# Patient Record
Sex: FEMALE | Race: WHITE | HISPANIC OR LATINO | Employment: UNEMPLOYED | URBAN - METROPOLITAN AREA
[De-identification: names, ages, dates, MRNs, and addresses within clinical notes are randomized per-mention and may not be internally consistent; named-entity substitution may affect disease eponyms.]

---

## 2017-02-21 RX ORDER — GLIMEPIRIDE 2 MG/1
TABLET ORAL
Refills: 0 | OUTPATIENT
Start: 2017-02-21

## 2017-04-20 ENCOUNTER — APPOINTMENT (OUTPATIENT)
Dept: RADIOLOGY | Facility: MEDICAL CENTER | Age: 37
End: 2017-04-20
Attending: EMERGENCY MEDICINE
Payer: MEDICAID

## 2017-04-20 ENCOUNTER — HOSPITAL ENCOUNTER (OUTPATIENT)
Facility: MEDICAL CENTER | Age: 37
End: 2017-04-21
Attending: EMERGENCY MEDICINE | Admitting: FAMILY MEDICINE
Payer: MEDICAID

## 2017-04-20 DIAGNOSIS — R51.9 NONINTRACTABLE HEADACHE, UNSPECIFIED CHRONICITY PATTERN, UNSPECIFIED HEADACHE TYPE: ICD-10-CM

## 2017-04-20 DIAGNOSIS — R07.9 CHEST PAIN, UNSPECIFIED TYPE: ICD-10-CM

## 2017-04-20 LAB
ALBUMIN SERPL BCP-MCNC: 4.3 G/DL (ref 3.2–4.9)
ALBUMIN/GLOB SERPL: 1 G/DL
ALP SERPL-CCNC: 84 U/L (ref 30–99)
ALT SERPL-CCNC: 49 U/L (ref 2–50)
ANION GAP SERPL CALC-SCNC: 12 MMOL/L (ref 0–11.9)
APTT PPP: 25.8 SEC (ref 24.7–36)
AST SERPL-CCNC: 24 U/L (ref 12–45)
BASOPHILS # BLD AUTO: 0.7 % (ref 0–1.8)
BASOPHILS # BLD: 0.07 K/UL (ref 0–0.12)
BILIRUB SERPL-MCNC: 0.4 MG/DL (ref 0.1–1.5)
BUN SERPL-MCNC: 9 MG/DL (ref 8–22)
CALCIUM SERPL-MCNC: 9.2 MG/DL (ref 8.4–10.2)
CHLORIDE SERPL-SCNC: 103 MMOL/L (ref 96–112)
CO2 SERPL-SCNC: 21 MMOL/L (ref 20–33)
CREAT SERPL-MCNC: 0.61 MG/DL (ref 0.5–1.4)
DEPRECATED D DIMER PPP IA-ACNC: <200 NG/ML(D-DU)
EKG IMPRESSION: NORMAL
EOSINOPHIL # BLD AUTO: 0.41 K/UL (ref 0–0.51)
EOSINOPHIL NFR BLD: 4 % (ref 0–6.9)
ERYTHROCYTE [DISTWIDTH] IN BLOOD BY AUTOMATED COUNT: 37.4 FL (ref 35.9–50)
GFR SERPL CREATININE-BSD FRML MDRD: >60 ML/MIN/1.73 M 2
GLOBULIN SER CALC-MCNC: 4.3 G/DL (ref 1.9–3.5)
GLUCOSE SERPL-MCNC: 243 MG/DL (ref 65–99)
HCG SERPL QL: NEGATIVE
HCT VFR BLD AUTO: 45 % (ref 37–47)
HGB BLD-MCNC: 15.7 G/DL (ref 12–16)
IMM GRANULOCYTES # BLD AUTO: 0.02 K/UL (ref 0–0.11)
IMM GRANULOCYTES NFR BLD AUTO: 0.2 % (ref 0–0.9)
INR PPP: 0.87 (ref 0.87–1.13)
LIPASE SERPL-CCNC: 30 U/L (ref 7–58)
LYMPHOCYTES # BLD AUTO: 3.77 K/UL (ref 1–4.8)
LYMPHOCYTES NFR BLD: 36.5 % (ref 22–41)
MCH RBC QN AUTO: 27.7 PG (ref 27–33)
MCHC RBC AUTO-ENTMCNC: 34.9 G/DL (ref 33.6–35)
MCV RBC AUTO: 79.4 FL (ref 81.4–97.8)
MONOCYTES # BLD AUTO: 0.57 K/UL (ref 0–0.85)
MONOCYTES NFR BLD AUTO: 5.5 % (ref 0–13.4)
NEUTROPHILS # BLD AUTO: 5.48 K/UL (ref 2–7.15)
NEUTROPHILS NFR BLD: 53.1 % (ref 44–72)
NRBC # BLD AUTO: 0 K/UL
NRBC BLD AUTO-RTO: 0 /100 WBC
PLATELET # BLD AUTO: 311 K/UL (ref 164–446)
PMV BLD AUTO: 10.2 FL (ref 9–12.9)
POTASSIUM SERPL-SCNC: 3.9 MMOL/L (ref 3.6–5.5)
PROT SERPL-MCNC: 8.6 G/DL (ref 6–8.2)
PROTHROMBIN TIME: 11.6 SEC (ref 12–14.6)
RBC # BLD AUTO: 5.67 M/UL (ref 4.2–5.4)
SODIUM SERPL-SCNC: 136 MMOL/L (ref 135–145)
TROPONIN I SERPL-MCNC: <0.02 NG/ML (ref 0–0.04)
TROPONIN I SERPL-MCNC: <0.02 NG/ML (ref 0–0.04)
WBC # BLD AUTO: 10.3 K/UL (ref 4.8–10.8)

## 2017-04-20 PROCEDURE — 83036 HEMOGLOBIN GLYCOSYLATED A1C: CPT

## 2017-04-20 PROCEDURE — 85025 COMPLETE CBC W/AUTO DIFF WBC: CPT

## 2017-04-20 PROCEDURE — 71020 DX-CHEST-2 VIEWS: CPT

## 2017-04-20 PROCEDURE — 85730 THROMBOPLASTIN TIME PARTIAL: CPT

## 2017-04-20 PROCEDURE — 84484 ASSAY OF TROPONIN QUANT: CPT

## 2017-04-20 PROCEDURE — 83690 ASSAY OF LIPASE: CPT

## 2017-04-20 PROCEDURE — 99285 EMERGENCY DEPT VISIT HI MDM: CPT

## 2017-04-20 PROCEDURE — 84443 ASSAY THYROID STIM HORMONE: CPT

## 2017-04-20 PROCEDURE — 84703 CHORIONIC GONADOTROPIN ASSAY: CPT

## 2017-04-20 PROCEDURE — 86140 C-REACTIVE PROTEIN: CPT

## 2017-04-20 PROCEDURE — 93005 ELECTROCARDIOGRAM TRACING: CPT

## 2017-04-20 PROCEDURE — 84439 ASSAY OF FREE THYROXINE: CPT

## 2017-04-20 PROCEDURE — 85379 FIBRIN DEGRADATION QUANT: CPT

## 2017-04-20 PROCEDURE — 85610 PROTHROMBIN TIME: CPT

## 2017-04-20 PROCEDURE — 700105 HCHG RX REV CODE 258: Performed by: EMERGENCY MEDICINE

## 2017-04-20 PROCEDURE — 80061 LIPID PANEL: CPT

## 2017-04-20 PROCEDURE — 94760 N-INVAS EAR/PLS OXIMETRY 1: CPT

## 2017-04-20 PROCEDURE — 36415 COLL VENOUS BLD VENIPUNCTURE: CPT

## 2017-04-20 PROCEDURE — 96372 THER/PROPH/DIAG INJ SC/IM: CPT

## 2017-04-20 PROCEDURE — 80053 COMPREHEN METABOLIC PANEL: CPT

## 2017-04-20 RX ORDER — SODIUM CHLORIDE 9 MG/ML
1000 INJECTION, SOLUTION INTRAVENOUS ONCE
Status: COMPLETED | OUTPATIENT
Start: 2017-04-20 | End: 2017-04-20

## 2017-04-20 RX ORDER — LEVOTHYROXINE SODIUM 0.07 MG/1
75 TABLET ORAL
COMMUNITY
End: 2019-07-06

## 2017-04-20 RX ORDER — LISINOPRIL 5 MG/1
2.5 TABLET ORAL DAILY
Status: ON HOLD | COMMUNITY
End: 2017-04-21

## 2017-04-20 RX ADMIN — SODIUM CHLORIDE 1000 ML: 9 INJECTION, SOLUTION INTRAVENOUS at 19:56

## 2017-04-20 RX ADMIN — SODIUM CHLORIDE 1000 ML: 900 INJECTION, SOLUTION INTRAVENOUS at 22:30

## 2017-04-20 ASSESSMENT — PAIN SCALES - GENERAL: PAINLEVEL_OUTOF10: 8

## 2017-04-20 NOTE — IP AVS SNAPSHOT
4/21/2017    Liana Degroot Panchito  [address]  [place] [address]    Dear Liana:    Pending sale to [facility] wants to ensure your discharge home is safe and you or your loved ones have had all of your questions answered regarding your care after you leave the hospital.    Below is a list of resources and contact information should you have any questions regarding your hospital stay, follow-up instructions, or active medical symptoms.    Questions or Concerns Regarding… Contact   Medical Questions Related to Your Discharge  (7 days a week, 8am-5pm) Contact a Nurse Care Coordinator   [phone]   Medical Questions Not Related to Your Discharge  (24 hours a day / 7 days a week)  Contact the Nurse Health Line   [phone]    Medications or Discharge Instructions Refer to your discharge packet   or contact your [facility] Primary Care Provider   [phone]   Follow-up Appointment(s) Schedule your appointment via [facility]   or contact Scheduling [phone]   Billing Review your statement via [facility]  or contact Billing [phone]   Medical Records Review your records via [facility]   or contact Medical Records [phone]     You may receive a telephone call within two days of discharge. This call is to make certain you understand your discharge instructions and have the opportunity to have any questions answered. You can also easily access your medical information, test results and upcoming appointments via the [facility] free online health management tool. You can learn more and sign up at [employer]. For assistance setting up your [facility] account, please call [phone].    Once again, we want to ensure your discharge home is safe and that you have a clear understanding of any next steps in your care. If you have any questions or concerns, please do not hesitate to contact us, we are here for you. Thank you for choosing [facility] for your healthcare needs.    Sincerely,    Your [facility] Healthcare Team

## 2017-04-20 NOTE — IP AVS SNAPSHOT
" <p align=\"LEFT\"><IMG SRC=\"//[email or web address]$/[email or web address]\" alt=\"Image\" WIDTH=\"50%\" HEIGHT=\"200\" BORDER=\"\"></p>                   Name:Liana Flanagan  Medical Record Number:[id number]  CSN: [id number]    YOB: 1980   Age: 37 y.o.  Sex: female  HT:1.6 m (5' 3\") WT: 107.1 kg (236 lb 1.8 oz)          Admit Date: 4/20/2017     Discharge Date:   Today's Date: 4/21/2017  Attending Doctor:  Nithin Costello M.D.                  Allergies:  Reglan and Zofran          Follow-up Information     1. Follow up with Kirsten Maher N.P.. Go on 4/28/2017.    Why:  Please arrive at 12:45pm for your appointment.    Contact information    [address] [place] [id number] [phone]         Medication List      Take these Medications        Instructions    atorvastatin 20 MG Tabs   Commonly known as:  LIPITOR    Take 1 Tab by mouth every bedtime.   Dose:  20 mg       insulin glargine 100 UNIT/ML Sopn injection   Commonly known as:  LANTUS    Inject 34 Units as instructed every evening.   Dose:  34 Units       levothyroxine 75 MCG Tabs   Commonly known as:  SYNTHROID    Take 75 mcg by mouth Every morning on an empty stomach.   Dose:  75 mcg       lisinopril 2.5 MG Tabs   Commonly known as:  PRINIVIL    Take 2.5 mg by mouth every day.   Dose:  2.5 mg       metformin 1000 MG tablet   What changed:    - medication strength  - how much to take  - when to take this   Commonly known as:  GLUCOPHAGE    Take 1 Tab by mouth 2 times a day.   Dose:  1000 mg       omeprazole 20 MG delayed-release capsule   Commonly known as:  PRILOSEC    Take 1 Cap by mouth 2 Times a Day.   Dose:  20 mg       sitagliptin 100 MG Tabs   What changed:    - medication strength  - how much to take   Commonly known as:  JANUVIA    Take 1 Tab by mouth every day.   Dose:  100 mg       sucralfate 1 GM/10ML Susp   Commonly known as:  CARAFATE    Take 10 mL by mouth every 6 hours for 14 days.   Dose:  1 g         "

## 2017-04-20 NOTE — IP AVS SNAPSHOT
[employer] Access Code: Activation code not generated  Current [employer] Status: Active    [employer]  A secure, online tool to manage your health information     [employer]’s [employer]® is a secure, online tool that connects you to your personalized health information from the privacy of your home -- day or night - making it very easy for you to manage your healthcare. Once the activation process is completed, you can even access your medical information using the [employer] zachariah, which is available for free in the [employer] store or Google Play store.     [employer] provides the following levels of access (as shown below):   My Chart Features   [facility] Primary Care Doctor [facility]  Specialists [facility]  Urgent  Care Non-[facility]  Primary Care  Doctor   Email your healthcare team securely and privately 24/7 X X X X   Manage appointments: schedule your next appointment; view details of past/upcoming appointments X      Request prescription refills. X      View recent personal medical records, including lab and immunizations X X X X   View health record, including health history, allergies, medications X X X X   Read reports about your outpatient visits, procedures, consult and ER notes X X X X   See your discharge summary, which is a recap of your hospital and/or ER visit that includes your diagnosis, lab results, and care plan. X X       How to register for [employer]:  1. Go to  [email or web address].  2. Click on the Sign Up Now box, which takes you to the New Member Sign Up page. You will need to provide the following information:  a. Enter your [employer] Access Code exactly as it appears at the top of this page. (You will not need to use this code after you’ve completed the sign-up process. If you do not sign up before the expiration date, you must request a new code.)   b. Enter your date of birth.   c. Enter your home email address.   d. Click Submit, and follow the next screen’s instructions.  3. Create a [employer] ID. This will  be your [employer] login ID and cannot be changed, so think of one that is secure and easy to remember.  4. Create a [employer] password. You can change your password at any time.  5. Enter your Password Reset Question and Answer. This can be used at a later time if you forget your password.   6. Enter your e-mail address. This allows you to receive e-mail notifications when new information is available in [employer].  7. Click Sign Up. You can now view your health information.    For assistance activating your [employer] account, call [phone]

## 2017-04-20 NOTE — ED NOTES
"In triage pt reports, \"I don't feel well\" points to right side of chest telling nurse it has been hurting for a few days, and when I walk it hurts too\". Also reports vague symptoms of dizziness, headache, being tired. EKG called in triage  "

## 2017-04-20 NOTE — IP AVS SNAPSHOT
" Home Care Instructions                                                                                                                  Name:Liana Flanagan  Medical Record Number:[id number]  CSN: [id number]    YOB: 1980   Age: 37 y.o.  Sex: female  HT:1.6 m (5' 3\") WT: 107.1 kg (236 lb 1.8 oz)          Admit Date: 4/20/2017     Discharge Date:   Today's Date: 4/21/2017  Attending Doctor:  Nithin Costello M.D.                  Allergies:  Reglan and Zofran            Discharge Instructions       Discharge Instructions    Discharged to home by car with self. Discharged via wheelchair, hospital escort: Yes.  Special equipment needed: Not Applicable    Be sure to schedule a follow-up appointment with your primary care doctor or any specialists as instructed.     Discharge Plan:   Influenza Vaccine Indication: Not indicated: Previously immunized this influenza season and > 8 years of age    I understand that a diet low in cholesterol, fat, and sodium is recommended for good health. Unless I have been given specific instructions below for another diet, I accept this instruction as my diet prescription.   Other diet: Diabetic diet     Special Instructions:     1) You symptoms are suggestive of Acid reflux and inflammation of the esophagus. Start Omeprazole and Sucralfate as prescribed.   2) Follow up with Primary Care Provider within one week of hospital discharge.   3) Your blood sugars are not well controlled. Increase Metformin to 1000 mg twice daily. Increase Januvia to 100 mg daily. Discuss better control with Primary care.   4) You need to start cholesterol medication. Start Atorvastatin 20 mg. Discuss with PCP. Have PCP check liver function and cholesterol levels in four weeks of hospital discharge.    · Is patient discharged on Warfarin / Coumadin?   No     · Is patient Post Blood Transfusion?  No    Depression / Suicide Risk    As you are discharged from this [facility] facility, it is important to " learn how to keep safe from harming yourself.    Recognize the warning signs:  · Abrupt changes in personality, positive or negative- including increase in energy   · Giving away possessions  · Change in eating patterns- significant weight changes-  positive or negative  · Change in sleeping patterns- unable to sleep or sleeping all the time   · Unwillingness or inability to communicate  · Depression  · Unusual sadness, discouragement and loneliness  · Talk of wanting to die  · Neglect of personal appearance   · Rebelliousness- reckless behavior  · Withdrawal from people/activities they love  · Confusion- inability to concentrate     If you or a loved one observes any of these behaviors or has concerns about self-harm, here's what you can do:  · Talk about it- your feelings and reasons for harming yourself  · Remove any means that you might use to hurt yourself (examples: pills, rope, extension cords, firearm)  · Get professional help from the community (Mental Health, Substance Abuse, psychological counseling)  · Do not be alone:Call your Safe Contact- someone whom you trust who will be there for you.  · Call your local CRISIS HOTLINE 909-5296 or 805-228-2514  · Call your local Children's Mobile Crisis Response Team [place] (387) 798-4771 or www.[place]  · Call the toll free National Suicide Prevention Hotlines   · National Suicide Prevention Lifeline 165-538-LEFL (5527)  · National Hope Line Network 800-SUICIDE (335-7536)      Gastroesophageal Reflux Disease, Adult  Gastroesophageal reflux disease (GERD) happens when acid from your stomach flows up into the esophagus. When acid comes in contact with the esophagus, the acid causes soreness (inflammation) in the esophagus. Over time, GERD may create small holes (ulcers) in the lining of the esophagus.  CAUSES   · Increased body weight. This puts pressure on the stomach, making acid rise from the stomach into the esophagus.  · Smoking. This increases  acid production in the stomach.  · Drinking alcohol. This causes decreased pressure in the lower esophageal sphincter (valve or ring of muscle between the esophagus and stomach), allowing acid from the stomach into the esophagus.  · Late evening meals and a full stomach. This increases pressure and acid production in the stomach.  · A malformed lower esophageal sphincter.  Sometimes, no cause is found.  SYMPTOMS   · Burning pain in the lower part of the mid-chest behind the breastbone and in the mid-stomach area. This may occur twice a week or more often.  · Trouble swallowing.  · Sore throat.  · Dry cough.  · Asthma-like symptoms including chest tightness, shortness of breath, or wheezing.  DIAGNOSIS   Your caregiver may be able to diagnose GERD based on your symptoms. In some cases, X-rays and other tests may be done to check for complications or to check the condition of your stomach and esophagus.  TREATMENT   Your caregiver may recommend over-the-counter or prescription medicines to help decrease acid production. Ask your caregiver before starting or adding any new medicines.   HOME CARE INSTRUCTIONS   · Change the factors that you can control. Ask your caregiver for guidance concerning weight loss, quitting smoking, and alcohol consumption.  · Avoid foods and drinks that make your symptoms worse, such as:  · Caffeine or alcoholic drinks.  · Chocolate.  · Peppermint or mint flavorings.  · Garlic and onions.  · Spicy foods.  · Citrus fruits, such as oranges, levon, or limes.  · Tomato-based foods such as sauce, chili, salsa, and pizza.  · Fried and fatty foods.  · Avoid lying down for the 3 hours prior to your bedtime or prior to taking a nap.  · Eat small, frequent meals instead of large meals.  · Wear loose-fitting clothing. Do not wear anything tight around your waist that causes pressure on your stomach.  · Raise the head of your bed 6 to 8 inches with wood blocks to help you sleep. Extra pillows will not  help.  · Only take over-the-counter or prescription medicines for pain, discomfort, or fever as directed by your caregiver.  · Do not take aspirin, ibuprofen, or other nonsteroidal anti-inflammatory drugs (NSAIDs).  SEEK IMMEDIATE MEDICAL CARE IF:   · You have pain in your arms, neck, jaw, teeth, or back.  · Your pain increases or changes in intensity or duration.  · You develop nausea, vomiting, or sweating (diaphoresis).  · You develop shortness of breath, or you faint.  · Your vomit is green, yellow, black, or looks like coffee grounds or blood.  · Your stool is red, bloody, or black.  These symptoms could be signs of other problems, such as heart disease, gastric bleeding, or esophageal bleeding.  MAKE SURE YOU:   · Understand these instructions.  · Will watch your condition.  · Will get help right away if you are not doing well or get worse.     This information is not intended to replace advice given to you by your health care provider. Make sure you discuss any questions you have with your health care provider.     Document Released: 09/27/2006 Document Revised: 01/08/2016 Document Reviewed: 04/13/2016  [employer] Interactive Patient Education ©2016 [employer]    Esophagitis  Esophagitis is inflammation of the esophagus. It can involve swelling, soreness, and pain in the esophagus. This condition can make it difficult and painful to swallow.  CAUSES   Most causes of esophagitis are not serious. Many different factors can cause esophagitis, including:  Gastroesophageal reflux disease (GERD). This is when acid from your stomach flows up into the esophagus.  Recurrent vomiting.  An allergic-type reaction.  Certain medicines, especially those that come in large pills.  Ingestion of harmful chemicals, such as household cleaning products.  Heavy alcohol use.  An infection of the esophagus.  Radiation treatment for cancer.  Certain diseases such as sarcoidosis, Crohn's disease, and scleroderma. These diseases may  cause recurrent esophagitis.  SYMPTOMS   Trouble swallowing.  Painful swallowing.  Chest pain.  Difficulty breathing.  Nausea.  Vomiting.  Abdominal pain.  DIAGNOSIS   Your caregiver will take your history and do a physical exam. Depending upon what your caregiver finds, certain tests may also be done, including:  Barium X-ray. You will drink a solution that coats the esophagus, and X-rays will be taken.  Endoscopy. A lighted tube is put down the esophagus so your caregiver can examine the area.  Allergy tests. These can sometimes be arranged through follow-up visits.  TREATMENT   Treatment will depend on the cause of your esophagitis. In some cases, steroids or other medicines may be given to help relieve your symptoms or to treat the underlying cause of your condition. Medicines that may be recommended include:  Viscous lidocaine, to soothe the esophagus.  Antacids.  Acid reducers.  Proton pump inhibitors.  Antiviral medicines for certain viral infections of the esophagus.  Antifungal medicines for certain fungal infections of the esophagus.  Antibiotic medicines, depending on the cause of the esophagitis.  HOME CARE INSTRUCTIONS   Avoid foods and drinks that seem to make your symptoms worse.  Eat small, frequent meals instead of large meals.  Avoid eating for the 3 hours prior to your bedtime.  If you have trouble taking pills, use a pill splitter to decrease the size and likelihood of the pill getting stuck or injuring the esophagus on the way down. Drinking water after taking a pill also helps.  Stop smoking if you smoke.  Maintain a healthy weight.  Wear loose-fitting clothing. Do not wear anything tight around your waist that causes pressure on your stomach.  Raise the head of your bed 6 to 8 inches with wood blocks to help you sleep. Extra pillows will not help.  Only take over-the-counter or prescription medicines as directed by your caregiver.  SEEK IMMEDIATE MEDICAL CARE IF:  You have severe chest pain  that radiates into your arm, neck, or jaw.  You feel sweaty, dizzy, or lightheaded.  You have shortness of breath.  You vomit blood.  You have difficulty or pain with swallowing.  You have bloody or black, tarry stools.  You have a fever.  You have a burning sensation in the chest more than 3 times a week for more than 2 weeks.  You cannot swallow, drink, or eat.  You drool because you cannot swallow your saliva.  MAKE SURE YOU:  Understand these instructions.  Will watch your condition.  Will get help right away if you are not doing well or get worse.     This information is not intended to replace advice given to you by your health care provider. Make sure you discuss any questions you have with your health care provider.     Document Released: 01/25/2006 Document Revised: 01/08/2016 Document Reviewed: 04/13/2016  [employer] Interactive Patient Education ©2016 [employer]        Follow-up Information     1. Follow up with Kirsten Maher N.P.. Go on 4/28/2017.    Why:  Please arrive at 12:45pm for your appointment.    Contact information    [address] [place] [id number] [phone]         Discharge Medication Instructions:    Below are the medications your physician expects you to take upon discharge:    Review all your home medications and newly ordered medications with your doctor and/or pharmacist. Follow medication instructions as directed by your doctor and/or pharmacist.    Please keep your medication list with you and share with your physician.               Medication List      START taking these medications        Instructions    Morning Afternoon Evening Bedtime    atorvastatin 20 MG Tabs   Commonly known as:  LIPITOR        Take 1 Tab by mouth every bedtime.   Dose:  20 mg                        omeprazole 20 MG delayed-release capsule   Commonly known as:  PRILOSEC        Take 1 Cap by mouth 2 Times a Day.   Dose:  20 mg                        sucralfate 1 GM/10ML Susp   Commonly known as:   CARAFATE        Take 10 mL by mouth every 6 hours for 14 days.   Dose:  1 g                          CHANGE how you take these medications        Instructions    Morning Afternoon Evening Bedtime    metformin 1000 MG tablet   What changed:    - medication strength  - how much to take  - when to take this   Last time this was given:  500 mg on 4/21/2017  1:08 PM   Commonly known as:  GLUCOPHAGE        Take 1 Tab by mouth 2 times a day.   Dose:  1000 mg                        sitagliptin 100 MG Tabs   What changed:    - medication strength  - how much to take   Last time this was given:  25 mg on 4/21/2017  1:07 PM   Commonly known as:  JANUVIA        Take 1 Tab by mouth every day.   Dose:  100 mg                          CONTINUE taking these medications        Instructions    Morning Afternoon Evening Bedtime    insulin glargine 100 UNIT/ML Sopn injection   Commonly known as:  LANTUS        Inject 34 Units as instructed every evening.   Dose:  34 Units                        levothyroxine 75 MCG Tabs   Last time this was given:  75 mcg on 4/21/2017  1:06 PM   Commonly known as:  SYNTHROID        Take 75 mcg by mouth Every morning on an empty stomach.   Dose:  75 mcg                        lisinopril 2.5 MG Tabs   Last time this was given:  2.5 mg on 4/21/2017  1:09 PM   Commonly known as:  PRINIVIL        Take 2.5 mg by mouth every day.   Dose:  2.5 mg                             Where to Get Your Medications      You can get these medications from any pharmacy     Bring a paper prescription for each of these medications    - atorvastatin 20 MG Tabs  - metformin 1000 MG tablet  - omeprazole 20 MG delayed-release capsule  - sitagliptin 100 MG Tabs  - sucralfate 1 GM/10ML Susp            Instructions           Diet / Nutrition:    Follow any diet instructions given to you by your doctor or the dietician, including how much salt (sodium) you are allowed each day.    If you are overweight, talk to your doctor about a  weight reduction plan.    Activity:    Remain physically active following your doctor's instructions about exercise and activity.    Rest often.     Any time you become even a little tired or short of breath, SIT DOWN and rest.    Worsening Symptoms:    Report any of the following signs and symptoms to the doctor's office immediately:    *Pain of jaw, arm, or neck  *Chest pain not relieved by medication                               *Dizziness or loss of consciousness  *Difficulty breathing even when at rest   *More tired than usual                                       *Bleeding drainage or swelling of surgical site  *Swelling of feet, ankles, legs or stomach                 *Fever (>100ºF)  *Pink or blood tinged sputum  *Weight gain (3lbs/day or 5lbs /week)           *Shock from internal defibrillator (if applicable)  *Palpitations or irregular heartbeats                *Cool and/or numb extremities    Stroke Awareness    Common Risk Factors for Stroke include:    Age  Atrial Fibrillation  Carotid Artery Stenosis  Diabetes Mellitus  Excessive alcohol consumption  High blood pressure  Overweight   Physical inactivity  Smoking    Warning signs and symptoms of a stroke include:    *Sudden numbness or weakness of the face, arm or leg (especially on one side of the body).  *Sudden confusion, trouble speaking or understanding.  *Sudden trouble seeing in one or both eyes.  *Sudden trouble walking, dizziness, loss of balance or coordination.Sudden severe headache with no known cause.    It is very important to get treatment quickly when a stroke occurs. If you experience any of the above warning signs, call 911 immediately.                   Disclaimer         Quit Smoking / Tobacco Use:    I understand the use of any tobacco products increases my chance of suffering from future heart disease or stroke and could cause other illnesses which may shorten my life. Quitting the use of tobacco products is the single most  important thing I can do to improve my health. For further information on smoking / tobacco cessation call a Toll Free Quit Line at 1-442.378.7864 (*[facility] [place]) or 1-610.131.7799 (American Lung Association) or you can access the web based program at www.lungusa.org.    [place] Tobacco Users Help Line:  (775) 983-5637       Toll Free: 1-108.483.9977  Quit Tobacco Program [facility] beds at the [facility] [phone]    Crisis Hotline:    [place] Crisis Hotline:  4-318-IXOEDTA or 1-775.704.9728    [place] Crisis Hotline:    1-118.154.5023 or 837-131-0731    Discharge Survey:   Thank you for choosing [facility] beds at the [facility]. We hope we did everything we could to make your hospital stay a pleasant one. You may be receiving a phone survey and we would appreciate your time and participation in answering the questions. Your input is very valuable to us in our efforts to improve our service to our patients and their families.        My signature on this form indicates that:    1. I have reviewed and understand the above information.  2. My questions regarding this information have been answered to my satisfaction.  3. I have formulated a plan with my discharge nurse to obtain my prescribed medications for home.                  Disclaimer         __________________________________                     __________       ________                       Patient Signature                                                 Date                    Time

## 2017-04-21 ENCOUNTER — APPOINTMENT (OUTPATIENT)
Dept: RADIOLOGY | Facility: MEDICAL CENTER | Age: 37
End: 2017-04-21
Attending: FAMILY MEDICINE
Payer: MEDICAID

## 2017-04-21 ENCOUNTER — PATIENT OUTREACH (OUTPATIENT)
Dept: HEALTH INFORMATION MANAGEMENT | Facility: OTHER | Age: 37
End: 2017-04-21

## 2017-04-21 ENCOUNTER — RESOLUTE PROFESSIONAL BILLING HOSPITAL PROF FEE (OUTPATIENT)
Dept: HOSPITALIST | Facility: MEDICAL CENTER | Age: 37
End: 2017-04-21
Payer: MEDICAID

## 2017-04-21 VITALS
WEIGHT: 236.11 LBS | RESPIRATION RATE: 18 BRPM | HEIGHT: 63 IN | BODY MASS INDEX: 41.84 KG/M2 | HEART RATE: 79 BPM | SYSTOLIC BLOOD PRESSURE: 121 MMHG | OXYGEN SATURATION: 97 % | TEMPERATURE: 97.7 F | DIASTOLIC BLOOD PRESSURE: 83 MMHG

## 2017-04-21 PROBLEM — R07.9 CHEST PAIN: Status: ACTIVE | Noted: 2017-04-21

## 2017-04-21 LAB
CHOLEST SERPL-MCNC: 206 MG/DL (ref 100–199)
CRP SERPL HS-MCNC: 1.01 MG/DL (ref 0–0.75)
EST. AVERAGE GLUCOSE BLD GHB EST-MCNC: 243 MG/DL
GLUCOSE BLD-MCNC: 114 MG/DL (ref 65–99)
GLUCOSE BLD-MCNC: 118 MG/DL (ref 65–99)
GLUCOSE BLD-MCNC: 180 MG/DL (ref 65–99)
HBA1C MFR BLD: 10.1 % (ref 0–5.6)
HDLC SERPL-MCNC: 61 MG/DL
LDLC SERPL CALC-MCNC: 92 MG/DL
T4 FREE SERPL-MCNC: 0.76 NG/DL (ref 0.58–1.64)
TRIGL SERPL-MCNC: 264 MG/DL (ref 0–149)
TROPONIN I SERPL-MCNC: <0.02 NG/ML (ref 0–0.04)
TSH SERPL DL<=0.005 MIU/L-ACNC: 3.13 UIU/ML (ref 0.35–5.5)

## 2017-04-21 PROCEDURE — 700105 HCHG RX REV CODE 258: Performed by: FAMILY MEDICINE

## 2017-04-21 PROCEDURE — A9270 NON-COVERED ITEM OR SERVICE: HCPCS | Performed by: FAMILY MEDICINE

## 2017-04-21 PROCEDURE — 700102 HCHG RX REV CODE 250 W/ 637 OVERRIDE(OP): Performed by: FAMILY MEDICINE

## 2017-04-21 PROCEDURE — A9270 NON-COVERED ITEM OR SERVICE: HCPCS | Performed by: EMERGENCY MEDICINE

## 2017-04-21 PROCEDURE — 99236 HOSP IP/OBS SAME DATE HI 85: CPT | Performed by: FAMILY MEDICINE

## 2017-04-21 PROCEDURE — 82962 GLUCOSE BLOOD TEST: CPT

## 2017-04-21 PROCEDURE — G0378 HOSPITAL OBSERVATION PER HR: HCPCS

## 2017-04-21 PROCEDURE — 84484 ASSAY OF TROPONIN QUANT: CPT

## 2017-04-21 PROCEDURE — 700111 HCHG RX REV CODE 636 W/ 250 OVERRIDE (IP): Performed by: FAMILY MEDICINE

## 2017-04-21 PROCEDURE — A9502 TC99M TETROFOSMIN: HCPCS

## 2017-04-21 PROCEDURE — 700102 HCHG RX REV CODE 250 W/ 637 OVERRIDE(OP)

## 2017-04-21 PROCEDURE — 700102 HCHG RX REV CODE 250 W/ 637 OVERRIDE(OP): Performed by: EMERGENCY MEDICINE

## 2017-04-21 PROCEDURE — 700111 HCHG RX REV CODE 636 W/ 250 OVERRIDE (IP)

## 2017-04-21 RX ORDER — OMEPRAZOLE 20 MG/1
20 CAPSULE, DELAYED RELEASE ORAL 2 TIMES DAILY
Status: DISCONTINUED | OUTPATIENT
Start: 2017-04-21 | End: 2017-04-21 | Stop reason: HOSPADM

## 2017-04-21 RX ORDER — SUCRALFATE ORAL 1 G/10ML
1 SUSPENSION ORAL EVERY 6 HOURS
Status: DISCONTINUED | OUTPATIENT
Start: 2017-04-21 | End: 2017-04-21 | Stop reason: HOSPADM

## 2017-04-21 RX ORDER — LABETALOL HYDROCHLORIDE 5 MG/ML
10 INJECTION, SOLUTION INTRAVENOUS EVERY 4 HOURS PRN
Status: DISCONTINUED | OUTPATIENT
Start: 2017-04-21 | End: 2017-04-21 | Stop reason: HOSPADM

## 2017-04-21 RX ORDER — SODIUM CHLORIDE 9 MG/ML
INJECTION, SOLUTION INTRAVENOUS CONTINUOUS
Status: DISCONTINUED | OUTPATIENT
Start: 2017-04-21 | End: 2017-04-21

## 2017-04-21 RX ORDER — LEVOTHYROXINE SODIUM 0.05 MG/1
75 TABLET ORAL
Status: DISCONTINUED | OUTPATIENT
Start: 2017-04-21 | End: 2017-04-21 | Stop reason: HOSPADM

## 2017-04-21 RX ORDER — PROMETHAZINE HYDROCHLORIDE 25 MG/1
12.5-25 SUPPOSITORY RECTAL EVERY 4 HOURS PRN
Status: DISCONTINUED | OUTPATIENT
Start: 2017-04-21 | End: 2017-04-21 | Stop reason: HOSPADM

## 2017-04-21 RX ORDER — ZOLPIDEM TARTRATE 5 MG/1
5 TABLET ORAL
Status: DISCONTINUED | OUTPATIENT
Start: 2017-04-21 | End: 2017-04-21 | Stop reason: HOSPADM

## 2017-04-21 RX ORDER — DEXTROSE MONOHYDRATE 25 G/50ML
25 INJECTION, SOLUTION INTRAVENOUS
Status: DISCONTINUED | OUTPATIENT
Start: 2017-04-21 | End: 2017-04-21 | Stop reason: HOSPADM

## 2017-04-21 RX ORDER — OMEPRAZOLE 20 MG/1
20 CAPSULE, DELAYED RELEASE ORAL 2 TIMES DAILY
Qty: 60 CAP | Refills: 0 | Status: SHIPPED | OUTPATIENT
Start: 2017-04-21 | End: 2017-07-24

## 2017-04-21 RX ORDER — SUCRALFATE ORAL 1 G/10ML
1 SUSPENSION ORAL EVERY 6 HOURS
Qty: 560 ML | Refills: 0 | Status: SHIPPED | OUTPATIENT
Start: 2017-04-21 | End: 2017-05-05

## 2017-04-21 RX ORDER — LISINOPRIL 5 MG/1
2.5 TABLET ORAL DAILY
Status: DISCONTINUED | OUTPATIENT
Start: 2017-04-21 | End: 2017-04-21 | Stop reason: HOSPADM

## 2017-04-21 RX ORDER — AMINOPHYLLINE 25 MG/ML
INJECTION, SOLUTION INTRAVENOUS
Status: COMPLETED
Start: 2017-04-21 | End: 2017-04-21

## 2017-04-21 RX ORDER — ASPIRIN 81 MG/1
324 TABLET, CHEWABLE ORAL ONCE
Status: COMPLETED | OUTPATIENT
Start: 2017-04-21 | End: 2017-04-21

## 2017-04-21 RX ORDER — REGADENOSON 0.08 MG/ML
INJECTION, SOLUTION INTRAVENOUS
Status: COMPLETED
Start: 2017-04-21 | End: 2017-04-21

## 2017-04-21 RX ORDER — ACETAMINOPHEN 325 MG/1
650 TABLET ORAL EVERY 6 HOURS PRN
Status: DISCONTINUED | OUTPATIENT
Start: 2017-04-21 | End: 2017-04-21 | Stop reason: HOSPADM

## 2017-04-21 RX ORDER — PROMETHAZINE HYDROCHLORIDE 25 MG/1
12.5-25 TABLET ORAL EVERY 4 HOURS PRN
Status: DISCONTINUED | OUTPATIENT
Start: 2017-04-21 | End: 2017-04-21 | Stop reason: HOSPADM

## 2017-04-21 RX ORDER — ATORVASTATIN CALCIUM 10 MG/1
20 TABLET, FILM COATED ORAL
Status: DISCONTINUED | OUTPATIENT
Start: 2017-04-21 | End: 2017-04-21 | Stop reason: HOSPADM

## 2017-04-21 RX ORDER — ATORVASTATIN CALCIUM 20 MG/1
20 TABLET, FILM COATED ORAL
Qty: 30 TAB | Refills: 0 | Status: SHIPPED | OUTPATIENT
Start: 2017-04-21 | End: 2019-07-06

## 2017-04-21 RX ORDER — LISINOPRIL 2.5 MG/1
2.5 TABLET ORAL DAILY
COMMUNITY
End: 2019-07-06

## 2017-04-21 RX ADMIN — REGADENOSON 0.4 MG: 0.08 INJECTION, SOLUTION INTRAVENOUS at 11:22

## 2017-04-21 RX ADMIN — SITAGLIPTIN 25 MG: 50 TABLET, FILM COATED ORAL at 13:07

## 2017-04-21 RX ADMIN — LISINOPRIL 2.5 MG: 5 TABLET ORAL at 13:09

## 2017-04-21 RX ADMIN — ASPIRIN 81 MG CHEWABLE TABLET 324 MG: 81 TABLET CHEWABLE at 01:05

## 2017-04-21 RX ADMIN — METFORMIN HYDROCHLORIDE 500 MG: 500 TABLET, FILM COATED ORAL at 13:08

## 2017-04-21 RX ADMIN — AMINOPHYLLINE 100 MG: 25 INJECTION, SOLUTION INTRAVENOUS at 11:30

## 2017-04-21 RX ADMIN — SODIUM CHLORIDE: 9 INJECTION, SOLUTION INTRAVENOUS at 03:21

## 2017-04-21 RX ADMIN — INSULIN LISPRO 2 UNITS: 100 INJECTION, SOLUTION INTRAVENOUS; SUBCUTANEOUS at 17:10

## 2017-04-21 RX ADMIN — ENOXAPARIN SODIUM 40 MG: 100 INJECTION SUBCUTANEOUS at 13:18

## 2017-04-21 RX ADMIN — LEVOTHYROXINE SODIUM 75 MCG: 50 TABLET ORAL at 13:06

## 2017-04-21 ASSESSMENT — LIFESTYLE VARIABLES
ALCOHOL_USE: NO
EVER_SMOKED: NEVER
EVER_SMOKED: YES

## 2017-04-21 ASSESSMENT — PAIN SCALES - GENERAL
PAINLEVEL_OUTOF10: 4
PAINLEVEL_OUTOF10: 0

## 2017-04-21 NOTE — CARE PLAN
Problem: Communication  Goal: The ability to communicate needs accurately and effectively will improve  Intervention: [facility] patient and significant other/support system to call light to alert staff of needs    04/21/17 1047   OTHER   Oriented to: All of the Following : Location of Bathroom, Visiting Policy, Unit Routine, Call Light and Bedside Controls, Bedside Rail Policy, Smoking Policy, Rights and Responsibilities, Bedside Report, and Patient Education Notebook           Problem: Knowledge Deficit  Goal: Knowledge of disease process/condition, treatment plan, diagnostic tests, and medications will improve  Intervention: Explain information regarding disease process/condition, treatment plan, diagnostic tests, and medications and document in education  Educated pt on stress test and why test is needed. Verbalized understanding.

## 2017-04-21 NOTE — ED NOTES
Assumed patient care. Pt assesement done.  Plan of care reviewed with patient. Pt denies any needs at this time, informed that waiting on hospitalist to come and assess, pt verbalized understanding.

## 2017-04-21 NOTE — ED PROVIDER NOTES
CHIEF COMPLAINT  Chest pain    HPI  Liana Flanagan is a 37 y.o. female who presents with chest pain. She states that the pain started 3 or 4 days ago and has been intermittent. She describes it as pressure-like in the middle of her chest. It does not radiate. She denies alleviating or exacerbating factors. She also admits to some nausea as well as a headache and dizziness intermittently. She states that she also feels short of breath. She does have a significant past medical history for diabetes and states that her blood sugars have been running high. She states that she does also a history of DVT after she was diagnosed with a cellulitis in her lower extremity. She states that she was on Coumadin for several years however stopped taking it several years ago. She denies any syncope, fevers, URI symptoms, focal neuro deficit, urinary symptoms, vomiting, or diarrhea.    REVIEW OF SYSTEMS  See HPI for further details. All other systems are negative.     PAST MEDICAL HISTORY   has a past medical history of Type II or unspecified type diabetes mellitus without mention of complication, not stated as uncontrolled; Sleep apnea; Hypertension; Personal history of venous thrombosis and embolism; Herpes; Obese; and Chickenpox.    SOCIAL HISTORY  Social History     Social History Main Topics   • Smoking status: Former Smoker -- 0.50 packs/day for .3 years     Types: Cigarettes     Quit date: 01/01/2008   • Smokeless tobacco: Never Used   • Alcohol Use: No   • Drug Use: No      Comment: past hx of meth use at age 19 x 3 mos   • Sexual Activity:     Partners: Male       SURGICAL HISTORY  patient denies any surgical history    CURRENT MEDICATIONS  Home Medications     **Home medications have not yet been reviewed for this encounter**          ALLERGIES  Allergies   Allergen Reactions   • Reglan [Metoclopramide] Vomiting   • Zofran Hives       PHYSICAL EXAM  VITAL SIGNS: /86 mmHg  Pulse 69  Temp(Src) 36.9 °C (98.4 °F)  " Resp 16  Ht 1.6 m (5' 3\")  Wt 107.1 kg (236 lb 1.8 oz)  BMI 41.84 kg/m2  SpO2 95%   Constitutional: Alert and in no apparent distress. Morbidly obese female.  HENT: Normocephalic atraumatic. Bilateral external ears normal. Nose normal. Mucous membranes are moist.  Eyes: Pupils are equal and reactive. Conjunctiva normal. Non-icteric sclera.   Neck: Normal range of motion without tenderness. Supple. No meningeal signs.  Cardiovascular: Regular rate and rhythm. No murmurs, gallops or rubs.  Thorax & Lungs: Breath sounds are clear to auscultation bilaterally. No wheezing, rhonchi or rales.  Abdomen: Exam is limited secondary to obesity however it is otherwise soft, nontender and nondistended. No peritoneal signs noted.  Skin: Warm and dry. No rashes are noted.  Back: No bony tenderness, No CVA tenderness.   Extremities: 2+ peripheral pulses. Cap refill is less than 2 seconds. No edema, cyanosis, or clubbing.  Musculoskeletal: Good range of motion in all major joints. No tenderness to palpation or major deformities noted. No calf tenderness or swelling is noted.  Neurologic: Alert and oriented ×3. The patient moves all 4 extremities and follows commands.  Psychiatric: Affect is anxious. Judgment appears to be intact.      DIAGNOSTIC STUDIES / PROCEDURES    EKG  EKG was performed at 15:35. It shows a normal sinus rhythm with a heart rate of 69. Axis is normal. MO interval is 161. QRS is 101. QT and QTC are 428/459. No acute ST elevation or depression is noted. Impression nonspecific EKG.    LABS  Results for orders placed or performed during the hospital encounter of 04/20/17   CBC w/ Differential   Result Value Ref Range    WBC 10.3 4.8 - 10.8 K/uL    RBC 5.67 (H) 4.20 - 5.40 M/uL    Hemoglobin 15.7 12.0 - 16.0 g/dL    Hematocrit 45.0 37.0 - 47.0 %    MCV 79.4 (L) 81.4 - 97.8 fL    MCH 27.7 27.0 - 33.0 pg    MCHC 34.9 33.6 - 35.0 g/dL    RDW 37.4 35.9 - 50.0 fL    Platelet Count 311 164 - 446 K/uL    MPV 10.2 9.0 - " 12.9 fL    Neutrophils-Polys 53.10 44.00 - 72.00 %    Lymphocytes 36.50 22.00 - 41.00 %    Monocytes 5.50 0.00 - 13.40 %    Eosinophils 4.00 0.00 - 6.90 %    Basophils 0.70 0.00 - 1.80 %    Immature Granulocytes 0.20 0.00 - 0.90 %    Nucleated RBC 0.00 /100 WBC    Neutrophils (Absolute) 5.48 2.00 - 7.15 K/uL    Lymphs (Absolute) 3.77 1.00 - 4.80 K/uL    Monos (Absolute) 0.57 0.00 - 0.85 K/uL    Eos (Absolute) 0.41 0.00 - 0.51 K/uL    Baso (Absolute) 0.07 0.00 - 0.12 K/uL    Immature Granulocytes (abs) 0.02 0.00 - 0.11 K/uL    NRBC (Absolute) 0.00 K/uL   Complete Metabolic Panel (CMP)   Result Value Ref Range    Sodium 136 135 - 145 mmol/L    Potassium 3.9 3.6 - 5.5 mmol/L    Chloride 103 96 - 112 mmol/L    Co2 21 20 - 33 mmol/L    Anion Gap 12.0 (H) 0.0 - 11.9    Glucose 243 (H) 65 - 99 mg/dL    Calcium 9.2 8.4 - 10.2 mg/dL    AST(SGOT) 24 12 - 45 U/L    ALT(SGPT) 49 2 - 50 U/L    Alkaline Phosphatase 84 30 - 99 U/L    Bun 9 8 - 22 mg/dL    Creatinine 0.61 0.50 - 1.40 mg/dL    Total Bilirubin 0.4 0.1 - 1.5 mg/dL    Albumin 4.3 3.2 - 4.9 g/dL    Total Protein 8.6 (H) 6.0 - 8.2 g/dL    Globulin 4.3 (H) 1.9 - 3.5 g/dL    A-G Ratio 1.0 g/dL   Lipase   Result Value Ref Range    Lipase 30 7 - 58 U/L   Troponin in two (2) hours   Result Value Ref Range    Troponin I <0.02 0.00 - 0.04 ng/mL   Troponin STAT   Result Value Ref Range    Troponin I <0.02 0.00 - 0.04 ng/mL   PT/INR   Result Value Ref Range    PT 11.6 (L) 12.0 - 14.6 sec    INR 0.87 0.87 - 1.13   APTT   Result Value Ref Range    APTT 25.8 24.7 - 36.0 sec   D-Dimer (only helpful in low pre-test probability wells critieria. Do not order if patient ruled out by PERC criteria. See Weblinks at top of Labs section)   Result Value Ref Range    D-Dimer Screen <200 <250 ng/mL(D-DU)   HCG QUAL SERUM   Result Value Ref Range    Beta-Hcg Qualitative Serum Negative Negative   ESTIMATED GFR   Result Value Ref Range    GFR If African American >60 >60 mL/min/1.73 m 2    GFR If  Non African American >60 >60 mL/min/1.73 m 2   EKG (ER)   Result Value Ref Range    Report       [facility] Emergency Dept.    Test Date:  2017  Pt Name:    PRANAV URIBE                 Department: [facility]  MRN:        [id number]                      Room:  Gender:     F                            Technician: MS  :        [id number]                   Requested By:ER TRIAGE PROTOCOL  Order #:    [id number]                    Reading MD:    Measurements  Intervals                                Axis  Rate:       69                           P:          51  AR:         161                          QRS:        67  QRSD:       101                          T:          45  QT:         428  QTc:        459    Interpretive Statements  Sinus rhythm  Baseline wander in lead(s) V1  Compared to ECG 2016 16:14:17  No significant changes           RADIOLOGY  DX-CHEST-2 VIEWS   Final Result         1. No active cardiopulmonary abnormalities are identified.          COURSE & MEDICAL DECISION MAKING  Pertinent Labs & Imaging studies reviewed. (See chart for details)  This is a 37-year-old female with history of diabetes, obesity, DVT, and hypertension presenting to the ED with chest pain. On initial evaluation, the patient did not appear to be in any acute distress, and her vital signs are stable. Specifically, she was not tachycardic or hypoxic. Initial concern was for ACS versus PE versus DKA.    EKG was performed and did not reveal any evidence of acute ischemia at this time. 1st troponin was negative. D-dimer was also negative. I have low clinical suspicion for PE or DVT. Her glucose was noted to be 243 however her bicarb was normal. Do not believe that she is in DKA at this point. She was treated with IV fluids and her symptoms improved. However, the patient does have significant risk factors for ACS. She has not had a stress test. She was admitted for chest pain rule out and stress testing.      11:20 PM - Discussed the case with Dr Butcher who agreed with the plan and accepted the patient    FINAL IMPRESSION  1. Chest pain  2. Dizziness    Electronically signed by: Yuliana Villagomez, 4/20/2017 7:09 PM

## 2017-04-21 NOTE — DIETARY
NUTRITION SERVICES: BMI - Pt with BMI >40 (41.84). Weight loss counseling not appropriate in acute care setting. RECOMMEND - Referral to outpatient nutrition services for weight management after D/C.

## 2017-04-21 NOTE — PROGRESS NOTES
Received bedside report from Nichelle ARMENTA. Assumed care of pt who is resting in bed. RR even/unlabored. Fall protocol in place. CLIP. Will continue to monitor. Pt aware of pending stress test.

## 2017-04-21 NOTE — H&P
HPI:    Patient is a 37 year old female who comes to the ER because of chest pain for the past 2 days. She states the pain is mostly located substernally with radiation to her back. She says the pain is 6/10, intermittent and dull in nature. She denies having any cardiac problems in the past. She does have a history of diabetes but she denies hypertension or dyslipidemia. She also denies tobacco use. In the ER, first 2 sets of negative troponins are negative. EKG shows a sinus rhythm at 69 bpm. Her sugars are elevated.     ROS: Positive for chest pain. All other ROS are negative.     PAST MEDICAL HISTORY   has a past medical history of Type II Diabetes, Hypothyroidism, Sleep apnea; Personal history of venous thrombosis and embolism; Herpes; Obese; and Chickenpox.    SOCIAL HISTORY  Social History        Social History Main Topics    •  Smoking status:  Former Smoker -- 0.50 packs/day for .3 years        Types:  Cigarettes        Quit date:  01/01/2008    •  Smokeless tobacco:  Never Used    •  Alcohol Use:  No    •  Drug Use:  No          Comment: past hx of meth use at age 19 x 3 mos    •  Sexual Activity:        Partners:  Male        SURGICAL HISTORY  patient denies any surgical history        MEDICATIONS:  Levothyroxine, lisinopril, metformin, Januvia      ALLERGIES  Allergies    Allergen  Reactions    •  Reglan [Metoclopramide]  Vomiting    •  Zofran               Physical Exam:  Gen: NAD, lying comfortably in bed  HEENT: NC/AT, MMM  Chest: symmetrical expansion, no costochondral tenderness on palpation  Lungs: CTA B/L, no w/r/r  CV: +S1, S2, no m/r/g  Abdomen: S/NT/ND. + BS x 4  Ext: no c/c/e, FROM x 4  Skin: dry, warm to touch   Neuro: CN II - XII intact, no focal motor or sensory deficits noted  Psych: A+O x 3, judgment is intact        Labs (CBC): Last 72 Hours  (Last 3 results in the past 72 hours)             04/20/17 1955          WBC  10.3        Neutrophils-Polys  53.10        Basophils  0.70         "RBC  5.67        Hemoglobin  15.7        Hematocrit  45.0        MCV  79.4        MCH  27.7        MCHC  34.9        Platelet Count  311        RDW  37.4        MPV  10.2        Neutrophils (Absolute)  5.48        Lymphs (Absolute)  3.77                   - Comment  - Low  - High            Labs (Metabolic): Last 72 Hours  (Last 3 results in the past 72 hours)             04/20/17 1955          Sodium  136        Potassium  3.9        Chloride  103        Co2  21        Glucose  243        Bun  9        Creatinine  0.61        Calcium  9.2        AST(SGOT)  24        ALT(SGPT)  49        Alkaline Phosphatase  84        Total Bilirubin  0.4        Albumin  4.3        Total Protein  8.6        Globulin  4.3        A-G Ratio  1.0        GFR If African American 60 mL/min/1.73 m 2  Lab: V\" class=\"rz_1o\" style=\"padding-left: 2px;\"> 60 mL/min/1.73 m 2  Lab: V\" class=\"rz_1p\" style=\"padding-left: 3px;\">>60        GFR If Non African American 60 mL/min/1.73 m 2  Lab: V\" class=\"rz_1o\" style=\"padding-left: 2px;\"> 60 mL/min/1.73 m 2  Lab: V\" class=\"rz_1p\" style=\"padding-left: 3px;\">>60        Anion Gap  12.0                   - High              Labs (Additional): Last 72 Hours  (Last 3 results in the past 72 hours)             04/20/17 2142 04/20/17 1955          INR    0.87        Troponin I 0.50 ng/mL  \" class=\"rz_1o\" style=\"padding-left: 2px;\"> 0.50 ng/mL  \" class=\"rz_1p\" style=\"padding-left: 4px; border-right-color: lightgray; border-right-width: 1px; border-right-style: solid;\"><0.02 0.50 ng/mL  \" class=\"rz_1o\" style=\"padding-left: 2px;\"> 0.50 ng/mL  \" class=\"rz_1p\" style=\"padding-left: 4px; border-right-color: lightgray; border-right-width: 1px; border-right-style: solid;\"><0.02         4/20/2017 7:59 PM    HISTORY/REASON FOR EXAM:  Chest Pain      TECHNIQUE/EXAM DESCRIPTION AND NUMBER OF VIEWS:  Two views of the chest.    COMPARISON:  7/13/2016.    FINDINGS:  No pulmonary infiltrates or consolidations are " noted.  No pleural effusions, no pneumothorax are appreciated.  Stable cardiopericardial silhouette.           Impression          1. No active cardiopulmonary abnormalities are identified.         Assessment/Plan:    Chest pain  Hyperglycemia due to uncontrolled DM Type II  Hypothyroidism  Hx of DVT x 3  Sleep apnea  Morbid obesity    Patient with 2 sets of negative troponins. EKG is normal. Monitor third set of troponin. Nuclear stress test ordered. Check lipid panel, HgbA1c, CRP, TSH, Free T4.  Continue IV fluids for hyperglycemia. Continue metformin and Januvia. Start insulin sliding scale  Apparently has had 3 episodes of lower extremity DVTs in the past but is not on any anticoagulation at home. Current D-dimer is negative

## 2017-04-21 NOTE — PROGRESS NOTES
Admit to 313-1 from ED. Ambulate from gurney to bed, gait steady. Alert and oriented. Respirations even and unlabored. IVF hung and infusing without issue. Oriented to room and equipment. Call light in reach.     Tele Report:   Sinus Rhythm  No ectopy  HR 55-70  NJ 0.18  QRS 0.08  QT 0.40

## 2017-04-21 NOTE — PROGRESS NOTES
The Medication Reconciliation process has been completed by interviewing the patient and a call to [facility]     Allergies have been reviewed  Antibiotic use in 30 days - NONE    Home Pharmacy:  [facility]

## 2017-04-21 NOTE — CARE PLAN
Problem: Safety  Goal: Will remain free from injury  Outcome: PROGRESSING AS EXPECTED  Oriented to room and equipment. Call light instructions given. In reach at all times. Slipper socks in place. Floor dry and uncluttered. Bed locked and in lowest position.     Problem: Knowledge Deficit  Goal: Knowledge of disease process/condition, treatment plan, diagnostic tests, and medications will improve  Outcome: PROGRESSING AS EXPECTED  Plan of care discussed with patient. Opportunity given for questions. States understanding.     Problem: Pain Management  Goal: Pain level will decrease to patient’s comfort goal  Outcome: PROGRESSING AS EXPECTED  Uses 0-10 scale appropriately. Pharmacologic and nonpharmacologic interventions in place.

## 2017-04-21 NOTE — PROGRESS NOTES
Assessment completed, pt aware stress test is roughly at 930. IV fluids running, PIV flushing well. Educated on procedure. Denies needs. CLIP.

## 2017-04-22 NOTE — PROGRESS NOTES
Discharge instructions provided, verbalized understanding. PIV removed, tip intact. printed scripts given. Work note provided. Side effects of new medications discussed. Pt will use call light when ready to go.

## 2017-04-22 NOTE — DISCHARGE SUMMARY
C O N F I D E N T I A L I N F O R M A T I O N   -------------------------------------------------------------------------------------------------------------------   DISCHARGE SUMMARY    Patient ID:  Liana Flanagan  [id number]  37 y.o.female  1980    Admit date: 4/20/2017    Discharge date and time: 04/21/2017    Admitting Physician: George Brown D.O.    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  7) Hypothyroidism  8) History of DVT  9) Hx SILVIA    Admission Condition: fair    Discharged Condition: good    Indication for Admission: Chest pain    HPI: As noted by Dr Brown on 04/21/2017.    Hospital Course: This is a 37 female presented to the [facility] ER with chest pain. Detail review with patient reveals that this has been ongoing for some time. Mostly after meals. [place]. There has been ongoing severe reflux like symptoms including reflux of sour contents into her throat causing pain. She was admitted to the hospital. Pain was non pleuritic. Overall low risk for PE. Given prior history of DVT D Dimer was obtained and found to be negative. Presentation was noted to be not consistent with aortic pathologies. She was admitted to the hospital. Given underlying uncontrolled DM, and underlying risk factors it was decided to r/o cardiac etiologies by obtaining a stress test. This was obtained after troponin's remained negative and no events noted on telemetry. MPI obtained did not reveal any evidence of ischemia or infarct. Presentation most consistent with esophageal pain / suspected esophagitis / GERD like symptoms. She has been started on oral PPI / Sucralfate. This has been prescribed on discharge. In addition patient with uncontrolled DM. I have increased  the metformin and Januvia on discharge. Advised close discussion with PCP following discharge home. In addition patient candidate for statin therapy and this has been started. Patient advised very close f/u with PCP following discharge for ongoing close care. She is discharged home in stable condition. Discharge planning has been discussed in detail with the patient.     Things to follow up after discharge:   1) You symptoms are suggestive of Acid reflux and inflammation of the esophagus. Start Omeprazole and Sucralfate as prescribed.   2) Follow up with PCP within one week of hospital discharge.   3) Your blood sugars are not well controlled. Increase Metformin to 1000 mg twice daily. Increase Januvia to 100 mg daily. Discuss better control with PCP.   4) You need to start cholesterol medication. Start Atorvastatin 20 mg. Discuss with PCP. Have PCP check liver function and cholesterol levels in four weeks of hospital discharge.    Consults: none    Significant Studies:   NM-CARDIAC STRESS TEST   Final Result      DX-CHEST-2 VIEWS   Final Result         1. No active cardiopulmonary abnormalities are identified.          Procedures Performed While Hospitalized: None    Operations During Hospitalization: None    Disposition: Home    Patient Instructions: Given  Activity: activity as tolerated  Diet: Low fatty food. Avoid choclates. Caffeine.   Wound Care: none needed    Medications at discharge:   Liana Flanagan   Home Medication Instructions ROSANNE:[id number]    Printed on:04/21/17 5292   Medication Information                      atorvastatin (LIPITOR) 20 MG Tab  Take 1 Tab by mouth every bedtime.             insulin glargine (LANTUS) 100 UNIT/ML Solution Pen-injector injection  Inject 34 Units as instructed every evening.             levothyroxine (SYNTHROID) 75 MCG Tab  Take 75 mcg by mouth Every morning on an empty stomach.             lisinopril (PRINIVIL) 2.5 MG Tab  Take 2.5 mg by mouth every day.              metformin (GLUCOPHAGE) 1000 MG tablet  Take 1 Tab by mouth 2 times a day.             omeprazole (PRILOSEC) 20 MG delayed-release capsule  Take 1 Cap by mouth 2 Times a Day.             sitagliptin (JANUVIA) 100 MG Tab  Take 1 Tab by mouth every day.             sucralfate (CARAFATE) 1 GM/10ML Suspension  Take 10 mL by mouth every 6 hours for 14 days.                 Opioid prescription history checked: N/A. None prescribed.     Follow-up with PCP in 7 days.    Signed:  Nithin Costello  4/21/2017  7:07 PM

## 2017-04-22 NOTE — DISCHARGE INSTRUCTIONS
Discharge Instructions    Discharged to home by car with self. Discharged via wheelchair, hospital escort: Yes.  Special equipment needed: Not Applicable    Be sure to schedule a follow-up appointment with your primary care doctor or any specialists as instructed.     Discharge Plan:   Influenza Vaccine Indication: Not indicated: Previously immunized this influenza season and > 8 years of age    I understand that a diet low in cholesterol, fat, and sodium is recommended for good health. Unless I have been given specific instructions below for another diet, I accept this instruction as my diet prescription.   Other diet: Diabetic diet     Special Instructions:     1) You symptoms are suggestive of Acid reflux and inflammation of the esophagus. Start Omeprazole and Sucralfate as prescribed.   2) Follow up with Primary Care Provider within one week of hospital discharge.   3) Your blood sugars are not well controlled. Increase Metformin to 1000 mg twice daily. Increase Januvia to 100 mg daily. Discuss better control with Primary care.   4) You need to start cholesterol medication. Start Atorvastatin 20 mg. Discuss with PCP. Have PCP check liver function and cholesterol levels in four weeks of hospital discharge.    · Is patient discharged on Warfarin / Coumadin?   No     · Is patient Post Blood Transfusion?  No    Depression / Suicide Risk    As you are discharged from this [facility] facility, it is important to learn how to keep safe from harming yourself.    Recognize the warning signs:  · Abrupt changes in personality, positive or negative- including increase in energy   · Giving away possessions  · Change in eating patterns- significant weight changes-  positive or negative  · Change in sleeping patterns- unable to sleep or sleeping all the time   · Unwillingness or inability to communicate  · Depression  · Unusual sadness, discouragement and loneliness  · Talk of wanting to die  · Neglect of personal  appearance   · Rebelliousness- reckless behavior  · Withdrawal from people/activities they love  · Confusion- inability to concentrate     If you or a loved one observes any of these behaviors or has concerns about self-harm, here's what you can do:  · Talk about it- your feelings and reasons for harming yourself  · Remove any means that you might use to hurt yourself (examples: pills, rope, extension cords, firearm)  · Get professional help from the community (Mental Health, Substance Abuse, psychological counseling)  · Do not be alone:Call your Safe Contact- someone whom you trust who will be there for you.  · Call your local CRISIS HOTLINE 193-3507 or 437-729-5525  · Call your local Children's Mobile Crisis Response Team [place] (979) 550-6441 or www.[employer]  · Call the toll free National Suicide Prevention Hotlines   · National Suicide Prevention Lifeline 439-335-HKUC (4397)  · fotopedia Line Network 800-SUICIDE (609-3944)      Gastroesophageal Reflux Disease, Adult  Gastroesophageal reflux disease (GERD) happens when acid from your stomach flows up into the esophagus. When acid comes in contact with the esophagus, the acid causes soreness (inflammation) in the esophagus. Over time, GERD may create small holes (ulcers) in the lining of the esophagus.  CAUSES   · Increased body weight. This puts pressure on the stomach, making acid rise from the stomach into the esophagus.  · Smoking. This increases acid production in the stomach.  · Drinking alcohol. This causes decreased pressure in the lower esophageal sphincter (valve or ring of muscle between the esophagus and stomach), allowing acid from the stomach into the esophagus.  · Late evening meals and a full stomach. This increases pressure and acid production in the stomach.  · A malformed lower esophageal sphincter.  Sometimes, no cause is found.  SYMPTOMS   · Burning pain in the lower part of the mid-chest behind the breastbone and in the  mid-stomach area. This may occur twice a week or more often.  · Trouble swallowing.  · Sore throat.  · Dry cough.  · Asthma-like symptoms including chest tightness, shortness of breath, or wheezing.  DIAGNOSIS   Your caregiver may be able to diagnose GERD based on your symptoms. In some cases, X-rays and other tests may be done to check for complications or to check the condition of your stomach and esophagus.  TREATMENT   Your caregiver may recommend over-the-counter or prescription medicines to help decrease acid production. Ask your caregiver before starting or adding any new medicines.   HOME CARE INSTRUCTIONS   · Change the factors that you can control. Ask your caregiver for guidance concerning weight loss, quitting smoking, and alcohol consumption.  · Avoid foods and drinks that make your symptoms worse, such as:  · Caffeine or alcoholic drinks.  · Chocolate.  · Peppermint or mint flavorings.  · Garlic and onions.  · Spicy foods.  · Citrus fruits, such as oranges, levon, or limes.  · Tomato-based foods such as sauce, chili, salsa, and pizza.  · Fried and fatty foods.  · Avoid lying down for the 3 hours prior to your bedtime or prior to taking a nap.  · Eat small, frequent meals instead of large meals.  · Wear loose-fitting clothing. Do not wear anything tight around your waist that causes pressure on your stomach.  · Raise the head of your bed 6 to 8 inches with wood blocks to help you sleep. Extra pillows will not help.  · Only take over-the-counter or prescription medicines for pain, discomfort, or fever as directed by your caregiver.  · Do not take aspirin, ibuprofen, or other nonsteroidal anti-inflammatory drugs (NSAIDs).  SEEK IMMEDIATE MEDICAL CARE IF:   · You have pain in your arms, neck, jaw, teeth, or back.  · Your pain increases or changes in intensity or duration.  · You develop nausea, vomiting, or sweating (diaphoresis).  · You develop shortness of breath, or you faint.  · Your vomit is green,  yellow, black, or looks like coffee grounds or blood.  · Your stool is red, bloody, or black.  These symptoms could be signs of other problems, such as heart disease, gastric bleeding, or esophageal bleeding.  MAKE SURE YOU:   · Understand these instructions.  · Will watch your condition.  · Will get help right away if you are not doing well or get worse.     This information is not intended to replace advice given to you by your health care provider. Make sure you discuss any questions you have with your health care provider.     Document Released: 09/27/2006 Document Revised: 01/08/2016 Document Reviewed: 04/13/2016  [employer] Interactive Patient Education ©2016 Elsevier Inc.    Esophagitis  Esophagitis is inflammation of the esophagus. It can involve swelling, soreness, and pain in the esophagus. This condition can make it difficult and painful to swallow.  CAUSES   Most causes of esophagitis are not serious. Many different factors can cause esophagitis, including:  Gastroesophageal reflux disease (GERD). This is when acid from your stomach flows up into the esophagus.  Recurrent vomiting.  An allergic-type reaction.  Certain medicines, especially those that come in large pills.  Ingestion of harmful chemicals, such as household cleaning products.  Heavy alcohol use.  An infection of the esophagus.  Radiation treatment for cancer.  Certain diseases such as sarcoidosis, Crohn's disease, and scleroderma. These diseases may cause recurrent esophagitis.  SYMPTOMS   Trouble swallowing.  Painful swallowing.  Chest pain.  Difficulty breathing.  Nausea.  Vomiting.  Abdominal pain.  DIAGNOSIS   Your caregiver will take your history and do a physical exam. Depending upon what your caregiver finds, certain tests may also be done, including:  Barium X-ray. You will drink a solution that coats the esophagus, and X-rays will be taken.  Endoscopy. A lighted tube is put down the esophagus so your caregiver can examine the  area.  Allergy tests. These can sometimes be arranged through follow-up visits.  TREATMENT   Treatment will depend on the cause of your esophagitis. In some cases, steroids or other medicines may be given to help relieve your symptoms or to treat the underlying cause of your condition. Medicines that may be recommended include:  Viscous lidocaine, to soothe the esophagus.  Antacids.  Acid reducers.  Proton pump inhibitors.  Antiviral medicines for certain viral infections of the esophagus.  Antifungal medicines for certain fungal infections of the esophagus.  Antibiotic medicines, depending on the cause of the esophagitis.  HOME CARE INSTRUCTIONS   Avoid foods and drinks that seem to make your symptoms worse.  Eat small, frequent meals instead of large meals.  Avoid eating for the 3 hours prior to your bedtime.  If you have trouble taking pills, use a pill splitter to decrease the size and likelihood of the pill getting stuck or injuring the esophagus on the way down. Drinking water after taking a pill also helps.  Stop smoking if you smoke.  Maintain a healthy weight.  Wear loose-fitting clothing. Do not wear anything tight around your waist that causes pressure on your stomach.  Raise the head of your bed 6 to 8 inches with wood blocks to help you sleep. Extra pillows will not help.  Only take over-the-counter or prescription medicines as directed by your caregiver.  SEEK IMMEDIATE MEDICAL CARE IF:  You have severe chest pain that radiates into your arm, neck, or jaw.  You feel sweaty, dizzy, or lightheaded.  You have shortness of breath.  You vomit blood.  You have difficulty or pain with swallowing.  You have bloody or black, tarry stools.  You have a fever.  You have a burning sensation in the chest more than 3 times a week for more than 2 weeks.  You cannot swallow, drink, or eat.  You drool because you cannot swallow your saliva.  MAKE SURE YOU:  Understand these instructions.  Will watch your  condition.  Will get help right away if you are not doing well or get worse.     This information is not intended to replace advice given to you by your health care provider. Make sure you discuss any questions you have with your health care provider.     Document Released: 01/25/2006 Document Revised: 01/08/2016 Document Reviewed: 04/13/2016  [employer] / [employer] Interactive Patient Education ©2016 Elsevier Inc.

## 2017-05-30 ENCOUNTER — TELEPHONE (OUTPATIENT)
Dept: SLEEP MEDICINE | Facility: MEDICAL CENTER | Age: 37
End: 2017-05-30

## 2017-05-30 NOTE — TELEPHONE ENCOUNTER
Request for sleep studies for continuation of care.  Faxed last sleep study from [facility] and last sleep study from [facility].

## 2017-07-24 ENCOUNTER — HOSPITAL ENCOUNTER (EMERGENCY)
Facility: MEDICAL CENTER | Age: 37
End: 2017-07-24
Attending: EMERGENCY MEDICINE
Payer: MEDICAID

## 2017-07-24 VITALS
HEIGHT: 63 IN | RESPIRATION RATE: 18 BRPM | WEIGHT: 245.37 LBS | SYSTOLIC BLOOD PRESSURE: 129 MMHG | OXYGEN SATURATION: 97 % | DIASTOLIC BLOOD PRESSURE: 85 MMHG | TEMPERATURE: 97.3 F | BODY MASS INDEX: 43.48 KG/M2 | HEART RATE: 75 BPM

## 2017-07-24 DIAGNOSIS — L08.9 INFECTED FINGER LACERATION, INITIAL ENCOUNTER: ICD-10-CM

## 2017-07-24 DIAGNOSIS — S61.219A INFECTED FINGER LACERATION, INITIAL ENCOUNTER: ICD-10-CM

## 2017-07-24 PROCEDURE — 99282 EMERGENCY DEPT VISIT SF MDM: CPT

## 2017-07-24 RX ORDER — SULFAMETHOXAZOLE AND TRIMETHOPRIM 800; 160 MG/1; MG/1
1 TABLET ORAL 2 TIMES DAILY
Qty: 14 TAB | Refills: 0 | Status: SHIPPED | OUTPATIENT
Start: 2017-07-24 | End: 2017-07-31

## 2017-07-24 RX ORDER — FLUCONAZOLE 200 MG/1
200 TABLET ORAL DAILY
Qty: 1 TAB | Refills: 1 | Status: SHIPPED | OUTPATIENT
Start: 2017-07-24 | End: 2017-07-25

## 2017-07-24 RX ORDER — CEPHALEXIN 500 MG/1
500 CAPSULE ORAL 4 TIMES DAILY
Qty: 28 CAP | Refills: 0 | Status: SHIPPED | OUTPATIENT
Start: 2017-07-24 | End: 2017-07-31

## 2017-07-24 RX ORDER — RANITIDINE 150 MG/1
150 TABLET ORAL 2 TIMES DAILY
Status: SHIPPED | COMMUNITY
End: 2018-08-09

## 2017-07-24 NOTE — DISCHARGE INSTRUCTIONS
Fingertip Infection  When an infection is around the nail, it is called a paronychia. When it appears over the tip of the finger, it is called a felon. These infections are due to minor injuries or cracks in the skin. If they are not treated properly, they can lead to bone infection and permanent damage to the fingernail.  Incision and drainage is necessary if a pus pocket (an abscess) has formed. Antibiotics and pain medicine may also be needed. Keep your hand elevated for the next 2-3 days to reduce swelling and pain. If a pack was placed in the abscess, it should be removed in 1-2 days by your caregiver. Soak the finger in warm water for 20 minutes 4 times daily to help promote drainage.  Keep the hands as dry as possible. Wear protective gloves with cotton liners. See your caregiver for follow-up care as recommended.   HOME CARE INSTRUCTIONS   · Keep wound clean, dry and dressed as suggested by your caregiver.  · Soak in warm salt water for fifteen minutes, four times per day for bacterial infections.  · Your caregiver will prescribe an antibiotic if a bacterial infection is suspected. Take antibiotics as directed and finish the prescription, even if the problem appears to be improving before the medicine is gone.  · Only take over-the-counter or prescription medicines for pain, discomfort, or fever as directed by your caregiver.  SEEK IMMEDIATE MEDICAL CARE IF:  · There is redness, swelling, or increasing pain in the wound.  · Pus or any other unusual drainage is coming from the wound.  · An unexplained oral temperature above 102° F (38.9° C) develops.  · You notice a foul smell coming from the wound or dressing.  MAKE SURE YOU:   · Understand these instructions.  · Monitor your condition.  · Contact your caregiver if you are getting worse or not improving.     This information is not intended to replace advice given to you by your health care provider. Make sure you discuss any questions you have with your  health care provider.     Document Released: 01/25/2006 Document Revised: 03/11/2013 Document Reviewed: 01/21/2010  Elsevier Interactive Patient Education ©2016 Elsevier Inc.

## 2017-07-24 NOTE — ED AVS SNAPSHOT
[employer] Access Code: Activation code not generated  Current [employer] Status: Active    Intransahart  A secure, online tool to manage your health information     [employer]’s [employer]® is a secure, online tool that connects you to your personalized health information from the privacy of your home -- day or night - making it very easy for you to manage your healthcare. Once the activation process is completed, you can even access your medical information using the [employer] Systems zachariah, which is available for free in the Apple Zachariah store or Google Play store.     [employer] provides the following levels of access (as shown below):   My Chart Features   [facility] Primary Care Doctor [facility]  Specialists [facility]  Urgent  Care Non-[facility]  Primary Care  Doctor   Email your healthcare team securely and privately 24/7 X X X X   Manage appointments: schedule your next appointment; view details of past/upcoming appointments X      Request prescription refills. X      View recent personal medical records, including lab and immunizations X X X X   View health record, including health history, allergies, medications X X X X   Read reports about your outpatient visits, procedures, consult and ER notes X X X X   See your discharge summary, which is a recap of your hospital and/or ER visit that includes your diagnosis, lab results, and care plan. X X       How to register for [employer]:  1. Go to  [email or web address].  2. Click on the Sign Up Now box, which takes you to the New Member Sign Up page. You will need to provide the following information:  a. Enter your [employer] Access Code exactly as it appears at the top of this page. (You will not need to use this code after you’ve completed the sign-up process. If you do not sign up before the expiration date, you must request a new code.)   b. Enter your date of birth.   c. Enter your home email address.   d. Click Submit, and follow the next screen’s instructions.  3. Create a [employer] ID. This will  be your [employer] login ID and cannot be changed, so think of one that is secure and easy to remember.  4. Create a [employer] password. You can change your password at any time.  5. Enter your Password Reset Question and Answer. This can be used at a later time if you forget your password.   6. Enter your e-mail address. This allows you to receive e-mail notifications when new information is available in [employer].  7. Click Sign Up. You can now view your health information.    For assistance activating your [employer] account, call [phone]

## 2017-07-24 NOTE — ED AVS SNAPSHOT
Home Care Instructions                                                                                                                Liana Flanagan   MRN: [id number]    Department:  [facility], Emergency Dept   Date of Visit:  7/24/2017            [facility], Emergency Dept    [address]    [place] [address]    Phone:  [phone]      You were seen by     Alex Greer M.D.      Your Diagnosis Was     Infected finger laceration, initial encounter     S61.219A, L08.9 Left 2nd distal      Follow-up Information     1. Follow up with Kirsten Maher N.P..    Specialty:  Family Medicine    Why:  see your doctor for recheck in 4 days if no improvement.  Sooner if worse or return    Contact information    [address] [place] [address] [phone]        Medication Information     Review all of your home medications and newly ordered medications with your primary doctor and/or pharmacist as soon as possible. Follow medication instructions as directed by your doctor and/or pharmacist.     Please keep your complete medication list with you and share with your physician. Update the information when medications are discontinued, doses are changed, or new medications (including over-the-counter products) are added; and carry medication information at all times in the event of emergency situations.               Medication List      START taking these medications        Instructions    Morning Afternoon Evening Bedtime    cephALEXin 500 MG Caps   Commonly known as:  KEFLEX        Take 1 Cap by mouth 4 times a day for 7 days.   Dose:  500 mg                        fluconazole 200 MG Tabs   Commonly known as:  DIFLUCAN        Take 1 Tab by mouth every day for 1 dose.   Dose:  200 mg                        sulfamethoxazole-trimethoprim 800-160 MG tablet   Commonly known as:  BACTRIM DS        Take 1 Tab by mouth 2 times a day for 7 days.   Dose:  1 Tab                          ASK your doctor about these medications        Instructions    Morning Afternoon Evening Bedtime    atorvastatin 20 MG Tabs   Commonly known as:  LIPITOR        Take 1 Tab by mouth every bedtime.   Dose:  20 mg                        insulin glargine 100 UNIT/ML Sopn injection   Commonly known as:  LANTUS        Inject 34 Units as instructed every evening.   Dose:  34 Units                        insulin lispro 100 UNIT/ML Soln   Commonly known as:  HUMALOG        Inject 15 Units as instructed 3 times a day before meals.   Dose:  15 Units                        levothyroxine 75 MCG Tabs   Commonly known as:  SYNTHROID        Take 75 mcg by mouth Every morning on an empty stomach.   Dose:  75 mcg                        lisinopril 2.5 MG Tabs   Commonly known as:  PRINIVIL        Take 2.5 mg by mouth every day.   Dose:  2.5 mg                        metformin 1000 MG tablet   Commonly known as:  GLUCOPHAGE        Take 1 Tab by mouth 2 times a day.   Dose:  1000 mg                        ranitidine 150 MG Tabs   Commonly known as:  ZANTAC        Take 150 mg by mouth 2 times a day.   Dose:  150 mg                        sitagliptin 100 MG Tabs   Commonly known as:  JANUVIA        Take 1 Tab by mouth every day.   Dose:  100 mg                             Where to Get Your Medications      You can get these medications from any pharmacy     Bring a paper prescription for each of these medications    - cephALEXin 500 MG Caps  - fluconazole 200 MG Tabs  - sulfamethoxazole-trimethoprim 800-160 MG tablet              Discharge Instructions       Fingertip Infection  When an infection is around the nail, it is called a paronychia. When it appears over the tip of the finger, it is called a felon. These infections are due to minor injuries or cracks in the skin. If they are not treated properly, they can lead to bone infection and permanent damage to the fingernail.  Incision and drainage is necessary if a pus  pocket (an abscess) has formed. Antibiotics and pain medicine may also be needed. Keep your hand elevated for the next 2-3 days to reduce swelling and pain. If a pack was placed in the abscess, it should be removed in 1-2 days by your caregiver. Soak the finger in warm water for 20 minutes 4 times daily to help promote drainage.  Keep the hands as dry as possible. Wear protective gloves with cotton liners. See your caregiver for follow-up care as recommended.   HOME CARE INSTRUCTIONS   · Keep wound clean, dry and dressed as suggested by your caregiver.  · Soak in warm salt water for fifteen minutes, four times per day for bacterial infections.  · Your caregiver will prescribe an antibiotic if a bacterial infection is suspected. Take antibiotics as directed and finish the prescription, even if the problem appears to be improving before the medicine is gone.  · Only take over-the-counter or prescription medicines for pain, discomfort, or fever as directed by your caregiver.  SEEK IMMEDIATE MEDICAL CARE IF:  · There is redness, swelling, or increasing pain in the wound.  · Pus or any other unusual drainage is coming from the wound.  · An unexplained oral temperature above 102° F (38.9° C) develops.  · You notice a foul smell coming from the wound or dressing.  MAKE SURE YOU:   · Understand these instructions.  · Monitor your condition.  · Contact your caregiver if you are getting worse or not improving.     This information is not intended to replace advice given to you by your health care provider. Make sure you discuss any questions you have with your health care provider.     Document Released: 01/25/2006 Document Revised: 03/11/2013 Document Reviewed: 01/21/2010  Elsevier Interactive Patient Education ©2016 [employer]            Patient Information     Patient Information    Following emergency treatment: all patient requiring follow-up care must return either to a private physician or a clinic if your condition  worsens before you are able to obtain further medical attention, please return to the emergency room.     Billing Information    At [facility], we work to make the billing process streamlined for our patients.  Our Representatives are here to answer any questions you may have regarding your hospital bill.  If you have insurance coverage and have supplied your insurance information to us, we will submit a claim to your insurer on your behalf.  Should you have any questions regarding your bill, we can be reached online or by phone as follows:  Online: You are able pay your bills online or live chat with our representatives about any billing questions you may have. We are here to help Monday - Friday from 8:00am to 7:30pm and 9:00am - 12:00pm on Saturdays.  Please visit [email or web address]  for more information.   Phone:  [phone] or [phone]    Please note that your emergency physician, surgeon, pathologist, radiologist, anesthesiologist, and other specialists are not employed by [facility] and will therefore bill separately for their services.  Please contact them directly for any questions concerning their bills at the numbers below:     Emergency Physician Services:  [phone]  [facility]:  [phone]  [facility]:  [phone]  [facility]:  [phone]    1. Your final bill may vary from the amount quoted upon discharge if all procedures are not complete at that time, or if your doctor has additional procedures of which we are not aware. You will receive an additional bill if you return to the Emergency Department at [facility] for suture removal regardless of the facility of which the sutures were placed.     2. Please arrange for settlement of this account at the emergency registration.    3. All self-pay accounts are due in full at the time of treatment.  If you are unable to meet this obligation then payment  is expected within 4-5 days.     4. If you have had radiology studies (CT, X-ray, Ultrasound, MRI), you have received a preliminary result during your emergency department visit. Please contact the radiology department [phone] to receive a copy of your final result. Please discuss the Final result with your primary physician or with the follow up physician provided.     Crisis Hotline:  [place] Crisis Hotline:  6-135-PRCZWVX or 1-563.441.4929  [place] Crisis Hotline:    1-560.365.4254 or 197-741-9586         ED Discharge Follow Up Questions    1. In order to provide you with very good care, we would like to follow up with a phone call in the next few days.  May we have your permission to contact you?     YES /  NO    2. What is the best phone number to call you? (       )_____-__________    3. What is the best time to call you?      Morning  /  Afternoon  /  Evening                   Patient Signature:  ____________________________________________________________    Date:  ____________________________________________________________

## 2017-07-24 NOTE — ED PROVIDER NOTES
ED Provider Note    CHIEF COMPLAINT   Chief Complaint   Patient presents with   • Laceration     left 1st finger       HPI   Liana Flanagan is a 37 y.o. female who presents with a small cut/skin avulsion to the dorsum of her left 2nd finger adjacent to the nail bed.  This occurred at Saturday night.  She had fever and chills last night subjectively.  Today noticed redness and swelling to the left 2nd fingertip.  Medical history significant for diabetes.  No arthralgia.  Currently no fever    REVIEW OF SYSTEMS   Musculoskeletal: Finger pain, no arthralgia  Neurologic: No numbness  Skin: Redness and swelling left 2nd fingertip  Constitutional: Chills last night      PAST MEDICAL HISTORY   Past Medical History   Diagnosis Date   • Type II or unspecified type diabetes mellitus without mention of complication, not stated as uncontrolled    • Sleep apnea    • Hypertension    • Personal history of venous thrombosis and embolism      DVT in BLE years ago   • Herpes    • Obese    • Chickenpox        FAMILY HISTORY  Family History   Problem Relation Age of Onset   • Hypertension Mother    • Sleep Apnea Mother    • Hyperlipidemia Father    • Cancer Maternal Uncle    • Diabetes Neg Hx    • Heart Disease Neg Hx    • Stroke Neg Hx    • Sleep Apnea Brother        SOCIAL HISTORY  Social History     Social History   • Marital Status: Single     Spouse Name: N/A   • Number of Children: N/A   • Years of Education: N/A     Social History Main Topics   • Smoking status: Former Smoker -- 0.50 packs/day for .3 years     Types: Cigarettes     Quit date: 01/01/2008   • Smokeless tobacco: Never Used   • Alcohol Use: No   • Drug Use: No      Comment: past hx of meth use at age 19 x 3 mos   • Sexual Activity:     Partners: Male     Other Topics Concern   • None     Social History Narrative       SURGICAL HISTORY  History reviewed. No pertinent past surgical history.    CURRENT MEDICATIONS   Home Medications     Reviewed by Castillo MOTTA  "Amari Panchal (Pharmacy Tech) on 07/24/17 at 0911  Med List Status: Complete    Medication Last Dose Status    atorvastatin (LIPITOR) 20 MG Tab 7/23/2017 Active    insulin glargine (LANTUS) 100 UNIT/ML Solution Pen-injector injection 7/23/2017 Active    insulin lispro (HUMALOG) 100 UNIT/ML Solution 7/23/2017 Active    levothyroxine (SYNTHROID) 75 MCG Tab 7/23/2017 Active    lisinopril (PRINIVIL) 2.5 MG Tab 7/23/2017 Active    metformin (GLUCOPHAGE) 1000 MG tablet 7/23/2017 Active    ranitidine (ZANTAC) 150 MG Tab 7/23/2017 Active    sitagliptin (JANUVIA) 100 MG Tab 7/23/2017 Active                ALLERGIES   Allergies   Allergen Reactions   • Reglan [Metoclopramide] Vomiting   • Zofran Hives       PHYSICAL EXAM  VITAL SIGNS: /85 mmHg  Pulse 75  Temp(Src) 36.3 °C (97.3 °F)  Resp 18  Ht 1.6 m (5' 2.99\")  Wt 111.3 kg (245 lb 6 oz)  BMI 43.48 kg/m2  SpO2 97%  LMP   Skin: Erythematous change to the distal phalanx of the left 2nd finger with 2 mm skin avulsion central.  Erythema does progress to the mid 2nd phalanx.  No proximal lymphangitis.  No fluctuance or pustule  Vascular: Intact distal capillary refill.   Musculoskeletal: Flexion and extension of the left 2nd finger is normal.  No crepitance or deformity  Neurologic: Sensation is normal.  Strength normal.    RADIOLOGY/PROCEDURES  None    COURSE & MEDICAL DECISION MAKING  Pertinent Labs & Imaging studies reviewed. (See chart for details)  Patient with an infected skin wound left 2nd finger, evidence of early cellulitis.  No evidence of flexor tenosynovitis.  No evidence of septic arthritis.  Patient is placed on Keflex and Bactrim.  At her request, secondary to previous episodes of yeast infections following antibiotics, she has requested and will receive Diflucan 200 mg single dose to be used if needed.    FINAL IMPRESSION     1. Infected finger laceration, initial encounter              Electronically signed by: Alex Greer, 7/24/2017 9:15 " AM

## 2017-07-24 NOTE — ED NOTES
C/O small cutto left index fingernail. Cut it Saturday night. C/O increase bleeding this morning with finger tip swelling. States transient fever last night.

## 2017-07-24 NOTE — ED AVS SNAPSHOT
7/24/2017    Liana Degroot Panchito  [address]  [place] [address]    Dear Liana:    [facility] wants to ensure your discharge home is safe and you or your loved ones have had all of your questions answered regarding your care after you leave the hospital.    Below is a list of resources and contact information should you have any questions regarding your hospital stay, follow-up instructions, or active medical symptoms.    Questions or Concerns Regarding… Contact   Medical Questions Related to Your Discharge  (7 days a week, 8am-5pm) Contact a Nurse Care Coordinator   [phone]   Medical Questions Not Related to Your Discharge  (24 hours a day / 7 days a week)  Contact the Nurse Health Line   [phone]    Medications or Discharge Instructions Refer to your discharge packet   or contact your [facility] Primary Care Provider   [phone]   Follow-up Appointment(s) Schedule your appointment via [employer]   or contact Scheduling [phone]   Billing Review your statement via [employer]  or contact Billing [phone]   Medical Records Review your records via [employer]   or contact Medical Records [phone]     You may receive a telephone call within two days of discharge. This call is to make certain you understand your discharge instructions and have the opportunity to have any questions answered. You can also easily access your medical information, test results and upcoming appointments via the [employer] free online health management tool. You can learn more and sign up at [employer]. For assistance setting up your [employer] account, please call [phone].    Once again, we want to ensure your discharge home is safe and that you have a clear understanding of any next steps in your care. If you have any questions or concerns, please do not hesitate to contact us, we are here for you. Thank you for choosing [facility] for your healthcare needs.    Sincerely,    Your [facility] Healthcare Team

## 2017-07-24 NOTE — ED NOTES
Outpatient pharmacy called, states patient is allergic to sulfa. This is added to our allergies.  Rx for Bactrim changed to doxycycline 100 mg po BID x 7 days, #14, no refills.    Marti Bianchi, PharmD, CGP, BCPS

## 2017-07-24 NOTE — ED NOTES
DC instructions, prescription x3, and work note given to pt. Verbalized understanding. Pt steady on feet with 0 s/s distress noted. Pt dcd home.

## 2017-07-25 ENCOUNTER — PATIENT OUTREACH (OUTPATIENT)
Dept: HEALTH INFORMATION MANAGEMENT | Facility: OTHER | Age: 37
End: 2017-07-25

## 2017-07-25 NOTE — PROGRESS NOTES
07/25/2017 1631 - Discharge Outreach - received call from patient. States her insurance requires prior auth for Bactrim. States PCP states they can't help her. Transferred to  at .   07/25/2017 1643 - Called patient back and left phone number for [facility] on VM to help with prior auth if she needs further assistance.

## 2017-07-26 NOTE — ED NOTES
Pharmacy called back to state that doxycycline not covered by insurance. Informed to run for the doxycycline monohydrate capsules to obtain coverage since patient has [employer].     Marti Bianchi, PharmD, CGP, BCPS

## 2017-07-26 NOTE — ED NOTES
[facility] pharmacy contacted ER r/t RX for doxycycline not covered by pt's ins. Dr Orozco contacted Marti Bianchi to change RX.

## 2017-08-16 ENCOUNTER — NON-PROVIDER VISIT (OUTPATIENT)
Dept: URGENT CARE | Facility: CLINIC | Age: 37
End: 2017-08-16

## 2017-08-16 DIAGNOSIS — Z02.1 PRE-EMPLOYMENT DRUG SCREENING: ICD-10-CM

## 2017-08-16 LAB
AMP AMPHETAMINE: NORMAL
COC COCAINE: NORMAL
INT CON NEG: NORMAL
INT CON POS: NORMAL
MET METHAMPHETAMINES: NORMAL
OPI OPIATES: NORMAL
PCP PHENCYCLIDINE: NORMAL
POC DRUG COMMENT 753798-OCCUPATIONAL HEALTH: NORMAL
THC: NORMAL

## 2017-08-16 PROCEDURE — 80305 DRUG TEST PRSMV DIR OPT OBS: CPT | Performed by: PHYSICIAN ASSISTANT

## 2017-09-25 ENCOUNTER — HOSPITAL ENCOUNTER (EMERGENCY)
Facility: MEDICAL CENTER | Age: 37
End: 2017-09-25
Attending: EMERGENCY MEDICINE
Payer: MEDICAID

## 2017-09-25 VITALS
OXYGEN SATURATION: 97 % | SYSTOLIC BLOOD PRESSURE: 138 MMHG | RESPIRATION RATE: 16 BRPM | HEART RATE: 79 BPM | DIASTOLIC BLOOD PRESSURE: 85 MMHG | BODY MASS INDEX: 46.01 KG/M2 | TEMPERATURE: 98.1 F | WEIGHT: 250 LBS | HEIGHT: 62 IN

## 2017-09-25 DIAGNOSIS — R73.9 HYPERGLYCEMIA: ICD-10-CM

## 2017-09-25 LAB
ALBUMIN SERPL BCP-MCNC: 3.5 G/DL (ref 3.2–4.9)
ALBUMIN/GLOB SERPL: 0.9 G/DL
ALP SERPL-CCNC: 68 U/L (ref 30–99)
ALT SERPL-CCNC: 40 U/L (ref 2–50)
ANION GAP SERPL CALC-SCNC: 6 MMOL/L (ref 0–11.9)
APPEARANCE UR: ABNORMAL
AST SERPL-CCNC: 23 U/L (ref 12–45)
B-OH-BUTYR SERPL-MCNC: 0.08 MMOL/L (ref 0.02–0.27)
BASE EXCESS BLDV CALC-SCNC: -2 MMOL/L
BASOPHILS # BLD AUTO: 0.7 % (ref 0–1.8)
BASOPHILS # BLD: 0.07 K/UL (ref 0–0.12)
BILIRUB SERPL-MCNC: 0.5 MG/DL (ref 0.1–1.5)
BODY TEMPERATURE: NORMAL CENTIGRADE
BUN SERPL-MCNC: <5 MG/DL (ref 8–22)
CALCIUM SERPL-MCNC: 8.6 MG/DL (ref 8.4–10.2)
CHLORIDE SERPL-SCNC: 103 MMOL/L (ref 96–112)
CO2 SERPL-SCNC: 24 MMOL/L (ref 20–33)
COLOR UR: YELLOW
CREAT SERPL-MCNC: 0.46 MG/DL (ref 0.5–1.4)
EKG IMPRESSION: NORMAL
EOSINOPHIL # BLD AUTO: 0.36 K/UL (ref 0–0.51)
EOSINOPHIL NFR BLD: 3.8 % (ref 0–6.9)
ERYTHROCYTE [DISTWIDTH] IN BLOOD BY AUTOMATED COUNT: 39.3 FL (ref 35.9–50)
GFR SERPL CREATININE-BSD FRML MDRD: >60 ML/MIN/1.73 M 2
GLOBULIN SER CALC-MCNC: 4.1 G/DL (ref 1.9–3.5)
GLUCOSE BLD-MCNC: 128 MG/DL (ref 65–99)
GLUCOSE BLD-MCNC: 159 MG/DL (ref 65–99)
GLUCOSE BLD-MCNC: 253 MG/DL (ref 65–99)
GLUCOSE SERPL-MCNC: 246 MG/DL (ref 65–99)
GLUCOSE UR STRIP.AUTO-MCNC: 500 MG/DL
HCG UR QL: NEGATIVE
HCO3 BLDV-SCNC: 24 MMOL/L (ref 24–28)
HCT VFR BLD AUTO: 41.9 % (ref 37–47)
HGB BLD-MCNC: 14.2 G/DL (ref 12–16)
IMM GRANULOCYTES # BLD AUTO: 0.05 K/UL (ref 0–0.11)
IMM GRANULOCYTES NFR BLD AUTO: 0.5 % (ref 0–0.9)
KETONES UR STRIP.AUTO-MCNC: ABNORMAL MG/DL
LEUKOCYTE ESTERASE UR QL STRIP.AUTO: NEGATIVE
LIPASE SERPL-CCNC: 23 U/L (ref 7–58)
LYMPHOCYTES # BLD AUTO: 3.56 K/UL (ref 1–4.8)
LYMPHOCYTES NFR BLD: 37.4 % (ref 22–41)
MCH RBC QN AUTO: 28.2 PG (ref 27–33)
MCHC RBC AUTO-ENTMCNC: 33.9 G/DL (ref 33.6–35)
MCV RBC AUTO: 83.1 FL (ref 81.4–97.8)
MONOCYTES # BLD AUTO: 0.55 K/UL (ref 0–0.85)
MONOCYTES NFR BLD AUTO: 5.8 % (ref 0–13.4)
NEUTROPHILS # BLD AUTO: 4.92 K/UL (ref 2–7.15)
NEUTROPHILS NFR BLD: 51.8 % (ref 44–72)
NITRITE UR QL STRIP.AUTO: NEGATIVE
NRBC # BLD AUTO: 0 K/UL
NRBC BLD AUTO-RTO: 0 /100 WBC
PCO2 BLDV: 44.7 MMHG (ref 41–51)
PH BLDV: 7.35 [PH] (ref 7.31–7.45)
PH UR STRIP.AUTO: 5 [PH]
PLATELET # BLD AUTO: 280 K/UL (ref 164–446)
PMV BLD AUTO: 10.4 FL (ref 9–12.9)
PO2 BLDV: 39.1 MMHG (ref 25–40)
POTASSIUM SERPL-SCNC: 3.9 MMOL/L (ref 3.6–5.5)
PROT SERPL-MCNC: 7.6 G/DL (ref 6–8.2)
PROT UR QL STRIP: NEGATIVE MG/DL
RBC # BLD AUTO: 5.04 M/UL (ref 4.2–5.4)
RBC UR QL AUTO: NEGATIVE
SAO2 % BLDV: 71.8 %
SODIUM SERPL-SCNC: 133 MMOL/L (ref 135–145)
SP GR UR STRIP.AUTO: 1.01
WBC # BLD AUTO: 9.5 K/UL (ref 4.8–10.8)

## 2017-09-25 PROCEDURE — 80053 COMPREHEN METABOLIC PANEL: CPT

## 2017-09-25 PROCEDURE — 93005 ELECTROCARDIOGRAM TRACING: CPT

## 2017-09-25 PROCEDURE — 82010 KETONE BODYS QUAN: CPT

## 2017-09-25 PROCEDURE — 83690 ASSAY OF LIPASE: CPT

## 2017-09-25 PROCEDURE — 700105 HCHG RX REV CODE 258: Performed by: EMERGENCY MEDICINE

## 2017-09-25 PROCEDURE — 99284 EMERGENCY DEPT VISIT MOD MDM: CPT

## 2017-09-25 PROCEDURE — 96361 HYDRATE IV INFUSION ADD-ON: CPT

## 2017-09-25 PROCEDURE — 96360 HYDRATION IV INFUSION INIT: CPT

## 2017-09-25 PROCEDURE — 36415 COLL VENOUS BLD VENIPUNCTURE: CPT

## 2017-09-25 PROCEDURE — 82962 GLUCOSE BLOOD TEST: CPT | Mod: 91

## 2017-09-25 PROCEDURE — 81025 URINE PREGNANCY TEST: CPT

## 2017-09-25 PROCEDURE — 93005 ELECTROCARDIOGRAM TRACING: CPT | Performed by: EMERGENCY MEDICINE

## 2017-09-25 PROCEDURE — 81002 URINALYSIS NONAUTO W/O SCOPE: CPT

## 2017-09-25 PROCEDURE — 82803 BLOOD GASES ANY COMBINATION: CPT

## 2017-09-25 PROCEDURE — 85025 COMPLETE CBC W/AUTO DIFF WBC: CPT

## 2017-09-25 RX ORDER — SODIUM CHLORIDE 9 MG/ML
2000 INJECTION, SOLUTION INTRAVENOUS ONCE
Status: COMPLETED | OUTPATIENT
Start: 2017-09-25 | End: 2017-09-25

## 2017-09-25 RX ORDER — GABAPENTIN 300 MG/1
300 CAPSULE ORAL 2 TIMES DAILY
Status: SHIPPED | COMMUNITY
End: 2019-07-06

## 2017-09-25 RX ORDER — INSULIN GLARGINE 100 [IU]/ML
59 INJECTION, SOLUTION SUBCUTANEOUS EVERY EVENING
Status: SHIPPED | COMMUNITY
End: 2018-08-27

## 2017-09-25 RX ADMIN — SODIUM CHLORIDE 2000 ML: 9 INJECTION, SOLUTION INTRAVENOUS at 17:16

## 2017-09-25 NOTE — ED NOTES
Patient arrives very tearful and in pain abdominally with nausea and vomiting, numbness to her hands bilaterally for four days, and fsbs 273.

## 2017-09-25 NOTE — ED NOTES
Pt continues waiting to see ERP. IV site obtained plus blood draw. Pt c/o nausea. Pt given emesis bag. Pt with call light in reach and kaye in low position for pt safety.

## 2017-09-25 NOTE — ED NOTES
Med rec complete per medication bottles at bedside  Allergies reviewed  No ORAL antibiotics in last 30 days

## 2017-09-26 ASSESSMENT — ENCOUNTER SYMPTOMS
FEVER: 0
CHILLS: 0
VOMITING: 1
NAUSEA: 1
HEADACHES: 0
MYALGIAS: 1
SHORTNESS OF BREATH: 0
ABDOMINAL PAIN: 1

## 2017-09-26 NOTE — ED NOTES
IV removed without problem. FSBS completed. Pt given d/c instructions. Questions answered & support given. Note for work provided. Pt states she has appt with PCP on Friday.

## 2017-09-26 NOTE — DISCHARGE INSTRUCTIONS
Please follow up with her primary care physician regarding her diabetes within the next week. Return if you have any worsening symptoms.  Blood Glucose Monitoring, Adult  Monitoring your blood glucose (also know as blood sugar) helps you to manage your diabetes. It also helps you and your health care provider monitor your diabetes and determine how well your treatment plan is working.  WHY SHOULD YOU MONITOR YOUR BLOOD GLUCOSE?  · It can help you understand how food, exercise, and medicine affect your blood glucose.  · It allows you to know what your blood glucose is at any given moment. You can quickly tell if you are having low blood glucose (hypoglycemia) or high blood glucose (hyperglycemia).  · It can help you and your health care provider know how to adjust your medicines.  · It can help you understand how to manage an illness or adjust medicine for exercise.  WHEN SHOULD YOU TEST?  Your health care provider will help you decide how often you should check your blood glucose. This may depend on the type of diabetes you have, your diabetes control, or the types of medicines you are taking. Be sure to write down all of your blood glucose readings so that this information can be reviewed with your health care provider. See below for examples of testing times that your health care provider may suggest.  Type 1 Diabetes  · Test at least 2 times per day if your diabetes is well controlled, if you are using an insulin pump, or if you perform multiple daily injections.  · If your diabetes is not well controlled or if you are sick, you may need to test more often.  · It is a good idea to also test:  ¨ Before every insulin injection.  ¨ Before and after exercise.  ¨ Between meals and 2 hours after a meal.  ¨ Occasionally between 2:00 a.m. and 3:00 a.m.  Type 2 Diabetes  · If you are taking insulin, test at least 2 times per day. However, it is best to test before every insulin injection.  · If you take medicines by mouth  "(orally), test 2 times a day.  · If you are on a controlled diet, test once a day.  · If your diabetes is not well controlled or if you are sick, you may need to monitor more often.  HOW TO MONITOR YOUR BLOOD GLUCOSE  Supplies Needed  · Blood glucose meter.  · Test strips for your meter. Each meter has its own strips. You must use the strips that go with your own meter.  · A pricking needle (lancet).  · A device that holds the lancet (lancing device).  · A journal or log book to write down your results.  Procedure  · Wash your hands with soap and water. Alcohol is not preferred.  · Prick the side of your finger (not the tip) with the lancet.  · Gently milk the finger until a small drop of blood appears.  · Follow the instructions that come with your meter for inserting the test strip, applying blood to the strip, and using your blood glucose meter.  Other Areas to Get Blood for Testing  Some meters allow you to use other areas of your body (other than your finger) to test your blood. These areas are called alternative sites. The most common alternative sites are:  · The forearm.  · The thigh.  · The back area of the lower leg.  · The palm of the hand.  The blood flow in these areas is slower. Therefore, the blood glucose values you get may be delayed, and the numbers are different from what you would get from your fingers. Do not use alternative sites if you think you are having hypoglycemia. Your reading will not be accurate. Always use a finger if you are having hypoglycemia. Also, if you cannot feel your lows (hypoglycemia unawareness), always use your fingers for your blood glucose checks.  ADDITIONAL TIPS FOR GLUCOSE MONITORING  · Do not reuse lancets.  · Always carry your supplies with you.  · All blood glucose meters have a 24-hour \"hotline\" number to call if you have questions or need help.  · Adjust (calibrate) your blood glucose meter with a control solution after finishing a few boxes of strips.  BLOOD " GLUCOSE RECORD KEEPING  It is a good idea to keep a daily record or log of your blood glucose readings. Most glucose meters, if not all, keep your glucose records stored in the meter. Some meters come with the ability to download your records to your home computer. Keeping a record of your blood glucose readings is especially helpful if you are wanting to look for patterns. Make notes to go along with the blood glucose readings because you might forget what happened at that exact time. Keeping good records helps you and your health care provider to work together to achieve good diabetes management.      This information is not intended to replace advice given to you by your health care provider. Make sure you discuss any questions you have with your health care provider.     Document Released: 12/20/2004 Document Revised: 01/08/2016 Document Reviewed: 05/12/2014  Elsevier Interactive Patient Education ©2016 [employer]

## 2017-09-26 NOTE — ED PROVIDER NOTES
ED Provider Note    Primary care provider: Kirsten Maher N.P.  Means of arrival: Private vehicle  History obtained from: Patient  History limited by: None    CHIEF COMPLAINT  Chief Complaint   Patient presents with   • Hyperglycemia     253   • Abdominal Pain   • N/V       HPI  Liana Flanagan is a 37 y.o. female who presents to the Emergency Department For generalized not feeling well. Patient reports that over the last couple of days she notes her blood sugars have been running high, despite taking her normal diabetes medications. She reports that they have been running from 200-300. She reports associated tingling in her bilateral hands, mild generalized abdominal pain, nausea, and nonbloody emesis. Patient states she is still eating normally despite feeling this way. She denies feeling thirsty and polyuria. She has follow-up with her diabetes doctor later this week.    REVIEW OF SYSTEMS  Review of Systems   Constitutional: Positive for malaise/fatigue. Negative for chills and fever.   Respiratory: Negative for shortness of breath.    Cardiovascular: Negative for chest pain.   Gastrointestinal: Positive for abdominal pain, nausea and vomiting.   Musculoskeletal: Positive for myalgias.   Neurological: Negative for headaches.   All other systems reviewed and are negative.        PAST MEDICAL HISTORY   has a past medical history of Chickenpox; Herpes; Hypertension; Obese; Personal history of venous thrombosis and embolism; Sleep apnea; and Type II or unspecified type diabetes mellitus without mention of complication, not stated as uncontrolled.    SURGICAL HISTORY  patient denies any surgical history    SOCIAL HISTORY  Social History   Substance Use Topics   • Smoking status: Former Smoker     Packs/day: 0.50     Years: 0.30     Types: Cigarettes     Quit date: 1/1/2008   • Smokeless tobacco: Never Used   • Alcohol use No      History   Drug Use No     Comment: past hx of meth use at age 19 x 3 mos  "      FAMILY HISTORY  Family History   Problem Relation Age of Onset   • Hypertension Mother    • Sleep Apnea Mother    • Hyperlipidemia Father    • Cancer Maternal Uncle    • Sleep Apnea Brother    • Diabetes Neg Hx    • Heart Disease Neg Hx    • Stroke Neg Hx        CURRENT MEDICATIONS  Home Medications     Reviewed by Amari Mac (Pharmacy Tech) on 09/25/17 at 1637  Med List Status: Complete   Medication Last Dose Status   atorvastatin (LIPITOR) 20 MG Tab 9/24/2017 Active   gabapentin (NEURONTIN) 300 MG Cap 9/24/2017 Active   insulin glargine (BASAGLAR KWIKPEN) 100 UNIT/ML Solution Pen-injector injection 9/24/2017 Active   insulin glulisine (APIDRA) 100 UNIT/ML Solution Pen-injector injection 9/25/2017 Active   levothyroxine (SYNTHROID) 75 MCG Tab 9/24/2017 Active   lisinopril (PRINIVIL) 2.5 MG Tab 9/24/2017 Active   metformin (GLUCOPHAGE) 1000 MG tablet 9/24/2017 Active   ranitidine (ZANTAC) 150 MG Tab 9/24/2017 Active   sitagliptin (JANUVIA) 100 MG Tab 9/24/2017 Active                ALLERGIES  Allergies   Allergen Reactions   • Reglan [Metoclopramide] Vomiting   • Sulfa Drugs    • Zofran Hives       PHYSICAL EXAM  VITAL SIGNS: /85   Pulse 79   Temp 36.7 °C (98.1 °F)   Resp 16   Ht 1.575 m (5' 2\")   Wt 113.4 kg (250 lb)   SpO2 97%   BMI 45.73 kg/m²   Vitals reviewed by myself.  Physical Exam   Constitutional: She is oriented to person, place, and time and well-developed, well-nourished, and in no distress. No distress.   HENT:   Head: Normocephalic.   Eyes: EOM are normal.   Neck: Normal range of motion.   Cardiovascular: Normal rate and regular rhythm.    Pulmonary/Chest: Effort normal and breath sounds normal. No respiratory distress.   Abdominal: Soft. There is no tenderness.   Musculoskeletal: Normal range of motion.   Strength is 5 out of 5 in all extremities   Neurological: She is alert and oriented to person, place, and time.   Sensation intact in all extremities   Skin: Skin is " warm and dry.         DIAGNOSTIC STUDIES /  LABS  Labs Reviewed   COMP METABOLIC PANEL - Abnormal; Notable for the following:        Result Value    Sodium 133 (*)     Glucose 246 (*)     Bun <5 (*)     Creatinine 0.46 (*)     Globulin 4.1 (*)     All other components within normal limits   ACCU-CHEK GLUCOSE - Abnormal; Notable for the following:     Glucose - Accu-Ck 253 (*)     All other components within normal limits   POC UA - Abnormal; Notable for the following:     POC Appearance Slightly Cloudy (*)     POC Glucose 500 (*)     POC Ketones Trace (*)     All other components within normal limits   ACCU-CHEK GLUCOSE - Abnormal; Notable for the following:     Glucose - Accu-Ck 159 (*)     All other components within normal limits   ACCU-CHEK GLUCOSE - Abnormal; Notable for the following:     Glucose - Accu-Ck 128 (*)     All other components within normal limits   CBC WITH DIFFERENTIAL   VENOUS BLOOD GAS   BETA-HYDROXYBUTYRIC ACID   LIPASE   ESTIMATED GFR   POC URINE PREGNANCY      All labs reviewed by me.      COURSE & MEDICAL DECISION MAKING  Nursing notes, VS, PMSFHx reviewed in chart.    Patient is a 37-year-old female who comes in for generalized malaise. Differential diagnosis includes hyperglycemia, DKA, UTI, poorly controlled diabetes. Diagnostic workup includes labs ABG, and beta hydroxy butyrate.      Patient's initial vitals are within normal limits and she is well-appearing on exam. Initial blood sugar is 253. She is treated with IV fluids for her hyperglycemia after which blood sugar improved to 128. Patient reports she feels improved after treatment with IV fluids. Labs returned and are unremarkable except for hyperglycemia. PH is within normal limits and anion gap is also normal. Beta hydroxybutyrate is also within normal limits. Therefore patient is not in DKA. Urinalysis returns demonstrates no signs of infection. Patient is reassured, advised to keep a blood sugar log, advised to follow-up with  her PCP as scheduled this week, and given strict return precautions. Patient is then discharged home in stable condition with vitals in normal limits.      FINAL IMPRESSION  1. Hyperglycemia

## 2017-09-26 NOTE — ED NOTES
IVF completed. Plan of care updated. Report from KATHI Mallory. Care assumed at this time. Pt complains of IV site pain.

## 2017-10-28 ENCOUNTER — RESOLUTE PROFESSIONAL BILLING HOSPITAL PROF FEE (OUTPATIENT)
Dept: HOSPITALIST | Facility: MEDICAL CENTER | Age: 37
End: 2017-10-28

## 2017-10-28 ENCOUNTER — HOSPITAL ENCOUNTER (INPATIENT)
Facility: MEDICAL CENTER | Age: 37
LOS: 2 days | DRG: 638 | End: 2017-10-30
Attending: EMERGENCY MEDICINE | Admitting: INTERNAL MEDICINE
Payer: MEDICAID

## 2017-10-28 DIAGNOSIS — F10.921 ALCOHOL INTOXICATION WITH DELIRIUM (HCC): ICD-10-CM

## 2017-10-28 DIAGNOSIS — E11.10 DIABETIC KETOACIDOSIS WITHOUT COMA ASSOCIATED WITH TYPE 2 DIABETES MELLITUS (HCC): ICD-10-CM

## 2017-10-28 PROBLEM — I10 ESSENTIAL HYPERTENSION: Status: ACTIVE | Noted: 2017-10-28

## 2017-10-28 PROBLEM — E87.29 HIGH ANION GAP METABOLIC ACIDOSIS: Status: ACTIVE | Noted: 2017-10-28

## 2017-10-28 LAB
ALBUMIN SERPL BCP-MCNC: 4.1 G/DL (ref 3.2–4.9)
ALBUMIN/GLOB SERPL: 1.1 G/DL
ALP SERPL-CCNC: 80 U/L (ref 30–99)
ALT SERPL-CCNC: 46 U/L (ref 2–50)
ANION GAP SERPL CALC-SCNC: 15 MMOL/L (ref 0–11.9)
ANION GAP SERPL CALC-SCNC: 9 MMOL/L (ref 0–11.9)
APPEARANCE UR: CLEAR
AST SERPL-CCNC: 22 U/L (ref 12–45)
B-OH-BUTYR SERPL-MCNC: 0.18 MMOL/L (ref 0.02–0.27)
BASE EXCESS BLDV CALC-SCNC: -8 MMOL/L
BASOPHILS # BLD AUTO: 0.6 % (ref 0–1.8)
BASOPHILS # BLD: 0.06 K/UL (ref 0–0.12)
BILIRUB SERPL-MCNC: 0.3 MG/DL (ref 0.1–1.5)
BILIRUB UR QL STRIP.AUTO: NEGATIVE
BODY TEMPERATURE: ABNORMAL CENTIGRADE
BUN SERPL-MCNC: 6 MG/DL (ref 8–22)
BUN SERPL-MCNC: 8 MG/DL (ref 8–22)
CALCIUM SERPL-MCNC: 7.8 MG/DL (ref 8.5–10.5)
CALCIUM SERPL-MCNC: 9 MG/DL (ref 8.5–10.5)
CHLORIDE SERPL-SCNC: 104 MMOL/L (ref 96–112)
CHLORIDE SERPL-SCNC: 111 MMOL/L (ref 96–112)
CO2 SERPL-SCNC: 18 MMOL/L (ref 20–33)
CO2 SERPL-SCNC: 19 MMOL/L (ref 20–33)
COLOR UR: YELLOW
CREAT SERPL-MCNC: 0.48 MG/DL (ref 0.5–1.4)
CREAT SERPL-MCNC: 0.65 MG/DL (ref 0.5–1.4)
CULTURE IF INDICATED INDCX: NO UA CULTURE
EOSINOPHIL # BLD AUTO: 0.07 K/UL (ref 0–0.51)
EOSINOPHIL NFR BLD: 0.7 % (ref 0–6.9)
ERYTHROCYTE [DISTWIDTH] IN BLOOD BY AUTOMATED COUNT: 41.3 FL (ref 35.9–50)
ETHANOL BLD-MCNC: 0.12 G/DL
GFR SERPL CREATININE-BSD FRML MDRD: >60 ML/MIN/1.73 M 2
GFR SERPL CREATININE-BSD FRML MDRD: >60 ML/MIN/1.73 M 2
GLOBULIN SER CALC-MCNC: 3.9 G/DL (ref 1.9–3.5)
GLUCOSE BLD-MCNC: 219 MG/DL (ref 65–99)
GLUCOSE BLD-MCNC: 227 MG/DL (ref 65–99)
GLUCOSE BLD-MCNC: 308 MG/DL (ref 65–99)
GLUCOSE BLD-MCNC: 330 MG/DL (ref 65–99)
GLUCOSE BLD-MCNC: 487 MG/DL (ref 65–99)
GLUCOSE SERPL-MCNC: 353 MG/DL (ref 65–99)
GLUCOSE SERPL-MCNC: 544 MG/DL (ref 65–99)
GLUCOSE UR STRIP.AUTO-MCNC: >=1000 MG/DL
HCO3 BLDV-SCNC: 19 MMOL/L (ref 24–28)
HCT VFR BLD AUTO: 40.7 % (ref 37–47)
HGB BLD-MCNC: 13.5 G/DL (ref 12–16)
IMM GRANULOCYTES # BLD AUTO: 0.08 K/UL (ref 0–0.11)
IMM GRANULOCYTES NFR BLD AUTO: 0.8 % (ref 0–0.9)
INHALED O2 FLOW RATE: 3 L/MIN (ref 2–10)
KETONES UR STRIP.AUTO-MCNC: 15 MG/DL
LEUKOCYTE ESTERASE UR QL STRIP.AUTO: NEGATIVE
LYMPHOCYTES # BLD AUTO: 1.79 K/UL (ref 1–4.8)
LYMPHOCYTES NFR BLD: 17.6 % (ref 22–41)
MCH RBC QN AUTO: 27.8 PG (ref 27–33)
MCHC RBC AUTO-ENTMCNC: 33.2 G/DL (ref 33.6–35)
MCV RBC AUTO: 83.7 FL (ref 81.4–97.8)
MICRO URNS: ABNORMAL
MONOCYTES # BLD AUTO: 0.25 K/UL (ref 0–0.85)
MONOCYTES NFR BLD AUTO: 2.5 % (ref 0–13.4)
NEUTROPHILS # BLD AUTO: 7.94 K/UL (ref 2–7.15)
NEUTROPHILS NFR BLD: 77.8 % (ref 44–72)
NITRITE UR QL STRIP.AUTO: NEGATIVE
NRBC # BLD AUTO: 0 K/UL
NRBC BLD AUTO-RTO: 0 /100 WBC
PCO2 BLDV: 42.5 MMHG (ref 41–51)
PH BLDV: 7.26 [PH] (ref 7.31–7.45)
PH UR STRIP.AUTO: 5 [PH]
PLATELET # BLD AUTO: 271 K/UL (ref 164–446)
PMV BLD AUTO: 10.5 FL (ref 9–12.9)
PO2 BLDV: 40.1 MMHG (ref 25–40)
POTASSIUM SERPL-SCNC: 3.1 MMOL/L (ref 3.6–5.5)
POTASSIUM SERPL-SCNC: 3.8 MMOL/L (ref 3.6–5.5)
PROT SERPL-MCNC: 8 G/DL (ref 6–8.2)
PROT UR QL STRIP: NEGATIVE MG/DL
RBC # BLD AUTO: 4.86 M/UL (ref 4.2–5.4)
RBC UR QL AUTO: NEGATIVE
SAO2 % BLDV: 65.3 %
SODIUM SERPL-SCNC: 137 MMOL/L (ref 135–145)
SODIUM SERPL-SCNC: 139 MMOL/L (ref 135–145)
SP GR UR STRIP.AUTO: 1.04
UROBILINOGEN UR STRIP.AUTO-MCNC: 0.2 MG/DL
WBC # BLD AUTO: 10.2 K/UL (ref 4.8–10.8)

## 2017-10-28 PROCEDURE — 80053 COMPREHEN METABOLIC PANEL: CPT

## 2017-10-28 PROCEDURE — 94760 N-INVAS EAR/PLS OXIMETRY 1: CPT

## 2017-10-28 PROCEDURE — A9270 NON-COVERED ITEM OR SERVICE: HCPCS | Performed by: INTERNAL MEDICINE

## 2017-10-28 PROCEDURE — 82010 KETONE BODYS QUAN: CPT

## 2017-10-28 PROCEDURE — 99285 EMERGENCY DEPT VISIT HI MDM: CPT

## 2017-10-28 PROCEDURE — 82962 GLUCOSE BLOOD TEST: CPT | Mod: 91

## 2017-10-28 PROCEDURE — 96360 HYDRATION IV INFUSION INIT: CPT

## 2017-10-28 PROCEDURE — 700105 HCHG RX REV CODE 258: Performed by: EMERGENCY MEDICINE

## 2017-10-28 PROCEDURE — 700102 HCHG RX REV CODE 250 W/ 637 OVERRIDE(OP): Performed by: FAMILY MEDICINE

## 2017-10-28 PROCEDURE — A9270 NON-COVERED ITEM OR SERVICE: HCPCS | Performed by: FAMILY MEDICINE

## 2017-10-28 PROCEDURE — 80307 DRUG TEST PRSMV CHEM ANLYZR: CPT

## 2017-10-28 PROCEDURE — 81003 URINALYSIS AUTO W/O SCOPE: CPT

## 2017-10-28 PROCEDURE — 99223 1ST HOSP IP/OBS HIGH 75: CPT | Performed by: INTERNAL MEDICINE

## 2017-10-28 PROCEDURE — 96361 HYDRATE IV INFUSION ADD-ON: CPT

## 2017-10-28 PROCEDURE — 700105 HCHG RX REV CODE 258: Performed by: INTERNAL MEDICINE

## 2017-10-28 PROCEDURE — 96372 THER/PROPH/DIAG INJ SC/IM: CPT

## 2017-10-28 PROCEDURE — 85025 COMPLETE CBC W/AUTO DIFF WBC: CPT

## 2017-10-28 PROCEDURE — 82803 BLOOD GASES ANY COMBINATION: CPT

## 2017-10-28 PROCEDURE — 700102 HCHG RX REV CODE 250 W/ 637 OVERRIDE(OP): Performed by: INTERNAL MEDICINE

## 2017-10-28 PROCEDURE — 770020 HCHG ROOM/CARE - TELE (206)

## 2017-10-28 PROCEDURE — 80048 BASIC METABOLIC PNL TOTAL CA: CPT

## 2017-10-28 RX ORDER — INSULIN GLARGINE 100 [IU]/ML
58 INJECTION, SOLUTION SUBCUTANEOUS EVERY EVENING
Status: DISCONTINUED | OUTPATIENT
Start: 2017-10-28 | End: 2017-10-30 | Stop reason: HOSPADM

## 2017-10-28 RX ORDER — DEXTROSE MONOHYDRATE 25 G/50ML
25 INJECTION, SOLUTION INTRAVENOUS
Status: DISCONTINUED | OUTPATIENT
Start: 2017-10-28 | End: 2017-10-30 | Stop reason: HOSPADM

## 2017-10-28 RX ORDER — PROMETHAZINE HYDROCHLORIDE 25 MG/1
12.5-25 SUPPOSITORY RECTAL EVERY 4 HOURS PRN
Status: DISCONTINUED | OUTPATIENT
Start: 2017-10-28 | End: 2017-10-30 | Stop reason: HOSPADM

## 2017-10-28 RX ORDER — SODIUM CHLORIDE 9 MG/ML
2000 INJECTION, SOLUTION INTRAVENOUS ONCE
Status: COMPLETED | OUTPATIENT
Start: 2017-10-28 | End: 2017-10-28

## 2017-10-28 RX ORDER — BISACODYL 10 MG
10 SUPPOSITORY, RECTAL RECTAL
Status: DISCONTINUED | OUTPATIENT
Start: 2017-10-28 | End: 2017-10-30 | Stop reason: HOSPADM

## 2017-10-28 RX ORDER — POTASSIUM CHLORIDE 20 MEQ/1
40 TABLET, EXTENDED RELEASE ORAL ONCE
Status: COMPLETED | OUTPATIENT
Start: 2017-10-28 | End: 2017-10-28

## 2017-10-28 RX ORDER — FAMOTIDINE 20 MG/1
20 TABLET, FILM COATED ORAL 2 TIMES DAILY
Status: DISCONTINUED | OUTPATIENT
Start: 2017-10-28 | End: 2017-10-30 | Stop reason: HOSPADM

## 2017-10-28 RX ORDER — LEVOTHYROXINE SODIUM 0.07 MG/1
75 TABLET ORAL
Status: DISCONTINUED | OUTPATIENT
Start: 2017-10-28 | End: 2017-10-30 | Stop reason: HOSPADM

## 2017-10-28 RX ORDER — ONDANSETRON 4 MG/1
4 TABLET, ORALLY DISINTEGRATING ORAL EVERY 4 HOURS PRN
Status: DISCONTINUED | OUTPATIENT
Start: 2017-10-28 | End: 2017-10-28

## 2017-10-28 RX ORDER — SODIUM CHLORIDE 9 MG/ML
1000 INJECTION, SOLUTION INTRAVENOUS ONCE
Status: DISCONTINUED | OUTPATIENT
Start: 2017-10-28 | End: 2017-10-28

## 2017-10-28 RX ORDER — PROMETHAZINE HYDROCHLORIDE 25 MG/1
12.5-25 TABLET ORAL EVERY 4 HOURS PRN
Status: DISCONTINUED | OUTPATIENT
Start: 2017-10-28 | End: 2017-10-30 | Stop reason: HOSPADM

## 2017-10-28 RX ORDER — LISINOPRIL 5 MG/1
2.5 TABLET ORAL DAILY
Status: DISCONTINUED | OUTPATIENT
Start: 2017-10-28 | End: 2017-10-30 | Stop reason: HOSPADM

## 2017-10-28 RX ORDER — RANITIDINE 150 MG/1
150 TABLET ORAL 2 TIMES DAILY
Status: DISCONTINUED | OUTPATIENT
Start: 2017-10-28 | End: 2017-10-28

## 2017-10-28 RX ORDER — ONDANSETRON 2 MG/ML
4 INJECTION INTRAMUSCULAR; INTRAVENOUS EVERY 4 HOURS PRN
Status: DISCONTINUED | OUTPATIENT
Start: 2017-10-28 | End: 2017-10-28

## 2017-10-28 RX ORDER — AMOXICILLIN 250 MG
2 CAPSULE ORAL 2 TIMES DAILY
Status: DISCONTINUED | OUTPATIENT
Start: 2017-10-28 | End: 2017-10-30 | Stop reason: HOSPADM

## 2017-10-28 RX ORDER — SODIUM CHLORIDE 9 MG/ML
INJECTION, SOLUTION INTRAVENOUS CONTINUOUS
Status: DISCONTINUED | OUTPATIENT
Start: 2017-10-28 | End: 2017-10-29

## 2017-10-28 RX ORDER — GABAPENTIN 300 MG/1
300 CAPSULE ORAL 2 TIMES DAILY
Status: DISCONTINUED | OUTPATIENT
Start: 2017-10-28 | End: 2017-10-30 | Stop reason: HOSPADM

## 2017-10-28 RX ORDER — ATORVASTATIN CALCIUM 20 MG/1
20 TABLET, FILM COATED ORAL
Status: DISCONTINUED | OUTPATIENT
Start: 2017-10-28 | End: 2017-10-30 | Stop reason: HOSPADM

## 2017-10-28 RX ORDER — POLYETHYLENE GLYCOL 3350 17 G/17G
1 POWDER, FOR SOLUTION ORAL
Status: DISCONTINUED | OUTPATIENT
Start: 2017-10-28 | End: 2017-10-30 | Stop reason: HOSPADM

## 2017-10-28 RX ORDER — HEPARIN SODIUM 5000 [USP'U]/ML
5000 INJECTION, SOLUTION INTRAVENOUS; SUBCUTANEOUS EVERY 8 HOURS
Status: DISCONTINUED | OUTPATIENT
Start: 2017-10-28 | End: 2017-10-30 | Stop reason: HOSPADM

## 2017-10-28 RX ADMIN — FAMOTIDINE 20 MG: 20 TABLET, FILM COATED ORAL at 10:02

## 2017-10-28 RX ADMIN — LEVOTHYROXINE SODIUM 75 MCG: 75 TABLET ORAL at 09:59

## 2017-10-28 RX ADMIN — GABAPENTIN 300 MG: 300 CAPSULE ORAL at 21:19

## 2017-10-28 RX ADMIN — POTASSIUM CHLORIDE 40 MEQ: 1500 TABLET, EXTENDED RELEASE ORAL at 21:18

## 2017-10-28 RX ADMIN — INSULIN HUMAN 15 UNITS: 100 INJECTION, SUSPENSION SUBCUTANEOUS at 11:52

## 2017-10-28 RX ADMIN — GABAPENTIN 300 MG: 300 CAPSULE ORAL at 11:41

## 2017-10-28 RX ADMIN — ATORVASTATIN CALCIUM 20 MG: 20 TABLET, FILM COATED ORAL at 21:19

## 2017-10-28 RX ADMIN — INSULIN LISPRO 3 UNITS: 100 INJECTION, SOLUTION INTRAVENOUS; SUBCUTANEOUS at 21:17

## 2017-10-28 RX ADMIN — INSULIN LISPRO 3 UNITS: 100 INJECTION, SOLUTION INTRAVENOUS; SUBCUTANEOUS at 16:48

## 2017-10-28 RX ADMIN — SODIUM CHLORIDE: 9 INJECTION, SOLUTION INTRAVENOUS at 11:41

## 2017-10-28 RX ADMIN — FAMOTIDINE 20 MG: 20 TABLET, FILM COATED ORAL at 21:19

## 2017-10-28 RX ADMIN — SODIUM CHLORIDE 2000 ML: 9 INJECTION, SOLUTION INTRAVENOUS at 06:48

## 2017-10-28 RX ADMIN — POTASSIUM CHLORIDE 40 MEQ: 1500 TABLET, EXTENDED RELEASE ORAL at 11:52

## 2017-10-28 RX ADMIN — LISINOPRIL 2.5 MG: 5 TABLET ORAL at 10:00

## 2017-10-28 RX ADMIN — INSULIN LISPRO 6 UNITS: 100 INJECTION, SOLUTION INTRAVENOUS; SUBCUTANEOUS at 09:54

## 2017-10-28 RX ADMIN — INSULIN GLARGINE 58 UNITS: 100 INJECTION, SOLUTION SUBCUTANEOUS at 21:16

## 2017-10-28 ASSESSMENT — LIFESTYLE VARIABLES
ON A TYPICAL DAY WHEN YOU DRINK ALCOHOL HOW MANY DRINKS DO YOU HAVE: 0
EVER FELT BAD OR GUILTY ABOUT YOUR DRINKING: NO
HEADACHE, FULLNESS IN HEAD: NOT PRESENT
NAUSEA AND VOMITING: NO NAUSEA AND NO VOMITING
HAVE PEOPLE ANNOYED YOU BY CRITICIZING YOUR DRINKING: NO
TOTAL SCORE: 0
HAVE YOU EVER FELT YOU SHOULD CUT DOWN ON YOUR DRINKING: NO
DO YOU DRINK ALCOHOL: YES
TOTAL SCORE: 0
CONSUMPTION TOTAL: NEGATIVE
HAVE YOU EVER FELT YOU SHOULD CUT DOWN ON YOUR DRINKING: NO
VISUAL DISTURBANCES: NOT PRESENT
EVER FELT BAD OR GUILTY ABOUT YOUR DRINKING: NO
DO YOU DRINK ALCOHOL: YES
TOTAL SCORE: 0
ORIENTATION AND CLOUDING OF SENSORIUM: ORIENTED AND CAN DO SERIAL ADDITIONS
EVER HAD A DRINK FIRST THING IN THE MORNING TO STEADY YOUR NERVES TO GET RID OF A HANGOVER: NO
VISUAL DISTURBANCES: NOT PRESENT
ORIENTATION AND CLOUDING OF SENSORIUM: ORIENTED AND CAN DO SERIAL ADDITIONS
TOTAL SCORE: 0
EVER_SMOKED: YES
AGITATION: NORMAL ACTIVITY
TREMOR: NO TREMOR
TOTAL SCORE: 0
ON A TYPICAL DAY WHEN YOU DRINK ALCOHOL HOW MANY DRINKS DO YOU HAVE: 0
ANXIETY: NO ANXIETY (AT EASE)
TOTAL SCORE: 0
NAUSEA AND VOMITING: MILD NAUSEA WITH NO VOMITING
AUDITORY DISTURBANCES: NOT PRESENT
AUDITORY DISTURBANCES: NOT PRESENT
HEADACHE, FULLNESS IN HEAD: NOT PRESENT
HAVE PEOPLE ANNOYED YOU BY CRITICIZING YOUR DRINKING: NO
AGITATION: NORMAL ACTIVITY
AVERAGE NUMBER OF DAYS PER WEEK YOU HAVE A DRINK CONTAINING ALCOHOL: 0
AVERAGE NUMBER OF DAYS PER WEEK YOU HAVE A DRINK CONTAINING ALCOHOL: 0
EVER HAD A DRINK FIRST THING IN THE MORNING TO STEADY YOUR NERVES TO GET RID OF A HANGOVER: NO
TOTAL SCORE: 1
TOTAL SCORE: 0
EVER_SMOKED: YES
PAROXYSMAL SWEATS: NO SWEAT VISIBLE
TREMOR: NO TREMOR
CONSUMPTION TOTAL: NEGATIVE
ANXIETY: NO ANXIETY (AT EASE)
PAROXYSMAL SWEATS: NO SWEAT VISIBLE
HOW MANY TIMES IN THE PAST YEAR HAVE YOU HAD 5 OR MORE DRINKS IN A DAY: 0
HOW MANY TIMES IN THE PAST YEAR HAVE YOU HAD 5 OR MORE DRINKS IN A DAY: 0
DO YOU DRINK ALCOHOL: YES

## 2017-10-28 ASSESSMENT — PAIN SCALES - GENERAL
PAINLEVEL_OUTOF10: 0
PAINLEVEL_OUTOF10: 8
PAINLEVEL_OUTOF10: 0

## 2017-10-28 ASSESSMENT — ENCOUNTER SYMPTOMS
WEIGHT LOSS: 0
SEIZURES: 0
STRIDOR: 0
BACK PAIN: 0
MYALGIAS: 0
COUGH: 0
DIARRHEA: 0
NECK PAIN: 0
HEARTBURN: 0
SPUTUM PRODUCTION: 0
INSOMNIA: 0
FOCAL WEAKNESS: 0
DEPRESSION: 0
FEVER: 0
NAUSEA: 1
DIZZINESS: 0
EYE REDNESS: 0
ABDOMINAL PAIN: 1
NERVOUS/ANXIOUS: 0
PALPITATIONS: 0
HEADACHES: 0
EYE DISCHARGE: 0
EYE PAIN: 0
ORTHOPNEA: 0
BLURRED VISION: 0
SHORTNESS OF BREATH: 0
VOMITING: 1
CHILLS: 0

## 2017-10-28 ASSESSMENT — PATIENT HEALTH QUESTIONNAIRE - PHQ9
SUM OF ALL RESPONSES TO PHQ QUESTIONS 1-9: 0
2. FEELING DOWN, DEPRESSED, IRRITABLE, OR HOPELESS: NOT AT ALL
SUM OF ALL RESPONSES TO PHQ9 QUESTIONS 1 AND 2: 0
1. LITTLE INTEREST OR PLEASURE IN DOING THINGS: NOT AT ALL

## 2017-10-28 ASSESSMENT — COPD QUESTIONNAIRES
DO YOU EVER COUGH UP ANY MUCUS OR PHLEGM?: NO/ONLY WITH OCCASIONAL COLDS OR INFECTIONS
HAVE YOU SMOKED AT LEAST 100 CIGARETTES IN YOUR ENTIRE LIFE: YES
DURING THE PAST 4 WEEKS HOW MUCH DID YOU FEEL SHORT OF BREATH: SOME OF THE TIME
COPD SCREENING SCORE: 3

## 2017-10-28 ASSESSMENT — COGNITIVE AND FUNCTIONAL STATUS - GENERAL
MOBILITY SCORE: 24
SUGGESTED CMS G CODE MODIFIER MOBILITY: CH
DAILY ACTIVITIY SCORE: 24
SUGGESTED CMS G CODE MODIFIER DAILY ACTIVITY: CH

## 2017-10-28 NOTE — ASSESSMENT & PLAN NOTE
Early DKA  Given insulin in ER along with IVF bolus 3L  improving  Follow cmp closely  On insulin management  Check A1c: 10.1  Diabetic education

## 2017-10-28 NOTE — ED NOTES
Report received.  Noted critical glucose.  JAIDA gale notified and verbalized understanding.  Rounded on patient.  IVF infusing.  Vitals stable.  Will monitor.

## 2017-10-28 NOTE — ED PROVIDER NOTES
ED Provider Note    Scribed for Lenny Boggs M.D. by Liz Gomez. 10/28/2017  6:40 AM    Primary care provider: Kirsetn Olvera N.P.  Means of arrival: EMS  History obtained from: Patient  History limited by: Altered mental status    CHIEF COMPLAINT  Chief Complaint   Patient presents with   • N/V   • High Blood Sugar   • Alcohol Intoxication       HPI  Liana Flanagan is a 37 y.o. female who presents to the Emergency Department complaining of nausea and vomiting this morning secondary to alcohol intoxication last night. Patient confirms drinking a lot last night which she does not normally do. Denies known fall, head trauma, hematemesis.     History is limited secondary to patients altered mental status.    REVIEW OF SYSTEMS  Pertinent positives include alcohol intoxication, nausea, emesis. Pertinent negatives include no fall, head trauma, hematemesis. All other systems reviewed and negative.    ROS limited secondary to patient's altered mental status.     PAST MEDICAL HISTORY   has a past medical history of Back pain; Chickenpox; Disorder of thyroid; Gynecological disorder; Herpes; Hypertension; Indigestion; Obese; Personal history of venous thrombosis and embolism; Psychiatric problem; Sleep apnea; and Type II or unspecified type diabetes mellitus without mention of complication, not stated as uncontrolled.    SURGICAL HISTORY  patient denies any surgical history    SOCIAL HISTORY  Social History   Substance Use Topics   • Smoking status: Former Smoker     Packs/day: 0.50     Years: 0.30     Types: Cigarettes     Quit date: 1/1/2008   • Smokeless tobacco: Never Used   • Alcohol use No      History   Drug Use No       FAMILY HISTORY  Family History   Problem Relation Age of Onset   • Hypertension Mother    • Sleep Apnea Mother    • Hyperlipidemia Father    • Cancer Maternal Uncle    • Sleep Apnea Brother    • Diabetes Neg Hx    • Heart Disease Neg Hx    • Stroke Neg Hx        CURRENT MEDICATIONS  No  "current facility-administered medications on file prior to encounter.      Current Outpatient Prescriptions on File Prior to Encounter   Medication Sig Dispense Refill   • insulin glulisine (APIDRA) 100 UNIT/ML Solution Pen-injector injection Inject 15 Units as instructed 3 times a day before meals.     • insulin glargine (BASAGLAR KWIKPEN) 100 UNIT/ML Solution Pen-injector injection Inject 58 Units as instructed every evening.     • gabapentin (NEURONTIN) 300 MG Cap Take 300 mg by mouth 3 times a day.     • ranitidine (ZANTAC) 150 MG Tab Take 150 mg by mouth 2 times a day.     • lisinopril (PRINIVIL) 2.5 MG Tab Take 2.5 mg by mouth every day.     • atorvastatin (LIPITOR) 20 MG Tab Take 1 Tab by mouth every bedtime. 30 Tab 0   • sitagliptin (JANUVIA) 100 MG Tab Take 1 Tab by mouth every day. 30 Tab 0   • metformin (GLUCOPHAGE) 1000 MG tablet Take 1 Tab by mouth 2 times a day. 60 Tab 0   • levothyroxine (SYNTHROID) 75 MCG Tab Take 75 mcg by mouth Every morning on an empty stomach.         ALLERGIES  Allergies   Allergen Reactions   • Reglan [Metoclopramide] Vomiting   • Sulfa Drugs Unspecified     Rxn - unknown timeframe and reaction     • Zofran Hives       PHYSICAL EXAM  VITAL SIGNS: /100   Pulse 83   Temp 36 °C (96.8 °F)   Resp 18   Ht 1.6 m (5' 3\")   Wt 113.4 kg (250 lb)   BMI 44.29 kg/m²     Constitutional: Disheveled. Intoxicated. Sleeping. No acute distress,   HENT: Dry mucous membranes. Normocephalic, Atraumatic, Bilateral external ears normal, No oral exudates.   Eyes: PERRLA, EOMI, Conjunctiva normal, No discharge.   Neck: No tenderness, Supple, No stridor.   Lymphatic: No lymphadenopathy noted.   Cardiovascular: Normal heart rate, Normal rhythm.   Thorax & Lungs: Clear to auscultation bilaterally, No respiratory distress, No wheezing, No crackles.   Abdomen: Obese. Soft, No tenderness, No masses, No pulsatile masses.   Skin: Warm, Dry, No erythema, No rash.   Extremities:, No edema No cyanosis. "   Musculoskeletal: No tenderness to palpation or major deformities noted.  Intact distal pulses  Neurologic: Somnolent but will awaken. Slurred speech. Moves all extremities spontaneously.  Psychiatric: Affect normal, Judgment normal, Mood normal.       LABS  Labs Reviewed   CBC WITH DIFFERENTIAL - Abnormal; Notable for the following:        Result Value    MCHC 33.2 (*)     Neutrophils-Polys 77.80 (*)     Lymphocytes 17.60 (*)     Neutrophils (Absolute) 7.94 (*)     All other components within normal limits   COMP METABOLIC PANEL - Abnormal; Notable for the following:     Co2 18 (*)     Anion Gap 15.0 (*)     Glucose 544 (*)     Globulin 3.9 (*)     All other components within normal limits   URINALYSIS,CULTURE IF INDICATED - Abnormal; Notable for the following:     Glucose >=1000 (*)     Ketones 15 (*)     All other components within normal limits   VENOUS BLOOD GAS - Abnormal; Notable for the following:     Venous Bg Ph 7.26 (*)     Venous Bg Po2 40.1 (*)     Venous Bg Hco3 19 (*)     All other components within normal limits   DIAGNOSTIC ALCOHOL - Abnormal; Notable for the following:     Diagnostic Alcohol 0.12 (*)     All other components within normal limits   BASIC METABOLIC PANEL - Abnormal; Notable for the following:     Potassium 3.1 (*)     Co2 19 (*)     Glucose 353 (*)     Bun 6 (*)     Creatinine 0.48 (*)     Calcium 7.8 (*)     All other components within normal limits   ACCU-CHEK GLUCOSE - Abnormal; Notable for the following:     Glucose - Accu-Ck 487 (*)     All other components within normal limits   ACCU-CHEK GLUCOSE - Abnormal; Notable for the following:     Glucose - Accu-Ck 330 (*)     All other components within normal limits   ACCU-CHEK GLUCOSE - Abnormal; Notable for the following:     Glucose - Accu-Ck 308 (*)     All other components within normal limits   BETA-HYDROXYBUTYRIC ACID   ESTIMATED GFR   ESTIMATED GFR   [facility] POC GLUCOSE   [facility] POC GLUCOSE     All labs reviewed by  me.      COURSE & MEDICAL DECISION MAKING  Pertinent Labs & Imaging studies reviewed. (See chart for details)    6:38 AM - I reviewed the patient's medical records which showed that she was last seen September 25th for hyperglycemia.     6:40 AM - Patient seen and examined at bedside. Patient will be treated with IV fluids for dehydration. Ordered CBC with differential, CMP, urinalysis, venous blood gas, beta-hydroxybutyric acid, diagnostic alcohol, acuu-chek glucose to evaluate her symptoms. The differential diagnoses include but are not limited to: TKA, alcohol intoxication.    7:32 AM - Consulted with Dr. Torres (Hospitalist) who will admit.    CRITICAL CARE  The very real possibility of a deterioration of this patient's condition required the highest level of my preparedness for sudden, emergent intervention.  I provided critical care services, which included medication orders, frequent reevaluations of the patient's condition and response to treatment, ordering and reviewing test results, and discussing the case with various consultants.  The critical care time associated with the care of the patient was 35 minutes. Review chart for interventions. This time is exclusive of any other billable procedures.       Decision Making:  Patient with alcohol intoxication, nausea vomiting, the patient was found to be hyperglycemic and in DKA, give the patient multiple liters of normal saline, started an insulin drip, discussed the case with the hospitalist for admission to hospital.    DISPOSITION:  Patient will be admitted to Dr. Torres (Hospitalist) in guarded condition.      FINAL IMPRESSION  Performed 35 minutes of critical care time  1. Diabetic ketoacidosis without coma associated with type 2 diabetes mellitus (CMS-HCC)    2. Alcohol intoxication with delirium (CMS-HCC)          ILiz (Scribe), am scribing for, and in the presence of, Lenny Boggs M.D..    Electronically signed by: Liz Gomez  (Scribe), 10/28/2017    ILenny M.D. personally performed the services described in this documentation, as scribed by Liz Gomez in my presence, and it is both accurate and complete.    The note accurately reflects work and decisions made by me.  Lenny Boggs  10/28/2017  1:03 PM

## 2017-10-28 NOTE — ED NOTES
Patient awake, alert and oriented.  Able to ambulate to the bathroom.  Vitals stable.  Snack provided.  Will monitor.

## 2017-10-28 NOTE — ED NOTES
"Pt BIB [facility] for nausea/vomiting/high blood sugar. For EMS FSBS read \"high\". Patient ETOH admits to drinking half a pint of vodka. Pt is alert and oriented but very drowsy and falling asleep during assessment.  NAD observed; patient's VSS at this time. Pt given 300 cc's NS PTA.     /100   Pulse 83   Temp 36 °C (96.8 °F)   Resp 18   Ht 1.6 m (5' 3\")   Wt 113.4 kg (250 lb)   BMI 44.29 kg/m²     "

## 2017-10-28 NOTE — DISCHARGE PLANNING
Alert team note:  Provided resources for support-AA meeting schedule, counseling and sobriety services. Available to assist with accessing detox treatment such as [facility] if requested.

## 2017-10-28 NOTE — ED NOTES
The Medication Reconciliation process has been completed by interviewing the patient who reports that she has not had her insulin in at least 2 days because she was afraid to mix the insulin with alcohol.     Allergies have been reviewed  Antibiotic use in 30 days - none    Home Pharmacy:  [facility]

## 2017-10-28 NOTE — PROGRESS NOTES
2 RN skin check completed with KATHI Michel.    Patient has abrasion on right posterior calf, which is healing and also has a healing bruise/abrasion on left anterior foot. Otherwise skin is dry and intact without any signs of breakdown.

## 2017-10-28 NOTE — DISCHARGE PLANNING
Alert team note:  Patient is in police custody for DUI with an accident.  Provided resources for support-AA meeting schedule, counseling and sobriety services.

## 2017-10-28 NOTE — PROGRESS NOTES
Patient arrived via gurney and she is AOx4. No complaints of any pain or discomfort. Patient is able to ambulate on her own, gait is steady.    Patient was oriented to her room, white board, call light, bed fall monitor, and medical supplies/equipment. POC was discussed with patient and she verbalized understanding.    Will continue to monitor.

## 2017-10-28 NOTE — FACE TO FACE
Hospital Medicine History and Physical      Date of Service  10/28/2017    Chief Complaint  Chief Complaint   Patient presents with   • N/V   • High Blood Sugar   • Alcohol Intoxication       History of Presenting Illness  Panchito is a 37 y.o. female PMH of DM, HTN, obesity, who presents with Nausea, vomiting and abdominal pain. She was found to have early DKA in the er. She is a poor historian. She is moaning in her room and some time answer questions appropriately in some time not answering the questions. She complained about abdominal pain in the left side. She was started on IV fluid bolus and 15 units of insulin. Her blood sugar was in the 600s. She will be admitted to the floor for further management.    Primary Care Physician  Kirsten Olvera N.P.      Code Status  Full code    Review of Systems  Review of Systems   Constitutional: Negative for chills, fever and weight loss.   HENT: Negative for congestion and nosebleeds.    Eyes: Negative for blurred vision, pain, discharge and redness.   Respiratory: Negative for cough, sputum production, shortness of breath and stridor.    Cardiovascular: Negative for chest pain, palpitations and orthopnea.   Gastrointestinal: Positive for abdominal pain, nausea and vomiting. Negative for diarrhea and heartburn.   Genitourinary: Negative for dysuria, frequency and urgency.   Musculoskeletal: Negative for back pain, myalgias and neck pain.   Skin: Negative for itching and rash.   Neurological: Negative for dizziness, focal weakness, seizures and headaches.   Psychiatric/Behavioral: Negative for depression. The patient is not nervous/anxious and does not have insomnia.      Please see HPI, all other systems were reviewed and are negative (AMA/CMS criteria)     Past Medical History  Past Medical History:   Diagnosis Date   • Chickenpox    • Herpes    • Hypertension    • Obese    • Personal history of venous thrombosis and embolism     DVT in BLE years ago   • Sleep apnea    •  Type II or unspecified type diabetes mellitus without mention of complication, not stated as uncontrolled        Surgical History  No past surgical history on file.    Medications  No current facility-administered medications on file prior to encounter.      Current Outpatient Prescriptions on File Prior to Encounter   Medication Sig Dispense Refill   • insulin glulisine (APIDRA) 100 UNIT/ML Solution Pen-injector injection Inject 15 Units as instructed 3 times a day before meals.     • insulin glargine (BASAGLAR KWIKPEN) 100 UNIT/ML Solution Pen-injector injection Inject 58 Units as instructed every evening.     • gabapentin (NEURONTIN) 300 MG Cap Take 300 mg by mouth 2 Times a Day.     • ranitidine (ZANTAC) 150 MG Tab Take 150 mg by mouth 2 times a day.     • lisinopril (PRINIVIL) 2.5 MG Tab Take 2.5 mg by mouth every day.     • atorvastatin (LIPITOR) 20 MG Tab Take 1 Tab by mouth every bedtime. 30 Tab 0   • sitagliptin (JANUVIA) 100 MG Tab Take 1 Tab by mouth every day. 30 Tab 0   • metformin (GLUCOPHAGE) 1000 MG tablet Take 1 Tab by mouth 2 times a day. 60 Tab 0   • levothyroxine (SYNTHROID) 75 MCG Tab Take 75 mcg by mouth Every morning on an empty stomach.       Family History  Family History   Problem Relation Age of Onset   • Hypertension Mother    • Sleep Apnea Mother    • Hyperlipidemia Father    • Cancer Maternal Uncle    • Sleep Apnea Brother    • Diabetes Neg Hx    • Heart Disease Neg Hx    • Stroke Neg Hx          Social History  Social History   Substance Use Topics   • Smoking status: Former Smoker     Packs/day: 0.50     Years: 0.30     Types: Cigarettes     Quit date: 2008   • Smokeless tobacco: Never Used   • Alcohol use No       Allergies  Allergies   Allergen Reactions   • Reglan [Metoclopramide] Vomiting   • Sulfa Drugs    • Zofran Hives        Physical Exam  Laboratory   Hemodynamics  Temp (24hrs), Av °C (96.8 °F), Min:36 °C (96.8 °F), Max:36 °C (96.8 °F)   Temperature: 36 °C (96.8  °F)  Pulse  Av  Min: 62  Max: 83 Heart Rate (Monitored): 86  Blood Pressure: 152/100, NIBP: 125/73      Respiratory      Respiration: 19, Pulse Oximetry: 100 %             Physical Exam   Constitutional: She is oriented to person, place, and time. No distress.   HENT:   Head: Normocephalic and atraumatic.   Mouth/Throat: Oropharynx is clear and moist.   Eyes: Conjunctivae and EOM are normal. Pupils are equal, round, and reactive to light.   Neck: Normal range of motion. Neck supple. No tracheal deviation present. No thyromegaly present.   Cardiovascular: Normal rate and regular rhythm.    No murmur heard.  Pulmonary/Chest: Effort normal and breath sounds normal. No respiratory distress. She has no wheezes.   Abdominal: Soft. Bowel sounds are normal. She exhibits no distension. There is tenderness. There is no rebound.   Musculoskeletal: She exhibits no edema or tenderness.   Neurological: She is alert and oriented to person, place, and time. No cranial nerve deficit.   Skin: Skin is warm and dry. She is not diaphoretic. No erythema.   Psychiatric: She has a normal mood and affect. Her behavior is normal. Thought content normal.       Recent Labs      10/28/17   0640   WBC  10.2   RBC  4.86   HEMOGLOBIN  13.5   HEMATOCRIT  40.7   MCV  83.7   MCH  27.8   MCHC  33.2*   RDW  41.3   PLATELETCT  271   MPV  10.5     Recent Labs      10/28/17   0640   SODIUM  137   POTASSIUM  3.8   CHLORIDE  104   CO2  18*   GLUCOSE  544*   BUN  8   CREATININE  0.65   CALCIUM  9.0     Recent Labs      10/28/17   0640   ALTSGPT  46   ASTSGOT  22   ALKPHOSPHAT  80   TBILIRUBIN  0.3   GLUCOSE  544*                 Lab Results   Component Value Date    TROPONINI <0.02 2017       Imaging  No orders to display        Assessment/Plan     I anticipate this patient will require at least two midnights for appropriate medical management, necessitating inpatient admission.    DKA (diabetic ketoacidoses) (CMS-[facility])- (present on admission)    Assessment & Plan    Early DKA  Given insulin in ER along with IVF bolus 3L  Follow cmp closely  On insulin management  Check A1c        Essential hypertension   Assessment & Plan    stable        High anion gap metabolic acidosis- (present on admission)   Assessment & Plan    From DKA  Plan as above.        Morbid obesity (CMS-HCC)- (present on admission)   Assessment & Plan    Diet and exercise education            Prophylaxis:  sc heparin

## 2017-10-29 PROBLEM — E87.6 HYPOKALEMIA: Status: ACTIVE | Noted: 2017-10-29

## 2017-10-29 LAB
ALBUMIN SERPL BCP-MCNC: 3.1 G/DL (ref 3.2–4.9)
ALBUMIN/GLOB SERPL: 1 G/DL
ALP SERPL-CCNC: 59 U/L (ref 30–99)
ALT SERPL-CCNC: 27 U/L (ref 2–50)
ANION GAP SERPL CALC-SCNC: 8 MMOL/L (ref 0–11.9)
AST SERPL-CCNC: 14 U/L (ref 12–45)
BILIRUB SERPL-MCNC: 0.5 MG/DL (ref 0.1–1.5)
BUN SERPL-MCNC: 7 MG/DL (ref 8–22)
CALCIUM SERPL-MCNC: 8.1 MG/DL (ref 8.5–10.5)
CHLORIDE SERPL-SCNC: 108 MMOL/L (ref 96–112)
CO2 SERPL-SCNC: 20 MMOL/L (ref 20–33)
CREAT SERPL-MCNC: 0.33 MG/DL (ref 0.5–1.4)
ERYTHROCYTE [DISTWIDTH] IN BLOOD BY AUTOMATED COUNT: 40.8 FL (ref 35.9–50)
GFR SERPL CREATININE-BSD FRML MDRD: >60 ML/MIN/1.73 M 2
GLOBULIN SER CALC-MCNC: 3.2 G/DL (ref 1.9–3.5)
GLUCOSE BLD-MCNC: 155 MG/DL (ref 65–99)
GLUCOSE BLD-MCNC: 155 MG/DL (ref 65–99)
GLUCOSE BLD-MCNC: 241 MG/DL (ref 65–99)
GLUCOSE BLD-MCNC: 250 MG/DL (ref 65–99)
GLUCOSE BLD-MCNC: 288 MG/DL (ref 65–99)
GLUCOSE SERPL-MCNC: 196 MG/DL (ref 65–99)
HCG UR QL: NEGATIVE
HCT VFR BLD AUTO: 36.9 % (ref 37–47)
HGB BLD-MCNC: 12.4 G/DL (ref 12–16)
MCH RBC QN AUTO: 27.9 PG (ref 27–33)
MCHC RBC AUTO-ENTMCNC: 33.6 G/DL (ref 33.6–35)
MCV RBC AUTO: 83.1 FL (ref 81.4–97.8)
PLATELET # BLD AUTO: 257 K/UL (ref 164–446)
PMV BLD AUTO: 10.3 FL (ref 9–12.9)
POTASSIUM SERPL-SCNC: 3.6 MMOL/L (ref 3.6–5.5)
PROT SERPL-MCNC: 6.3 G/DL (ref 6–8.2)
RBC # BLD AUTO: 4.44 M/UL (ref 4.2–5.4)
SODIUM SERPL-SCNC: 136 MMOL/L (ref 135–145)
SP GR UR REFRACTOMETRY: 1.02
WBC # BLD AUTO: 9.7 K/UL (ref 4.8–10.8)

## 2017-10-29 PROCEDURE — 81025 URINE PREGNANCY TEST: CPT

## 2017-10-29 PROCEDURE — 700102 HCHG RX REV CODE 250 W/ 637 OVERRIDE(OP): Performed by: INTERNAL MEDICINE

## 2017-10-29 PROCEDURE — A9270 NON-COVERED ITEM OR SERVICE: HCPCS | Performed by: INTERNAL MEDICINE

## 2017-10-29 PROCEDURE — 700111 HCHG RX REV CODE 636 W/ 250 OVERRIDE (IP): Performed by: INTERNAL MEDICINE

## 2017-10-29 PROCEDURE — 82962 GLUCOSE BLOOD TEST: CPT | Mod: 91

## 2017-10-29 PROCEDURE — A9270 NON-COVERED ITEM OR SERVICE: HCPCS | Performed by: FAMILY MEDICINE

## 2017-10-29 PROCEDURE — 85027 COMPLETE CBC AUTOMATED: CPT

## 2017-10-29 PROCEDURE — 700105 HCHG RX REV CODE 258: Performed by: INTERNAL MEDICINE

## 2017-10-29 PROCEDURE — 770020 HCHG ROOM/CARE - TELE (206)

## 2017-10-29 PROCEDURE — 700102 HCHG RX REV CODE 250 W/ 637 OVERRIDE(OP): Performed by: FAMILY MEDICINE

## 2017-10-29 PROCEDURE — 80053 COMPREHEN METABOLIC PANEL: CPT

## 2017-10-29 PROCEDURE — 99233 SBSQ HOSP IP/OBS HIGH 50: CPT | Performed by: INTERNAL MEDICINE

## 2017-10-29 PROCEDURE — 36415 COLL VENOUS BLD VENIPUNCTURE: CPT

## 2017-10-29 RX ORDER — ACETAMINOPHEN 325 MG/1
650 TABLET ORAL EVERY 6 HOURS PRN
Status: DISCONTINUED | OUTPATIENT
Start: 2017-10-29 | End: 2017-10-30 | Stop reason: HOSPADM

## 2017-10-29 RX ADMIN — FAMOTIDINE 20 MG: 20 TABLET, FILM COATED ORAL at 21:33

## 2017-10-29 RX ADMIN — GABAPENTIN 300 MG: 300 CAPSULE ORAL at 21:33

## 2017-10-29 RX ADMIN — ACETAMINOPHEN 650 MG: 325 TABLET, FILM COATED ORAL at 01:33

## 2017-10-29 RX ADMIN — INSULIN GLARGINE 58 UNITS: 100 INJECTION, SOLUTION SUBCUTANEOUS at 21:38

## 2017-10-29 RX ADMIN — LISINOPRIL 2.5 MG: 5 TABLET ORAL at 09:35

## 2017-10-29 RX ADMIN — INSULIN LISPRO 5 UNITS: 100 INJECTION, SOLUTION INTRAVENOUS; SUBCUTANEOUS at 21:38

## 2017-10-29 RX ADMIN — SODIUM CHLORIDE: 9 INJECTION, SOLUTION INTRAVENOUS at 12:00

## 2017-10-29 RX ADMIN — STANDARDIZED SENNA CONCENTRATE AND DOCUSATE SODIUM 2 TABLET: 8.6; 5 TABLET, FILM COATED ORAL at 21:33

## 2017-10-29 RX ADMIN — LEVOTHYROXINE SODIUM 75 MCG: 75 TABLET ORAL at 06:06

## 2017-10-29 RX ADMIN — FAMOTIDINE 20 MG: 20 TABLET, FILM COATED ORAL at 09:35

## 2017-10-29 RX ADMIN — SODIUM CHLORIDE: 9 INJECTION, SOLUTION INTRAVENOUS at 01:35

## 2017-10-29 RX ADMIN — INSULIN LISPRO 3 UNITS: 100 INJECTION, SOLUTION INTRAVENOUS; SUBCUTANEOUS at 12:07

## 2017-10-29 RX ADMIN — HEPARIN SODIUM 5000 UNITS: 5000 INJECTION, SOLUTION INTRAVENOUS; SUBCUTANEOUS at 15:20

## 2017-10-29 RX ADMIN — GABAPENTIN 300 MG: 300 CAPSULE ORAL at 09:35

## 2017-10-29 RX ADMIN — HEPARIN SODIUM 5000 UNITS: 5000 INJECTION, SOLUTION INTRAVENOUS; SUBCUTANEOUS at 21:33

## 2017-10-29 RX ADMIN — INSULIN LISPRO 3 UNITS: 100 INJECTION, SOLUTION INTRAVENOUS; SUBCUTANEOUS at 17:35

## 2017-10-29 RX ADMIN — ATORVASTATIN CALCIUM 20 MG: 20 TABLET, FILM COATED ORAL at 21:33

## 2017-10-29 RX ADMIN — INSULIN LISPRO 2 UNITS: 100 INJECTION, SOLUTION INTRAVENOUS; SUBCUTANEOUS at 06:04

## 2017-10-29 RX ADMIN — HEPARIN SODIUM 5000 UNITS: 5000 INJECTION, SOLUTION INTRAVENOUS; SUBCUTANEOUS at 06:06

## 2017-10-29 ASSESSMENT — PAIN SCALES - GENERAL
PAINLEVEL_OUTOF10: 0
PAINLEVEL_OUTOF10: 5
PAINLEVEL_OUTOF10: 0

## 2017-10-29 ASSESSMENT — LIFESTYLE VARIABLES
HEADACHE, FULLNESS IN HEAD: NOT PRESENT
ORIENTATION AND CLOUDING OF SENSORIUM: ORIENTED AND CAN DO SERIAL ADDITIONS
NAUSEA AND VOMITING: MILD NAUSEA WITH NO VOMITING
ANXIETY: MILDLY ANXIOUS
PAROXYSMAL SWEATS: NO SWEAT VISIBLE
AGITATION: NORMAL ACTIVITY
TOTAL SCORE: 2
TREMOR: NO TREMOR
AUDITORY DISTURBANCES: NOT PRESENT
VISUAL DISTURBANCES: NOT PRESENT

## 2017-10-29 ASSESSMENT — ENCOUNTER SYMPTOMS
DIARRHEA: 0
BACK PAIN: 0
WEIGHT LOSS: 0
SHORTNESS OF BREATH: 0
FOCAL WEAKNESS: 0
COUGH: 0
VOMITING: 0
HEARTBURN: 0
NAUSEA: 0
ABDOMINAL PAIN: 0
STRIDOR: 0
BLURRED VISION: 0
MYALGIAS: 0
HEADACHES: 0
INSOMNIA: 0
DIZZINESS: 0
EYE DISCHARGE: 0
ORTHOPNEA: 0
FEVER: 0
SPUTUM PRODUCTION: 0
NECK PAIN: 0
NERVOUS/ANXIOUS: 0
SEIZURES: 0
EYE REDNESS: 0
EYE PAIN: 0
CHILLS: 0
DEPRESSION: 0
PALPITATIONS: 0

## 2017-10-29 NOTE — PROGRESS NOTES
Kavya Christopher Fall Risk Assessment:     Last Known Fall: No falls  Mobility: No limitations  Medications: No meds  Mental Status/LOC/Awareness: Awake, alert, and oriented to date, place, and person  Toileting Needs: No needs  Volume/Electrolyte Status: Use of IV fluids/tube feeds  Communication/Sensory: No deficits  Behavior: Appropriate behavior  Kavya Christopher Fall Risk Total: 3  Fall Risk Level: NO RISK    Universal Fall Precautions:  call light/belongings in reach, bed in low position and locked, wheelchairs and assistive devices out of sight, siderails up x 2, use non-slip footwear, adequate lighting, clutter free and spill free environment, educate on level of risk, educate to call for assistance    Fall Risk Level Interventions:          Patient Specific Interventions:     Medication: not applicable  Mental Status/LOC/Awareness: reinforce falls education and reinforce the use of call light  Toileting: monitor intake and output/use of appropriate interventions and instruct patient/family on the need to call for assistance when toileting  Volume/Electrolyte Status: ensure patient remains hydrated, monitor blood sugars and maintain appropriate blood sugar levels if diabetic, monitor abnormal lab values and ensure IV fluids are appropriate  Communication/Sensory: update plan of care on whiteboard  Behavioral: not applicable  Mobility: not applicable

## 2017-10-29 NOTE — PROGRESS NOTES
Pt states she is having visual changes and headache, stroke scale negative, pupils equal/round/reactive to light, vitals signs stable, pt stated that she has had issues with vision in past related to diabetes and uncontrolled sugar levels, accu check 155 at this time; pt also stated that several years ago she did have clot in her leg; RN discussed findings with Dr. Pulido via phone, due to stroke assessment negative and pt history, no further work ups at this time and continue monitoring pt symptoms

## 2017-10-29 NOTE — PROGRESS NOTES
[facility] Hospitalist Progress Note    Date of Service: 10/29/2017    Chief Complaint  37 y.o. Female with PMH of DMII, DVT, HTN admitted 10/28/2017 with high blood sugar    Interval Problem Update  Feeling much better today. Denies n/v/d.    Consultants/Specialty  none    Disposition  Remain on the floor        Review of Systems   Constitutional: Negative for chills, fever and weight loss.   HENT: Negative for congestion and nosebleeds.    Eyes: Negative for blurred vision, pain, discharge and redness.   Respiratory: Negative for cough, sputum production, shortness of breath and stridor.    Cardiovascular: Negative for chest pain, palpitations and orthopnea.   Gastrointestinal: Negative for abdominal pain, diarrhea, heartburn, nausea and vomiting.   Genitourinary: Negative for dysuria, frequency and urgency.   Musculoskeletal: Negative for back pain, myalgias and neck pain.   Skin: Negative for itching and rash.   Neurological: Negative for dizziness, focal weakness, seizures and headaches.   Psychiatric/Behavioral: Negative for depression. The patient is not nervous/anxious and does not have insomnia.       Physical Exam  Laboratory/Imaging   Hemodynamics  Temp (24hrs), Av.7 °C (98 °F), Min:36.2 °C (97.2 °F), Max:37.2 °C (98.9 °F)   Temperature: 36.2 °C (97.2 °F)  Pulse  Av.1  Min: 61  Max: 91 Heart Rate (Monitored): 68  Blood Pressure: 106/68, NIBP: 140/59      Respiratory      Respiration: 20, Pulse Oximetry: 96 %     Work Of Breathing / Effort: Mild  RUL Breath Sounds: Clear, RML Breath Sounds: Clear, RLL Breath Sounds: Clear, KATINA Breath Sounds: Clear, LLL Breath Sounds: Clear    Fluids    Intake/Output Summary (Last 24 hours) at 10/29/17 0837  Last data filed at 10/29/17 0400   Gross per 24 hour   Intake             1000 ml   Output              400 ml   Net              600 ml       Nutrition  Orders Placed This Encounter   Procedures   • Diet Order     Standing Status:   Standing     Number of  Occurrences:   1     Order Specific Question:   Diet:     Answer:   Diabetic [3]     Physical Exam   Constitutional: She is oriented to person, place, and time. No distress.   HENT:   Head: Normocephalic and atraumatic.   Mouth/Throat: Oropharynx is clear and moist.   Eyes: Conjunctivae and EOM are normal. Pupils are equal, round, and reactive to light.   Neck: Normal range of motion. Neck supple. No tracheal deviation present. No thyromegaly present.   Cardiovascular: Normal rate and regular rhythm.    No murmur heard.  Pulmonary/Chest: Effort normal and breath sounds normal. No respiratory distress. She has no wheezes.   Abdominal: Soft. Bowel sounds are normal. She exhibits no distension. There is no tenderness.   Musculoskeletal: She exhibits no edema or tenderness.   Neurological: She is alert and oriented to person, place, and time. No cranial nerve deficit.   Skin: Skin is warm and dry. She is not diaphoretic. No erythema.   Psychiatric: She has a normal mood and affect. Her behavior is normal. Thought content normal.       Recent Labs      10/28/17   0640  10/29/17   0309   WBC  10.2  9.7   RBC  4.86  4.44   HEMOGLOBIN  13.5  12.4   HEMATOCRIT  40.7  36.9*   MCV  83.7  83.1   MCH  27.8  27.9   MCHC  33.2*  33.6   RDW  41.3  40.8   PLATELETCT  271  257   MPV  10.5  10.3     Recent Labs      10/28/17   0640  10/28/17   1013  10/29/17   0309   SODIUM  137  139  136   POTASSIUM  3.8  3.1*  3.6   CHLORIDE  104  111  108   CO2  18*  19*  20   GLUCOSE  544*  353*  196*   BUN  8  6*  7*   CREATININE  0.65  0.48*  0.33*   CALCIUM  9.0  7.8*  8.1*                      Assessment/Plan     DKA (diabetic ketoacidoses) (CMS-[facility])- (present on admission)   Assessment & Plan    Early DKA  Given insulin in ER along with IVF bolus 3L  improving  Follow cmp closely  On insulin management  Check A1c: 10.1  Diabetic education        Hypokalemia- (present on admission)   Assessment & Plan    Improving  Replete as needed  Follow  cmp in am        Essential hypertension- (present on admission)   Assessment & Plan    stable        High anion gap metabolic acidosis- (present on admission)   Assessment & Plan    From DKA  Plan as above.  Improving  cmp in am        Morbid obesity (CMS-[facility])- (present on admission)   Assessment & Plan    Diet and exercise education            Reviewed items::  Labs reviewed, Medications reviewed and Radiology images reviewed  DVT prophylaxis pharmacological::  Heparin

## 2017-10-29 NOTE — DIETARY
"Nutrition Services: High BMI    Pt is a 37 y.o. Female with Dx: DKA    Ht: 63\", Wt: 112.1 kg, BMI= 43.78  Pt with BMI >40; formal weight loss counseling not appropriate in acute care setting.    Recommend outpatient weight management after D/c.   RD available as indicated.  "

## 2017-10-29 NOTE — PROGRESS NOTES
Reassessed pt, pt stated she is no longer having issues with vision, stroke scale still negative, only complaint pt has at this time is headache

## 2017-10-29 NOTE — H&P
Hospital Medicine History and Physical        Date of Service  10/28/2017     Chief Complaint      Chief Complaint   Patient presents with   • N/V   • High Blood Sugar   • Alcohol Intoxication         History of Presenting Illness  Panchito is a 37 y.o. female PMH of DM, HTN, obesity, who presents with Nausea, vomiting and abdominal pain. She was found to have early DKA in the er. She is a poor historian. She is moaning in her room and some time answer questions appropriately in some time not answering the questions. She complained about abdominal pain in the left side. She was started on IV fluid bolus and 15 units of insulin. Her blood sugar was in the 600s. She will be admitted to the floor for further management.   Primary Care Physician  Kirsten Olvera N.P.        Code Status  Full code     Review of Systems  Review of Systems   Constitutional: Negative for chills, fever and weight loss.   HENT: Negative for congestion and nosebleeds.    Eyes: Negative for blurred vision, pain, discharge and redness.   Respiratory: Negative for cough, sputum production, shortness of breath and stridor.    Cardiovascular: Negative for chest pain, palpitations and orthopnea.   Gastrointestinal: Positive for abdominal pain, nausea and vomiting. Negative for diarrhea and heartburn.   Genitourinary: Negative for dysuria, frequency and urgency.   Musculoskeletal: Negative for back pain, myalgias and neck pain.   Skin: Negative for itching and rash.   Neurological: Negative for dizziness, focal weakness, seizures and headaches.   Psychiatric/Behavioral: Negative for depression. The patient is not nervous/anxious and does not have insomnia.       Please see HPI, all other systems were reviewed and are negative (AMA/CMS criteria)   Past Medical History       Past Medical History:   Diagnosis Date   • Chickenpox     • Herpes     • Hypertension     • Obese     • Personal history of venous thrombosis and embolism       DVT in BLE years ago    • Sleep apnea     • Type II or unspecified type diabetes mellitus without mention of complication, not stated as uncontrolled           Surgical History  No past surgical history on file.     Medications  No current facility-administered medications on file prior to encounter.                 Current Outpatient Prescriptions on File Prior to Encounter   Medication Sig Dispense Refill   • insulin glulisine (APIDRA) 100 UNIT/ML Solution Pen-injector injection Inject 15 Units as instructed 3 times a day before meals.       • insulin glargine (BASAGLAR KWIKPEN) 100 UNIT/ML Solution Pen-injector injection Inject 58 Units as instructed every evening.       • gabapentin (NEURONTIN) 300 MG Cap Take 300 mg by mouth 2 Times a Day.       • ranitidine (ZANTAC) 150 MG Tab Take 150 mg by mouth 2 times a day.       • lisinopril (PRINIVIL) 2.5 MG Tab Take 2.5 mg by mouth every day.       • atorvastatin (LIPITOR) 20 MG Tab Take 1 Tab by mouth every bedtime. 30 Tab 0   • sitagliptin (JANUVIA) 100 MG Tab Take 1 Tab by mouth every day. 30 Tab 0   • metformin (GLUCOPHAGE) 1000 MG tablet Take 1 Tab by mouth 2 times a day. 60 Tab 0   • levothyroxine (SYNTHROID) 75 MCG Tab Take 75 mcg by mouth Every morning on an empty stomach.           Family History  Family History         Family History   Problem Relation Age of Onset   • Hypertension Mother     • Sleep Apnea Mother     • Hyperlipidemia Father     • Cancer Maternal Uncle     • Sleep Apnea Brother     • Diabetes Neg Hx     • Heart Disease Neg Hx     • Stroke Neg Hx                 Social History         Social History   Substance Use Topics   • Smoking status: Former Smoker       Packs/day: 0.50       Years: 0.30       Types: Cigarettes       Quit date: 1/1/2008   • Smokeless tobacco: Never Used   • Alcohol use No         Allergies       Allergies   Allergen Reactions   • Reglan [Metoclopramide] Vomiting   • Sulfa Drugs     • Zofran Hives       Physical Exam   Laboratory    Hemodynamics  Temp (24hrs), Av °C (96.8 °F), Min:36 °C (96.8 °F), Max:36 °C (96.8 °F)   Temperature: 36 °C (96.8 °F)  Pulse  Av  Min: 62  Max: 83 Heart Rate (Monitored): 86  Blood Pressure: 152/100, NIBP: 125/73       Respiratory      Respiration: 19, Pulse Oximetry: 100 %              Physical Exam   Constitutional: She is oriented to person, place, and time. No distress.   HENT:   Head: Normocephalic and atraumatic.   Mouth/Throat: Oropharynx is clear and moist.   Eyes: Conjunctivae and EOM are normal. Pupils are equal, round, and reactive to light.   Neck: Normal range of motion. Neck supple. No tracheal deviation present. No thyromegaly present.   Cardiovascular: Normal rate and regular rhythm.    No murmur heard.  Pulmonary/Chest: Effort normal and breath sounds normal. No respiratory distress. She has no wheezes.   Abdominal: Soft. Bowel sounds are normal. She exhibits no distension. There is tenderness. There is no rebound.   Musculoskeletal: She exhibits no edema or tenderness.   Neurological: She is alert and oriented to person, place, and time. No cranial nerve deficit.   Skin: Skin is warm and dry. She is not diaphoretic. No erythema.   Psychiatric: She has a normal mood and affect. Her behavior is normal. Thought content normal.           Recent Labs      10/28/17   0640   WBC  10.2   RBC  4.86   HEMOGLOBIN  13.5   HEMATOCRIT  40.7   MCV  83.7   MCH  27.8   MCHC  33.2*   RDW  41.3   PLATELETCT  271   MPV  10.5          Recent Labs      10/28/17   0640   SODIUM  137   POTASSIUM  3.8   CHLORIDE  104   CO2  18*   GLUCOSE  544*   BUN  8   CREATININE  0.65   CALCIUM  9.0          Recent Labs      10/28/17   0640   ALTSGPT  46   ASTSGOT  22   ALKPHOSPHAT  80   TBILIRUBIN  0.3   GLUCOSE  544*                        Lab Results   Component Value Date     TROPONINI <0.02 2017         Imaging  No orders to display       Assessment/Plan       I anticipate this patient will require at least two  midnights for appropriate medical management, necessitating inpatient admission.         DKA (diabetic ketoacidoses) (CMS-[facility])- (present on admission)   Assessment & Plan     Early DKA  Given insulin in ER along with IVF bolus 3L  Follow cmp closely  On insulin management  Check A1c       Essential hypertension   Assessment & Plan     stable       High anion gap metabolic acidosis- (present on admission)   Assessment & Plan     From DKA  Plan as above.       Morbid obesity (CMS-[facility])- (present on admission)   Assessment & Plan     Diet and exercise education             Prophylaxis:  sc heparin

## 2017-10-30 ENCOUNTER — PATIENT OUTREACH (OUTPATIENT)
Dept: HEALTH INFORMATION MANAGEMENT | Facility: OTHER | Age: 37
End: 2017-10-30

## 2017-10-30 VITALS
OXYGEN SATURATION: 98 % | RESPIRATION RATE: 15 BRPM | HEART RATE: 67 BPM | HEIGHT: 63 IN | SYSTOLIC BLOOD PRESSURE: 144 MMHG | BODY MASS INDEX: 44.3 KG/M2 | WEIGHT: 250 LBS | DIASTOLIC BLOOD PRESSURE: 83 MMHG | TEMPERATURE: 97.1 F

## 2017-10-30 LAB
ANION GAP SERPL CALC-SCNC: 8 MMOL/L (ref 0–11.9)
BUN SERPL-MCNC: 7 MG/DL (ref 8–22)
CALCIUM SERPL-MCNC: 8.9 MG/DL (ref 8.5–10.5)
CHLORIDE SERPL-SCNC: 104 MMOL/L (ref 96–112)
CO2 SERPL-SCNC: 20 MMOL/L (ref 20–33)
CREAT SERPL-MCNC: 0.37 MG/DL (ref 0.5–1.4)
GFR SERPL CREATININE-BSD FRML MDRD: >60 ML/MIN/1.73 M 2
GLUCOSE BLD-MCNC: 175 MG/DL (ref 65–99)
GLUCOSE BLD-MCNC: 264 MG/DL (ref 65–99)
GLUCOSE SERPL-MCNC: 179 MG/DL (ref 65–99)
POTASSIUM SERPL-SCNC: 3.4 MMOL/L (ref 3.6–5.5)
SODIUM SERPL-SCNC: 132 MMOL/L (ref 135–145)

## 2017-10-30 PROCEDURE — 82962 GLUCOSE BLOOD TEST: CPT | Mod: 91

## 2017-10-30 PROCEDURE — 99239 HOSP IP/OBS DSCHRG MGMT >30: CPT | Performed by: INTERNAL MEDICINE

## 2017-10-30 PROCEDURE — A9270 NON-COVERED ITEM OR SERVICE: HCPCS | Performed by: INTERNAL MEDICINE

## 2017-10-30 PROCEDURE — 36415 COLL VENOUS BLD VENIPUNCTURE: CPT

## 2017-10-30 PROCEDURE — 700111 HCHG RX REV CODE 636 W/ 250 OVERRIDE (IP): Performed by: INTERNAL MEDICINE

## 2017-10-30 PROCEDURE — 80048 BASIC METABOLIC PNL TOTAL CA: CPT

## 2017-10-30 PROCEDURE — 700102 HCHG RX REV CODE 250 W/ 637 OVERRIDE(OP): Performed by: INTERNAL MEDICINE

## 2017-10-30 RX ORDER — POTASSIUM CHLORIDE 20 MEQ/1
40 TABLET, EXTENDED RELEASE ORAL ONCE
Status: COMPLETED | OUTPATIENT
Start: 2017-10-30 | End: 2017-10-30

## 2017-10-30 RX ADMIN — GABAPENTIN 300 MG: 300 CAPSULE ORAL at 09:04

## 2017-10-30 RX ADMIN — INSULIN LISPRO 2 UNITS: 100 INJECTION, SOLUTION INTRAVENOUS; SUBCUTANEOUS at 05:57

## 2017-10-30 RX ADMIN — INSULIN LISPRO 5 UNITS: 100 INJECTION, SOLUTION INTRAVENOUS; SUBCUTANEOUS at 12:25

## 2017-10-30 RX ADMIN — LEVOTHYROXINE SODIUM 75 MCG: 75 TABLET ORAL at 05:52

## 2017-10-30 RX ADMIN — POTASSIUM CHLORIDE 40 MEQ: 1500 TABLET, EXTENDED RELEASE ORAL at 09:04

## 2017-10-30 RX ADMIN — FAMOTIDINE 20 MG: 20 TABLET, FILM COATED ORAL at 09:04

## 2017-10-30 RX ADMIN — HEPARIN SODIUM 5000 UNITS: 5000 INJECTION, SOLUTION INTRAVENOUS; SUBCUTANEOUS at 05:52

## 2017-10-30 RX ADMIN — LISINOPRIL 2.5 MG: 5 TABLET ORAL at 09:04

## 2017-10-30 ASSESSMENT — PAIN SCALES - GENERAL: PAINLEVEL_OUTOF10: 0

## 2017-10-30 ASSESSMENT — LIFESTYLE VARIABLES: EVER_SMOKED: YES

## 2017-10-30 NOTE — PROGRESS NOTES
All discharge instructions reviewed with patient and diabetes education given. Pt verbalizes understanding. Transport called to take pt to [facility] by wheelchair. Pt has her own private vehicle and will drive herself home.

## 2017-10-30 NOTE — DISCHARGE SUMMARY
CHIEF COMPLAINT ON ADMISSION  Chief Complaint   Patient presents with   • N/V   • High Blood Sugar   • Alcohol Intoxication       CODE STATUS  Full Code    HPI & HOSPITAL COURSE  This is a 37 y.o. female here with with PMH of poorly controlled IDDM came to ER with nausea, vomiting, high blood surgar. She was found to have early DKA in ER. She was given insulin and aggressive IVF. She responded amazingly well to medical management. Her acidosis resolved as well as her nausea, vomiting. A1c: 10. Diabetic education was given. Work note was written. Instructed to come back to ER if her symptom get worse.    Therefore, she is discharged in fair and stable condition with close outpatient follow-up.    SPECIFIC OUTPATIENT FOLLOW-UP      DISCHARGE PROBLEM LIST  Active Problems:    DKA (diabetic ketoacidoses) (CMS-[facility]) POA: Yes    Morbid obesity (CMS-[facility]) POA: Yes    High anion gap metabolic acidosis POA: Yes    Essential hypertension POA: Yes    Hypokalemia POA: Yes  Resolved Problems:    * No resolved hospital problems. *      FOLLOW UP  No future appointments.  Octavio Young M.D.  [address]  [address]  [facility] [place] [address]  [phone]    Go on 11/8/2017  Please arrive at 7:45 am for a 8:00 am appointment. Thank you.    Kirsten Olvera N.P.  [address] [facility] [address] [facility] [address]  [phone]    In 1 week        MEDICATIONS ON DISCHARGE   PanchitoLiana saenz   Home Medication Instructions ROSANNE:[id number]    Printed on:10/30/17 0930   Medication Information                      atorvastatin (LIPITOR) 20 MG Tab  Take 1 Tab by mouth every bedtime.             gabapentin (NEURONTIN) 300 MG Cap  Take 300 mg by mouth 3 times a day.             insulin glargine (BASAGLAR KWIKPEN) 100 UNIT/ML Solution Pen-injector injection  Inject 58 Units as instructed every evening.             insulin glulisine (APIDRA) 100 UNIT/ML Solution Pen-injector injection  Inject 15 Units as instructed 3 times a day before meals.             levothyroxine  (SYNTHROID) 75 MCG Tab  Take 75 mcg by mouth Every morning on an empty stomach.             lisinopril (PRINIVIL) 2.5 MG Tab  Take 2.5 mg by mouth every day.             metformin (GLUCOPHAGE) 1000 MG tablet  Take 1 Tab by mouth 2 times a day.             ranitidine (ZANTAC) 150 MG Tab  Take 150 mg by mouth 2 times a day.             sitagliptin (JANUVIA) 100 MG Tab  Take 1 Tab by mouth every day.                 DIET  Orders Placed This Encounter   Procedures   • Diet Order     Standing Status:   Standing     Number of Occurrences:   1     Order Specific Question:   Diet:     Answer:   Diabetic [3]       ACTIVITY  As tolerated.  Weight bearing as tolerated      CONSULTATIONS      PROCEDURES      LABORATORY  Lab Results   Component Value Date/Time    SODIUM 132 (L) 10/30/2017 04:05 AM    POTASSIUM 3.4 (L) 10/30/2017 04:05 AM    CHLORIDE 104 10/30/2017 04:05 AM    CO2 20 10/30/2017 04:05 AM    GLUCOSE 179 (H) 10/30/2017 04:05 AM    BUN 7 (L) 10/30/2017 04:05 AM    CREATININE 0.37 (L) 10/30/2017 04:05 AM        Lab Results   Component Value Date/Time    WBC 9.7 10/29/2017 03:09 AM    HEMOGLOBIN 12.4 10/29/2017 03:09 AM    HEMATOCRIT 36.9 (L) 10/29/2017 03:09 AM    PLATELETCT 257 10/29/2017 03:09 AM        Total time of the discharge process exceeds 40 minutes

## 2017-10-30 NOTE — CARE PLAN
Problem: Communication  Goal: The ability to communicate needs accurately and effectively will improve    Intervention: Educate patient and significant other/support system about the plan of care, procedures, treatments, medications and allow for questions  Bedside report completed. Patient educated on plan of care, call light, and communication board. Patient verbalizes understanding.         Problem: Safety  Goal: Will remain free from falls    Intervention: Assess risk factors for falls  Bed in low position.  Treaded socks on patient.  Call light within reach.  Bedrails closest to bathroom down.  Patient educated to call for assistance.

## 2017-10-30 NOTE — CONSULTS
Diabetes Education:  I met with Liana for diabetes education.  She states that she was doing better but has been under a lot of stress.  She has not been eating breakfast at home and not taking her insulin at breakfast time.  She has not been bringing her own food from home and eating at fast food for dinner due to working long hours.  She states she plans to start eating breakfast at home and taking her insulin.   She will stock up on food at home.  She was encouraged to get more prepared on her days off for the work week.  She plans to get her vegetables put in bags that she can grab for her lunch. She will make sure she is eating protein at each meal. She has been only testing blood sugars in the morning.  She will test before meals and bedtime.  I reviewed blood sugar pattern recognition and how to adjust her insulin.  She denies any problems with hypoglycemia.  I reviewed all basic education with her including diet, exercise, medications, blood sugar testing and logging, pattern recognition and insulin adjustment, hypoglycemia, daily foot care, and need for follow up care.  She is ready for discharge.

## 2017-10-30 NOTE — PROGRESS NOTES
Bedside report received, assumed pt care @4995. Pt A&Ox4. She denies any pain. Pt c/o nausea. POC discussed, she verbalized understanding. Pt steady on her feet. Call light within reach

## 2017-10-30 NOTE — DISCHARGE INSTRUCTIONS
Discharge Instructions    Discharged to home by car with self. Discharged via walking, hospital escort: Refused.  Special equipment needed: Not Applicable    Be sure to schedule a follow-up appointment with your primary care doctor or any specialists as instructed.     Discharge Plan:   Diet Plan: Discussed  Activity Level: Discussed  Smoking Cessation Offered: Patient Counseled  Confirmed Follow up Appointment: Patient to Call and Schedule Appointment  Confirmed Symptoms Management: Discussed  Medication Reconciliation Updated: Yes  Influenza Vaccine Indication:  (Already received 10/19/17)    I understand that a diet low in cholesterol, fat, and sodium is recommended for good health. Unless I have been given specific instructions below for another diet, I accept this instruction as my diet prescription.   Other diet: Diabetic    Special Instructions: None    · Is patient discharged on Warfarin / Coumadin?   No     · Is patient Post Blood Transfusion?  No    Depression / Suicide Risk    As you are discharged from this [facility] facility, it is important to learn how to keep safe from harming yourself.    Recognize the warning signs:  · Abrupt changes in personality, positive or negative- including increase in energy   · Giving away possessions  · Change in eating patterns- significant weight changes-  positive or negative  · Change in sleeping patterns- unable to sleep or sleeping all the time   · Unwillingness or inability to communicate  · Depression  · Unusual sadness, discouragement and loneliness  · Talk of wanting to die  · Neglect of personal appearance   · Rebelliousness- reckless behavior  · Withdrawal from people/activities they love  · Confusion- inability to concentrate     If you or a loved one observes any of these behaviors or has concerns about self-harm, here's what you can do:  · Talk about it- your feelings and reasons for harming yourself  · Remove any means that you might use to hurt  yourself (examples: pills, rope, extension cords, firearm)  · Get professional help from the community (Mental Health, Substance Abuse, psychological counseling)  · Do not be alone:Call your Safe Contact- someone whom you trust who will be there for you.  · Call your local CRISIS HOTLINE 536-7771 or 357-444-0953  · Call your local Children's Mobile Crisis Response Team [place] (214) 377-7393 or www.[employer]  · Call the toll free National Suicide Prevention Hotlines   · National Suicide Prevention Lifeline 505-544-OGIF (9690)  · National Hope Line Network 800-SUICIDE (533-7119)  Diets for Diabetes, Food Labeling  Look at food labels to help you decide how much of a product you can eat. You will want to check the amount of total carbohydrate in a serving to see how the food fits into your meal plan. In the list of ingredients, the ingredient present in the largest amount by weight must be listed first, followed by the other ingredients in descending order.  STANDARD OF IDENTITY  Most products have a list of ingredients. However, foods that the Food and Drug Administration (FDA) has given a standard of identity do not need a list of ingredients. A standard of identity means that a food must contain certain ingredients if it is called a particular name. Examples are mayonnaise, peanut butter, ketchup, jelly, and cheese.  LABELING TERMS  There are many terms found on food labels. Some of these terms have specific definitions. Some terms are regulated by the FDA, and the FDA has clearly specified how they can be used. Others are not regulated or well-defined and can be misleading and confusing.  SPECIFICALLY DEFINED TERMS  Nutritive Sweetener.  · A sweetener that contains calories,such as table sugar or honey.  Nonnutritive Sweetener.  · A sweetener with few or no calories,such as saccharin, aspartame, sucralose, and cyclamate.  LABELING TERMS REGULATED BY THE FDA  Free.  · The product contains only a tiny  "or small amount of fat, cholesterol, sodium, sugar, or calories. For example, a \"fat-free\" product will contain less than 0.5 g of fat per serving.  Low.  · A food described as \"low\" in fat, saturated fat, cholesterol, sodium, or calories could be eaten fairly often without exceeding dietary guidelines. For example, \"low in fat\" means no more than 3 g of fat per serving.  Lean.  · \"Lean\" and \"extra lean\" are U.S. Department of Agriculture (USDA) terms for use on meat and poultry products. \"Lean\" means the product contains less than 10 g of fat, 4 g of saturated fat, and 95 mg of cholesterol per serving. \"Lean\" is not as low in fat as a product labeled \"low.\"  Extra Lean.  · \"Extra lean\" means the product contains less than 5 g of fat, 2 g of saturated fat, and 95 mg of cholesterol per serving. While \"extra lean\" has less fat than \"lean,\" it is still higher in fat than a product labeled \"low.\"  Reduced, Less, Fewer.  · A diet product that contains 25% less of a nutrient or calories than the regular version. For example, hot dogs might be labeled \"25% less fat than our regular hot dogs.\"  Light/Lite.  · A diet product that contains  fewer calories or ½ the fat of the original. For example, \"light in sodium\" means a product with ½ the usual sodium.  More.  · One serving contains at least 10% more of the daily value of a vitamin, mineral, or fiber than usual.  Good Source Of.  · One serving contains 10% to 19% of the daily value for a particular vitamin, mineral, or fiber.  Excellent Source Of.  · One serving contains 20% or more of the daily value for a particular nutrient. Other terms used might be \"high in\" or \"rich in.\"  Enriched or Fortified.  · The product contains added vitamins, minerals, or protein. Nutrition labeling must be used on enriched or fortified foods.  Imitation.  · The product has been altered so that it is lower in protein, vitamins, or minerals than the usual food,such as imitation peanut " "butter.  Total Fat.  · The number listed is the total of all fat found in a serving of the product. Under total fat, food labels must list saturated fat and trans fat, which are associated with raising bad cholesterol and an increased risk of heart blood vessel disease.  Saturated Fat.  · Mainly fats from animal-based sources. Some examples are red meat, cheese, cream, whole milk, and coconut oil.  Trans Fat.  · Found in some fried snack foods, packaged foods, and fried restaurant foods. It is recommended you eat as close to 0 g of trans fat as possible, since it raises bad cholesterol and lowers good cholesterol.  Polyunsaturated and Monounsaturated Fats.  · More healthful fats. These fats are from plant sources.  Total Carbohydrate.  · The number of carbohydrate grams in a serving of the product. Under total carbohydrate are listed the other carbohydrate sources, such as dietary fiber and sugars.  Dietary Fiber.  · A carbohydrate from plant sources.  Sugars.  · Sugars listed on the label contain all naturally occurring sugars as well as added sugars.  LABELING TERMS NOT REGULATED BY THE FDA  Sugarless.  · Table sugar (sucrose) has not been added. However, the  may use another form of sugar in place of sucrose to kennedy the product. For example, sugar alcohols are used to kennedy foods. Sugar alcohols are a form of sugar but are not table sugar. If a product contains sugar alcohols in place of sucrose, it can still be labeled \"sugarless.\"  Low Salt, Salt-Free, Unsalted, No Salt, No Salt Added, Without Added Salt.  · Food that is usually processed with salt has been made without salt. However, the food may contain sodium-containing additives, such as preservatives, leavening agents, or flavorings.  Natural.  · This term has no legal meaning.  Organic.  · Foods that are certified as organic have been inspected and approved by the USDA to ensure they are produced without pesticides, fertilizers containing " synthetic ingredients, bioengineering, or ionizing radiation.  Document Released: 12/20/2004 Document Revised: 03/11/2013 Document Reviewed: 07/08/2010  [employer]® Patient Information ©2014 [employer].

## 2017-10-30 NOTE — PROGRESS NOTES
Received report from day shift RN. Twelve hour chart check completed. Patient assessed. Patient A and 0 X 4.   Patient states that pain is a 0 out of 10.  Patient educated on plan of care for the evening including medications per MAR, monitoring vital signs, safety, and rest. Patient educated to call for assistance. Patient verbalizes understanding.

## 2017-11-03 NOTE — ADDENDUM NOTE
Encounter addended by: Laura Velarde R.N. on: 11/3/2017  9:00 AM<BR>    Actions taken: Flowsheet accepted

## 2017-11-19 ENCOUNTER — APPOINTMENT (OUTPATIENT)
Dept: RADIOLOGY | Facility: MEDICAL CENTER | Age: 37
End: 2017-11-19
Attending: EMERGENCY MEDICINE
Payer: MEDICAID

## 2017-11-19 ENCOUNTER — HOSPITAL ENCOUNTER (EMERGENCY)
Facility: MEDICAL CENTER | Age: 37
End: 2017-11-19
Attending: EMERGENCY MEDICINE
Payer: MEDICAID

## 2017-11-19 VITALS
WEIGHT: 243.17 LBS | HEART RATE: 77 BPM | BODY MASS INDEX: 43.09 KG/M2 | RESPIRATION RATE: 18 BRPM | TEMPERATURE: 97.8 F | SYSTOLIC BLOOD PRESSURE: 148 MMHG | HEIGHT: 63 IN | OXYGEN SATURATION: 96 % | DIASTOLIC BLOOD PRESSURE: 96 MMHG

## 2017-11-19 DIAGNOSIS — B34.9 VIRAL SYNDROME: ICD-10-CM

## 2017-11-19 DIAGNOSIS — R11.2 NON-INTRACTABLE VOMITING WITH NAUSEA, UNSPECIFIED VOMITING TYPE: ICD-10-CM

## 2017-11-19 DIAGNOSIS — Z86.39 HISTORY OF DIABETES MELLITUS: ICD-10-CM

## 2017-11-19 LAB
ANION GAP SERPL CALC-SCNC: 7 MMOL/L (ref 0–11.9)
APPEARANCE UR: ABNORMAL
APPEARANCE UR: ABNORMAL
BACTERIA #/AREA URNS HPF: ABNORMAL /HPF
BASOPHILS # BLD AUTO: 0.8 % (ref 0–1.8)
BASOPHILS # BLD: 0.05 K/UL (ref 0–0.12)
BILIRUB UR QL STRIP.AUTO: NEGATIVE
BUN SERPL-MCNC: 7 MG/DL (ref 8–22)
CALCIUM SERPL-MCNC: 8.5 MG/DL (ref 8.4–10.2)
CHLORIDE SERPL-SCNC: 105 MMOL/L (ref 96–112)
CO2 SERPL-SCNC: 23 MMOL/L (ref 20–33)
COLOR UR: ABNORMAL
COLOR UR: YELLOW
CREAT SERPL-MCNC: 0.52 MG/DL (ref 0.5–1.4)
CULTURE IF INDICATED INDCX: YES UA CULTURE
DEPRECATED D DIMER PPP IA-ACNC: <200 NG/ML(D-DU)
EOSINOPHIL # BLD AUTO: 0.11 K/UL (ref 0–0.51)
EOSINOPHIL NFR BLD: 1.7 % (ref 0–6.9)
EPI CELLS #/AREA URNS HPF: ABNORMAL /HPF
ERYTHROCYTE [DISTWIDTH] IN BLOOD BY AUTOMATED COUNT: 40.2 FL (ref 35.9–50)
FLUAV+FLUBV AG SPEC QL IA: NORMAL
GFR SERPL CREATININE-BSD FRML MDRD: >60 ML/MIN/1.73 M 2
GLUCOSE BLD-MCNC: 218 MG/DL (ref 65–99)
GLUCOSE SERPL-MCNC: 221 MG/DL (ref 65–99)
GLUCOSE UR STRIP.AUTO-MCNC: 250 MG/DL
GLUCOSE UR STRIP.AUTO-MCNC: 500 MG/DL
HCG SERPL QL: NEGATIVE
HCT VFR BLD AUTO: 40.1 % (ref 37–47)
HGB BLD-MCNC: 13.8 G/DL (ref 12–16)
IMM GRANULOCYTES # BLD AUTO: 0.02 K/UL (ref 0–0.11)
IMM GRANULOCYTES NFR BLD AUTO: 0.3 % (ref 0–0.9)
KETONES UR STRIP.AUTO-MCNC: NEGATIVE MG/DL
KETONES UR STRIP.AUTO-MCNC: NEGATIVE MG/DL
LEUKOCYTE ESTERASE UR QL STRIP.AUTO: NEGATIVE
LEUKOCYTE ESTERASE UR QL STRIP.AUTO: NEGATIVE
LYMPHOCYTES # BLD AUTO: 1.95 K/UL (ref 1–4.8)
LYMPHOCYTES NFR BLD: 30.6 % (ref 22–41)
MCH RBC QN AUTO: 28.9 PG (ref 27–33)
MCHC RBC AUTO-ENTMCNC: 34.4 G/DL (ref 33.6–35)
MCV RBC AUTO: 83.9 FL (ref 81.4–97.8)
MICRO URNS: ABNORMAL
MONOCYTES # BLD AUTO: 0.71 K/UL (ref 0–0.85)
MONOCYTES NFR BLD AUTO: 11.1 % (ref 0–13.4)
MUCOUS THREADS #/AREA URNS HPF: ABNORMAL /HPF
NEUTROPHILS # BLD AUTO: 3.54 K/UL (ref 2–7.15)
NEUTROPHILS NFR BLD: 55.5 % (ref 44–72)
NITRITE UR QL STRIP.AUTO: NEGATIVE
NITRITE UR QL STRIP.AUTO: NEGATIVE
NRBC # BLD AUTO: 0 K/UL
NRBC BLD AUTO-RTO: 0 /100 WBC
PH UR STRIP.AUTO: 6 [PH]
PH UR STRIP.AUTO: 6 [PH]
PLATELET # BLD AUTO: 235 K/UL (ref 164–446)
PMV BLD AUTO: 10.1 FL (ref 9–12.9)
POTASSIUM SERPL-SCNC: 3.6 MMOL/L (ref 3.6–5.5)
PROT UR QL STRIP: 30 MG/DL
PROT UR QL STRIP: NEGATIVE MG/DL
RBC # BLD AUTO: 4.78 M/UL (ref 4.2–5.4)
RBC # URNS HPF: ABNORMAL /HPF
RBC UR QL AUTO: ABNORMAL
RBC UR QL AUTO: ABNORMAL
SIGNIFICANT IND 70042: NORMAL
SITE SITE: NORMAL
SODIUM SERPL-SCNC: 135 MMOL/L (ref 135–145)
SOURCE SOURCE: NORMAL
SP GR UR REFRACTOMETRY: 1.03
SP GR UR STRIP.AUTO: >=1.03
UNIDENT CRYS URNS QL MICRO: ABNORMAL /HPF
WBC # BLD AUTO: 6.4 K/UL (ref 4.8–10.8)
WBC #/AREA URNS HPF: ABNORMAL /HPF

## 2017-11-19 PROCEDURE — 87086 URINE CULTURE/COLONY COUNT: CPT

## 2017-11-19 PROCEDURE — 82962 GLUCOSE BLOOD TEST: CPT

## 2017-11-19 PROCEDURE — 85025 COMPLETE CBC W/AUTO DIFF WBC: CPT

## 2017-11-19 PROCEDURE — 700102 HCHG RX REV CODE 250 W/ 637 OVERRIDE(OP): Performed by: EMERGENCY MEDICINE

## 2017-11-19 PROCEDURE — A9270 NON-COVERED ITEM OR SERVICE: HCPCS | Performed by: EMERGENCY MEDICINE

## 2017-11-19 PROCEDURE — 99284 EMERGENCY DEPT VISIT MOD MDM: CPT

## 2017-11-19 PROCEDURE — 87400 INFLUENZA A/B EACH AG IA: CPT

## 2017-11-19 PROCEDURE — 85379 FIBRIN DEGRADATION QUANT: CPT

## 2017-11-19 PROCEDURE — 71010 DX-CHEST-LIMITED (1 VIEW): CPT

## 2017-11-19 PROCEDURE — 36415 COLL VENOUS BLD VENIPUNCTURE: CPT

## 2017-11-19 PROCEDURE — 81001 URINALYSIS AUTO W/SCOPE: CPT

## 2017-11-19 PROCEDURE — 80048 BASIC METABOLIC PNL TOTAL CA: CPT

## 2017-11-19 PROCEDURE — 84703 CHORIONIC GONADOTROPIN ASSAY: CPT

## 2017-11-19 PROCEDURE — 81002 URINALYSIS NONAUTO W/O SCOPE: CPT

## 2017-11-19 RX ORDER — PROCHLORPERAZINE MALEATE 10 MG
10 TABLET ORAL EVERY 6 HOURS PRN
Qty: 15 TAB | Refills: 3 | Status: SHIPPED | OUTPATIENT
Start: 2017-11-19 | End: 2018-08-09

## 2017-11-19 RX ORDER — DIPHENHYDRAMINE HCL 25 MG
12.5 TABLET ORAL ONCE
Status: COMPLETED | OUTPATIENT
Start: 2017-11-19 | End: 2017-11-19

## 2017-11-19 RX ORDER — LORAZEPAM 1 MG/1
1 TABLET ORAL ONCE
Status: COMPLETED | OUTPATIENT
Start: 2017-11-19 | End: 2017-11-19

## 2017-11-19 RX ADMIN — DIPHENHYDRAMINE HCL 12.5 MG: 25 TABLET ORAL at 11:51

## 2017-11-19 RX ADMIN — LORAZEPAM 1 MG: 1 TABLET ORAL at 11:50

## 2017-11-19 ASSESSMENT — PAIN SCALES - GENERAL: PAINLEVEL_OUTOF10: 7

## 2017-11-19 NOTE — ED PROVIDER NOTES
ED Provider Note    CHIEF COMPLAINT  Chief Complaint   Patient presents with   • Vomiting     vomiting and diarrhea started this am  cough for a few days       HPI  Liana Flanagan is a 37 y.o. female who presentsWith 1 episode of vomiting onset this morning at around 8. She had diarrhea twice since yesterday evening. She wonders if she has a URI since she has some sore throat and intermittent rhinorrhea and cough. She feels malaise but no body aches. She has diabetes. Denies pregnancy. She did receive an influenza vaccine. She had abdominal pain before vomiting but now that's resolved. She has chronic rash from eczema.    REVIEW OF SYSTEMS  Pertinent positives include: Nausea, diarrhea, intermittent abdominal pain.  Pertinent negatives include: Fever, body aches, headaches, pregnancy.  10+ systems reviewed and negative.      PAST MEDICAL HISTORY  Past Medical History:   Diagnosis Date   • Back pain    • Chickenpox    • Disorder of thyroid    • Gynecological disorder    • Herpes    • Hypertension    • Indigestion    • Obese    • Personal history of venous thrombosis and embolism     DVT in BLE years ago   • Psychiatric problem    • Sleep apnea    • Type II or unspecified type diabetes mellitus without mention of complication, not stated as uncontrolled        FAMILY HISTORY  Family History   Problem Relation Age of Onset   • Hypertension Mother    • Sleep Apnea Mother    • Hyperlipidemia Father    • Cancer Maternal Uncle    • Sleep Apnea Brother    • Diabetes Neg Hx    • Heart Disease Neg Hx    • Stroke Neg Hx        SOCIAL HISTORY  Social History   Substance Use Topics   • Smoking status: Current Some Day Smoker     Packs/day: 0.50     Years: 0.30     Types: Cigarettes     Last attempt to quit: 1/1/2008   • Smokeless tobacco: Never Used   • Alcohol use 0.0 oz/week      Comment: occ     History   Drug Use No       CURRENT MEDICATIONS  No current facility-administered medications for this encounter.      Current  "Outpatient Prescriptions   Medication Sig Dispense Refill   • insulin glulisine (APIDRA) 100 UNIT/ML Solution Pen-injector injection Inject 15 Units as instructed 3 times a day before meals.     • insulin glargine (BASAGLAR KWIKPEN) 100 UNIT/ML Solution Pen-injector injection Inject 58 Units as instructed every evening.     • gabapentin (NEURONTIN) 300 MG Cap Take 300 mg by mouth 3 times a day.     • ranitidine (ZANTAC) 150 MG Tab Take 150 mg by mouth 2 times a day.     • lisinopril (PRINIVIL) 2.5 MG Tab Take 2.5 mg by mouth every day.     • atorvastatin (LIPITOR) 20 MG Tab Take 1 Tab by mouth every bedtime. 30 Tab 0   • sitagliptin (JANUVIA) 100 MG Tab Take 1 Tab by mouth every day. 30 Tab 0   • metformin (GLUCOPHAGE) 1000 MG tablet Take 1 Tab by mouth 2 times a day. 60 Tab 0   • levothyroxine (SYNTHROID) 75 MCG Tab Take 75 mcg by mouth Every morning on an empty stomach.         ALLERGIES  Allergies   Allergen Reactions   • Reglan [Metoclopramide] Vomiting   • Sulfa Drugs Unspecified     Rxn - unknown timeframe and reaction     • Zofran Hives       PHYSICAL EXAM  VITAL SIGNS: /96   Pulse 89   Temp 36.6 °C (97.8 °F)   Resp 18   Ht 1.6 m (5' 3\")   Wt 110.3 kg (243 lb 2.7 oz)   LMP 11/12/2017 (Approximate)   SpO2 100%   BMI 43.08 kg/m²   Reviewed andBorderline blood pressure elevation, afebrile  Constitutional: Well developed, Well nourished, well appearing, moderately overweight.  HENT: Normocephalic, atraumatic, bilateral external ears normal, oropharynx moist, No exudates or erythema.   Eyes: PERRLA, conjunctiva pink, no scleral icterus.   Cardiovascular: Regular S1-S2 without murmur, rub, gallop .  Respiratory: No rales, rhonchi, wheeze.  Gastrointestinal: Soft, nontender, nondistended, no organomegaly.  Skin: No erythema, no rash.   Genitourinary:  No costovertebral angle tenderness.   Neurologic: Alert & oriented x 3, cranial nerves 2-12 intact by passive exam.  No focal deficit " noted.  Psychiatric: Affect normal, Judgment normal, Mood normal.     DIFFERENTIAL DIAGNOSIS:  Viral syndrome, influenza, UTI, pneumonia, strep throat.    RADIOLOGY/PROCEDURES  DX-CHEST-LIMITED (1 VIEW)   Final Result      No evidence of acute cardiopulmonary process.          LABORATORY:  Results for orders placed or performed during the hospital encounter of 11/19/17   CBC WITH DIFFERENTIAL   Result Value Ref Range    WBC 6.4 4.8 - 10.8 K/uL    RBC 4.78 4.20 - 5.40 M/uL    Hemoglobin 13.8 12.0 - 16.0 g/dL    Hematocrit 40.1 37.0 - 47.0 %    MCV 83.9 81.4 - 97.8 fL    MCH 28.9 27.0 - 33.0 pg    MCHC 34.4 33.6 - 35.0 g/dL    RDW 40.2 35.9 - 50.0 fL    Platelet Count 235 164 - 446 K/uL    MPV 10.1 9.0 - 12.9 fL    Neutrophils-Polys 55.50 44.00 - 72.00 %    Lymphocytes 30.60 22.00 - 41.00 %    Monocytes 11.10 0.00 - 13.40 %    Eosinophils 1.70 0.00 - 6.90 %    Basophils 0.80 0.00 - 1.80 %    Immature Granulocytes 0.30 0.00 - 0.90 %    Nucleated RBC 0.00 /100 WBC    Neutrophils (Absolute) 3.54 2.00 - 7.15 K/uL    Lymphs (Absolute) 1.95 1.00 - 4.80 K/uL    Monos (Absolute) 0.71 0.00 - 0.85 K/uL    Eos (Absolute) 0.11 0.00 - 0.51 K/uL    Baso (Absolute) 0.05 0.00 - 0.12 K/uL    Immature Granulocytes (abs) 0.02 0.00 - 0.11 K/uL    NRBC (Absolute) 0.00 K/uL   BASIC METABOLIC PANEL   Result Value Ref Range    Sodium 135 135 - 145 mmol/L    Potassium 3.6 3.6 - 5.5 mmol/L    Chloride 105 96 - 112 mmol/L    Co2 23 20 - 33 mmol/L    Glucose 221 (H) 65 - 99 mg/dL    Bun 7 (L) 8 - 22 mg/dL    Creatinine 0.52 0.50 - 1.40 mg/dL    Calcium 8.5 8.4 - 10.2 mg/dL    Anion Gap 7.0 0.0 - 11.9   INFLUENZA RAPID   Result Value Ref Range    Significant Indicator NEG     Source RESP     Site RESPIRATORY     Rapid Influenza A-B       Negative for Influenza A and Influenza B antigens.  Infection due to influenza A or B cannot be ruled out  since the antigen present in the specimen may be below the  detection limit of the test. Culture  confirmation of  negative samples is recommended.     BETA-HCG QUALITATIVE SERUM   Result Value Ref Range    Beta-Hcg Qualitative Serum Negative Negative   URINALYSIS,CULTURE IF INDICATED   Result Value Ref Range    Micro Urine Req Microscopic     Color Yellow     Character Hazy (A)     Ph 6.0 5.0 - 8.0    Glucose 250 (A) Negative mg/dL    Ketones Negative Negative mg/dL    Protein Negative Negative mg/dL    Bilirubin Negative Negative    Nitrite Negative Negative    Leukocyte Esterase Negative Negative    Occult Blood Trace (A) Negative    Culture Indicated Yes UA Culture   URINE MICROSCOPIC (W/UA)   Result Value Ref Range    WBC 0-2 /hpf    RBC 0-2 /hpf    Bacteria Moderate (A) None /hpf    Epithelial Cells Few Few /hpf    Mucous Threads Few /hpf    Urine Crystals Few Amorphous /hpf   ACCU-CHEK GLUCOSE   Result Value Ref Range    Glucose - Accu-Ck 218 (H) 65 - 99 mg/dL   POC UA   Result Value Ref Range    POC Color Lora     POC Appearance Slightly Cloudy (A)     POC Glucose 500 (A) Negative mg/dL    POC Ketones Negative Negative mg/dL    POC Specific Gravity >=1.030 (A) 1.005 - 1.030    POC Blood Small (A) Negative    POC Urine PH 6.0 5.0 - 8.0    POC Protein 30 (A) Negative mg/dL    POC Nitrites Negative Negative    POC Leukocyte Esterase Negative Negative       INTERVENTIONS:  Medications   diphenhydrAMINE (BENADRYL) tablet/capsule 12.5 mg (12.5 mg Oral Given 11/19/17 1151)   lorazepam (ATIVAN) tablet 1 mg (1 mg Oral Given 11/19/17 1150)     Response:Somnolence, resolution of nausea.    COURSE & MEDICAL DECISION MAKING  Well-appearing patient presents with vomiting diarrhea transient abdominal pain cough and diabetes. She likely has a viral syndrome. There is no evidence of influenza, UTI, acute abdominal process or pneumonia..    PLAN:  New Prescriptions    PROCHLORPERAZINE (COMPAZINE) 10 MG TAB    Take 1 Tab by mouth every 6 hours as needed.     Ibuprofen and Tylenol  Viral syndrome handout given  Return for  ill appearance, uncontrolled vomiting, GI bleed, dizziness    LOGAN SandovalPSummerRSummerN.  [address] [facility] [address] [phone]    Schedule an appointment as soon as possible for a visit in 2 days  As needed      CONDITION: Stable, improved.    FINAL IMPRESSION  1. Non-intractable vomiting with nausea, unspecified vomiting type    2. Viral syndrome    3. History of diabetes mellitus          Electronically signed by: Norm Callaway, 11/19/2017 9:33 AM

## 2017-11-19 NOTE — ED NOTES
Assumed care of pt from dionicio castro. erp aware of pt c/o nausea and unable to establish iv. Orders recvd

## 2017-11-19 NOTE — ED NOTES
Pt amb in hallway prior to d/c for safety. To f/u with pcp, aware of not being able to drive due to meds recvd in er, return for worsening s/s

## 2017-11-19 NOTE — ED NOTES
Pt rounding-states improvement in previous s/s. Urine spec obtained and in process. Denies further needs

## 2017-11-19 NOTE — ED NOTES
"Med rec updated and complete  Allergies reviewed  Pt states \"No antibiotics in the last 30 days\".  Called [facility] @ [phone] to verify insulin.    "

## 2017-11-19 NOTE — DISCHARGE INSTRUCTIONS
" Viral Illness    Take ibuprofen 600-800 mg every 6 hours or naproxen 500 mg every 12 hours for pain and fever.  Return for ill appearance, uncontrolled vomiting, abdominal pain, GI bleed, dizziness or shortness of breath.  See a doctor if not better or worsening after 5-6 days of fever or other significant symptoms. Take Compazine for nausea and vomiting and Imodium for diarrhea.    You had a borderline or high normal blood pressure reading today.  This does not necessarily mean you have hypertension.  Please followup with your/a primary physician for comprehensive blood pressure evaluation and yearly fasting cholesterol assessment.  BP Readings from Last 3 Encounters:   11/19/17 148/96   10/30/17 144/83   09/25/17 138/85         Your exam indicates you have a viral illness.  Typical symptoms of virus infections include: fever, muscle aches, headache, fatigue, stomach upsets, sore throat, and dry cough. Antibiotic drugs are not effective in viral illnesses; they are only given when there is a secondary bacterial infection.    General treatment includes bed rest, increasing oral fluid intake of clear, non-caffeinated drinks like ginger ale, fruit juices, water, or sports (electrolyte) drinks, and medicine to relieve specific symptoms such as cough, pain, or diarrhea.  Acetaminophen (Tylenol) or ibuprofen may be used to help control fever and pain.      Please call your doctor if you are not better after 2-3 days of symptom treatment.  Call or return here right away if your illness gets more severe, or you develop any other new symptoms, such as a fever above 103 F, vomiting for more than a day, severe headache or other pain, stiff neck, trouble breathing, visual problems, \"blackouts\" or fainting.    Document Released: 12/18/2006    [employer]® Patient Information ©2008 [employer].    "

## 2017-11-21 LAB
BACTERIA UR CULT: NORMAL
SIGNIFICANT IND 70042: NORMAL
SITE SITE: NORMAL
SOURCE SOURCE: NORMAL

## 2017-12-04 ENCOUNTER — NON-PROVIDER VISIT (OUTPATIENT)
Dept: URGENT CARE | Facility: CLINIC | Age: 37
End: 2017-12-04

## 2017-12-04 DIAGNOSIS — Z02.1 PRE-EMPLOYMENT DRUG SCREENING: ICD-10-CM

## 2017-12-04 DIAGNOSIS — Z02.89 ENCOUNTER FOR OCCUPATIONAL HEALTH EXAMINATION: ICD-10-CM

## 2017-12-04 LAB
AMP AMPHETAMINE: NORMAL
BAR BARBITURATES: NORMAL
BZO BENZODIAZEPINES: NORMAL
COC COCAINE: NORMAL
INT CON NEG: NORMAL
INT CON POS: NORMAL
MDMA ECSTASY: NORMAL
MET METHAMPHETAMINES: NORMAL
MTD METHADONE: NORMAL
OPI OPIATES: NORMAL
OXY OXYCODONE: NORMAL
PCP PHENCYCLIDINE: NORMAL
POC URINE DRUG SCREEN OCDRS: NEGATIVE
THC: NORMAL

## 2017-12-04 PROCEDURE — 80305 DRUG TEST PRSMV DIR OPT OBS: CPT | Performed by: PHYSICIAN ASSISTANT

## 2018-01-23 ENCOUNTER — HOSPITAL ENCOUNTER (EMERGENCY)
Facility: MEDICAL CENTER | Age: 38
End: 2018-01-23
Attending: EMERGENCY MEDICINE
Payer: MEDICAID

## 2018-01-23 VITALS
RESPIRATION RATE: 20 BRPM | HEART RATE: 80 BPM | BODY MASS INDEX: 45.39 KG/M2 | HEIGHT: 63 IN | OXYGEN SATURATION: 98 % | TEMPERATURE: 98.2 F | SYSTOLIC BLOOD PRESSURE: 130 MMHG | WEIGHT: 256.17 LBS | DIASTOLIC BLOOD PRESSURE: 72 MMHG

## 2018-01-23 DIAGNOSIS — L30.9 DERMATITIS: ICD-10-CM

## 2018-01-23 PROCEDURE — 99283 EMERGENCY DEPT VISIT LOW MDM: CPT

## 2018-01-23 PROCEDURE — 700102 HCHG RX REV CODE 250 W/ 637 OVERRIDE(OP): Performed by: EMERGENCY MEDICINE

## 2018-01-23 PROCEDURE — A9270 NON-COVERED ITEM OR SERVICE: HCPCS | Performed by: EMERGENCY MEDICINE

## 2018-01-23 RX ORDER — BENZOCAINE/MENTHOL 6 MG-10 MG
LOZENGE MUCOUS MEMBRANE
Qty: 1 TUBE | Refills: 0 | Status: SHIPPED | OUTPATIENT
Start: 2018-01-23 | End: 2018-10-29

## 2018-01-23 RX ORDER — DIPHENHYDRAMINE HCL 25 MG
25 TABLET ORAL ONCE
Status: COMPLETED | OUTPATIENT
Start: 2018-01-23 | End: 2018-01-23

## 2018-01-23 RX ADMIN — DIPHENHYDRAMINE HCL 25 MG: 25 TABLET ORAL at 03:37

## 2018-01-23 ASSESSMENT — PAIN SCALES - GENERAL: PAINLEVEL_OUTOF10: 0

## 2018-01-23 NOTE — ED NOTES
Discharge instructions provided. Rx x1 given and educated on how and when to take medication, Pt verbalized the understanding of discharge instructions to follow up with PCP and to return to ER if condition worsens.  Pt ambulated out of ER without difficulty.

## 2018-01-23 NOTE — DISCHARGE INSTRUCTIONS
You were seen in the Emergency Department for rash.    Please use 1,000mg of tylenol or 600mg of ibuprofen every 6 hours as needed for pain.    Take 25-50mg (1-2 pills) benadryl scheduled every 6 hours for the next 2-3 days.  Use ointment as directed.    Please follow up with your primary care physician as scheduled for recheck.    Return to the Emergency Department with worsening rash, trouble breathing, oral swelling or lesion, or other concerns.      Rash  A rash is a change in the color or texture of the skin. There are many different types of rashes. You may have other problems that accompany your rash.  CAUSES   · Infections.  · Allergic reactions. This can include allergies to pets or foods.  · Certain medicines.  · Exposure to certain chemicals, soaps, or cosmetics.  · Heat.  · Exposure to poisonous plants.  · Tumors, both cancerous and noncancerous.  SYMPTOMS   · Redness.  · Scaly skin.  · Itchy skin.  · Dry or cracked skin.  · Bumps.  · Blisters.  · Pain.  DIAGNOSIS   Your caregiver may do a physical exam to determine what type of rash you have. A skin sample (biopsy) may be taken and examined under a microscope.  TREATMENT   Treatment depends on the type of rash you have. Your caregiver may prescribe certain medicines. For serious conditions, you may need to see a skin doctor (dermatologist).  HOME CARE INSTRUCTIONS   · Avoid the substance that caused your rash.  · Do not scratch your rash. This can cause infection.  · You may take cool baths to help stop itching.  · Only take over-the-counter or prescription medicines as directed by your caregiver.  · Keep all follow-up appointments as directed by your caregiver.  SEEK IMMEDIATE MEDICAL CARE IF:  · You have increasing pain, swelling, or redness.  · You have a fever.  · You have new or severe symptoms.  · You have body aches, diarrhea, or vomiting.  · Your rash is not better after 3 days.  MAKE SURE YOU:  · Understand these instructions.  · Will watch  your condition.  · Will get help right away if you are not doing well or get worse.     This information is not intended to replace advice given to you by your health care provider. Make sure you discuss any questions you have with your health care provider.     Document Released: 12/08/2003 Document Revised: 01/08/2016 Document Reviewed: 10/01/2012  [employer] Interactive Patient Education ©2016 [employer]    Allergies  An allergy is an abnormal reaction to a substance by the body's defense system (immune system). Allergies can develop at any age.  WHAT CAUSES ALLERGIES?  An allergic reaction happens when the immune system mistakenly reacts to a normally harmless substance, called an allergen, as if it were harmful. The immune system releases antibodies to fight the substance. Antibodies eventually release a chemical called histamine into the bloodstream. The release of histamine is meant to protect the body from infection, but it also causes discomfort.  An allergic reaction can be triggered by:  · Eating an allergen.  · Inhaling an allergen.  · Touching an allergen.  WHAT TYPES OF ALLERGIES ARE THERE?  There are many types of allergies. Common types include:  · Seasonal allergies. People with this type of allergy are usually allergic to substances that are only present during certain seasons, such as molds and pollens.  · Food allergies.  · Drug allergies.  · Insect allergies.  · Animal dander allergies.  WHAT ARE SYMPTOMS OF ALLERGIES?  Possible allergy symptoms include:  · Swelling of the lips, face, tongue, mouth, or throat.  · Sneezing, coughing, or wheezing.  · Nasal congestion.  · Tingling in the mouth.  · Rash.  · Itching.  · Itchy, red, swollen areas of skin (hives).  · Watery eyes.  · Vomiting.  · Diarrhea.  · Dizziness.  · Lightheadedness.  · Fainting.  · Trouble breathing or swallowing.  · Chest tightness.  · Rapid heartbeat.  HOW ARE ALLERGIES DIAGNOSED?  Allergies are diagnosed with a medical and  family history and one or more of the following:  · Skin tests.  · Blood tests.  · A food diary. A food diary is a record of all the foods and drinks you have in a day and of all the symptoms you experience.  · The results of an elimination diet. An elimination diet involves eliminating foods from your diet and then adding them back in one by one to find out if a certain food causes an allergic reaction.  HOW ARE ALLERGIES TREATED?  There is no cure for allergies, but allergic reactions can be treated with medicine. Severe reactions usually need to be treated at a hospital.  HOW CAN REACTIONS BE PREVENTED?  The best way to prevent an allergic reaction is by avoiding the substance you are allergic to. Allergy shots and medicines can also help prevent reactions in some cases. People with severe allergic reactions may be able to prevent a life-threatening reaction called anaphylaxis with a medicine given right after exposure to the allergen.     This information is not intended to replace advice given to you by your health care provider. Make sure you discuss any questions you have with your health care provider.     Document Released: 03/12/2004 Document Revised: 01/08/2016 Document Reviewed: 09/29/2015  [employer] Interactive Patient Education ©2016 [employer]

## 2018-01-23 NOTE — ED PROVIDER NOTES
ED Provider Note    CHIEF COMPLAINT  Chief Complaint   Patient presents with   • Rash     Painful and itchy rash that started Saturday 1/20/18 to bilateral arms, chest and neck       HPI  Liana Flanagan is a 37 y.o. Female with history of insulin dependent diabetes HTN, and bipolar disorder who presents with rash.  Patient endorses pruritic, erythematous rash to her bilateral arms, chest and face which started 2-3 days prior.  No history of the same.  Denies known allergies.  No new foods, detergents, soaps.  She states she was placed on oxycarbazepine approximately one month prior for her bipolar depression.  Denies oral lesion, rash to the palms or soles.  No oral swelling, trouble breathing, or vomiting or diarrhea.       REVIEW OF SYSTEMS  See HPI for further details. All other systems are negative.     PAST MEDICAL HISTORY   has a past medical history of Back pain; Chickenpox; Disorder of thyroid; Gynecological disorder; Herpes; Hypertension; Indigestion; Obese; Personal history of venous thrombosis and embolism; Psychiatric problem; Sleep apnea; and Type II or unspecified type diabetes mellitus without mention of complication, not stated as uncontrolled.    SOCIAL HISTORY  Social History     Social History Main Topics   • Smoking status: Current Some Day Smoker     Packs/day: 0.50     Years: 0.30     Types: Cigarettes     Last attempt to quit: 1/1/2008   • Smokeless tobacco: Never Used   • Alcohol use 0.0 oz/week      Comment: occ   • Drug use: No   • Sexual activity: Yes     Partners: Male       SURGICAL HISTORY  patient denies any surgical history    CURRENT MEDICATIONS  Home Medications     Reviewed by Pauline Lama R.N. (Registered Nurse) on 01/23/18 at 0254  Med List Status: <None>   Medication Last Dose Status   atorvastatin (LIPITOR) 20 MG Tab 11/18/2017 Active   gabapentin (NEURONTIN) 300 MG Cap 11/18/2017 Active   insulin glargine (BASAGLAR KWIKPEN) 100 UNIT/ML Solution Pen-injector  "injection 11/18/2017 Active   insulin glulisine (APIDRA) 100 UNIT/ML Solution Pen-injector injection 11/19/2017 Active   levothyroxine (SYNTHROID) 75 MCG Tab 11/18/2017 Active   lisinopril (PRINIVIL) 2.5 MG Tab 11/18/2017 Active   metformin (GLUCOPHAGE) 1000 MG tablet 11/18/2017 Active   prochlorperazine (COMPAZINE) 10 MG Tab  Active   ranitidine (ZANTAC) 150 MG Tab 11/18/2017 Active   sitagliptin (JANUVIA) 100 MG Tab 11/18/2017 Active                ALLERGIES  Allergies   Allergen Reactions   • Reglan [Metoclopramide] Vomiting   • Sulfa Drugs Hives     Rxn - unknown timeframe and reaction     • Zofran Hives       PHYSICAL EXAM  VITAL SIGNS: /72   Pulse 80   Temp 36.8 °C (98.2 °F)   Resp 20   Ht 1.6 m (5' 3\")   Wt 116.2 kg (256 lb 2.8 oz)   LMP 01/15/2018   SpO2 98%   BMI 45.38 kg/m²    Constitutional: Obese, well appearing female.  Alert in no apparent distress.  HENT: Normocephalic, Atraumatic. Bilateral external ears normal. Nose normal. Moist mucous membranes. No oral lesions.  No tongue or oropharyngeal swelling.    Neck: Supple, full range of motion.  Eyes: Pupils are equal and reactive. Conjunctiva normal.   Heart: Regular rate and rhythm. No murmurs.    Lungs: No respiratory distress.  Normal work of breathing.  Clear to auscultation bilaterally.  Skin: Warm, Dry. Pruritic maculopapular rash to bilateral arms and chest. No involvement of palms or soles of feet.      DIAGNOSTIC STUDIES        ED COURSE  Vitals:    01/23/18 0233 01/23/18 0344   BP: 136/78 130/72   Pulse: 75 80   Resp: 16 20   Temp: 36.1 °C (97 °F) 36.8 °C (98.2 °F)   SpO2:  98%   Weight: 116.2 kg (256 lb 2.8 oz)    Height: 1.6 m (5' 3\")          Medications administered:  Medications   diphenhydrAMINE (BENADRYL) tablet/capsule 25 mg (25 mg Oral Given 1/23/18 3160)         MEDICAL DECISION MAKING  Patient with history of insulin dependent diabetes and bipolar disorder who presents with pruritic maculopapular rash to chest and " arms.  Normal vials and well appearing on arrival.  No signs concerning for anaphylaxis at this time.  No infectious symptoms concerning for cellulitis or abscess.  No oral lesions concerning for SJS/TEN, however patient was recently placed on oxycarbamazepine.  Suspect more likely allergic or contact dermatitis.  Plan to discharge home with benadryl and hydrocortisone cream.  Patient has established follow up in 2 days with PCP.  Patient understands plan of care and strict return precautions for changing or worsening symptoms.      IMPRESSION  (L30.9) Dermatitis    Disposition: Discharge home stable condition  Results, diagnoses, and treatment options were discussed with the patient and/or family. Patient verbalized understanding of plan of care and strict return precautions prior to discharge.    Patient referred to primary care provider for monitoring and treatment of blood pressure.      Discharge Medication List as of 1/23/2018  3:39 AM      START taking these medications    Details   hydrocortisone 1 % Cream Applied to affected area twice daily for 1-2 weeks until resolved., Disp-1 Tube, R-0, Print Rx Paper               Electronically signed by: Shalini Dhillon, 1/23/2018 3:21 AM

## 2018-01-23 NOTE — ED NOTES
"Chief Complaint   Patient presents with   • Rash     Painful and itchy rash that started Saturday 1/20/18 to bilateral arms, chest and neck     /78   Pulse 75   Temp 36.1 °C (97 °F)   Resp 16   Ht 1.6 m (5' 3\")   Wt 116.2 kg (256 lb 2.8 oz)   BMI 45.38 kg/m²     Pt denies taking any new medications or anything different in her normal routine.  "

## 2018-01-26 ENCOUNTER — NON-PROVIDER VISIT (OUTPATIENT)
Dept: URGENT CARE | Facility: CLINIC | Age: 38
End: 2018-01-26

## 2018-01-26 DIAGNOSIS — Z02.1 PRE-EMPLOYMENT DRUG SCREENING: ICD-10-CM

## 2018-01-26 LAB
AMP AMPHETAMINE: NORMAL
BAR BARBITURATES: NORMAL
BZO BENZODIAZEPINES: NORMAL
COC COCAINE: NORMAL
INT CON NEG: NORMAL
INT CON POS: NORMAL
MDMA ECSTASY: NORMAL
MET METHAMPHETAMINES: NORMAL
MTD METHADONE: NORMAL
OPI OPIATES: NORMAL
OXY OXYCODONE: NORMAL
PCP PHENCYCLIDINE: NORMAL
POC URINE DRUG SCREEN OCDRS: NORMAL
THC: NORMAL

## 2018-01-26 PROCEDURE — 80305 DRUG TEST PRSMV DIR OPT OBS: CPT | Performed by: PHYSICIAN ASSISTANT

## 2018-02-07 ENCOUNTER — NON-PROVIDER VISIT (OUTPATIENT)
Dept: OCCUPATIONAL MEDICINE | Facility: CLINIC | Age: 38
End: 2018-02-07

## 2018-02-07 DIAGNOSIS — Z02.1 PRE-EMPLOYMENT DRUG SCREENING: ICD-10-CM

## 2018-02-07 LAB
AMP AMPHETAMINE: NORMAL
COC COCAINE: NORMAL
INT CON NEG: NORMAL
INT CON POS: NORMAL
MET METHAMPHETAMINES: NORMAL
OPI OPIATES: NORMAL
PCP PHENCYCLIDINE: NORMAL
POC DRUG COMMENT 753798-OCCUPATIONAL HEALTH: NEGATIVE
THC: NORMAL

## 2018-02-07 PROCEDURE — 80305 DRUG TEST PRSMV DIR OPT OBS: CPT | Performed by: PREVENTIVE MEDICINE

## 2018-04-16 ENCOUNTER — PATIENT MESSAGE (OUTPATIENT)
Dept: HEALTH INFORMATION MANAGEMENT | Facility: OTHER | Age: 38
End: 2018-04-16

## 2018-04-17 ENCOUNTER — APPOINTMENT (OUTPATIENT)
Dept: RADIOLOGY | Facility: MEDICAL CENTER | Age: 38
End: 2018-04-17
Attending: EMERGENCY MEDICINE

## 2018-04-17 ENCOUNTER — HOSPITAL ENCOUNTER (EMERGENCY)
Facility: MEDICAL CENTER | Age: 38
End: 2018-04-18
Attending: EMERGENCY MEDICINE

## 2018-04-17 DIAGNOSIS — R73.9 HYPERGLYCEMIA: ICD-10-CM

## 2018-04-17 DIAGNOSIS — N12 PYELONEPHRITIS: ICD-10-CM

## 2018-04-17 LAB
ALBUMIN SERPL BCP-MCNC: 3.5 G/DL (ref 3.2–4.9)
ALBUMIN/GLOB SERPL: 0.9 G/DL
ALP SERPL-CCNC: 72 U/L (ref 30–99)
ALT SERPL-CCNC: 28 U/L (ref 2–50)
ANION GAP SERPL CALC-SCNC: 7 MMOL/L (ref 0–11.9)
APPEARANCE UR: ABNORMAL
AST SERPL-CCNC: 19 U/L (ref 12–45)
BACTERIA #/AREA URNS HPF: ABNORMAL /HPF
BASOPHILS # BLD AUTO: 0.5 % (ref 0–1.8)
BASOPHILS # BLD: 0.06 K/UL (ref 0–0.12)
BILIRUB SERPL-MCNC: 0.5 MG/DL (ref 0.1–1.5)
BILIRUB UR QL STRIP.AUTO: NEGATIVE
BUN SERPL-MCNC: 12 MG/DL (ref 8–22)
CALCIUM SERPL-MCNC: 8.8 MG/DL (ref 8.4–10.2)
CASTS URNS QL MICRO: ABNORMAL /LPF
CHLORIDE SERPL-SCNC: 104 MMOL/L (ref 96–112)
CO2 SERPL-SCNC: 21 MMOL/L (ref 20–33)
COLOR UR: ABNORMAL
CREAT SERPL-MCNC: 0.58 MG/DL (ref 0.5–1.4)
CULTURE IF INDICATED INDCX: YES UA CULTURE
EOSINOPHIL # BLD AUTO: 0.19 K/UL (ref 0–0.51)
EOSINOPHIL NFR BLD: 1.7 % (ref 0–6.9)
EPI CELLS #/AREA URNS HPF: ABNORMAL /HPF
ERYTHROCYTE [DISTWIDTH] IN BLOOD BY AUTOMATED COUNT: 37.2 FL (ref 35.9–50)
GLOBULIN SER CALC-MCNC: 3.7 G/DL (ref 1.9–3.5)
GLUCOSE SERPL-MCNC: 325 MG/DL (ref 65–99)
GLUCOSE UR STRIP.AUTO-MCNC: NEGATIVE MG/DL
HCG UR QL: NEGATIVE
HCT VFR BLD AUTO: 35.4 % (ref 37–47)
HGB BLD-MCNC: 12.1 G/DL (ref 12–16)
IMM GRANULOCYTES # BLD AUTO: 0.03 K/UL (ref 0–0.11)
IMM GRANULOCYTES NFR BLD AUTO: 0.3 % (ref 0–0.9)
KETONES UR STRIP.AUTO-MCNC: 15 MG/DL
LEUKOCYTE ESTERASE UR QL STRIP.AUTO: ABNORMAL
LYMPHOCYTES # BLD AUTO: 3.61 K/UL (ref 1–4.8)
LYMPHOCYTES NFR BLD: 32.6 % (ref 22–41)
MCH RBC QN AUTO: 26.7 PG (ref 27–33)
MCHC RBC AUTO-ENTMCNC: 34.2 G/DL (ref 33.6–35)
MCV RBC AUTO: 78 FL (ref 81.4–97.8)
MICRO URNS: ABNORMAL
MONOCYTES # BLD AUTO: 0.76 K/UL (ref 0–0.85)
MONOCYTES NFR BLD AUTO: 6.9 % (ref 0–13.4)
MUCOUS THREADS #/AREA URNS HPF: ABNORMAL /HPF
NEUTROPHILS # BLD AUTO: 6.42 K/UL (ref 2–7.15)
NEUTROPHILS NFR BLD: 58 % (ref 44–72)
NITRITE UR QL STRIP.AUTO: POSITIVE
NRBC # BLD AUTO: 0 K/UL
NRBC BLD-RTO: 0 /100 WBC
PH UR STRIP.AUTO: 7 [PH]
PLATELET # BLD AUTO: 242 K/UL (ref 164–446)
PMV BLD AUTO: 10.2 FL (ref 9–12.9)
POTASSIUM SERPL-SCNC: 3.6 MMOL/L (ref 3.6–5.5)
PROT SERPL-MCNC: 7.2 G/DL (ref 6–8.2)
PROT UR QL STRIP: >=300 MG/DL
RBC # BLD AUTO: 4.54 M/UL (ref 4.2–5.4)
RBC # URNS HPF: >150 /HPF
RBC UR QL AUTO: ABNORMAL
SODIUM SERPL-SCNC: 132 MMOL/L (ref 135–145)
SP GR UR REFRACTOMETRY: 1.03
SP GR UR STRIP.AUTO: 1.02
UNIDENT CRYS URNS QL MICRO: ABNORMAL /HPF
WBC # BLD AUTO: 11.1 K/UL (ref 4.8–10.8)
WBC #/AREA URNS HPF: ABNORMAL /HPF

## 2018-04-17 PROCEDURE — 81001 URINALYSIS AUTO W/SCOPE: CPT

## 2018-04-17 PROCEDURE — 99284 EMERGENCY DEPT VISIT MOD MDM: CPT

## 2018-04-17 PROCEDURE — 700111 HCHG RX REV CODE 636 W/ 250 OVERRIDE (IP): Performed by: EMERGENCY MEDICINE

## 2018-04-17 PROCEDURE — 74176 CT ABD & PELVIS W/O CONTRAST: CPT

## 2018-04-17 PROCEDURE — 85025 COMPLETE CBC W/AUTO DIFF WBC: CPT

## 2018-04-17 PROCEDURE — 36415 COLL VENOUS BLD VENIPUNCTURE: CPT

## 2018-04-17 PROCEDURE — 96374 THER/PROPH/DIAG INJ IV PUSH: CPT

## 2018-04-17 PROCEDURE — 87086 URINE CULTURE/COLONY COUNT: CPT

## 2018-04-17 PROCEDURE — 87077 CULTURE AEROBIC IDENTIFY: CPT

## 2018-04-17 PROCEDURE — 96375 TX/PRO/DX INJ NEW DRUG ADDON: CPT

## 2018-04-17 PROCEDURE — 80053 COMPREHEN METABOLIC PANEL: CPT

## 2018-04-17 PROCEDURE — 87186 SC STD MICRODIL/AGAR DIL: CPT

## 2018-04-17 PROCEDURE — 81025 URINE PREGNANCY TEST: CPT

## 2018-04-17 RX ORDER — ONDANSETRON 2 MG/ML
INJECTION INTRAMUSCULAR; INTRAVENOUS
Status: COMPLETED
Start: 2018-04-17 | End: 2018-04-17

## 2018-04-17 RX ORDER — KETOROLAC TROMETHAMINE 30 MG/ML
15 INJECTION, SOLUTION INTRAMUSCULAR; INTRAVENOUS ONCE
Status: COMPLETED | OUTPATIENT
Start: 2018-04-17 | End: 2018-04-17

## 2018-04-17 RX ORDER — MORPHINE SULFATE 4 MG/ML
4 INJECTION, SOLUTION INTRAMUSCULAR; INTRAVENOUS
Status: DISCONTINUED | OUTPATIENT
Start: 2018-04-17 | End: 2018-04-18 | Stop reason: HOSPADM

## 2018-04-17 RX ADMIN — KETOROLAC TROMETHAMINE 15 MG: 30 INJECTION, SOLUTION INTRAMUSCULAR at 22:46

## 2018-04-17 RX ADMIN — MORPHINE SULFATE 4 MG: 4 INJECTION INTRAVENOUS at 22:46

## 2018-04-17 ASSESSMENT — PAIN SCALES - GENERAL: PAINLEVEL_OUTOF10: 8

## 2018-04-17 NOTE — LETTER
4/20/2018               Liana Degroot Panchito  [address]  [place] [address]        Dear Liana (MR#[id number])    This letter is sent in regards to your, recent visit to the [facility] Emergency Department on 4/17/2018.  During the visit, tests were performed to assist the physician in a medical diagnosis.  A review of those tests requires that we notify you of the following:    Your urine culture was POSITIVE for a bacteria called Escherichia coli. The antibiotic prescribed for you (ciprofloxacin) should be active to treat this bacteria. IT IS IMPORTANT THAT YOU CONTINUE TAKING YOUR ANTIBIOTIC UNTIL IT IS FINISHED.      Please feel free to contact me at the number below if you have any questions or concerns. Thank you for your cooperation in the matter.    Sincerely,  ED Culture Follow-Up Staff  Obi Anderson, PharmD, [facility]    [facility], Emergency Department  [address] [place] [id number]  [phone]

## 2018-04-18 VITALS
HEART RATE: 74 BPM | DIASTOLIC BLOOD PRESSURE: 69 MMHG | TEMPERATURE: 98.7 F | BODY MASS INDEX: 46.56 KG/M2 | SYSTOLIC BLOOD PRESSURE: 109 MMHG | HEIGHT: 63 IN | WEIGHT: 262.79 LBS | OXYGEN SATURATION: 95 % | RESPIRATION RATE: 16 BRPM

## 2018-04-18 PROCEDURE — 96375 TX/PRO/DX INJ NEW DRUG ADDON: CPT

## 2018-04-18 PROCEDURE — 700111 HCHG RX REV CODE 636 W/ 250 OVERRIDE (IP): Performed by: EMERGENCY MEDICINE

## 2018-04-18 RX ORDER — CIPROFLOXACIN 500 MG/1
500 TABLET, FILM COATED ORAL 2 TIMES DAILY
Qty: 14 TAB | Refills: 0 | Status: SHIPPED | OUTPATIENT
Start: 2018-04-18 | End: 2018-04-25

## 2018-04-18 RX ORDER — CEFTRIAXONE 1 G/1
1 INJECTION, POWDER, FOR SOLUTION INTRAMUSCULAR; INTRAVENOUS ONCE
Status: COMPLETED | OUTPATIENT
Start: 2018-04-18 | End: 2018-04-18

## 2018-04-18 RX ADMIN — CEFTRIAXONE SODIUM 1 G: 1 INJECTION, POWDER, FOR SOLUTION INTRAMUSCULAR; INTRAVENOUS at 00:39

## 2018-04-18 NOTE — ED NOTES
Discharge information provided. Pt verbalized understanding of discharge instructions to follow up with PCP and to return to ER if condition worsens. Pt expressed the awareness of not driving or operating heavy machinery, has ride home with . Pt ambulated out of ER in a steady gait, no additional questions or concerns. Paper scripts given, educated on new medication

## 2018-04-18 NOTE — DISCHARGE INSTRUCTIONS
Hyperglycemia  Hyperglycemia occurs when the level of sugar (glucose) in the blood is too high. Glucose is a type of sugar that provides the body's main source of energy. Certain hormones (insulin and glucagon) control the level of glucose in the blood. Insulin lowers blood glucose, and glucagon increases blood glucose. Hyperglycemia can result from having too little insulin in the bloodstream, or from the body not responding normally to insulin.  Hyperglycemia occurs most often in people who have diabetes (diabetes mellitus), but it can happen in people who do not have diabetes. It can develop quickly, and it can be life-threatening if it causes you to become severely dehydrated (diabetic ketoacidosis or hyperglycemic hyperosmolar state). Severe hyperglycemia is a medical emergency.  What are the causes?  If you have diabetes, hyperglycemia may be caused by:  · Diabetes medicine.  · Medicines that increase blood glucose or affect your diabetes control.  · Not eating enough, or not eating often enough.  · Changes in physical activity level.  · Being sick or having an infection.  If you have prediabetes or undiagnosed diabetes:  · Hyperglycemia may be caused by those conditions.  If you do not have diabetes, hyperglycemia may be caused by:  · Certain medicines, including steroid medicines, beta-blockers, epinephrine, and thiazide diuretics.  · Stress.  · Serious illness.  · Surgery.  · Diseases of the pancreas.  · Infection.  What increases the risk?  Hyperglycemia is more likely to develop in people who have risk factors for diabetes, such as:  · Having a family member with diabetes.  · Having a gene for type 1 diabetes that is passed from parent to child (inherited).  · Living in an area with cold weather conditions.  · Exposure to certain viruses.  · Certain conditions in which the body's disease-fighting (immune) system attacks itself (autoimmune disorders).  · Being overweight or obese.  · Having an inactive  (sedentary) lifestyle.  · Having been diagnosed with insulin resistance.  · Having a history of prediabetes, gestational diabetes, or polycystic ovarian syndrome (PCOS).  · Being of American-, -American, /, or / descent.  What are the signs or symptoms?  Hyperglycemia may not cause any symptoms. If you do have symptoms, they may include early warning signs, such as:  · Increased thirst.  · Hunger.  · Feeling very tired.  · Needing to urinate more often than usual.  · Blurry vision.  Other symptoms may develop if hyperglycemia gets worse, such as:  · Dry mouth.  · Loss of appetite.  · Fruity-smelling breath.  · Weakness.  · Unexpected or rapid weight gain or weight loss.  · Tingling or numbness in the hands or feet.  · Headache.  · Skin that does not quickly return to normal after being lightly pinched and released (poor skin turgor).  · Abdominal pain.  · Cuts or bruises that are slow to heal.  How is this diagnosed?  Hyperglycemia is diagnosed with a blood test to measure your blood glucose level. This blood test is usually done while you are having symptoms. Your health care provider may also do a physical exam and review your medical history.  You may have more tests to determine the cause of your hyperglycemia, such as:  · A fasting blood glucose (FBG) test. You will not be allowed to eat (you will fast) for at least 8 hours before a blood sample is taken.  · An A1c (hemoglobin A1c) blood test. This provides information about blood glucose control over the previous 2-3 months.  · An oral glucose tolerance test (OGTT). This measures your blood glucose at two times:  ¨ After fasting. This is your baseline blood glucose level.  ¨ Two hours after drinking a beverage that contains glucose.  How is this treated?  Treatment depends on the cause of your hyperglycemia. Treatment may include:  · Taking medicine to regulate your blood glucose levels. If you take insulin or  other diabetes medicines, your medicine or dosage may be adjusted.  · Lifestyle changes, such as exercising more, eating healthier foods, or losing weight.  · Treating an illness or infection, if this caused your hyperglycemia.  · Checking your blood glucose more often.  · Stopping or reducing steroid medicines, if these caused your hyperglycemia.  If your hyperglycemia becomes severe and it results in hyperglycemic hyperosmolar state, you must be hospitalized and given IV fluids.  Follow these instructions at home:  General instructions  · Take over-the-counter and prescription medicines only as told by your health care provider.  · Do not use any products that contain nicotine or tobacco, such as cigarettes and e-cigarettes. If you need help quitting, ask your health care provider.  · Limit alcohol intake to no more than 1 drink per day for nonpregnant women and 2 drinks per day for men. One drink equals 12 oz of beer, 5 oz of wine, or 1½ oz of hard liquor.  · Learn to manage stress. If you need help with this, ask your health care provider.  · Keep all follow-up visits as told by your health care provider. This is important.  Eating and drinking  · Maintain a healthy weight.  · Exercise regularly, as directed by your health care provider.  · Stay hydrated, especially when you exercise, get sick, or spend time in hot temperatures.  · Eat healthy foods, such as:  ¨ Lean proteins.  ¨ Complex carbohydrates.  ¨ Fresh fruits and vegetables.  ¨ Low-fat dairy products.  ¨ Healthy fats.  · Drink enough fluid to keep your urine clear or pale yellow.  If you have diabetes:   · Make sure you know the symptoms of hyperglycemia.  · Follow your diabetes management plan, as told by your health care provider. Make sure you:  ¨ Take your insulin and medicines as directed.  ¨ Follow your exercise plan.  ¨ Follow your meal plan. Eat on time, and do not skip meals.  ¨ Check your blood glucose as often as directed. Make sure to check  your blood glucose before and after exercise. If you exercise longer or in a different way than usual, check your blood glucose more often.  ¨ Follow your sick day plan whenever you cannot eat or drink normally. Make this plan in advance with your health care provider.  · Share your diabetes management plan with people in your workplace, school, and household.  · Check your urine for ketones when you are ill and as told by your health care provider.  · Carry a medical alert card or wear medical alert jewelry.  Contact a health care provider if:  · Your blood glucose is at or above 240 mg/dL (13.3 mmol/L) for 2 days in a row.  · You have problems keeping your blood glucose in your target range.  · You have frequent episodes of hyperglycemia.  Get help right away if:  · You have difficulty breathing.  · You have a change in how you think, feel, or act (mental status).  · You have nausea or vomiting that does not go away.  These symptoms may represent a serious problem that is an emergency. Do not wait to see if the symptoms will go away. Get medical help right away. Call your local emergency services (911 in the [place] Do not drive yourself to the hospital.   Summary  · Hyperglycemia occurs when the level of sugar (glucose) in the blood is too high.  · Hyperglycemia is diagnosed with a blood test to measure your blood glucose level. This blood test is usually done while you are having symptoms. Your health care provider may also do a physical exam and review your medical history.  · If you have diabetes, follow your diabetes management plan as told by your health care provider.  · Contact your health care provider if you have problems keeping your blood glucose in your target range.  This information is not intended to replace advice given to you by your health care provider. Make sure you discuss any questions you have with your health care provider.  Document Released: 06/13/2002 Document Revised: 09/04/2017  Document Reviewed: 09/04/2017  [employer] Patient Education © 2017 [employer]      Pyelonephritis, Adult  Introduction  Pyelonephritis is a kidney infection. The kidneys are the organs that filter a person's blood and move waste out of the bloodstream and into the urine. Urine passes from the kidneys, through the ureters, and into the bladder. There are two main types of pyelonephritis:  · Infections that come on quickly without any warning (acute pyelonephritis).  · Infections that last for a long period of time (chronic pyelonephritis).  In most cases, the infection clears up with treatment and does not cause further problems. More severe infections or chronic infections can sometimes spread to the bloodstream or lead to other problems with the kidneys.  What are the causes?  This condition is usually caused by:  · Bacteria traveling from the bladder to the kidney through infected urine. The urine in the bladder can become infected with bacteria from:  ¨ Bladder infection (cystitis).  ¨ Inflammation of the prostate gland (prostatitis).  ¨ Sexual intercourse, in females.  · Bacteria traveling from the bloodstream to the kidney.  What increases the risk?  This condition is more likely to develop in:  · Pregnant women.  · Older people.  · People who have diabetes.  · People who have kidney stones or bladder stones.  · People who have other abnormalities of the kidney or ureter.  · People who have a catheter placed in the bladder.  · People who have cancer.  · People who are sexually active.  · Women who use spermicides.  · People who have had a prior urinary tract infection.  What are the signs or symptoms?  Symptoms of this condition include:  · Frequent urination.  · Strong or persistent urge to urinate.  · Burning or stinging when urinating.  · Abdominal pain.  · Back pain.  · Pain in the side or flank area.  · Fever.  · Chills.  · Blood in the urine, or dark urine.  · Nausea.  · Vomiting.  How is  this diagnosed?  This condition may be diagnosed based on:  · Medical history and physical exam.  · Urine tests.  · Blood tests.  You may also have imaging tests of the kidneys, such as an ultrasound or CT scan.  How is this treated?  Treatment for this condition may depend on the severity of the infection.  · If the infection is mild and is found early, you may be treated with antibiotic medicines taken by mouth. You will need to drink fluids to remain hydrated.  · If the infection is more severe, you may need to stay in the hospital and receive antibiotics given directly into a vein through an IV tube. You may also need to receive fluids through an IV tube if you are not able to remain hydrated. After your hospital stay, you may need to take oral antibiotics for a period of time.  Other treatments may be required, depending on the cause of the infection.  Follow these instructions at home:  Medicines  · Take over-the-counter and prescription medicines only as told by your health care provider.  · If you were prescribed an antibiotic medicine, take it as told by your health care provider. Do not stop taking the antibiotic even if you start to feel better.  General instructions  · Drink enough fluid to keep your urine clear or pale yellow.  · Avoid caffeine, tea, and carbonated beverages. They tend to irritate the bladder.  · Urinate often. Avoid holding in urine for long periods of time.  · Urinate before and after sex.  · After a bowel movement, women should cleanse from front to back. Use each tissue only once.  · Keep all follow-up visits as told by your health care provider. This is important.  Contact a health care provider if:  · Your symptoms do not get better after 2 days of treatment.  · Your symptoms get worse.  · You have a fever.  Get help right away if:  · You are unable to take your antibiotics or fluids.  · You have shaking chills.  · You vomit.  · You have severe flank or back pain.  · You have  extreme weakness or fainting.  This information is not intended to replace advice given to you by your health care provider. Make sure you discuss any questions you have with your health care provider.  Document Released: 12/18/2006 Document Revised: 05/25/2017 Document Reviewed: 04/11/2016  © 2017 [employer]

## 2018-04-18 NOTE — ED NOTES
"Chief Complaint   Patient presents with   • Blood in Urine     Started today, right sided flank pain and dysuria. Unsure if having fevers.    • Nausea     /94   Pulse 92   Temp 37.1 °C (98.7 °F)   Resp 18   Ht 1.6 m (5' 3\")   Wt 119.2 kg (262 lb 12.6 oz)   SpO2 97%   BMI 46.55 kg/m²     "

## 2018-04-18 NOTE — ED NOTES
Pt reports dysuria and hematuria since approx 2 pm this afternoon. Reports nausea but denies vomiting. Denies fever. abd mildly tender to palpation. Denies bowel disturbances. Skin pwd. Aa0x3. resp nonlabored.

## 2018-04-18 NOTE — ED PROVIDER NOTES
CHIEF COMPLAINT  Chief Complaint   Patient presents with   • Blood in Urine     Started today, right sided flank pain and dysuria. Unsure if having fevers.    • Nausea       HPI  Liana Flanagan is a 38 y.o. female who presents for evaluation of right flank pain which started around 2:00 this afternoon. Patient states she has frequent low back pain however this is different and more intense. Associated with nausea and some chills. She noted hematuria and presented to the emergency department. She has no history of kidney stones.    REVIEW OF SYSTEMS  Constitutional: No fevers, weakness, or weight loss   Skin: No rashes, abrasions, lacerations, or pruritus  HEENT: No sore throat, runny nose, sores, trouble swallowing, trouble speaking.  Neck: No neck pain, stiffness, or masses.  Chest: No pain or rashes  Pulm: No shortness of breath, cough, wheezing, stridor, or pain with inspiration/expiration  Gastrointestinal: Mild nausea. No vomiting, diarrhea, constipation, bloating, melena, hematochezia or pain.  Genitourinary: Increased flank pain with urination, hematuria noted. No frequency or hesitation.   Musculoskeletal: No recent trauma, pain, swelling, weakness  Neurologic: No sensory or motor changes. No confusion or disorientation.  Heme: No bleeding or bruising problems.   Immuno: No hx of recurrent infections      PAST MEDICAL HISTORY   has a past medical history of Back pain; Chickenpox; Disorder of thyroid; Gynecological disorder; Herpes; Hypertension; Indigestion; Obese; Personal history of venous thrombosis and embolism; Psychiatric problem; Sleep apnea; and Type II or unspecified type diabetes mellitus without mention of complication, not stated as uncontrolled.    SOCIAL HISTORY  Social History     Social History Main Topics   • Smoking status: Current Some Day Smoker     Packs/day: 0.50     Years: 0.30     Types: Cigarettes     Last attempt to quit: 1/1/2008   • Smokeless tobacco: Never Used   • Alcohol  "use 0.0 oz/week      Comment: occ   • Drug use: No   • Sexual activity: Yes     Partners: Male       SURGICAL HISTORY  patient denies any surgical history    CURRENT MEDICATIONS  Home Medications    **Home medications have not yet been reviewed for this encounter**         ALLERGIES  Allergies   Allergen Reactions   • Reglan [Metoclopramide] Vomiting   • Sulfa Drugs Hives     Rxn - unknown timeframe and reaction     • Zofran Hives       PHYSICAL EXAM  VITAL SIGNS: /69   Pulse 80   Temp 37.1 °C (98.7 °F)   Resp 18   Ht 1.6 m (5' 3\")   Wt 119.2 kg (262 lb 12.6 oz)   LMP 02/17/2018 (Approximate) Comment: irregular  SpO2 96%   BMI 46.55 kg/m²    Gen: Alert in no apparent distress.  HEENT: No signs of trauma, Bilateral external ears normal, Nose normal. Conjunctiva normal, Non-icteric.   Neck:  No tenderness, Supple, No masses  Lymphatic: No cervical lymphadenopathy noted.   Cardiovascular: Regular rate and rhythm, no murmurs.   Thorax & Lungs: Normal breath sounds, No respiratory distress, No wheezing bilateral chest rise  Abdomen: Bowel sounds normal, Soft, No tenderness, No masses, No pulsatile masses. No Guarding or rebound  Skin: Warm, Dry, No erythema, No rash.   Back: No bony tenderness, mild right CVA tenderness.  Extremities: Intact distal pulses, No edema  Neurologic: Alert , no facial droop, grossly normal coordination and strength  Psychiatric: Affect normal, Judgment normal, Mood normal.       DIAGNOSTIC STUDIES / PROCEDURES    EKG      LABS  Results for orders placed or performed during the hospital encounter of 04/17/18   URINALYSIS,CULTURE IF INDICATED   Result Value Ref Range    Color Red     Character Cloudy (A)     Specific Gravity 1.020 <1.035    Ph 7.0 5.0 - 8.0    Glucose Negative Negative mg/dL    Ketones 15 (A) Negative mg/dL    Protein >=300 (A) Negative mg/dL    Bilirubin Negative Negative    Nitrite Positive (A) Negative    Leukocyte Esterase Moderate (A) Negative    Occult Blood " Large (A) Negative    Micro Urine Req Microscopic     Culture Indicated Yes UA Culture   CBC WITH DIFFERENTIAL   Result Value Ref Range    WBC 11.1 (H) 4.8 - 10.8 K/uL    RBC 4.54 4.20 - 5.40 M/uL    Hemoglobin 12.1 12.0 - 16.0 g/dL    Hematocrit 35.4 (L) 37.0 - 47.0 %    MCV 78.0 (L) 81.4 - 97.8 fL    MCH 26.7 (L) 27.0 - 33.0 pg    MCHC 34.2 33.6 - 35.0 g/dL    RDW 37.2 35.9 - 50.0 fL    Platelet Count 242 164 - 446 K/uL    MPV 10.2 9.0 - 12.9 fL    Neutrophils-Polys 58.00 44.00 - 72.00 %    Lymphocytes 32.60 22.00 - 41.00 %    Monocytes 6.90 0.00 - 13.40 %    Eosinophils 1.70 0.00 - 6.90 %    Basophils 0.50 0.00 - 1.80 %    Immature Granulocytes 0.30 0.00 - 0.90 %    Nucleated RBC 0.00 /100 WBC    Neutrophils (Absolute) 6.42 2.00 - 7.15 K/uL    Lymphs (Absolute) 3.61 1.00 - 4.80 K/uL    Monos (Absolute) 0.76 0.00 - 0.85 K/uL    Eos (Absolute) 0.19 0.00 - 0.51 K/uL    Baso (Absolute) 0.06 0.00 - 0.12 K/uL    Immature Granulocytes (abs) 0.03 0.00 - 0.11 K/uL    NRBC (Absolute) 0.00 K/uL   COMP METABOLIC PANEL   Result Value Ref Range    Sodium 132 (L) 135 - 145 mmol/L    Potassium 3.6 3.6 - 5.5 mmol/L    Chloride 104 96 - 112 mmol/L    Co2 21 20 - 33 mmol/L    Anion Gap 7.0 0.0 - 11.9    Glucose 325 (H) 65 - 99 mg/dL    Bun 12 8 - 22 mg/dL    Creatinine 0.58 0.50 - 1.40 mg/dL    Calcium 8.8 8.4 - 10.2 mg/dL    AST(SGOT) 19 12 - 45 U/L    ALT(SGPT) 28 2 - 50 U/L    Alkaline Phosphatase 72 30 - 99 U/L    Total Bilirubin 0.5 0.1 - 1.5 mg/dL    Albumin 3.5 3.2 - 4.9 g/dL    Total Protein 7.2 6.0 - 8.2 g/dL    Globulin 3.7 (H) 1.9 - 3.5 g/dL    A-G Ratio 0.9 g/dL   URINE MICROSCOPIC (W/UA)   Result Value Ref Range    WBC 10-20 (A) /hpf    RBC >150 (A) /hpf    Bacteria Moderate (A) None /hpf    Epithelial Cells Moderate (A) Few /hpf    Mucous Threads Few /hpf    Urine Crystals Mod Amorphous /hpf    Urine Casts 0-2 Hyaline /lpf   BETA-HCG QUALITATIVE URINE   Result Value Ref Range    Beta-Hcg Urine Negative Negative    REFRACTOMETER SG   Result Value Ref Range    Specific Gravity 1.028    ESTIMATED GFR   Result Value Ref Range    GFR If African American >60 >60 mL/min/1.73 m 2    GFR If Non African American >60 >60 mL/min/1.73 m 2       RADIOLOGY  CT-RENAL COLIC EVALUATION(A/P W/O)   Final Result         1.  Negative noncontrast CT scan of the abdomen and pelvis.      2.  No hydronephrosis or nephrolithiasis. No perinephric inflammatory change.      3.  Normal appearance of the appendix in the right lower quadrant.        Reevaluation   Time:12:30 AM  Vital signs:   Assessment: Evaluated to bedside. Informed patient of results and plan for treatment of pyelonephritis.     COURSE & MEDICAL DECISION MAKING  Pertinent Labs & Imaging studies reviewed. (See chart for details)  Patient arrives for evaluation of right flank pain and hematuria which is likely related to early pyelonephritis. Patient does not appear septic or toxic and has no renal dysfunction. She was hemodynamically stable and had no deterioration in her condition while in the emergency department. She is noted to be diabetic and is hyperglycemic. She notes her blood sugars have been difficult to control recently however she has no signs of diabetic ketoacidosis or hyperosmolar state. She is able to tolerate oral fluids and feels safe with the plan for discharge. She'll be encouraged follow-up with her primary care physician within a week for further evaluation if symptoms continue or return to the emergency department if they worsen or change.     FINAL IMPRESSION  1. Pyelonephritis  2. Hyperglycemia  3.         Electronically signed by: Ulisses Hawkins, 4/17/2018 10:22 PM

## 2018-04-20 LAB
BACTERIA UR CULT: ABNORMAL
BACTERIA UR CULT: ABNORMAL
SIGNIFICANT IND 70042: ABNORMAL
SITE SITE: ABNORMAL
SOURCE SOURCE: ABNORMAL

## 2018-04-20 NOTE — ED NOTES
ED Positive Culture Follow-up/Notification Note:    Date: 04/20/2018     Patient seen in the ED on 4/17/2018 for right flank pain, hematuria, nausea and chills.   1. Hyperglycemia    2. Pyelonephritis       Discharge Medication List as of 4/18/2018 12:34 AM      START taking these medications    Details   ciprofloxacin (CIPRO) 500 MG Tab Take 1 Tab by mouth 2 times a day for 7 days., Disp-14 Tab, R-0, Normal             Allergies: Reglan [metoclopramide]; Sulfa drugs; and Zofran     Vitals:    04/17/18 2204 04/17/18 2240 04/17/18 2310 04/18/18 0040   BP: 109/69      Pulse: 84 75 80 74   Resp: 18   16   Temp:       SpO2: 96% 95% 96% 95%   Weight:       Height:           Final cultures:   Results     Procedure Component Value Units Date/Time    URINE CULTURE(NEW) [[id number]]  (Abnormal)  (Susceptibility) Collected:  04/17/18 2122    Order Status:  Completed Specimen:  Urine Updated:  04/20/18 0829     Significant Indicator POS (POS)     Source UR     Site --     Urine Culture Mixed skin giovani ,000 cfu/mL (A)      Escherichia coli  ,000 cfu/mL   (A)    Culture & Susceptibility     ESCHERICHIA COLI     Antibiotic Sensitivity Microscan Unit Status    Ampicillin Resistant >16 mcg/mL Final    Cefepime Sensitive <=8 mcg/mL Final    Cefotaxime Sensitive <=2 mcg/mL Final    Cefotetan Sensitive <=16 mcg/mL Final    Ceftazidime Sensitive <=1 mcg/mL Final    Ceftriaxone Sensitive <=8 mcg/mL Final    Cefuroxime Sensitive <=4 mcg/mL Final    Cephalothin Sensitive <=8 mcg/mL Final    Ciprofloxacin Sensitive <=1 mcg/mL Final    Gentamicin Sensitive <=4 mcg/mL Final    Levofloxacin Sensitive <=2 mcg/mL Final    Nitrofurantoin Sensitive <=32 mcg/mL Final    Pip/Tazobactam Sensitive <=16 mcg/mL Final    Piperacillin Intermediate 64 mcg/mL Final    Tigecycline Sensitive <=2 mcg/mL Final    Tobramycin Sensitive <=4 mcg/mL Final    Trimeth/Sulfa Resistant >2/38 mcg/mL Final                       URINALYSIS,CULTURE IF INDICATED  [[id number]]  (Abnormal) Collected:  04/17/18 2122    Order Status:  Completed Specimen:  Urine Updated:  04/17/18 2206     Color Red     Character Cloudy (A)     Specific Gravity 1.020     Ph 7.0     Glucose Negative mg/dL      Ketones 15 (A) mg/dL      Protein >=300 (A) mg/dL      Bilirubin Negative     Nitrite Positive (A)     Leukocyte Esterase Moderate (A)     Occult Blood Large (A)     Micro Urine Req Microscopic     Culture Indicated Yes UA Culture           Plan:   Appropriate antibiotic therapy prescribed. No changes required based upon culture result.  Sent letter to patient to notify of positive culture result and encourage compliance with prescribed antibiotics.     Obi Anderson, PharmD, BCPS

## 2018-08-09 ENCOUNTER — OFFICE VISIT (OUTPATIENT)
Dept: MEDICAL GROUP | Facility: PHYSICIAN GROUP | Age: 38
End: 2018-08-09
Payer: COMMERCIAL

## 2018-08-09 VITALS
HEART RATE: 83 BPM | OXYGEN SATURATION: 97 % | HEIGHT: 63 IN | WEIGHT: 258 LBS | RESPIRATION RATE: 16 BRPM | DIASTOLIC BLOOD PRESSURE: 84 MMHG | TEMPERATURE: 97.5 F | BODY MASS INDEX: 45.71 KG/M2 | SYSTOLIC BLOOD PRESSURE: 114 MMHG

## 2018-08-09 DIAGNOSIS — E03.4 HYPOTHYROIDISM DUE TO ACQUIRED ATROPHY OF THYROID: ICD-10-CM

## 2018-08-09 DIAGNOSIS — L97.501 ULCER OF FOOT, LIMITED TO BREAKDOWN OF SKIN, UNSPECIFIED LATERALITY (HCC): ICD-10-CM

## 2018-08-09 DIAGNOSIS — Z86.718 PERSONAL HISTORY OF VENOUS THROMBOSIS AND EMBOLISM: ICD-10-CM

## 2018-08-09 DIAGNOSIS — G47.33 OBSTRUCTIVE SLEEP APNEA: Chronic | ICD-10-CM

## 2018-08-09 DIAGNOSIS — R35.0 URINARY FREQUENCY: ICD-10-CM

## 2018-08-09 PROBLEM — F31.9 BIPOLAR DISORDER (HCC): Status: ACTIVE | Noted: 2018-08-09

## 2018-08-09 LAB
APPEARANCE UR: NORMAL
BILIRUB UR STRIP-MCNC: NORMAL MG/DL
COLOR UR AUTO: NORMAL
GLUCOSE UR STRIP.AUTO-MCNC: 500 MG/DL
KETONES UR STRIP.AUTO-MCNC: NORMAL MG/DL
LEUKOCYTE ESTERASE UR QL STRIP.AUTO: NORMAL
NITRITE UR QL STRIP.AUTO: NORMAL
PH UR STRIP.AUTO: 5.5 [PH] (ref 5–8)
PROT UR QL STRIP: NORMAL MG/DL
RBC UR QL AUTO: NORMAL
SP GR UR STRIP.AUTO: 1.01
UROBILINOGEN UR STRIP-MCNC: 0.2 MG/DL

## 2018-08-09 PROCEDURE — 81002 URINALYSIS NONAUTO W/O SCOPE: CPT | Performed by: INTERNAL MEDICINE

## 2018-08-09 PROCEDURE — 99214 OFFICE O/P EST MOD 30 MIN: CPT | Performed by: INTERNAL MEDICINE

## 2018-08-09 PROCEDURE — 92250 FUNDUS PHOTOGRAPHY W/I&R: CPT | Mod: TC | Performed by: INTERNAL MEDICINE

## 2018-08-09 RX ORDER — LORATADINE 10 MG/1
10 TABLET ORAL DAILY
COMMUNITY
End: 2019-07-06

## 2018-08-09 RX ORDER — RISPERIDONE 2 MG/1
2 TABLET ORAL
COMMUNITY
End: 2018-08-09

## 2018-08-09 RX ORDER — CEPHALEXIN 500 MG/1
500 CAPSULE ORAL 3 TIMES DAILY
Qty: 21 CAP | Refills: 0 | Status: SHIPPED | OUTPATIENT
Start: 2018-08-09 | End: 2018-08-16

## 2018-08-09 RX ORDER — RISPERIDONE 1 MG/1
1 TABLET ORAL
COMMUNITY
Start: 2018-08-07 | End: 2019-05-03

## 2018-08-09 RX ORDER — RANITIDINE 150 MG/1
150 CAPSULE ORAL 2 TIMES DAILY
COMMUNITY
Start: 2018-08-07 | End: 2019-07-06

## 2018-08-09 NOTE — PROGRESS NOTES
PRIMARY CARE CLINIC NEW PATIENT H&P  Chief Complaint   Patient presents with   • Urinary Frequency     History of Present Illness     Uncontrolled type 2 diabetes mellitus without complication, without long-term current use of insulin (CMS-[facility])  Has been having a lot of variability with her blood sugars. Taking glargine 35u qHS, SSI with meals starting at 15u, metformin 1 g bid, januvia 100 mg daily. Hasn't seen a doctor in a while. This morning her fasting blood sugar was 298 and up to 400 on other days. Some days her fasting blood sugars are 160. Her last eye exam was over a year ago. Has diabetic neuropathy and takes gabapentin 300 mg tid. Taking atorvastatin and a small dose of lisinopril for heart and kidney protection.     Obstructive sleep apnea  Not on CPAP since she's been out of insurance.     Hypothyroidism due to acquired atrophy of thyroid  Denies history of surgery and radiation. Taking levothyroxine 75 mcg daily.     Personal history of venous thrombosis and embolism  Says she's had more than one clot in her legs, first time thought to be secondary to OCPs. She thinks the others were due to immobility. She has been off of blood thinners for ten years with no recurrences.     Bipolar disorder ([facility])  She was prescribed risperdal 1 mg daily originally prescribed by a doctor at [facility] but isn't sure if that provider was a psychiatrist. Has been taking risperdal for a couple months.     Urinary frequency  Has been having issues with urinary urgency/frequency on and off since she was diagnosed with diabetes in 2013/2014. Is prone to yeast infections and UTIs.     Current Outpatient Prescriptions   Medication Sig Dispense Refill   • ranitidine (ZANTAC) 150 MG capsule      • risperiDONE (RISPERDAL) 1 MG Tab      • Probiotic Product (PROBIOTIC ADVANCED PO) Take  by mouth.     • CRANBERRY EXTRACT PO Take  by mouth.     • loratadine (CLARITIN) 10 MG Tab Take 10 mg by mouth every day.     •  cephALEXin (KEFLEX) 500 MG Cap Take 1 Cap by mouth 3 times a day for 7 days. 21 Cap 0   • Insulin Pen Needle 31G X 5 MM Misc To be used with insulin pen 180 Each 3   • hydrocortisone 1 % Cream Applied to affected area twice daily for 1-2 weeks until resolved. 1 Tube 0   • insulin glulisine (APIDRA) 100 UNIT/ML Solution Pen-injector injection Inject 15 Units as instructed 3 times a day before meals.     • insulin glargine (BASAGLAR KWIKPEN) 100 UNIT/ML Solution Pen-injector injection Inject 59 Units as instructed every evening.     • gabapentin (NEURONTIN) 300 MG Cap Take 300 mg by mouth 3 times a day.     • lisinopril (PRINIVIL) 2.5 MG Tab Take 2.5 mg by mouth every day.     • atorvastatin (LIPITOR) 20 MG Tab Take 1 Tab by mouth every bedtime. 30 Tab 0   • sitagliptin (JANUVIA) 100 MG Tab Take 1 Tab by mouth every day. 30 Tab 0   • metformin (GLUCOPHAGE) 1000 MG tablet Take 1 Tab by mouth 2 times a day. 60 Tab 0   • levothyroxine (SYNTHROID) 75 MCG Tab Take 75 mcg by mouth Every morning on an empty stomach.       No current facility-administered medications for this visit.        Past Medical History:   Diagnosis Date   • Bipolar disorder (HCC) 8/9/2018   • Chickenpox    • Herpes    • Hypertension    • Hypothyroidism due to acquired atrophy of thyroid 1/22/2016   • Personal history of venous thrombosis and embolism     DVT in BLE years ago   • Sleep apnea    • Uncontrolled type 2 diabetes mellitus without complication, without long-term current use of insulin (HCC) 3/12/2015     History reviewed. No pertinent surgical history.  Social History   Substance Use Topics   • Smoking status: Former Smoker     Packs/day: 0.50     Years: 0.30     Types: Cigarettes     Quit date: 2018   • Smokeless tobacco: Never Used      Comment: for 3 months at a time on and off    • Alcohol use 0.0 oz/week      Comment: occ     Social History     Social History Narrative    Works at [employer]      Family History   Problem Relation  "Age of Onset   • Hypertension Mother    • Sleep Apnea Mother    • Hyperlipidemia Father    • Cancer Maternal Uncle    • Sleep Apnea Brother    • Diabetes Neg Hx    • Heart Disease Neg Hx    • Stroke Neg Hx      Family Status   Relation Status   • Mo Alive   • Fa Alive   • MUnc Alive   • Bro Alive   • Bro Alive   • Neg Hx (Not Specified)     Allergies: Reglan [metoclopramide]; Sulfa drugs; and Zofran    ROS  Constitutional: Negative for fatigue/generalized weakness.   HEENT: Negative for  vision changes, hearing changes    Respiratory: Negative for shortness of breath  Cardiovascular: Negative for chest pain, palpitations  Gastrointestinal: Negative for blood in stool, constipation, diarrhea  Genitourinary: Positive for urinary frequency   Musculoskeletal: Negative for myalgias, back pain, and joint pain.   Skin: positive for skin changes in toes  Neurological: Negative for numbness, tingling  Psychiatric/Behavioral: Negative for depression, anxiety       Objective   Blood pressure 114/84, pulse 83, temperature 36.4 °C (97.5 °F), resp. rate 16, height 1.6 m (5' 3\"), weight 117 kg (258 lb), SpO2 97 %, not currently breastfeeding. Body mass index is 45.7 kg/m².    General: Alert, oriented. In no acute distress   HEET: EOMI, PERRL, conjunctiva non-injected, sclera non-icteric.  Nares patent with no significant congestion or drainage.  Katherine pinnae, external auditory canals, TM pearly gray with normal light reflex bilaterally.Oral mucous membranes pink and moist with no lesions.  Neck: supple with no cervical, subclavicular lymphadenopathy, JVD, palpable thyroid nodules   Lungs: clear to auscultation bilaterally with good excursion.  CV: regular rate and rhythm.  Abdomen soft, non-distended, non-tender with normal bowel sounds. No hepatosplenomegaly, no masses palpated  Skin: no lesions. Warm, dry   Extremities: monofilament exam intact but green skin discoloration with mild breakdown between bilateral toes "   Psychiatric: appropriate mood and affect       Assessment and Plan   The following treatment plan was discussed     1. Urinary frequency  No signs of infection on POCT UA but glucosuria likely accounting for her symptoms. Needs stricter glycemic control as below.   - POCT Urinalysis    2. Uncontrolled type 2 diabetes mellitus without complication, without long-term current use of insulin ([facility])  Will check baseline labs although suspect her diabetes is wildly uncontrolled. She will need assistance from endocrinology as well as HIP in achieving weight loss and a diabetic regimen for better control. Fear that the changes of her toes are early diabetic ulcers (treatment as below). POCT retinal exam done today.   - POCT Retinal Eye Exam  - HEMOGLOBIN A1C; Future  - MICROALBUMIN CREAT RATIO URINE; Future  - LIPID PROFILE; Future  - REFERRAL TO [facility] HEALTH IMPROVEMENT PROGRAMS (HIP) Services Requested: Physician Medical Weight Management Program; Reason for Referral? BMI>30; Reason for Visit: Overweight/Obesity  - REFERRAL TO ENDOCRINOLOGY  - Diabetic Monofilament LE Exam  - Insulin Pen Needle 31G X 5 MM Misc; To be used with insulin pen  Dispense: 180 Each; Refill: 3    3. Obstructive sleep apnea  Weight loss as above, presently not on CPAP. Will address this at next visit on 9/20/2018.     4. Hypothyroidism due to acquired atrophy of thyroid  Will check TSH, follow up 9/20/2018.     5. Personal history of venous thrombosis and embolism  Presently not on blood thinners, prior events seem provoked.     6. Ulcer of foot, limited to breakdown of skin, unspecified laterality (HCC)  Will treat with keflex, follow up in 9/2018. Needs stricter glycemic control as above.   - cephALEXin (KEFLEX) 500 MG Cap; Take 1 Cap by mouth 3 times a day for 7 days.  Dispense: 21 Cap; Refill: 0      Return in about 6 weeks (around 9/20/2018).    Health Maintenance      Health Maintenance Due   Topic Date Due   • IMM PNEUMOCOCCAL 19-64  (ADULT) MEDIUM RISK SERIES (1 of 1 - PPSV23) 05/07/2015   • IMM HEP B VACCINE (2 of 3 - Risk 3-dose series) 03/21/2016   • RETINAL SCREENING  08/25/2016   • URINE ACR / MICROALBUMIN  01/29/2017   • PAP SMEAR  06/25/2017   • A1C SCREENING  10/20/2017   • FASTING LIPID PROFILE  04/20/2018       Gary Caal MD  Internal Medicine  [facility]

## 2018-08-09 NOTE — LETTER
August 9, 2018      Liana Mikayla Obrien      To Whom It May Concern,       I have evaluated Liana in my office today 8/9/2018. Due to her medical condition she needs to have access to insulin at all times and accessibility to food at least every 2 hours (glucose tablets, granola bar, cheese). Her medical condition also necessitates frequent bathroom visits that are beyond her control and ought to be accommodated. She is under active care presently to help manage her medical condition better.       Please call with questions,       Gary Caal MD

## 2018-08-09 NOTE — ASSESSMENT & PLAN NOTE
Says she's had more than one clot in her legs, first time thought to be secondary to OCPs. She thinks the others were due to immobility. She has been off of blood thinners for ten years with no recurrences.

## 2018-08-09 NOTE — ASSESSMENT & PLAN NOTE
Has been having issues with urinary urgency/frequency on and off since she was diagnosed with diabetes in 2013/2014. Is prone to yeast infections and UTIs.

## 2018-08-09 NOTE — ASSESSMENT & PLAN NOTE
Has been having a lot of variability with her blood sugars. Taking glargine 35u qHS, SSI with meals starting at 15u, metformin 1 g bid, januvia 100 mg daily. Hasn't seen a doctor in a while. This morning her fasting blood sugar was 298 and up to 400 on other days. Some days her fasting blood sugars are 160. Her last eye exam was over a year ago. Has diabetic neuropathy and takes gabapentin 300 mg tid. Taking atorvastatin and a small dose of lisinopril for heart and kidney protection.

## 2018-08-09 NOTE — ASSESSMENT & PLAN NOTE
She was prescribed risperdal 1 mg daily originally prescribed by a doctor at [facility] but isn't sure if that provider was a psychiatrist. Has been taking risperdal for a couple months.

## 2018-08-11 ENCOUNTER — HOSPITAL ENCOUNTER (OUTPATIENT)
Dept: LAB | Facility: MEDICAL CENTER | Age: 38
End: 2018-08-11
Attending: INTERNAL MEDICINE
Payer: COMMERCIAL

## 2018-08-11 DIAGNOSIS — E03.4 HYPOTHYROIDISM DUE TO ACQUIRED ATROPHY OF THYROID: ICD-10-CM

## 2018-08-11 LAB
CHOLEST SERPL-MCNC: 132 MG/DL (ref 100–199)
CREAT UR-MCNC: 137.5 MG/DL
EST. AVERAGE GLUCOSE BLD GHB EST-MCNC: 266 MG/DL
HBA1C MFR BLD: 10.9 % (ref 0–5.6)
HDLC SERPL-MCNC: 57 MG/DL
LDLC SERPL CALC-MCNC: 59 MG/DL
MICROALBUMIN UR-MCNC: 4.1 MG/DL
MICROALBUMIN/CREAT UR: 30 MG/G (ref 0–30)
TRIGL SERPL-MCNC: 82 MG/DL (ref 0–149)
TSH SERPL DL<=0.005 MIU/L-ACNC: 2.22 UIU/ML (ref 0.38–5.33)

## 2018-08-11 PROCEDURE — 83036 HEMOGLOBIN GLYCOSYLATED A1C: CPT

## 2018-08-11 PROCEDURE — 82043 UR ALBUMIN QUANTITATIVE: CPT

## 2018-08-11 PROCEDURE — 36415 COLL VENOUS BLD VENIPUNCTURE: CPT

## 2018-08-11 PROCEDURE — 80061 LIPID PANEL: CPT

## 2018-08-11 PROCEDURE — 82570 ASSAY OF URINE CREATININE: CPT

## 2018-08-11 PROCEDURE — 84443 ASSAY THYROID STIM HORMONE: CPT

## 2018-08-17 LAB — RETINAL SCREEN: NEGATIVE

## 2018-08-21 ENCOUNTER — TELEPHONE (OUTPATIENT)
Dept: MEDICAL GROUP | Facility: PHYSICIAN GROUP | Age: 38
End: 2018-08-21

## 2018-08-21 ENCOUNTER — OFFICE VISIT (OUTPATIENT)
Dept: HEALTH INFORMATION MANAGEMENT | Facility: MEDICAL CENTER | Age: 38
End: 2018-08-21
Payer: COMMERCIAL

## 2018-08-21 VITALS
HEIGHT: 62 IN | BODY MASS INDEX: 46.8 KG/M2 | SYSTOLIC BLOOD PRESSURE: 124 MMHG | HEART RATE: 104 BPM | OXYGEN SATURATION: 96 % | WEIGHT: 254.3 LBS | DIASTOLIC BLOOD PRESSURE: 74 MMHG

## 2018-08-21 DIAGNOSIS — T74.31XA ABUSIVE EMOTIONAL RELATIONSHIP WITH HUSBAND, INITIAL ENCOUNTER: ICD-10-CM

## 2018-08-21 DIAGNOSIS — D50.9 MICROCYTIC ANEMIA: ICD-10-CM

## 2018-08-21 DIAGNOSIS — G47.33 OBSTRUCTIVE SLEEP APNEA: Chronic | ICD-10-CM

## 2018-08-21 DIAGNOSIS — E66.01 MORBID OBESITY (HCC): ICD-10-CM

## 2018-08-21 DIAGNOSIS — F31.61 BIPOLAR DISORDER, CURRENT EPISODE MIXED, MILD (HCC): ICD-10-CM

## 2018-08-21 DIAGNOSIS — E55.9 VITAMIN D DEFICIENCY: ICD-10-CM

## 2018-08-21 DIAGNOSIS — R63.2 BINGE EATING: ICD-10-CM

## 2018-08-21 DIAGNOSIS — I10 ESSENTIAL HYPERTENSION: ICD-10-CM
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
## 2018-08-21 PROCEDURE — 97802 MEDICAL NUTRITION INDIV IN: CPT | Performed by: DIETITIAN, REGISTERED

## 2018-08-21 PROCEDURE — 93000 ELECTROCARDIOGRAM COMPLETE: CPT | Performed by: INTERNAL MEDICINE

## 2018-08-21 PROCEDURE — 99205 OFFICE O/P NEW HI 60 MIN: CPT | Mod: 25 | Performed by: INTERNAL MEDICINE

## 2018-08-21 NOTE — PROGRESS NOTES
"8/21/2018   Referring Provider: Gary Caal M.D.  Liana Mikayla Obrien 38 y.o.        Time in/out: 3:51-4:24    Anthropometrics/Objective  Vitals:    08/21/18 1507   BP: 124/74   Weight: 115.3 kg (254 lb 4.8 oz)   Height: 1.581 m (5' 2.25\")     BMI: Body mass index is 46.14 kg/m².  Stated Goal Weight: 150 lb  See comprehensive patient history form for further information     Subjective:  Pt is here today for the initial screening visit for the medical weight management program.      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    See Medical Questionnaire for more detailed diet history     Nutrition Diagnosis (PES Statement)  · Obesity related to excessive energy intake and inadequate energy expenditure as evidenced by BMI >30     Client history:  Condition(s) associated with a diagnosis or treatment (specify) uncontrolled T2DM, HTN, SILVIA w/ CPAP, bipolar    Biochemical data, medical test and procedures  Lab Results   Component Value Date/Time    HBA1C 10.9 (H) 08/11/2018 08:22 AM     Lab Results   Component Value Date/Time    POCGLUCOSE 218 (H) 11/19/2017 09:43 AM     Lab Results   Component Value Date/Time    CHOLSTRLTOT 132 08/11/2018 08:22 AM    LDL 59 08/11/2018 08:22 AM    HDL 57 08/11/2018 08:22 AM    TRIGLYCERIDE 82 08/11/2018 08:22 AM         Nutrition Intervention  Nutrition Prescription  Recommended Daily Kcals: 1500 Kcal " based on REE of 1783   Recommended Daily Protein: 100g or more per Dr. Carrasco  Recommended CHO Intake: 30g per meal and 0-15g per snack w/ insulin adjustments to prevent hypoglycemia    Meal Plan Recommendation   High protein, calorie-controlled, consistent CHO diet with 1 meal replacements per day     Comprehensive Nutrition Education Instruction or training leading to in-depth nutrition related knowledge about:   Benefits to following meal plan, Combine carb, protein and fat at each meal, Meal timing and spacing, Portion control, Sweets and alcohol in moderation, and treatment of hypoglycemia.     Handouts provided regarding topics discussed - MyPlate, ADA Hypoglycemia, Meal Journals, snack list (w/ fruit)    Monitoring & Evaluation Plan    Behavioral-Environmental:  Behavior: Keep a food and BG journal and bring to next appointment   Physical activity: not discussed at this time    Food / Nutrient Intake:  Food intake: Follow meal plan as discussed. Avoid concentrated sweets and processed carbs. Use the plate method for portion control and macronutrient balance. Limit carbs/starch to up to 1/2 cup (or fist-size if no measuring cups) per meal. Snacks should be 1oz protein + ns veggies.     Fluid intake: Consume at least 64 oz water per day. Avoid all sugar-sweetened beverages - diet is better, water preferred. Limit coffee to 1 cup a day (ideally no cream or sugar). Avoid alcohol.      Physical Signs / Symptoms:  BG: fasting <130, PP < 180 HbA1c profiles < 7.0% and Weight loss of 1-2 lbs per week towards goal weight      Assessment Notes:   Even small changes will make a big impact on Liana's health. Today I focused on helping her understand CHO and protein and NSV foods and their effect or non-effect on blood sugar. I also explained recommended grams of CHO to put her food and beverage choices in perspective. We practiced reading the label of her Radio NEXT bar.  She will be using Robard shake for  breakfast, then an AM and PM snack of protein, NSV / 1 fruit serving (max CHO 15g) and then a bedtime snack only if needed due to BG purposes. She also purchased some Robard snack bars as an alternative to the Raptr. I explained that while her BG is coming down, she may feel like she is hypoglycemic even when she is in the normal range and to avoid treating these like true low blood sugars and that her body will adapt to lower sugars. I encouraged her to get some measuring cups and spoons for home but also taught her Handy Portions. If she is caught off-guard for a meal, rather than going to fast food or skipping, recommended a second Robard shake. Explained strict avoidance of sugar-sweetened beverages; she had heard diet was worse for you and had been avoiding it. Explained it is a better choice for her by far than the regular ones, but water would be best. She will also change out her brown sugar with her AM coffee to Splenda.    Follow up 2 weeks with JACKI and 1 month with MD

## 2018-08-21 NOTE — TELEPHONE ENCOUNTER
----- Message from Dang Reina sent at 8/20/2018  3:50 PM PDT -----  Regarding: Out of insulin  Michele Gomez,  Pt is out of Insulin and haven't been able to get it. Insurance only covers the Humalog not the other. Pt would like the pens not the bottle. [phone].    Thanks,  Dang :)
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
## 2018-08-21 NOTE — TELEPHONE ENCOUNTER
Spoke with pharmacy and due to patients health insurance they are requesting a change from insulin glulisine (APIDRA) to Humalog quik pen injector. This is covered per health insurance. Same sig;Inject 15 Units as instructed 3 times a day before meals.  Please Advise

## 2018-08-27 ENCOUNTER — HOSPITAL ENCOUNTER (OUTPATIENT)
Dept: LAB | Facility: MEDICAL CENTER | Age: 38
End: 2018-08-27
Attending: INTERNAL MEDICINE
Payer: COMMERCIAL

## 2018-08-27 ENCOUNTER — HOSPITAL ENCOUNTER (OUTPATIENT)
Dept: LAB | Facility: MEDICAL CENTER | Age: 38
End: 2018-08-27
Attending: PHYSICIAN ASSISTANT
Payer: COMMERCIAL

## 2018-08-27 ENCOUNTER — OFFICE VISIT (OUTPATIENT)
Dept: ENDOCRINOLOGY | Facility: MEDICAL CENTER | Age: 38
End: 2018-08-27
Payer: COMMERCIAL

## 2018-08-27 VITALS
SYSTOLIC BLOOD PRESSURE: 112 MMHG | OXYGEN SATURATION: 99 % | WEIGHT: 253.6 LBS | HEART RATE: 94 BPM | BODY MASS INDEX: 46.67 KG/M2 | HEIGHT: 62 IN | DIASTOLIC BLOOD PRESSURE: 54 MMHG

## 2018-08-27 DIAGNOSIS — I10 ESSENTIAL HYPERTENSION: ICD-10-CM

## 2018-08-27 DIAGNOSIS — D50.9 MICROCYTIC ANEMIA: ICD-10-CM

## 2018-08-27 LAB
25(OH)D3 SERPL-MCNC: 19 NG/ML (ref 30–100)
IRON SATN MFR SERPL: 9 % (ref 15–55)
IRON SERPL-MCNC: 36 UG/DL (ref 40–170)
TIBC SERPL-MCNC: 388 UG/DL (ref 250–450)

## 2018-08-27 PROCEDURE — 84681 ASSAY OF C-PEPTIDE: CPT

## 2018-08-27 PROCEDURE — 83540 ASSAY OF IRON: CPT

## 2018-08-27 PROCEDURE — 84443 ASSAY THYROID STIM HORMONE: CPT

## 2018-08-27 PROCEDURE — 86341 ISLET CELL ANTIBODY: CPT | Mod: 91

## 2018-08-27 PROCEDURE — 86337 INSULIN ANTIBODIES: CPT

## 2018-08-27 PROCEDURE — 84480 ASSAY TRIIODOTHYRONINE (T3): CPT

## 2018-08-27 PROCEDURE — 82306 VITAMIN D 25 HYDROXY: CPT

## 2018-08-27 PROCEDURE — 99204 OFFICE O/P NEW MOD 45 MIN: CPT | Performed by: PHYSICIAN ASSISTANT

## 2018-08-27 PROCEDURE — 36415 COLL VENOUS BLD VENIPUNCTURE: CPT

## 2018-08-27 PROCEDURE — 83550 IRON BINDING TEST: CPT

## 2018-08-27 RX ORDER — PIOGLITAZONEHYDROCHLORIDE 30 MG/1
30 TABLET ORAL DAILY
Qty: 30 TAB | Refills: 11 | Status: SHIPPED | OUTPATIENT
Start: 2018-08-27 | End: 2018-08-27 | Stop reason: SDUPTHER

## 2018-08-27 RX ORDER — KETOCONAZOLE 20 MG/G
1 CREAM TOPICAL DAILY
Qty: 2 TUBE | Refills: 2 | Status: SHIPPED | OUTPATIENT
Start: 2018-08-27 | End: 2018-10-29

## 2018-08-27 RX ORDER — PIOGLITAZONEHYDROCHLORIDE 30 MG/1
30 TABLET ORAL DAILY
Qty: 30 TAB | Refills: 11 | Status: SHIPPED | OUTPATIENT
Start: 2018-08-27 | End: 2018-10-18 | Stop reason: SDUPTHER

## 2018-08-27 NOTE — PROGRESS NOTES
New Patient Consult Note  Referred by: Gary Caal M.D.    Reason for consult: Diabetes Management Type 2    HPI:  Liana Obrien is a 38 y.o. old patient who is seeing us today for diabetes care.  This is a pleasant patient with diabetes and I appreciate the opportunity to participate in the care of this patient.  This is a new patient with me today.    Labs on 8/11/18 HbA1c is 10.9  Labs of 6/2015 HbA1c was 10.1      BG Diary:8/27/2018  In the AM: did not bring    Has been Diabetic since 2014  Has a Glucagon pen at home: no    1. Uncontrolled type 2 diabetes mellitus without complication, without long-term current use of insulin ([facility])  This is a new patient with me today  She is on:  1.  Januvia 100mg  2.  Metformin 1000mg BID  3.  Apidra (not on for 2 weeks)  BG of 130 take 15 units and then increase from there  4.  Basaglar 40 at night    STOP  1.  Januvia 100mg  2.  Metformin 1000mg BID  4.  Basaglar 40 at night  3.  Apidra    Start:  1.  Tresiba 50 at night before bed  2.  Ozempic 0.25 once a week (Monday)  3.  Xigduo 5/1000 one in the Am one in the PM  4.  Actos 30mg one a day      2. Essential hypertension  This is stable and no changes needed      ROS:   Constitutional: No change in weight , No fatigue, No night sweats.  HEENT: No Headache.  Eyes:  No blurred vision, No visual changes.  Cardiac: No chest pain, No palpitations.  Resp: No shortness of breath, No cough,   Gastro: No nausea or vomiting, No diarrhea.  Neuro: Denies numbness or tinging in bilateral feet or hands, and no loss of sensation.  Endo: No heat or cold intolerance.  : No polyuria, No polydipsia, No chronic UTI's.  Lower extremities: No lower leg edema bilateral.  All other systems were reviewed and were negative.    Past Medical History:  Patient Active Problem List    Diagnosis Date Noted   • Bipolar disorder (HCC) 08/09/2018   • Urinary frequency 08/09/2018   • Personal history of venous thrombosis and embolism    • Essential  hypertension 10/28/2017   • Elevated LFTs 01/22/2016   • Hypothyroidism due to acquired atrophy of thyroid 01/22/2016   • Obstructive sleep apnea 07/31/2015   • Morbid obesity (CMS-HCC) 03/15/2015   • Seasonal allergies 03/12/2015   • Vitamin D deficiency 03/12/2015   • Uncontrolled type 2 diabetes mellitus without complication, without long-term current use of insulin ([facility]) 03/12/2015   • Metrorrhagia 03/12/2015       Past Surgical History:  No past surgical history on file.    Allergies:  Reglan [metoclopramide]; Sulfa drugs; and Zofran    Social History:  Social History     Social History   • Marital status: Single     Spouse name: N/A   • Number of children: N/A   • Years of education: N/A     Occupational History   • Not on file.     Social History Main Topics   • Smoking status: Former Smoker     Packs/day: 0.50     Years: 0.30     Types: Cigarettes     Quit date: 2018   • Smokeless tobacco: Never Used      Comment: for 3 months at a time on and off    • Alcohol use 0.0 oz/week      Comment: occ   • Drug use: No   • Sexual activity: Yes     Partners: Male     Other Topics Concern   • Not on file     Social History Narrative    Works at [employer]        Family History:  Family History   Problem Relation Age of Onset   • Hypertension Mother    • Sleep Apnea Mother    • Hyperlipidemia Father    • Cancer Maternal Uncle    • Sleep Apnea Brother    • Diabetes Neg Hx    • Heart Disease Neg Hx    • Stroke Neg Hx        Medications:    Current Outpatient Prescriptions:   •  pioglitazone (ACTOS) 30 MG Tab, Take 1 Tab by mouth every day., Disp: 30 Tab, Rfl: 11  •  ketoconazole (NIZORAL) 2 % Cream, Apply 1 Application to affected area(s) every day., Disp: 2 Tube, Rfl: 2  •  ranitidine (ZANTAC) 150 MG capsule, , Disp: , Rfl:   •  risperiDONE (RISPERDAL) 1 MG Tab, , Disp: , Rfl:   •  Probiotic Product (PROBIOTIC ADVANCED PO), Take  by mouth., Disp: , Rfl:   •  CRANBERRY EXTRACT PO, Take  by mouth., Disp: , Rfl:   •   "loratadine (CLARITIN) 10 MG Tab, Take 10 mg by mouth every day., Disp: , Rfl:   •  Insulin Pen Needle 31G X 5 MM Misc, To be used with insulin pen, Disp: 180 Each, Rfl: 3  •  hydrocortisone 1 % Cream, Applied to affected area twice daily for 1-2 weeks until resolved., Disp: 1 Tube, Rfl: 0  •  gabapentin (NEURONTIN) 300 MG Cap, Take 300 mg by mouth 3 times a day., Disp: , Rfl:   •  lisinopril (PRINIVIL) 2.5 MG Tab, Take 2.5 mg by mouth every day., Disp: , Rfl:   •  atorvastatin (LIPITOR) 20 MG Tab, Take 1 Tab by mouth every bedtime., Disp: 30 Tab, Rfl: 0  •  levothyroxine (SYNTHROID) 75 MCG Tab, Take 75 mcg by mouth Every morning on an empty stomach., Disp: , Rfl:       Physical Examination:   Vital signs: /54   Pulse 94   Ht 1.581 m (5' 2.24\")   Wt 115 kg (253 lb 9.6 oz)   SpO2 99%   BMI 46.02 kg/m²   General: No distress, cooperative, well dressed and well nourished.   Eyes: No scleral icterus or discharge, No hyposphagma  ENMT: Normal on external inspection of nose, lips, No nasal drainage   Neck: No abnormal masses on inspection  Resp: Normal effort, Bilateral clear to auscultation, No wheezing, No rales  CVS: Regular rate and rhythm, S1 S2 normal, No murmur. No gallop  Extremities: No edema bilateral extremities  Neuro: Alert and oriented  Skin: No rash, No Ulcers  Psych: Normal mood and affect  Foot exam: normal sensation to monofilament testing, normal pulses, no ulcers.  Normal Vibration quantitative sensation test.    Assessment and Plan:    1. Uncontrolled type 2 diabetes mellitus without complication, without long-term current use of insulin (HCC)    STOP  1.  Januvia 100mg  2.  Metformin 1000mg BID  4.  Basaglar 40 at night  3.  Apidra    Start:  1.  Tresiba 50 at night before bed  2.  Ozempic 0.25 once a week (Monday)  3.  Xigduo 5/1000 one in the Am one in the PM  4.  Actos 30mg one a day    2. Essential hypertension  This is stable and no changes needed    Return in about 2 weeks (around " 9/10/2018).    Blood glucose log: Check BG in the morning when wake up, before lunch or dinner and before bed.  So three times a day.  Always bring BG diary to the next office visit.     This patient during there office visit was started on new medication  Tresiba Ozempic xigduo.  Side effects of new medications were discussed with the patient today in the office. The patient was supplied paperwork on this new medication.    Thank you kindly for allowing me to participate in the diabetes care plan for this patient.    Froylan Andrew PA-C, BC-ADM  Board Certified - Advanced Diabetes Management  08/27/18    CC:   Gary Caal M.D.

## 2018-08-28 LAB
T3 SERPL-MCNC: 119.7 NG/DL (ref 60–181)
TSH SERPL DL<=0.005 MIU/L-ACNC: 2.14 UIU/ML (ref 0.38–5.33)

## 2018-08-29 LAB
C PEPTIDE SERPL-MCNC: 3.5 NG/ML (ref 0.8–3.5)
GAD65 AB SER IA-ACNC: <5 IU/ML (ref 0–5)
PANC ISLET CELL AB TITR SER: NORMAL {TITER}

## 2018-08-31 LAB — INSULIN HUMAN AB SER-ACNC: <0.4 U/ML (ref 0–0.4)

## 2018-09-04 ENCOUNTER — NON-PROVIDER VISIT (OUTPATIENT)
Dept: HEALTH INFORMATION MANAGEMENT | Facility: MEDICAL CENTER | Age: 38
End: 2018-09-04
Payer: COMMERCIAL

## 2018-09-04 VITALS — WEIGHT: 248.8 LBS | HEIGHT: 63 IN | BODY MASS INDEX: 44.08 KG/M2

## 2018-09-04 DIAGNOSIS — E66.01 MORBID OBESITY (HCC): ICD-10-CM

## 2018-09-04 PROCEDURE — 97803 MED NUTRITION INDIV SUBSEQ: CPT | Performed by: DIETITIAN, REGISTERED

## 2018-09-04 NOTE — PROGRESS NOTES
"Nutrition Reassess    9/4/2018    Referring Provider:  Gary Caal    Time: in/out 4:00-4:33pm       Subjective:  -Using Antonio ESTRADA for breakfast most days   -Has cut back on her portions  -Was started on GLP-1 and SGLT2 Inhibitor in addition to long acting insulin, tresiba by her Endocrinologist.   -Reports her blood sugars are stablizing. Checks FSBS 1-2 times a day.   -Reports FSBS in AM/ fasting of 137-200 and HS is ~250s   -GI sx/ diarrhea resolved after 2 days of starting these new medications. Reports frequent urination. Is drinking a lot of water as advised.   -She is eating fast food still on days she works late - [employer] or [employer]       Anthropometrics/Objective  Vitals:    09/04/18 1656   Weight: 112.9 kg (248 lb 12.8 oz)   Height: 1.6 m (5' 3\")     Body mass index is 44.07 kg/m².      Previous weight/date: 254.5lb   Weight Change : -5.7lb         Medication changes: Tresiba 50 units HS, Ozempic 0.25 once a week, Xigduo 5/1000 BID, Actos 30mg once a day    Recommended Diet:   High protein, calorie-controlled, consistent CHO diet with 1 meal replacements per day     ReAssesment/Notes:  Reviewed food journal with pt today. She had recorded a few days which still indicate she is eating fast food burgers with fries and sweetened beverages. However she has done well with reducing her portions. The GLP1 has helped with her appetite . She still reports cravings for sugars/sweets.   Discussed tips for making healthier choices while dinning out and some simple meal ideas for quick dinners.   Reviewed label reading today as well.       Goals:   1. Avoid sweetened beverages,   2. Avoid deep fried foods, and watch carb porions when dinning out at fast food and if at a sit down restaurant to take 1/2 entree home.   3. Limit carbs to 15g per snack and 30g per meal   4. Continue 1 protein meal replacement for breakfast daily (give list of over the counter alternatives as pt cannot afford the Antonio carranzakes long " term).   5. Continue to replace sweets for healthier snacks between meals.     Follow-up: 4 weeks with MD and JACKI (pt just saw RD today)

## 2018-09-06 DIAGNOSIS — E61.1 IRON DEFICIENCY: ICD-10-CM

## 2018-09-06 RX ORDER — FERROUS SULFATE 325(65) MG
325 TABLET ORAL DAILY
Qty: 30 TAB | COMMUNITY
Start: 2018-09-06 | End: 2018-10-29 | Stop reason: CLARIF

## 2018-09-10 ENCOUNTER — OFFICE VISIT (OUTPATIENT)
Dept: ENDOCRINOLOGY | Facility: MEDICAL CENTER | Age: 38
End: 2018-09-10
Payer: COMMERCIAL

## 2018-09-10 VITALS
OXYGEN SATURATION: 97 % | WEIGHT: 244.2 LBS | HEART RATE: 94 BPM | BODY MASS INDEX: 43.27 KG/M2 | DIASTOLIC BLOOD PRESSURE: 86 MMHG | SYSTOLIC BLOOD PRESSURE: 124 MMHG | HEIGHT: 63 IN

## 2018-09-10 DIAGNOSIS — E66.01 MORBID OBESITY (HCC): ICD-10-CM

## 2018-09-10 PROCEDURE — 99214 OFFICE O/P EST MOD 30 MIN: CPT | Performed by: PHYSICIAN ASSISTANT

## 2018-09-10 RX ORDER — INSULIN LISPRO 100 [IU]/ML
INJECTION, SOLUTION INTRAVENOUS; SUBCUTANEOUS
COMMUNITY
Start: 2018-08-22 | End: 2018-09-10

## 2018-09-10 RX ORDER — CEPHALEXIN 500 MG/1
500 CAPSULE ORAL 3 TIMES DAILY
Refills: 0 | COMMUNITY
Start: 2018-08-09 | End: 2018-10-29 | Stop reason: CLARIF

## 2018-09-10 RX ORDER — INSULIN GLARGINE 100 [IU]/ML
INJECTION, SOLUTION SUBCUTANEOUS
COMMUNITY
Start: 2018-09-04 | End: 2018-09-10

## 2018-09-10 RX ORDER — DAPAGLIFLOZIN AND METFORMIN HYDROCHLORIDE 5; 1000 MG/1; MG/1
TABLET, FILM COATED, EXTENDED RELEASE ORAL
COMMUNITY
End: 2018-10-18

## 2018-09-10 RX ORDER — SITAGLIPTIN 100 MG/1
100 TABLET, FILM COATED ORAL
Refills: 3 | COMMUNITY
Start: 2018-08-07 | End: 2018-09-10

## 2018-09-10 NOTE — LETTER
September 10, 2018        Liana Obrien  [address] [place] [address]        To Whom it may concern:    Please excuse for medical reason until 9/13/18 and Return on 9/14/18.    If you have any questions or concerns, please don't hesitate to call.        Sincerely,        Froylan Andrew P.A.-C.    Electronically Signed

## 2018-09-10 NOTE — PATIENT INSTRUCTIONS
Now on:  1.  Tresiba 50 at night before bed  2.  Ozempic 0.25 once a week (Monday) (INCREASE to 0.5)  3.  Xigduo 5/1000 one in the Am one in the PM  4.  Actos 30mg one a day
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
## 2018-09-13 ENCOUNTER — OFFICE VISIT (OUTPATIENT)
Dept: MEDICAL GROUP | Facility: PHYSICIAN GROUP | Age: 38
End: 2018-09-13
Payer: COMMERCIAL

## 2018-09-13 VITALS
BODY MASS INDEX: 43.77 KG/M2 | DIASTOLIC BLOOD PRESSURE: 68 MMHG | OXYGEN SATURATION: 97 % | SYSTOLIC BLOOD PRESSURE: 120 MMHG | HEIGHT: 63 IN | TEMPERATURE: 97.7 F | RESPIRATION RATE: 16 BRPM | WEIGHT: 247 LBS | HEART RATE: 84 BPM

## 2018-09-13 DIAGNOSIS — T74.91XA DOMESTIC VIOLENCE OF ADULT, INITIAL ENCOUNTER: ICD-10-CM

## 2018-09-13 PROCEDURE — 99213 OFFICE O/P EST LOW 20 MIN: CPT | Performed by: INTERNAL MEDICINE
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
## 2018-09-24 ENCOUNTER — APPOINTMENT (OUTPATIENT)
Dept: ENDOCRINOLOGY | Facility: MEDICAL CENTER | Age: 38
End: 2018-09-24
Payer: COMMERCIAL

## 2018-09-27 ENCOUNTER — OFFICE VISIT (OUTPATIENT)
Dept: MEDICAL GROUP | Facility: PHYSICIAN GROUP | Age: 38
End: 2018-09-27
Payer: COMMERCIAL

## 2018-09-27 VITALS
HEIGHT: 63 IN | OXYGEN SATURATION: 98 % | TEMPERATURE: 97.3 F | SYSTOLIC BLOOD PRESSURE: 130 MMHG | WEIGHT: 242 LBS | RESPIRATION RATE: 16 BRPM | HEART RATE: 79 BPM | BODY MASS INDEX: 42.88 KG/M2 | DIASTOLIC BLOOD PRESSURE: 86 MMHG

## 2018-09-27 DIAGNOSIS — Z23 NEED FOR VACCINATION: ICD-10-CM

## 2018-09-27 DIAGNOSIS — R07.89 OTHER CHEST PAIN: ICD-10-CM

## 2018-09-27 DIAGNOSIS — N92.6 MISSED PERIOD: ICD-10-CM

## 2018-09-27 LAB
INT CON NEG: NEGATIVE
INT CON POS: POSITIVE
POC URINE PREGNANCY TEST: NEGATIVE

## 2018-09-27 PROCEDURE — 99214 OFFICE O/P EST MOD 30 MIN: CPT | Performed by: INTERNAL MEDICINE

## 2018-09-27 PROCEDURE — 81025 URINE PREGNANCY TEST: CPT | Performed by: INTERNAL MEDICINE

## 2018-09-27 PROCEDURE — 93000 ELECTROCARDIOGRAM COMPLETE: CPT | Performed by: INTERNAL MEDICINE

## 2018-09-27 NOTE — LETTER
September 27, 2018        Re: Liana Mikayla Obrien      To Whom It May Concern,     Liana is released back to work September 28, 2018.           Please call with questions,         Gary Caal MD

## 2018-09-27 NOTE — ASSESSMENT & PLAN NOTE
Her fasting blood sugars are now around 150. Following with endocrinology with next follow up 10/18/2018. On tresiba 50u qHS, Xigduo, Ozempic, and actos.
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
## 2018-10-18 ENCOUNTER — OFFICE VISIT (OUTPATIENT)
Dept: ENDOCRINOLOGY | Facility: MEDICAL CENTER | Age: 38
End: 2018-10-18
Payer: COMMERCIAL

## 2018-10-18 VITALS
OXYGEN SATURATION: 99 % | HEART RATE: 102 BPM | HEIGHT: 63 IN | SYSTOLIC BLOOD PRESSURE: 106 MMHG | WEIGHT: 239.2 LBS | DIASTOLIC BLOOD PRESSURE: 68 MMHG | BODY MASS INDEX: 42.38 KG/M2

## 2018-10-18 DIAGNOSIS — I10 ESSENTIAL HYPERTENSION: ICD-10-CM

## 2018-10-18 PROCEDURE — 99214 OFFICE O/P EST MOD 30 MIN: CPT | Performed by: PHYSICIAN ASSISTANT

## 2018-10-18 RX ORDER — PIOGLITAZONEHYDROCHLORIDE 30 MG/1
30 TABLET ORAL DAILY
Qty: 90 TAB | Refills: 4 | Status: SHIPPED | OUTPATIENT
Start: 2018-10-18 | End: 2019-05-03

## 2018-10-18 RX ORDER — DAPAGLIFLOZIN AND METFORMIN HYDROCHLORIDE 5; 1000 MG/1; MG/1
1 TABLET, FILM COATED, EXTENDED RELEASE ORAL 2 TIMES DAILY
Qty: 180 TAB | Refills: 4 | COMMUNITY
Start: 2018-10-18 | End: 2019-05-03
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
## 2018-10-18 NOTE — PATIENT INSTRUCTIONS
Start:  1.  Tresiba 50 at night before bed (DECREASE to 40)  2.  Ozempic 0.5 once a week (Monday) (INCREASE to 1.0)  3.  Xigduo 5/1000 one in the Am one in the PM  4.  Actos 30mg one a day

## 2018-10-29 ENCOUNTER — APPOINTMENT (OUTPATIENT)
Dept: RADIOLOGY | Facility: MEDICAL CENTER | Age: 38
End: 2018-10-29
Attending: EMERGENCY MEDICINE
Payer: COMMERCIAL

## 2018-10-29 ENCOUNTER — HOSPITAL ENCOUNTER (EMERGENCY)
Facility: MEDICAL CENTER | Age: 38
End: 2018-10-29
Attending: EMERGENCY MEDICINE
Payer: COMMERCIAL

## 2018-10-29 VITALS
OXYGEN SATURATION: 96 % | BODY MASS INDEX: 44.42 KG/M2 | HEART RATE: 73 BPM | DIASTOLIC BLOOD PRESSURE: 96 MMHG | HEIGHT: 62 IN | WEIGHT: 241.4 LBS | RESPIRATION RATE: 14 BRPM | SYSTOLIC BLOOD PRESSURE: 154 MMHG | TEMPERATURE: 98.2 F

## 2018-10-29 DIAGNOSIS — R55 NEAR SYNCOPE: ICD-10-CM

## 2018-10-29 LAB
ALBUMIN SERPL BCP-MCNC: 4 G/DL (ref 3.2–4.9)
ALBUMIN/GLOB SERPL: 1.1 G/DL
ALP SERPL-CCNC: 68 U/L (ref 30–99)
ALT SERPL-CCNC: 20 U/L (ref 2–50)
ANION GAP SERPL CALC-SCNC: 4 MMOL/L (ref 0–11.9)
AST SERPL-CCNC: 15 U/L (ref 12–45)
BASOPHILS # BLD AUTO: 0.7 % (ref 0–1.8)
BASOPHILS # BLD: 0.07 K/UL (ref 0–0.12)
BILIRUB SERPL-MCNC: 0.3 MG/DL (ref 0.1–1.5)
BUN SERPL-MCNC: 9 MG/DL (ref 8–22)
CALCIUM SERPL-MCNC: 8.7 MG/DL (ref 8.4–10.2)
CHLORIDE SERPL-SCNC: 105 MMOL/L (ref 96–112)
CO2 SERPL-SCNC: 26 MMOL/L (ref 20–33)
CREAT SERPL-MCNC: 0.43 MG/DL (ref 0.5–1.4)
D DIMER PPP IA.FEU-MCNC: <0.4 UG/ML (FEU) (ref 0–0.5)
EKG IMPRESSION: NORMAL
EOSINOPHIL # BLD AUTO: 0.25 K/UL (ref 0–0.51)
EOSINOPHIL NFR BLD: 2.5 % (ref 0–6.9)
ERYTHROCYTE [DISTWIDTH] IN BLOOD BY AUTOMATED COUNT: 40.6 FL (ref 35.9–50)
GLOBULIN SER CALC-MCNC: 3.6 G/DL (ref 1.9–3.5)
GLUCOSE SERPL-MCNC: 109 MG/DL (ref 65–99)
HCG SERPL QL: NEGATIVE
HCT VFR BLD AUTO: 41.5 % (ref 37–47)
HGB BLD-MCNC: 14 G/DL (ref 12–16)
IMM GRANULOCYTES # BLD AUTO: 0.03 K/UL (ref 0–0.11)
IMM GRANULOCYTES NFR BLD AUTO: 0.3 % (ref 0–0.9)
LIPASE SERPL-CCNC: 25 U/L (ref 7–58)
LYMPHOCYTES # BLD AUTO: 2.84 K/UL (ref 1–4.8)
LYMPHOCYTES NFR BLD: 28.1 % (ref 22–41)
MCH RBC QN AUTO: 27.3 PG (ref 27–33)
MCHC RBC AUTO-ENTMCNC: 33.7 G/DL (ref 33.6–35)
MCV RBC AUTO: 81.1 FL (ref 81.4–97.8)
MONOCYTES # BLD AUTO: 0.76 K/UL (ref 0–0.85)
MONOCYTES NFR BLD AUTO: 7.5 % (ref 0–13.4)
NEUTROPHILS # BLD AUTO: 6.17 K/UL (ref 2–7.15)
NEUTROPHILS NFR BLD: 60.9 % (ref 44–72)
NRBC # BLD AUTO: 0 K/UL
NRBC BLD-RTO: 0 /100 WBC
PLATELET # BLD AUTO: 286 K/UL (ref 164–446)
PMV BLD AUTO: 9.9 FL (ref 9–12.9)
POTASSIUM SERPL-SCNC: 3.2 MMOL/L (ref 3.6–5.5)
PROT SERPL-MCNC: 7.6 G/DL (ref 6–8.2)
RBC # BLD AUTO: 5.12 M/UL (ref 4.2–5.4)
SODIUM SERPL-SCNC: 135 MMOL/L (ref 135–145)
TROPONIN I SERPL-MCNC: <0.02 NG/ML (ref 0–0.04)
TROPONIN I SERPL-MCNC: <0.02 NG/ML (ref 0–0.04)
TSH SERPL DL<=0.005 MIU/L-ACNC: 2.19 UIU/ML (ref 0.38–5.33)
WBC # BLD AUTO: 10.1 K/UL (ref 4.8–10.8)

## 2018-10-29 PROCEDURE — 99285 EMERGENCY DEPT VISIT HI MDM: CPT

## 2018-10-29 PROCEDURE — 80053 COMPREHEN METABOLIC PANEL: CPT

## 2018-10-29 PROCEDURE — 85025 COMPLETE CBC W/AUTO DIFF WBC: CPT

## 2018-10-29 PROCEDURE — 85379 FIBRIN DEGRADATION QUANT: CPT

## 2018-10-29 PROCEDURE — 71045 X-RAY EXAM CHEST 1 VIEW: CPT

## 2018-10-29 PROCEDURE — 84703 CHORIONIC GONADOTROPIN ASSAY: CPT

## 2018-10-29 PROCEDURE — 93005 ELECTROCARDIOGRAM TRACING: CPT | Performed by: EMERGENCY MEDICINE

## 2018-10-29 PROCEDURE — 83690 ASSAY OF LIPASE: CPT

## 2018-10-29 PROCEDURE — 84484 ASSAY OF TROPONIN QUANT: CPT

## 2018-10-29 PROCEDURE — 84443 ASSAY THYROID STIM HORMONE: CPT

## 2018-10-29 PROCEDURE — 36415 COLL VENOUS BLD VENIPUNCTURE: CPT

## 2018-10-29 ASSESSMENT — PAIN SCALES - GENERAL
PAINLEVEL_OUTOF10: 8
PAINLEVEL_OUTOF10: 8

## 2018-10-29 NOTE — ED NOTES
EDT answered call light.  Pt c/o returning sub-sternal/L-sided CP.  Repeat EKG in process, will inform ERP.
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
## 2018-10-29 NOTE — ED NOTES
Tech answered call light, pt c/o numbness to R fingers.  Pt re-positioned, fingers massaged.  Sx resolved.  Aware labs pending, call light in reach.

## 2018-10-30 ENCOUNTER — OFFICE VISIT (OUTPATIENT)
Dept: MEDICAL GROUP | Facility: PHYSICIAN GROUP | Age: 38
End: 2018-10-30
Payer: COMMERCIAL

## 2018-10-30 VITALS
DIASTOLIC BLOOD PRESSURE: 80 MMHG | HEIGHT: 63 IN | RESPIRATION RATE: 16 BRPM | BODY MASS INDEX: 42.35 KG/M2 | HEART RATE: 84 BPM | OXYGEN SATURATION: 99 % | TEMPERATURE: 97.7 F | WEIGHT: 239 LBS | SYSTOLIC BLOOD PRESSURE: 124 MMHG

## 2018-10-30 DIAGNOSIS — R25.1 SHAKINESS: ICD-10-CM

## 2018-10-30 DIAGNOSIS — Z23 NEED FOR VACCINATION: ICD-10-CM

## 2018-10-30 PROCEDURE — 90746 HEPB VACCINE 3 DOSE ADULT IM: CPT | Performed by: INTERNAL MEDICINE

## 2018-10-30 PROCEDURE — 90686 IIV4 VACC NO PRSV 0.5 ML IM: CPT | Performed by: INTERNAL MEDICINE

## 2018-10-30 PROCEDURE — 99213 OFFICE O/P EST LOW 20 MIN: CPT | Mod: 25 | Performed by: INTERNAL MEDICINE

## 2018-10-30 PROCEDURE — 90472 IMMUNIZATION ADMIN EACH ADD: CPT | Performed by: INTERNAL MEDICINE

## 2018-10-30 PROCEDURE — 90471 IMMUNIZATION ADMIN: CPT | Performed by: INTERNAL MEDICINE

## 2018-10-30 RX ORDER — FLURBIPROFEN SODIUM 0.3 MG/ML
SOLUTION/ DROPS OPHTHALMIC
Refills: 3 | COMMUNITY
Start: 2018-08-09 | End: 2018-10-31

## 2018-10-30 NOTE — ED NOTES
Discharge and f/u instructions discussed and understanding verbalized.  Ambulatory out of ED.  Work note given.
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
## 2018-10-30 NOTE — ASSESSMENT & PLAN NOTE
Went to the ER yesterday for palpitations, vision changes, and feeling clammy. Her blood work (including D dimer, troponin, TSH, CMP/CBC) was all unremarkable. EKG with NSR. Prior to this episode yesterday Liana had vomited. Hasn't had recurrence of her symptoms since. By the she had seen the ER doctor she was already feeling better.

## 2018-10-30 NOTE — ASSESSMENT & PLAN NOTE
Following with endocrinology. Says her ozempic was increased to 1 and her tresiba decreased from 50 to 40. Since going up on the ozempic she has been having headaches. Since increasing the ozempic her fasting sugars are around 100 and but her sugars will go to 60s sometimes later in the day. Her appetite has also decreased since her diabetes regimen was adjusted.

## 2018-10-31 ENCOUNTER — HOSPITAL ENCOUNTER (OUTPATIENT)
Facility: MEDICAL CENTER | Age: 38
End: 2018-11-01
Attending: EMERGENCY MEDICINE | Admitting: HOSPITALIST
Payer: COMMERCIAL

## 2018-10-31 DIAGNOSIS — R07.2 PRECORDIAL PAIN: ICD-10-CM

## 2018-10-31 PROBLEM — R07.9 CHEST PAIN: Status: ACTIVE | Noted: 2018-10-31

## 2018-10-31 LAB
ALBUMIN SERPL BCP-MCNC: 3.3 G/DL (ref 3.2–4.9)
ALBUMIN/GLOB SERPL: 0.9 G/DL
ALP SERPL-CCNC: 62 U/L (ref 30–99)
ALT SERPL-CCNC: 23 U/L (ref 2–50)
ANION GAP SERPL CALC-SCNC: 5 MMOL/L (ref 0–11.9)
APTT PPP: 27.7 SEC (ref 24.7–36)
AST SERPL-CCNC: 19 U/L (ref 12–45)
BASOPHILS # BLD AUTO: 0.5 % (ref 0–1.8)
BASOPHILS # BLD: 0.05 K/UL (ref 0–0.12)
BILIRUB SERPL-MCNC: 0.5 MG/DL (ref 0.1–1.5)
BNP SERPL-MCNC: 22 PG/ML (ref 0–100)
BUN SERPL-MCNC: 10 MG/DL (ref 8–22)
CALCIUM SERPL-MCNC: 9 MG/DL (ref 8.4–10.2)
CHLORIDE SERPL-SCNC: 105 MMOL/L (ref 96–112)
CO2 SERPL-SCNC: 24 MMOL/L (ref 20–33)
CREAT SERPL-MCNC: 0.57 MG/DL (ref 0.5–1.4)
EKG IMPRESSION: NORMAL
EOSINOPHIL # BLD AUTO: 0.2 K/UL (ref 0–0.51)
EOSINOPHIL NFR BLD: 2 % (ref 0–6.9)
ERYTHROCYTE [DISTWIDTH] IN BLOOD BY AUTOMATED COUNT: 40.9 FL (ref 35.9–50)
GLOBULIN SER CALC-MCNC: 3.6 G/DL (ref 1.9–3.5)
GLUCOSE BLD-MCNC: 119 MG/DL (ref 65–99)
GLUCOSE BLD-MCNC: 147 MG/DL (ref 65–99)
GLUCOSE SERPL-MCNC: 147 MG/DL (ref 65–99)
HCT VFR BLD AUTO: 41 % (ref 37–47)
HGB BLD-MCNC: 13.5 G/DL (ref 12–16)
IMM GRANULOCYTES # BLD AUTO: 0.03 K/UL (ref 0–0.11)
IMM GRANULOCYTES NFR BLD AUTO: 0.3 % (ref 0–0.9)
INR PPP: 0.99 (ref 0.87–1.13)
LIPASE SERPL-CCNC: 27 U/L (ref 7–58)
LYMPHOCYTES # BLD AUTO: 2.54 K/UL (ref 1–4.8)
LYMPHOCYTES NFR BLD: 25.3 % (ref 22–41)
MCH RBC QN AUTO: 27.1 PG (ref 27–33)
MCHC RBC AUTO-ENTMCNC: 32.9 G/DL (ref 33.6–35)
MCV RBC AUTO: 82.2 FL (ref 81.4–97.8)
MONOCYTES # BLD AUTO: 0.51 K/UL (ref 0–0.85)
MONOCYTES NFR BLD AUTO: 5.1 % (ref 0–13.4)
NEUTROPHILS # BLD AUTO: 6.71 K/UL (ref 2–7.15)
NEUTROPHILS NFR BLD: 66.8 % (ref 44–72)
NRBC # BLD AUTO: 0 K/UL
NRBC BLD-RTO: 0 /100 WBC
PLATELET # BLD AUTO: 264 K/UL (ref 164–446)
PMV BLD AUTO: 9.9 FL (ref 9–12.9)
POTASSIUM SERPL-SCNC: 3.6 MMOL/L (ref 3.6–5.5)
PROT SERPL-MCNC: 6.9 G/DL (ref 6–8.2)
PROTHROMBIN TIME: 13 SEC (ref 12–14.6)
RBC # BLD AUTO: 4.99 M/UL (ref 4.2–5.4)
SODIUM SERPL-SCNC: 134 MMOL/L (ref 135–145)
TROPONIN I SERPL-MCNC: <0.02 NG/ML (ref 0–0.04)
WBC # BLD AUTO: 10 K/UL (ref 4.8–10.8)

## 2018-10-31 PROCEDURE — 700102 HCHG RX REV CODE 250 W/ 637 OVERRIDE(OP)

## 2018-10-31 PROCEDURE — G0378 HOSPITAL OBSERVATION PER HR: HCPCS

## 2018-10-31 PROCEDURE — 82962 GLUCOSE BLOOD TEST: CPT | Mod: 91

## 2018-10-31 PROCEDURE — 83690 ASSAY OF LIPASE: CPT

## 2018-10-31 PROCEDURE — 36415 COLL VENOUS BLD VENIPUNCTURE: CPT

## 2018-10-31 PROCEDURE — 80053 COMPREHEN METABOLIC PANEL: CPT

## 2018-10-31 PROCEDURE — 84484 ASSAY OF TROPONIN QUANT: CPT

## 2018-10-31 PROCEDURE — 700102 HCHG RX REV CODE 250 W/ 637 OVERRIDE(OP): Performed by: HOSPITALIST

## 2018-10-31 PROCEDURE — 85025 COMPLETE CBC W/AUTO DIFF WBC: CPT

## 2018-10-31 PROCEDURE — 99219 PR INITIAL OBSERVATION CARE,LEVL II: CPT | Performed by: HOSPITALIST

## 2018-10-31 PROCEDURE — 83880 ASSAY OF NATRIURETIC PEPTIDE: CPT

## 2018-10-31 PROCEDURE — 96372 THER/PROPH/DIAG INJ SC/IM: CPT

## 2018-10-31 PROCEDURE — 85730 THROMBOPLASTIN TIME PARTIAL: CPT

## 2018-10-31 PROCEDURE — 85610 PROTHROMBIN TIME: CPT

## 2018-10-31 PROCEDURE — 99285 EMERGENCY DEPT VISIT HI MDM: CPT

## 2018-10-31 PROCEDURE — A9270 NON-COVERED ITEM OR SERVICE: HCPCS

## 2018-10-31 PROCEDURE — 93005 ELECTROCARDIOGRAM TRACING: CPT

## 2018-10-31 PROCEDURE — A9270 NON-COVERED ITEM OR SERVICE: HCPCS | Performed by: HOSPITALIST

## 2018-10-31 RX ORDER — DAPAGLIFLOZIN AND METFORMIN HYDROCHLORIDE 5; 1000 MG/1; MG/1
1 TABLET, FILM COATED, EXTENDED RELEASE ORAL 2 TIMES DAILY
Status: DISCONTINUED | OUTPATIENT
Start: 2018-10-31 | End: 2018-10-31

## 2018-10-31 RX ORDER — ASPIRIN 325 MG
325 TABLET ORAL DAILY
Status: DISCONTINUED | OUTPATIENT
Start: 2018-10-31 | End: 2018-11-01 | Stop reason: HOSPADM

## 2018-10-31 RX ORDER — BISACODYL 10 MG
10 SUPPOSITORY, RECTAL RECTAL
Status: DISCONTINUED | OUTPATIENT
Start: 2018-10-31 | End: 2018-11-01 | Stop reason: HOSPADM

## 2018-10-31 RX ORDER — ASPIRIN 600 MG/1
300 SUPPOSITORY RECTAL DAILY
Status: DISCONTINUED | OUTPATIENT
Start: 2018-10-31 | End: 2018-11-01 | Stop reason: HOSPADM

## 2018-10-31 RX ORDER — PROMETHAZINE HYDROCHLORIDE 25 MG/1
12.5-25 SUPPOSITORY RECTAL EVERY 4 HOURS PRN
Status: DISCONTINUED | OUTPATIENT
Start: 2018-10-31 | End: 2018-11-01 | Stop reason: HOSPADM

## 2018-10-31 RX ORDER — DEXTROSE MONOHYDRATE 25 G/50ML
25 INJECTION, SOLUTION INTRAVENOUS
Status: DISCONTINUED | OUTPATIENT
Start: 2018-10-31 | End: 2018-11-01 | Stop reason: HOSPADM

## 2018-10-31 RX ORDER — ATORVASTATIN CALCIUM 10 MG/1
TABLET, FILM COATED ORAL
Status: ACTIVE
Start: 2018-10-31 | End: 2018-11-01

## 2018-10-31 RX ORDER — ACETAMINOPHEN 325 MG/1
650 TABLET ORAL EVERY 6 HOURS PRN
Status: DISCONTINUED | OUTPATIENT
Start: 2018-10-31 | End: 2018-11-01 | Stop reason: HOSPADM

## 2018-10-31 RX ORDER — ASPIRIN 81 MG/1
324 TABLET, CHEWABLE ORAL ONCE
Status: ACTIVE | OUTPATIENT
Start: 2018-10-31 | End: 2018-11-01

## 2018-10-31 RX ORDER — LEVOTHYROXINE SODIUM 0.05 MG/1
75 TABLET ORAL
Status: DISCONTINUED | OUTPATIENT
Start: 2018-10-31 | End: 2018-11-01 | Stop reason: HOSPADM

## 2018-10-31 RX ORDER — RISPERIDONE 1 MG/1
1 TABLET ORAL
Status: DISCONTINUED | OUTPATIENT
Start: 2018-10-31 | End: 2018-11-01 | Stop reason: HOSPADM

## 2018-10-31 RX ORDER — FAMOTIDINE 20 MG/1
20 TABLET, FILM COATED ORAL 2 TIMES DAILY
Status: DISCONTINUED | OUTPATIENT
Start: 2018-10-31 | End: 2018-11-01 | Stop reason: HOSPADM

## 2018-10-31 RX ORDER — PROMETHAZINE HYDROCHLORIDE 25 MG/1
12.5-25 TABLET ORAL EVERY 4 HOURS PRN
Status: DISCONTINUED | OUTPATIENT
Start: 2018-10-31 | End: 2018-11-01 | Stop reason: HOSPADM

## 2018-10-31 RX ORDER — POLYETHYLENE GLYCOL 3350 17 G/17G
1 POWDER, FOR SOLUTION ORAL
Status: DISCONTINUED | OUTPATIENT
Start: 2018-10-31 | End: 2018-11-01 | Stop reason: HOSPADM

## 2018-10-31 RX ORDER — GABAPENTIN 300 MG/1
300 CAPSULE ORAL 3 TIMES DAILY
Status: DISCONTINUED | OUTPATIENT
Start: 2018-10-31 | End: 2018-11-01 | Stop reason: HOSPADM

## 2018-10-31 RX ORDER — LISINOPRIL 5 MG/1
2.5 TABLET ORAL DAILY
Status: DISCONTINUED | OUTPATIENT
Start: 2018-10-31 | End: 2018-11-01 | Stop reason: HOSPADM

## 2018-10-31 RX ORDER — LABETALOL HYDROCHLORIDE 5 MG/ML
10 INJECTION, SOLUTION INTRAVENOUS EVERY 4 HOURS PRN
Status: DISCONTINUED | OUTPATIENT
Start: 2018-10-31 | End: 2018-11-01 | Stop reason: HOSPADM

## 2018-10-31 RX ORDER — INSULIN GLARGINE 100 [IU]/ML
35 INJECTION, SOLUTION SUBCUTANEOUS
Status: DISCONTINUED | OUTPATIENT
Start: 2018-10-31 | End: 2018-11-01 | Stop reason: HOSPADM

## 2018-10-31 RX ORDER — GABAPENTIN 300 MG/1
300 CAPSULE ORAL 3 TIMES DAILY
Status: DISCONTINUED | OUTPATIENT
Start: 2018-10-31 | End: 2018-10-31

## 2018-10-31 RX ORDER — LORATADINE 10 MG/1
10 TABLET ORAL DAILY
Status: DISCONTINUED | OUTPATIENT
Start: 2018-10-31 | End: 2018-11-01 | Stop reason: HOSPADM

## 2018-10-31 RX ORDER — AMOXICILLIN 250 MG
2 CAPSULE ORAL 2 TIMES DAILY
Status: DISCONTINUED | OUTPATIENT
Start: 2018-10-31 | End: 2018-11-01 | Stop reason: HOSPADM

## 2018-10-31 RX ORDER — ATORVASTATIN CALCIUM 10 MG/1
20 TABLET, FILM COATED ORAL
Status: DISCONTINUED | OUTPATIENT
Start: 2018-10-31 | End: 2018-11-01 | Stop reason: HOSPADM

## 2018-10-31 RX ORDER — PIOGLITAZONEHYDROCHLORIDE 15 MG/1
30 TABLET ORAL DAILY
Status: DISCONTINUED | OUTPATIENT
Start: 2018-10-31 | End: 2018-11-01 | Stop reason: HOSPADM

## 2018-10-31 RX ORDER — ASPIRIN 81 MG/1
324 TABLET, CHEWABLE ORAL DAILY
Status: DISCONTINUED | OUTPATIENT
Start: 2018-10-31 | End: 2018-11-01 | Stop reason: HOSPADM

## 2018-10-31 RX ORDER — ATORVASTATIN CALCIUM 10 MG/1
TABLET, FILM COATED ORAL
Status: COMPLETED
Start: 2018-10-31 | End: 2018-10-31

## 2018-10-31 RX ADMIN — GABAPENTIN 300 MG: 300 CAPSULE ORAL at 18:22

## 2018-10-31 RX ADMIN — FAMOTIDINE 20 MG: 20 TABLET ORAL at 18:22

## 2018-10-31 RX ADMIN — INSULIN GLARGINE 35 UNITS: 100 INJECTION, SOLUTION SUBCUTANEOUS at 20:41

## 2018-10-31 RX ADMIN — RISPERIDONE 1 MG: 1 TABLET ORAL at 20:33

## 2018-10-31 RX ADMIN — ACETAMINOPHEN 650 MG: 325 TABLET, FILM COATED ORAL at 12:34

## 2018-10-31 RX ADMIN — GABAPENTIN 300 MG: 300 CAPSULE ORAL at 14:09

## 2018-10-31 RX ADMIN — ATORVASTATIN CALCIUM 20 MG: 10 TABLET, FILM COATED ORAL at 20:43

## 2018-10-31 RX ADMIN — ASPIRIN 325 MG ORAL TABLET 325 MG: 325 PILL ORAL at 12:33

## 2018-10-31 ASSESSMENT — PATIENT HEALTH QUESTIONNAIRE - PHQ9
2. FEELING DOWN, DEPRESSED, IRRITABLE, OR HOPELESS: NEARLY EVERY DAY
SUM OF ALL RESPONSES TO PHQ9 QUESTIONS 1 AND 2: 4
3. TROUBLE FALLING OR STAYING ASLEEP OR SLEEPING TOO MUCH: SEVERAL DAYS
5. POOR APPETITE OR OVEREATING: NOT AT ALL
7. TROUBLE CONCENTRATING ON THINGS, SUCH AS READING THE NEWSPAPER OR WATCHING TELEVISION: SEVERAL DAYS
4. FEELING TIRED OR HAVING LITTLE ENERGY: SEVERAL DAYS
9. THOUGHTS THAT YOU WOULD BE BETTER OFF DEAD, OR OF HURTING YOURSELF: NOT AT ALL
SUM OF ALL RESPONSES TO PHQ QUESTIONS 1-9: 8
1. LITTLE INTEREST OR PLEASURE IN DOING THINGS: SEVERAL DAYS
6. FEELING BAD ABOUT YOURSELF - OR THAT YOU ARE A FAILURE OR HAVE LET YOURSELF OR YOUR FAMILY DOWN: SEVERAL DAYS
8. MOVING OR SPEAKING SO SLOWLY THAT OTHER PEOPLE COULD HAVE NOTICED. OR THE OPPOSITE, BEING SO FIGETY OR RESTLESS THAT YOU HAVE BEEN MOVING AROUND A LOT MORE THAN USUAL: NOT AT ALL

## 2018-10-31 ASSESSMENT — ENCOUNTER SYMPTOMS
ABDOMINAL PAIN: 0
NECK PAIN: 0
EYE PAIN: 0
FEVER: 0
DIZZINESS: 0
VOMITING: 0
PALPITATIONS: 1
SORE THROAT: 0
CHILLS: 0
NAUSEA: 0
SHORTNESS OF BREATH: 0
COUGH: 0
TINGLING: 0
HEADACHES: 0
DEPRESSION: 0
NERVOUS/ANXIOUS: 1
INSOMNIA: 0
BLURRED VISION: 0
BACK PAIN: 0

## 2018-10-31 ASSESSMENT — COGNITIVE AND FUNCTIONAL STATUS - GENERAL
SUGGESTED CMS G CODE MODIFIER MOBILITY: CH
MOBILITY SCORE: 24
DAILY ACTIVITIY SCORE: 24
SUGGESTED CMS G CODE MODIFIER DAILY ACTIVITY: CH

## 2018-10-31 ASSESSMENT — PAIN SCALES - GENERAL
PAINLEVEL_OUTOF10: 0
PAINLEVEL_OUTOF10: 9

## 2018-10-31 ASSESSMENT — LIFESTYLE VARIABLES: ALCOHOL_USE: NO

## 2018-10-31 ASSESSMENT — PAIN DESCRIPTION - DESCRIPTORS: DESCRIPTORS: SHARP

## 2018-10-31 NOTE — H&P
Hospital Medicine History & Physical Note    Date of Service  10/31/2018    Primary Care Physician  Gary Caal M.D.    Consultants  none    Code Status  full    Chief Complaint  Chest pain    History of Presenting Illness  38 y.o. female who presented 10/31/2018 with chest pain. It is in different places across her chest, sometimes sharp, other times dull. It lasts 5min max and happens several times per day for the last week. It is worse with anxiety but sometime happens randomly. She vomited once this week and does have occasional palpitations and diaphoresis. She recently had her diabetic meds increase a week ago and has had sugars in the 60s several times. She has a restraining order against her  for domestic violence but still works with him at work. She does not feel she is in danger at home. She is not depressed or suicidal.     Review of Systems  Review of Systems   Constitutional: Negative for chills and fever.   HENT: Negative for sore throat.    Eyes: Negative for blurred vision and pain.   Respiratory: Negative for cough and shortness of breath.    Cardiovascular: Positive for chest pain and palpitations.   Gastrointestinal: Negative for abdominal pain, nausea and vomiting.   Genitourinary: Negative for dysuria and urgency.   Musculoskeletal: Negative for back pain and neck pain.   Skin: Negative for itching and rash.   Neurological: Negative for dizziness, tingling and headaches.   Psychiatric/Behavioral: Negative for depression. The patient is nervous/anxious. The patient does not have insomnia.    All other systems reviewed and are negative.      Past Medical History   has a past medical history of Bipolar disorder (HCC) (8/9/2018); Chickenpox; Herpes; Hypertension; Hypothyroidism due to acquired atrophy of thyroid (1/22/2016); Personal history of venous thrombosis and embolism; Sleep apnea; and Uncontrolled type 2 diabetes mellitus without complication, without long-term current use of  insulin (HCC) (3/12/2015).    Surgical History   has no past surgical history on file.     Family History  family history includes Cancer in her maternal uncle; Hyperlipidemia in her father; Hypertension in her mother; Sleep Apnea in her brother and mother.     Social History   reports that she quit smoking about 9 months ago. Her smoking use included Cigarettes. She has a 0.15 pack-year smoking history. She has never used smokeless tobacco. She reports that she drinks alcohol. She reports that she does not use drugs.    Allergies  Allergies   Allergen Reactions   • Metoclopramide Vomiting   • Ondansetron Hives   • Sulfa Drugs Hives              Medications  Prior to Admission Medications   Prescriptions Last Dose Informant Patient Reported? Taking?   CRANBERRY EXTRACT PO 10/30/2018 at 0830 Patient Yes No   Sig: Take 1 Cap by mouth every day.   Dapagliflozin-Metformin HCl ER 5-1000 MG TABLET SR 24 HR 10/31/2018 at 0530 Patient No No   Sig: Take 1 tablet by mouth 2 Times a Day.   Insulin Degludec (TRESIBA FLEXTOUCH) 200 UNIT/ML Solution Pen-injector 10/30/2018 at 2030 Patient Yes No   Sig: Inject 35 Units as instructed every bedtime.   Probiotic Product (PROBIOTIC ADVANCED PO) 10/30/2018 at 2030 Patient Yes No   Sig: Take 2 Tabs by mouth every day.   Semaglutide (OZEMPIC) 1 MG/DOSE Solution Pen-injector 10/30/2018 at 0800 Patient No No   Sig: Inject 1 mg as instructed every 7 days.   atorvastatin (LIPITOR) 20 MG Tab 10/30/2018 at 2030 Patient No No   Sig: Take 1 Tab by mouth every bedtime.   gabapentin (NEURONTIN) 300 MG Cap 10/31/2018 at 0530 Patient Yes No   Sig: Take 300 mg by mouth 3 times a day.   levothyroxine (SYNTHROID) 75 MCG Tab 10/31/2018 at 0530 Patient Yes No   Sig: Take 75 mcg by mouth Every morning on an empty stomach.   lisinopril (PRINIVIL) 2.5 MG Tab 10/31/2018 at 0530 Patient Yes No   Sig: Take 2.5 mg by mouth every day.   loratadine (CLARITIN) 10 MG Tab 10/31/2018 at 0530 Patient Yes No   Sig:  Take 10 mg by mouth every day.   pioglitazone (ACTOS) 30 MG Tab 10/31/2018 at 0530 Patient No No   Sig: Take 1 Tab by mouth every day.   ranitidine (ZANTAC) 150 MG capsule 10/31/2018 at 0530 Patient Yes No   Sig: Take 150 mg by mouth 2 Times a Day.   risperiDONE (RISPERDAL) 1 MG Tab 10/30/2018 at 2030 Patient Yes No   Sig: Take 1 mg by mouth every bedtime.      Facility-Administered Medications: None       Physical Exam  Temp:  [36.3 °C (97.4 °F)] 36.3 °C (97.4 °F)  Pulse:  [89-95] 90  Resp:  [18-21] 21  BP: (140)/(81) 140/81    Physical Exam   Constitutional: She is oriented to person, place, and time. She appears well-developed and well-nourished. No distress.   Patient seen and examined  Discussed plan with RN   HENT:   Right Ear: External ear normal.   Left Ear: External ear normal.   Nose: Nose normal.   Eyes: Conjunctivae are normal. Right eye exhibits no discharge. Left eye exhibits no discharge.   Neck: No JVD present.   Cardiovascular: Regular rhythm and normal heart sounds.    No murmur heard.  Cap refill 2sec  Pulses 2+ throughout     Pulmonary/Chest: Effort normal and breath sounds normal. No stridor. No respiratory distress. She has no wheezes. She has no rales.   Abdominal: Soft. Bowel sounds are normal. She exhibits no distension. There is no tenderness.   obese   Musculoskeletal: She exhibits no edema or tenderness.   Neurological: She is alert and oriented to person, place, and time.   Skin: Skin is warm and dry. She is not diaphoretic. No erythema.   Normal skin  Color.    Psychiatric: She has a normal mood and affect. Her behavior is normal.   Nursing note and vitals reviewed.      Laboratory:  Recent Labs      10/29/18   1506  10/31/18   1020   WBC  10.1  10.0   RBC  5.12  4.99   HEMOGLOBIN  14.0  13.5   HEMATOCRIT  41.5  41.0   MCV  81.1*  82.2   MCH  27.3  27.1   MCHC  33.7  32.9*   RDW  40.6  40.9   PLATELETCT  286  264   MPV  9.9  9.9     Recent Labs      10/29/18   1506  10/31/18   1020    SODIUM  135  134*   POTASSIUM  3.2*  3.6   CHLORIDE  105  105   CO2  26  24   GLUCOSE  109*  147*   BUN  9  10   CREATININE  0.43*  0.57   CALCIUM  8.7  9.0     Recent Labs      10/29/18   1506  10/31/18   1020   ALTSGPT  20  23   ASTSGOT  15  19   ALKPHOSPHAT  68  62   TBILIRUBIN  0.3  0.5   LIPASE  25  27   GLUCOSE  109*  147*     Recent Labs      10/31/18   1020   APTT  27.7   INR  0.99     Recent Labs      10/31/18   1020   BNPBTYPENAT  22         Recent Labs      10/29/18   1506  10/29/18   1803  10/31/18   1020   TROPONINI  <0.02  <0.02  <0.02       Urinalysis:    No results found     Imaging:  NM-CARDIAC STRESS TEST    (Results Pending)         Assessment/Plan:  I anticipate this patient is appropriate for observation status at this time.    Chest pain   Assessment & Plan    Likely axiety related to her social situation. Ss consult placed. Will ANASTASIYA with serial trops and she has asked fo ra stress test. Trend on tele  Also likely from hypoglycemia  Ssi and hold dm med that has been increased. OZEMPIC        Obstructive sleep apnea   Assessment & Plan    On cpap. Needs weight loss.         Morbid obesity (CMS-HCC)- (present on admission)   Assessment & Plan    Needs weight loss.         Uncontrolled type 2 diabetes mellitus without complication, without long-term current use of insulin ([facility])   Assessment & Plan    With hypoglycemia. Will need to decrease her meds again on discharge. Suspect this is part of her sx .             VTE prophylaxis: none

## 2018-10-31 NOTE — CARE PLAN
Problem: Safety  Goal: Will remain free from injury  Outcome: PROGRESSING AS EXPECTED  Pt will remain free from falls or injury.

## 2018-10-31 NOTE — ED NOTES
Rounded on pt. No needs at this time. Updated pt on POC and plan to be admitted. Pt verbalizes understanding. Pt states the dizzyness and nausea have improved and that the chest pain comes and goes. Denies chest pain at this time. Awaiting bed placement.

## 2018-10-31 NOTE — PROGRESS NOTES
"1215: Pt admitted from the ED for atypical chest pain.  VSS. Pt currently denies any chest pain, n/v/sob.  She states that she is having problems at home and at work.  She currentyl has a restraining order agianst her  that she was \"forced\" to obtain by her legal councel provided by the court. Her  is also \"demanding she get a divorce\"--this is against her Buddhist beliefs and is very upsetting to her.  Her Yaz[employer]ism is making comments about her and her  to her--\"they are taking his side\". Pt is very tearful and very overwhelmed by the situation.    1620: MD at bedside.    "

## 2018-10-31 NOTE — ED PROVIDER NOTES
ED Provider Note    CHIEF COMPLAINT  Chief Complaint   Patient presents with   • Chest Pain   • Nausea       HPI  Liana Mikayla Obrien is a 38 y.o. female who presents complaining of chest pain, not feeling well.  For the last several days the patient describes having episodes of chest pain.  With this she gets very dizzy, sweaty, lightheaded.  It makes her feel like she is about to pass out.  It feels like her vision gets blurry.  She says every single time is different.  Sometimes it is a sharp nagging pain other times it is a more pressure pain.  This is what brought her in today.  She describes a pressure-like sensation in the center of her chest.  There is no pleuritic symptoms.  No recent trips or travel.  No leg pain or swelling.  She does not think she has had a fever.  There is no weakness or numbness.  No change in bowel or bladder.  There is no other complaint.  The patient was seen 2 days ago, at that time she had negative troponin x2, negative d-dimer, negative beta.  TSH, CBC were unremarkable, CMP was normal with exception of mildly low potassium and a mildly elevated blood sugar.    PAST MEDICAL HISTORY  Past Medical History:   Diagnosis Date   • Bipolar disorder ([facility]) 8/9/2018   • Chickenpox    • Herpes    • Hypertension    • Hypothyroidism due to acquired atrophy of thyroid 1/22/2016   • Personal history of venous thrombosis and embolism     DVT in BLE years ago   • Sleep apnea    • Uncontrolled type 2 diabetes mellitus without complication, without long-term current use of insulin ([facility]) 3/12/2015       FAMILY HISTORY  Family History   Problem Relation Age of Onset   • Hypertension Mother    • Sleep Apnea Mother    • Hyperlipidemia Father    • Cancer Maternal Uncle    • Sleep Apnea Brother    • Diabetes Neg Hx    • Heart Disease Neg Hx    • Stroke Neg Hx        SOCIAL HISTORY  Social History   Substance Use Topics   • Smoking status: Former Smoker     Packs/day: 0.50     Years: 0.30     Types:  "Cigarettes     Quit date: 2018   • Smokeless tobacco: Never Used      Comment: for 3 months at a time on and off    • Alcohol use 0.0 oz/week      Comment: occ         SURGICAL HISTORY  History reviewed. No pertinent surgical history.    CURRENT MEDICATIONS  Home Medications     Reviewed by Amari Contreras (Pharmacy Tech) on 10/31/18 at 1005  Med List Status: Complete   Medication Last Dose Status   atorvastatin (LIPITOR) 20 MG Tab 10/30/2018 Active   CRANBERRY EXTRACT PO 10/30/2018 Active   Dapagliflozin-Metformin HCl ER 5-1000 MG TABLET SR 24 HR 10/31/2018 Active   gabapentin (NEURONTIN) 300 MG Cap 10/31/2018 Active   Insulin Degludec (TRESIBA FLEXTOUCH) 200 UNIT/ML Solution Pen-injector 10/30/2018 Active   levothyroxine (SYNTHROID) 75 MCG Tab 10/31/2018 Active   lisinopril (PRINIVIL) 2.5 MG Tab 10/31/2018 Active   loratadine (CLARITIN) 10 MG Tab 10/31/2018 Active   pioglitazone (ACTOS) 30 MG Tab 10/31/2018 Active   Probiotic Product (PROBIOTIC ADVANCED PO) 10/30/2018 Active   ranitidine (ZANTAC) 150 MG capsule 10/31/2018 Active   risperiDONE (RISPERDAL) 1 MG Tab 10/30/2018 Active   Semaglutide (OZEMPIC) 1 MG/DOSE Solution Pen-injector 10/30/2018 Active                I have reviewed the nurses notes and/or the list brought with the patient.    ALLERGIES  Allergies   Allergen Reactions   • Metoclopramide Vomiting   • Ondansetron Hives   • Sulfa Drugs Hives              REVIEW OF SYSTEMS  See HPI for further details. Review of systems as above, otherwise all other systems are negative.     PHYSICAL EXAM  VITAL SIGNS: /81   Pulse 90   Temp 36.3 °C (97.4 °F)   Resp (!) 21   Ht 1.575 m (5' 2\")   Wt 110.3 kg (243 lb 2.7 oz)   SpO2 95%   BMI 44.48 kg/m²     Constitutional: Well appearing patient in no acute distress.  Not toxic, nor ill in appearance.  HENT: Mucus membranes moist.  Oropharynx is clear.  Eyes: Pupils equally round.  No scleral icterus.   Neck: Full nontender range of motion.  Lymphatic: " No cervical lymphadenopathy noted.   Cardiovascular: Regular heart rate and rhythm.  No murmurs, rubs, nor gallop appreciated.   Thorax & Lungs: Chest is nontender.  Lungs are clear to auscultation with good air movement bilaterally.  No wheeze, rhonchi, nor rales.   Abdomen: Soft, with no tenderness, rebound nor guarding.  No mass, pulsatile mass, nor hepatosplenomegaly appreciated.  Skin: No purpura nor petechia noted.  Extremities/Musculoskeletal: No sign of trauma.  Calves are nontender with no cords nor edema.  No Amanda's sign.  Pulses are intact all around.   Neurologic: Alert & oriented.  Strength and sensation is intact all around.  Gait is normal.  Psychiatric: Normal affect appropriate for the clinical situation.    EKG  I interpreted this EKG myself.  This is a 12-lead study.  The rhythm is sinus with a rate of 86.  There are no ST segment nor T wave abnormalities.  Interpretation: No ST segment elevation myocardial infarction.    LABS  Labs Reviewed   CBC WITH DIFFERENTIAL - Abnormal; Notable for the following:        Result Value    MCHC 32.9 (*)     All other components within normal limits    Narrative:     Indicate which anticoagulants the patient is on:->UNKNOWN   COMP METABOLIC PANEL - Abnormal; Notable for the following:     Sodium 134 (*)     Glucose 147 (*)     Globulin 3.6 (*)     All other components within normal limits    Narrative:     Indicate which anticoagulants the patient is on:->UNKNOWN   BTYPE NATRIURETIC PEPTIDE    Narrative:     Indicate which anticoagulants the patient is on:->UNKNOWN   PROTHROMBIN TIME    Narrative:     Indicate which anticoagulants the patient is on:->UNKNOWN   APTT    Narrative:     Indicate which anticoagulants the patient is on:->UNKNOWN   LIPASE    Narrative:     Indicate which anticoagulants the patient is on:->UNKNOWN   TROPONIN    Narrative:     Indicate which anticoagulants the patient is on:->UNKNOWN   ESTIMATED GFR    Narrative:     Indicate which  anticoagulants the patient is on:->UNKNOWN   TROPONIN   TROPONIN     Pregnancy 2 days ago was negative.    RADIOLOGY/PROCEDURES  Chest x-ray the other day was normal    MEDICAL RECORD  I have reviewed patient's medical record and pertinent results are listed above.    COURSE & MEDICAL DECISION MAKING  I have reviewed any medical record information, laboratory studies and radiographic results as noted above.  Patient presents with chest pain which has been recurrent.  I have a low suspicion for cardiac etiology,  however, her symptoms are accelerating.  At this point I like to rule her out for cardiac etiology.  Again a negative troponin is reassuring.  EKG is nondiagnostic.  She is a history of a DVT/PE, however I do not think this is pulmonary embolism; she had a negative d-dimer 2 days ago, we did not follow further down this pathway.  Clinic there is no evidence of infectious etiology.  No evidence of pneumonia.  Discussed the patient's case with Dr. Shoemaker, hospitalist.  He will be seeing the patient for admission.  She was given an aspirin here.    FINAL IMPRESSION  1. Precordial pain    2.  Mild hyperglycemia in the setting of diabetes       This dictation was created using voice recognition software.    Electronically signed by: Domenico Orozco, 10/31/2018 11:17 AM

## 2018-11-01 ENCOUNTER — APPOINTMENT (OUTPATIENT)
Dept: RADIOLOGY | Facility: MEDICAL CENTER | Age: 38
End: 2018-11-01
Attending: HOSPITALIST
Payer: COMMERCIAL

## 2018-11-01 ENCOUNTER — PATIENT OUTREACH (OUTPATIENT)
Dept: HEALTH INFORMATION MANAGEMENT | Facility: OTHER | Age: 38
End: 2018-11-01

## 2018-11-01 VITALS
RESPIRATION RATE: 16 BRPM | HEART RATE: 87 BPM | BODY MASS INDEX: 44.38 KG/M2 | WEIGHT: 241.18 LBS | HEIGHT: 62 IN | TEMPERATURE: 97.6 F | OXYGEN SATURATION: 93 % | DIASTOLIC BLOOD PRESSURE: 75 MMHG | SYSTOLIC BLOOD PRESSURE: 119 MMHG

## 2018-11-01 LAB
ALBUMIN SERPL BCP-MCNC: 3.2 G/DL (ref 3.2–4.9)
ALBUMIN/GLOB SERPL: 0.9 G/DL
ALP SERPL-CCNC: 58 U/L (ref 30–99)
ALT SERPL-CCNC: 21 U/L (ref 2–50)
ANION GAP SERPL CALC-SCNC: 5 MMOL/L (ref 0–11.9)
AST SERPL-CCNC: 15 U/L (ref 12–45)
BASOPHILS # BLD AUTO: 0.7 % (ref 0–1.8)
BASOPHILS # BLD: 0.05 K/UL (ref 0–0.12)
BILIRUB SERPL-MCNC: 0.6 MG/DL (ref 0.1–1.5)
BUN SERPL-MCNC: 11 MG/DL (ref 8–22)
CALCIUM SERPL-MCNC: 8.5 MG/DL (ref 8.4–10.2)
CHLORIDE SERPL-SCNC: 102 MMOL/L (ref 96–112)
CO2 SERPL-SCNC: 26 MMOL/L (ref 20–33)
CREAT SERPL-MCNC: 0.51 MG/DL (ref 0.5–1.4)
EOSINOPHIL # BLD AUTO: 0.28 K/UL (ref 0–0.51)
EOSINOPHIL NFR BLD: 3.7 % (ref 0–6.9)
ERYTHROCYTE [DISTWIDTH] IN BLOOD BY AUTOMATED COUNT: 41.5 FL (ref 35.9–50)
GLOBULIN SER CALC-MCNC: 3.7 G/DL (ref 1.9–3.5)
GLUCOSE BLD-MCNC: 102 MG/DL (ref 65–99)
GLUCOSE BLD-MCNC: 103 MG/DL (ref 65–99)
GLUCOSE SERPL-MCNC: 123 MG/DL (ref 65–99)
HCT VFR BLD AUTO: 39.4 % (ref 37–47)
HGB BLD-MCNC: 13 G/DL (ref 12–16)
IMM GRANULOCYTES # BLD AUTO: 0.03 K/UL (ref 0–0.11)
IMM GRANULOCYTES NFR BLD AUTO: 0.4 % (ref 0–0.9)
LYMPHOCYTES # BLD AUTO: 2.72 K/UL (ref 1–4.8)
LYMPHOCYTES NFR BLD: 36.4 % (ref 22–41)
MCH RBC QN AUTO: 27.1 PG (ref 27–33)
MCHC RBC AUTO-ENTMCNC: 33 G/DL (ref 33.6–35)
MCV RBC AUTO: 82.3 FL (ref 81.4–97.8)
MONOCYTES # BLD AUTO: 0.51 K/UL (ref 0–0.85)
MONOCYTES NFR BLD AUTO: 6.8 % (ref 0–13.4)
NEUTROPHILS # BLD AUTO: 3.88 K/UL (ref 2–7.15)
NEUTROPHILS NFR BLD: 52 % (ref 44–72)
NRBC # BLD AUTO: 0 K/UL
NRBC BLD-RTO: 0 /100 WBC
PLATELET # BLD AUTO: 253 K/UL (ref 164–446)
PMV BLD AUTO: 9.9 FL (ref 9–12.9)
POTASSIUM SERPL-SCNC: 3.6 MMOL/L (ref 3.6–5.5)
PROT SERPL-MCNC: 6.9 G/DL (ref 6–8.2)
RBC # BLD AUTO: 4.79 M/UL (ref 4.2–5.4)
SODIUM SERPL-SCNC: 133 MMOL/L (ref 135–145)
WBC # BLD AUTO: 7.5 K/UL (ref 4.8–10.8)

## 2018-11-01 PROCEDURE — 80053 COMPREHEN METABOLIC PANEL: CPT

## 2018-11-01 PROCEDURE — 82962 GLUCOSE BLOOD TEST: CPT

## 2018-11-01 PROCEDURE — G0378 HOSPITAL OBSERVATION PER HR: HCPCS

## 2018-11-01 PROCEDURE — 99217 PR OBSERVATION CARE DISCHARGE: CPT | Performed by: HOSPITALIST

## 2018-11-01 PROCEDURE — 85025 COMPLETE CBC W/AUTO DIFF WBC: CPT

## 2018-11-01 PROCEDURE — 700102 HCHG RX REV CODE 250 W/ 637 OVERRIDE(OP): Performed by: HOSPITALIST

## 2018-11-01 PROCEDURE — 93018 CV STRESS TEST I&R ONLY: CPT | Performed by: INTERNAL MEDICINE

## 2018-11-01 PROCEDURE — 78452 HT MUSCLE IMAGE SPECT MULT: CPT | Mod: 26 | Performed by: INTERNAL MEDICINE

## 2018-11-01 PROCEDURE — A9502 TC99M TETROFOSMIN: HCPCS

## 2018-11-01 PROCEDURE — A9270 NON-COVERED ITEM OR SERVICE: HCPCS | Performed by: HOSPITALIST

## 2018-11-01 PROCEDURE — 700111 HCHG RX REV CODE 636 W/ 250 OVERRIDE (IP)

## 2018-11-01 RX ORDER — REGADENOSON 0.08 MG/ML
INJECTION, SOLUTION INTRAVENOUS
Status: COMPLETED
Start: 2018-11-01 | End: 2018-11-01

## 2018-11-01 RX ORDER — AMINOPHYLLINE 25 MG/ML
INJECTION, SOLUTION INTRAVENOUS
Status: COMPLETED
Start: 2018-11-01 | End: 2018-11-01

## 2018-11-01 RX ADMIN — LORATADINE 10 MG: 10 TABLET ORAL at 06:36

## 2018-11-01 RX ADMIN — ASPIRIN 325 MG ORAL TABLET 325 MG: 325 PILL ORAL at 06:37

## 2018-11-01 RX ADMIN — AMINOPHYLLINE 100 MG: 25 INJECTION, SOLUTION INTRAVENOUS at 10:50

## 2018-11-01 RX ADMIN — LISINOPRIL 2.5 MG: 5 TABLET ORAL at 06:36

## 2018-11-01 RX ADMIN — GABAPENTIN 300 MG: 300 CAPSULE ORAL at 06:36

## 2018-11-01 RX ADMIN — FAMOTIDINE 20 MG: 20 TABLET ORAL at 06:37

## 2018-11-01 RX ADMIN — GABAPENTIN 300 MG: 300 CAPSULE ORAL at 12:54

## 2018-11-01 RX ADMIN — REGADENOSON 0.4 MG: 0.08 INJECTION, SOLUTION INTRAVENOUS at 10:45

## 2018-11-01 RX ADMIN — PIOGLITAZONE 30 MG: 15 TABLET ORAL at 06:34

## 2018-11-01 RX ADMIN — LEVOTHYROXINE SODIUM 75 MCG: 50 TABLET ORAL at 06:33

## 2018-11-01 ASSESSMENT — PAIN SCALES - GENERAL
PAINLEVEL_OUTOF10: 0

## 2018-11-01 NOTE — PROGRESS NOTES
Nursing care plan includes knowledge deficit, potential for discomfort, potential for compromised cardiac output. POC includes teaching, comfort measures and reassurance, and access to code cart, cardiology stand by and availability of rapid response team. Pt verbalizes good understanding of benefits and risks of Con protocol and pharmacological cardiac stress test. Informed consent obtained.Pt unable to complete Con TM test past stage 2 and HR reached only 120s, max HR being 155. Lexiscan given, pt developed the following symptoms SOB dizzy nausea and vomiting. Aminophylline 100 mg IV given. VS stable, major symptoms resolved. To waiting room, caffeinated fluids and/or snacks given, awaiting second scan. Nursing goals met.Will return to room after second scan.

## 2018-11-01 NOTE — ASSESSMENT & PLAN NOTE
Likely axiety related to her social situation. Ss consult placed. Will ANASTASIYA with serial trops and she has asked fo ra stress test. Trend on tele  Also likely from hypoglycemia  Ssi and hold dm med that has been increased. OZEMPIC

## 2018-11-01 NOTE — DIETARY
"Nutrition services: Day 0 of admit.  Liana Obrien is a 38 y.o. female with admitting DX of chest pain.     Pt with BMI >40 (=44.11). Pt with PMH of  Uncontrolled DM II (2015), Hx of DVT, Hypothyroidism, HTN, Herpes, and Bipolar disorder. Pt presented with chest pain and of note had her DM meds increased last week and has had blood sugars in the 60s several times. Pt with negative nurse nutrition screening. PO intake on Consistent Carbohydrate no caffeine diet of % x 1 and adequate. Weight loss counseling not appropriate in acute care setting. RD available for consult as needed.     Assessment:  Height: 157.5 cm (5' 2\")  Weight: 109.4 kg (241 lb 2.9 oz)  Body mass index is 44.11 kg/m².  Diet/Intake: Consistent Carbohydrate No Caffeine    Labs (A1C 8/11/2018 10.9%), MAR (Lantus 35 units and Humulin SSI QID, Actos) and Chart reviewed.     Recommendations/Plan: Referral to outpatient nutrition services for weight management after D/C.   1. Diet as ordered.   2. Encourage intake of 50% or greater.   3. Document intake of all meals  as % taken in ADL's to provide interdisciplinary communication across all shifts.   4. Monitor weight.  5. Nutrition rep will continue to see patient for ongoing meal and snack preferences.             "

## 2018-11-01 NOTE — PROGRESS NOTES
Care assumed from night shift RN. POC and orders reviewed. Pt resting in bed at this time. Call light in reach. Safety precautions maintained. Will continue to monitor.

## 2018-11-01 NOTE — ASSESSMENT & PLAN NOTE
With hypoglycemia. Will need to decrease her meds again on discharge. Suspect this is part of her sx .

## 2018-11-01 NOTE — DISCHARGE INSTRUCTIONS
"Discharge Instructions    Discharged to home by car with self. Discharged via walking, hospital escort: Refused.  Special equipment needed: Not Applicable    Be sure to schedule a follow-up appointment with your primary care doctor or any specialists as instructed.     Discharge Plan:   Diet Plan: Discussed  Activity Level: Discussed  Confirmed Follow up Appointment: Patient to Call and Schedule Appointment  Confirmed Symptoms Management: Discussed  Medication Reconciliation Updated: Yes  Influenza Vaccine Indication: Not indicated: Previously immunized this influenza season and > 8 years of age    I understand that a diet low in cholesterol, fat, and sodium is recommended for good health. Unless I have been given specific instructions below for another diet, I accept this instruction as my diet prescription.   Other diet: diabetic    Special Instructions: None    Stress  Stress-related medical problems are becoming increasingly common.  The body has a built-in physical response to stressful situations. Faced with pressure, challenge or danger, we need to react quickly. Our bodies release hormones such as cortisol and adrenaline to help do this. These hormones are part of the \"fight or flight\" response and affect the metabolic rate, heart rate and blood pressure, resulting in a heightened, stressed state that prepares the body for optimum performance in dealing with a stressful situation.  It is likely that early man required these mechanisms to stay alive, but usually modern stresses do not call for this, and the same hormones released in today's world can damage health and reduce coping ability.  CAUSES  · Pressure to perform at work, at school or in sports.  · Threats of physical violence.  · Money worries.  · Arguments.  · Family conflicts.  · Divorce or separation from significant other.  · Bereavement.  · New job or unemployment.  · Changes in location.  · Alcohol or drug abuse.  SOMETIMES, THERE IS NO " PARTICULAR REASON FOR DEVELOPING STRESS.  Almost all people are at risk of being stressed at some time in their lives. It is important to know that some stress is temporary and some is long term.  · Temporary stress will go away when a situation is resolved. Most people can cope with short periods of stress, and it can often be relieved by relaxing, taking a walk, chatting through issues with friends, or having a good night's sleep.  · Chronic (long-term, continuous) stress is much harder to deal with. It can be psychologically and emotionally damaging. It can be harmful both for an individual and for friends and family.  SYMPTOMS  Everyone reacts to stress differently. There are some common effects that help us recognize it. In times of extreme stress, people may:  · Shake uncontrollably.  · Breathe faster and deeper than normal (hyperventilate).  · Vomit.  · For people with asthma, stress can trigger an attack.  · For some people, stress may trigger migraine headaches, ulcers, and body pain.  PHYSICAL EFFECTS OF STRESS MAY INCLUDE:  · Loss of energy.  · Skin problems.  · Aches and pains resulting from tense muscles, including neck ache, backache and tension headaches.  · Increased pain from arthritis and other conditions.  · Irregular heart beat (palpitations).  · Periods of irritability or anger.  · Apathy or depression.  · Anxiety (feeling uptight or worrying).  · Unusual behavior.  · Loss of appetite.  · Comfort eating.  · Lack of concentration.  · Loss of, or decreased, sex-drive.  · Increased smoking, drinking, or recreational drug use.  · For women, missed periods.  · Ulcers, joint pain, and muscle pain.  Post-traumatic stress is the stress caused by any serious accident, strong emotional damage, or extremely difficult or violent experience such as rape or war.  Post-traumatic stress victims can experience mixtures of emotions such as fear, shame, depression, guilt or anger. It may include recurrent memories  or images that may be haunting. These feelings can last for weeks, months or even years after the traumatic event that triggered them. Specialized treatment, possibly with medicines and psychological therapies, is available.  If stress is causing physical symptoms, severe distress or making it difficult for you to function as normal, it is worth seeing your caregiver. It is important to remember that although stress is a usual part of life, extreme or prolonged stress can lead to other illnesses that will need treatment. It is better to visit a doctor sooner rather than later. Stress has been linked to the development of high blood pressure and heart disease, as well as insomnia and depression.  There is no diagnostic test for stress since everyone reacts to it differently. But a caregiver will be able to spot the physical symptoms, such as:  · Headaches.  · Shingles.  · Ulcers.  Emotional distress such as intense worry, low mood or irritability should be detected when the doctor asks pertinent questions to identify any underlying problems that might be the cause. In case there are physical reasons for the symptoms, the doctor may also want to do some tests to exclude certain conditions.  If you feel that you are suffering from stress, try to identify the aspects of your life that are causing it. Sometimes you may not be able to change or avoid them, but even a small change can have a positive ripple effect. A simple lifestyle change can make all the difference.  STRATEGIES THAT CAN HELP DEAL WITH STRESS:  · Delegating or sharing responsibilities.  · Avoiding confrontations.  · Learning to be more assertive.  · Regular exercise.  · Avoid using alcohol or street drugs to cope.  · Eating a healthy, balanced diet, rich in fruit and vegetables and proteins.  · Finding humor or absurdity in stressful situations.  · Never taking on more than you know you can handle comfortably.  · Organizing your time better to get as  much done as possible.  · Talking to friends or family and sharing your thoughts and fears.  · Listening to music or relaxation tapes.  · Tensing and then relaxing your muscles, starting at the toes and working up to the head and neck.  If you think that you would benefit from help, either in identifying the things that are causing your stress or in learning techniques to help you relax, see a caregiver who is capable of helping you with this. Rather than relying on medications, it is usually better to try and identify the things in your life that are causing stress and try to deal with them.  There are many techniques of managing stress including counseling, psychotherapy, aromatherapy, yoga, and exercise. Your caregiver can help you determine what is best for you.  Document Released: 03/09/2004 Document Revised: 03/11/2013 Document Reviewed: 02/03/2009  [employer]® Patient Information ©2014 [employer].    · Is patient discharged on Warfarin / Coumadin?   No     Depression / Suicide Risk    As you are discharged from this [facility] facility, it is important to learn how to keep safe from harming yourself.    Recognize the warning signs:  · Abrupt changes in personality, positive or negative- including increase in energy   · Giving away possessions  · Change in eating patterns- significant weight changes-  positive or negative  · Change in sleeping patterns- unable to sleep or sleeping all the time   · Unwillingness or inability to communicate  · Depression  · Unusual sadness, discouragement and loneliness  · Talk of wanting to die  · Neglect of personal appearance   · Rebelliousness- reckless behavior  · Withdrawal from people/activities they love  · Confusion- inability to concentrate     If you or a loved one observes any of these behaviors or has concerns about self-harm, here's what you can do:  · Talk about it- your feelings and reasons for harming yourself  · Remove any means that you might use to hurt  yourself (examples: pills, rope, extension cords, firearm)  · Get professional help from the community (Mental Health, Substance Abuse, psychological counseling)  · Do not be alone:Call your Safe Contact- someone whom you trust who will be there for you.  · Call your local CRISIS HOTLINE 690-8375 or 418-699-0821  · Call your local Children's Mobile Crisis Response Team [place] (395) 919-3016 or www.Dia[employer]on  · Call the toll free National Suicide Prevention Hotlines   · National Suicide Prevention Lifeline 665-547-MPRQ (9604)  · National Hope Line Network 800-SUICIDE (172-4346)

## 2018-11-02 ENCOUNTER — PATIENT OUTREACH (OUTPATIENT)
Dept: HEALTH INFORMATION MANAGEMENT | Facility: OTHER | Age: 38
End: 2018-11-02
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
## 2018-12-05 ENCOUNTER — OFFICE VISIT (OUTPATIENT)
Dept: HEALTH INFORMATION MANAGEMENT | Facility: MEDICAL CENTER | Age: 38
End: 2018-12-05
Payer: COMMERCIAL

## 2018-12-05 VITALS
DIASTOLIC BLOOD PRESSURE: 68 MMHG | BODY MASS INDEX: 44.22 KG/M2 | OXYGEN SATURATION: 100 % | HEART RATE: 95 BPM | HEIGHT: 62 IN | WEIGHT: 240.3 LBS | SYSTOLIC BLOOD PRESSURE: 118 MMHG

## 2018-12-05 DIAGNOSIS — I10 ESSENTIAL HYPERTENSION: ICD-10-CM

## 2018-12-05 DIAGNOSIS — E55.9 VITAMIN D DEFICIENCY: ICD-10-CM

## 2018-12-05 DIAGNOSIS — E66.01 MORBID OBESITY (HCC): ICD-10-CM

## 2018-12-05 DIAGNOSIS — G47.33 OBSTRUCTIVE SLEEP APNEA: Chronic | ICD-10-CM

## 2018-12-05 PROCEDURE — 97803 MED NUTRITION INDIV SUBSEQ: CPT | Performed by: DIETITIAN, REGISTERED

## 2018-12-05 PROCEDURE — 99214 OFFICE O/P EST MOD 30 MIN: CPT | Performed by: INTERNAL MEDICINE

## 2018-12-05 NOTE — Clinical Note
She is having significant hypoglycemia.  Until she is back in your office, I am reducing some of her meds - FYI!
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
## 2018-12-20 ENCOUNTER — OFFICE VISIT (OUTPATIENT)
Dept: HEALTH INFORMATION MANAGEMENT | Facility: MEDICAL CENTER | Age: 38
End: 2018-12-20
Payer: COMMERCIAL

## 2018-12-20 VITALS
DIASTOLIC BLOOD PRESSURE: 72 MMHG | HEART RATE: 74 BPM | HEIGHT: 62 IN | BODY MASS INDEX: 44.39 KG/M2 | WEIGHT: 241.2 LBS | OXYGEN SATURATION: 99 % | SYSTOLIC BLOOD PRESSURE: 115 MMHG

## 2018-12-20 VITALS — WEIGHT: 241.2 LBS | HEIGHT: 62 IN | BODY MASS INDEX: 44.39 KG/M2

## 2018-12-20 DIAGNOSIS — E55.9 VITAMIN D DEFICIENCY: ICD-10-CM

## 2018-12-20 DIAGNOSIS — I10 ESSENTIAL HYPERTENSION: ICD-10-CM

## 2018-12-20 PROCEDURE — 97803 MED NUTRITION INDIV SUBSEQ: CPT | Performed by: DIETITIAN, REGISTERED

## 2018-12-20 PROCEDURE — 99214 OFFICE O/P EST MOD 30 MIN: CPT | Performed by: INTERNAL MEDICINE

## 2018-12-20 NOTE — PROGRESS NOTES
Bariatric Medicine Follow Up  Chief Complaint   Patient presents with   • Hyperglycemia   • Weight Gain       History of Present Illness:   Liana Obrien is a 38 y.o. female who presents for weight management follow-up and to help address co-morbidities related to overweight, including T2DM, HTN, VDD.    During the patient's last visit, the following were discussed and recommended:  Weight Goal: 3-5% wt loss each month (pt goal weight is 150 lb)  Diet: High Protein/Low Carb Meals and 2 snacks between meals daily  64+ oz water per day  Avoid sweet drinks and sodas  Track daily intake with my fitness pal (begin) or keep a written food journal for the next 2 weeks  Physical Activity: 12,000 steps at work, can consider increasing to 15,000 or resume Tray next month when work schedule improves  Risk level for moderate/vigorous exercise program: Moderate given propensity for hypoglycemia.  New Rx: Discontinue Ozempic/Trulicity.  Will decrease Tresiba dosing next visit if fasting glucose is still in the 60s.  Instructed patient also to reduce insulin to keep fasting glucose above 80.  Behavior change: Mindful eating, tracking  Follow-up: 2 weeks    Returns to make sure her diabetes medications are adjusted properly, as she was hypoglycemic.  Has stopped Semaglutide, fasting glucose now 110-120s.  Rarely, fasting glucose in the 150s, improved from over 200 regularly.  Now on Actos, Dapagli/metformin, and insulinTresiba 35 units qhs.    Having MR shake in am, snacking less.  When off work, very hungry, eats regular meal.  She feels she can control her portion sizes better.  Trying to track her intake.    Blood pressure controlled on lisinopril.  Not on vitamin D repletion.    Exercise:   12,000 steps at work     Review of Systems   H/a, lightheaded, leg cramp occasionally.  Denies diaphoresis, hypoglycemia, nausea or vomiting.  Polyuria.  All other ROS were reviewed and are otherwise unchanged from my previous visit  "with patient.    Physical Exam:    /72 (BP Location: Left arm, Patient Position: Sitting)   Pulse 74   Ht 1.581 m (5' 2.25\")   Wt 109.4 kg (241 lb 3.2 oz)   SpO2 99%   BMI 43.76 kg/m²   Waist: 51 in    Body fat % 55  REE 1734 kcal/day    Weight change since last visit: +1 lb (-13 lb total)  Waist Circum change since last visit: -1 in (-4 in total)    Constitutional: Oriented to person, place, and time and well-developed, well-nourished, and in no distress.    Head: Normocephalic.   Musculoskeletal: Normal range of motion. No edema.   Neurological: Alert and oriented to person, place, and time. No muscle weakness.  Gait normal.   Skin: Warm and dry. Not diaphoretic.   Psychiatric: Mood, memory, affect and judgment normal.     Laboratory:   Recent labs reviewed.      Dietitian Assessment: I have reviewed the Dietitian's assessment related to this encounter.       ASSESSMENT/PLAN:  Body mass index is 43.76 kg/m².    Obesity Stage ([place]):  2; Class 3    1. Uncontrolled type 2 diabetes mellitus without complication, without long-term current use of insulin (HCC)     2. Vitamin D deficiency     3. Essential hypertension       The patient's fasting glucose under much better control off Semaglutide.  Continue current glucose lowering agents as above, reduce insulin to maintain euglycemia.  Continue tracking intake, steps to maintain activity level.  Continue to monitor vitamin D, blood pressure, which will continue to improve with weight loss.    The patient and I have discussed at length and agree to the following recommendations, which are all addressing the above diagnoses:    Weight Goal: 3-5% wt loss each month (pt goal weight is 150 lb)  Diet: Premier protein shake for breakfast daily  Keep tracking intake  Boullion broth nightly for leg cramps and headache as needed  Physical Activity: Tracks steps, maintain 12,000/day  Risk level for moderate/vigorous exercise program: Low  New Rx: None.  Continue " reducing insulin/Tresiba to maintain euglycemia; continue dapagliflozin/metformin, Actos.   Behavior change: Continue tracking, portion control  Follow-up: 1 month    Patient's body mass index is 43.76 kg/m². Exercise and nutrition counseling were performed at this visit.

## 2018-12-20 NOTE — PROGRESS NOTES
"Nutrition Reassess: Medical Weight Management   Today's date: 12/20/2018  Referring Provider: No ref. provider found      Time: in/out 810 - 830  Visit#: 4    Subjective:  -States she has stopped drinking egg nog and has decreased the amount of Gatorade she drinks to 1-2 bottles/day  -No regular exercise  -Work schedule has changed and she occasionally works until 1700, which leads her to excessive hunger and eating fast food on the way home  -Dr. Carrasco stopped her Ozempic and continued her insulin at last visit    Anthropometrics/Objective  Today's weight:    Vitals:    12/20/18 0833   Weight: 109.4 kg (241 lb 3.2 oz)   Height: 1.581 m (5' 2.25\")     BMI:  Body mass index is 43.76 kg/m².  Starting weight/date 254.3#     Total weight change : - 13.1#            Meal Plan:   High protein with 1 meal replacements per day     ReAssesment/Notes:    Liana and I have set some small goals to help her with improving her habits today.  She is going to start bringing a snack with her to work to consume on the way home to help her with her hunger, understanding that the snack is not meant to make her full, only to help her with her hunger before dinner, which will help with keeping her dinner smaller.  She is also going to drink no more than 1 bottle of Gatorade (lower sugar version) daily and drink one additional glass/bottle of water in its place.    Finally, she wants to start some consistent exercise by walking on one day each week.  She does not think that she can realistically do more than this at this time so this is a great goal for her to strive for.  She will walk for 30 minutes/day on those days that she can walk to start (patient set own goal).  We can adjust her exercise goal as necessary at next appointment.    She is still experiencing low BG's and regained weight so I am unsure why Ozempic was discontinued instead of the insulin, as insulin promotes weight gain and hypoglycemia and Ozempic helps with " weight loss without the risk of hypoglycemia.  She is seeing her endocrinologist in a few weeks and he can straighten out her medications if they need to be changed.  I did encourage Liana to call her endocrinologist if she continues to experience low BG's as something needs to change.    Follow-up: 1 month

## 2018-12-26 ENCOUNTER — HOSPITAL ENCOUNTER (EMERGENCY)
Facility: MEDICAL CENTER | Age: 38
End: 2018-12-26
Attending: EMERGENCY MEDICINE
Payer: COMMERCIAL

## 2018-12-26 VITALS
BODY MASS INDEX: 44.71 KG/M2 | HEART RATE: 95 BPM | HEIGHT: 62 IN | WEIGHT: 242.95 LBS | DIASTOLIC BLOOD PRESSURE: 78 MMHG | TEMPERATURE: 99.9 F | OXYGEN SATURATION: 100 % | SYSTOLIC BLOOD PRESSURE: 119 MMHG | RESPIRATION RATE: 18 BRPM

## 2018-12-26 DIAGNOSIS — J02.0 STREP PHARYNGITIS: ICD-10-CM

## 2018-12-26 LAB
ALBUMIN SERPL BCP-MCNC: 3.4 G/DL (ref 3.2–4.9)
ALBUMIN/GLOB SERPL: 0.8 G/DL
ALP SERPL-CCNC: 75 U/L (ref 30–99)
ALT SERPL-CCNC: 16 U/L (ref 2–50)
ANION GAP SERPL CALC-SCNC: 8 MMOL/L (ref 0–11.9)
APPEARANCE UR: ABNORMAL
AST SERPL-CCNC: 21 U/L (ref 12–45)
BACTERIA #/AREA URNS HPF: ABNORMAL /HPF
BASOPHILS # BLD AUTO: 0.4 % (ref 0–1.8)
BASOPHILS # BLD: 0.06 K/UL (ref 0–0.12)
BILIRUB SERPL-MCNC: 0.4 MG/DL (ref 0.1–1.5)
BILIRUB UR QL STRIP.AUTO: NEGATIVE
BUN SERPL-MCNC: 12 MG/DL (ref 8–22)
CALCIUM SERPL-MCNC: 8.6 MG/DL (ref 8.4–10.2)
CHLORIDE SERPL-SCNC: 103 MMOL/L (ref 96–112)
CO2 SERPL-SCNC: 21 MMOL/L (ref 20–33)
COLOR UR: YELLOW
CREAT SERPL-MCNC: 0.54 MG/DL (ref 0.5–1.4)
EOSINOPHIL # BLD AUTO: 0.21 K/UL (ref 0–0.51)
EOSINOPHIL NFR BLD: 1.4 % (ref 0–6.9)
EPI CELLS #/AREA URNS HPF: ABNORMAL /HPF
ERYTHROCYTE [DISTWIDTH] IN BLOOD BY AUTOMATED COUNT: 40.4 FL (ref 35.9–50)
FLUAV RNA SPEC QL NAA+PROBE: NEGATIVE
FLUBV RNA SPEC QL NAA+PROBE: NEGATIVE
GLOBULIN SER CALC-MCNC: 4.3 G/DL (ref 1.9–3.5)
GLUCOSE SERPL-MCNC: 284 MG/DL (ref 65–99)
GLUCOSE UR STRIP.AUTO-MCNC: >=1000 MG/DL
HCG UR QL: NEGATIVE
HCT VFR BLD AUTO: 39.6 % (ref 37–47)
HGB BLD-MCNC: 13.2 G/DL (ref 12–16)
IMM GRANULOCYTES # BLD AUTO: 0.05 K/UL (ref 0–0.11)
IMM GRANULOCYTES NFR BLD AUTO: 0.3 % (ref 0–0.9)
KETONES UR STRIP.AUTO-MCNC: ABNORMAL MG/DL
LEUKOCYTE ESTERASE UR QL STRIP.AUTO: NEGATIVE
LYMPHOCYTES # BLD AUTO: 2.61 K/UL (ref 1–4.8)
LYMPHOCYTES NFR BLD: 17 % (ref 22–41)
MCH RBC QN AUTO: 27.2 PG (ref 27–33)
MCHC RBC AUTO-ENTMCNC: 33.3 G/DL (ref 33.6–35)
MCV RBC AUTO: 81.6 FL (ref 81.4–97.8)
MICRO URNS: ABNORMAL
MONOCYTES # BLD AUTO: 0.73 K/UL (ref 0–0.85)
MONOCYTES NFR BLD AUTO: 4.8 % (ref 0–13.4)
MUCOUS THREADS #/AREA URNS HPF: ABNORMAL /HPF
NEUTROPHILS # BLD AUTO: 11.66 K/UL (ref 2–7.15)
NEUTROPHILS NFR BLD: 76.1 % (ref 44–72)
NITRITE UR QL STRIP.AUTO: NEGATIVE
NRBC # BLD AUTO: 0 K/UL
NRBC BLD-RTO: 0 /100 WBC
PH UR STRIP.AUTO: 5.5 [PH]
PLATELET # BLD AUTO: 275 K/UL (ref 164–446)
PMV BLD AUTO: 10 FL (ref 9–12.9)
POTASSIUM SERPL-SCNC: 3.9 MMOL/L (ref 3.6–5.5)
PROT SERPL-MCNC: 7.7 G/DL (ref 6–8.2)
PROT UR QL STRIP: NEGATIVE MG/DL
RBC # BLD AUTO: 4.85 M/UL (ref 4.2–5.4)
RBC # URNS HPF: ABNORMAL /HPF
RBC UR QL AUTO: NEGATIVE
S PYO AG THROAT QL: ABNORMAL
SIGNIFICANT IND 70042: ABNORMAL
SITE SITE: ABNORMAL
SODIUM SERPL-SCNC: 132 MMOL/L (ref 135–145)
SOURCE SOURCE: ABNORMAL
SP GR UR REFRACTOMETRY: 1.03
SP GR UR STRIP.AUTO: 1.02
WBC # BLD AUTO: 15.3 K/UL (ref 4.8–10.8)
WBC #/AREA URNS HPF: ABNORMAL /HPF

## 2018-12-26 PROCEDURE — 36415 COLL VENOUS BLD VENIPUNCTURE: CPT

## 2018-12-26 PROCEDURE — 87502 INFLUENZA DNA AMP PROBE: CPT

## 2018-12-26 PROCEDURE — 96372 THER/PROPH/DIAG INJ SC/IM: CPT

## 2018-12-26 PROCEDURE — 85025 COMPLETE CBC W/AUTO DIFF WBC: CPT

## 2018-12-26 PROCEDURE — 700111 HCHG RX REV CODE 636 W/ 250 OVERRIDE (IP): Performed by: EMERGENCY MEDICINE

## 2018-12-26 PROCEDURE — 87880 STREP A ASSAY W/OPTIC: CPT

## 2018-12-26 PROCEDURE — 80053 COMPREHEN METABOLIC PANEL: CPT

## 2018-12-26 PROCEDURE — A9270 NON-COVERED ITEM OR SERVICE: HCPCS | Performed by: EMERGENCY MEDICINE

## 2018-12-26 PROCEDURE — 99284 EMERGENCY DEPT VISIT MOD MDM: CPT

## 2018-12-26 PROCEDURE — 700102 HCHG RX REV CODE 250 W/ 637 OVERRIDE(OP): Performed by: EMERGENCY MEDICINE

## 2018-12-26 PROCEDURE — 81001 URINALYSIS AUTO W/SCOPE: CPT

## 2018-12-26 PROCEDURE — 81025 URINE PREGNANCY TEST: CPT

## 2018-12-26 RX ORDER — ACETAMINOPHEN 325 MG/1
650 TABLET ORAL ONCE
Status: COMPLETED | OUTPATIENT
Start: 2018-12-26 | End: 2018-12-26

## 2018-12-26 RX ADMIN — ACETAMINOPHEN 650 MG: 325 TABLET, FILM COATED ORAL at 18:45

## 2018-12-26 RX ADMIN — PENICILLIN G BENZATHINE 1.2 MILLION UNITS: 1200000 INJECTION, SUSPENSION INTRAMUSCULAR at 20:40

## 2018-12-26 ASSESSMENT — PAIN SCALES - GENERAL
PAINLEVEL_OUTOF10: 4
PAINLEVEL_OUTOF10: 5

## 2018-12-27 NOTE — ED PROVIDER NOTES
"ED Provider Note    CHIEF COMPLAINT  Chief Complaint   Patient presents with   • Bug Bite       HPI  Liana Obrien is a 38 y.o. female who presents for evaluation of \"bug bites\" to face, arms and chest which have been present since Monday and apparently multiplying.  Is associated with fevers, chills, and a sore throat.  She also feels as if her left ear hurts and think she may have an ear infection.  She notes she has had the influenza vaccine this year.    REVIEW OF SYSTEMS  Constitutional: No fevers, weakness, weight loss   Skin: Facial rash. No abrasions, lacerations, or pruritus  HEENT: Left ear pain.  No ringing in ears, or decreased hearing.  Positive for sore throat.  No runny nose, sores, trouble swallowing, trouble speaking.  Neck: No neck pain, stiffness, or masses.  Chest: No pain or rashes  Pulm: No shortness of breath, cough, wheezing, stridor, or pain with inspiration/expiration  Gastrointestinal: No nausea, vomiting, diarrhea, constipation, bloating, melena, hematochezia or pain.  Genitourinary: No dysuria or hematuria  Musculoskeletal: No recent trauma, pain, swelling, weakness  Neurologic: No sensory or motor changes. No confusion or disorientation.  Heme: No bleeding or bruising problems.   Immuno: No hx of recurrent infections      PAST MEDICAL HISTORY   has a past medical history of Bipolar disorder ([facility]) (8/9/2018); Chickenpox; Herpes; Hypertension; Hypothyroidism due to acquired atrophy of thyroid (1/22/2016); Personal history of venous thrombosis and embolism; Sleep apnea; and Uncontrolled type 2 diabetes mellitus without complication, without long-term current use of insulin ([facility]) (3/12/2015).    SOCIAL HISTORY  Social History     Social History Main Topics   • Smoking status: Former Smoker     Packs/day: 0.50     Years: 0.30     Types: Cigarettes     Quit date: 2018   • Smokeless tobacco: Never Used      Comment: for 3 months at a time on and off    • Alcohol use 0.0 oz/week      " "Comment: occ   • Drug use: No   • Sexual activity: Yes     Partners: Male       SURGICAL HISTORY  patient denies any surgical history    CURRENT MEDICATIONS  Home Medications     Reviewed by Sav Frances R.N. (Registered Nurse) on 12/26/18 at 1739  Med List Status: Partial   Medication Last Dose Status   atorvastatin (LIPITOR) 20 MG Tab  Active   CRANBERRY EXTRACT PO  Active   Dapagliflozin-Metformin HCl ER 5-1000 MG TABLET SR 24 HR  Active   gabapentin (NEURONTIN) 300 MG Cap  Active   Insulin Degludec (TRESIBA FLEXTOUCH) 200 UNIT/ML Solution Pen-injector  Active   levothyroxine (SYNTHROID) 75 MCG Tab  Active   lisinopril (PRINIVIL) 2.5 MG Tab  Active   loratadine (CLARITIN) 10 MG Tab  Active   pioglitazone (ACTOS) 30 MG Tab  Active   Probiotic Product (PROBIOTIC ADVANCED PO)  Active   ranitidine (ZANTAC) 150 MG capsule  Active   risperiDONE (RISPERDAL) 1 MG Tab  Active                ALLERGIES  Allergies   Allergen Reactions   • Metoclopramide Vomiting   • Ondansetron Hives   • Sulfa Drugs Hives              PHYSICAL EXAM  VITAL SIGNS: /93   Pulse (!) 102   Temp 37.4 °C (99.3 °F) (Temporal)   Resp 20   Ht 1.575 m (5' 2\")   Wt 110.2 kg (242 lb 15.2 oz)   SpO2 99%   BMI 44.44 kg/m²    Gen: Alert in no apparent distress.  HEENT: No signs of trauma, Bilateral external ears normal, Nose normal. Conjunctiva normal, Non-icteric.  Posterior pharynx erythema with tonsillar swelling.  No tonsillar exudate noted, no unilateral swelling or uvula deviation.  Neck:  No tenderness, Supple, No masses  Lymphatic: Subcentimeter, shotty, anterior cervical adenopathy noted   cardiovascular: Regular rate and rhythm, no murmurs.  Patient noted to have a heart rate of 102 in triage however her heart rate was normal on my exam  Thorax & Lungs: Normal breath sounds, No respiratory distress, No wheezing bilateral chest rise  Abdomen: Bowel sounds normal, Soft, No tenderness, No masses, No pulsatile masses. No Guarding or " rebound  Skin: Warm, Dry, No erythema, 5-7 cm diameter areas of circular excoriations to the patient's face.  There are approximately 8-10 of these.  There is no crusting or weeping and there is no surrounding or patient has scattered papules and occasional pustules were arms and right side of her upper chest.  He again there is no surrounding erythema or induration and no active drainage.  The lesions themselves are mildly tender.   Back: No bony tenderness, No CVA tenderness.   Extremities: Intact distal pulses, No edema  Neurologic: Alert , no facial droop, grossly normal coordination and strength  Psychiatric: Affect normal, Judgment normal, Mood normal.       LABS  Results for orders placed or performed during the hospital encounter of 12/26/18   Influenza A/B By PCR   Result Value Ref Range    Influenza virus A RNA Negative Negative    Influenza virus B, PCR Negative Negative   RAPID STREP, CULT IF INDICATED (CULTURE IF NEGATIVE)   Result Value Ref Range    Significant Indicator POS (POS)     Source THRT     Site THROAT     Rapid Strep Screen Positive for Group A streptococcus. (A)    CBC WITH DIFFERENTIAL   Result Value Ref Range    WBC 15.3 (H) 4.8 - 10.8 K/uL    RBC 4.85 4.20 - 5.40 M/uL    Hemoglobin 13.2 12.0 - 16.0 g/dL    Hematocrit 39.6 37.0 - 47.0 %    MCV 81.6 81.4 - 97.8 fL    MCH 27.2 27.0 - 33.0 pg    MCHC 33.3 (L) 33.6 - 35.0 g/dL    RDW 40.4 35.9 - 50.0 fL    Platelet Count 275 164 - 446 K/uL    MPV 10.0 9.0 - 12.9 fL    Neutrophils-Polys 76.10 (H) 44.00 - 72.00 %    Lymphocytes 17.00 (L) 22.00 - 41.00 %    Monocytes 4.80 0.00 - 13.40 %    Eosinophils 1.40 0.00 - 6.90 %    Basophils 0.40 0.00 - 1.80 %    Immature Granulocytes 0.30 0.00 - 0.90 %    Nucleated RBC 0.00 /100 WBC    Neutrophils (Absolute) 11.66 (H) 2.00 - 7.15 K/uL    Lymphs (Absolute) 2.61 1.00 - 4.80 K/uL    Monos (Absolute) 0.73 0.00 - 0.85 K/uL    Eos (Absolute) 0.21 0.00 - 0.51 K/uL    Baso (Absolute) 0.06 0.00 - 0.12 K/uL     Immature Granulocytes (abs) 0.05 0.00 - 0.11 K/uL    NRBC (Absolute) 0.00 K/uL   COMP METABOLIC PANEL   Result Value Ref Range    Sodium 132 (L) 135 - 145 mmol/L    Potassium 3.9 3.6 - 5.5 mmol/L    Chloride 103 96 - 112 mmol/L    Co2 21 20 - 33 mmol/L    Anion Gap 8.0 0.0 - 11.9    Glucose 284 (H) 65 - 99 mg/dL    Bun 12 8 - 22 mg/dL    Creatinine 0.54 0.50 - 1.40 mg/dL    Calcium 8.6 8.4 - 10.2 mg/dL    AST(SGOT) 21 12 - 45 U/L    ALT(SGPT) 16 2 - 50 U/L    Alkaline Phosphatase 75 30 - 99 U/L    Total Bilirubin 0.4 0.1 - 1.5 mg/dL    Albumin 3.4 3.2 - 4.9 g/dL    Total Protein 7.7 6.0 - 8.2 g/dL    Globulin 4.3 (H) 1.9 - 3.5 g/dL    A-G Ratio 0.8 g/dL   URINALYSIS CULTURE, IF INDICATED   Result Value Ref Range    Color Yellow     Character Turbid (A)     Specific Gravity 1.025 <1.035    Ph 5.5 5.0 - 8.0    Glucose >=1000 (A) Negative mg/dL    Ketones Trace (A) Negative mg/dL    Protein Negative Negative mg/dL    Bilirubin Negative Negative    Nitrite Negative Negative    Leukocyte Esterase Negative Negative    Occult Blood Negative Negative    Micro Urine Req Microscopic    HCG QUALITATIVE UR (Lab)   Result Value Ref Range    Beta-Hcg Urine Negative Negative   REFRACTOMETER SG   Result Value Ref Range    Specific Gravity 1.029    ESTIMATED GFR   Result Value Ref Range    GFR If African American >60 >60 mL/min/1.73 m 2    GFR If Non African American >60 >60 mL/min/1.73 m 2   URINE MICROSCOPIC (W/UA)   Result Value Ref Range    WBC 0-2 /hpf    RBC Rare /hpf    Bacteria Many (A) None /hpf    Epithelial Cells Many (A) Few /hpf    Mucous Threads Moderate /hpf       RADIOLOGY  No orders to display     Reevaluation   Time:8:14 PM  Vital signs:   Assessment: Patient informed that she has strep pharyngitis.  She elected to be treated with a single shot of Bicillin.      COURSE & MEDICAL DECISION MAKING  Pertinent Labs & Imaging studies reviewed. (See chart for details)  Patient has findings suggestive of strep pharyngitis  which I feel can be easily treated with a single shot of Bicillin.  Patient was given the option to take penicillin however she felt it was reasonable to get it all done at once.  I did not feel further labs or imaging would benefit the patient or  and she did not appear septic or toxic.  I did not feel inpatient evaluation or treatment was necessary and I do not suspect any significant superimposed illness although she did have what appeared to be scattered excoriations and occasionally papules to her face and arms.  These did not appear to have any cellulitic component but some did have a small pustule suggestive of folliculitis.  I did consider chickenpox however felt this was unlikely given the atypical appearance.  Patient will follow up with her primary care physician or return if her symptoms worsen or change.      FINAL IMPRESSION  1. Strep pharyngitis        Electronically signed by: Ulisses Hawkins, 12/26/2018 6:16 PM

## 2018-12-27 NOTE — ED TRIAGE NOTES
Pt bib family with multiple complaints. Pt reports multiple insect bites to her face. Pt also reporting left ear pain.

## 2018-12-27 NOTE — ED NOTES
Pt given written and oral discharge instructions. Pt verbalized understanding of all instructions given. All questions answered. VSS. IV removed. Pt given f/u instructions and educated on s/s of when to return to the ER. Pt ambulating with  upon time of discharge in stable condition.

## 2018-12-27 NOTE — PROGRESS NOTES
darin from Lab called with critical result of  at (+) Group A strep. Critical lab result read back to Darin.   Dr. Hawkins notified of critical lab result at 1930.  Critical lab result read back by Dr. Hawkins.

## 2018-12-28 ENCOUNTER — HOSPITAL ENCOUNTER (EMERGENCY)
Facility: MEDICAL CENTER | Age: 38
End: 2018-12-28
Attending: EMERGENCY MEDICINE
Payer: COMMERCIAL

## 2018-12-28 VITALS
HEIGHT: 62 IN | BODY MASS INDEX: 44.63 KG/M2 | RESPIRATION RATE: 18 BRPM | TEMPERATURE: 98.2 F | DIASTOLIC BLOOD PRESSURE: 74 MMHG | HEART RATE: 89 BPM | WEIGHT: 242.51 LBS | SYSTOLIC BLOOD PRESSURE: 127 MMHG | OXYGEN SATURATION: 97 %

## 2018-12-28 DIAGNOSIS — R21 RASH: Primary | ICD-10-CM

## 2018-12-28 PROCEDURE — 700111 HCHG RX REV CODE 636 W/ 250 OVERRIDE (IP): Performed by: EMERGENCY MEDICINE

## 2018-12-28 PROCEDURE — A9270 NON-COVERED ITEM OR SERVICE: HCPCS | Performed by: EMERGENCY MEDICINE

## 2018-12-28 PROCEDURE — 700102 HCHG RX REV CODE 250 W/ 637 OVERRIDE(OP): Performed by: EMERGENCY MEDICINE

## 2018-12-28 PROCEDURE — 99284 EMERGENCY DEPT VISIT MOD MDM: CPT

## 2018-12-28 PROCEDURE — 96374 THER/PROPH/DIAG INJ IV PUSH: CPT

## 2018-12-28 RX ORDER — FAMOTIDINE 20 MG/1
20 TABLET, FILM COATED ORAL ONCE
Status: COMPLETED | OUTPATIENT
Start: 2018-12-28 | End: 2018-12-28

## 2018-12-28 RX ORDER — DIPHENHYDRAMINE HCL 25 MG
25 TABLET ORAL ONCE
Status: COMPLETED | OUTPATIENT
Start: 2018-12-28 | End: 2018-12-28

## 2018-12-28 RX ORDER — FAMOTIDINE 20 MG/1
20 TABLET, FILM COATED ORAL 2 TIMES DAILY
Qty: 10 TAB | Refills: 0 | Status: SHIPPED | OUTPATIENT
Start: 2018-12-28 | End: 2019-01-02

## 2018-12-28 RX ORDER — METHYLPREDNISOLONE 4 MG/1
TABLET ORAL
Qty: 1 KIT | Refills: 0 | Status: SHIPPED | OUTPATIENT
Start: 2018-12-28 | End: 2019-02-14

## 2018-12-28 RX ORDER — METHYLPREDNISOLONE SODIUM SUCCINATE 125 MG/2ML
125 INJECTION, POWDER, LYOPHILIZED, FOR SOLUTION INTRAMUSCULAR; INTRAVENOUS ONCE
Status: COMPLETED | OUTPATIENT
Start: 2018-12-28 | End: 2018-12-28

## 2018-12-28 RX ORDER — DIPHENHYDRAMINE HCL 25 MG
25 CAPSULE ORAL EVERY 6 HOURS PRN
Qty: 20 CAP | Refills: 0 | Status: SHIPPED | OUTPATIENT
Start: 2018-12-28 | End: 2019-01-02

## 2018-12-28 RX ADMIN — DIPHENHYDRAMINE HCL 25 MG: 25 TABLET ORAL at 06:33

## 2018-12-28 RX ADMIN — METHYLPREDNISOLONE SODIUM SUCCINATE 125 MG: 125 INJECTION, POWDER, FOR SOLUTION INTRAMUSCULAR; INTRAVENOUS at 06:33

## 2018-12-28 RX ADMIN — FAMOTIDINE 20 MG: 20 TABLET, FILM COATED ORAL at 06:33

## 2018-12-28 NOTE — ED NOTES
Pt medicated as ordered, discharged in good condition with prescriptions and follow up instructions, pt verbalizes understanding of all, ambulates out with steady gait accompanied by .

## 2018-12-28 NOTE — DISCHARGE INSTRUCTIONS
Follow-up with primary care on Monday for reevaluation, medication management close blood pressure monitoring.    Medrol Dosepak as directed.  Benadryl every 6 hours as needed for rash or itching.  Pepcid twice daily for 5 days.    Return to the emergency department for persistent or worsening rash/pruritus/redness/weeping, fever, facial or oral swelling, difficulty breathing or swallowing, wheezing or stridor, fever or other new concerns.

## 2018-12-28 NOTE — ED PROVIDER NOTES
ED Provider Note    CHIEF COMPLAINT  Chief Complaint   Patient presents with   • Rash     arms , neck and trunk. Had PCN injection 2 days ago. Has taken PCN orally in the past.       HPI  Liana Obrien is a 38 y.o. female who ambulates to the emergency department through triage with her significant other for rash.  Patient states she awoke this morning with a pruritic rash.  She has not taken any medication for this.  Patient was treated 2 days ago for strep throat, given a shot of penicillin here in the emergency department she believes she is having a reaction to this.  Patient has taken oral penicillin as previously without similar symptoms.  No other new exposures or history for similar rash.  Denies fever.  Mild sore throat persistent although improved from initial.  No difficulty swallowing or breathing.  No wheezing, stridor or change in voice.  No neck pain or stiffness.    REVIEW OF SYSTEMS  See HPI for further details.     PAST MEDICAL HISTORY   has a past medical history of Bipolar disorder ([facility]) (8/9/2018); Chickenpox; Herpes; Hypertension; Hypothyroidism due to acquired atrophy of thyroid (1/22/2016); Personal history of venous thrombosis and embolism; Sleep apnea; and Uncontrolled type 2 diabetes mellitus without complication, without long-term current use of insulin ([facility]) (3/12/2015).    SOCIAL HISTORY  Social History     Social History Main Topics   • Smoking status: Former Smoker     Packs/day: 0.50     Years: 0.30     Types: Cigarettes     Quit date: 2018   • Smokeless tobacco: Never Used      Comment: for 3 months at a time on and off    • Alcohol use 0.0 oz/week      Comment: occ   • Drug use: No   • Sexual activity: Yes     Partners: Male       SURGICAL HISTORY  patient denies any surgical history    CURRENT MEDICATIONS  Home Medications    **Home medications have not yet been reviewed for this encounter**         ALLERGIES  Allergies   Allergen Reactions   • Metoclopramide Vomiting   •  "Ondansetron Hives   • Sulfa Drugs Hives              PHYSICAL EXAM  VITAL SIGNS: /98   Pulse 89   Temp 35.9 °C (96.7 °F) (Temporal)   Resp 18   Ht 1.575 m (5' 2\")   Wt 110 kg (242 lb 8.1 oz)   BMI 44.35 kg/m²   Pulse ox interpretation: I interpret this pulse ox as normal.  Constitutional: Alert in no apparent distress.  HENT: Normocephalic, atraumatic. Bilateral external ears normal, Nose normal. Moist mucous membranes.  Oropharynx slightly erythematous, mildly enlarged tonsils although symmetric bilaterally.  No exudate.  Uvula midline.  Tolerating secretions.  No stridor or dysphonia.  Eyes: Pupils are equal and reactive, Conjunctiva normal.   Neck: Normal range of motion, Supple.  No meningeal irritation.  Lymphatic: Subtle submandibular and anterior cervical lymphadenopathy.  Cardiovascular: Regular rate and rhythm, no murmurs. Distal pulses intact.   Thorax & Lungs: Normal breath sounds.  No wheezing/rales/ronchi. No increased work of breathing, clipped speech or retractions.   Skin: Warm, Dry.  Fine rough maculopapular rash greatest on the upper extremities, right greater than left, also on the abdomen.  No weeping, vesicles or cellulitis.  Musculoskeletal: Good range of motion in all major joints.   Neurologic: Alert and oriented x4.  Ambulates independently.  Psychiatric: Affect normal, Judgment normal, Mood normal.       COURSE & MEDICAL DECISION MAKING  Nursing notes and vital signs were reviewed. (See chart for details)  The patients records were reviewed, history was obtained from the patient;     Medical record review: Patient seen at this facility 12/26/18 for sore throat fever and chills.  Strep positive.  Influenza negative.  Leukocytosis, WBC 15.3, leftward shift without bandemia.  No electrolyte derangement.  Treated with IM Bicillin before discharge.    ED evaluation for rash is quite nonspecific but suggestive of drug reaction given presentation and recent treatment for strep " pharyngitis.  No sloughing of skin, no bullae or vesicles, no superimposed cellulitis or abscess.  Scarlet fever rash is considered as well, although patient has been treated appropriately.  Vital signs are stable without fever or tachycardia, patient was never hypotensive.  No clinical evidence for anaphylaxis or sepsis.  Patient was given Solu-Medrol, Pepcid and Benadryl in the emergency department, to continue such regimen for 5 days at home.    Patient is stable for discharge at this time, anticipatory guidance provided, Medrol Dosepak as directed, Pepcid twice daily, Benadryl every 6 hours as needed, close follow-up is encouraged, and strict ED return instructions have been detailed. Patient and her  are agreeable to the disposition and plan.    Patient's blood pressure was elevated in the emergency department, and has been referred to primary care for close monitoring.      FINAL IMPRESSION  (R21) Rash  (primary encounter diagnosis)      Electronically signed by: Sonya Ku, 12/28/2018 6:21 AM      This dictation was created using voice recognition software. The accuracy of the dictation is limited to the abilities of the software. I expect there may be some errors of grammar and possibly content. The nursing notes were reviewed and certain aspects of this information were incorporated into this note.

## 2018-12-28 NOTE — ED TRIAGE NOTES
"Chief Complaint   Patient presents with   • Rash     arms , neck and trunk. Had PCN injection 2 days ago. Has taken PCN orally in the past.     /98   Pulse 89   Temp 35.9 °C (96.7 °F) (Temporal)   Resp 18   Ht 1.575 m (5' 2\")   Wt 110 kg (242 lb 8.1 oz)   BMI 44.35 kg/m²     "

## 2019-01-02 ENCOUNTER — OFFICE VISIT (OUTPATIENT)
Dept: MEDICAL GROUP | Facility: PHYSICIAN GROUP | Age: 39
End: 2019-01-02
Payer: COMMERCIAL

## 2019-01-02 VITALS
RESPIRATION RATE: 18 BRPM | TEMPERATURE: 97.2 F | DIASTOLIC BLOOD PRESSURE: 82 MMHG | BODY MASS INDEX: 43.43 KG/M2 | HEIGHT: 62 IN | SYSTOLIC BLOOD PRESSURE: 126 MMHG | WEIGHT: 236 LBS

## 2019-01-02 DIAGNOSIS — Z23 NEED FOR VACCINATION: ICD-10-CM

## 2019-01-02 DIAGNOSIS — J02.0 STREP PHARYNGITIS: ICD-10-CM

## 2019-01-02 PROCEDURE — 90732 PPSV23 VACC 2 YRS+ SUBQ/IM: CPT | Performed by: INTERNAL MEDICINE

## 2019-01-02 PROCEDURE — 90746 HEPB VACCINE 3 DOSE ADULT IM: CPT | Performed by: INTERNAL MEDICINE

## 2019-01-02 PROCEDURE — 90471 IMMUNIZATION ADMIN: CPT | Performed by: INTERNAL MEDICINE

## 2019-01-02 PROCEDURE — 99213 OFFICE O/P EST LOW 20 MIN: CPT | Mod: 25 | Performed by: INTERNAL MEDICINE

## 2019-01-02 PROCEDURE — 90472 IMMUNIZATION ADMIN EACH ADD: CPT | Performed by: INTERNAL MEDICINE

## 2019-01-02 NOTE — PROGRESS NOTES
PRIMARY CARE CLINIC FOLLOW UP VISIT  Chief Complaint   Patient presents with   • Bug Bite     seen in ER    • Pharyngitis     seen in ER      History of Present Illness     Strep pharyngitis  Was seen in the ED 12/26/2018 and treated with IV penicillin for strep pharyngitis. 2 days later she returned to the ER with a rash and was given solumedrol and medrol dose pack. Her rash has since resolved although the steroids have (not unexpectedly) caused hyperglycemia. She has 2 doses left of the medrol dose pack. Throat pain has improved greatly since she was treated with IV penicillin.     Current Outpatient Prescriptions   Medication Sig Dispense Refill   • MethylPREDNISolone (MEDROL DOSEPAK) 4 MG Tablet Therapy Pack Use as directed 1 Kit 0   • diphenhydrAMINE (BENADRYL) 25 MG capsule Take 1 Cap by mouth every 6 hours as needed for up to 5 days. 20 Cap 0   • famotidine (PEPCID) 20 MG Tab Take 1 Tab by mouth 2 times a day for 5 days. 10 Tab 0   • Insulin Degludec (TRESIBA FLEXTOUCH) 200 UNIT/ML Solution Pen-injector Inject 35 Units as instructed every bedtime.     • pioglitazone (ACTOS) 30 MG Tab Take 1 Tab by mouth every day. 90 Tab 4   • Dapagliflozin-Metformin HCl ER 5-1000 MG TABLET SR 24 HR Take 1 tablet by mouth 2 Times a Day. (Patient not taking: Reported on 12/5/2018) 180 Tab 4   • ranitidine (ZANTAC) 150 MG capsule Take 150 mg by mouth 2 Times a Day.     • risperiDONE (RISPERDAL) 1 MG Tab Take 1 mg by mouth every bedtime.     • Probiotic Product (PROBIOTIC ADVANCED PO) Take 2 Tabs by mouth every day.     • CRANBERRY EXTRACT PO Take 1 Cap by mouth every day.     • loratadine (CLARITIN) 10 MG Tab Take 10 mg by mouth every day.     • gabapentin (NEURONTIN) 300 MG Cap Take 300 mg by mouth 3 times a day.     • lisinopril (PRINIVIL) 2.5 MG Tab Take 2.5 mg by mouth every day.     • atorvastatin (LIPITOR) 20 MG Tab Take 1 Tab by mouth every bedtime. 30 Tab 0   • levothyroxine (SYNTHROID) 75 MCG Tab Take 75 mcg by mouth  "Every morning on an empty stomach.       No current facility-administered medications for this visit.      Past Medical History:   Diagnosis Date   • Bipolar disorder (HCC) 8/9/2018   • Chickenpox    • Herpes    • Hypertension    • Hypothyroidism due to acquired atrophy of thyroid 1/22/2016   • Personal history of venous thrombosis and embolism     DVT in BLE years ago   • Sleep apnea    • Uncontrolled type 2 diabetes mellitus without complication, without long-term current use of insulin (HCC) 3/12/2015     No past surgical history on file.  Social History   Substance Use Topics   • Smoking status: Former Smoker     Packs/day: 0.50     Years: 0.30     Types: Cigarettes     Quit date: 2018   • Smokeless tobacco: Never Used      Comment: for 3 months at a time on and off    • Alcohol use 0.0 oz/week      Comment: occ     Social History     Social History Narrative    Works at [employer]      Family History   Problem Relation Age of Onset   • Hypertension Mother    • Sleep Apnea Mother    • Hyperlipidemia Father    • Cancer Maternal Uncle    • Sleep Apnea Brother    • Diabetes Neg Hx    • Heart Disease Neg Hx    • Stroke Neg Hx      Family Status   Relation Status   • Mo Alive   • Fa Alive   • MUnc Alive   • Bro Alive   • Bro Alive   • Neg Hx (Not Specified)     Allergies: Bicillin c-r [penicillin g proc & benzathine]; Metoclopramide; Ondansetron; and Sulfa drugs    ROS  As per HPI above. All other systems reviewed and negative.        Objective   Blood pressure 126/82, temperature 36.2 °C (97.2 °F), resp. rate 18, height 1.581 m (5' 2.25\"), weight 107 kg (236 lb), not currently breastfeeding. Body mass index is 42.82 kg/m².    General: alert and oriented, pleasant, cooperative  HEENT: Normocephalic, atraumatic.   Cardiovascular: regular rate and rhythm, normal S1/S2  Pulmonary: lungs clear to auscultation bilaterally  Psychiatric: appropriate mood and affect. Good insight and appropriate judgment "       Assessment and Plan   The following treatment plan was discussed     1. Need for vaccination  - Hepatitis B Vaccine Adult IM  - PneumoVax PPV23 =>1yo    2. Strep pharyngitis  Treated with IV penicillin 12/26/2018 in the ED, symptoms have self resolved. Her rash to IV penicillin has also resolved since receiving steroids. Advised to stop further steroids as her symptoms have resolved.     Healthcare maintenance     Health Maintenance Due   Topic Date Due   • IMM PNEUMOCOCCAL 19-64 (ADULT) MEDIUM RISK SERIES (1 of 1 - PPSV23) 05/07/2015   • PAP SMEAR  06/25/2017   • IMM HEP B VACCINE (3 of 3 - Risk 3-dose series) 12/25/2018     Return if symptoms worsen or fail to improve.    Gary Caal MD  Internal Medicine  [facility]

## 2019-01-02 NOTE — ASSESSMENT & PLAN NOTE
Was seen in the ED 12/26/2018 and treated with IV penicillin for strep pharyngitis. 2 days later she returned to the ER with a rash and was given solumedrol and medrol dose pack. Her rash has since resolved although the steroids have (not unexpectedly) caused hyperglycemia. She has 2 doses left of the medrol dose pack. Throat pain has improved greatly since she was treated with IV penicillin.

## 2019-01-15 ENCOUNTER — APPOINTMENT (OUTPATIENT)
Dept: ENDOCRINOLOGY | Facility: MEDICAL CENTER | Age: 39
End: 2019-01-15

## 2019-02-04 ENCOUNTER — HOSPITAL ENCOUNTER (EMERGENCY)
Facility: MEDICAL CENTER | Age: 39
End: 2019-02-04
Attending: EMERGENCY MEDICINE

## 2019-02-04 VITALS
RESPIRATION RATE: 18 BRPM | OXYGEN SATURATION: 97 % | HEIGHT: 63 IN | TEMPERATURE: 97 F | HEART RATE: 82 BPM | SYSTOLIC BLOOD PRESSURE: 163 MMHG | DIASTOLIC BLOOD PRESSURE: 70 MMHG | WEIGHT: 246.25 LBS | BODY MASS INDEX: 43.63 KG/M2

## 2019-02-04 DIAGNOSIS — S46.911A MUSCLE STRAIN OF RIGHT SCAPULAR REGION, INITIAL ENCOUNTER: ICD-10-CM

## 2019-02-04 DIAGNOSIS — S46.811A STRAIN OF RIGHT TRAPEZIUS MUSCLE, INITIAL ENCOUNTER: ICD-10-CM

## 2019-02-04 DIAGNOSIS — S29.012A STRAIN OF RHOMBOID MUSCLE, INITIAL ENCOUNTER: ICD-10-CM

## 2019-02-04 DIAGNOSIS — M25.511 ACUTE PAIN OF RIGHT SHOULDER: ICD-10-CM

## 2019-02-04 LAB
EKG IMPRESSION: NORMAL
HCG UR QL: NEGATIVE

## 2019-02-04 PROCEDURE — A9270 NON-COVERED ITEM OR SERVICE: HCPCS | Performed by: EMERGENCY MEDICINE

## 2019-02-04 PROCEDURE — 99284 EMERGENCY DEPT VISIT MOD MDM: CPT

## 2019-02-04 PROCEDURE — 700111 HCHG RX REV CODE 636 W/ 250 OVERRIDE (IP): Performed by: EMERGENCY MEDICINE

## 2019-02-04 PROCEDURE — 700102 HCHG RX REV CODE 250 W/ 637 OVERRIDE(OP): Performed by: EMERGENCY MEDICINE

## 2019-02-04 PROCEDURE — 93005 ELECTROCARDIOGRAM TRACING: CPT | Performed by: EMERGENCY MEDICINE

## 2019-02-04 PROCEDURE — 81025 URINE PREGNANCY TEST: CPT

## 2019-02-04 PROCEDURE — 96372 THER/PROPH/DIAG INJ SC/IM: CPT

## 2019-02-04 RX ORDER — KETOROLAC TROMETHAMINE 30 MG/ML
60 INJECTION, SOLUTION INTRAMUSCULAR; INTRAVENOUS ONCE
Status: COMPLETED | OUTPATIENT
Start: 2019-02-04 | End: 2019-02-04

## 2019-02-04 RX ORDER — METHOCARBAMOL 750 MG/1
750 TABLET, FILM COATED ORAL 4 TIMES DAILY
Qty: 40 TAB | Refills: 0 | Status: SHIPPED | OUTPATIENT
Start: 2019-02-04 | End: 2019-02-14

## 2019-02-04 RX ORDER — DIAZEPAM 5 MG/1
5 TABLET ORAL ONCE
Status: COMPLETED | OUTPATIENT
Start: 2019-02-04 | End: 2019-02-04

## 2019-02-04 RX ORDER — KETOROLAC TROMETHAMINE 10 MG/1
10 TABLET, FILM COATED ORAL 3 TIMES DAILY PRN
Qty: 15 TAB | Refills: 0 | Status: SHIPPED | OUTPATIENT
Start: 2019-02-04 | End: 2019-02-14

## 2019-02-04 RX ADMIN — DIAZEPAM 5 MG: 5 TABLET ORAL at 03:21

## 2019-02-04 RX ADMIN — KETOROLAC TROMETHAMINE 60 MG: 30 INJECTION, SOLUTION INTRAMUSCULAR at 03:22

## 2019-02-04 NOTE — DISCHARGE INSTRUCTIONS
Alternate between moist heat and ice  Hot tub soaks in Epsom salts  Try icy hot or BenGay to the affected area  Rest and take the medications as directed  Follow-up with your primary care provider this week for recheck.

## 2019-02-04 NOTE — ED PROVIDER NOTES
CHIEF COMPLAINT  Chief Complaint   Patient presents with   • Arm Pain     Right, started three or four days ago, denies any recent injuries   • Neck Pain     Right sided       HPI  Liana Obrien is a 38 y.o. female who presents tonight with her  with a chief complaint of right lateral neck and posterior shoulder pain that started about 3 or 4 days ago.  She denies any injury to her neck or back.  She denies any distal paresthesias or rashes.  She has no complaints of chest pain or shortness of breath.  Her last menstrual cycle was over a month ago and she states she is due to start but she is not sure if she might be pregnant.  She denies any recent productive cough or fever.  REVIEW OF SYSTEMS  See HPI for further details. All other system reviews are negative.    PAST MEDICAL HISTORY  Past Medical History:   Diagnosis Date   • Bipolar disorder ([facility]) 8/9/2018   • Chickenpox    • Herpes    • Hypertension    • Hypothyroidism due to acquired atrophy of thyroid 1/22/2016   • Personal history of venous thrombosis and embolism     DVT in BLE years ago   • Sleep apnea    • Uncontrolled type 2 diabetes mellitus without complication, without long-term current use of insulin ([facility]) 3/12/2015       FAMILY HISTORY  Family History   Problem Relation Age of Onset   • Hypertension Mother    • Sleep Apnea Mother    • Hyperlipidemia Father    • Cancer Maternal Uncle    • Sleep Apnea Brother    • Diabetes Neg Hx    • Heart Disease Neg Hx    • Stroke Neg Hx        SOCIAL HISTORY  Social History     Social History   • Marital status: Single     Spouse name: N/A   • Number of children: N/A   • Years of education: N/A     Social History Main Topics   • Smoking status: Former Smoker     Packs/day: 0.50     Years: 0.30     Types: Cigarettes     Quit date: 2018   • Smokeless tobacco: Never Used      Comment: for 3 months at a time on and off    • Alcohol use 0.0 oz/week   • Drug use: No   • Sexual activity: Yes     Partners:  "Male     Other Topics Concern   • Not on file     Social History Narrative    Works at [employer]        SURGICAL HISTORY  History reviewed. No pertinent surgical history.    CURRENT MEDICATIONS  See nurse's note    ALLERGIES  Allergies   Allergen Reactions   • Bicillin C-R [Penicillin G Proc & Benzathine] Rash     Received inj of Bicillin- reaction was rash. Oral penicillin shows no reaction.   • Metoclopramide Vomiting   • Ondansetron Hives   • Sulfa Drugs Hives              PHYSICAL EXAM  VITAL SIGNS: /108   Pulse 76   Temp 36.6 °C (97.9 °F) (Temporal)   Resp 20   Ht 1.6 m (5' 3\")   Wt 111.7 kg (246 lb 4.1 oz)   LMP 01/04/2019   SpO2 97%   BMI 43.62 kg/m²     Constitutional: Patient is well developed, well nourished in moderate distress from her right posterior shoulder pain  HENT: Normocephalic, atraumatic, Oropharynx moist , nose normal with no drainage.   Eyes: PERRL, EOMI, Conjunctiva without erythema.   Neck: Supple with Normal range of motion in flexion, extension and lateral rotation. No tenderness along the bony prominences or paraspinal muscles.   Lymphatic: No lymphadenopathy noted.   Cardiovascular: Normal heart rate and rhythm. No murmur.  Thorax & Lungs: Clear and equal breath sounds with good excursion. No respiratory distress, no rhonchi, wheezing or rales.  Abdomen: Bowel sounds normal in all four quadrants. Soft morbidly obese, nontender with no rebound, guarding or flank tenderness.  Skin: Warm, Dry, No erythema, No rashes.   Back: No cervical or lumbosacral tenderness.  Positive tenderness on palpation of the right upper thoracic paraspinal muscles extending over to the right scapular region.  Extremities: Peripheral pulses 4/4 No edema, no bony deformities.  Musculoskeletal: Limited range of motion right shoulder secondary to pain in the right trapezius/rhomboid and posterior scapular region.  There are no contusions, abrasions, rashes noted.  There is no swelling or bony " deformities.  She has good radial and ulnar pulses in the right upper extremity.  Equal  strength bilaterally.  Neurologic: Alert & oriented x 3, Normal motor function, Normal sensory function, No lateralizing or focal deficits noted. DTR's 4/4 bilaterally.  Psychiatric: Affect odd, Judgment normal, Mood anxious.     EKG :    Twelve-lead EKG was performed and read by myself to show a normal sinus rhythm at a rate of 73 bpm.  There is no acute ST elevation or depression.  There is no ventricular ectopy.  There is no A-V dissociation or QT prolongation.  Compared to an old EKG from 10/31/2018 there are no acute changes.    COURSE & MEDICAL DECISION MAKING  Pertinent Labs & Imaging studies reviewed. (See chart for details)  Patient received Toradol 60 mg intramuscular and 5 mg of Valium p.o.  She will be sent home with prescriptions for Toradol and Robaxin.  She is to apply ice and alternate with moist heat.  Rest, hot tub soaks in Epsom salts follow-up with her primary care provider this week for recheck.  She is discharged in stable and improved condition.    FINAL IMPRESSION  1.  Right posterior shoulder pain  2.  Right trapezius/rhomboid muscle strain  3.         Electronically signed by: Claudette Mckeon, 2/4/2019 2:56 [facility] Provider Note

## 2019-02-04 NOTE — ED TRIAGE NOTES
"Chief Complaint   Patient presents with   • Arm Pain     Right, started three or four days ago, denies any recent injuries   • Neck Pain     Right sided     /108   Pulse 76   Temp 36.6 °C (97.9 °F) (Temporal)   Resp 20   Ht 1.6 m (5' 3\")   Wt 111.7 kg (246 lb 4.1 oz)   LMP 01/04/2019   SpO2 97%   BMI 43.62 kg/m²     Pt denies c/o CP or abd pain  "

## 2019-02-04 NOTE — ED NOTES
Reviewed discharge instructions and printed prescriptions for Toradol and Robaxin w/ pt, verbalized understanding to information provided including follow up care and return precautions, denied questions/concerns.  Pt ambulated from ED w/ family member.

## 2019-02-11 ENCOUNTER — APPOINTMENT (OUTPATIENT)
Dept: MEDICAL GROUP | Facility: PHYSICIAN GROUP | Age: 39
End: 2019-02-11

## 2019-02-11 ENCOUNTER — OFFICE VISIT (OUTPATIENT)
Dept: ENDOCRINOLOGY | Facility: MEDICAL CENTER | Age: 39
End: 2019-02-11

## 2019-02-11 VITALS
SYSTOLIC BLOOD PRESSURE: 116 MMHG | WEIGHT: 243 LBS | BODY MASS INDEX: 43.05 KG/M2 | OXYGEN SATURATION: 98 % | DIASTOLIC BLOOD PRESSURE: 70 MMHG | HEIGHT: 63 IN | HEART RATE: 117 BPM

## 2019-02-11 DIAGNOSIS — I10 ESSENTIAL HYPERTENSION: ICD-10-CM

## 2019-02-11 LAB
HBA1C MFR BLD: 8.8 % (ref ?–5.8)
INT CON NEG: NEGATIVE
INT CON POS: POSITIVE

## 2019-02-11 PROCEDURE — 83036 HEMOGLOBIN GLYCOSYLATED A1C: CPT | Performed by: PHYSICIAN ASSISTANT

## 2019-02-11 PROCEDURE — 99214 OFFICE O/P EST MOD 30 MIN: CPT | Performed by: PHYSICIAN ASSISTANT

## 2019-02-11 NOTE — PATIENT INSTRUCTIONS
Start:  1.  Tresiba 35 at night before bed (Decrease to 20) Drop if going low  2.  Ozempic 1.0 once a week (Monday) (not on was taken off by weight management)  Ozempic does not cause a low BG reading INSULIN causes low BG.  RESTART Ozempic 1.0   3.  Xigduo 5/1000 one in the Am one in the PM  4.  Actos 30mg one a day
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
## 2019-02-12 ENCOUNTER — OFFICE VISIT (OUTPATIENT)
Dept: HEALTH INFORMATION MANAGEMENT | Facility: MEDICAL CENTER | Age: 39
End: 2019-02-12

## 2019-02-12 VITALS
WEIGHT: 241.8 LBS | HEIGHT: 62 IN | HEART RATE: 87 BPM | OXYGEN SATURATION: 96 % | DIASTOLIC BLOOD PRESSURE: 78 MMHG | BODY MASS INDEX: 44.5 KG/M2 | SYSTOLIC BLOOD PRESSURE: 122 MMHG

## 2019-02-12 DIAGNOSIS — G47.33 OBSTRUCTIVE SLEEP APNEA: Chronic | ICD-10-CM

## 2019-02-12 DIAGNOSIS — I10 ESSENTIAL HYPERTENSION: ICD-10-CM

## 2019-02-12 DIAGNOSIS — E66.01 MORBID OBESITY (HCC): ICD-10-CM

## 2019-02-12 DIAGNOSIS — E55.9 VITAMIN D DEFICIENCY: ICD-10-CM

## 2019-02-12 PROCEDURE — 97803 MED NUTRITION INDIV SUBSEQ: CPT | Performed by: DIETITIAN, REGISTERED

## 2019-02-12 PROCEDURE — 99214 OFFICE O/P EST MOD 30 MIN: CPT | Performed by: INTERNAL MEDICINE
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
## 2019-02-12 NOTE — PROGRESS NOTES
"Nutrition Reassess: Medical Weight Management   Today's date: 2/12/2019  Referring Provider: Afua Vega MD  Time: in/out 8:16-8:31  Visit#: 5    Subjective:   - Liana has been out of work since around the start of the new year. She states she spends her day \"trying to keep busy\"; cleaning, cooking, arts and crafts   - She hurt her shoulder and her back ~2 weeks ago, went to ER and was given a muscle relaxer and a pain medication, she is unsure of the names (Toradol and Robaxin per chart review).   - She was started on Ozempic again and is taking 20u Tresiba BID in AM and HS   - FBG elevated in 250, 270 range   - She states she was unsuccessful in trying to walk for 30 min per week prior to hurting her back    - She is not keeping a food journal and has not for some time, but feels it would be good for her to do again. Prefers a written journal    Diet history:   Breakfast - She told Dr. Hobbs she was doing [employer], but tells me she is having coffee and either sweet bread of some kind or 2 packets flavored oatmeal  Snack - \"tries not to\" but if she does will have \"whatever she can find in the fridge\" which ends up being leftovers or making a quesidilla or hamburger  Lunch - yesterday had [employer] chicken sandwich meal, but gave the fries to her   Snack - usually, Ritz crackers  Dinner - last night; tuna on Ritz crackers  Drinks: water, sweetened raspberry iced tea, coffee    Anthropometrics/Objective  Today's weight:    Vitals:    02/12/19 0802   BP: 122/78   Weight: 109.7 kg (241 lb 12.8 oz)   Height: 1.581 m (5' 2.25\")     BMI:  Body mass index is 43.87 kg/m².  Starting weight/date 254.3 lb     Total weight change : - 13.0 lb            Meal Plan:   Low CHO / high protein / calorie controlled diet per Dr. Hobbs with 0 meal replacements per day     ReAssesment/Notes:  I refreshed Liana on My[employer], and explained it like a check list - no more than 1 check for the CHO group and in the right " portion (1 srvg per label or ~1/2 cup) and checks in protein and NSV groups, OK for more than 1 check in these groups. She will also keep a written journal, focusing her attention on no more than 1 CHO food per meal and their portions.     Follow-up: 2 months w/ MD and 1 mo w/ JACKI

## 2019-02-14 ENCOUNTER — OFFICE VISIT (OUTPATIENT)
Dept: MEDICAL GROUP | Facility: PHYSICIAN GROUP | Age: 39
End: 2019-02-14

## 2019-02-14 VITALS
DIASTOLIC BLOOD PRESSURE: 70 MMHG | TEMPERATURE: 97.3 F | HEIGHT: 63 IN | RESPIRATION RATE: 18 BRPM | HEART RATE: 65 BPM | OXYGEN SATURATION: 95 % | BODY MASS INDEX: 42.7 KG/M2 | SYSTOLIC BLOOD PRESSURE: 116 MMHG | WEIGHT: 241 LBS

## 2019-02-14 DIAGNOSIS — S46.811D STRAIN OF RIGHT TRAPEZIUS MUSCLE, SUBSEQUENT ENCOUNTER: ICD-10-CM

## 2019-02-14 PROCEDURE — 99214 OFFICE O/P EST MOD 30 MIN: CPT | Performed by: FAMILY MEDICINE

## 2019-02-14 RX ORDER — DICLOFENAC SODIUM 75 MG/1
75 TABLET, DELAYED RELEASE ORAL 2 TIMES DAILY PRN
Qty: 45 TAB | Refills: 0 | Status: SHIPPED | OUTPATIENT
Start: 2019-02-14 | End: 2019-05-03

## 2019-02-14 NOTE — PROGRESS NOTES
cc: arm/back pain      Subjective:     Liana Obrien is a 38 y.o. female presenting for the following:     Trapezius muscle pain: patient was seen in ER 10 days ago for muscle pain. Since then, the pain has been slowly improving but she still is getting tightness by the end of the day. No arm tingling, numbness, weakness.     No new pain. No rashes or skin changes.     No relief from robaxin. Mild relief with oral toradol but often forgets to take the noon dose and then having flairs in pain at the end of the day.     Review of systems:  All others reviewed and are negative.       Current Outpatient Prescriptions:   •  diclofenac EC (VOLTAREN) 75 MG Tablet Delayed Response, Take 1 Tab by mouth 2 times a day as needed (pain)., Disp: 45 Tab, Rfl: 0  •  Semaglutide (OZEMPIC) 1 MG/DOSE Solution Pen-injector, Inject 1 mg as instructed every 7 days., Disp: 6 PEN, Rfl: 4  •  Insulin Degludec (TRESIBA FLEXTOUCH) 200 UNIT/ML Solution Pen-injector, Inject 35 Units as instructed every bedtime., Disp: , Rfl:   •  pioglitazone (ACTOS) 30 MG Tab, Take 1 Tab by mouth every day., Disp: 90 Tab, Rfl: 4  •  Dapagliflozin-Metformin HCl ER 5-1000 MG TABLET SR 24 HR, Take 1 tablet by mouth 2 Times a Day., Disp: 180 Tab, Rfl: 4  •  ranitidine (ZANTAC) 150 MG capsule, Take 150 mg by mouth 2 Times a Day., Disp: , Rfl:   •  risperiDONE (RISPERDAL) 1 MG Tab, Take 1 mg by mouth every bedtime., Disp: , Rfl:   •  Probiotic Product (PROBIOTIC ADVANCED PO), Take 2 Tabs by mouth every day., Disp: , Rfl:   •  CRANBERRY EXTRACT PO, Take 1 Cap by mouth every day., Disp: , Rfl:   •  loratadine (CLARITIN) 10 MG Tab, Take 10 mg by mouth every day., Disp: , Rfl:   •  gabapentin (NEURONTIN) 300 MG Cap, Take 300 mg by mouth 3 times a day., Disp: , Rfl:   •  lisinopril (PRINIVIL) 2.5 MG Tab, Take 2.5 mg by mouth every day., Disp: , Rfl:   •  atorvastatin (LIPITOR) 20 MG Tab, Take 1 Tab by mouth every bedtime., Disp: 30 Tab, Rfl: 0  •  levothyroxine  "(SYNTHROID) 75 MCG Tab, Take 75 mcg by mouth Every morning on an empty stomach., Disp: , Rfl:     Allergies, past medical history, past surgical history, family history, social history reviewed and updated    Objective:     Vitals: /70 (BP Location: Left arm, Patient Position: Sitting, BP Cuff Size: Large adult)   Pulse 65   Temp 36.3 °C (97.3 °F) (Temporal)   Resp 18   Ht 1.6 m (5' 3\")   Wt 109.3 kg (241 lb)   SpO2 95%   BMI 42.69 kg/m²   General: Alert, pleasant, NAD  Musculoskeletal: Right shoulder: shoulder joint with FROM iwthout pain. Patient does have muscle tightness and tenderness along upper, medial insertion of the trapezius. But neck with full ROM and supple. Arm and hand with normal sensation and strength.     Assessment/Plan:     Liana was seen today for follow-up.    Diagnoses and all orders for this visit:    Strain of right trapezius muscle, subsequent encounter  Pain has been slowly getting better. Explained stretches and ROM exercises for the patient to do 2 times daily.   No weakness to suggest full thickness tear and normal neuro exam in arm.   Warned of increased risk of GERD/ulcer with long term NSAID use. Will switch to voltaren EC for ease of twice daily dosing.   Other orders  -     diclofenac EC (VOLTAREN) 75 MG Tablet Delayed Response; Take 1 Tab by mouth 2 times a day as needed (pain).    Return if symptoms worsen or fail to improve.  "

## 2019-03-08 ENCOUNTER — PATIENT MESSAGE (OUTPATIENT)
Dept: HEALTH INFORMATION MANAGEMENT | Facility: OTHER | Age: 39
End: 2019-03-08

## 2019-03-12 ENCOUNTER — HOSPITAL ENCOUNTER (EMERGENCY)
Facility: MEDICAL CENTER | Age: 39
End: 2019-03-13
Attending: EMERGENCY MEDICINE

## 2019-03-12 DIAGNOSIS — B37.9 YEAST INFECTION: ICD-10-CM

## 2019-03-12 DIAGNOSIS — E86.0 DEHYDRATION: ICD-10-CM

## 2019-03-12 DIAGNOSIS — R73.9 HYPERGLYCEMIA: ICD-10-CM

## 2019-03-12 LAB
ANION GAP SERPL CALC-SCNC: 8 MMOL/L (ref 0–11.9)
APPEARANCE UR: ABNORMAL
BACTERIA #/AREA URNS HPF: NEGATIVE /HPF
BASOPHILS # BLD AUTO: 0.7 % (ref 0–1.8)
BASOPHILS # BLD: 0.07 K/UL (ref 0–0.12)
BILIRUB UR QL STRIP.AUTO: NEGATIVE
BUN SERPL-MCNC: 7 MG/DL (ref 8–22)
CALCIUM SERPL-MCNC: 8.7 MG/DL (ref 8.4–10.2)
CHLORIDE SERPL-SCNC: 105 MMOL/L (ref 96–112)
CO2 SERPL-SCNC: 21 MMOL/L (ref 20–33)
COLOR UR: ABNORMAL
CREAT SERPL-MCNC: 0.56 MG/DL (ref 0.5–1.4)
EKG IMPRESSION: NORMAL
EOSINOPHIL # BLD AUTO: 0.31 K/UL (ref 0–0.51)
EOSINOPHIL NFR BLD: 3.1 % (ref 0–6.9)
EPI CELLS #/AREA URNS HPF: ABNORMAL /HPF
EPITHELIAL CELLS RENAL  1715R: ABNORMAL /HPF
ERYTHROCYTE [DISTWIDTH] IN BLOOD BY AUTOMATED COUNT: 40.5 FL (ref 35.9–50)
GLUCOSE BLD-MCNC: 428 MG/DL (ref 65–99)
GLUCOSE SERPL-MCNC: 358 MG/DL (ref 65–99)
GLUCOSE UR STRIP.AUTO-MCNC: >=1000 MG/DL
HCT VFR BLD AUTO: 39.3 % (ref 37–47)
HGB BLD-MCNC: 13.2 G/DL (ref 12–16)
IMM GRANULOCYTES # BLD AUTO: 0.02 K/UL (ref 0–0.11)
IMM GRANULOCYTES NFR BLD AUTO: 0.2 % (ref 0–0.9)
KETONES UR STRIP.AUTO-MCNC: 15 MG/DL
LEUKOCYTE ESTERASE UR QL STRIP.AUTO: NEGATIVE
LYMPHOCYTES # BLD AUTO: 3.59 K/UL (ref 1–4.8)
LYMPHOCYTES NFR BLD: 36.4 % (ref 22–41)
MCH RBC QN AUTO: 27.2 PG (ref 27–33)
MCHC RBC AUTO-ENTMCNC: 33.6 G/DL (ref 33.6–35)
MCV RBC AUTO: 81 FL (ref 81.4–97.8)
MICRO URNS: ABNORMAL
MONOCYTES # BLD AUTO: 0.63 K/UL (ref 0–0.85)
MONOCYTES NFR BLD AUTO: 6.4 % (ref 0–13.4)
NEUTROPHILS # BLD AUTO: 5.23 K/UL (ref 2–7.15)
NEUTROPHILS NFR BLD: 53.2 % (ref 44–72)
NITRITE UR QL STRIP.AUTO: NEGATIVE
NRBC # BLD AUTO: 0 K/UL
NRBC BLD-RTO: 0 /100 WBC
PH UR STRIP.AUTO: 5 [PH]
PLATELET # BLD AUTO: 243 K/UL (ref 164–446)
PMV BLD AUTO: 10.6 FL (ref 9–12.9)
POTASSIUM SERPL-SCNC: 3.2 MMOL/L (ref 3.6–5.5)
PROT UR QL STRIP: 100 MG/DL
RBC # BLD AUTO: 4.85 M/UL (ref 4.2–5.4)
RBC # URNS HPF: >150 /HPF
RBC UR QL AUTO: ABNORMAL
SODIUM SERPL-SCNC: 134 MMOL/L (ref 135–145)
SP GR UR STRIP.AUTO: <=1.005
WBC # BLD AUTO: 9.9 K/UL (ref 4.8–10.8)
WBC #/AREA URNS HPF: ABNORMAL /HPF

## 2019-03-12 PROCEDURE — 85025 COMPLETE CBC W/AUTO DIFF WBC: CPT

## 2019-03-12 PROCEDURE — 36415 COLL VENOUS BLD VENIPUNCTURE: CPT

## 2019-03-12 PROCEDURE — 99284 EMERGENCY DEPT VISIT MOD MDM: CPT

## 2019-03-12 PROCEDURE — 81001 URINALYSIS AUTO W/SCOPE: CPT

## 2019-03-12 PROCEDURE — 80048 BASIC METABOLIC PNL TOTAL CA: CPT

## 2019-03-12 PROCEDURE — 82962 GLUCOSE BLOOD TEST: CPT

## 2019-03-12 PROCEDURE — 93005 ELECTROCARDIOGRAM TRACING: CPT | Performed by: EMERGENCY MEDICINE

## 2019-03-12 PROCEDURE — 700105 HCHG RX REV CODE 258: Performed by: EMERGENCY MEDICINE

## 2019-03-12 RX ORDER — SODIUM CHLORIDE 9 MG/ML
1000 INJECTION, SOLUTION INTRAVENOUS ONCE
Status: COMPLETED | OUTPATIENT
Start: 2019-03-12 | End: 2019-03-13

## 2019-03-12 RX ORDER — CLOTRIMAZOLE 1 %
1 CREAM (GRAM) TOPICAL EVERY EVENING
Qty: 1 TUBE | Refills: 0 | Status: SHIPPED | OUTPATIENT
Start: 2019-03-12 | End: 2019-03-22

## 2019-03-12 RX ADMIN — SODIUM CHLORIDE 1000 ML: 9 INJECTION, SOLUTION INTRAVENOUS at 23:10

## 2019-03-13 VITALS
WEIGHT: 241.18 LBS | HEART RATE: 74 BPM | TEMPERATURE: 98.7 F | SYSTOLIC BLOOD PRESSURE: 133 MMHG | BODY MASS INDEX: 44.38 KG/M2 | OXYGEN SATURATION: 98 % | HEIGHT: 62 IN | DIASTOLIC BLOOD PRESSURE: 84 MMHG | RESPIRATION RATE: 16 BRPM

## 2019-03-13 NOTE — ED TRIAGE NOTES
Pt comes in w/   C/o high blood sugars at home and urinary complaints  Has not contacted her MD as of yet however per pt her MD has been changing her med doses and it has not been working  FSBS 428 in triage

## 2019-03-13 NOTE — ED PROVIDER NOTES
ED Provider Note    ER Provider Note         CHIEF COMPLAINT  Chief Complaint   Patient presents with   • Hyperglycemia   • Dizziness   • Urinary Frequency       HPI  Liana Obrien is a 39 y.o. female who presents to the Emergency Department saying that her sugars have been out of control and she has been feeling slightly lightheaded as well as having some urinary frequency.  She also mentions some pain around her vaginal area.  She currently is on her period.  The patient denies any dysuria.  She describes herself as being slightly lightheaded.  She has been eating and drinking normally.  She denies any neck stiffness or pain.  She denies any headache.  She denies any fevers.    REVIEW OF SYSTEMS  See HPI for further details. All other systems are negative.     PAST MEDICAL HISTORY   has a past medical history of Bipolar disorder ([facility]) (8/9/2018); Chickenpox; Herpes; Hypertension; Hypothyroidism due to acquired atrophy of thyroid (1/22/2016); Personal history of venous thrombosis and embolism; Sleep apnea; and Uncontrolled type 2 diabetes mellitus without complication, without long-term current use of insulin ([facility]) (3/12/2015).    SURGICAL HISTORY  patient denies any surgical history    SOCIAL HISTORY  Social History   Substance Use Topics   • Smoking status: Former Smoker     Packs/day: 0.50     Years: 0.30     Types: Cigarettes     Quit date: 2018   • Smokeless tobacco: Never Used      Comment: for 3 months at a time on and off    • Alcohol use No      History   Drug Use No       FAMILY HISTORY  Family History   Problem Relation Age of Onset   • Hypertension Mother    • Sleep Apnea Mother    • Hyperlipidemia Father    • Cancer Maternal Uncle    • Sleep Apnea Brother    • Diabetes Neg Hx    • Heart Disease Neg Hx    • Stroke Neg Hx        CURRENT MEDICATIONS  Home Medications    **Home medications have not yet been reviewed for this encounter**         ALLERGIES  Allergies   Allergen Reactions   • Bicillin  "C-R [Penicillin G Proc & Benzathine] Rash     Received inj of Bicillin- reaction was rash. Oral penicillin shows no reaction.   • Metoclopramide Vomiting   • Ondansetron Hives   • Sulfa Drugs Hives              PHYSICAL EXAM  VITAL SIGNS: /84   Pulse 82   Temp 37.1 °C (98.7 °F) (Temporal)   Resp 16   Ht 1.575 m (5' 2\")   Wt 109.4 kg (241 lb 2.9 oz)   LMP 03/10/2019 (Approximate)   SpO2 95%   BMI 44.11 kg/m²      Constitutional: Alert in no apparent distress.  HENT: No signs of trauma, Bilateral external ears normal, Nose normal.   Eyes: Pupils are equal and reactive, Conjunctiva normal, Non-icteric.   Neck: Normal range of motion, No tenderness, Supple, No stridor.   Lymphatic: No lymphadenopathy noted.   Cardiovascular: Regular rate and rhythm, no murmurs.   Thorax & Lungs: Normal breath sounds, No respiratory distress, No wheezing, No chest tenderness.   Abdomen: Bowel sounds normal, Soft, No tenderness, No masses, No pulsatile masses. No peritoneal signs.  Skin: Warm, Dry, No erythema, No rash.   Back: No bony tenderness, No CVA tenderness.   Extremities: Intact distal pulses, No edema, No tenderness, No cyanosis.  Musculoskeletal: Good range of motion in all major joints. No tenderness to palpation or major deformities noted.   Neurologic: Alert , Normal motor function, Normal sensory function, No focal deficits noted.   Psychiatric: Affect normal, Judgment normal, Mood normal.   : The patient has some redness noted on her labia minora with some satellite lesions present.      DIAGNOSTIC STUDIES / PROCEDURES    EKG Interpretation:  Interpreted by me  No ST-T wave changes and no ectopy with no Brugada syndrome or any hypertrophic cardiomyopathy and no preexcitation and normal intervals         LABS  Labs Reviewed   URINALYSIS,CULTURE IF INDICATED - Abnormal; Notable for the following:        Result Value    Character Cloudy (*)     Glucose >=1000 (*)     Ketones 15 (*)     Protein 100 (*)     " Occult Blood Large (*)     All other components within normal limits   URINE MICROSCOPIC (W/UA) - Abnormal; Notable for the following:     RBC >150 (*)     All other components within normal limits   CBC WITH DIFFERENTIAL - Abnormal; Notable for the following:     MCV 81.0 (*)     All other components within normal limits   BASIC METABOLIC PANEL - Abnormal; Notable for the following:     Sodium 134 (*)     Potassium 3.2 (*)     Glucose 358 (*)     Bun 7 (*)     All other components within normal limits   ACCU-CHEK GLUCOSE - Abnormal; Notable for the following:     Glucose - Accu-Ck 428 (*)     All other components within normal limits   ESTIMATED GFR       All labs reviewed by me.        COURSE & MEDICAL DECISION MAKING  Pertinent Labs & Imaging studies reviewed. (See chart for details)    This is a 39 y.o. female that presents with lightheadedness as well as hyperglycemia.  IV fluids were given to the patient given I was unsure what sort of disease process she may have as well as I am concerned about her hyperglycemia.  I did not want to do p.o. fluids initially given my uncertainty about her disease process.  The patient's EKG shows no arrhythmia genic cause of her  Lightheadedness.    10:06 PM - Patient seen and examined at bedside.    The patient is hyperglycemic at 358.  Her sodium is mildly low at 134.  I believe she is dehydrated therefore fluids were given.  In addition the patient has no infection in her urine but does have red blood cells.  She is on her menses at this time.  She is in much improved after fluids.  I also treat her what appears to be a yeast infection.  However follow-up with a primary care physician.            FINAL IMPRESSION  1. Dehydration    2. Hyperglycemia    3. Yeast infection              Electronically signed by: Emre Rollins, 3/12/2019  \

## 2019-03-16 ENCOUNTER — HOSPITAL ENCOUNTER (EMERGENCY)
Facility: MEDICAL CENTER | Age: 39
End: 2019-03-17
Attending: EMERGENCY MEDICINE

## 2019-03-16 ENCOUNTER — APPOINTMENT (OUTPATIENT)
Dept: RADIOLOGY | Facility: MEDICAL CENTER | Age: 39
End: 2019-03-16
Attending: EMERGENCY MEDICINE

## 2019-03-16 DIAGNOSIS — Z79.4 UNCONTROLLED TYPE 2 DIABETES MELLITUS WITH HYPERGLYCEMIA, WITH LONG-TERM CURRENT USE OF INSULIN (HCC): ICD-10-CM

## 2019-03-16 DIAGNOSIS — B34.9 ACUTE VIRAL SYNDROME: ICD-10-CM

## 2019-03-16 DIAGNOSIS — E86.0 DEHYDRATION: ICD-10-CM

## 2019-03-16 DIAGNOSIS — E11.65 UNCONTROLLED TYPE 2 DIABETES MELLITUS WITH HYPERGLYCEMIA, WITH LONG-TERM CURRENT USE OF INSULIN (HCC): ICD-10-CM

## 2019-03-16 LAB
ALBUMIN SERPL BCP-MCNC: 3.7 G/DL (ref 3.2–4.9)
ALBUMIN/GLOB SERPL: 1 G/DL
ALP SERPL-CCNC: 70 U/L (ref 30–99)
ALT SERPL-CCNC: 40 U/L (ref 2–50)
ANION GAP SERPL CALC-SCNC: 8 MMOL/L (ref 0–11.9)
APPEARANCE UR: CLEAR
AST SERPL-CCNC: 20 U/L (ref 12–45)
B-OH-BUTYR SERPL-MCNC: 0.17 MMOL/L (ref 0.02–0.27)
BASOPHILS # BLD AUTO: 0.7 % (ref 0–1.8)
BASOPHILS # BLD: 0.06 K/UL (ref 0–0.12)
BILIRUB SERPL-MCNC: 0.5 MG/DL (ref 0.1–1.5)
BILIRUB UR QL STRIP.AUTO: NEGATIVE
BUN SERPL-MCNC: 17 MG/DL (ref 8–22)
CALCIUM SERPL-MCNC: 9.1 MG/DL (ref 8.4–10.2)
CHLORIDE SERPL-SCNC: 99 MMOL/L (ref 96–112)
CO2 SERPL-SCNC: 23 MMOL/L (ref 20–33)
COLOR UR: YELLOW
CREAT SERPL-MCNC: 0.48 MG/DL (ref 0.5–1.4)
EOSINOPHIL # BLD AUTO: 0.43 K/UL (ref 0–0.51)
EOSINOPHIL NFR BLD: 4.8 % (ref 0–6.9)
EPI CELLS #/AREA URNS HPF: NORMAL /HPF
ERYTHROCYTE [DISTWIDTH] IN BLOOD BY AUTOMATED COUNT: 39.2 FL (ref 35.9–50)
GLOBULIN SER CALC-MCNC: 3.8 G/DL (ref 1.9–3.5)
GLUCOSE SERPL-MCNC: 461 MG/DL (ref 65–99)
GLUCOSE UR STRIP.AUTO-MCNC: >=1000 MG/DL
HCG UR QL: NEGATIVE
HCT VFR BLD AUTO: 38.2 % (ref 37–47)
HGB BLD-MCNC: 12.8 G/DL (ref 12–16)
IMM GRANULOCYTES # BLD AUTO: 0.02 K/UL (ref 0–0.11)
IMM GRANULOCYTES NFR BLD AUTO: 0.2 % (ref 0–0.9)
KETONES UR STRIP.AUTO-MCNC: 15 MG/DL
LEUKOCYTE ESTERASE UR QL STRIP.AUTO: NEGATIVE
LYMPHOCYTES # BLD AUTO: 3.82 K/UL (ref 1–4.8)
LYMPHOCYTES NFR BLD: 42.3 % (ref 22–41)
MCH RBC QN AUTO: 27.1 PG (ref 27–33)
MCHC RBC AUTO-ENTMCNC: 33.5 G/DL (ref 33.6–35)
MCV RBC AUTO: 80.8 FL (ref 81.4–97.8)
MICRO URNS: ABNORMAL
MONOCYTES # BLD AUTO: 0.64 K/UL (ref 0–0.85)
MONOCYTES NFR BLD AUTO: 7.1 % (ref 0–13.4)
NEUTROPHILS # BLD AUTO: 4.07 K/UL (ref 2–7.15)
NEUTROPHILS NFR BLD: 44.9 % (ref 44–72)
NITRITE UR QL STRIP.AUTO: NEGATIVE
NRBC # BLD AUTO: 0 K/UL
NRBC BLD-RTO: 0 /100 WBC
PH UR STRIP.AUTO: 5.5 [PH]
PLATELET # BLD AUTO: 251 K/UL (ref 164–446)
PMV BLD AUTO: 10.5 FL (ref 9–12.9)
POTASSIUM SERPL-SCNC: 3.6 MMOL/L (ref 3.6–5.5)
PROT SERPL-MCNC: 7.5 G/DL (ref 6–8.2)
PROT UR QL STRIP: NEGATIVE MG/DL
RBC # BLD AUTO: 4.73 M/UL (ref 4.2–5.4)
RBC # URNS HPF: NORMAL /HPF
RBC UR QL AUTO: ABNORMAL
SODIUM SERPL-SCNC: 130 MMOL/L (ref 135–145)
SP GR UR REFRACTOMETRY: 1.03
SP GR UR REFRACTOMETRY: 1.03
WBC # BLD AUTO: 9 K/UL (ref 4.8–10.8)
WBC #/AREA URNS HPF: NORMAL /HPF

## 2019-03-16 PROCEDURE — 700102 HCHG RX REV CODE 250 W/ 637 OVERRIDE(OP): Performed by: EMERGENCY MEDICINE

## 2019-03-16 PROCEDURE — 96374 THER/PROPH/DIAG INJ IV PUSH: CPT

## 2019-03-16 PROCEDURE — 80053 COMPREHEN METABOLIC PANEL: CPT

## 2019-03-16 PROCEDURE — 82010 KETONE BODYS QUAN: CPT

## 2019-03-16 PROCEDURE — 36415 COLL VENOUS BLD VENIPUNCTURE: CPT

## 2019-03-16 PROCEDURE — 700105 HCHG RX REV CODE 258: Performed by: EMERGENCY MEDICINE

## 2019-03-16 PROCEDURE — 93971 EXTREMITY STUDY: CPT | Mod: RT

## 2019-03-16 PROCEDURE — 85025 COMPLETE CBC W/AUTO DIFF WBC: CPT

## 2019-03-16 PROCEDURE — 81025 URINE PREGNANCY TEST: CPT

## 2019-03-16 PROCEDURE — 82962 GLUCOSE BLOOD TEST: CPT

## 2019-03-16 PROCEDURE — 81001 URINALYSIS AUTO W/SCOPE: CPT

## 2019-03-16 PROCEDURE — 99285 EMERGENCY DEPT VISIT HI MDM: CPT

## 2019-03-16 RX ORDER — SODIUM CHLORIDE 9 MG/ML
1000 INJECTION, SOLUTION INTRAVENOUS ONCE
Status: COMPLETED | OUTPATIENT
Start: 2019-03-16 | End: 2019-03-17

## 2019-03-16 RX ADMIN — INSULIN HUMAN 5 UNITS: 100 INJECTION, SOLUTION PARENTERAL at 23:41

## 2019-03-16 RX ADMIN — SODIUM CHLORIDE 1000 ML: 9 INJECTION, SOLUTION INTRAVENOUS at 23:05

## 2019-03-17 VITALS
HEIGHT: 62 IN | TEMPERATURE: 96.7 F | WEIGHT: 242.95 LBS | SYSTOLIC BLOOD PRESSURE: 152 MMHG | OXYGEN SATURATION: 98 % | RESPIRATION RATE: 18 BRPM | DIASTOLIC BLOOD PRESSURE: 101 MMHG | BODY MASS INDEX: 44.71 KG/M2 | HEART RATE: 82 BPM

## 2019-03-17 LAB
GLUCOSE BLD-MCNC: 283 MG/DL (ref 65–99)
GLUCOSE BLD-MCNC: 340 MG/DL (ref 65–99)
GLUCOSE BLD-MCNC: 394 MG/DL (ref 65–99)

## 2019-03-17 PROCEDURE — 82962 GLUCOSE BLOOD TEST: CPT

## 2019-03-17 NOTE — ED TRIAGE NOTES
Pt bib family with c/o urinary incontinence and urgency for the past 2-3 weeks. Pt reports that she was seen on 3/12 for the same problems but has not experienced any resolution of s/s

## 2019-03-17 NOTE — ED NOTES
Discharge instructions provided.  Pt verbalized the understanding of discharge instructions to follow up with PCP and to return to ER if condition worsens.

## 2019-03-17 NOTE — ED PROVIDER NOTES
"ED Provider Note    ED Provider Note    Scribed for Angeles Venegas MD by Angeles Venegas. 3/16/2019, 10:19 PM.    Primary care provider: Gary Caal M.D.  Means of arrival: Private  History obtained from: Patient and SO  History limited by: None    CHIEF COMPLAINT  Chief Complaint   Patient presents with   • Incontinence   • Urinary Frequency       HPI  Liana Obrien is a 39 y.o. female who presents to the Emergency Department for evaluation of urinary frequency and urgency.  She relates this has been getting generally worse for the past week.  Patient notes mild dysuria but primarily urinary urgency, hesitancy, to the extent she is occasionally experiencing \"accidents\".  She does relate mild nausea but no persistent vomiting, no cough or sputum production, no fever endorses chills and generalized fatigue.  She relates a lightheaded sensation but denies any syncope.  She notes she had similar symptoms when she was seen in the second and found to have impressive hyperglycemia.  Blood sugar at that point was over 400.  She relates last 2 days for maximum blood sugars been essentially to 75.  She is a type II diabetic but does require insulin, she notes no known missed doses or recent medication changes, no recent travel or particular ill contacts though her  has had \"a cold\".  Patient herself relates no cough, no nasal congestion, no sore throat, no other change in bowel or bladder habitus, generalized mild body aches but no focal chest or abdominal pain.  She is not anticoagulated but does note a history of DVT and relates discomfort to the right calf for the last week as well without trauma.  No left-sided symptoms and no edema.    REVIEW OF SYSTEMS  Pertinent positives include urinary urgency and hesitancy, fatigue, dizziness. Pertinent negatives include no chest pain, no abdominal pain, no actual fever, no focal weakness or numbness to either side of the body or face, no syncope, no " hematuria.  All other systems reviewed and negative.    PAST MEDICAL HISTORY   has a past medical history of Bipolar disorder ([facility]) (8/9/2018); Chickenpox; Herpes; Hypertension; Hypothyroidism due to acquired atrophy of thyroid (1/22/2016); Personal history of venous thrombosis and embolism; Sleep apnea; and Uncontrolled type 2 diabetes mellitus without complication, without long-term current use of insulin ([facility]) (3/12/2015).    SURGICAL HISTORY  patient denies any surgical history    SOCIAL HISTORY  Social History   Substance Use Topics   • Smoking status: Former Smoker     Packs/day: 0.50     Years: 0.30     Types: Cigarettes     Quit date: 2018   • Smokeless tobacco: Never Used      Comment: for 3 months at a time on and off    • Alcohol use No      History   Drug Use No       FAMILY HISTORY  Family History   Problem Relation Age of Onset   • Hypertension Mother    • Sleep Apnea Mother    • Hyperlipidemia Father    • Cancer Maternal Uncle    • Sleep Apnea Brother    • Diabetes Neg Hx    • Heart Disease Neg Hx    • Stroke Neg Hx        CURRENT MEDICATIONS  Home Medications     Reviewed by Sav Frances R.N. (Registered Nurse) on 03/16/19 at 2133  Med List Status: Partial   Medication Last Dose Status   atorvastatin (LIPITOR) 20 MG Tab  Active   clotrimazole (LOTRIMIN) 1 % Cream  Active   CRANBERRY EXTRACT PO  Active   Dapagliflozin-Metformin HCl ER 5-1000 MG TABLET SR 24 HR  Active   diclofenac EC (VOLTAREN) 75 MG Tablet Delayed Response  Active   gabapentin (NEURONTIN) 300 MG Cap  Active   Insulin Degludec (TRESIBA FLEXTOUCH) 200 UNIT/ML Solution Pen-injector  Active   levothyroxine (SYNTHROID) 75 MCG Tab  Active   lisinopril (PRINIVIL) 2.5 MG Tab  Active   loratadine (CLARITIN) 10 MG Tab  Active   pioglitazone (ACTOS) 30 MG Tab  Active   Probiotic Product (PROBIOTIC ADVANCED PO)  Active   ranitidine (ZANTAC) 150 MG capsule  Active   risperiDONE (RISPERDAL) 1 MG Tab  Active   Semaglutide (OZEMPIC) 1 MG/DOSE  "Solution Pen-injector  Active                ALLERGIES  Allergies   Allergen Reactions   • Bicillin C-R [Penicillin G Proc & Benzathine] Rash     Received inj of Bicillin- reaction was rash. Oral penicillin shows no reaction.   • Metoclopramide Vomiting   • Ondansetron Hives   • Sulfa Drugs Hives              PHYSICAL EXAM  VITAL SIGNS: /101   Pulse 79   Temp 35.9 °C (96.7 °F) (Temporal)   Resp 18   Ht 1.575 m (5' 2\")   Wt 110.2 kg (242 lb 15.2 oz)   LMP 03/10/2019 (Approximate)   SpO2 93%   BMI 44.44 kg/m²     General: Alert, no acute distress  Skin: Warm, dry, normal for ethnicity  Head: Normocephalic, atraumatic  Neck: Trachea midline, no tenderness  Eye: PERRL, normal conjunctiva  ENMT: Oral mucosa pink and dry, no pharyngeal erythema or exudate  Cardiovascular: Regular rate and rhythm, No murmur, Normal peripheral perfusion  Respiratory: Lungs CTA, respirations are non-labored, breath sounds are equal  Gastrointestinal: Soft, nontender, non distended  Musculoskeletal: No swelling, no deformity.  No peripheral edema, there is tenderness throughout the right calf, no erythema, no warmth, no proximal streaking.  2+ DP and PT pulses symmetrical bilaterally.  Neurological: Alert and oriented to person, place, time, and situation  Lymphatics: No lymphadenopathy  Psychiatric: Cooperative, appropriate mood & affect      DIAGNOSTIC STUDIES/PROCEDURES    LABS  Results for orders placed or performed during the hospital encounter of 03/16/19   URINALYSIS CULTURE, IF INDICATED   Result Value Ref Range    Color Yellow     Character Clear     Ph 5.5 5.0 - 8.0    Glucose >=1000 (A) Negative mg/dL    Ketones 15 (A) Negative mg/dL    Protein Negative Negative mg/dL    Bilirubin Negative Negative    Nitrite Negative Negative    Leukocyte Esterase Negative Negative    Occult Blood Trace (A) Negative    Micro Urine Req Microscopic    HCG QUALITATIVE UR   Result Value Ref Range    Beta-Hcg Urine Negative Negative "   REFRACTOMETER SG   Result Value Ref Range    Specific Gravity 1.032    URINE MICROSCOPIC (W/UA)   Result Value Ref Range    WBC Rare /hpf    RBC 0-2 /hpf    Epithelial Cells Rare Few /hpf   CBC w/ Differential   Result Value Ref Range    WBC 9.0 4.8 - 10.8 K/uL    RBC 4.73 4.20 - 5.40 M/uL    Hemoglobin 12.8 12.0 - 16.0 g/dL    Hematocrit 38.2 37.0 - 47.0 %    MCV 80.8 (L) 81.4 - 97.8 fL    MCH 27.1 27.0 - 33.0 pg    MCHC 33.5 (L) 33.6 - 35.0 g/dL    RDW 39.2 35.9 - 50.0 fL    Platelet Count 251 164 - 446 K/uL    MPV 10.5 9.0 - 12.9 fL    Neutrophils-Polys 44.90 44.00 - 72.00 %    Lymphocytes 42.30 (H) 22.00 - 41.00 %    Monocytes 7.10 0.00 - 13.40 %    Eosinophils 4.80 0.00 - 6.90 %    Basophils 0.70 0.00 - 1.80 %    Immature Granulocytes 0.20 0.00 - 0.90 %    Nucleated RBC 0.00 /100 WBC    Neutrophils (Absolute) 4.07 2.00 - 7.15 K/uL    Lymphs (Absolute) 3.82 1.00 - 4.80 K/uL    Monos (Absolute) 0.64 0.00 - 0.85 K/uL    Eos (Absolute) 0.43 0.00 - 0.51 K/uL    Baso (Absolute) 0.06 0.00 - 0.12 K/uL    Immature Granulocytes (abs) 0.02 0.00 - 0.11 K/uL    NRBC (Absolute) 0.00 K/uL   Complete Metabolic Panel (CMP)   Result Value Ref Range    Sodium 130 (L) 135 - 145 mmol/L    Potassium 3.6 3.6 - 5.5 mmol/L    Chloride 99 96 - 112 mmol/L    Co2 23 20 - 33 mmol/L    Anion Gap 8.0 0.0 - 11.9    Glucose 461 (H) 65 - 99 mg/dL    Bun 17 8 - 22 mg/dL    Creatinine 0.48 (L) 0.50 - 1.40 mg/dL    Calcium 9.1 8.4 - 10.2 mg/dL    AST(SGOT) 20 12 - 45 U/L    ALT(SGPT) 40 2 - 50 U/L    Alkaline Phosphatase 70 30 - 99 U/L    Total Bilirubin 0.5 0.1 - 1.5 mg/dL    Albumin 3.7 3.2 - 4.9 g/dL    Total Protein 7.5 6.0 - 8.2 g/dL    Globulin 3.8 (H) 1.9 - 3.5 g/dL    A-G Ratio 1.0 g/dL   BETA-HYDROXYBUTYRIC ACID   Result Value Ref Range    beta-Hydroxybutyric Acid 0.17 0.02 - 0.27 mmol/L   REFRACTOMETER SG   Result Value Ref Range    Specific Gravity 1.032    ESTIMATED GFR   Result Value Ref Range    GFR If  >60 >60  "mL/min/1.73 m 2    GFR If Non African American >60 >60 mL/min/1.73 m 2   ACCU-CHEK GLUCOSE   Result Value Ref Range    Glucose - Accu-Ck 394 (H) 65 - 99 mg/dL   ACCU-CHEK GLUCOSE   Result Value Ref Range    Glucose - Accu-Ck 340 (H) 65 - 99 mg/dL     All labs reviewed by me; improving hyperglycemia noted, slight lymphocyte predominance of CBC, otherwise unremarkable.        RADIOLOGY  US-EXTREMITY VENOUS LOWER UNILAT RIGHT   Final Result      No evidence of right lower extremity deep venous thrombosis.        The radiologist's interpretation of all radiological studies have been reviewed by me.    COURSE & MEDICAL DECISION MAKING  Pertinent Labs & Imaging studies reviewed. (See chart for details)    10:19 PM - Patient seen and examined at bedside. Patient will be treated with IV fluid resuscitation. Ordered metabolic workup including beta hydroxybutyrate and urinalysis to evaluate her symptoms. The differential diagnoses include but are not limited to: Diabetic hyperglycemia, hyperosmolar state, UTI, viral illness    2327: Patient reassessed, updated with labs thus far, there is no evidence of UTI.  Patient does have impressive hyperglycemia with no evidence of DKA or renal insufficiency or marketed I abnormality beyond pseudohyponatremia.  I have ordered cautious dose of IV insulin to go with the IV fluids.  Patient relieved to hear otherwise unremarkable studies.  Stressed importance of follow-up with her outpatient team that manages her diabetes given she has had 2 visits to the emergency department with impressive and symptomatic hyperglycemia.    Patient Vitals for the past 24 hrs:   BP Temp Temp src Pulse Resp SpO2 Height Weight   03/16/19 2300 - - - 89 - 100 % - -   03/16/19 2135 152/101 35.9 °C (96.7 °F) Temporal 93 18 98 % - -   03/16/19 2131 - - - - - - 1.575 m (5' 2\") 110.2 kg (242 lb 15.2 oz)       HYDRATION: Based on the patient's presentation of Dehydration and Hyperglycemia the patient was given IV " fluids. IV Hydration was used because oral hydration was not adequate alone. Upon recheck following hydration, the patient was Feeling better, heart rate improving.  Currently 89.    Decision Making:  This is a 39 y.o. year old female who presents with fatigue and malaise as well as urinary urgency and hesitancy as well as polydipsia and polyuria.  She is a type II diabetic, insulin-dependent.  Patient has dry oral mucous membranes and given concerns of polyuria and polydipsia certainly hypoglycemia is high on the differential.  Indeed urinalysis is impressively positive for glucose.  No evidence of DKA or hyperosmolar coma.  Patient has no evidence of UTI, no leukocytosis, no fever, no evidence of serious bacterial illness.  I suspect perchance her hyperglycemia is exacerbated by a viral illness given she does have a slight lymphocyte predominance on her differential.  Patient otherwise clearly volume depleted, given her hyperglycemia IV fluid resuscitation is indicated.     The patient will return for new or worsening symptoms and is stable at the time of discharge.    Patient has had high blood pressure while in the emergency department, felt likely secondary to medical condition. Counseled patient to monitor blood pressure at home and follow up with primary care physician.     DISPOSITION:  Patient will be discharged home in stable condition.    FOLLOW UP:  Gary Caal M.D.  [address] [place] [id number] [phone]    Call in 2 days        OUTPATIENT MEDICATIONS:  New Prescriptions    No medications on file       FINAL IMPRESSION  1. Uncontrolled type 2 diabetes mellitus with hyperglycemia, with long-term current use of insulin ([facility])    2. Dehydration    3. Acute viral syndrome          Angeles STONE), am scribing for, and in the presence of, Angeles Venegas MD.    Electronically signed by: Angeles Venegas ([place]), 3/16/2019    Angeles STONE MD  personally performed the services described in this documentation, as scribed by Angeles Venegas in my presence, and it is both accurate and complete    The note accurately reflects work and decisions made by me.  Angeles Venegas  3/16/2019  11:28 PM

## 2019-04-07 ENCOUNTER — HOSPITAL ENCOUNTER (EMERGENCY)
Facility: MEDICAL CENTER | Age: 39
End: 2019-04-07
Attending: EMERGENCY MEDICINE

## 2019-04-07 VITALS
HEART RATE: 81 BPM | HEIGHT: 63 IN | TEMPERATURE: 98.4 F | DIASTOLIC BLOOD PRESSURE: 96 MMHG | BODY MASS INDEX: 42.5 KG/M2 | SYSTOLIC BLOOD PRESSURE: 145 MMHG | OXYGEN SATURATION: 96 % | WEIGHT: 239.86 LBS | RESPIRATION RATE: 20 BRPM

## 2019-04-07 DIAGNOSIS — R73.9 ELEVATED BLOOD SUGAR: ICD-10-CM

## 2019-04-07 DIAGNOSIS — N89.8 VAGINAL DISCHARGE: ICD-10-CM

## 2019-04-07 DIAGNOSIS — L03.90 CELLULITIS, UNSPECIFIED CELLULITIS SITE: ICD-10-CM

## 2019-04-07 DIAGNOSIS — N76.0 BACTERIAL VAGINOSIS: ICD-10-CM

## 2019-04-07 DIAGNOSIS — B96.89 BACTERIAL VAGINOSIS: ICD-10-CM

## 2019-04-07 LAB
APPEARANCE UR: ABNORMAL
BACTERIA #/AREA URNS HPF: ABNORMAL /HPF
BACTERIA GENITAL QL WET PREP: NORMAL
BILIRUB UR QL STRIP.AUTO: NEGATIVE
COLOR UR: YELLOW
EPI CELLS #/AREA URNS HPF: ABNORMAL /HPF
GLUCOSE BLD-MCNC: 278 MG/DL (ref 65–99)
GLUCOSE UR STRIP.AUTO-MCNC: >=1000 MG/DL
HCG UR QL: NEGATIVE
KETONES UR STRIP.AUTO-MCNC: ABNORMAL MG/DL
LEUKOCYTE ESTERASE UR QL STRIP.AUTO: ABNORMAL
MICRO URNS: ABNORMAL
MUCOUS THREADS #/AREA URNS HPF: ABNORMAL /HPF
NITRITE UR QL STRIP.AUTO: NEGATIVE
PH UR STRIP.AUTO: 5 [PH]
PROT UR QL STRIP: NEGATIVE MG/DL
RBC # URNS HPF: ABNORMAL /HPF
RBC UR QL AUTO: ABNORMAL
SIGNIFICANT IND 70042: NORMAL
SITE SITE: NORMAL
SOURCE SOURCE: NORMAL
SP GR UR REFRACTOMETRY: >1.035
WBC #/AREA URNS HPF: ABNORMAL /HPF

## 2019-04-07 PROCEDURE — A9270 NON-COVERED ITEM OR SERVICE: HCPCS | Performed by: EMERGENCY MEDICINE

## 2019-04-07 PROCEDURE — 87086 URINE CULTURE/COLONY COUNT: CPT

## 2019-04-07 PROCEDURE — 87491 CHLMYD TRACH DNA AMP PROBE: CPT

## 2019-04-07 PROCEDURE — 82962 GLUCOSE BLOOD TEST: CPT

## 2019-04-07 PROCEDURE — 87077 CULTURE AEROBIC IDENTIFY: CPT

## 2019-04-07 PROCEDURE — 81001 URINALYSIS AUTO W/SCOPE: CPT

## 2019-04-07 PROCEDURE — 99284 EMERGENCY DEPT VISIT MOD MDM: CPT

## 2019-04-07 PROCEDURE — 81025 URINE PREGNANCY TEST: CPT

## 2019-04-07 PROCEDURE — 87591 N.GONORRHOEAE DNA AMP PROB: CPT

## 2019-04-07 PROCEDURE — 700102 HCHG RX REV CODE 250 W/ 637 OVERRIDE(OP): Performed by: EMERGENCY MEDICINE

## 2019-04-07 RX ORDER — CEPHALEXIN 500 MG/1
500 CAPSULE ORAL 4 TIMES DAILY
Qty: 20 CAP | Refills: 0 | Status: SHIPPED | OUTPATIENT
Start: 2019-04-07 | End: 2019-04-12

## 2019-04-07 RX ORDER — METRONIDAZOLE 500 MG/1
500 TABLET ORAL ONCE
Status: COMPLETED | OUTPATIENT
Start: 2019-04-07 | End: 2019-04-07

## 2019-04-07 RX ORDER — METRONIDAZOLE 500 MG/1
500 TABLET ORAL 2 TIMES DAILY
Qty: 14 TAB | Refills: 0 | Status: SHIPPED | OUTPATIENT
Start: 2019-04-07 | End: 2019-04-14

## 2019-04-07 RX ORDER — CEPHALEXIN 250 MG/1
500 CAPSULE ORAL ONCE
Status: COMPLETED | OUTPATIENT
Start: 2019-04-07 | End: 2019-04-07

## 2019-04-07 RX ORDER — FLUCONAZOLE 200 MG/1
200 TABLET ORAL ONCE
Status: COMPLETED | OUTPATIENT
Start: 2019-04-07 | End: 2019-04-07

## 2019-04-07 RX ADMIN — CEPHALEXIN 500 MG: 250 CAPSULE ORAL at 01:59

## 2019-04-07 RX ADMIN — FLUCONAZOLE 200 MG: 200 TABLET ORAL at 01:58

## 2019-04-07 RX ADMIN — METRONIDAZOLE 500 MG: 500 TABLET ORAL at 01:59

## 2019-04-07 NOTE — DISCHARGE INSTRUCTIONS
You were seen in the emergency department for vaginal discharge.  Your urinalysis shows a possible urinary infection.  Your swab shows some evidence of yeast infection but also bacterial vaginosis.  You are being started on therapy to treat these.  The yeast infection is treated with a single dose in the emergency department.  You are being sent home with an antibiotic for the urine which will also treat any possible bacterial skin infections.  For the bacterial vaginosis, you are being started on a medication.  This medication causes a severe reaction with alcohol.  It is very important that you do not drink alcohol while you are taking this medication.    Your blood sugars are elevated.  Please follow-up with your regular doctor about improving her blood sugar control.  Controlling your blood sugars makes further infections less likely.    Please return to the emergency department or seek medical attention if you develop:  Abdominal pain, lightheadedness, dehydration, any other new or concerning findings

## 2019-04-07 NOTE — ED PROVIDER NOTES
"ED Provider Note        History obtained from: Patient, medical record    CHIEF COMPLAINT  Chief Complaint   Patient presents with   • Vaginal Discharge     x 1 week .  the pt is diabetic. \" i have had a yeast infection for one week and can not get rid of it, there is a chance that i could be pregnant \"         HPI  Liana Obrien is a 39 y.o. female who presents with urinary frequency, vaginal itching, vaginal discharge, vaginal pain.  Patient reports that she has been having these symptoms for approximately the past 3 weeks.  She was seen here previously near the start of her symptoms, and was prescribed a cream for this.  She reports that her blood sugars have been running high, however she was able to get better control and now runs roughly in the 170s.  She also reports urinary frequency, occasional incontinence.  She does report a remote history of sexually transmitted infections.    She reports that the discharge from her vagina is cottage cheese in nature.    REVIEW OF SYSTEMS    CONSTITUTIONAL:  No fever.  CARDIOVASCULAR:  No chest discomfort.  RESPIRATORY:  No pleuritic chest pain.  GASTROINTESTINAL:  No abdominal pain.    See HPI for further details.       PAST MEDICAL HISTORY  Past Medical History:   Diagnosis Date   • Bipolar disorder ([facility]) 8/9/2018   • Chickenpox    • Herpes    • Hypertension    • Hypothyroidism due to acquired atrophy of thyroid 1/22/2016   • Personal history of venous thrombosis and embolism     DVT in BLE years ago   • Sleep apnea    • Uncontrolled type 2 diabetes mellitus without complication, without long-term current use of insulin ([facility]) 3/12/2015       FAMILY HISTORY  Family History   Problem Relation Age of Onset   • Hypertension Mother    • Sleep Apnea Mother    • Hyperlipidemia Father    • Cancer Maternal Uncle    • Sleep Apnea Brother    • Diabetes Neg Hx    • Heart Disease Neg Hx    • Stroke Neg Hx        SOCIAL HISTORY   reports that she quit smoking about 15 months " "ago. Her smoking use included Cigarettes. She has a 0.15 pack-year smoking history. She has never used smokeless tobacco. She reports that she does not drink alcohol or use drugs.    SURGICAL HISTORY  History reviewed. No pertinent surgical history.    CURRENT MEDICATIONS  Home Medications     Reviewed by Lauren Franks R.N. (Registered Nurse) on 04/07/19 at 0129  Med List Status: Partial   Medication Last Dose Status   atorvastatin (LIPITOR) 20 MG Tab  Active   CRANBERRY EXTRACT PO  Active   Dapagliflozin-Metformin HCl ER 5-1000 MG TABLET SR 24 HR  Active   diclofenac EC (VOLTAREN) 75 MG Tablet Delayed Response  Active   gabapentin (NEURONTIN) 300 MG Cap  Active   Insulin Degludec (TRESIBA FLEXTOUCH) 200 UNIT/ML Solution Pen-injector  Active   levothyroxine (SYNTHROID) 75 MCG Tab  Active   lisinopril (PRINIVIL) 2.5 MG Tab  Active   loratadine (CLARITIN) 10 MG Tab  Active   pioglitazone (ACTOS) 30 MG Tab  Active   Probiotic Product (PROBIOTIC ADVANCED PO)  Active   ranitidine (ZANTAC) 150 MG capsule  Active   risperiDONE (RISPERDAL) 1 MG Tab  Active   Semaglutide (OZEMPIC) 1 MG/DOSE Solution Pen-injector  Active                ALLERGIES  Allergies   Allergen Reactions   • Bicillin C-R [Penicillin G Proc & Benzathine] Rash     Received inj of Bicillin- reaction was rash. Oral penicillin shows no reaction.   • Metoclopramide Vomiting   • Ondansetron Hives   • Sulfa Drugs Hives              PHYSICAL EXAM  VITAL SIGNS: /90   Pulse 81   Temp 36.9 °C (98.4 °F) (Temporal)   Resp 20   Ht 1.6 m (5' 3\")   Wt 108.8 kg (239 lb 13.8 oz)   LMP 03/10/2019 (Approximate)   SpO2 99%   BMI 42.49 kg/m²    Gen: alert, no acute distress  HENT: ATNC  Eyes: normal conjuctiva  Resp: No resipiratory distress.   CV: No JVD   Abd: Non-distended, nontender  : No CVA tenderness.  External genital exam demonstrates erythema, minimal discharge.  There is tenderness over the labia bilaterally.  Extremities: No " deformity          LABS  Labs Reviewed   REFRACTOMETER SG - Abnormal; Notable for the following:        Result Value    Specific Gravity >1.035 (*)     All other components within normal limits   URINALYSIS,CULTURE IF INDICATED - Abnormal; Notable for the following:     Character Hazy (*)     Glucose >=1000 (*)     Ketones Trace (*)     Leukocyte Esterase Small (*)     Occult Blood Trace (*)     All other components within normal limits   URINE MICROSCOPIC (W/UA) - Abnormal; Notable for the following:     WBC 10-20 (*)     Bacteria Few (*)     All other components within normal limits   ACCU-CHEK GLUCOSE - Abnormal; Notable for the following:     Glucose - Accu-Ck 278 (*)     All other components within normal limits   HCG QUALITATIVE UR   WET PREP   CHLAMYDIA/GC PCR URINE OR SWAB   URINE CULTURE(NEW)        COURSE & MEDICAL DECISION MAKING  Pertinent Labs & Imaging studies reviewed. (See chart for details)    Patient with persistent vaginal itching, discharge that sounds consistent with yeast infection.  She also does have some tenderness and erythema concerning for possible superimposed bacterial infection.  Patient has no pelvic or abdominal pain to suggest PID.  She does have some urinary symptoms.  Will check urinalysis, fingerstick glucose, swab for wet prep and gonorrhea/chlamydia.      1:46 AM  Patient's urinalysis demonstrates elevated white blood cell count, some bacteria.  This concerning for possible infection given her urinary frequency.  Will treat with Keflex as this will treat her urinary tract infection as well as any possible mild cellulitis.  Patient does have clue cells, will treat for bacterial vaginosis as well.  Patient was warned about interaction between alcohol and metronidazole.  She was provided with return precautions and the opportunity ask questions prior to discharge.  She was advised to follow-up with her endocrinologist for improved blood sugar control.  No evidence of DKA at this  time.  No evidence of necrotizing fasciitis.      FINAL IMPRESSION  1. Vaginal discharge    2. Bacterial vaginosis    3. Cellulitis, unspecified cellulitis site    4. Elevated blood sugar

## 2019-04-07 NOTE — ED NOTES
D/c inst reviewed w/ the pt to include f/u w/ pcp for management of blood glucose.  RX x 2.  No etoh w/ rx. The pt verb understanding and denies questions and amb out of the ed w/o diff.

## 2019-04-08 LAB
C TRACH DNA SPEC QL NAA+PROBE: NEGATIVE
N GONORRHOEA DNA SPEC QL NAA+PROBE: NEGATIVE
SPECIMEN SOURCE: NORMAL

## 2019-04-10 NOTE — ED NOTES
"ED Positive Culture Follow-up/Notification Note:    Date: 4/10/19     Patient seen in the ED on 4/7/2019 for urinary frequency, vaginal itching, discharge and pain. Symptoms reported as 3 weeks and patient has history of DM.    1. Vaginal discharge    2. Bacterial vaginosis    3. Cellulitis, unspecified cellulitis site    4. Elevated blood sugar       Patient received cephalexin 500mg orally once, fluconazole 200mg orally once, and metronidazole 500mg orally once before discharge.  Discharge Medication List as of 4/7/2019  2:10 AM      START taking these medications    Details   cephALEXin (KEFLEX) 500 MG Cap Take 1 Cap by mouth 4 times a day for 5 days., Disp-20 Cap, R-0, Normal      metroNIDAZOLE (FLAGYL) 500 MG Tab Take 1 Tab by mouth 2 Times a Day for 7 days., Disp-14 Tab, R-0, Normal             Allergies: Bicillin c-r [penicillin g proc & benzathine]; Metoclopramide; Ondansetron; and Sulfa drugs     Vitals:    04/07/19 0118 04/07/19 0201 04/07/19 0214   BP: 153/90  145/96   Pulse: 81  81   Resp: 19 20    Temp: 36.3 °C (97.4 °F) 36.9 °C (98.4 °F)    TempSrc: Temporal Temporal    SpO2: 99%  96%   Weight: 108.8 kg (239 lb 13.8 oz)     Height: 1.6 m (5' 3\")         Final cultures:   Results     Procedure Component Value Units Date/Time    URINE CULTURE(NEW) [[id number]]  (Abnormal) Collected:  04/07/19 0126    Order Status:  Completed Specimen:  Urine Updated:  04/09/19 0903     Significant Indicator POS (POS)     Source UR     Site -     Urine Culture Mixed skin giovani ,000 cfu/mL (A)      Streptococcus agalactiae (Group B)  10-50,000 cfu/mL   (A)    Narrative:       TEST Urine Culture WAS CANCELLED, 04/07/19 04:19 HIS# [id number]    Chlamydia/GC PCR Urine Or Swab [[id number]] Collected:  04/07/19 0137    Order Status:  Completed Specimen:  Genital from Genital Updated:  04/08/19 1801     C. trachomatis by PCR Negative     N. gonorrhoeae by PCR Negative     Source Endo/Cervical    WET PREP [[id number]] " Collected:  04/07/19 0137    Order Status:  Completed Specimen:  Genital from Vaginal Updated:  04/07/19 0149     Significant Indicator NEG     Source GEN     Site VAGINAL     Wet Prep For Parasites Few yeast.  No motile Trichomonas seen.  Few clue cells seen.  Many WBCs seen.      URINALYSIS,CULTURE IF INDICATED [[id number]]  (Abnormal) Collected:  04/07/19 0126    Order Status:  Completed Updated:  04/07/19 0142     Color Yellow     Character Hazy (A)     Ph 5.0     Glucose >=1000 (A) mg/dL      Ketones Trace (A) mg/dL      Protein Negative mg/dL      Bilirubin Negative     Nitrite Negative     Leukocyte Esterase Small (A)     Occult Blood Trace (A)     Micro Urine Req Microscopic    URINALYSIS,CULTURE IF INDICATED [[id number]] Collected:  04/07/19 0000    Order Status:  Canceled Specimen:  Urine     URINE CULTURE(NEW) [[id number]]     Order Status:  Canceled           Plan:   Antibiotic therapy prescribed likely appropriate. Patient does have a history of DM and infection with this pathogen can occur. However it is also a known colonizer of the genitourinary area and may not representative of true pathogen. Patient also treated for vaginosis symptoms which may be the predominant problem. No change required at this time.      Patricia May, PharmD

## 2019-05-03 ENCOUNTER — APPOINTMENT (OUTPATIENT)
Dept: RADIOLOGY | Facility: MEDICAL CENTER | Age: 39
End: 2019-05-03
Attending: EMERGENCY MEDICINE

## 2019-05-03 ENCOUNTER — HOSPITAL ENCOUNTER (EMERGENCY)
Facility: MEDICAL CENTER | Age: 39
End: 2019-05-03
Attending: EMERGENCY MEDICINE

## 2019-05-03 VITALS
WEIGHT: 231.48 LBS | HEIGHT: 63 IN | HEART RATE: 96 BPM | SYSTOLIC BLOOD PRESSURE: 157 MMHG | OXYGEN SATURATION: 100 % | BODY MASS INDEX: 41.02 KG/M2 | RESPIRATION RATE: 16 BRPM | DIASTOLIC BLOOD PRESSURE: 90 MMHG | TEMPERATURE: 97.8 F

## 2019-05-03 DIAGNOSIS — D25.1 INTRAMURAL LEIOMYOMA OF UTERUS: ICD-10-CM

## 2019-05-03 DIAGNOSIS — N39.0 URINARY TRACT INFECTION ASSOCIATED WITH CATHETERIZATION OF URINARY TRACT, UNSPECIFIED INDWELLING URINARY CATHETER TYPE, INITIAL ENCOUNTER (HCC): ICD-10-CM

## 2019-05-03 DIAGNOSIS — T83.511A URINARY TRACT INFECTION ASSOCIATED WITH CATHETERIZATION OF URINARY TRACT, UNSPECIFIED INDWELLING URINARY CATHETER TYPE, INITIAL ENCOUNTER (HCC): ICD-10-CM

## 2019-05-03 DIAGNOSIS — N93.8 DUB (DYSFUNCTIONAL UTERINE BLEEDING): ICD-10-CM

## 2019-05-03 LAB
APPEARANCE UR: CLEAR
BACTERIA #/AREA URNS HPF: ABNORMAL /HPF
BASOPHILS # BLD AUTO: 1.1 % (ref 0–1.8)
BASOPHILS # BLD: 0.09 K/UL (ref 0–0.12)
BILIRUB UR QL STRIP.AUTO: NEGATIVE
COLOR UR: YELLOW
EOSINOPHIL # BLD AUTO: 0.27 K/UL (ref 0–0.51)
EOSINOPHIL NFR BLD: 3.3 % (ref 0–6.9)
EPI CELLS #/AREA URNS HPF: ABNORMAL /HPF
ERYTHROCYTE [DISTWIDTH] IN BLOOD BY AUTOMATED COUNT: 36.6 FL (ref 35.9–50)
GLUCOSE UR STRIP.AUTO-MCNC: >=1000 MG/DL
HCG SERPL QL: NEGATIVE
HCT VFR BLD AUTO: 40.9 % (ref 37–47)
HGB BLD-MCNC: 13.8 G/DL (ref 12–16)
IMM GRANULOCYTES # BLD AUTO: 0.02 K/UL (ref 0–0.11)
IMM GRANULOCYTES NFR BLD AUTO: 0.2 % (ref 0–0.9)
KETONES UR STRIP.AUTO-MCNC: 15 MG/DL
LEUKOCYTE ESTERASE UR QL STRIP.AUTO: ABNORMAL
LYMPHOCYTES # BLD AUTO: 2.66 K/UL (ref 1–4.8)
LYMPHOCYTES NFR BLD: 33 % (ref 22–41)
MCH RBC QN AUTO: 27.1 PG (ref 27–33)
MCHC RBC AUTO-ENTMCNC: 33.7 G/DL (ref 33.6–35)
MCV RBC AUTO: 80.4 FL (ref 81.4–97.8)
MICRO URNS: ABNORMAL
MONOCYTES # BLD AUTO: 0.51 K/UL (ref 0–0.85)
MONOCYTES NFR BLD AUTO: 6.3 % (ref 0–13.4)
NEUTROPHILS # BLD AUTO: 4.51 K/UL (ref 2–7.15)
NEUTROPHILS NFR BLD: 56.1 % (ref 44–72)
NITRITE UR QL STRIP.AUTO: NEGATIVE
NRBC # BLD AUTO: 0 K/UL
NRBC BLD-RTO: 0 /100 WBC
PH UR STRIP.AUTO: 5 [PH]
PLATELET # BLD AUTO: 285 K/UL (ref 164–446)
PMV BLD AUTO: 10.6 FL (ref 9–12.9)
PROT UR QL STRIP: NEGATIVE MG/DL
RBC # BLD AUTO: 5.09 M/UL (ref 4.2–5.4)
RBC # URNS HPF: ABNORMAL /HPF
RBC UR QL AUTO: ABNORMAL
SP GR UR STRIP.AUTO: <=1.005
WBC # BLD AUTO: 8.1 K/UL (ref 4.8–10.8)
WBC #/AREA URNS HPF: ABNORMAL /HPF

## 2019-05-03 PROCEDURE — 84703 CHORIONIC GONADOTROPIN ASSAY: CPT

## 2019-05-03 PROCEDURE — 700105 HCHG RX REV CODE 258: Performed by: EMERGENCY MEDICINE

## 2019-05-03 PROCEDURE — 76856 US EXAM PELVIC COMPLETE: CPT

## 2019-05-03 PROCEDURE — 81001 URINALYSIS AUTO W/SCOPE: CPT

## 2019-05-03 PROCEDURE — 87086 URINE CULTURE/COLONY COUNT: CPT

## 2019-05-03 PROCEDURE — 36415 COLL VENOUS BLD VENIPUNCTURE: CPT

## 2019-05-03 PROCEDURE — 99284 EMERGENCY DEPT VISIT MOD MDM: CPT

## 2019-05-03 PROCEDURE — 85025 COMPLETE CBC W/AUTO DIFF WBC: CPT

## 2019-05-03 PROCEDURE — 87077 CULTURE AEROBIC IDENTIFY: CPT

## 2019-05-03 RX ORDER — CEPHALEXIN 500 MG/1
500 CAPSULE ORAL 4 TIMES DAILY
Status: SHIPPED | COMMUNITY
Start: 2019-04-07 | End: 2019-05-08

## 2019-05-03 RX ORDER — IBUPROFEN 200 MG
400 TABLET ORAL EVERY 6 HOURS PRN
Status: ON HOLD | COMMUNITY
End: 2019-05-09

## 2019-05-03 RX ORDER — METRONIDAZOLE 500 MG/1
500 TABLET ORAL 2 TIMES DAILY
Status: SHIPPED | COMMUNITY
Start: 2019-04-07 | End: 2019-05-08

## 2019-05-03 RX ORDER — NITROFURANTOIN 25; 75 MG/1; MG/1
100 CAPSULE ORAL 2 TIMES DAILY
Qty: 5 CAP | Refills: 0 | Status: ON HOLD | OUTPATIENT
Start: 2019-05-03 | End: 2019-05-09

## 2019-05-03 RX ORDER — DAPAGLIFLOZIN AND METFORMIN HYDROCHLORIDE 5; 1000 MG/1; MG/1
1 TABLET, FILM COATED, EXTENDED RELEASE ORAL EVERY MORNING
Status: ON HOLD | COMMUNITY
End: 2019-05-09

## 2019-05-03 RX ORDER — SODIUM CHLORIDE 9 MG/ML
1000 INJECTION, SOLUTION INTRAVENOUS ONCE
Status: COMPLETED | OUTPATIENT
Start: 2019-05-03 | End: 2019-05-03

## 2019-05-03 RX ADMIN — SODIUM CHLORIDE 1000 ML: 9 INJECTION, SOLUTION INTRAVENOUS at 09:44

## 2019-05-03 NOTE — ED NOTES
Med Rec complete per pt at bedside  Ok per Pt to discuss medications with visitor/s present  Allergies have been verified and updated  Pt Home Pharmacy:[facility]    Pt reports that she finished a 7 day course of FLAGYL and a 5 day course of KEFLEX that was prescribed to her on 04-

## 2019-05-03 NOTE — ED PROVIDER NOTES
ED Provider Note    CHIEF COMPLAINT  Chief Complaint   Patient presents with   • Vaginal Bleeding   • Pelvic Pain       HPI  Liana Obrien is a 39 y.o. female who presents to the Emergency Department with 3-month history of abnormal bleeding.  She states she was regular for her entire menstrual history until 3 months ago and now she has started having pelvic pain and abnormal bleeding.  She was seen about a month ago and states that she was started on antibiotics Flagyl and Keflex for bacterial vaginosis.  She took these antibiotics.  She was placed on Keflex for group B strep infection.  The patient states she still having some urinary frequency and does wear diapers because of incontinence.  She denies feeling lightheaded chest pain weakness fever or shortness of breath.        REVIEW OF SYSTEMS  Positive for vaginal bleeding urinary frequency urinary incontinence, Negative for back pain shortness of breath abdominal pain.  As above all other systems are negative.    PAST MEDICAL HISTORY   has a past medical history of Bipolar disorder ([facility]) (8/9/2018); Chickenpox; Herpes; Hypertension; Hypothyroidism due to acquired atrophy of thyroid (1/22/2016); Personal history of venous thrombosis and embolism; Sleep apnea; and Uncontrolled type 2 diabetes mellitus without complication, without long-term current use of insulin ([facility]) (3/12/2015).    FAMILY HISTORY  Family History   Problem Relation Age of Onset   • Hypertension Mother    • Sleep Apnea Mother    • Hyperlipidemia Father    • Cancer Maternal Uncle    • Sleep Apnea Brother    • Diabetes Neg Hx    • Heart Disease Neg Hx    • Stroke Neg Hx         SOCIAL HISTORY  Social History     Social History Main Topics   • Smoking status: Former Smoker     Packs/day: 0.50     Years: 0.30     Types: Cigarettes     Quit date: 2018   • Smokeless tobacco: Never Used      Comment: for 3 months at a time on and off    • Alcohol use No   • Drug use: No   • Sexual activity:  "Yes     Partners: Male       SURGICAL HISTORY  patient denies any surgical history    CURRENT MEDICATIONS  Reviewed.  See Encounter Summary.  Include recent treatment with Keflex and Flagyl    ALLERGIES  Allergies   Allergen Reactions   • Bicillin C-R [Penicillin G Proc & Benzathine] Rash     Received inj of Bicillin- reaction was rash. Oral penicillin shows no reaction.   • Ondansetron Hives   • Sulfa Drugs Hives          • Metoclopramide Rash and Vomiting     Rash         PHYSICAL EXAM  VITAL SIGNS: /90   Pulse 96   Temp 36.6 °C (97.8 °F) (Temporal)   Resp 16   Ht 1.6 m (5' 3\")   Wt 105 kg (231 lb 7.7 oz)   SpO2 100%   BMI 41.01 kg/m²   Constitutional: Alert, able to answer questions  HENT: Nose is normal in appearance, external ears are normal,  moist mucous membranes  Eyes: Anicteric,  pupils are equal round and reactive, there is no conjunctival drainage or pallor   Neck: The trachea is midline, there is no obvious mass or meningeal signs  Cardiovascular: Good perfusion, regular rate and rhythm without murmurs gallops or rubs  Thorax & Lungs: Respiratory rate and effort are normal. There is normal chest excursion with respiration.  No wheezes rhonchi or rales noted.  Abdomen: Abdomen is obese normal in appearance, no gross peritoneal signs  normal bowel sounds, no pain with cough  :   No CVA tenderness to palpation  Musculoskeletal: No deformities noted in all 4 extremities.   Skin: Visualized skin is warm without rash.  Neurologic:  Cranial nerves II through XII are intact there is no focal abnormality noted.  Psychiatric: Normal mood and mentation    RADIOLOGY/PROCEDURES  Imaging Studies:    US-PELVIC COMPLETE (TRANSABDOMINAL/TRANSVAGINAL) (COMBO)   Final Result      1.  Multiple uterine fibroids.   2.  Thickening of endometrium, likely due to phase of cycle.  Follow-up recommended.   3.  LEFT ovarian follicles.   4.  No evidence for ovarian torsion.            Pertinent Labs   Results for " orders placed or performed during the hospital encounter of 05/03/19   CBC WITH DIFFERENTIAL   Result Value Ref Range    WBC 8.1 4.8 - 10.8 K/uL    RBC 5.09 4.20 - 5.40 M/uL    Hemoglobin 13.8 12.0 - 16.0 g/dL    Hematocrit 40.9 37.0 - 47.0 %    MCV 80.4 (L) 81.4 - 97.8 fL    MCH 27.1 27.0 - 33.0 pg    MCHC 33.7 33.6 - 35.0 g/dL    RDW 36.6 35.9 - 50.0 fL    Platelet Count 285 164 - 446 K/uL    MPV 10.6 9.0 - 12.9 fL    Neutrophils-Polys 56.10 44.00 - 72.00 %    Lymphocytes 33.00 22.00 - 41.00 %    Monocytes 6.30 0.00 - 13.40 %    Eosinophils 3.30 0.00 - 6.90 %    Basophils 1.10 0.00 - 1.80 %    Immature Granulocytes 0.20 0.00 - 0.90 %    Nucleated RBC 0.00 /100 WBC    Neutrophils (Absolute) 4.51 2.00 - 7.15 K/uL    Lymphs (Absolute) 2.66 1.00 - 4.80 K/uL    Monos (Absolute) 0.51 0.00 - 0.85 K/uL    Eos (Absolute) 0.27 0.00 - 0.51 K/uL    Baso (Absolute) 0.09 0.00 - 0.12 K/uL    Immature Granulocytes (abs) 0.02 0.00 - 0.11 K/uL    NRBC (Absolute) 0.00 K/uL   BETA-HCG QUALITATIVE SERUM   Result Value Ref Range    Beta-Hcg Qualitative Serum Negative Negative   URINALYSIS,CULTURE IF INDICATED   Result Value Ref Range    Color Yellow     Character Clear     Specific Gravity <=1.005 <1.035    Ph 5.0 5.0 - 8.0    Glucose >=1000 (A) Negative mg/dL    Ketones 15 (A) Negative mg/dL    Protein Negative Negative mg/dL    Bilirubin Negative Negative    Nitrite Negative Negative    Leukocyte Esterase Trace (A) Negative    Occult Blood Trace (A) Negative    Micro Urine Req Microscopic    URINE MICROSCOPIC (W/UA)   Result Value Ref Range    WBC 10-20 (A) /hpf    RBC 0-2 /hpf    Bacteria Moderate (A) None /hpf    Epithelial Cells Moderate (A) Few /hpf           COURSE & MEDICAL DECISION MAKING  Nursing notes and vital signs were reviewed. (See chart for details)  The patients  records were reviewed, history was obtained from the patient and ;     The patient presents with vaginal bleeding, and the differential diagnosis  includes but is not limited to dysfunctional uterine bleeding uterine fibroid, unlikely secondary to pregnancy related complaint,.       Initial orders in the Emergency Department included urinalysis ultrasound pelvis and initial treatment     ED testing reveals urine with great 10-20 white blood cells and positive leukocyte esterase she is symptomatic so I will place her on Macrobid.  I believe her dysfunctional uterine bleeding may be secondary to her uterine fibroids.  She has not anemic her vital signs are stable she is not orthostatic.  She does have some endometrial thickening and I have referred her to gynecology for an endometrial biopsy and further work-up I will not place her on hormone therapy pending outpatient work-up which will be needed for proper diagnosis        FINAL IMPRESSION  1.  Dysfunctional uterine bleeding  2.  Urinary tract infection       DISPOSITION  Home in stable condition      FOLLOW UP:  Meche Prescott M.D.  [address] [place] [id number]  [phone]            OUTPATIENT MEDICATIONS:  Discharge Medication List as of 5/3/2019 12:23 PM      START taking these medications    Details   nitrofurantoin monohyd macro (MACROBID) 100 MG Cap Take 1 Cap by mouth 2 times a day., Disp-5 Cap, R-0, Print Rx Paper           The patient verbally agreed to the discharge precautions and follow-up plan which is documented in EPIC.    Electronically signed by: Xena Escoto, 5/3/2019 9:38 AM

## 2019-05-03 NOTE — ED NOTES
"Pt presents accompanied by family.  She C/O recurrent vagina bleeding persisting for at least 3 months with pelvic pain.  She denies a pregnancy status, or any traumatic causative events.  Her medical history is significant for the following conditions:   Bipolar disorder (HCC) 8/9/2018   • Chickenpox     • Herpes     • Hypertension     • Hypothyroidism due to acquired atrophy of thyroid 1/22/2016   • Personal history of venous thrombosis and embolism       DVT in BLE years ago   • Sleep apnea     • Uncontrolled type 2 diabetes mellitus without complication, without long-term current use of insulin ([facility])      Chief Complaint   Patient presents with   • Vaginal Bleeding   • Pelvic Pain   /90   Pulse 78   Temp 36.6 °C (97.8 °F) (Temporal)   Resp 16   Ht 1.6 m (5' 3\")   Wt 105 kg (231 lb 7.7 oz)   SpO2 99%   BMI 41.01 kg/m²       "

## 2019-05-05 NOTE — ED NOTES
ED Positive Culture Follow-up/Notification Note:    Date: 5/5/19     Patient seen in the ED on 5/3/2019 for pelvic pain, abnormal bleeding, and urinary frequency with the use of diapers for incontinence.   1. DUB (dysfunctional uterine bleeding)    2. Intramural leiomyoma of uterus    3. Urinary tract infection associated with catheterization of urinary tract, unspecified indwelling urinary catheter type, initial encounter (HCC)       Discharge Medication List as of 5/3/2019 12:23 PM      START taking these medications    Details   nitrofurantoin monohyd macro (MACROBID) 100 MG Cap Take 1 Cap by mouth 2 times a day., Disp-5 Cap, R-0, Print Rx Paper             Allergies: Bicillin c-r [penicillin g proc & benzathine]; Ondansetron; Sulfa drugs; and Metoclopramide     Vitals:    05/03/19 1030 05/03/19 1034 05/03/19 1054 05/03/19 1209   BP:       Pulse: 82 78 82 96   Resp:       Temp:       TempSrc:       SpO2: 96% 98% 98% 100%   Weight:       Height:           Final cultures:   Results     Procedure Component Value Units Date/Time    URINE CULTURE(NEW) [[id number]]  (Abnormal) Collected:  05/03/19 1000    Order Status:  Completed Specimen:  Urine Updated:  05/05/19 0913     Significant Indicator POS (POS)     Source UR     Site -     Culture Result Mixed skin giovani >100,000 cfu/mL (A)      Streptococcus agalactiae (Group B)  10-50,000 cfu/mL   (A)    Narrative:       [facility]    URINALYSIS,CULTURE IF INDICATED [[id number]]  (Abnormal) Collected:  05/03/19 1000    Order Status:  Completed Specimen:  Urine Updated:  05/03/19 1009     Color Yellow     Character Clear     Specific Gravity <=1.005     Ph 5.0     Glucose >=1000 (A) mg/dL      Ketones 15 (A) mg/dL      Protein Negative mg/dL      Bilirubin Negative     Nitrite Negative     Leukocyte Esterase Trace (A)     Occult Blood Trace (A)     Micro Urine Req Microscopic    URINE CULTURE(NEW) [[id number]]     Order Status:  Canceled           Plan:   Appropriate  antibiotic therapy prescribed to treat empiric UTI based on most predominant pathogens. No changes required based upon culture result. Group B Strep in the urine is a common urogenital colonizer and not likely a true urinary pathogen.     Patricia May, KadieD

## 2019-05-08 ENCOUNTER — HOSPITAL ENCOUNTER (OUTPATIENT)
Facility: MEDICAL CENTER | Age: 39
End: 2019-05-09
Attending: EMERGENCY MEDICINE | Admitting: HOSPITALIST

## 2019-05-08 DIAGNOSIS — E13.65 UNCONTROLLED OTHER SPECIFIED DIABETES MELLITUS WITH HYPERGLYCEMIA (HCC): ICD-10-CM

## 2019-05-08 DIAGNOSIS — B35.6 TINEA CRURIS: ICD-10-CM

## 2019-05-08 DIAGNOSIS — N76.0 VAGINITIS AND VULVOVAGINITIS: ICD-10-CM

## 2019-05-08 LAB
ALBUMIN SERPL BCP-MCNC: 3.3 G/DL (ref 3.2–4.9)
ALBUMIN/GLOB SERPL: 0.8 G/DL
ALP SERPL-CCNC: 85 U/L (ref 30–99)
ALT SERPL-CCNC: 35 U/L (ref 2–50)
ANION GAP SERPL CALC-SCNC: 11 MMOL/L (ref 0–11.9)
APPEARANCE UR: ABNORMAL
AST SERPL-CCNC: 27 U/L (ref 12–45)
B-HCG SERPL-ACNC: <0.6 MIU/ML (ref 0–10)
BACTERIA #/AREA URNS HPF: ABNORMAL /HPF
BACTERIA GENITAL QL WET PREP: NORMAL
BASOPHILS # BLD AUTO: 0.5 % (ref 0–1.8)
BASOPHILS # BLD: 0.07 K/UL (ref 0–0.12)
BILIRUB SERPL-MCNC: 0.4 MG/DL (ref 0.1–1.5)
BILIRUB UR QL STRIP.AUTO: NEGATIVE
BUN SERPL-MCNC: 10 MG/DL (ref 8–22)
CALCIUM SERPL-MCNC: 8.5 MG/DL (ref 8.4–10.2)
CHLORIDE SERPL-SCNC: 98 MMOL/L (ref 96–112)
CO2 SERPL-SCNC: 19 MMOL/L (ref 20–33)
COLOR UR: YELLOW
CREAT SERPL-MCNC: 0.72 MG/DL (ref 0.5–1.4)
EOSINOPHIL # BLD AUTO: 0.27 K/UL (ref 0–0.51)
EOSINOPHIL NFR BLD: 2.1 % (ref 0–6.9)
EPI CELLS #/AREA URNS HPF: ABNORMAL /HPF
ERYTHROCYTE [DISTWIDTH] IN BLOOD BY AUTOMATED COUNT: 36.7 FL (ref 35.9–50)
GLOBULIN SER CALC-MCNC: 3.9 G/DL (ref 1.9–3.5)
GLUCOSE SERPL-MCNC: 519 MG/DL (ref 65–99)
GLUCOSE UR STRIP.AUTO-MCNC: >=1000 MG/DL
HCT VFR BLD AUTO: 40.1 % (ref 37–47)
HGB BLD-MCNC: 13.6 G/DL (ref 12–16)
IMM GRANULOCYTES # BLD AUTO: 0.06 K/UL (ref 0–0.11)
IMM GRANULOCYTES NFR BLD AUTO: 0.5 % (ref 0–0.9)
KETONES UR STRIP.AUTO-MCNC: 15 MG/DL
LEUKOCYTE ESTERASE UR QL STRIP.AUTO: NEGATIVE
LYMPHOCYTES # BLD AUTO: 3.56 K/UL (ref 1–4.8)
LYMPHOCYTES NFR BLD: 27.7 % (ref 22–41)
MCH RBC QN AUTO: 27.3 PG (ref 27–33)
MCHC RBC AUTO-ENTMCNC: 33.9 G/DL (ref 33.6–35)
MCV RBC AUTO: 80.4 FL (ref 81.4–97.8)
MICRO URNS: ABNORMAL
MONOCYTES # BLD AUTO: 0.92 K/UL (ref 0–0.85)
MONOCYTES NFR BLD AUTO: 7.2 % (ref 0–13.4)
MUCOUS THREADS #/AREA URNS HPF: ABNORMAL /HPF
NEUTROPHILS # BLD AUTO: 7.97 K/UL (ref 2–7.15)
NEUTROPHILS NFR BLD: 62 % (ref 44–72)
NITRITE UR QL STRIP.AUTO: NEGATIVE
NRBC # BLD AUTO: 0 K/UL
NRBC BLD-RTO: 0 /100 WBC
PH UR STRIP.AUTO: 5.5 [PH]
PLATELET # BLD AUTO: 268 K/UL (ref 164–446)
PMV BLD AUTO: 10.7 FL (ref 9–12.9)
POTASSIUM SERPL-SCNC: 3.6 MMOL/L (ref 3.6–5.5)
PROT SERPL-MCNC: 7.2 G/DL (ref 6–8.2)
PROT UR QL STRIP: NEGATIVE MG/DL
RBC # BLD AUTO: 4.99 M/UL (ref 4.2–5.4)
RBC # URNS HPF: ABNORMAL /HPF
RBC UR QL AUTO: ABNORMAL
SIGNIFICANT IND 70042: NORMAL
SITE SITE: NORMAL
SODIUM SERPL-SCNC: 128 MMOL/L (ref 135–145)
SOURCE SOURCE: NORMAL
SP GR UR STRIP.AUTO: 1.01
WBC # BLD AUTO: 12.9 K/UL (ref 4.8–10.8)
WBC #/AREA URNS HPF: ABNORMAL /HPF

## 2019-05-08 PROCEDURE — 87086 URINE CULTURE/COLONY COUNT: CPT

## 2019-05-08 PROCEDURE — 85025 COMPLETE CBC W/AUTO DIFF WBC: CPT

## 2019-05-08 PROCEDURE — 87491 CHLMYD TRACH DNA AMP PROBE: CPT

## 2019-05-08 PROCEDURE — 84702 CHORIONIC GONADOTROPIN TEST: CPT

## 2019-05-08 PROCEDURE — 80053 COMPREHEN METABOLIC PANEL: CPT

## 2019-05-08 PROCEDURE — 87077 CULTURE AEROBIC IDENTIFY: CPT

## 2019-05-08 PROCEDURE — 36415 COLL VENOUS BLD VENIPUNCTURE: CPT

## 2019-05-08 PROCEDURE — 87186 SC STD MICRODIL/AGAR DIL: CPT

## 2019-05-08 PROCEDURE — 81001 URINALYSIS AUTO W/SCOPE: CPT

## 2019-05-08 PROCEDURE — 87591 N.GONORRHOEAE DNA AMP PROB: CPT

## 2019-05-08 PROCEDURE — G0378 HOSPITAL OBSERVATION PER HR: HCPCS

## 2019-05-08 PROCEDURE — 99219 PR INITIAL OBSERVATION CARE,LEVL II: CPT | Performed by: HOSPITALIST

## 2019-05-08 PROCEDURE — 99285 EMERGENCY DEPT VISIT HI MDM: CPT

## 2019-05-08 RX ORDER — GABAPENTIN 300 MG/1
300 CAPSULE ORAL 2 TIMES DAILY
Status: DISCONTINUED | OUTPATIENT
Start: 2019-05-09 | End: 2019-05-09 | Stop reason: HOSPADM

## 2019-05-08 RX ORDER — POLYETHYLENE GLYCOL 3350 17 G/17G
1 POWDER, FOR SOLUTION ORAL
Status: DISCONTINUED | OUTPATIENT
Start: 2019-05-08 | End: 2019-05-09 | Stop reason: HOSPADM

## 2019-05-08 RX ORDER — ATORVASTATIN CALCIUM 10 MG/1
20 TABLET, FILM COATED ORAL
Status: DISCONTINUED | OUTPATIENT
Start: 2019-05-09 | End: 2019-05-09 | Stop reason: HOSPADM

## 2019-05-08 RX ORDER — LISINOPRIL 5 MG/1
2.5 TABLET ORAL DAILY
Status: DISCONTINUED | OUTPATIENT
Start: 2019-05-09 | End: 2019-05-09 | Stop reason: HOSPADM

## 2019-05-08 RX ORDER — LEVOTHYROXINE SODIUM 0.05 MG/1
75 TABLET ORAL
Status: DISCONTINUED | OUTPATIENT
Start: 2019-05-09 | End: 2019-05-09 | Stop reason: HOSPADM

## 2019-05-08 RX ORDER — ACETAMINOPHEN 325 MG/1
650 TABLET ORAL EVERY 6 HOURS PRN
Status: DISCONTINUED | OUTPATIENT
Start: 2019-05-08 | End: 2019-05-09 | Stop reason: HOSPADM

## 2019-05-08 RX ORDER — AMOXICILLIN 250 MG
2 CAPSULE ORAL 2 TIMES DAILY
Status: DISCONTINUED | OUTPATIENT
Start: 2019-05-09 | End: 2019-05-09 | Stop reason: HOSPADM

## 2019-05-08 RX ORDER — BISACODYL 10 MG
10 SUPPOSITORY, RECTAL RECTAL
Status: DISCONTINUED | OUTPATIENT
Start: 2019-05-08 | End: 2019-05-09 | Stop reason: HOSPADM

## 2019-05-09 ENCOUNTER — PATIENT OUTREACH (OUTPATIENT)
Dept: HEALTH INFORMATION MANAGEMENT | Facility: OTHER | Age: 39
End: 2019-05-09

## 2019-05-09 VITALS
WEIGHT: 233.69 LBS | BODY MASS INDEX: 41.41 KG/M2 | OXYGEN SATURATION: 90 % | HEIGHT: 63 IN | TEMPERATURE: 98.2 F | RESPIRATION RATE: 18 BRPM | DIASTOLIC BLOOD PRESSURE: 81 MMHG | SYSTOLIC BLOOD PRESSURE: 144 MMHG | HEART RATE: 89 BPM

## 2019-05-09 PROBLEM — B37.31 CANDIDA VAGINITIS: Status: ACTIVE | Noted: 2019-05-09

## 2019-05-09 PROBLEM — Z78.9 FAILURE OF OUTPATIENT TREATMENT: Status: ACTIVE | Noted: 2019-05-09

## 2019-05-09 PROBLEM — D72.829 LEUKOCYTOSIS: Status: ACTIVE | Noted: 2019-05-09

## 2019-05-09 LAB
ANION GAP SERPL CALC-SCNC: 5 MMOL/L (ref 0–11.9)
BASOPHILS # BLD AUTO: 0.8 % (ref 0–1.8)
BASOPHILS # BLD: 0.09 K/UL (ref 0–0.12)
BUN SERPL-MCNC: 8 MG/DL (ref 8–22)
C TRACH DNA SPEC QL NAA+PROBE: NEGATIVE
CALCIUM SERPL-MCNC: 8.1 MG/DL (ref 8.4–10.2)
CHLORIDE SERPL-SCNC: 105 MMOL/L (ref 96–112)
CO2 SERPL-SCNC: 21 MMOL/L (ref 20–33)
CREAT SERPL-MCNC: 0.41 MG/DL (ref 0.5–1.4)
EOSINOPHIL # BLD AUTO: 0.3 K/UL (ref 0–0.51)
EOSINOPHIL NFR BLD: 2.8 % (ref 0–6.9)
ERYTHROCYTE [DISTWIDTH] IN BLOOD BY AUTOMATED COUNT: 38.3 FL (ref 35.9–50)
GLUCOSE BLD-MCNC: 204 MG/DL (ref 65–99)
GLUCOSE BLD-MCNC: 266 MG/DL (ref 65–99)
GLUCOSE BLD-MCNC: 305 MG/DL (ref 65–99)
GLUCOSE BLD-MCNC: 414 MG/DL (ref 65–99)
GLUCOSE SERPL-MCNC: 262 MG/DL (ref 65–99)
HCT VFR BLD AUTO: 40.6 % (ref 37–47)
HGB BLD-MCNC: 13.4 G/DL (ref 12–16)
IMM GRANULOCYTES # BLD AUTO: 0.06 K/UL (ref 0–0.11)
IMM GRANULOCYTES NFR BLD AUTO: 0.6 % (ref 0–0.9)
LYMPHOCYTES # BLD AUTO: 3.39 K/UL (ref 1–4.8)
LYMPHOCYTES NFR BLD: 31.4 % (ref 22–41)
MCH RBC QN AUTO: 27.1 PG (ref 27–33)
MCHC RBC AUTO-ENTMCNC: 33 G/DL (ref 33.6–35)
MCV RBC AUTO: 82 FL (ref 81.4–97.8)
MONOCYTES # BLD AUTO: 0.78 K/UL (ref 0–0.85)
MONOCYTES NFR BLD AUTO: 7.2 % (ref 0–13.4)
N GONORRHOEA DNA SPEC QL NAA+PROBE: NEGATIVE
NEUTROPHILS # BLD AUTO: 6.17 K/UL (ref 2–7.15)
NEUTROPHILS NFR BLD: 57.2 % (ref 44–72)
NRBC # BLD AUTO: 0 K/UL
NRBC BLD-RTO: 0 /100 WBC
PLATELET # BLD AUTO: 251 K/UL (ref 164–446)
PMV BLD AUTO: 10.5 FL (ref 9–12.9)
POTASSIUM SERPL-SCNC: 3.1 MMOL/L (ref 3.6–5.5)
RBC # BLD AUTO: 4.95 M/UL (ref 4.2–5.4)
SODIUM SERPL-SCNC: 131 MMOL/L (ref 135–145)
SPECIMEN SOURCE: NORMAL
WBC # BLD AUTO: 10.8 K/UL (ref 4.8–10.8)

## 2019-05-09 PROCEDURE — 700105 HCHG RX REV CODE 258

## 2019-05-09 PROCEDURE — 82962 GLUCOSE BLOOD TEST: CPT | Mod: 91

## 2019-05-09 PROCEDURE — 700102 HCHG RX REV CODE 250 W/ 637 OVERRIDE(OP)

## 2019-05-09 PROCEDURE — 36415 COLL VENOUS BLD VENIPUNCTURE: CPT

## 2019-05-09 PROCEDURE — 99406 BEHAV CHNG SMOKING 3-10 MIN: CPT

## 2019-05-09 PROCEDURE — 96372 THER/PROPH/DIAG INJ SC/IM: CPT

## 2019-05-09 PROCEDURE — 80048 BASIC METABOLIC PNL TOTAL CA: CPT

## 2019-05-09 PROCEDURE — A9270 NON-COVERED ITEM OR SERVICE: HCPCS | Performed by: INTERNAL MEDICINE

## 2019-05-09 PROCEDURE — 700102 HCHG RX REV CODE 250 W/ 637 OVERRIDE(OP): Performed by: HOSPITALIST

## 2019-05-09 PROCEDURE — 99217 PR OBSERVATION CARE DISCHARGE: CPT | Performed by: INTERNAL MEDICINE

## 2019-05-09 PROCEDURE — G0378 HOSPITAL OBSERVATION PER HR: HCPCS

## 2019-05-09 PROCEDURE — A9270 NON-COVERED ITEM OR SERVICE: HCPCS | Performed by: HOSPITALIST

## 2019-05-09 PROCEDURE — 700102 HCHG RX REV CODE 250 W/ 637 OVERRIDE(OP): Performed by: INTERNAL MEDICINE

## 2019-05-09 PROCEDURE — 85025 COMPLETE CBC W/AUTO DIFF WBC: CPT

## 2019-05-09 PROCEDURE — 83036 HEMOGLOBIN GLYCOSYLATED A1C: CPT

## 2019-05-09 RX ORDER — SODIUM CHLORIDE 9 MG/ML
INJECTION, SOLUTION INTRAVENOUS
Status: COMPLETED
Start: 2019-05-09 | End: 2019-05-09

## 2019-05-09 RX ORDER — FLUCONAZOLE 100 MG/1
150 TABLET ORAL
Status: DISCONTINUED | OUTPATIENT
Start: 2019-05-09 | End: 2019-05-09 | Stop reason: HOSPADM

## 2019-05-09 RX ORDER — DEXTROSE MONOHYDRATE 25 G/50ML
25 INJECTION, SOLUTION INTRAVENOUS
Status: DISCONTINUED | OUTPATIENT
Start: 2019-05-09 | End: 2019-05-09

## 2019-05-09 RX ORDER — PROMETHAZINE HYDROCHLORIDE 25 MG/1
25 TABLET ORAL EVERY 6 HOURS PRN
Status: DISCONTINUED | OUTPATIENT
Start: 2019-05-09 | End: 2019-05-09 | Stop reason: HOSPADM

## 2019-05-09 RX ORDER — DEXTROSE MONOHYDRATE 25 G/50ML
25 INJECTION, SOLUTION INTRAVENOUS
Status: DISCONTINUED | OUTPATIENT
Start: 2019-05-09 | End: 2019-05-09 | Stop reason: HOSPADM

## 2019-05-09 RX ORDER — FLUCONAZOLE 150 MG/1
150 TABLET ORAL
Qty: 3 TAB | Refills: 0 | Status: SHIPPED | OUTPATIENT
Start: 2019-05-12 | End: 2019-07-03

## 2019-05-09 RX ORDER — SODIUM CHLORIDE 9 MG/ML
1000 INJECTION, SOLUTION INTRAVENOUS CONTINUOUS
Status: DISCONTINUED | OUTPATIENT
Start: 2019-05-09 | End: 2019-05-09

## 2019-05-09 RX ORDER — POTASSIUM CHLORIDE 20 MEQ/1
40 TABLET, EXTENDED RELEASE ORAL ONCE
Status: COMPLETED | OUTPATIENT
Start: 2019-05-09 | End: 2019-05-09

## 2019-05-09 RX ORDER — TRAMADOL HYDROCHLORIDE 50 MG/1
50 TABLET ORAL EVERY 6 HOURS PRN
Status: DISCONTINUED | OUTPATIENT
Start: 2019-05-09 | End: 2019-05-09 | Stop reason: HOSPADM

## 2019-05-09 RX ADMIN — LISINOPRIL 2.5 MG: 5 TABLET ORAL at 06:03

## 2019-05-09 RX ADMIN — INSULIN HUMAN 8 UNITS: 100 INJECTION, SOLUTION PARENTERAL at 02:58

## 2019-05-09 RX ADMIN — ACETAMINOPHEN 650 MG: 325 TABLET, FILM COATED ORAL at 01:24

## 2019-05-09 RX ADMIN — TRAMADOL HYDROCHLORIDE 50 MG: 50 TABLET, FILM COATED ORAL at 06:02

## 2019-05-09 RX ADMIN — TRAMADOL HYDROCHLORIDE 50 MG: 50 TABLET, FILM COATED ORAL at 12:21

## 2019-05-09 RX ADMIN — INSULIN HUMAN 10 UNITS: 100 INJECTION, SOLUTION PARENTERAL at 00:39

## 2019-05-09 RX ADMIN — GABAPENTIN 300 MG: 300 CAPSULE ORAL at 06:03

## 2019-05-09 RX ADMIN — ATORVASTATIN CALCIUM 20 MG: 10 TABLET, FILM COATED ORAL at 01:07

## 2019-05-09 RX ADMIN — FLUCONAZOLE 150 MG: 100 TABLET ORAL at 01:05

## 2019-05-09 RX ADMIN — INSULIN HUMAN 6 UNITS: 100 INJECTION, SOLUTION PARENTERAL at 10:58

## 2019-05-09 RX ADMIN — Medication 2 TABLET: at 06:03

## 2019-05-09 RX ADMIN — SODIUM CHLORIDE 1000 ML: 9 INJECTION, SOLUTION INTRAVENOUS at 00:38

## 2019-05-09 RX ADMIN — POTASSIUM CHLORIDE 40 MEQ: 1500 TABLET, EXTENDED RELEASE ORAL at 12:21

## 2019-05-09 RX ADMIN — INSULIN HUMAN 4 UNITS: 100 INJECTION, SOLUTION PARENTERAL at 06:46

## 2019-05-09 RX ADMIN — LEVOTHYROXINE SODIUM 75 MCG: 50 TABLET ORAL at 06:03

## 2019-05-09 ASSESSMENT — COGNITIVE AND FUNCTIONAL STATUS - GENERAL
MOBILITY SCORE: 24
SUGGESTED CMS G CODE MODIFIER MOBILITY: CH
SUGGESTED CMS G CODE MODIFIER DAILY ACTIVITY: CH
DAILY ACTIVITIY SCORE: 24

## 2019-05-09 ASSESSMENT — ENCOUNTER SYMPTOMS
COUGH: 0
HEADACHES: 0
DOUBLE VISION: 0
VOMITING: 0
WEAKNESS: 0
SORE THROAT: 0
MYALGIAS: 0
INSOMNIA: 0
BRUISES/BLEEDS EASILY: 0
NAUSEA: 0
BLURRED VISION: 0
DEPRESSION: 0
DIZZINESS: 0
NECK PAIN: 0
SHORTNESS OF BREATH: 0
PALPITATIONS: 0
FEVER: 0

## 2019-05-09 ASSESSMENT — LIFESTYLE VARIABLES
ALCOHOL_USE: NO
EVER_SMOKED: NEVER

## 2019-05-09 ASSESSMENT — PATIENT HEALTH QUESTIONNAIRE - PHQ9
2. FEELING DOWN, DEPRESSED, IRRITABLE, OR HOPELESS: NOT AT ALL
SUM OF ALL RESPONSES TO PHQ9 QUESTIONS 1 AND 2: 0
1. LITTLE INTEREST OR PLEASURE IN DOING THINGS: NOT AT ALL

## 2019-05-09 NOTE — ASSESSMENT & PLAN NOTE
-Patient had multiple work-ups for this problem over the past 2 months including pelvic ultrasound, GC chlamydia etc.  No evidence of PID  -Patient's clinical features are consistent with Candida vaginitis.  -Patient was treated with metronidazole, I suspect for bacterial vaginosis, and she also just finished treatment with Macrobid 4 days ago, both without significant improvement on her symptoms.  -I will start her on fluconazole 150 mg every 72 hours, total of 3 doses since she has significant symptomatology and clinical findings.

## 2019-05-09 NOTE — ED NOTES
Report called and given to Chris Rn   Made aware of pt's FSBS 414 and covered SS insulin 10 units regular given SQ  Pt and Rn made aware that FS needs repeat in 2 hours   IV NS infusing well at present of transfer to 221-2

## 2019-05-09 NOTE — ASSESSMENT & PLAN NOTE
-Patient received metronidazole and Macrobid.  I will start her on fluconazole, suspecting that when she has is actually Candida vaginitis.

## 2019-05-09 NOTE — PROGRESS NOTES
Notified Dr. Hilton, that pt's pain not controlled by tylenol. Pt needs something stronger.       Dr. Hilton will order 50mg tramadol, PO.

## 2019-05-09 NOTE — ED TRIAGE NOTES
Pt comes c/o increased vaginal pain and swelling  Was seen here on Friday and Dx w/UTI   Now pt believes she is getting worse  Pt also states she thinks she has a yeast infection

## 2019-05-09 NOTE — RESPIRATORY CARE
COPD EDUCATION by COPD CLINICAL EDUCATOR  5/9/2019 at 10:30 AM by Marii Obrien     Patient interviewed by COPD education team. Patient refused COPD/ Smoking cessation program at this time. Hx SILVIA.

## 2019-05-09 NOTE — ED NOTES
Darin from Lab called with critical result of glucose 519 at 2309. Critical lab result read back to Darin.   Dr. Cheng notified of critical lab result at 2309.  Critical lab result read back by Dr. Cheng.

## 2019-05-09 NOTE — ASSESSMENT & PLAN NOTE
-Glucose above 500.  -She has ongoing Candida vaginitis over the past 2 months, also lost medical insurance, reason why I suspect she is not quite taking her medication as prescribed.  Also, she has a very unusual type 2 diabetes medication regimen including semaglutide, Tresiba and Xigduo (Dapagliflozin/metformin).  -Hold all home diabetic medication and start regular insulin sliding scale.  -May need to readjust her diabetes medication regimen, considering that she no longer has medical insurance and current medication regimen is very expensive.  -Also, add hemoglobin A1c.

## 2019-05-09 NOTE — H&P
"Hospital Medicine History & Physical Note    Date of Service  5/8/2019    Primary Care Physician  No primary care provider on file.    Consultants  None    Code Status  Full Code    Chief Complaint  Vaginal Itching    History of Presenting Illness  39 y.o. female, morbid obese patient, with uncontrolled type 2 diabetes, hyperlipidemia, who presented to the ER for the fifth time in 2 months complaining of the same problem, come today, on 5/8/2019 for evaluation of ongoing vaginal itching that is getting worse.    VAGINAL ITCHING:  -Onset: 2 months ago.  -Patient has been prescribed metronidazole, and also has been on Macrobid, most recently finished treatment of Macrobid 4 days ago.  No resolution with medications.  -Patient also has associated \"dry/white \"skin, and sometimes foul-smelling.  -She has been tested for GC/chlamydia here in the emergency department on 4/7/2019 and has been negative.  -Pelvic ultrasound was done last time she was seen in the ED, 6 days ago on 5/3/2019.  She has multiple uterine fibroids, thickened endometrium and left ovarian follicles but no evidence of ovarian torsion or any other abnormality.    -Patient has glucose of 519 today.  She states is being \"very hard to control her glucose \".  She also admits missing doses of medications, however states that last night did take her long-acting insulin.  She is currently taking the following medications for diabetes: Semaglutide, Tresiba and Xigduo.    Review of Systems  Review of Systems   Constitutional: Negative for fever.   HENT: Negative for congestion and sore throat.    Eyes: Negative for blurred vision and double vision.   Respiratory: Negative for cough and shortness of breath.    Cardiovascular: Negative for chest pain and palpitations.   Gastrointestinal: Negative for nausea and vomiting.   Genitourinary: Negative for dysuria and urgency.        Vaginal itching   Musculoskeletal: Negative for myalgias and neck pain.   Skin: " Negative for itching and rash.   Neurological: Negative for dizziness, weakness and headaches.   Endo/Heme/Allergies: Does not bruise/bleed easily.   Psychiatric/Behavioral: Negative for depression. The patient does not have insomnia.        Past Medical History   has a past medical history of Bipolar disorder ([facility]) (8/9/2018); Chickenpox; Herpes; Hypertension; Hypothyroidism due to acquired atrophy of thyroid (1/22/2016); Personal history of venous thrombosis and embolism; Sleep apnea; and Uncontrolled type 2 diabetes mellitus without complication, without long-term current use of insulin ([facility]) (3/12/2015).    Surgical History   has no past surgical history on file.     Family History  family history includes Cancer in her maternal uncle; Hyperlipidemia in her father; Hypertension in her mother; Sleep Apnea in her brother and mother.     Social History   reports that she quit smoking about 16 months ago. Her smoking use included Cigarettes. She has a 0.15 pack-year smoking history. She has never used smokeless tobacco. She reports that she does not drink alcohol or use drugs.    Allergies  Allergies   Allergen Reactions   • Bicillin C-R [Penicillin G Proc & Benzathine] Rash     Received inj of Bicillin- reaction was rash. Oral penicillin shows no reaction.   • Ondansetron Hives   • Sulfa Drugs Hives          • Metoclopramide Rash and Vomiting     Rash         Medications  Prior to Admission Medications   Prescriptions Last Dose Informant Patient Reported? Taking?   Dapagliflozin-Metformin HCl ER 5-1000 MG TABLET SR 24 HR 5/8/2019 at Unknown time Patient Yes No   Sig: Take 1 Tab by mouth every morning.   Insulin Degludec (TRESIBA FLEXTOUCH) 200 UNIT/ML Solution Pen-injector 5/8/2019 at Unknown time Patient Yes No   Sig: Inject 34 Units as instructed every bedtime.   Probiotic Product (PROBIOTIC ADVANCED PO) 5/8/2019 at Unknown time Patient Yes No   Sig: Take 2 Caps by mouth every day.   Semaglutide (OZEMPIC) 1  MG/DOSE Solution Pen-injector 5/6/2019 Patient No No   Sig: Inject 1 mg as instructed every 7 days.   atorvastatin (LIPITOR) 20 MG Tab 5/7/2019 at Unknown time Patient No No   Sig: Take 1 Tab by mouth every bedtime.   gabapentin (NEURONTIN) 300 MG Cap 5/8/2019 at Unknown time Patient Yes No   Sig: Take 300 mg by mouth 2 Times a Day.   ibuprofen (MOTRIN) 200 MG Tab  Patient Yes No   Sig: Take 400 mg by mouth every 6 hours as needed for Mild Pain or Headache.   levothyroxine (SYNTHROID) 75 MCG Tab 5/8/2019 at Unknown time Patient Yes No   Sig: Take 75 mcg by mouth Every morning on an empty stomach.   lisinopril (PRINIVIL) 2.5 MG Tab 5/8/2019 at Unknown time Patient Yes No   Sig: Take 2.5 mg by mouth every day.   loratadine (CLARITIN) 10 MG Tab  Patient Yes No   Sig: Take 10 mg by mouth every day.   nitrofurantoin monohyd macro (MACROBID) 100 MG Cap 5/5/2019  No No   Sig: Take 1 Cap by mouth 2 times a day.   ranitidine (ZANTAC) 150 MG capsule 5/8/2019 at Unknown time Patient Yes No   Sig: Take 150 mg by mouth 2 Times a Day.      Facility-Administered Medications: None       Physical Exam  Temp:  [36.7 °C (98 °F)] 36.7 °C (98 °F)  Pulse:  [] 94  Resp:  [20] 20  BP: (140)/(95) 140/95  SpO2:  [93 %-96 %] 93 %    Physical Exam   Constitutional: She is oriented to person, place, and time. She appears well-developed and well-nourished.   HENT:   Head: Normocephalic and atraumatic.   Eyes: Pupils are equal, round, and reactive to light. EOM are normal.   Neck: Normal range of motion. Neck supple.   Cardiovascular: Normal rate and regular rhythm.    Pulmonary/Chest: Effort normal and breath sounds normal.   Abdominal: Soft. Bowel sounds are normal.   Genitourinary:   Genitourinary Comments: External pelvic exam: Vulvar inflammation/erythema with excoriation marks.  Mild vulvar edema with white, thick, clumpy plaque-like lesion seen on the vulvar area.  Minimal thin discharge.   Musculoskeletal: Normal range of motion.  She exhibits no edema.   Neurological: She is alert and oriented to person, place, and time.   Skin: Skin is warm and dry.   Psychiatric: She has a normal mood and affect. Her behavior is normal.       Laboratory:  Recent Labs      05/08/19   2225   WBC  12.9*   RBC  4.99   HEMOGLOBIN  13.6   HEMATOCRIT  40.1   MCV  80.4*   MCH  27.3   MCHC  33.9   RDW  36.7   PLATELETCT  268   MPV  10.7     Recent Labs      05/08/19   2225   SODIUM  128*   POTASSIUM  3.6   CHLORIDE  98   CO2  19*   GLUCOSE  519*   BUN  10   CREATININE  0.72   CALCIUM  8.5     Recent Labs      05/08/19   2225   ALTSGPT  35   ASTSGOT  27   ALKPHOSPHAT  85   TBILIRUBIN  0.4   GLUCOSE  519*                 No results for input(s): TROPONINI in the last 72 hours.    Urinalysis:    Recent Labs      05/08/19 2115   SPECGRAVITY  1.010   GLUCOSEUR  >=1000*   KETONES  15*   NITRITE  Negative   LEUKESTERAS  Negative   WBCURINE  10-20*   RBCURINE  0-2   BACTERIA  Many*   EPITHELCELL  Many*        Imaging:  No orders to display         Assessment/Plan:  I anticipate this patient is appropriate for observation status at this time.    * Uncontrolled type 2 diabetes mellitus without complication, without long-term current use of insulin (HCC)- (present on admission)   Assessment & Plan    -Glucose above 500.  -She has ongoing Candida vaginitis over the past 2 months, also lost medical insurance, reason why I suspect she is not quite taking her medication as prescribed.  Also, she has a very unusual type 2 diabetes medication regimen including semaglutide, Tresiba and Xigduo (Dapagliflozin/metformin).  -Hold all home diabetic medication and start regular insulin sliding scale.  -May need to readjust her diabetes medication regimen, considering that she no longer has medical insurance and current medication regimen is very expensive.  -Also, add hemoglobin A1c.     Leukocytosis   Assessment & Plan    -12.4 on admission, likely reactive.  Will monitor.      Failure  of outpatient treatment   Assessment & Plan    -Patient received metronidazole and Macrobid.  I will start her on fluconazole, suspecting that when she has is actually Candida vaginitis.     Candida vaginitis   Assessment & Plan    -Patient had multiple work-ups for this problem over the past 2 months including pelvic ultrasound, GC chlamydia etc.  No evidence of PID  -Patient's clinical features are consistent with Candida vaginitis.  -Patient was treated with metronidazole, I suspect for bacterial vaginosis, and she also just finished treatment with Macrobid 4 days ago, both without significant improvement on her symptoms.  -I will start her on fluconazole 150 mg every 72 hours, total of 3 doses since she has significant symptomatology and clinical findings.     Hypothyroidism due to acquired atrophy of thyroid- (present on admission)   Assessment & Plan    -Continue levothyroxine.         VTE prophylaxis: SCD's

## 2019-05-09 NOTE — PROGRESS NOTES
Pt is AAO x4.  Pt reports a 4/10 groin pain level.  Understands when next pain med is due.  Pt fatigued this AM.  VS WNL.  L PIV patent, saline locked.  Pt upself.  POC discussed.  All needs met at this time.  Bed in low position.  Call light within reach.  Rounding in place.

## 2019-05-09 NOTE — ED NOTES
Sl placed  Hospitalist at  for evaluation and admission to hospital  Both pt and  aware of POC and in agreement

## 2019-05-09 NOTE — DIETARY
NUTRITION SERVICES: BMI - Pt with BMI >40 (=Body mass index is 41.4 kg/m².). Weight loss counseling not appropriate in acute care setting. RECOMMEND - Referral to outpatient nutrition services for weight management after D/C.

## 2019-05-09 NOTE — DISCHARGE INSTRUCTIONS
"   Grains and Starchy Vegetables  There is no end in sight to the debate as to whether grains help you lose weight, or if they promote weight gain. Even more importantly, do they help or hinder blood glucosethe main sugar found in the blood and the body's main source of energy. Also called blood sugar.X management?  One thing is for sure. If you are going to eat grain foods, pick the ones that are the most nutritious. Choose whole grains. Whole grains are rich in vitamins, minerals, phytochemicals and fiberThe part of food that is hard to digest. Foods high in fiber take longer to digest and therefore affect your blood glucose more slowly (i.e. whole wheat bread, prunes and other vegetables)X.  Reading labels is essential for this food group to make sure you are making the best choices.  Every time you choose to eat a starchy food, make it count! Leave the processed white flour-based products, especially the ones with added sugar1. A class of carbohydrates with a sweet taste, including glucose, fructose and sucrose. 2. A term used to refer to blood glucose.X, on the shelves or use them only for special occasion treats.    What is a Whole Grain?  A whole grain is the entire grain--which includes the bran, germ and endosperm (starchy part).  The most popular grain in the  is wheat so that will be our example. To make 100% whole wheat flour, the entire wheat grain is ground up. \"Refined\" flours like white and enriched wheat flour include only part of the grain - the starchy part, and are not whole grain. They are missing many of the nutrients found in whole wheat flour.  Examples of whole grain wheat products include 100% whole wheat bread, pasta, tortillas, and crackers. But don’t stop there! There are many whole grains to choose from.  Finding whole grain foods can be a challenge. Some foods only contain a small amount of whole grain but will say it contains whole grain on the front of the package. For all cereals " "and grains, read the ingredient list and look for the following sources of whole grains as the first ingredient:  Bulgur (cracked wheat)  Whole wheat flour  Whole oats/oatmeal  Whole grain corn/corn meal  Popcorn  Brown rice  Whole rye  Whole grain barley  Whole farro  Wild rice  Buckwheat  Buckwheat flour  Triticale  Millet  Quinoa  Sorghum  Do you have celiac disease? Check out our gluten-free meal planning section for some essential tips.  Most rolls, breads, cereals, and crackers labeled as \"made with\" or \"containing\" whole grain do not have whole grain as the first ingredient. Read labels carefully to find the most nutritious grain products.  For cereals, pick those with at least 3 grams of fiber per serving and less than 6 grams of sugar.    Starchy Vegetables  Starchy vegetables are great sources of vitamins, minerals and fiber. The best choices do not have added fats, sugar or sodium. While these foods can be part of healthy diet, they do raise blood glucoseThe food you eat gets digested and broken down into a sugar your body's cells can use. This is glucose, one of the simplest forms of sugar.X.  Try a variety such as:  Parsnip  Plantain  Potato  Pumpkin  [place] squash  [place] squash  Green Peas  Corn  Beans, Legumes, Peas and Lentils  Try to include beans into several meals per week. They are a great source of protein1. One of the three main nutrients in food. Foods that provide protein include meat, poultry, fish, cheese, milk, dairy products, eggs, and dried beans. 2. Proteins are also used in the body for cell structure, hormones such as insulin, and other functions.X an<[facility] [place]=\"//[email or web address]-[employer]/action/0?[email or web address]\" height=\"0\" width=\"0\" style=\"display:none; visibility: hidden;\" />         Protein Choices  Plant-Based Proteins  Plant-based protein foods provide quality protein, healthy fats, and fiberThe part of food that is hard to digest. Foods high in fiber take longer to digest and therefore " "affect your blood glucose more slowly (i.e. whole wheat bread, prunes and other vegetables)X. They vary in how much fat and carbohydrate they contain, so make sure to read labels.  Beans such as black, kidney, and forde  Bean products like baked beans and refried beans  Hummus and falafel  Lentils such as brown, green, or yellow  Peas such as black-eyed or split peas  Edamame  Soy nuts  Nuts and spreads like almond butter, cashew butter, or peanut butter  [place], tofu  Products like meatless \"chicken\" nuggets, \"beef\" crumbles, \"burgers\", \"parikh\", \"sausage\", and \"hot dogs\"  Fish and Seafood  Try to include fish at least 2 times per week.  Fish high in omega-3 fatty acids like [place] tuna, herring, mackerel, rainbow trout, sardines, and salmon  Other fish including catfish, cod, flounder, graciela, halibut, orange roughy, and tilapia  Shellfish including clams, crab, imitation shellfish, lobster, scallops, shrimp, oysters.  Poultry  Choose poultry without the skin for less saturated fat and cholesterola type of fat produced by the liver and found in the blood; it is also found in some foods. Cholesterol is used by the body to make hormones and build cell walls.X.  Chicken, turkey, alonso hen  Cheese and Eggs  Reduced-fat cheese or regular cheese in small amounts   Cottage cheese  Whole eggs  Game  [place], ostrich, rabbit, venison  Dove, duck, goose, or pheasant (no skin)  Beef, Pork, Veal, Lamb  It’s best to limit your intake of red meat which is often higher in saturated fat and processed meats like ham, parikh and hot dogs which are often higher in saturated fat and sodium. If you decide to have these, choose the leanest options, which are:  Select or Choice grades of beef trimmed of fat including: lily, rib, rump roast, round, sirloin, cubed, flank, porterhouse, T-bone steak, tenderloin  Lamb: chop, leg, or roast  Veal: loin chop or roast  Pork: [place] parikh, center loin chop, ham, tenderloin    The healthiest " choices of dairy products are:  Fat-free (skim) or low-fatone of the three main nutrients in food. Foods that provide fat are butter, margarine, salad dressing, oil, nuts, meat, poultry, fish and some dairy products. 2. Excess calories are stored as body fat, providing the body with a reserve supply of energy and other functions.X (1% milk)  Yogurt (regular or Greek yogurt)  Some reduced fat cheeses  If you are lactose intolerant or don't like milk, you may want to try fortified soy milk, rice milk, or almond milk as a source of calcium and vitamin D.       What Can I Drink?  Food is often a focus when it comes to diabetes. But don't forget that the beverages you drink can also have an effect on your weight and blood glucosethe main sugar found in the blood and the body's main source of energy. Also called blood sugar.X!  It’s important to stay hydrated and water is simply your best choice when it comes to hydration.  What to Avoid  Avoid sugary drinks like regular soda, fruit punch, fruit drinks, energy drinks, sports drinks, sweet tea, and other sugary drinks. These will raise blood glucoseThe food you eat gets digested and broken down into a sugar your body's cells can use. This is glucose, one of the simplest forms of sugar.X and can provide several hundred calories in just one serving! See for yourself:  One 12-ounce can of regular soda has about 150 calories and 40 grams of carbohydrate. This is the same amount of carbohydrateAnother word for sugars. The main source of energy for the body. Carbs get digested quickly and easily into glucose. Carbs are the foods that affect blood glucose the most. Examples of carbs are fruits, starchy vegetables, breads, pastas, rice, sugar, syrup and honey.X in 10 teaspoons of sugar1. A class of carbohydrates with a sweet taste, including glucose, fructose and sucrose. 2. A term used to refer to blood glucose.X!  One cup of fruit punch and other sugary fruit drinks have about  100 calories (or more) and 30 grams of carbohydrate.  Tired of Water?  There are other options!  Mix it up by choosing unsweetened teas. Hot or cold - black, green, and herbal teas provide lots of variety. You could also try sparkling water or making your own infused water at home. To make infused water, simply put water in the fridge with cucumbers, strawberries, fresh mint for a refreshing low-calorie drink. Get creative and invent your own natural fruit or herbal infusion or buy one of the many 0 caloriea unit representing the energy provided by food. Carbohydrate, protein, fat and alcohol provide calories in the diet. Carbohydrate and protein have 4 calories per gram, fat has 9 calories per gram, and alcohol has 7 calories per gram.X drinks on the market.  Most diet drinks (like diet soda or diet tea) have zero grams of carbohydrate per serving, so they will not raise blood glucose on their own. These diet drinks are sweetened with non-nutritive sweeteners instead of added sugars. Removing the added sugars and replacing them with low-calorie sweeteners removes most of the calories and carbohydrates. It is important to consider that these products may help reduce calorie and carbohydrate intake only if they are used in place of other higher calorie or carbohydrate drinks.    Other low-calorie drinks and drink mixes are available in several flavors. They may be a good alternative to regular lemonade, iced tea, fruit punch, etc. These drinks also use low-calorie sweeteners in place of sugar. They are very low in calories (about 5-10 calories per 8-ounce portion) and have less than 5 grams of carbohydrate per serving.  Coffee and Tea  Plain coffee and tea contain very little calories and carbohydrates and can be part of a healthy diet. Things added to coffee such as cream, sugar, sweetener, and non-dairy creamer can all add calories and carbohydrates, so using a small amount or no additives at all will have the  least impact on blood sugar. Coffee drinks with flavors and syrups can contain too much calories and carbohydrates.  There have been several large research studies indicating that drinking coffee may play a role in preventing type 2 diabetesa condition characterized by high blood glucose levels caused by either a lack of insulin or the body's inability to use insulin efficiently. Type 2 diabetes develops most often in middle-aged and older adults but can appear in young people.X. However, other research has also shown that coffee intake can increase blood sugar in the short term. People with Type 1 diabetesa condition characterized by high blood glucose levels caused by a total lack of insulin. Occurs when the body's immune system attacks the insulin-producing beta cells in the pancreas and destroys them. The pancreas then produces little or no insulin. Type 1 diabetes develops most often in young people but can appear in adults.X may see a rise in blood glucose after drinking coffee alone. More research is needed on the effects of coffee in individuals with diabetes, but it can be enjoyed in moderation.   Milk and Juice  If you drink milk choose low-fat 1% or fat-free milk, and be sure to count it in your meal plan. One cup of fatone of the three main nutrients in food. Foods that provide fat are butter, margarine, salad dressing, oil, nuts, meat, poultry, fish and some dairy products. 2. Excess calories are stored as body fat, providing the body with a reserve supply of energy and other functions.X-free milk provides about 12 grams of carbohydrate, 80 calories, calcium, and vitamin D. If you are lactose intolerant or don’t like milk, other options are fortified soy milk, rice milk, or almond milk.  Juice provides a lot of carbohydrates in a small portion, so be sure to count it in your meal plan. Usually about 4 ounces or less of juice contains at least 15 grams of carbohydrate and 50 or more calories. If you  choose to drink juice, be sure the label says it is 100% juice with no sugar added.    Unhealthy Fats  Saturated Fat  Why should you eat less saturated fat? Because saturated fat raises blood cholesterol levels. High blood cholesterola type of fat produced by the liver and found in the blood; it is also found in some foods. Cholesterol is used by the body to make hormones and build cell walls.X is a risk factoranything that raises the chances of a person developing a disease.X for heart disease. People with diabetes are at high risk for heart disease and limiting your saturated fat can help lower your risk of having a heart attack or strokecondition caused by damage to blood vessels in the brain; may cause loss of ability to speak or to move parts of the body.X.  Foods containing saturated fat include:  Lard  Fatback and salt pork  High-fat meats like regular ground beef, bologna, hot dogs, sausage, parikh and spareribs  High-fat dairy products such as full-fat cheese, cream, ice cream, whole milk, 2% milk and sour cream.  Butter  Cream sauces  Gravy made with meat drippings  Chocolate  Palm oil and palm kernel oil  Coconut and coconut oil  Poultry (chicken and turkey) skin  The goal for people with and without diabetes is to eat less than 10% of calories from saturated fat. For most people, eating this is about 20 grams of saturated fat per day. That is not much when you consider just one ounce of cheese can have 8 grams of saturated fat.  Many adults, especially women or sedentary men, may need less. To find out a specific goal for you, talk with your dietitiana health care professional who advises people about meal planning, weight control and diabetes management. A registered dietitian (RD) has more trainingX or health care provider.  Saturated fat grams are listed on the Nutrition Facts food label under total fat. As a general rule, compare foods with less saturated fat. Foods with 1 grama unit of weight in the  metric system. An ounce equals 28 grams. In some meal plans for people with diabetes, the suggested amounts of food are given in grams.X or less saturated fat per serving are considered low in saturated fat.  Trans Fat  Like saturated fat, trans fat increases blood cholesterol levels. It is actually worse for you than saturated fat and for a heart-healthy diet, you want to eat as little trans fat as possible by avoiding all foods that contain it.  Trans fats are produced when liquid oil is made into a solid fat. This process is called hydrogenation. Trans fats act like saturated fats and can raise your cholesterol level.  Trans fats are listed on the label, making it easier to identify these foods. However, if there is not at least 0.5 grams or more of trans fat in a food, the label can claim 0 grams. If you want to avoid as much trans fat as possible, you must read the ingredient list on food labels. Look for words like hydrogenated oil or partially hydrogenated oil. Select foods that either do not contain hydrogenated oil or where a liquid oil is listed first in the ingredient list.  Sources of trans fat include:  Processed foods like snacks (crackers and chips) and baked goods (muffins, cookies and cakes) with hydrogenated oil or partially hydrogenated oil  Stick margarines  Shortening  Some fast food items such as french fries  Cholesterol  Your body makes some of the cholesterol in your blood. The rest comes from foods you eat. Foods from animals are sources of dietary cholesterol.  Cholesterol from the food you eat may increase your blood cholesterol, so it's a good idea to eat less than 300 mg per day. Cholesterol is required on the label if the food contains it.  Sources of cholesterol include:  High-fat dairy products (whole or 2% milk, cream, ice cream, full-fat cheese)  Egg yolks  Liver and other organ meats  High-fat meat and poultry skin  Healthy Fats  Monounsaturated Fat  Monounsaturated fats are  "called \"good or healthy\" fats because they can lower your bad (LDL) cholesterol. Sources of monounsaturated fat include:  Avocado  Canola oil  Nuts like almonds, cashews, pecans, and peanuts  Olive oil and olives  Peanut butter and peanut oil  Sesame seeds  The Association recommends eating more monounsaturated fats than saturated or trans fats in your diet.  To include more monounsaturated fats, try to substitute olive or canola oil instead of butter, margarine or shortening when cooking. Sprinkling a few nuts or sunflower seeds on a salad, yogurt, or cereal is an easy way to eat more monounsaturated fats.  But be careful! Nuts and oils are high in calories, like all fats. If you are trying to lose or maintain your weight, you want to eat small portions of these foods. For example, 6 almonds or 4 pecan halves have the same number of calories as 1 teaspoon of oil or butter.  Work with your dietitian to include healthy fats into your meal plan without increasing your total calories.  Monounsaturated fats are not required on the label, but many foods that are a good source do list them.  Polyunsaturated Fat  Polyunsaturated fats are also \"healthy\" fats. The Association recommends that you include these in your diet as well as monounsaturated fats.  Like the other healthy fats, you want to replace the sources of saturated fat in your diet with polyunsaturated fats.  Sources of polyunsaturated fats are:  Corn oil  Cottonseed oil  Safflower oil  Soybean oil  Sunflower oil  Walnuts  Pumpkin or sunflower seeds  Soft (tub) margarine  Mayonnaise  Salad dressings  Omega-3 Fatty Acids  Omega-3 fatty acids help prevent clogging of the arteries. Some types of fish are high in omega-3 fatty acids. The Association recommends eating non-fried fish 2 or 3 times a week.  Sources include:  [place] tuna  [place]  Mackerel  [place] trout  Sardines  [place]  Some plant foods are also sources of omega-3 fatty acids. Sources include:  Tofu " and other soybean products  Walnuts  Flaxseed and flaxseed oil  Canola oil

## 2019-05-09 NOTE — PROGRESS NOTES
Pt discharged to home via wheelchair escroted with transport.  All discharge instructions given, questions and concerns addressed.   All prescriptions sent to pharmacy.  PIV removed without complications.  Follow up appt discussed.   DM education given and education print outs given.

## 2019-05-09 NOTE — CARE PLAN
Problem: Knowledge Deficit  Goal: Knowledge of disease process/condition, treatment plan, diagnostic tests, and medications will improve  POC discussed. Pt understands the plan is to monitor BS and continue to treat yeast infection. All questions and concerns addressed.    Problem: Pain Management  Goal: Pain level will decrease to patient's comfort goal  Will control pain with TRAMADOL.

## 2019-05-09 NOTE — ED PROVIDER NOTES
ED Provider Note    ED Provider Note    Primary care provider: No primary care provider on file.    CHIEF COMPLAINT  Chief Complaint   Patient presents with   • Vaginal Pain     pt was seen here on Friday for same complaint  worsening symptoms now  increased swelling and pt believes she has a yeast infection as well        HPI  Liana Obrien is a 39 y.o. female who presents with a complaint of vaginal pain and discharge.  She states she has been here multiple times in the keep given her antibiotics but they do not seem to be helping.  She seems to be getting worse with increased pain and swelling in her pelvic region.  She denies other abdominal pain or fevers.  She admits that she was been unable to follow-up as an outpatient because she cannot afford the payment and she does not have insurance.  She states that she has not been checking her blood sugars and is not completely compliant with her medications.    REVIEW OF SYSTEMS  See HPI.  All other systems negative.       ALLERGIES  Allergies   Allergen Reactions   • Bicillin C-R [Penicillin G Proc & Benzathine] Rash     Received inj of Bicillin- reaction was rash. Oral penicillin shows no reaction.   • Ondansetron Hives   • Sulfa Drugs Hives          • Metoclopramide Rash and Vomiting     Rash         CURRENT MEDICATIONS  Home Medications     Reviewed by Radha Gomez R.N. (Registered Nurse) on 05/08/19 at 2141  Med List Status: Partial   Medication Last Dose Status   atorvastatin (LIPITOR) 20 MG Tab 5/7/2019 Active   Dapagliflozin-Metformin HCl ER 5-1000 MG TABLET SR 24 HR 5/8/2019 Active   gabapentin (NEURONTIN) 300 MG Cap 5/8/2019 Active   ibuprofen (MOTRIN) 200 MG Tab  Active   Insulin Degludec (TRESIBA FLEXTOUCH) 200 UNIT/ML Solution Pen-injector 5/8/2019 Active   levothyroxine (SYNTHROID) 75 MCG Tab 5/8/2019 Active   lisinopril (PRINIVIL) 2.5 MG Tab 5/8/2019 Active   loratadine (CLARITIN) 10 MG Tab  Active   nitrofurantoin monohyd macro  "(MACROBID) 100 MG Cap 5/5/2019 Active   Probiotic Product (PROBIOTIC ADVANCED PO) 5/8/2019 Active   ranitidine (ZANTAC) 150 MG capsule 5/8/2019 Active   Semaglutide (OZEMPIC) 1 MG/DOSE Solution Pen-injector 5/6/2019 Active                PAST MEDICAL HISTORY   has a past medical history of Bipolar disorder (HCC) (8/9/2018); Chickenpox; Herpes; Hypertension; Hypothyroidism due to acquired atrophy of thyroid (1/22/2016); Personal history of venous thrombosis and embolism; Sleep apnea; and Uncontrolled type 2 diabetes mellitus without complication, without long-term current use of insulin (HCC) (3/12/2015).    SURGICAL HISTORY  patient denies any surgical history    SOCIAL HISTORY  Social History   Substance Use Topics   • Smoking status: Former Smoker     Packs/day: 0.50     Years: 0.30     Types: Cigarettes     Quit date: 2018   • Smokeless tobacco: Never Used      Comment: for 3 months at a time on and off    • Alcohol use No      History   Drug Use No       FAMILY HISTORY  Family History   Problem Relation Age of Onset   • Hypertension Mother    • Sleep Apnea Mother    • Hyperlipidemia Father    • Cancer Maternal Uncle    • Sleep Apnea Brother    • Diabetes Neg Hx    • Heart Disease Neg Hx    • Stroke Neg Hx          PHYSICAL EXAM  VITAL SIGNS: /95   Pulse (!) 105   Temp 36.7 °C (98 °F) (Temporal)   Resp 20   Ht 1.6 m (5' 3\")   Wt 106 kg (233 lb 11 oz)   SpO2 96%   BMI 41.40 kg/m²   Constitutional: Well-nourished, Well developed. In mild distress.   Head: Normocephalic, Atraumatic  Eyes: PERRL, sclera anicteric, EOMI, no lid swelling  ENT: No nasal discharge or epistaxis. No facial deformity. Mucous membranes are moist.   Lungs: No respiratory distress.  Heart regular rate and rhythm without S3-S4 murmur  Abdomen is morbidly obese soft nontender nondistended without peritoneal signs  Pelvic exam demonstrates significant perineal excoriation and evidence of severe tinea infection.  She has significant " tenderness to the labia and vagina on speculum exam.  There is a white purulent discharge.  Samples were collected for testing.  I was unable to do any further exam because of her pain.  Psychiatric: Cooperative. Appropriate mood and affect.       DIAGNOSTIC STUDIES / PROCEDURES    Results for orders placed or performed during the hospital encounter of 05/08/19   CBC WITH DIFFERENTIAL   Result Value Ref Range    WBC 12.9 (H) 4.8 - 10.8 K/uL    RBC 4.99 4.20 - 5.40 M/uL    Hemoglobin 13.6 12.0 - 16.0 g/dL    Hematocrit 40.1 37.0 - 47.0 %    MCV 80.4 (L) 81.4 - 97.8 fL    MCH 27.3 27.0 - 33.0 pg    MCHC 33.9 33.6 - 35.0 g/dL    RDW 36.7 35.9 - 50.0 fL    Platelet Count 268 164 - 446 K/uL    MPV 10.7 9.0 - 12.9 fL    Neutrophils-Polys 62.00 44.00 - 72.00 %    Lymphocytes 27.70 22.00 - 41.00 %    Monocytes 7.20 0.00 - 13.40 %    Eosinophils 2.10 0.00 - 6.90 %    Basophils 0.50 0.00 - 1.80 %    Immature Granulocytes 0.50 0.00 - 0.90 %    Nucleated RBC 0.00 /100 WBC    Neutrophils (Absolute) 7.97 (H) 2.00 - 7.15 K/uL    Lymphs (Absolute) 3.56 1.00 - 4.80 K/uL    Monos (Absolute) 0.92 (H) 0.00 - 0.85 K/uL    Eos (Absolute) 0.27 0.00 - 0.51 K/uL    Baso (Absolute) 0.07 0.00 - 0.12 K/uL    Immature Granulocytes (abs) 0.06 0.00 - 0.11 K/uL    NRBC (Absolute) 0.00 K/uL   URINALYSIS CULTURE, IF INDICATED   Result Value Ref Range    Color Yellow     Character Hazy (A)     Specific Gravity 1.010 <1.035    Ph 5.5 5.0 - 8.0    Glucose >=1000 (A) Negative mg/dL    Ketones 15 (A) Negative mg/dL    Protein Negative Negative mg/dL    Bilirubin Negative Negative    Nitrite Negative Negative    Leukocyte Esterase Negative Negative    Occult Blood Trace (A) Negative    Micro Urine Req Microscopic    HCG QUANTITATIVE SERUM   Result Value Ref Range    Bhcg <0.6 0.0 - 10.0 mIU/mL   WET PREP   Result Value Ref Range    Significant Indicator NEG     Source GEN     Site VAGINAL     Wet Prep For Parasites       No yeast.  No motile Trichomonas  seen.  No clue cells seen.  Few WBCs seen.     CHLAMYDIA & GC BY PCR   Result Value Ref Range    Source Genital    COMP METABOLIC PANEL   Result Value Ref Range    Sodium 128 (L) 135 - 145 mmol/L    Potassium 3.6 3.6 - 5.5 mmol/L    Chloride 98 96 - 112 mmol/L    Co2 19 (L) 20 - 33 mmol/L    Anion Gap 11.0 0.0 - 11.9    Glucose 519 (HH) 65 - 99 mg/dL    Bun 10 8 - 22 mg/dL    Creatinine 0.72 0.50 - 1.40 mg/dL    Calcium 8.5 8.4 - 10.2 mg/dL    AST(SGOT) 27 12 - 45 U/L    ALT(SGPT) 35 2 - 50 U/L    Alkaline Phosphatase 85 30 - 99 U/L    Total Bilirubin 0.4 0.1 - 1.5 mg/dL    Albumin 3.3 3.2 - 4.9 g/dL    Total Protein 7.2 6.0 - 8.2 g/dL    Globulin 3.9 (H) 1.9 - 3.5 g/dL    A-G Ratio 0.8 g/dL   URINE MICROSCOPIC (W/UA)   Result Value Ref Range    WBC 10-20 (A) /hpf    RBC 0-2 /hpf    Bacteria Many (A) None /hpf    Epithelial Cells Many (A) Few /hpf    Mucous Threads Rare /hpf   ESTIMATED GFR   Result Value Ref Range    GFR If African American >60 >60 mL/min/1.73 m 2    GFR If Non African American >60 >60 mL/min/1.73 m 2         COURSE & MEDICAL DECISION MAKING:  Nursing notes, VS, PMSFHx reviewed in chart.   Prior ED visits were reviewed.  In each visit she had elevated blood sugars and was treated for some sort of  infection.  She had a pelvic ultrasound last week which showed a uterine fibroid.  There is no tubo-ovarian abscess.    Patient was evaluated here in the emergency department but has clearly failed outpatient therapy, is noncompliant, and cannot afford the follow-up visits.  Her blood sugars the highest since coming in over the last couple of months.  She has a worsening infection that has not gone to heal with an uncontrolled blood sugar.  The infection is also keeping the blood sugars elevated.  At this time she does not appear septic with stable vital signs and clinically on exam.  She does not have a necrotizing fasciitis or Qing's gangrene.  She does need to be admitted for glucose control and  better treatment of her infection or she will only get worse and be at risk for these severe infections at a later time.  I spoke to the hospitalist who agreed to admit the patient to her service.  She came to the emergency department to evaluate the patient and write the orders.    FINAL IMPRESSION:  Uncontrolled diabetes  Elevated blood sugar  [employer] vaginitis  Tinea cruris infection    DISPOSITION:  Patient will be admitted to the hospitalist service for further care and evaluation.    Please note that this dictation was created using voice recognition software. I have worked with consultants from the vendor as well as technical experts from [facility] to optimize the interface. I have made every reasonable attempt to correct obvious errors, but I expect that there are errors of grammar and possibly content that I did not discover before finalizing the note.

## 2019-05-09 NOTE — DISCHARGE PLANNING
MAGDA noted patient has been screened by hospitals and is a medical financial hardship.  Approved Services completed for patient needed discharge medications. SW provided [facility] and [facility] information.

## 2019-05-10 LAB
EST. AVERAGE GLUCOSE BLD GHB EST-MCNC: 309 MG/DL
HBA1C MFR BLD: 12.4 % (ref 0–5.6)

## 2019-05-10 NOTE — DISCHARGE SUMMARY
Discharge Summary    CHIEF COMPLAINT ON ADMISSION  Chief Complaint   Patient presents with   • Vaginal Pain     pt was seen here on Friday for same complaint  worsening symptoms now  increased swelling and pt believes she has a yeast infection as well        Reason for Admission  Vaginal Discomfort     Admission Date  5/8/2019    CODE STATUS  Prior    HPI & HOSPITAL COURSE  This is a 39 y.o. female here with vaginitis, uncontrolled diabetes.  Apparently she lost her insurance in January, she had some residual samples and was able to take medication for a time but has since run out.  She is been seen in clinics and been treated for bacterial vaginitis without improvement.  Given her elevated sugars in the 4-500s, and her exam this is most consistent with candidal vaginitis.    I discussed management of this with medication but more importantly with glucose control, she is morbidly obese with a BMI of 41.4.  I stated she need to be on an extremely low carbohydrate diet, she needs to eat more probiotic foods including plain yogurt.  She may also use this vaginally to help control vaginal giovani and eliminate the yeast.    We will give her prescription for Diflucan, as well as metformin-1 month of metformin as well as 3 Diflucan tablets were covered by [facility] pharmacy.  She is seen by patient financial services, and she will follow-up in the [facility] clinic.       Therefore, she is discharged in good and stable condition to home with close outpatient follow-up.      Discharge Date  5/9/2019    FOLLOW UP ITEMS POST DISCHARGE  [facility]    DISCHARGE DIAGNOSES  Principal Problem:    Uncontrolled type 2 diabetes mellitus without complication, without long-term current use of insulin (HCC) POA: Yes  Active Problems:    Failure of outpatient treatment POA: Unknown    Leukocytosis POA: Unknown    Hypothyroidism due to acquired atrophy of thyroid POA: Yes    Morbid obesity (CMS-HCC)  POA: Yes    Candida vaginitis POA: Unknown  Resolved Problems:    * No resolved hospital problems. *      FOLLOW UP  Future Appointments  Date Time Provider Department Center   5/14/2019 8:45 AM [facility]   5/15/2019 8:45 AM Mansi Carrasco [address] [address] [phone]    Call to establish with a primary care provider. They offer discounts to patients who do not have insurance.      MEDICATIONS ON DISCHARGE     Medication List      START taking these medications      Instructions   fluconazole 150 MG tablet  Start taking on:  5/12/2019  Commonly known as:  DIFLUCAN   Take 1 Tab by mouth every 72 hours.  Dose:  150 mg     metFORMIN 500 MG Tabs  Commonly known as:  GLUCOPHAGE   Take 2 Tabs by mouth 2 times a day, with meals.  Dose:  1000 mg        CONTINUE taking these medications      Instructions   atorvastatin 20 MG Tabs  Commonly known as:  LIPITOR   Take 1 Tab by mouth every bedtime.  Dose:  20 mg     gabapentin 300 MG Caps  Commonly known as:  NEURONTIN   Take 300 mg by mouth 2 Times a Day.  Dose:  300 mg     levothyroxine 75 MCG Tabs  Commonly known as:  SYNTHROID   Take 75 mcg by mouth Every morning on an empty stomach.  Dose:  75 mcg     lisinopril 2.5 MG Tabs  Commonly known as:  PRINIVIL   Take 2.5 mg by mouth every day.  Dose:  2.5 mg     loratadine 10 MG Tabs  Commonly known as:  CLARITIN   Take 10 mg by mouth every day.  Dose:  10 mg     PROBIOTIC ADVANCED PO   Take 2 Caps by mouth every day.  Dose:  2 Cap     ranitidine 150 MG capsule  Commonly known as:  ZANTAC   Take 150 mg by mouth 2 Times a Day.  Dose:  150 mg        STOP taking these medications    Dapagliflozin-Metformin HCl ER 5-1000 MG Tb24     ibuprofen 200 MG Tabs  Commonly known as:  MOTRIN     nitrofurantoin monohyd macro 100 MG Caps  Commonly known as:  MACROBID     Semaglutide 1 MG/DOSE Sopn  Commonly known as:  OZEMPIC      TRESIBA FLEXTOUCH 200 UNIT/ML Sopn  Generic drug:  Insulin Degludec            Allergies  Allergies   Allergen Reactions   • Bicillin C-R [Penicillin G Proc & Benzathine] Rash     Received inj of Bicillin- reaction was rash. Oral penicillin shows no reaction.   • Ondansetron Hives   • Sulfa Drugs Hives          • Metoclopramide Rash and Vomiting     Rash         DIET  Very low carbohydrate  Probiotic rich    ACTIVITY  As tolerated    CONSULTATIONS  None    PROCEDURES  None    LABORATORY  Lab Results   Component Value Date    SODIUM 131 (L) 05/09/2019    POTASSIUM 3.1 (L) 05/09/2019    CHLORIDE 105 05/09/2019    CO2 21 05/09/2019    GLUCOSE 262 (H) 05/09/2019    BUN 8 05/09/2019    CREATININE 0.41 (L) 05/09/2019        Lab Results   Component Value Date    WBC 10.8 05/09/2019    HEMOGLOBIN 13.4 05/09/2019    HEMATOCRIT 40.6 05/09/2019    PLATELETCT 251 05/09/2019

## 2019-05-11 LAB
BACTERIA UR CULT: ABNORMAL
SIGNIFICANT IND 70042: ABNORMAL
SITE SITE: ABNORMAL
SOURCE SOURCE: ABNORMAL

## 2019-05-12 ENCOUNTER — APPOINTMENT (OUTPATIENT)
Dept: RADIOLOGY | Facility: MEDICAL CENTER | Age: 39
End: 2019-05-12
Attending: EMERGENCY MEDICINE

## 2019-05-12 ENCOUNTER — HOSPITAL ENCOUNTER (EMERGENCY)
Facility: MEDICAL CENTER | Age: 39
End: 2019-05-13
Attending: EMERGENCY MEDICINE

## 2019-05-12 DIAGNOSIS — R73.9 HYPERGLYCEMIA: ICD-10-CM

## 2019-05-12 DIAGNOSIS — N83.201 CYST OF RIGHT OVARY: ICD-10-CM

## 2019-05-12 DIAGNOSIS — R11.2 NON-INTRACTABLE VOMITING WITH NAUSEA, UNSPECIFIED VOMITING TYPE: ICD-10-CM

## 2019-05-12 DIAGNOSIS — D25.9 UTERINE LEIOMYOMA, UNSPECIFIED LOCATION: ICD-10-CM

## 2019-05-12 LAB
ALBUMIN SERPL BCP-MCNC: 3.3 G/DL (ref 3.2–4.9)
ALBUMIN/GLOB SERPL: 0.8 G/DL
ALP SERPL-CCNC: 79 U/L (ref 30–99)
ALT SERPL-CCNC: 31 U/L (ref 2–50)
ANION GAP SERPL CALC-SCNC: 8 MMOL/L (ref 0–11.9)
AST SERPL-CCNC: 19 U/L (ref 12–45)
BASOPHILS # BLD AUTO: 0.8 % (ref 0–1.8)
BASOPHILS # BLD: 0.08 K/UL (ref 0–0.12)
BILIRUB SERPL-MCNC: 0.5 MG/DL (ref 0.1–1.5)
BUN SERPL-MCNC: 7 MG/DL (ref 8–22)
CALCIUM SERPL-MCNC: 8.9 MG/DL (ref 8.4–10.2)
CHLORIDE SERPL-SCNC: 100 MMOL/L (ref 96–112)
CO2 SERPL-SCNC: 22 MMOL/L (ref 20–33)
CREAT SERPL-MCNC: 0.46 MG/DL (ref 0.5–1.4)
EOSINOPHIL # BLD AUTO: 0.3 K/UL (ref 0–0.51)
EOSINOPHIL NFR BLD: 2.8 % (ref 0–6.9)
ERYTHROCYTE [DISTWIDTH] IN BLOOD BY AUTOMATED COUNT: 36.4 FL (ref 35.9–50)
GLOBULIN SER CALC-MCNC: 4.1 G/DL (ref 1.9–3.5)
GLUCOSE SERPL-MCNC: 338 MG/DL (ref 65–99)
HCG SERPL QL: NEGATIVE
HCT VFR BLD AUTO: 41.5 % (ref 37–47)
HGB BLD-MCNC: 14.1 G/DL (ref 12–16)
IMM GRANULOCYTES # BLD AUTO: 0.04 K/UL (ref 0–0.11)
IMM GRANULOCYTES NFR BLD AUTO: 0.4 % (ref 0–0.9)
LIPASE SERPL-CCNC: 35 U/L (ref 7–58)
LYMPHOCYTES # BLD AUTO: 4.14 K/UL (ref 1–4.8)
LYMPHOCYTES NFR BLD: 39.3 % (ref 22–41)
MCH RBC QN AUTO: 27.1 PG (ref 27–33)
MCHC RBC AUTO-ENTMCNC: 34 G/DL (ref 33.6–35)
MCV RBC AUTO: 79.8 FL (ref 81.4–97.8)
MONOCYTES # BLD AUTO: 0.69 K/UL (ref 0–0.85)
MONOCYTES NFR BLD AUTO: 6.6 % (ref 0–13.4)
NEUTROPHILS # BLD AUTO: 5.28 K/UL (ref 2–7.15)
NEUTROPHILS NFR BLD: 50.1 % (ref 44–72)
NRBC # BLD AUTO: 0 K/UL
NRBC BLD-RTO: 0 /100 WBC
PLATELET # BLD AUTO: 271 K/UL (ref 164–446)
PMV BLD AUTO: 10.3 FL (ref 9–12.9)
POTASSIUM SERPL-SCNC: 3.4 MMOL/L (ref 3.6–5.5)
PROT SERPL-MCNC: 7.4 G/DL (ref 6–8.2)
RBC # BLD AUTO: 5.2 M/UL (ref 4.2–5.4)
SODIUM SERPL-SCNC: 130 MMOL/L (ref 135–145)
WBC # BLD AUTO: 10.5 K/UL (ref 4.8–10.8)

## 2019-05-12 PROCEDURE — 96375 TX/PRO/DX INJ NEW DRUG ADDON: CPT

## 2019-05-12 PROCEDURE — 80053 COMPREHEN METABOLIC PANEL: CPT

## 2019-05-12 PROCEDURE — 74177 CT ABD & PELVIS W/CONTRAST: CPT

## 2019-05-12 PROCEDURE — 700117 HCHG RX CONTRAST REV CODE 255: Performed by: EMERGENCY MEDICINE

## 2019-05-12 PROCEDURE — 36415 COLL VENOUS BLD VENIPUNCTURE: CPT

## 2019-05-12 PROCEDURE — 84703 CHORIONIC GONADOTROPIN ASSAY: CPT

## 2019-05-12 PROCEDURE — 85025 COMPLETE CBC W/AUTO DIFF WBC: CPT

## 2019-05-12 PROCEDURE — 96374 THER/PROPH/DIAG INJ IV PUSH: CPT

## 2019-05-12 PROCEDURE — 99285 EMERGENCY DEPT VISIT HI MDM: CPT

## 2019-05-12 PROCEDURE — 83690 ASSAY OF LIPASE: CPT

## 2019-05-12 PROCEDURE — 700111 HCHG RX REV CODE 636 W/ 250 OVERRIDE (IP): Performed by: EMERGENCY MEDICINE

## 2019-05-12 PROCEDURE — 700105 HCHG RX REV CODE 258: Performed by: EMERGENCY MEDICINE

## 2019-05-12 PROCEDURE — 81003 URINALYSIS AUTO W/O SCOPE: CPT

## 2019-05-12 RX ORDER — SODIUM CHLORIDE 9 MG/ML
1000 INJECTION, SOLUTION INTRAVENOUS ONCE
Status: COMPLETED | OUTPATIENT
Start: 2019-05-12 | End: 2019-05-12

## 2019-05-12 RX ORDER — DIPHENHYDRAMINE HYDROCHLORIDE 50 MG/ML
25 INJECTION INTRAMUSCULAR; INTRAVENOUS ONCE
Status: COMPLETED | OUTPATIENT
Start: 2019-05-12 | End: 2019-05-12

## 2019-05-12 RX ADMIN — PROCHLORPERAZINE EDISYLATE 10 MG: 5 INJECTION INTRAMUSCULAR; INTRAVENOUS at 22:03

## 2019-05-12 RX ADMIN — PROCHLORPERAZINE EDISYLATE 10 MG: 5 INJECTION INTRAMUSCULAR; INTRAVENOUS at 21:44

## 2019-05-12 RX ADMIN — DIPHENHYDRAMINE HYDROCHLORIDE 25 MG: 50 INJECTION INTRAMUSCULAR; INTRAVENOUS at 22:03

## 2019-05-12 RX ADMIN — IOHEXOL 100 ML: 350 INJECTION, SOLUTION INTRAVENOUS at 22:51

## 2019-05-12 RX ADMIN — SODIUM CHLORIDE 1000 ML: 9 INJECTION, SOLUTION INTRAVENOUS at 21:44

## 2019-05-12 ASSESSMENT — ENCOUNTER SYMPTOMS
HEADACHES: 0
COUGH: 0
SHORTNESS OF BREATH: 0
FEVER: 0
ABDOMINAL PAIN: 1
SORE THROAT: 0
NECK PAIN: 0
CHILLS: 0
EYE REDNESS: 0
NAUSEA: 1
FOCAL WEAKNESS: 0
SEIZURES: 0
BACK PAIN: 0
VOMITING: 1
BLURRED VISION: 0

## 2019-05-13 VITALS
HEART RATE: 18 BPM | DIASTOLIC BLOOD PRESSURE: 97 MMHG | RESPIRATION RATE: 20 BRPM | OXYGEN SATURATION: 94 % | HEIGHT: 63 IN | TEMPERATURE: 97.3 F | SYSTOLIC BLOOD PRESSURE: 140 MMHG | WEIGHT: 234.35 LBS | BODY MASS INDEX: 41.52 KG/M2

## 2019-05-13 LAB
APPEARANCE UR: CLEAR
BILIRUB UR QL STRIP.AUTO: NEGATIVE
COLOR UR: YELLOW
GLUCOSE UR STRIP.AUTO-MCNC: 500 MG/DL
KETONES UR STRIP.AUTO-MCNC: 40 MG/DL
LEUKOCYTE ESTERASE UR QL STRIP.AUTO: NEGATIVE
MICRO URNS: ABNORMAL
NITRITE UR QL STRIP.AUTO: NEGATIVE
PH UR STRIP.AUTO: 5 [PH]
PROT UR QL STRIP: NEGATIVE MG/DL
RBC UR QL AUTO: NEGATIVE
SP GR UR REFRACTOMETRY: 1.01
SP GR UR STRIP.AUTO: <=1.005

## 2019-05-13 RX ORDER — PROCHLORPERAZINE MALEATE 10 MG
10 TABLET ORAL EVERY 6 HOURS PRN
Qty: 10 TAB | Refills: 0 | Status: SHIPPED | OUTPATIENT
Start: 2019-05-13 | End: 2019-07-06

## 2019-05-13 NOTE — ED NOTES
Pt states pain and nausea minimal and she is ready to go home. Patient provided printed discharge instructions which included signs and symptoms to look out for, why to return to ER, and other follow up appointment to make. Patient provided prescription, information on medication, and how to . Patient stated they understand discharge instructions and had no further questions or concerns at this time. Patient discharged to home with . Patient ambulated out of ER with stable gait.

## 2019-05-13 NOTE — DISCHARGE INSTRUCTIONS
You were seen in the Emergency Department for vomiting.    Labs, urine test were completed without significant acute abnormalities other than mildly elevated blood sugar.  CT scan showed evidence of ovarian cyst and uterine fibroids however no other acute abnormality.    Please use 1,000mg of tylenol or 600mg of ibuprofen every 6 hours as needed for pain.  Take nausea medication as directed.  Drink plenty of fluids.  Please finish treatment for yeast infection.    Please follow up with your primary care physician.    Return to the Emergency Department with fevers greater than 100.4, worsening abdominal pain, persistent vomiting, or other concerns.

## 2019-05-13 NOTE — ED TRIAGE NOTES
Pt comes in w/   Was admitted to hospital last week and ws d/c'ed on Thursday  Continues to have severe N/V and with this is having severe abdomen pain as well  Having a very difficult time with her blood sugars  They are remaining in the 400's
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
## 2019-06-12 ENCOUNTER — HOSPITAL ENCOUNTER (EMERGENCY)
Facility: MEDICAL CENTER | Age: 39
End: 2019-06-12
Attending: EMERGENCY MEDICINE

## 2019-06-12 VITALS
WEIGHT: 242.29 LBS | TEMPERATURE: 97.4 F | BODY MASS INDEX: 42.93 KG/M2 | DIASTOLIC BLOOD PRESSURE: 78 MMHG | HEIGHT: 63 IN | HEART RATE: 70 BPM | SYSTOLIC BLOOD PRESSURE: 130 MMHG | OXYGEN SATURATION: 96 % | RESPIRATION RATE: 18 BRPM

## 2019-06-12 DIAGNOSIS — B37.31 VAGINAL CANDIDIASIS: ICD-10-CM

## 2019-06-12 LAB
APPEARANCE UR: CLEAR
BACTERIA #/AREA URNS HPF: NEGATIVE /HPF
BACTERIA GENITAL QL WET PREP: NORMAL
BILIRUB UR QL STRIP.AUTO: NEGATIVE
COLOR UR: ABNORMAL
EPI CELLS #/AREA URNS HPF: ABNORMAL /HPF
GLUCOSE UR STRIP.AUTO-MCNC: >=1000 MG/DL
HCG UR QL: NEGATIVE
KETONES UR STRIP.AUTO-MCNC: ABNORMAL MG/DL
LEUKOCYTE ESTERASE UR QL STRIP.AUTO: NEGATIVE
MICRO URNS: ABNORMAL
NITRITE UR QL STRIP.AUTO: NEGATIVE
PH UR STRIP.AUTO: 5 [PH]
PROT UR QL STRIP: NEGATIVE MG/DL
RBC # URNS HPF: ABNORMAL /HPF
RBC UR QL AUTO: ABNORMAL
SIGNIFICANT IND 70042: NORMAL
SITE SITE: NORMAL
SOURCE SOURCE: NORMAL
SP GR UR REFRACTOMETRY: 1.03
SP GR UR STRIP.AUTO: <=1.005
WBC #/AREA URNS HPF: ABNORMAL /HPF
YEAST BUDDING URNS QL: PRESENT /HPF

## 2019-06-12 PROCEDURE — 700102 HCHG RX REV CODE 250 W/ 637 OVERRIDE(OP): Performed by: EMERGENCY MEDICINE

## 2019-06-12 PROCEDURE — 99284 EMERGENCY DEPT VISIT MOD MDM: CPT

## 2019-06-12 PROCEDURE — 87491 CHLMYD TRACH DNA AMP PROBE: CPT

## 2019-06-12 PROCEDURE — 81025 URINE PREGNANCY TEST: CPT

## 2019-06-12 PROCEDURE — 87591 N.GONORRHOEAE DNA AMP PROB: CPT

## 2019-06-12 PROCEDURE — 81001 URINALYSIS AUTO W/SCOPE: CPT

## 2019-06-12 PROCEDURE — A9270 NON-COVERED ITEM OR SERVICE: HCPCS | Performed by: EMERGENCY MEDICINE

## 2019-06-12 RX ORDER — FLUCONAZOLE 100 MG/1
150 TABLET ORAL ONCE
Status: COMPLETED | OUTPATIENT
Start: 2019-06-12 | End: 2019-06-12

## 2019-06-12 RX ORDER — FLUCONAZOLE 150 MG/1
150 TABLET ORAL
Qty: 2 TAB | Refills: 0 | Status: SHIPPED | OUTPATIENT
Start: 2019-06-15 | End: 2019-06-17

## 2019-06-12 RX ADMIN — FLUCONAZOLE 150 MG: 100 TABLET ORAL at 23:04

## 2019-06-13 NOTE — ED PROVIDER NOTES
ED Provider Note    ER PROVIDER NOTE    *     CHIEF COMPLAINT  Chief Complaint   Patient presents with   • Vaginal Discharge   • Vaginal Itching       HPI  Liana Obrien is a 39 y.o. female who presents to the emergency department complaining of vaginal discharge and itching.  Patient reports she has had symptoms over the last 2 to 3 days although has had similar over the past few months.  She has history of diabetes, sugars have been around 300.  She denies any abdominal pain.  Denies any fevers chills dysuria hematuria vaginal bleeding.  Primarily white discharge    REVIEW OF SYSTEMS  Pertinent positives include vaginal discharge. Pertinent negatives include no fever. See HPI for details. All other systems reviewed and are negative.    PAST MEDICAL HISTORY   has a past medical history of Bipolar disorder ([facility]) (8/9/2018); Chickenpox; DVT (deep venous thrombosis) ([facility]); Herpes; Hypertension; Hypothyroidism due to acquired atrophy of thyroid (1/22/2016); Personal history of venous thrombosis and embolism; Sleep apnea; and Uncontrolled type 2 diabetes mellitus without complication, without long-term current use of insulin ([facility]) (3/12/2015).    SURGICAL HISTORY  patient denies any surgical history    FAMILY HISTORY  Family History   Problem Relation Age of Onset   • Hypertension Mother    • Sleep Apnea Mother    • Hyperlipidemia Father    • Cancer Maternal Uncle    • Sleep Apnea Brother    • Diabetes Neg Hx    • Heart Disease Neg Hx    • Stroke Neg Hx        SOCIAL HISTORY  Social History     Social History   • Marital status: Single     Spouse name: N/A   • Number of children: N/A   • Years of education: N/A     Social History Main Topics   • Smoking status: Former Smoker     Packs/day: 0.50     Years: 0.30     Types: Cigarettes     Quit date: 2018   • Smokeless tobacco: Never Used      Comment: for 3 months at a time on and off    • Alcohol use No   • Drug use: No   • Sexual activity: Yes     Partners: Male  "    Other Topics Concern   • Not on file     Social History Narrative    Works at [employer]       History   Drug Use No       CURRENT MEDICATIONS  Home Medications    **Home medications have not yet been reviewed for this encounter**         ALLERGIES  Allergies   Allergen Reactions   • Bicillin C-R [Penicillin G Proc & Benzathine] Rash     Received inj of Bicillin- reaction was rash. Oral penicillin shows no reaction.   • Ondansetron Hives   • Sulfa Drugs Hives          • Metoclopramide Rash and Vomiting     Rash         PHYSICAL EXAM  VITAL SIGNS: /104   Pulse 91   Temp 36.3 °C (97.4 °F) (Temporal)   Resp 19   Ht 1.6 m (5' 3\")   Wt 109.9 kg (242 lb 4.6 oz)   SpO2 96%   BMI 42.92 kg/m²   Pulse ox interpretation: I interpret this pulse ox as normal.    Constitutional: Alert in no apparent distress.  HENT: No signs of trauma, Bilateral external ears normal, Nose normal.   Eyes: Pupils are equal and reactive, Conjunctiva normal, Non-icteric.   Neck: Normal range of motion, No tenderness, Supple, No stridor.   Lymphatic: No lymphadenopathy noted.   Cardiovascular: Regular rate and rhythm, no murmurs.   Thorax & Lungs: Normal breath sounds, No respiratory distress, No wheezing, No chest tenderness.   Abdomen: Bowel sounds normal, Soft, No tenderness, No masses, No pulsatile masses. No peritoneal signs.  Skin: Warm, Dry, No erythema, No rash.   Back: No bony tenderness, No CVA tenderness.   Extremities: Intact distal pulses, No edema, No tenderness, No cyanosis, Negative Amanda's sign.  Musculoskeletal: Good range of motion in all major joints. No tenderness to palpation or major deformities noted.   Neurologic: Alert , Normal motor function, Normal sensory function, No focal deficits noted.   Psychiatric: Affect normal, Judgment normal, Mood normal.     DIAGNOSTIC STUDIES / PROCEDURES      LABS  Labs Reviewed   URINALYSIS,CULTURE IF INDICATED - Abnormal; Notable for the following:        Result Value "    Glucose >=1000 (*)     Ketones Trace (*)     Occult Blood Moderate (*)     All other components within normal limits   URINE MICROSCOPIC (W/UA) - Abnormal; Notable for the following:     Epithelial Cells Moderate (*)     Budding Yeast Present (*)     All other components within normal limits   WET PREP   CHLAMYDIA/GC PCR URINE OR SWAB   HCG QUALITATIVE UR   REFRACTOMETER SG       All labs reviewed by me.    RADIOLOGY  No orders to display     The radiologist's interpretation of all radiological studies have been reviewed by me.    COURSE & MEDICAL DECISION MAKING  Nursing notes, VS, PMSFHx reviewed in chart.    9:08 PM Patient seen and examined at bedside.   Pelvic exam performed with female chaperone.  Significant clumpy white discharge in labial irritation, no CMT or adnexal mass or tenderness, no external lesions    With order for fluconazole, plan for discharge      Decision Making:  This is a 39 y.o. female presenting with vaginal itching, on exam here is found to have candidiasis.  Patient will be started on fluconazole, will treat for recurrent candidiasis with 3 doses.  This is likely due to her underlying diabetes, will follow up with \[facility]\"" for outpatient care and longer term diabetes management.  No symptoms suggestive of DKA or other complication at this time.  Her exam is not consistent with cellulitis or more progressive deep space infection   The patient will return for new or worsening symptoms and is stable at the time of discharge.    The patient is referred to a primary physician for blood pressure management, diabetic screening, and for all other preventative health concerns.        DISPOSITION:  Patient will be discharged home in stable condition.    FOLLOW UP:  [address] [address]  [phone]  In 1 week        OUTPATIENT MEDICATIONS:  New Prescriptions    FLUCONAZOLE (DIFLUCAN) 150 MG TABLET    Take 1 Tab by mouth every 3 days for 2 days.    ,    FINAL IMPRESSION  1. Vaginal candidiasis         The note accurately reflects work and decisions made by me.  Faiban Go  6/12/2019  10:59 PM

## 2019-06-13 NOTE — ED TRIAGE NOTES
"Chief Complaint   Patient presents with   • Vaginal Discharge   • Vaginal Itching     /90   Pulse 86   Temp 36.3 °C (97.3 °F) (Temporal)   Resp 15   Ht 1.6 m (5' 3\")   Wt 109.9 kg (242 lb 4.6 oz)   SpO2 96%   Pt informed of wait times. Educated on triage process.  Asked to return to triage RN for any new or worsening of symptoms. Thanked for patience.        "

## 2019-06-13 NOTE — ED NOTES
Pt rounded on at this time and this ED tech apologized for the wait but explained that we will get her back to a room as soon as we are able. VSS currently and pt has no further needs at this time

## 2019-07-03 ENCOUNTER — OFFICE VISIT (OUTPATIENT)
Dept: MEDICAL GROUP | Facility: PHYSICIAN GROUP | Age: 39
End: 2019-07-03
Payer: COMMERCIAL

## 2019-07-03 ENCOUNTER — HOSPITAL ENCOUNTER (OUTPATIENT)
Facility: MEDICAL CENTER | Age: 39
End: 2019-07-03
Attending: INTERNAL MEDICINE
Payer: COMMERCIAL

## 2019-07-03 VITALS
DIASTOLIC BLOOD PRESSURE: 86 MMHG | OXYGEN SATURATION: 98 % | HEART RATE: 102 BPM | TEMPERATURE: 97.3 F | BODY MASS INDEX: 41.11 KG/M2 | RESPIRATION RATE: 18 BRPM | HEIGHT: 63 IN | WEIGHT: 232 LBS | SYSTOLIC BLOOD PRESSURE: 140 MMHG

## 2019-07-03 DIAGNOSIS — B37.31 CANDIDA VAGINITIS: ICD-10-CM

## 2019-07-03 DIAGNOSIS — R30.9 URINARY PAIN: ICD-10-CM

## 2019-07-03 LAB
APPEARANCE UR: NORMAL
BILIRUB UR STRIP-MCNC: NORMAL MG/DL
CANDIDA DNA VAG QL PROBE+SIG AMP: POSITIVE
COLOR UR AUTO: NORMAL
G VAGINALIS DNA VAG QL PROBE+SIG AMP: NEGATIVE
GLUCOSE UR STRIP.AUTO-MCNC: 500 MG/DL
KETONES UR STRIP.AUTO-MCNC: NORMAL MG/DL
LEUKOCYTE ESTERASE UR QL STRIP.AUTO: NORMAL
NITRITE UR QL STRIP.AUTO: NORMAL
PH UR STRIP.AUTO: 5 [PH] (ref 5–8)
PROT UR QL STRIP: NORMAL MG/DL
RBC UR QL AUTO: NORMAL
SP GR UR STRIP.AUTO: <1.005
T VAGINALIS DNA VAG QL PROBE+SIG AMP: NEGATIVE
UROBILINOGEN UR STRIP-MCNC: 0.2 MG/DL

## 2019-07-03 PROCEDURE — 87591 N.GONORRHOEAE DNA AMP PROB: CPT

## 2019-07-03 PROCEDURE — 87491 CHLMYD TRACH DNA AMP PROBE: CPT

## 2019-07-03 PROCEDURE — 99214 OFFICE O/P EST MOD 30 MIN: CPT | Performed by: INTERNAL MEDICINE

## 2019-07-03 PROCEDURE — 87660 TRICHOMONAS VAGIN DIR PROBE: CPT

## 2019-07-03 PROCEDURE — 81002 URINALYSIS NONAUTO W/O SCOPE: CPT | Performed by: INTERNAL MEDICINE

## 2019-07-03 PROCEDURE — 87510 GARDNER VAG DNA DIR PROBE: CPT

## 2019-07-03 PROCEDURE — 87480 CANDIDA DNA DIR PROBE: CPT

## 2019-07-03 RX ORDER — PIOGLITAZONEHYDROCHLORIDE 30 MG/1
30 TABLET ORAL DAILY
Qty: 90 TAB | Refills: 3 | Status: ON HOLD | OUTPATIENT
Start: 2019-07-03 | End: 2020-01-11

## 2019-07-03 RX ORDER — DAPAGLIFLOZIN AND METFORMIN HYDROCHLORIDE 5; 1000 MG/1; MG/1
1 TABLET, FILM COATED, EXTENDED RELEASE ORAL 2 TIMES DAILY
Qty: 180 TAB | Refills: 3 | Status: ON HOLD | OUTPATIENT
Start: 2019-07-03 | End: 2020-01-10

## 2019-07-03 RX ORDER — FLUCONAZOLE 100 MG/1
TABLET ORAL
Qty: 2 TAB | Refills: 0 | Status: SHIPPED | OUTPATIENT
Start: 2019-07-03 | End: 2019-07-15

## 2019-07-03 RX ORDER — CLOTRIMAZOLE 1 %
CREAM (GRAM) TOPICAL
Qty: 1 TUBE | Refills: 0 | Status: SHIPPED | OUTPATIENT
Start: 2019-07-03 | End: 2019-07-06
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
## 2019-07-03 NOTE — ASSESSMENT & PLAN NOTE
She ran out of all of her diabetes medications in the last month or so. Her morning fasting sugars are running in the 300s. She had lost her job and ran out of insurance for 6 months, leading to a lapse in therapy.
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
## 2019-07-03 NOTE — ASSESSMENT & PLAN NOTE
She has been cottage cheese vaginal discharge. Sitting is painful. Even her underwear touching her perineum is painful. Has a burning sensation with urinating and cleaning herself.

## 2019-07-06 ENCOUNTER — APPOINTMENT (OUTPATIENT)
Dept: RADIOLOGY | Facility: MEDICAL CENTER | Age: 39
End: 2019-07-06
Attending: EMERGENCY MEDICINE
Payer: COMMERCIAL

## 2019-07-06 ENCOUNTER — HOSPITAL ENCOUNTER (OUTPATIENT)
Facility: MEDICAL CENTER | Age: 39
End: 2019-07-09
Attending: EMERGENCY MEDICINE | Admitting: HOSPITALIST
Payer: COMMERCIAL

## 2019-07-06 DIAGNOSIS — R73.9 HYPERGLYCEMIA: ICD-10-CM

## 2019-07-06 DIAGNOSIS — L02.214 ABSCESS OF LEFT GROIN: ICD-10-CM

## 2019-07-06 DIAGNOSIS — L02.215 PERINEAL ABSCESS: ICD-10-CM

## 2019-07-06 DIAGNOSIS — B37.31 YEAST VAGINITIS: ICD-10-CM

## 2019-07-06 LAB
ALBUMIN SERPL BCP-MCNC: 3.2 G/DL (ref 3.2–4.9)
ALBUMIN/GLOB SERPL: 0.8 G/DL
ALP SERPL-CCNC: 84 U/L (ref 30–99)
ALT SERPL-CCNC: 28 U/L (ref 2–50)
ANION GAP SERPL CALC-SCNC: 11 MMOL/L (ref 0–11.9)
AST SERPL-CCNC: 16 U/L (ref 12–45)
BASOPHILS # BLD AUTO: 0.7 % (ref 0–1.8)
BASOPHILS # BLD: 0.07 K/UL (ref 0–0.12)
BILIRUB SERPL-MCNC: 0.5 MG/DL (ref 0.1–1.5)
BUN SERPL-MCNC: 6 MG/DL (ref 8–22)
CALCIUM SERPL-MCNC: 8.7 MG/DL (ref 8.4–10.2)
CHLORIDE SERPL-SCNC: 98 MMOL/L (ref 96–112)
CO2 SERPL-SCNC: 23 MMOL/L (ref 20–33)
CREAT SERPL-MCNC: 0.53 MG/DL (ref 0.5–1.4)
EOSINOPHIL # BLD AUTO: 0.3 K/UL (ref 0–0.51)
EOSINOPHIL NFR BLD: 3 % (ref 0–6.9)
ERYTHROCYTE [DISTWIDTH] IN BLOOD BY AUTOMATED COUNT: 38.4 FL (ref 35.9–50)
GLOBULIN SER CALC-MCNC: 3.9 G/DL (ref 1.9–3.5)
GLUCOSE BLD-MCNC: 277 MG/DL (ref 65–99)
GLUCOSE BLD-MCNC: 306 MG/DL (ref 65–99)
GLUCOSE SERPL-MCNC: 359 MG/DL (ref 65–99)
GRAM STN SPEC: NORMAL
HCG SERPL QL: NEGATIVE
HCT VFR BLD AUTO: 41.2 % (ref 37–47)
HGB BLD-MCNC: 13.8 G/DL (ref 12–16)
IMM GRANULOCYTES # BLD AUTO: 0.06 K/UL (ref 0–0.11)
IMM GRANULOCYTES NFR BLD AUTO: 0.6 % (ref 0–0.9)
LACTATE BLD-SCNC: 2.2 MMOL/L (ref 0.5–2)
LYMPHOCYTES # BLD AUTO: 3.1 K/UL (ref 1–4.8)
LYMPHOCYTES NFR BLD: 31.4 % (ref 22–41)
MCH RBC QN AUTO: 26.8 PG (ref 27–33)
MCHC RBC AUTO-ENTMCNC: 33.5 G/DL (ref 33.6–35)
MCV RBC AUTO: 80 FL (ref 81.4–97.8)
MONOCYTES # BLD AUTO: 0.64 K/UL (ref 0–0.85)
MONOCYTES NFR BLD AUTO: 6.5 % (ref 0–13.4)
NEUTROPHILS # BLD AUTO: 5.69 K/UL (ref 2–7.15)
NEUTROPHILS NFR BLD: 57.8 % (ref 44–72)
NRBC # BLD AUTO: 0 K/UL
NRBC BLD-RTO: 0 /100 WBC
PLATELET # BLD AUTO: 257 K/UL (ref 164–446)
PMV BLD AUTO: 10.5 FL (ref 9–12.9)
POTASSIUM SERPL-SCNC: 3.7 MMOL/L (ref 3.6–5.5)
PROT SERPL-MCNC: 7.1 G/DL (ref 6–8.2)
RBC # BLD AUTO: 5.15 M/UL (ref 4.2–5.4)
SIGNIFICANT IND 70042: NORMAL
SITE SITE: NORMAL
SODIUM SERPL-SCNC: 132 MMOL/L (ref 135–145)
SOURCE SOURCE: NORMAL
TSH SERPL DL<=0.005 MIU/L-ACNC: 5.5 UIU/ML (ref 0.38–5.33)
WBC # BLD AUTO: 9.9 K/UL (ref 4.8–10.8)

## 2019-07-06 PROCEDURE — 82962 GLUCOSE BLOOD TEST: CPT

## 2019-07-06 PROCEDURE — 700102 HCHG RX REV CODE 250 W/ 637 OVERRIDE(OP): Performed by: INTERNAL MEDICINE

## 2019-07-06 PROCEDURE — 85025 COMPLETE CBC W/AUTO DIFF WBC: CPT

## 2019-07-06 PROCEDURE — 72193 CT PELVIS W/DYE: CPT

## 2019-07-06 PROCEDURE — 87077 CULTURE AEROBIC IDENTIFY: CPT

## 2019-07-06 PROCEDURE — 303977 HCHG I & D

## 2019-07-06 PROCEDURE — 84703 CHORIONIC GONADOTROPIN ASSAY: CPT

## 2019-07-06 PROCEDURE — 36415 COLL VENOUS BLD VENIPUNCTURE: CPT

## 2019-07-06 PROCEDURE — 700111 HCHG RX REV CODE 636 W/ 250 OVERRIDE (IP): Performed by: EMERGENCY MEDICINE

## 2019-07-06 PROCEDURE — 96372 THER/PROPH/DIAG INJ SC/IM: CPT

## 2019-07-06 PROCEDURE — 96365 THER/PROPH/DIAG IV INF INIT: CPT

## 2019-07-06 PROCEDURE — 96375 TX/PRO/DX INJ NEW DRUG ADDON: CPT

## 2019-07-06 PROCEDURE — 700101 HCHG RX REV CODE 250: Performed by: EMERGENCY MEDICINE

## 2019-07-06 PROCEDURE — 87205 SMEAR GRAM STAIN: CPT

## 2019-07-06 PROCEDURE — 99285 EMERGENCY DEPT VISIT HI MDM: CPT

## 2019-07-06 PROCEDURE — 99220 PR INITIAL OBSERVATION CARE,LEVL III: CPT | Performed by: INTERNAL MEDICINE

## 2019-07-06 PROCEDURE — 87070 CULTURE OTHR SPECIMN AEROBIC: CPT

## 2019-07-06 PROCEDURE — 700117 HCHG RX CONTRAST REV CODE 255: Performed by: EMERGENCY MEDICINE

## 2019-07-06 PROCEDURE — G0378 HOSPITAL OBSERVATION PER HR: HCPCS

## 2019-07-06 PROCEDURE — 83605 ASSAY OF LACTIC ACID: CPT

## 2019-07-06 PROCEDURE — 80053 COMPREHEN METABOLIC PANEL: CPT

## 2019-07-06 PROCEDURE — 700111 HCHG RX REV CODE 636 W/ 250 OVERRIDE (IP): Performed by: INTERNAL MEDICINE

## 2019-07-06 PROCEDURE — 84443 ASSAY THYROID STIM HORMONE: CPT

## 2019-07-06 PROCEDURE — A9270 NON-COVERED ITEM OR SERVICE: HCPCS | Performed by: INTERNAL MEDICINE

## 2019-07-06 RX ORDER — KETOROLAC TROMETHAMINE 30 MG/ML
30 INJECTION, SOLUTION INTRAMUSCULAR; INTRAVENOUS EVERY 6 HOURS PRN
Status: DISCONTINUED | OUTPATIENT
Start: 2019-07-06 | End: 2019-07-08

## 2019-07-06 RX ORDER — ACETAMINOPHEN 325 MG/1
650 TABLET ORAL EVERY 6 HOURS PRN
Status: DISCONTINUED | OUTPATIENT
Start: 2019-07-06 | End: 2019-07-09 | Stop reason: HOSPADM

## 2019-07-06 RX ORDER — FLUCONAZOLE 200 MG/1
200 TABLET ORAL DAILY
Status: DISCONTINUED | OUTPATIENT
Start: 2019-07-06 | End: 2019-07-09 | Stop reason: HOSPADM

## 2019-07-06 RX ORDER — INSULIN GLARGINE 100 [IU]/ML
0.2 INJECTION, SOLUTION SUBCUTANEOUS EVERY EVENING
Status: DISCONTINUED | OUTPATIENT
Start: 2019-07-06 | End: 2019-07-09 | Stop reason: HOSPADM

## 2019-07-06 RX ORDER — PROMETHAZINE HYDROCHLORIDE 25 MG/1
12.5-25 TABLET ORAL EVERY 4 HOURS PRN
Status: DISCONTINUED | OUTPATIENT
Start: 2019-07-06 | End: 2019-07-09 | Stop reason: HOSPADM

## 2019-07-06 RX ORDER — ONDANSETRON 2 MG/ML
4 INJECTION INTRAMUSCULAR; INTRAVENOUS EVERY 4 HOURS PRN
Status: DISCONTINUED | OUTPATIENT
Start: 2019-07-06 | End: 2019-07-06

## 2019-07-06 RX ORDER — DOXYCYCLINE 100 MG/1
100 TABLET ORAL EVERY 12 HOURS
Status: DISCONTINUED | OUTPATIENT
Start: 2019-07-06 | End: 2019-07-09 | Stop reason: HOSPADM

## 2019-07-06 RX ORDER — ONDANSETRON 4 MG/1
4 TABLET, ORALLY DISINTEGRATING ORAL EVERY 4 HOURS PRN
Status: DISCONTINUED | OUTPATIENT
Start: 2019-07-06 | End: 2019-07-06

## 2019-07-06 RX ORDER — LIDOCAINE HYDROCHLORIDE 10 MG/ML
20 INJECTION, SOLUTION INFILTRATION; PERINEURAL ONCE
Status: COMPLETED | OUTPATIENT
Start: 2019-07-06 | End: 2019-07-06

## 2019-07-06 RX ORDER — AMOXICILLIN AND CLAVULANATE POTASSIUM 875; 125 MG/1; MG/1
1 TABLET, FILM COATED ORAL EVERY 12 HOURS
Status: DISCONTINUED | OUTPATIENT
Start: 2019-07-06 | End: 2019-07-09 | Stop reason: HOSPADM

## 2019-07-06 RX ORDER — LEVOFLOXACIN 5 MG/ML
750 INJECTION, SOLUTION INTRAVENOUS ONCE
Status: COMPLETED | OUTPATIENT
Start: 2019-07-06 | End: 2019-07-06

## 2019-07-06 RX ORDER — PROMETHAZINE HYDROCHLORIDE 25 MG/1
12.5-25 SUPPOSITORY RECTAL EVERY 4 HOURS PRN
Status: DISCONTINUED | OUTPATIENT
Start: 2019-07-06 | End: 2019-07-09 | Stop reason: HOSPADM

## 2019-07-06 RX ADMIN — ACETAMINOPHEN 650 MG: 325 TABLET, FILM COATED ORAL at 13:42

## 2019-07-06 RX ADMIN — FLUCONAZOLE 200 MG: 200 TABLET ORAL at 13:42

## 2019-07-06 RX ADMIN — IOHEXOL 100 ML: 350 INJECTION, SOLUTION INTRAVENOUS at 09:09

## 2019-07-06 RX ADMIN — INSULIN GLARGINE 21 UNITS: 100 INJECTION, SOLUTION SUBCUTANEOUS at 17:28

## 2019-07-06 RX ADMIN — INSULIN LISPRO 6 UNITS: 100 INJECTION, SOLUTION INTRAVENOUS; SUBCUTANEOUS at 20:57

## 2019-07-06 RX ADMIN — INSULIN LISPRO 5 UNITS: 100 INJECTION, SOLUTION INTRAVENOUS; SUBCUTANEOUS at 16:37

## 2019-07-06 RX ADMIN — LEVOFLOXACIN 750 MG: 5 INJECTION, SOLUTION INTRAVENOUS at 12:00

## 2019-07-06 RX ADMIN — AMOXICILLIN AND CLAVULANATE POTASSIUM 1 TABLET: 875; 125 TABLET, FILM COATED ORAL at 17:26

## 2019-07-06 RX ADMIN — LIDOCAINE HYDROCHLORIDE 20 ML: 10 INJECTION, SOLUTION INFILTRATION; PERINEURAL at 10:53

## 2019-07-06 RX ADMIN — INSULIN LISPRO 7 UNITS: 100 INJECTION, SOLUTION INTRAVENOUS; SUBCUTANEOUS at 16:39

## 2019-07-06 RX ADMIN — KETOROLAC TROMETHAMINE 30 MG: 30 INJECTION, SOLUTION INTRAMUSCULAR at 17:26

## 2019-07-06 RX ADMIN — ENOXAPARIN SODIUM 30 MG: 100 INJECTION SUBCUTANEOUS at 17:30

## 2019-07-06 RX ADMIN — PROCHLORPERAZINE EDISYLATE 10 MG: 5 INJECTION INTRAMUSCULAR; INTRAVENOUS at 20:59

## 2019-07-06 RX ADMIN — DOXYCYCLINE 100 MG: 100 TABLET, FILM COATED ORAL at 17:26

## 2019-07-06 ASSESSMENT — COGNITIVE AND FUNCTIONAL STATUS - GENERAL
SUGGESTED CMS G CODE MODIFIER DAILY ACTIVITY: CH
SUGGESTED CMS G CODE MODIFIER MOBILITY: CH
DAILY ACTIVITIY SCORE: 24
MOBILITY SCORE: 24

## 2019-07-06 ASSESSMENT — ENCOUNTER SYMPTOMS
CHILLS: 0
TINGLING: 1
NAUSEA: 0
FEVER: 0
DIARRHEA: 0
EYE PAIN: 0
FOCAL WEAKNESS: 0
NERVOUS/ANXIOUS: 0
COUGH: 0
EYE REDNESS: 0
ABDOMINAL PAIN: 0
HEADACHES: 0
WEIGHT LOSS: 0
CONSTIPATION: 0
DIAPHORESIS: 0
WEAKNESS: 0
VOMITING: 0
SORE THROAT: 0
SHORTNESS OF BREATH: 0
DIZZINESS: 0

## 2019-07-06 ASSESSMENT — PATIENT HEALTH QUESTIONNAIRE - PHQ9
2. FEELING DOWN, DEPRESSED, IRRITABLE, OR HOPELESS: NOT AT ALL
1. LITTLE INTEREST OR PLEASURE IN DOING THINGS: NOT AT ALL
SUM OF ALL RESPONSES TO PHQ9 QUESTIONS 1 AND 2: 0

## 2019-07-06 NOTE — PROGRESS NOTES
Received pt via [facility].  Ambulated to bed without difficulty.  Pt is AAO x4.  Pt reports an 8/10 L labia pain level.  Will medicate per MAR.  VS WNL.  L labia dressing in place, CDI.  R PIV patent, running levaquin.  Pt oriented to room.   at bedside.  POC discussed.  All needs met at this time.  Bed in low position.  Call light within reach.  Rounding in place.

## 2019-07-06 NOTE — CARE PLAN
Problem: Knowledge Deficit  Goal: Knowledge of disease process/condition, treatment plan, diagnostic tests, and medications will improve  POC discussed. Pt understands the plan is to control sugars and continue with AB. All questions and concerns addressed.    Problem: Pain Management  Goal: Pain level will decrease to patient's comfort goal  Will control pain with PRN meds.

## 2019-07-06 NOTE — DIETARY
NUTRITION SERVICES: BMI - Pt with BMI >40 (=Body mass index is 41.47 kg/m².), class III (extreme) obesity. Weight loss counseling not appropriate in acute care setting.     RECOMMEND - Referral to outpatient nutrition services for weight management after D/C.

## 2019-07-06 NOTE — ED PROVIDER NOTES
ED Provider Note    CHIEF COMPLAINT  Chief Complaint   Patient presents with   • Abscess     c/o possible abscess in vaginal area       HPI  Liana Obrien is a 39 y.o. female who presents to the emergency room complaining of a painful swollen lump on the vaginal area.  Patient states she is had increasing pain and swelling in the left side of her vulva for the last 2 days.  Is become more painful and tender.  This is been associated with very high blood sugars.  She reports taking her medicines for diabetes but despite that her blood sugars have been difficult to control.    Patient was recently seen by her primary care doctor she states, and she was told she was very well and had a yeast infection she is on medicines for this.  Blood sugars have been in the 300-500 range.  Denies any fevers or chills.  Denies any problems urinating.  Denies any other acute concerns complaint the pain is moderate and increasing over the last few days per    REVIEW OF SYSTEMS  See HPI for further details. All other systems are negative.    PAST MEDICAL HISTORY  Past Medical History:   Diagnosis Date   • Bipolar disorder ([facility]) 8/9/2018   • Candida vaginitis    • Chickenpox    • DVT (deep venous thrombosis) ([facility])    • Dysfunctional uterine bleeding    • Fibroids    • Herpes    • Hypertension    • Hypothyroidism due to acquired atrophy of thyroid 1/22/2016   • Personal history of venous thrombosis and embolism     DVT in BLE years ago   • Sleep apnea    • Uncontrolled type 2 diabetes mellitus without complication, without long-term current use of insulin ([facility]) 3/12/2015       FAMILY HISTORY  Family History   Problem Relation Age of Onset   • Hypertension Mother    • Sleep Apnea Mother    • Hyperlipidemia Father    • Cancer Maternal Uncle    • Sleep Apnea Brother    • Diabetes Neg Hx    • Heart Disease Neg Hx    • Stroke Neg Hx        SOCIAL HISTORY  Social History     Social History   • Marital status: Single     Spouse name:  "N/A   • Number of children: N/A   • Years of education: N/A     Social History Main Topics   • Smoking status: Former Smoker     Packs/day: 0.50     Years: 0.30     Types: Cigarettes     Quit date: 2018   • Smokeless tobacco: Never Used      Comment: for 3 months at a time on and off    • Alcohol use No   • Drug use: No   • Sexual activity: Yes     Partners: Male     Other Topics Concern   • Not on file     Social History Narrative    Works at [employer]        SURGICAL HISTORY  History reviewed. No pertinent surgical history.    CURRENT MEDICATIONS  Home Medications     Reviewed by Amari Talley (Pharmacy Tech) on 07/06/19 at 0747  Med List Status: Complete   Medication Last Dose Status   Biotin w/ Vitamins C & E (HAIR/SKIN/NAILS PO) 7/5/2019 Active   Dapagliflozin-metFORMIN HCl ER 5-1000 MG TABLET SR 24 HR NOT YET STARTED Active   fluconazole (DIFLUCAN) 100 MG Tab 7/5/2019 Active   Insulin Degludec 200 UNIT/ML Solution Pen-injector 7/5/2019 Active   Multiple Vitamin (MULTI VITAMIN PO) 7/5/2019 Active   pioglitazone (ACTOS) 30 MG Tab 7/5/2019 Active   Probiotic Product (PROBIOTIC ADVANCED PO) FEW DAYS AGO Active   Semaglutide 1 MG/DOSE Solution Pen-injector NOT YET STARTED Active                ALLERGIES  Allergies   Allergen Reactions   • Bicillin C-R [Penicillin G Proc & Benzathine] Rash     Received inj of Bicillin- reaction was rash. Oral penicillin shows no reaction.   • Ondansetron Hives   • Sulfa Drugs Hives          • Metoclopramide Rash and Vomiting     Rash         PHYSICAL EXAM  VITAL SIGNS: /84   Pulse 81   Temp 36.1 °C (97 °F) (Oral)   Resp 16   Ht 1.6 m (5' 3\")   Wt 106.2 kg (234 lb 2.1 oz)   SpO2 97%   BMI 41.47 kg/m²    Constitutional: Well developed, Well nourished, No acute distress, Non-toxic appearance.   HENT: Normocephalic, Atraumatic, Bilateral external ears normal, Oropharynx moist, No oral exudates, Nose normal.   Eyes: PERRL, EOMI, Conjunctiva normal, No discharge. "   Neck: Normal range of motion, No tenderness, Supple,  Cardiovascular: Normal heart rate, Normal rhythm, No murmurs, No rubs, No gallops.   Thorax & Lungs: Normal breath sounds, No respiratory distress, No wheezing  Abdomen: Bowel sounds normal, Soft, No tenderness,  GYN: Obvious yeast infection in the vulva and around the vagina.  There is a palpable abscess lateral to the vulva that does not appear to be Bartholin's.  There is a small associated pustular head next to hair follicle I suspect this is a folliculitis/abscess.  There is some redness and induration but no extensive cellulitis  Musculoskeletal: Good range of motion in all major joints.  Neurologic: Alert, No focal deficits noted.   Psychiatric: Affect normal    Results for orders placed or performed during the hospital encounter of 07/06/19   CBC WITH DIFFERENTIAL   Result Value Ref Range    WBC 9.9 4.8 - 10.8 K/uL    RBC 5.15 4.20 - 5.40 M/uL    Hemoglobin 13.8 12.0 - 16.0 g/dL    Hematocrit 41.2 37.0 - 47.0 %    MCV 80.0 (L) 81.4 - 97.8 fL    MCH 26.8 (L) 27.0 - 33.0 pg    MCHC 33.5 (L) 33.6 - 35.0 g/dL    RDW 38.4 35.9 - 50.0 fL    Platelet Count 257 164 - 446 K/uL    MPV 10.5 9.0 - 12.9 fL    Neutrophils-Polys 57.80 44.00 - 72.00 %    Lymphocytes 31.40 22.00 - 41.00 %    Monocytes 6.50 0.00 - 13.40 %    Eosinophils 3.00 0.00 - 6.90 %    Basophils 0.70 0.00 - 1.80 %    Immature Granulocytes 0.60 0.00 - 0.90 %    Nucleated RBC 0.00 /100 WBC    Neutrophils (Absolute) 5.69 2.00 - 7.15 K/uL    Lymphs (Absolute) 3.10 1.00 - 4.80 K/uL    Monos (Absolute) 0.64 0.00 - 0.85 K/uL    Eos (Absolute) 0.30 0.00 - 0.51 K/uL    Baso (Absolute) 0.07 0.00 - 0.12 K/uL    Immature Granulocytes (abs) 0.06 0.00 - 0.11 K/uL    NRBC (Absolute) 0.00 K/uL   COMP METABOLIC PANEL   Result Value Ref Range    Sodium 132 (L) 135 - 145 mmol/L    Potassium 3.7 3.6 - 5.5 mmol/L    Chloride 98 96 - 112 mmol/L    Co2 23 20 - 33 mmol/L    Anion Gap 11.0 0.0 - 11.9    Glucose 359 (H) 65  - 99 mg/dL    Bun 6 (L) 8 - 22 mg/dL    Creatinine 0.53 0.50 - 1.40 mg/dL    Calcium 8.7 8.4 - 10.2 mg/dL    AST(SGOT) 16 12 - 45 U/L    ALT(SGPT) 28 2 - 50 U/L    Alkaline Phosphatase 84 30 - 99 U/L    Total Bilirubin 0.5 0.1 - 1.5 mg/dL    Albumin 3.2 3.2 - 4.9 g/dL    Total Protein 7.1 6.0 - 8.2 g/dL    Globulin 3.9 (H) 1.9 - 3.5 g/dL    A-G Ratio 0.8 g/dL   LACTIC ACID   Result Value Ref Range    Lactic Acid 2.2 (H) 0.5 - 2.0 mmol/L   HCG QUAL SERUM   Result Value Ref Range    Beta-Hcg Qualitative Serum Negative Negative   ESTIMATED GFR   Result Value Ref Range    GFR If African American >60 >60 mL/min/1.73 m 2    GFR If Non African American >60 >60 mL/min/1.73 m 2          RADIOLOGY/PROCEDURES  CT-PELVIS WITH   Final Result         A1.8 cm abscess in the left labia majora.          Incision and Drainage Procedure Note    Indication: Abscess    Procedure: The patient was positioned appropriately and the skin over the incision site was prepped with betadine and draped in a sterile fashion and prepped with betadine. Local anesthesia was obtained by infiltration using 7.0 cc of 2% Lidocaine without epinephrine.  An incision was then made over the apex of the lesion and approximately 8 cc of thick and purulent material was expressed. Loculations were broken up using forceps and more of the material was able to be expressed. The drainage cavity was then packed with sterile gauze. The patient’s tetanus status was up to date and did not require a booster dose.    The patient tolerated the procedure well.    Complications: None          COURSE & MEDICAL DECISION MAKING  Pertinent Labs & Imaging studies reviewed. (See chart for details)    Patient is presenting with a painful lump in her perineum.  She is a diabetic and her blood sugars out of control because she has not been taking her medications.      She has no need for vaginitis but has not been taking the medicine she was prescribed for this.        The patient  sent over for CT to rule out extensive perineal infection or life-threatening infection of the perineum.    The patient is a small superficial appearing abscess.  After I reviewed the CT looks like this can be managed here in the emergency department somewhat simply.  The patient is agreeable to the plan of I&D.    Abscess is incised and drained as above.  The patient's been on antibiotics such as Levaquin because of her penicillin allergy.    The patient will be admitted to the hospitalist because she has a perineal infection and because she has a blood sugar that is out of control.  I think is very high risk to send her home with a blood sugar in the 300-500 range of the peritoneal incision and drainage and infection.  patient is made aware of this.  She is agreed with the plan.  Admitted to hospice for continued work-up and treatment she is admitted in guarded condition per        FINAL IMPRESSION  1. Perineal abscess    2. Hyperglycemia    3. Yeast vaginitis        3.         Electronically signed by: Russ Soto, 7/6/2019 9:02 AM

## 2019-07-06 NOTE — ED TRIAGE NOTES
"Chief Complaint   Patient presents with   • Abscess     c/o possible abscess in vaginal area     /84   Pulse 87   Temp 36.1 °C (97 °F) (Oral)   Resp 16   Ht 1.6 m (5' 3\")   Wt 106.2 kg (234 lb 2.1 oz)   SpO2 96%   BMI 41.47 kg/m²     "

## 2019-07-06 NOTE — ED NOTES
Attempt x 2 to place PIV unsuccessful.    Charge RN to assist.  Pt stated needs to get back to see PCP for better management of DM and to resume home medications.

## 2019-07-07 LAB
GLUCOSE BLD-MCNC: 221 MG/DL (ref 65–99)
GLUCOSE BLD-MCNC: 224 MG/DL (ref 65–99)
GLUCOSE BLD-MCNC: 243 MG/DL (ref 65–99)

## 2019-07-07 PROCEDURE — 82962 GLUCOSE BLOOD TEST: CPT | Mod: 91

## 2019-07-07 PROCEDURE — 96376 TX/PRO/DX INJ SAME DRUG ADON: CPT

## 2019-07-07 PROCEDURE — 96372 THER/PROPH/DIAG INJ SC/IM: CPT

## 2019-07-07 PROCEDURE — 700111 HCHG RX REV CODE 636 W/ 250 OVERRIDE (IP): Performed by: INTERNAL MEDICINE

## 2019-07-07 PROCEDURE — 99225 PR SUBSEQUENT OBSERVATION CARE,LEVEL II: CPT | Performed by: HOSPITALIST

## 2019-07-07 PROCEDURE — G0378 HOSPITAL OBSERVATION PER HR: HCPCS

## 2019-07-07 PROCEDURE — 700102 HCHG RX REV CODE 250 W/ 637 OVERRIDE(OP): Performed by: INTERNAL MEDICINE

## 2019-07-07 PROCEDURE — A9270 NON-COVERED ITEM OR SERVICE: HCPCS | Performed by: INTERNAL MEDICINE

## 2019-07-07 RX ORDER — PROMETHAZINE HYDROCHLORIDE 25 MG/1
25 TABLET ORAL EVERY 6 HOURS PRN
Status: DISCONTINUED | OUTPATIENT
Start: 2019-07-07 | End: 2019-07-07

## 2019-07-07 RX ORDER — SCOLOPAMINE TRANSDERMAL SYSTEM 1 MG/1
1 PATCH, EXTENDED RELEASE TRANSDERMAL
Status: DISCONTINUED | OUTPATIENT
Start: 2019-07-07 | End: 2019-07-07 | Stop reason: CLARIF

## 2019-07-07 RX ADMIN — DOXYCYCLINE 100 MG: 100 TABLET, FILM COATED ORAL at 05:52

## 2019-07-07 RX ADMIN — INSULIN LISPRO 7 UNITS: 100 INJECTION, SOLUTION INTRAVENOUS; SUBCUTANEOUS at 11:04

## 2019-07-07 RX ADMIN — KETOROLAC TROMETHAMINE 30 MG: 30 INJECTION, SOLUTION INTRAMUSCULAR at 09:54

## 2019-07-07 RX ADMIN — INSULIN LISPRO 3 UNITS: 100 INJECTION, SOLUTION INTRAVENOUS; SUBCUTANEOUS at 11:07

## 2019-07-07 RX ADMIN — KETOROLAC TROMETHAMINE 30 MG: 30 INJECTION, SOLUTION INTRAMUSCULAR at 16:05

## 2019-07-07 RX ADMIN — AMOXICILLIN AND CLAVULANATE POTASSIUM 1 TABLET: 875; 125 TABLET, FILM COATED ORAL at 05:52

## 2019-07-07 RX ADMIN — INSULIN LISPRO 3 UNITS: 100 INJECTION, SOLUTION INTRAVENOUS; SUBCUTANEOUS at 05:57

## 2019-07-07 RX ADMIN — DOXYCYCLINE 100 MG: 100 TABLET, FILM COATED ORAL at 17:08

## 2019-07-07 RX ADMIN — INSULIN LISPRO 3 UNITS: 100 INJECTION, SOLUTION INTRAVENOUS; SUBCUTANEOUS at 17:11

## 2019-07-07 RX ADMIN — FLUCONAZOLE 200 MG: 200 TABLET ORAL at 05:52

## 2019-07-07 RX ADMIN — ENOXAPARIN SODIUM 30 MG: 100 INJECTION SUBCUTANEOUS at 17:08

## 2019-07-07 RX ADMIN — INSULIN GLARGINE 21 UNITS: 100 INJECTION, SOLUTION SUBCUTANEOUS at 17:10

## 2019-07-07 RX ADMIN — INSULIN LISPRO 7 UNITS: 100 INJECTION, SOLUTION INTRAVENOUS; SUBCUTANEOUS at 17:14

## 2019-07-07 RX ADMIN — INSULIN LISPRO 5 UNITS: 100 INJECTION, SOLUTION INTRAVENOUS; SUBCUTANEOUS at 21:27

## 2019-07-07 RX ADMIN — AMOXICILLIN AND CLAVULANATE POTASSIUM 1 TABLET: 875; 125 TABLET, FILM COATED ORAL at 17:08

## 2019-07-07 RX ADMIN — ENOXAPARIN SODIUM 30 MG: 100 INJECTION SUBCUTANEOUS at 05:52

## 2019-07-07 RX ADMIN — INSULIN LISPRO 7 UNITS: 100 INJECTION, SOLUTION INTRAVENOUS; SUBCUTANEOUS at 07:04

## 2019-07-07 ASSESSMENT — ENCOUNTER SYMPTOMS
VOMITING: 0
PHOTOPHOBIA: 0
TREMORS: 0
BLOOD IN STOOL: 0
HEADACHES: 0
DIZZINESS: 0
NECK PAIN: 0
SHORTNESS OF BREATH: 0
COUGH: 0
CLAUDICATION: 0
HEMOPTYSIS: 0
DEPRESSION: 0
NAUSEA: 0
DOUBLE VISION: 0
MYALGIAS: 0
SPEECH CHANGE: 0
MEMORY LOSS: 0
BACK PAIN: 0
FEVER: 1
SPUTUM PRODUCTION: 0
STRIDOR: 0
ORTHOPNEA: 0
BLURRED VISION: 0
TINGLING: 0
SORE THROAT: 0
PALPITATIONS: 0
CHILLS: 1
NERVOUS/ANXIOUS: 0
EYE PAIN: 0
SENSORY CHANGE: 0
HEARTBURN: 0
WEAKNESS: 0
PND: 0
CONSTIPATION: 0

## 2019-07-07 NOTE — PROGRESS NOTES
Hospital Medicine Daily Progress Note    Date of Service  7/7/2019    Chief Complaint  39 y.o. female admitted 7/6/2019 with left groin abscess    Hospital Course    per HPI,39 y.o. female who presented 7/6/2019 with persistent vaginal pain and discharge due to ongoing yeast infection.  She also has a history of diabetes and has not been on medication for several months due to loss of her insurance.  She recently regained her insurance and saw primary care on July 3, was given prescriptions for her yeast infection as well as her diabetes but has not yet started these medications, she has chronic irritation and pain in the vulvovaginal area yesterday she noted that when she put her underwear on the edge of the underwear hurt, today she noticed a swelling in the left groin area.  CT shows a small abscess which was I&D by the ER physician.         Interval Problem Update  No acute events overnight, patient says she feels like she is having fevers, is very anxious because of was going on in her life.  In addition she complains of having 7 out of 10 pain in her perineal region.  We will continue with pain control, IV antibiotics  Wound care    Consultants/Specialty  None    Code Status  Full Code    Disposition  TBD    Review of Systems  Review of Systems   Constitutional: Positive for chills, fever and malaise/fatigue.   HENT: Negative for congestion, hearing loss, sore throat and tinnitus.    Eyes: Negative for blurred vision, double vision, photophobia and pain.   Respiratory: Negative for cough, hemoptysis, sputum production, shortness of breath and stridor.    Cardiovascular: Negative for chest pain, palpitations, orthopnea, claudication and PND.   Gastrointestinal: Negative for blood in stool, constipation, heartburn, melena, nausea and vomiting.   Genitourinary: Negative for dysuria, frequency and urgency.        Perineal pain   Musculoskeletal: Negative for back pain, myalgias and neck pain.   Neurological:  Negative for dizziness, tingling, tremors, sensory change, speech change, weakness and headaches.   Psychiatric/Behavioral: Negative for depression, memory loss and suicidal ideas. The patient is not nervous/anxious.         Physical Exam  Temp:  [36.4 °C (97.5 °F)-36.7 °C (98 °F)] 36.7 °C (98 °F)  Pulse:  [66-96] 96  Resp:  [18] 18  BP: (123-136)/(76-92) 123/76  SpO2:  [95 %-100 %] 100 %    Physical Exam   Constitutional: She is oriented to person, place, and time. She appears well-developed and well-nourished. No distress.   Morbidly obese   HENT:   Head: Normocephalic and atraumatic.   Mouth/Throat: No oropharyngeal exudate.   Eyes: Pupils are equal, round, and reactive to light. Conjunctivae are normal. Right eye exhibits no discharge. No scleral icterus.   Neck: Neck supple. No JVD present. No thyromegaly present.   Cardiovascular: Normal rate and intact distal pulses.    No murmur heard.  Pulmonary/Chest: Effort normal and breath sounds normal. No stridor. No respiratory distress. She has no wheezes. She has no rales.   Abdominal: Soft. Bowel sounds are normal. She exhibits no distension. There is no tenderness. There is no rebound.   Genitourinary:   Genitourinary Comments: Left inguinal area mild erythema, packing in place no exudative discharges noted   Musculoskeletal: Normal range of motion. She exhibits no edema.   Neurological: She is alert and oriented to person, place, and time. No cranial nerve deficit.   Skin: Skin is warm. She is not diaphoretic. No erythema.   Psychiatric: Thought content normal.   Anxious   Nursing note and vitals reviewed.      Fluids    Intake/Output Summary (Last 24 hours) at 07/07/19 0725  Last data filed at 07/07/19 0600   Gross per 24 hour   Intake              400 ml   Output              900 ml   Net             -500 ml       Laboratory  Recent Labs      07/06/19   0802   WBC  9.9   RBC  5.15   HEMOGLOBIN  13.8   HEMATOCRIT  41.2   MCV  80.0*   MCH  26.8*   MCHC  33.5*    RDW  38.4   PLATELETCT  257   MPV  10.5     Recent Labs      07/06/19   0802   SODIUM  132*   POTASSIUM  3.7   CHLORIDE  98   CO2  23   GLUCOSE  359*   BUN  6*   CREATININE  0.53   CALCIUM  8.7                   Imaging  CT-PELVIS WITH   Final Result         A1.8 cm abscess in the left labia majora.           Assessment/Plan  * Abscess of left groin   Assessment & Plan    1.8cm Left groin abscess.  S/p I&D in the ER, no cultures taken  Improving  Resume IV Unasyn and doxycyline  Pain control          Candida vaginitis- (present on admission)   Assessment & Plan    Patient with long-standing candidal vaginitis and irritation  Continue with PO diflucan      Personal history of venous thrombosis and embolism- (present on admission)   Assessment & Plan    No acute processes  Resume lovenox      Obstructive sleep apnea- (present on admission)   Assessment & Plan    Does not have cpap machine, needs follow up     Morbid obesity (CMS-HCC)- (present on admission)   Assessment & Plan    Body mass index is 41.47 kg/m².  Needs diet/exercise      Uncontrolled type 2 diabetes mellitus without complication, without long-term current use of insulin ([facility])- (present on admission)   Assessment & Plan    Noncompliant with medications due to loss of insurance. Patient is awaiting for insurance authorization.  Last a1c 12.4, education provided.  Continue Insulin-sliding scale, accu-checks and hypoglycemia protocol.     Hypothyroidism due to acquired atrophy of thyroid- (present on admission)   Assessment & Plan    TSH 5.5  Check Free T4  Not taking medications           Patient plan of care discussed at multidisplinary team rounds and with patient and R.N at beside.      VTE prophylaxis: Lovenox

## 2019-07-07 NOTE — PROGRESS NOTES
Received report from day shift nurse.   Assumed pt care.   Pt is A&Ox4, resting comfortably in bed.   Pt on r.a.. No signs of SOB/respiratory distress.   Labs noted, VSS.   Pt c/o mild pain to her vaginal area and stated that she took Toradol 30mg earlier and it really helped - pt denied more pain meds at this time   Assessment completed, packing dressing noted to vaginal area with moderate blood drainage noted   Fall precautions in place.   Bed at lowest position.   Call light and personal belongings within reach.   Continue to monitor

## 2019-07-07 NOTE — CARE PLAN
Problem: Safety  Goal: Will remain free from injury  Pt is sba with transfers. Non-skid socks on. Bed in lowest position.  is at bedside. Call light and belongings are within reach. Will continue to monitor.     Problem: Pain Management  Goal: Pain level will decrease to patient's comfort goal  Pt c/o left labia pain r/t I&D. Administered prn IV Toradol with good relief.

## 2019-07-07 NOTE — ASSESSMENT & PLAN NOTE
Noncompliant with medications due to loss of insurance.   Authorization completed on the medications his PCP sent to her pharmacy,[facility].  Last a1c 12.4, education provided.  Continue Insulin-sliding scale, accu-checks and hypoglycemia protocol.

## 2019-07-07 NOTE — H&P
Hospital Medicine History & Physical Note    Date of Service  7/6/2019    Primary Care Physician  Gary Caal M.D.    Consultants  None    Code Status  Full    Chief Complaint  Pain in the left groin    History of Presenting Illness  39 y.o. female who presented 7/6/2019 with persistent vaginal pain and discharge due to ongoing yeast infection.  She also has a history of diabetes and has not been on medication for several months due to loss of her insurance.  She recently regained her insurance and saw primary care on July 3, was given prescriptions for her yeast infection as well as her diabetes but has not yet started these medications, she has chronic irritation and pain in the vulvovaginal area yesterday she noted that when she put her underwear on the edge of the underwear hurt, today she noticed a swelling in the left groin area.  CT shows a small abscess which was I&D by the ER physician.      Review of Systems  Review of Systems   Constitutional: Negative for chills, diaphoresis, fever, malaise/fatigue and weight loss.   HENT: Negative for congestion and sore throat.    Eyes: Negative for pain and redness.   Respiratory: Negative for cough and shortness of breath.    Cardiovascular: Negative for chest pain.   Gastrointestinal: Negative for abdominal pain, constipation, diarrhea, nausea and vomiting.   Genitourinary: Positive for dysuria.        Discharge vaginal irritation   Skin: Positive for itching and rash.   Neurological: Positive for tingling (Legs and feet). Negative for dizziness, focal weakness, weakness and headaches. Sensory change: Legs and feet.   Psychiatric/Behavioral: The patient is not nervous/anxious.        Past Medical History   has a past medical history of Bipolar disorder ([facility]) (8/9/2018); Candida vaginitis; Chickenpox; DVT (deep venous thrombosis) ([facility]); Dysfunctional uterine bleeding; Fibroids; Herpes; Hypertension; Hypothyroidism due to acquired atrophy of thyroid (1/22/2016);  Personal history of venous thrombosis and embolism; Sleep apnea; and Uncontrolled type 2 diabetes mellitus without complication, without long-term current use of insulin ([facility]) (3/12/2015).    Surgical History   has no past surgical history on file.     Family History  family history includes Cancer in her maternal uncle; Hyperlipidemia in her father; Hypertension in her mother; Sleep Apnea in her brother and mother.     Social History   reports that she quit smoking about 18 months ago. Her smoking use included Cigarettes. She has a 0.15 pack-year smoking history. She has never used smokeless tobacco. She reports that she does not drink alcohol or use drugs.    Allergies  Allergies   Allergen Reactions   • Bicillin C-R [Penicillin G Proc & Benzathine] Rash     Received inj of Bicillin- reaction was rash. Oral penicillin shows no reaction.   • Ondansetron Hives   • Sulfa Drugs Hives          • Metoclopramide Rash and Vomiting     Rash         Medications  Prior to Admission Medications   Prescriptions Last Dose Informant Patient Reported? Taking?   Biotin w/ Vitamins C & E (HAIR/SKIN/NAILS PO) 7/5/2019 at AM Patient Yes Yes   Sig: Take 1 Tab by mouth every day.   Dapagliflozin-metFORMIN HCl ER 5-1000 MG TABLET SR 24 HR NOT YET STARTED at NOT YET STARTED Patient No No   Sig: Take 1 tablet by mouth 2 Times a Day.   Insulin Degludec 200 UNIT/ML Solution Pen-injector 7/5/2019 at PM Patient No No   Sig: Inject 35 Units as instructed every bedtime.   Multiple Vitamin (MULTI VITAMIN PO) 7/5/2019 at AM Patient Yes Yes   Sig: Take 1 Tab by mouth every day.   Probiotic Product (PROBIOTIC ADVANCED PO) FEW DAYS AGO at [facility] Patient Yes No   Sig: Take 2 Caps by mouth every day.   Semaglutide 1 MG/DOSE Solution Pen-injector NOT YET STARTED at NOT YET STARTED Patient No No   Sig: Inject 1 mg as instructed every 7 days.   fluconazole (DIFLUCAN) 100 MG Tab 7/5/2019 at AM Patient No No   Sig: Take 1 tablet and may repeat dose in 72  hours if needed   pioglitazone (ACTOS) 30 MG Tab 7/5/2019 at AM Patient No No   Sig: Take 1 Tab by mouth every day.      Facility-Administered Medications: None       Physical Exam  Temp:  [36.1 °C (97 °F)-36.6 °C (97.8 °F)] 36.6 °C (97.8 °F)  Pulse:  [66-87] 74  Resp:  [16-18] 18  BP: (135-136)/(82-88) 136/88  SpO2:  [95 %-100 %] 100 %    Physical Exam   Constitutional: She is oriented to person, place, and time. She appears well-developed. No distress.   Morbidly obese, mildly disheveled   HENT:   Head: Normocephalic and atraumatic.   Mouth/Throat: Oropharynx is clear and moist.   Eyes: Pupils are equal, round, and reactive to light. Conjunctivae and EOM are normal. Right eye exhibits no discharge. Left eye exhibits no discharge. No scleral icterus.   Neck: Neck supple.   Cardiovascular: Normal rate and regular rhythm.    Murmur (Grade 1 mid left sternal border) heard.  Pulmonary/Chest: Effort normal and breath sounds normal.   Abdominal: Soft. Bowel sounds are normal. She exhibits no distension. There is no tenderness.   Genitourinary:   Genitourinary Comments: Groin is hyperpigmented and mildly erythematous there is blood in the mid groin area on the left she has skin tags   Musculoskeletal: She exhibits no edema or tenderness.   Neurological: She is alert and oriented to person, place, and time. No cranial nerve deficit.   Skin: Skin is warm and dry. She is not diaphoretic.   Psychiatric: She has a normal mood and affect.   Nursing note and vitals reviewed.      Laboratory:  Recent Labs      07/06/19   0802   WBC  9.9   RBC  5.15   HEMOGLOBIN  13.8   HEMATOCRIT  41.2   MCV  80.0*   MCH  26.8*   MCHC  33.5*   RDW  38.4   PLATELETCT  257   MPV  10.5     Recent Labs      07/06/19   0802   SODIUM  132*   POTASSIUM  3.7   CHLORIDE  98   CO2  23   GLUCOSE  359*   BUN  6*   CREATININE  0.53   CALCIUM  8.7     Recent Labs      07/06/19   0802   ALTSGPT  28   ASTSGOT  16   ALKPHOSPHAT  84   TBILIRUBIN  0.5   GLUCOSE   359*                 No results for input(s): TROPONINI in the last 72 hours.    Urinalysis:    No results found     Imaging:  CT-PELVIS WITH   Final Result         A1.8 cm abscess in the left labia majora.            Assessment/Plan:  I anticipate this patient is appropriate for observation status at this time.    Abscess of left groin   Assessment & Plan    Patient has medium sized abscess in the left groin which was I indeed in the emergency room  Unfortunately initial past was not sent for culture  Culture was obtained from the wound base  She will be placed on broad-spectrum empiric coverage  She has a penicillin allergy but apparently only to injected Bicillin and has been on oral penicillin since that time  Should she react to Augmentin alternative could be Cipro and Flagyl     Candida vaginitis- (present on admission)   Assessment & Plan    Patient with long-standing candidal vaginitis and irritation  Start oral Diflucan     Personal history of venous thrombosis and embolism- (present on admission)   Assessment & Plan    She will be placed on Lovenox  There is no evidence of acute process     Obstructive sleep apnea- (present on admission)   Assessment & Plan    She does not wear CPAP or have a machine     Morbid obesity (CMS-HCC)- (present on admission)   Assessment & Plan    With diabetes, obstructive sleep apnea  BMI of 41.47     Uncontrolled type 2 diabetes mellitus without complication, without long-term current use of insulin ([facility])- (present on admission)   Assessment & Plan    Patient has been noncompliant with medication due to lack of insurance  She has recently seen primary care but is awaiting insurance authorization for medication  Unclear where she is in terms of dietary compliance A1c 2 months ago was 12.4  Due to her abscess we will strive for tight control with basal and sliding scale, she can probably be transitioned to oral therapy at discharge       Hypothyroidism due to acquired atrophy  of thyroid- (present on admission)   Assessment & Plan    Not had medication, TSH last fall okay, recheck           VTE prophylaxis: lovenox

## 2019-07-07 NOTE — PROGRESS NOTES
Received bedside shift report regarding patient and assumed care. Patient is awake, calm and stable, currently positioned in bed for comfort and safety; call light within reach. Denies any pain or discomfort at this time. Will continue to monitor.

## 2019-07-07 NOTE — ASSESSMENT & PLAN NOTE
Patient Today complained of RUE swelling, No IVs were placed there  UE duplex pending   Resume lovenox

## 2019-07-07 NOTE — ASSESSMENT & PLAN NOTE
1.8cm Left groin abscess.  S/p I&D in the ER, no cultures taken  Wound care recommended daily packing, that will be difficult, most they can do outpatient is 3x week  We will address this with wound care.   Resume IV Unasyn and doxycyline  Pain control

## 2019-07-07 NOTE — CARE PLAN
Problem: Infection  Goal: Will remain free from infection  Outcome: PROGRESSING AS EXPECTED  Pt is afebrile 98*F with recent WBCS of 9.9  Pt on scheduled Augmentin 875-125mg q12 hours, Doxycyline 100mg q12 hours and Diflucan 200mg daily   + Candida from wound cx   Strip packing to L-labia area with mild serosanguinous drainage noted   Will continue to monitor & plan of care     Problem: Venous Thromboembolism (VTW)/Deep Vein Thrombosis (DVT) Prevention:  Goal: Patient will participate in Venous Thrombosis (VTE)/Deep Vein Thrombosis (DVT)Prevention Measures  Outcome: PROGRESSING AS EXPECTED   07/06/19 1308 07/06/19 2100   Mechanical/VTE Prophylaxis   Mechanical Prophylaxis  (none ordered) --    OTHER   Pharmacologic Prophylaxis Used --  LMWH: Enoxaparin(Lovenox)   Pt up to self to bathroom and to chair     Problem: Pain Management  Goal: Pain level will decrease to patient's comfort goal  Outcome: PROGRESSING AS EXPECTED   07/06/19 2100   OTHER   Pain Rating Scale (NPRS) 3   Comfort Goal Comfort at Rest;Comfort with Movement;Sleep Comfortably   Prn Tylenol 650mg and Toradol via MAR for c/o pain to labial area   However pt denied pain meds upon assessment and stated that her pain is manageable - highly encouraged pt to report to the nurse if pain remains uncontrolled, pt verbalized understanding

## 2019-07-07 NOTE — PROGRESS NOTES
2 RN skin check completed.  L labia packing dressing in place with scant drainage.  All other skin intact.

## 2019-07-08 ENCOUNTER — APPOINTMENT (OUTPATIENT)
Dept: RADIOLOGY | Facility: MEDICAL CENTER | Age: 39
End: 2019-07-08
Attending: HOSPITALIST
Payer: COMMERCIAL

## 2019-07-08 LAB
ALBUMIN SERPL BCP-MCNC: 2.7 G/DL (ref 3.2–4.9)
ALBUMIN/GLOB SERPL: 0.7 G/DL
ALP SERPL-CCNC: 69 U/L (ref 30–99)
ALT SERPL-CCNC: 35 U/L (ref 2–50)
ANION GAP SERPL CALC-SCNC: 10 MMOL/L (ref 0–11.9)
AST SERPL-CCNC: 35 U/L (ref 12–45)
BASOPHILS # BLD AUTO: 0.6 % (ref 0–1.8)
BASOPHILS # BLD: 0.04 K/UL (ref 0–0.12)
BILIRUB SERPL-MCNC: 0.6 MG/DL (ref 0.1–1.5)
BUN SERPL-MCNC: 10 MG/DL (ref 8–22)
CALCIUM SERPL-MCNC: 8.9 MG/DL (ref 8.4–10.2)
CHLORIDE SERPL-SCNC: 103 MMOL/L (ref 96–112)
CO2 SERPL-SCNC: 21 MMOL/L (ref 20–33)
CREAT SERPL-MCNC: 0.45 MG/DL (ref 0.5–1.4)
EOSINOPHIL # BLD AUTO: 0.28 K/UL (ref 0–0.51)
EOSINOPHIL NFR BLD: 4.3 % (ref 0–6.9)
ERYTHROCYTE [DISTWIDTH] IN BLOOD BY AUTOMATED COUNT: 39.3 FL (ref 35.9–50)
GLOBULIN SER CALC-MCNC: 3.9 G/DL (ref 1.9–3.5)
GLUCOSE BLD-MCNC: 211 MG/DL (ref 65–99)
GLUCOSE BLD-MCNC: 246 MG/DL (ref 65–99)
GLUCOSE SERPL-MCNC: 166 MG/DL (ref 65–99)
HCT VFR BLD AUTO: 39.2 % (ref 37–47)
HGB BLD-MCNC: 13 G/DL (ref 12–16)
IMM GRANULOCYTES # BLD AUTO: 0.03 K/UL (ref 0–0.11)
IMM GRANULOCYTES NFR BLD AUTO: 0.5 % (ref 0–0.9)
LYMPHOCYTES # BLD AUTO: 2.57 K/UL (ref 1–4.8)
LYMPHOCYTES NFR BLD: 39.1 % (ref 22–41)
MCH RBC QN AUTO: 26.8 PG (ref 27–33)
MCHC RBC AUTO-ENTMCNC: 33.2 G/DL (ref 33.6–35)
MCV RBC AUTO: 80.8 FL (ref 81.4–97.8)
MONOCYTES # BLD AUTO: 0.61 K/UL (ref 0–0.85)
MONOCYTES NFR BLD AUTO: 9.3 % (ref 0–13.4)
NEUTROPHILS # BLD AUTO: 3.04 K/UL (ref 2–7.15)
NEUTROPHILS NFR BLD: 46.2 % (ref 44–72)
NRBC # BLD AUTO: 0 K/UL
NRBC BLD-RTO: 0 /100 WBC
PLATELET # BLD AUTO: 214 K/UL (ref 164–446)
PMV BLD AUTO: 10.1 FL (ref 9–12.9)
POTASSIUM SERPL-SCNC: 3.4 MMOL/L (ref 3.6–5.5)
PROT SERPL-MCNC: 6.6 G/DL (ref 6–8.2)
RBC # BLD AUTO: 4.85 M/UL (ref 4.2–5.4)
SODIUM SERPL-SCNC: 134 MMOL/L (ref 135–145)
T4 FREE SERPL-MCNC: 0.88 NG/DL (ref 0.58–1.64)
WBC # BLD AUTO: 6.6 K/UL (ref 4.8–10.8)

## 2019-07-08 PROCEDURE — 96376 TX/PRO/DX INJ SAME DRUG ADON: CPT

## 2019-07-08 PROCEDURE — 36415 COLL VENOUS BLD VENIPUNCTURE: CPT

## 2019-07-08 PROCEDURE — A9270 NON-COVERED ITEM OR SERVICE: HCPCS | Performed by: INTERNAL MEDICINE

## 2019-07-08 PROCEDURE — 700102 HCHG RX REV CODE 250 W/ 637 OVERRIDE(OP): Performed by: INTERNAL MEDICINE

## 2019-07-08 PROCEDURE — 85025 COMPLETE CBC W/AUTO DIFF WBC: CPT

## 2019-07-08 PROCEDURE — 700111 HCHG RX REV CODE 636 W/ 250 OVERRIDE (IP): Performed by: INTERNAL MEDICINE

## 2019-07-08 PROCEDURE — 700102 HCHG RX REV CODE 250 W/ 637 OVERRIDE(OP): Performed by: HOSPITALIST

## 2019-07-08 PROCEDURE — 93971 EXTREMITY STUDY: CPT | Mod: RT

## 2019-07-08 PROCEDURE — A9270 NON-COVERED ITEM OR SERVICE: HCPCS | Performed by: HOSPITALIST

## 2019-07-08 PROCEDURE — 96372 THER/PROPH/DIAG INJ SC/IM: CPT

## 2019-07-08 PROCEDURE — 84439 ASSAY OF FREE THYROXINE: CPT

## 2019-07-08 PROCEDURE — G0378 HOSPITAL OBSERVATION PER HR: HCPCS

## 2019-07-08 PROCEDURE — 82962 GLUCOSE BLOOD TEST: CPT

## 2019-07-08 PROCEDURE — 80053 COMPREHEN METABOLIC PANEL: CPT

## 2019-07-08 PROCEDURE — 99225 PR SUBSEQUENT OBSERVATION CARE,LEVEL II: CPT | Performed by: HOSPITALIST

## 2019-07-08 RX ORDER — IBUPROFEN 600 MG/1
600 TABLET ORAL EVERY 6 HOURS PRN
Status: DISCONTINUED | OUTPATIENT
Start: 2019-07-08 | End: 2019-07-09 | Stop reason: HOSPADM

## 2019-07-08 RX ORDER — POTASSIUM CHLORIDE 20 MEQ/1
40 TABLET, EXTENDED RELEASE ORAL ONCE
Status: COMPLETED | OUTPATIENT
Start: 2019-07-08 | End: 2019-07-08

## 2019-07-08 RX ADMIN — POTASSIUM CHLORIDE 40 MEQ: 1500 TABLET, EXTENDED RELEASE ORAL at 09:15

## 2019-07-08 RX ADMIN — ACETAMINOPHEN 650 MG: 325 TABLET, FILM COATED ORAL at 15:19

## 2019-07-08 RX ADMIN — INSULIN LISPRO 3 UNITS: 100 INJECTION, SOLUTION INTRAVENOUS; SUBCUTANEOUS at 17:19

## 2019-07-08 RX ADMIN — ENOXAPARIN SODIUM 30 MG: 100 INJECTION SUBCUTANEOUS at 17:24

## 2019-07-08 RX ADMIN — ENOXAPARIN SODIUM 30 MG: 100 INJECTION SUBCUTANEOUS at 05:24

## 2019-07-08 RX ADMIN — AMOXICILLIN AND CLAVULANATE POTASSIUM 1 TABLET: 875; 125 TABLET, FILM COATED ORAL at 05:24

## 2019-07-08 RX ADMIN — KETOROLAC TROMETHAMINE 30 MG: 30 INJECTION, SOLUTION INTRAMUSCULAR at 02:13

## 2019-07-08 RX ADMIN — AMOXICILLIN AND CLAVULANATE POTASSIUM 1 TABLET: 875; 125 TABLET, FILM COATED ORAL at 17:24

## 2019-07-08 RX ADMIN — DOXYCYCLINE 100 MG: 100 TABLET, FILM COATED ORAL at 17:24

## 2019-07-08 RX ADMIN — DOXYCYCLINE 100 MG: 100 TABLET, FILM COATED ORAL at 05:24

## 2019-07-08 RX ADMIN — INSULIN LISPRO 7 UNITS: 100 INJECTION, SOLUTION INTRAVENOUS; SUBCUTANEOUS at 17:22

## 2019-07-08 RX ADMIN — KETOROLAC TROMETHAMINE 30 MG: 30 INJECTION, SOLUTION INTRAMUSCULAR at 09:15

## 2019-07-08 RX ADMIN — INSULIN LISPRO 7 UNITS: 100 INJECTION, SOLUTION INTRAVENOUS; SUBCUTANEOUS at 10:55

## 2019-07-08 RX ADMIN — FLUCONAZOLE 200 MG: 200 TABLET ORAL at 05:24

## 2019-07-08 RX ADMIN — INSULIN LISPRO 3 UNITS: 100 INJECTION, SOLUTION INTRAVENOUS; SUBCUTANEOUS at 10:53

## 2019-07-08 RX ADMIN — INSULIN LISPRO 5 UNITS: 100 INJECTION, SOLUTION INTRAVENOUS; SUBCUTANEOUS at 21:16

## 2019-07-08 RX ADMIN — INSULIN GLARGINE 21 UNITS: 100 INJECTION, SOLUTION SUBCUTANEOUS at 17:18

## 2019-07-08 RX ADMIN — INSULIN LISPRO 7 UNITS: 100 INJECTION, SOLUTION INTRAVENOUS; SUBCUTANEOUS at 07:35

## 2019-07-08 RX ADMIN — IBUPROFEN 600 MG: 600 TABLET ORAL at 21:10

## 2019-07-08 RX ADMIN — INSULIN LISPRO 2 UNITS: 100 INJECTION, SOLUTION INTRAVENOUS; SUBCUTANEOUS at 05:25

## 2019-07-08 ASSESSMENT — ENCOUNTER SYMPTOMS
PHOTOPHOBIA: 0
NERVOUS/ANXIOUS: 1
PND: 0
SPEECH CHANGE: 0
SORE THROAT: 0
BACK PAIN: 0
SENSORY CHANGE: 0
DOUBLE VISION: 0
NECK PAIN: 0
WEAKNESS: 0
SPUTUM PRODUCTION: 0
NAUSEA: 0
CLAUDICATION: 0
ORTHOPNEA: 0
DEPRESSION: 0
COUGH: 0
STRIDOR: 0
HEADACHES: 0
FEVER: 0
HEMOPTYSIS: 0
VOMITING: 0
BLURRED VISION: 0
TINGLING: 0
TREMORS: 0
BLOOD IN STOOL: 0
DIZZINESS: 0
MYALGIAS: 0
CONSTIPATION: 0
PALPITATIONS: 0
EYE PAIN: 0
SHORTNESS OF BREATH: 0
CHILLS: 0
HEARTBURN: 0
MEMORY LOSS: 0

## 2019-07-08 NOTE — WOUND TEAM
"[facility]  Inpatient Services  Initial Wound and Skin Care Evaluation    Admission Date:  7/6/2019   HPI, PMH, SH: Reviewed  Unit where seen by Wound Team: 2213/02    WOUND CONSULT RELATED TO: L labia    SUBJECTIVE:   \"Is it going to hurt?\"    Self Report / Pain Level: premedicated by primary RN, c/o pain with packing removal and placing      OBJECTIVE: 1/2\" wide strip packing dislodged  WOUND TYPE, LOCATION, CHARACTERISTICS (Pressure ulcers: location, stage, POA or date identified)  Wound 07/06/19 Incision Groin labia left (Active)   Site Assessment Red;Drainage 7/8/2019  9:45 AM   Sravani-wound Assessment Maceration 7/8/2019  9:45 AM   Margins Defined edges 7/8/2019  9:45 AM   Wound Length (cm) 1 cm 7/8/2019  9:45 AM   Wound Width (cm) 0.2 cm 7/8/2019  9:45 AM   Wound Depth (cm) 1.3 cm 7/8/2019  9:45 AM   Wound Surface Area (cm^2) 0.2 cm^2 7/8/2019  9:45 AM   Tunneling 0 cm 7/8/2019  9:45 AM   Undermining 0 cm 7/8/2019  9:45 AM   Drainage Amount Small 7/8/2019  9:45 AM   Drainage Description Serosanguineous 7/8/2019  9:45 AM   Non-staged Wound Description Full thickness 7/8/2019  9:45 AM   Treatments Cleansed 7/8/2019  9:45 AM   Cleansing Normal Saline Irrigation 7/8/2019  9:45 AM   Periwound Protectant Not Applicable 7/8/2019  9:45 AM   Dressing Options Strip Iodoform 7/8/2019  9:45 AM   Dressing Changed Changed 7/8/2019  9:45 AM   Dressing Status Intact 7/8/2019  9:45 AM   Dressing Change Frequency Daily 7/8/2019  9:45 AM   NEXT Dressing Change  07/09/19 7/8/2019  9:45 AM   Odor None 7/8/2019  9:45 AM    Exposed Structures None 7/8/2019  9:45 AM    Tissue Type and Percentage 100% red 7/8/2019  9:45 AM     Vascular:  Wounds not r/t vascular problem  Lab Values:    WBC:       WBC   Date/Time Value Ref Range Status   07/08/2019 05:15 AM 6.6 4.8 - 10.8 K/uL Final     AIC:      Lab Results   Component Value Date/Time    HBA1C 12.4 (H) 05/09/2019 05:00 AM          Culture:  Already " "performed    INTERVENTIONS BY WOUND TEAM: soaked packing, removed. Irrigated with IV catheter tip applied to NS flushes. Loosely filled wound bed with 1/4\" iodoform strip packing. Applied stretch underwear and cj-pad.  Dressing Options: Strip Iodoform (cj-pad and stretch briefs)    Interdisciplinary consultation:   With Nursing;With Patient / Family    EVALUATION: pt has I&D site that is full thickness, cj-wound skin moist r/t body habitus. Iodoform strip packing for antimicrobial and to provide wick. Cj-pad for drainage. Unable to get photo r/t pain and body habitus    Factors affecting wound healing: infection, obesity, DM, candida vaginitis  Goals:  Steady decrease in wound area and depth weekly       NURSING PLAN OF CARE ORDERS (X):    Dressing changes: See Dressing Care orders:   x  Skin care: See Skin Care orders:   Rectal tube care: See Rectal Tube Care orders:   Other orders:    RSKIN: CURRENT (X) ORDERED (O)  Pt performs self care  Q shift Andrew:  X  Q shift pressure point assessments:  X  Pressure redistribution mattress    x        ANABEL          Bariatric ANABEL        Bariatric foam           Heel float boots         Float Heels off Bed with Pillows              Barrier wipes         Barrier Cream         Barrier paste        Sacral silicone dressing         Silicone O2 tubing         Anchorfast         Cannula fixation Device (Tender )          Gray Foam Ear protectors           Trach with split foam               Waffle cushion        Waffle Overlay         Rectal tube or BMS         Antifungal tx   Interdry         Reposition q 2 hours    pt performs   Up to chair        Ambulate      PT/OT        Dietician        Diabetes Education      PO x   TF     TPN     NPO   # days   Other     WOUND TEAM PLAN OF CARE (X):   NPWT change 3 x week:        Dressing changes by wound team:       Follow up as needed:  x     Other (explain):    Anticipated discharge plans (X):  SNF:           Home Care:        "    Outpatient Wound Center:            Self Care:            Other:   nneds wound observed and possibly packed depending on drainage

## 2019-07-08 NOTE — PROGRESS NOTES
Hospital Medicine Daily Progress Note    Date of Service  7/8/2019    Chief Complaint  39 y.o. female admitted 7/6/2019 with left groin abscess    Hospital Course    per HPI,39 y.o. female who presented 7/6/2019 with persistent vaginal pain and discharge due to ongoing yeast infection.  She also has a history of diabetes and has not been on medication for several months due to loss of her insurance.  She recently regained her insurance and saw primary care on July 3, was given prescriptions for her yeast infection as well as her diabetes but has not yet started these medications, she has chronic irritation and pain in the vulvovaginal area yesterday she noted that when she put her underwear on the edge of the underwear hurt, today she noticed a swelling in the left groin area.  CT shows a small abscess which was I&D by the ER physician.         Interval Problem Update  No acute events overnight, patient says she feels like she is having fevers, is very anxious because of was going on in her life.  In addition she complains of having 7 out of 10 pain in her perineal region.  We will continue with pain control, IV antibiotics  Wound care    7/8  Patient is very anxious says that she is still in a lot of, 8 out of 10 in severity around her perineal region.  By wound care dressing changes.  Denies having fevers however  Also she complains of right upper extremity swelling which was not there earlier  Upper extremity duplex pending  Wound care  Antibiotics    Consultants/Specialty  None    Code Status  Full Code    Disposition  TBD    Review of Systems  Review of Systems   Constitutional: Positive for malaise/fatigue. Negative for chills and fever.   HENT: Negative for congestion, hearing loss, sore throat and tinnitus.    Eyes: Negative for blurred vision, double vision, photophobia and pain.   Respiratory: Negative for cough, hemoptysis, sputum production, shortness of breath and stridor.    Cardiovascular: Negative  for chest pain, palpitations, orthopnea, claudication and PND.   Gastrointestinal: Negative for blood in stool, constipation, heartburn, melena, nausea and vomiting.   Genitourinary: Negative for dysuria, frequency and urgency.        Perineal pain   Musculoskeletal: Negative for back pain, myalgias and neck pain.        RUE swelling   Neurological: Negative for dizziness, tingling, tremors, sensory change, speech change, weakness and headaches.   Psychiatric/Behavioral: Negative for depression, memory loss and suicidal ideas. The patient is nervous/anxious.         Physical Exam  Temp:  [36.3 °C (97.4 °F)-36.5 °C (97.7 °F)] 36.3 °C (97.4 °F)  Pulse:  [70-83] 81  Resp:  [20] 20  BP: (118-163)/(77-90) 118/77  SpO2:  [95 %-98 %] 95 %    Physical Exam   Constitutional: She is oriented to person, place, and time. She appears well-developed and well-nourished. No distress.   Morbidly obese   HENT:   Head: Normocephalic and atraumatic.   Mouth/Throat: No oropharyngeal exudate.   Eyes: Pupils are equal, round, and reactive to light. Conjunctivae are normal. Right eye exhibits no discharge. No scleral icterus.   Neck: Neck supple. No JVD present. No thyromegaly present.   Cardiovascular: Normal rate and intact distal pulses.    No murmur heard.  Pulmonary/Chest: Effort normal and breath sounds normal. No stridor. No respiratory distress. She has no wheezes. She has no rales.   Abdominal: Soft. Bowel sounds are normal. She exhibits no distension. There is no tenderness. There is no rebound.   Genitourinary:   Genitourinary Comments: Left inguinal area mild erythema, packing in place no exudative discharges noted   Musculoskeletal: Normal range of motion. She exhibits edema (RUL swelling, ). She exhibits no tenderness.   Neurological: She is alert and oriented to person, place, and time. No cranial nerve deficit.   Skin: Skin is warm. She is not diaphoretic. No erythema.   Psychiatric: She has a normal mood and affect.  Thought content normal.   Anxious   Nursing note and vitals reviewed.      Fluids    Intake/Output Summary (Last 24 hours) at 07/08/19 1338  Last data filed at 07/08/19 1230   Gross per 24 hour   Intake              360 ml   Output                0 ml   Net              360 ml       Laboratory  Recent Labs      07/06/19   0802  07/08/19   0515   WBC  9.9  6.6   RBC  5.15  4.85   HEMOGLOBIN  13.8  13.0   HEMATOCRIT  41.2  39.2   MCV  80.0*  80.8*   MCH  26.8*  26.8*   MCHC  33.5*  33.2*   RDW  38.4  39.3   PLATELETCT  257  214   MPV  10.5  10.1     Recent Labs      07/06/19   0802  07/08/19   0515   SODIUM  132*  134*   POTASSIUM  3.7  3.4*   CHLORIDE  98  103   CO2  23  21   GLUCOSE  359*  166*   BUN  6*  10   CREATININE  0.53  0.45*   CALCIUM  8.7  8.9                   Imaging  CT-PELVIS WITH   Final Result         A1.8 cm abscess in the left labia majora.      US-EXTREMITY VENOUS UPPER UNILAT RIGHT    (Results Pending)        Assessment/Plan  * Abscess of left groin   Assessment & Plan    1.8cm Left groin abscess.  S/p I&D in the ER, no cultures taken  Wound care recommended daily packing, that will be difficult, most they can do outpatient is 3x week  We will address this with wound care.   Resume IV Unasyn and doxycyline  Pain control          Candida vaginitis- (present on admission)   Assessment & Plan    Patient with long-standing candidal vaginitis and irritation  Continue with PO diflucan      Personal history of venous thrombosis and embolism- (present on admission)   Assessment & Plan    Patient Today complained of RUE swelling, No IVs were placed there  UE duplex pending   Resume lovenox      Obstructive sleep apnea- (present on admission)   Assessment & Plan    Does not have cpap machine, needs follow up     Morbid obesity (CMS-HCC)- (present on admission)   Assessment & Plan    Body mass index is 41.47 kg/m².  Needs diet/exercise      Uncontrolled type 2 diabetes mellitus without complication, without  long-term current use of insulin (HCC)- (present on admission)   Assessment & Plan    Noncompliant with medications due to loss of insurance.   Authorization completed on the medications his PCP sent to her pharmacy,ozempic.  Last a1c 12.4, education provided.  Continue Insulin-sliding scale, accu-checks and hypoglycemia protocol.     Hypothyroidism due to acquired atrophy of thyroid- (present on admission)   Assessment & Plan    TSH 5.5  Check Free T4  Not taking medications           Patient plan of care discussed at multidisplinary team rounds and with patient and R.N at [facility].      VTE prophylaxis: Lovenox

## 2019-07-08 NOTE — CARE PLAN
Problem: Communication  Goal: The ability to communicate needs accurately and effectively will improve  Outcome: PROGRESSING AS EXPECTED  Plan of care discussed, pt verbalizes understanding     Problem: Safety  Goal: Will remain free from injury  Outcome: PROGRESSING AS EXPECTED  Treaded socks on, call light within reach, hourly rounding     Problem: Pain Management  Goal: Pain level will decrease to patient's comfort goal  Outcome: PROGRESSING AS EXPECTED  Patient denies pain at this time

## 2019-07-08 NOTE — DISCHARGE PLANNING
MAGDA met with this patient bedside regarding HH choice.  Patient stated that she does not believe that she will be homebound and requested outpatient wound clinic.  Patient agreeable to [facility] wound clinic since [facility] does not take patient's insurance.  MAGDA faxed referral to [facility] WC at [phone].  Phone [phone].

## 2019-07-08 NOTE — FACE TO FACE
Face to Face Supporting Documentation - Home Health    The encounter with this patient was in whole or in part the primary reason for home health admission.    Date of encounter:   Patient:                    MRN:                       YOB: 2019  Liana Obrien  [id number]  1980     Home health to see patient for:  Wound Care    Skilled need for:  Surgical Aftercare wound groin    Skilled nursing interventions to include:  Wound Care    Homebound status evidenced by:  Require the use of special transportation. Leaving home requires a considerable and taxing effort. There is a normal inability to leave the home.    Community Physician to provide follow up care: Gary Caal M.D.     Optional Interventions? No      I certify the face to face encounter for this home health care referral meets the CMS requirements and the encounter/clinical assessment with the patient was, in whole, or in part, for the medical condition(s) listed above, which is the primary reason for home health care. Based on my clinical findings: the service(s) are medically necessary, support the need for home health care, and the homebound criteria are met.  I certify that this patient has had a face to face encounter by myself.  Karina Galarza M.D. - NPI: [id number]

## 2019-07-08 NOTE — PROGRESS NOTES
Hospitalist KATHI Hood notified of pt's new c/o RUE edema and soreness. She will report to Dr. Galarza now

## 2019-07-08 NOTE — PROGRESS NOTES
Patient is alert and oriented.   Denies pain  Resting comfortably in bed.   Wound RN to come change packing to labial wound this morning, she will notify this RN to premedicate patient  Hourly rounding, treaded socks on, call light within reach.

## 2019-07-09 ENCOUNTER — PATIENT OUTREACH (OUTPATIENT)
Dept: HEALTH INFORMATION MANAGEMENT | Facility: OTHER | Age: 39
End: 2019-07-09

## 2019-07-09 VITALS
RESPIRATION RATE: 18 BRPM | DIASTOLIC BLOOD PRESSURE: 84 MMHG | BODY MASS INDEX: 41.48 KG/M2 | SYSTOLIC BLOOD PRESSURE: 122 MMHG | HEART RATE: 67 BPM | TEMPERATURE: 97.4 F | WEIGHT: 234.13 LBS | OXYGEN SATURATION: 100 % | HEIGHT: 63 IN

## 2019-07-09 PROBLEM — L02.214 ABSCESS OF LEFT GROIN: Status: RESOLVED | Noted: 2019-07-06 | Resolved: 2019-07-09

## 2019-07-09 LAB
ALBUMIN SERPL BCP-MCNC: 2.8 G/DL (ref 3.2–4.9)
ALBUMIN/GLOB SERPL: 0.7 G/DL
ALP SERPL-CCNC: 65 U/L (ref 30–99)
ALT SERPL-CCNC: 36 U/L (ref 2–50)
ANION GAP SERPL CALC-SCNC: 6 MMOL/L (ref 0–11.9)
AST SERPL-CCNC: 24 U/L (ref 12–45)
BACTERIA WND AEROBE CULT: ABNORMAL
BACTERIA WND AEROBE CULT: ABNORMAL
BASOPHILS # BLD AUTO: 0.6 % (ref 0–1.8)
BASOPHILS # BLD: 0.04 K/UL (ref 0–0.12)
BILIRUB SERPL-MCNC: 0.4 MG/DL (ref 0.1–1.5)
BUN SERPL-MCNC: 14 MG/DL (ref 8–22)
CALCIUM SERPL-MCNC: 9.6 MG/DL (ref 8.4–10.2)
CHLORIDE SERPL-SCNC: 105 MMOL/L (ref 96–112)
CO2 SERPL-SCNC: 23 MMOL/L (ref 20–33)
CREAT SERPL-MCNC: 0.48 MG/DL (ref 0.5–1.4)
EOSINOPHIL # BLD AUTO: 0.26 K/UL (ref 0–0.51)
EOSINOPHIL NFR BLD: 3.7 % (ref 0–6.9)
ERYTHROCYTE [DISTWIDTH] IN BLOOD BY AUTOMATED COUNT: 40.4 FL (ref 35.9–50)
GLOBULIN SER CALC-MCNC: 4.1 G/DL (ref 1.9–3.5)
GLUCOSE BLD-MCNC: 203 MG/DL (ref 65–99)
GLUCOSE BLD-MCNC: 218 MG/DL (ref 65–99)
GLUCOSE BLD-MCNC: 254 MG/DL (ref 65–99)
GLUCOSE SERPL-MCNC: 222 MG/DL (ref 65–99)
GRAM STN SPEC: ABNORMAL
HCT VFR BLD AUTO: 40.3 % (ref 37–47)
HGB BLD-MCNC: 13.2 G/DL (ref 12–16)
IMM GRANULOCYTES # BLD AUTO: 0.02 K/UL (ref 0–0.11)
IMM GRANULOCYTES NFR BLD AUTO: 0.3 % (ref 0–0.9)
LYMPHOCYTES # BLD AUTO: 2.67 K/UL (ref 1–4.8)
LYMPHOCYTES NFR BLD: 38.1 % (ref 22–41)
MCH RBC QN AUTO: 26.7 PG (ref 27–33)
MCHC RBC AUTO-ENTMCNC: 32.8 G/DL (ref 33.6–35)
MCV RBC AUTO: 81.6 FL (ref 81.4–97.8)
MONOCYTES # BLD AUTO: 0.63 K/UL (ref 0–0.85)
MONOCYTES NFR BLD AUTO: 9 % (ref 0–13.4)
NEUTROPHILS # BLD AUTO: 3.39 K/UL (ref 2–7.15)
NEUTROPHILS NFR BLD: 48.3 % (ref 44–72)
NRBC # BLD AUTO: 0 K/UL
NRBC BLD-RTO: 0 /100 WBC
PLATELET # BLD AUTO: 223 K/UL (ref 164–446)
PMV BLD AUTO: 10.6 FL (ref 9–12.9)
POTASSIUM SERPL-SCNC: 4 MMOL/L (ref 3.6–5.5)
PROT SERPL-MCNC: 6.9 G/DL (ref 6–8.2)
RBC # BLD AUTO: 4.94 M/UL (ref 4.2–5.4)
SIGNIFICANT IND 70042: ABNORMAL
SITE SITE: ABNORMAL
SODIUM SERPL-SCNC: 134 MMOL/L (ref 135–145)
SOURCE SOURCE: ABNORMAL
WBC # BLD AUTO: 7 K/UL (ref 4.8–10.8)

## 2019-07-09 PROCEDURE — 96372 THER/PROPH/DIAG INJ SC/IM: CPT

## 2019-07-09 PROCEDURE — 80053 COMPREHEN METABOLIC PANEL: CPT

## 2019-07-09 PROCEDURE — 700111 HCHG RX REV CODE 636 W/ 250 OVERRIDE (IP): Performed by: INTERNAL MEDICINE

## 2019-07-09 PROCEDURE — G0378 HOSPITAL OBSERVATION PER HR: HCPCS

## 2019-07-09 PROCEDURE — 85025 COMPLETE CBC W/AUTO DIFF WBC: CPT

## 2019-07-09 PROCEDURE — 82962 GLUCOSE BLOOD TEST: CPT

## 2019-07-09 PROCEDURE — A9270 NON-COVERED ITEM OR SERVICE: HCPCS | Performed by: INTERNAL MEDICINE

## 2019-07-09 PROCEDURE — 700102 HCHG RX REV CODE 250 W/ 637 OVERRIDE(OP): Performed by: INTERNAL MEDICINE

## 2019-07-09 PROCEDURE — 36415 COLL VENOUS BLD VENIPUNCTURE: CPT

## 2019-07-09 PROCEDURE — 99217 PR OBSERVATION CARE DISCHARGE: CPT | Performed by: HOSPITALIST

## 2019-07-09 RX ORDER — AMOXICILLIN AND CLAVULANATE POTASSIUM 875; 125 MG/1; MG/1
1 TABLET, FILM COATED ORAL EVERY 12 HOURS
Qty: 10 QUANTITY SUFFICIENT | Refills: 0 | Status: SHIPPED | OUTPATIENT
Start: 2019-07-09 | End: 2019-07-15

## 2019-07-09 RX ORDER — DOXYCYCLINE 100 MG/1
100 TABLET ORAL EVERY 12 HOURS
Qty: 10 TAB | Refills: 0 | Status: SHIPPED | OUTPATIENT
Start: 2019-07-09 | End: 2019-07-15

## 2019-07-09 RX ADMIN — INSULIN LISPRO 3 UNITS: 100 INJECTION, SOLUTION INTRAVENOUS; SUBCUTANEOUS at 07:14

## 2019-07-09 RX ADMIN — DOXYCYCLINE 100 MG: 100 TABLET, FILM COATED ORAL at 05:47

## 2019-07-09 RX ADMIN — AMOXICILLIN AND CLAVULANATE POTASSIUM 1 TABLET: 875; 125 TABLET, FILM COATED ORAL at 05:47

## 2019-07-09 RX ADMIN — IBUPROFEN 600 MG: 600 TABLET ORAL at 08:38

## 2019-07-09 RX ADMIN — INSULIN LISPRO 7 UNITS: 100 INJECTION, SOLUTION INTRAVENOUS; SUBCUTANEOUS at 07:17

## 2019-07-09 RX ADMIN — INSULIN LISPRO 7 UNITS: 100 INJECTION, SOLUTION INTRAVENOUS; SUBCUTANEOUS at 10:47

## 2019-07-09 RX ADMIN — ENOXAPARIN SODIUM 30 MG: 100 INJECTION SUBCUTANEOUS at 05:48

## 2019-07-09 RX ADMIN — INSULIN LISPRO 3 UNITS: 100 INJECTION, SOLUTION INTRAVENOUS; SUBCUTANEOUS at 10:44

## 2019-07-09 RX ADMIN — FLUCONAZOLE 200 MG: 200 TABLET ORAL at 05:47

## 2019-07-09 RX ADMIN — ACETAMINOPHEN 650 MG: 325 TABLET, FILM COATED ORAL at 07:14

## 2019-07-09 NOTE — DISCHARGE PLANNING
Agency/Facility Name: [facility]   Spoke To: Maryann  Outcome: Patiwnt insurance is out of network and she said it could potentially take a couple of days to get auth.

## 2019-07-09 NOTE — DISCHARGE INSTRUCTIONS
D/c with daily wound care fsbs ac/hs Follow up with pcp within one week Diabetic diet Return to er if needed.      Discharge Instructions    Discharged to home by car with relative. Discharged via wheelchair, hospital escort: Yes.  Special equipment needed: Not Applicable    Be sure to schedule a follow-up appointment with your primary care doctor or any specialists as instructed.     Discharge Plan:   Pneumococcal Vaccine Administered/Refused: Not given - Patient refused pneumococcal vaccine  Influenza Vaccine Indication: Patient Refuses    I understand that a diet low in cholesterol, fat, and sodium is recommended for good health. Unless I have been given specific instructions below for another diet, I accept this instruction as my diet prescription.   Other diet: Carb control    Special Instructions: None    · Is patient discharged on Warfarin / Coumadin?   No     Depression / Suicide Risk    As you are discharged from this [facility] facility, it is important to learn how to keep safe from harming yourself.    Recognize the warning signs:  · Abrupt changes in personality, positive or negative- including increase in energy   · Giving away possessions  · Change in eating patterns- significant weight changes-  positive or negative  · Change in sleeping patterns- unable to sleep or sleeping all the time   · Unwillingness or inability to communicate  · Depression  · Unusual sadness, discouragement and loneliness  · Talk of wanting to die  · Neglect of personal appearance   · Rebelliousness- reckless behavior  · Withdrawal from people/activities they love  · Confusion- inability to concentrate     If you or a loved one observes any of these behaviors or has concerns about self-harm, here's what you can do:  · Talk about it- your feelings and reasons for harming yourself  · Remove any means that you might use to hurt yourself (examples: pills, rope, extension cords, firearm)  · Get professional help from the  community (Mental Health, Substance Abuse, psychological counseling)  · Do not be alone:Call your Safe Contact- someone whom you trust who will be there for you.  · Call your local CRISIS HOTLINE 919-0793 or 293-705-1020  · Call your local Children's Mobile Crisis Response Team [place] (554) 897-7256 or www.InteliCloud  · Call the toll free National Suicide Prevention Hotlines   · National Suicide Prevention Lifeline 890-737-EXUD (5813)  · National Hope Line Network 800-SUICIDE (046-3164)

## 2019-07-09 NOTE — DISCHARGE PLANNING
Received Choice form at 1029  Agency/Facility Name: [facility] (1) [facility] (2)  Referral sent per Choice form @ [phone]

## 2019-07-09 NOTE — PROGRESS NOTES
Received bedside shift report regarding patient and assumed care. Patient is awake, calm and stable, currently positioned in bed for comfort and safety; call light within reach. Pt requested prn pain meds with scheduled insulin this am. Will continue to monitor.

## 2019-07-09 NOTE — CARE PLAN
Problem: Safety  Goal: Will remain free from injury  Pt ambulates independently in room.  at bedside. Call light within reach. Will continue to monitor.     Problem: Pain Management  Goal: Pain level will decrease to patient's comfort goal  Administered prn pain meds with good relief (see MAR).

## 2019-07-09 NOTE — DISCHARGE PLANNING
SW received phone call from [facility] wound clinic and was informed that they are able to see this patient 7/18 at 10 am.  SW met with patient and discussed HH in the mean time.  Patient agreeable to HH referral.  Completed choice form faxed to susie, [facility] for referral follow up.

## 2019-07-09 NOTE — DISCHARGE PLANNING
SW called [facility] wound clinic and left message regarding getting this patient scheduled. SW will await a call back.

## 2019-07-09 NOTE — CARE PLAN
Problem: Communication  Goal: The ability to communicate needs accurately and effectively will improve  Outcome: PROGRESSING AS EXPECTED  Pt calls appropriately, makes needs known    Problem: Safety  Goal: Will remain free from injury  Outcome: PROGRESSING AS EXPECTED    Goal: Will remain free from falls  Outcome: PROGRESSING AS EXPECTED    Intervention: Assess risk factors for falls   07/09/19 0133   OTHER   Mobility Status Assessment 0-Ambulates & Transfers Independently. No Assistance Required   History of fall 0   Pt Calls for Assistance No assistance required         Problem: Knowledge Deficit  Goal: Knowledge of disease process/condition, treatment plan, diagnostic tests, and medications will improve  Outcome: PROGRESSING AS EXPECTED  Pt aware of POC, denies questions at this time    Problem: Pain Management  Goal: Pain level will decrease to patient's comfort goal  Outcome: PROGRESSING AS EXPECTED  Pain managed at this time. Pt toradol d/demian as IV access lost. Received ibuprofen order to replace. Pt educated on alternating tylenol and ibuprofen for pain

## 2019-07-09 NOTE — DISCHARGE SUMMARY
Discharge Summary    CHIEF COMPLAINT ON ADMISSION  Chief Complaint   Patient presents with   • Abscess     c/o possible abscess in vaginal area       Reason for Admission  Tyler     Admission Date  7/6/2019    CODE STATUS  Full Code    HPI & HOSPITAL COURSE  This is a 39 y.o. female here diabetes and vaginal yeast infection who presented to the ER with vaginal pain and discharge and was found to have a small left labial abscess which was drained and found to grow strep agalactiae.  She is responding very well to wound care and antibiotics and will continue close outpatient wound care as well as diabetic care.  Diabetic diet was discussed in detail and she agrees to close blood sugar follow up and medication review with her pcp next week.  She agrees to return to the er if needed and has been set up for daily wound care.     Therefore, she is discharged in fair and stable condition to home with close outpatient follow-up.    The patient recovered much more quickly than anticipated on admission.    Discharge Date  7/9/19    FOLLOW UP ITEMS POST DISCHARGE  pcp    DISCHARGE DIAGNOSES  Principal Problem (Resolved):    Abscess of left groin POA: Unknown  Active Problems:    Hypothyroidism due to acquired atrophy of thyroid POA: Yes    Uncontrolled type 2 diabetes mellitus without complication, without long-term current use of insulin (formerly [facility]) POA: Yes    Morbid obesity (CMS-HCC) POA: Yes    Obstructive sleep apnea (Chronic) POA: Yes    Personal history of venous thrombosis and embolism POA: Yes      Overview: DVT in BLE years ago    Candida vaginitis POA: Yes      FOLLOW UP  Future Appointments  Date Time Provider Department Center   8/16/2019 11:15 AM [facility]     No follow-up provider specified.    MEDICATIONS ON DISCHARGE     Medication List      START taking these medications      Instructions   amoxicillin-clavulanate 875-125 MG Tabs  Commonly known as:  AUGMENTIN   Take 1 Tab by mouth  every 12 hours.  Dose:  1 Tab     doxycycline monohydrate 100 MG tablet  Commonly known as:  ADOXA   Take 1 Tab by mouth every 12 hours.  Dose:  100 mg        CONTINUE taking these medications      Instructions   Dapagliflozin-metFORMIN HCl ER 5-1000 MG Tb24   Take 1 tablet by mouth 2 Times a Day.  Dose:  1 tablet     fluconazole 100 MG Tabs  Commonly known as:  DIFLUCAN   Take 1 tablet and may repeat dose in 72 hours if needed     HAIR/SKIN/NAILS PO   Take 1 Tab by mouth every day.  Dose:  1 Tab     Insulin Degludec 200 UNIT/ML Sopn   Inject 35 Units as instructed every bedtime.  Dose:  35 Units     MULTI VITAMIN PO   Take 1 Tab by mouth every day.  Dose:  1 Tab     pioglitazone 30 MG Tabs  Commonly known as:  ACTOS   Take 1 Tab by mouth every day.  Dose:  30 mg     PROBIOTIC ADVANCED PO   Take 2 Caps by mouth every day.  Dose:  2 Cap     Semaglutide 1 MG/DOSE Sopn   Inject 1 mg as instructed every 7 days.  Dose:  1 mg            Allergies  Allergies   Allergen Reactions   • Bicillin C-R [Penicillin G Proc & Benzathine] Rash     Received inj of Bicillin- reaction was rash. Oral penicillin shows no reaction.   • Ondansetron Hives   • Sulfa Drugs Hives          • Metoclopramide Rash and Vomiting     Rash         DIET  Orders Placed This Encounter   Procedures   • Diet Order Consistent Carbohydrate     Standing Status:   Standing     Number of Occurrences:   1     Order Specific Question:   Diet:     Answer:   Consistent Carbohydrate [4]       ACTIVITY  As tolerated.    CONSULTATIONS    PROCEDURES  US-EXTREMITY VENOUS UPPER UNILAT RIGHT   Final Result      CT-PELVIS WITH   Final Result         A1.8 cm abscess in the left labia majora.            LABORATORY  Lab Results   Component Value Date    SODIUM 134 (L) 07/09/2019    POTASSIUM 4.0 07/09/2019    CHLORIDE 105 07/09/2019    CO2 23 07/09/2019    GLUCOSE 222 (H) 07/09/2019    BUN 14 07/09/2019    CREATININE 0.48 (L) 07/09/2019        Lab Results   Component Value  Date    WBC 7.0 07/09/2019    HEMOGLOBIN 13.2 07/09/2019    HEMATOCRIT 40.3 07/09/2019    PLATELETCT 223 07/09/2019        Total time of the discharge process exceeds 45 minutes.

## 2019-07-09 NOTE — PROGRESS NOTES
Report received from Laura ARMENTA. Pt awake, alert, appropriate and pleasant. Sitting up in chair watching TV at this time. Pt denies needs at this time. Reports pain is managed. VSS. Will continue to monitor.

## 2019-07-09 NOTE — DISCHARGE PLANNING
SW placed call and sent referral to [facility].  SW will await call back to see if they are able to accomodate patient insurance.  They have appointments this week.

## 2019-07-15 ENCOUNTER — OFFICE VISIT (OUTPATIENT)
Dept: MEDICAL GROUP | Facility: PHYSICIAN GROUP | Age: 39
End: 2019-07-15

## 2019-07-15 VITALS
HEIGHT: 63 IN | SYSTOLIC BLOOD PRESSURE: 118 MMHG | WEIGHT: 236 LBS | RESPIRATION RATE: 16 BRPM | DIASTOLIC BLOOD PRESSURE: 84 MMHG | OXYGEN SATURATION: 97 % | TEMPERATURE: 97.8 F | HEART RATE: 88 BPM | BODY MASS INDEX: 41.82 KG/M2

## 2019-07-15 DIAGNOSIS — N76.4 LABIAL ABSCESS: ICD-10-CM

## 2019-07-15 PROCEDURE — 99214 OFFICE O/P EST MOD 30 MIN: CPT | Performed by: INTERNAL MEDICINE

## 2019-07-15 NOTE — ASSESSMENT & PLAN NOTE
She was recently admitted to the hospital for a labial abscess, which was drained in the ER. She has since completed oral augmentin and doxycycline and is doing well. Denies fevers, chills and following with wound care.

## 2019-07-15 NOTE — ASSESSMENT & PLAN NOTE
Has been having blurriness of her vision which she has had prior when she has hyperglycemia. She has been able to resume her tresiba, semaglutide, and actos but says a prior auth has been requested for her dapagliflozin-metformin.

## 2019-07-15 NOTE — PROGRESS NOTES
PRIMARY CARE CLINIC FOLLOW UP VISIT  Chief Complaint   Patient presents with   • Abscess       History of Present Illness     Labial abscess  She was recently admitted to the hospital for a labial abscess, which was drained in the ER. She has since completed oral augmentin and doxycycline and is doing well. Denies fevers, chills and following with wound care.     Uncontrolled type 2 diabetes mellitus without complication, without long-term current use of insulin ([facility])  Has been having blurriness of her vision which she has had prior when she has hyperglycemia. She has been able to resume her tresiba, semaglutide, and actos but says a prior auth has been requested for her dapagliflozin-metformin.       Current Outpatient Prescriptions   Medication Sig Dispense Refill   • Ibuprofen (IBU PO) Take  by mouth.     • metformin (GLUCOPHAGE) 1000 MG tablet Take 1 Tab by mouth 2 times a day, with meals. 60 Tab 3   • Biotin w/ Vitamins C & E (HAIR/SKIN/NAILS PO) Take 1 Tab by mouth every day.     • Multiple Vitamin (MULTI VITAMIN PO) Take 1 Tab by mouth every day.     • pioglitazone (ACTOS) 30 MG Tab Take 1 Tab by mouth every day. 90 Tab 3   • Dapagliflozin-metFORMIN HCl ER 5-1000 MG TABLET SR 24 HR Take 1 tablet by mouth 2 Times a Day. 180 Tab 3   • Insulin Degludec 200 UNIT/ML Solution Pen-injector Inject 35 Units as instructed every bedtime. 3 PEN 3   • Semaglutide 1 MG/DOSE Solution Pen-injector Inject 1 mg as instructed every 7 days. 4 PEN 3   • Probiotic Product (PROBIOTIC ADVANCED PO) Take 2 Caps by mouth every day.       No current facility-administered medications for this visit.      Past Medical History:   Diagnosis Date   • Bipolar disorder ([facility]) 8/9/2018   • Candida vaginitis    • Chickenpox    • DVT (deep venous thrombosis) ([facility])    • Dysfunctional uterine bleeding    • Fibroids    • Herpes    • Hypertension    • Hypothyroidism due to acquired atrophy of thyroid 1/22/2016   • Personal history of venous thrombosis  "and embolism     DVT in BLE years ago   • Sleep apnea    • Uncontrolled type 2 diabetes mellitus without complication, without long-term current use of insulin ([facility]) 3/12/2015     No past surgical history on file.  Social History   Substance Use Topics   • Smoking status: Former Smoker     Packs/day: 0.50     Years: 0.30     Types: Cigarettes     Quit date: 2018   • Smokeless tobacco: Never Used      Comment: for 3 months at a time on and off    • Alcohol use No     Social History     Social History Narrative    Works at [employer]      Family History   Problem Relation Age of Onset   • Hypertension Mother    • Sleep Apnea Mother    • Hyperlipidemia Father    • Cancer Maternal Uncle    • Sleep Apnea Brother    • Diabetes Neg Hx    • Heart Disease Neg Hx    • Stroke Neg Hx      Family Status   Relation Status   • Mo Alive   • Fa Alive   • MUnc Alive   • Bro Alive   • Bro Alive   • Neg Hx (Not Specified)     Allergies: Bicillin c-r [penicillin g proc & benzathine]; Ondansetron; Sulfa drugs; and Metoclopramide    ROS  As per HPI above. All other systems reviewed and negative.        Objective   /84   Pulse 88   Temp 36.6 °C (97.8 °F)   Resp 16   Ht 1.6 m (5' 3\")   Wt 107 kg (236 lb)   SpO2 97%  Body mass index is 41.81 kg/m².    General: alert and oriented, pleasant, cooperative  HEENT: Normocephalic, atraumatic.   Psychiatric: appropriate mood and affect. Good insight and appropriate judgment       Assessment and Plan   The following treatment plan was discussed     1. Labial abscess  Now resolved, following with wound clinic. Has completed oral antibiotics.     2. Uncontrolled type 2 diabetes mellitus without complication, without long-term current use of insulin ([facility])  Has been able to resume her diabetes medications with the exception of Farxiga, will at least give her metformin for now while we work on the prior auth for Farxiga. She also follows up with endocrinology next month.   - metformin " (GLUCOPHAGE) 1000 MG tablet; Take 1 Tab by mouth 2 times a day, with meals.  Dispense: 60 Tab; Refill: 3    Healthcare maintenance     Health Maintenance Due   Topic Date Due   • PAP SMEAR  06/25/2017     No Follow-up on file.    Gary Caal MD  Internal Medicine  [facility]

## 2019-07-18 ENCOUNTER — TELEPHONE (OUTPATIENT)
Dept: MEDICAL GROUP | Facility: PHYSICIAN GROUP | Age: 39
End: 2019-07-18

## 2019-07-18 NOTE — TELEPHONE ENCOUNTER
----- Message from Gary Caal M.D. sent at 7/18/2019 11:48 AM PDT -----  Yes, ivet, Xigduo is the combo. I am confused because she was on Xigduo before but then had an insurance lapse due to job change. Not sure if the insurance plan changed?   ----- Message -----  From: Sara Meyers, Med Ass't  Sent: 7/17/2019   5:21 PM  To: Gary Caal M.D.    I thought the Farxiga is just Dapagliflozin?  And Xigdo was Dapagliflozin-Metformin combo?  Please advise.  I don't believe that there has been a prior authorization submitted, but I want to make sure before one is initiated.  Please advise.  Thank you.  ----- Message -----  From: Gary Caal M.D.  Sent: 7/15/2019   2:17 PM  To: Peabody Fm Mas    Can we look into the prior auth situation for her Faxiga?

## 2019-07-18 NOTE — TELEPHONE ENCOUNTER
1. Caller Name: Liana      Call Back Number: [phone] (home)         Patient approves a detailed voicemail message: N\A    LVM for pt to call me back. I called her insurance with the insurance information we have in her chart, and they are not able to find pt. Asked pt to call me back to see if she has a different insurance, ID or card. I also looked in Cover my meds and a PA has not been started.

## 2019-07-19 NOTE — TELEPHONE ENCOUNTER
I called the local pharmacy and asked for the insurance information to submit a PA through [employer].  I attempted to do a PA with the information given and it stated that the patient was inactive.  BIN:[id number]  PCN:  [employer]: [id number]  ID: [id number]    Awaiting for a call back from patient with information.

## 2019-07-29 ENCOUNTER — HOSPITAL ENCOUNTER (EMERGENCY)
Facility: MEDICAL CENTER | Age: 39
End: 2019-07-29
Attending: EMERGENCY MEDICINE

## 2019-07-29 VITALS
HEIGHT: 63 IN | WEIGHT: 237.22 LBS | DIASTOLIC BLOOD PRESSURE: 94 MMHG | TEMPERATURE: 97.3 F | HEART RATE: 78 BPM | BODY MASS INDEX: 42.03 KG/M2 | RESPIRATION RATE: 18 BRPM | OXYGEN SATURATION: 94 % | SYSTOLIC BLOOD PRESSURE: 133 MMHG

## 2019-07-29 DIAGNOSIS — F43.9 STRESS: ICD-10-CM

## 2019-07-29 DIAGNOSIS — R11.0 NAUSEA: ICD-10-CM

## 2019-07-29 DIAGNOSIS — R63.0 LOSS OF APPETITE: ICD-10-CM

## 2019-07-29 DIAGNOSIS — R03.0 ELEVATED BLOOD PRESSURE READING: ICD-10-CM

## 2019-07-29 LAB
APPEARANCE UR: ABNORMAL
BACTERIA #/AREA URNS HPF: ABNORMAL /HPF
BILIRUB UR QL STRIP.AUTO: ABNORMAL
COLOR UR: YELLOW
EPI CELLS #/AREA URNS HPF: ABNORMAL /HPF
GLUCOSE UR STRIP.AUTO-MCNC: NEGATIVE MG/DL
KETONES UR STRIP.AUTO-MCNC: ABNORMAL MG/DL
LEUKOCYTE ESTERASE UR QL STRIP.AUTO: ABNORMAL
MICRO URNS: ABNORMAL
MUCOUS THREADS #/AREA URNS HPF: ABNORMAL /HPF
NITRITE UR QL STRIP.AUTO: NEGATIVE
PH UR STRIP.AUTO: 5.5 [PH] (ref 5–8)
PROT UR QL STRIP: NEGATIVE MG/DL
RBC # URNS HPF: ABNORMAL /HPF
RBC UR QL AUTO: NEGATIVE
SP GR UR REFRACTOMETRY: 1.03
WBC #/AREA URNS HPF: ABNORMAL /HPF

## 2019-07-29 PROCEDURE — 81001 URINALYSIS AUTO W/SCOPE: CPT

## 2019-07-29 PROCEDURE — 99284 EMERGENCY DEPT VISIT MOD MDM: CPT

## 2019-07-29 RX ORDER — FLUCONAZOLE 200 MG/1
200 TABLET ORAL DAILY
Qty: 1 TAB | Refills: 0 | Status: ON HOLD | OUTPATIENT
Start: 2019-07-29 | End: 2020-01-10

## 2019-07-29 RX ORDER — CEFDINIR 300 MG/1
300 CAPSULE ORAL 2 TIMES DAILY
Qty: 14 CAP | Refills: 0 | Status: SHIPPED | OUTPATIENT
Start: 2019-07-29 | End: 2019-08-05

## 2019-07-29 NOTE — ED PROVIDER NOTES
"ED Provider Note    CHIEF COMPLAINT  Chief Complaint   Patient presents with   • N/V     last night felt like stomach \"was in a knot\"   • Loss of Appetite     has not eaten for 24 hours   • Lightheadedness       HPI  Liana Obrien is a 39 y.o. female who presents to the emergency department with reports of an elevated blood pressure.  The patient was in an argument with her .  Neighbors called the police.  Police and paramedics arrived.  They were arguing and fighting.  Paramedics took her blood pressure and noted that it was 160/100.  They told her that she was at increased risk for stroke because of his elevated blood pressure and it was recommended that she come to the hospital for evaluation.  Patient says that she is feeling much better now.  Argument with her  has resolved.  She does not have any chest pain or shortness of breath.  She does note that she is been under increased stress recently.  She says that she has not been eating very well or taking her occasions.  Blood sugar was taken prior to arrival and measured 173.  Says that she has not really felt like eating very much.  She did not eat for the last 24 hours so.  She is been somewhat nauseated although she attributes this to the social stressors in her life.    REVIEW OF SYSTEMS  See HPI for further details. All other systems are negative.     PAST MEDICAL HISTORY  Past Medical History:   Diagnosis Date   • Bipolar disorder ([facility]) 8/9/2018   • Candida vaginitis    • Chickenpox    • DVT (deep venous thrombosis) ([facility])    • Dysfunctional uterine bleeding    • Fibroids    • Herpes    • Hypertension    • Hypothyroidism due to acquired atrophy of thyroid 1/22/2016   • Personal history of venous thrombosis and embolism     DVT in BLE years ago   • Sleep apnea    • Uncontrolled type 2 diabetes mellitus without complication, without long-term current use of insulin ([facility]) 3/12/2015       FAMILY HISTORY  Family History   Problem Relation " "Age of Onset   • Hypertension Mother    • Sleep Apnea Mother    • Hyperlipidemia Father    • Cancer Maternal Uncle    • Sleep Apnea Brother    • Diabetes Neg Hx    • Heart Disease Neg Hx    • Stroke Neg Hx        SOCIAL HISTORY  Social History     Social History   • Marital status: Single     Spouse name: N/A   • Number of children: N/A   • Years of education: N/A     Social History Main Topics   • Smoking status: Former Smoker     Packs/day: 0.50     Years: 0.30     Types: Cigarettes     Quit date: 2018   • Smokeless tobacco: Never Used      Comment: for 3 months at a time on and off    • Alcohol use No   • Drug use: No   • Sexual activity: Yes     Partners: Male     Other Topics Concern   • Not on file     Social History Narrative    Works at [employer]        SURGICAL HISTORY  History reviewed. No pertinent surgical history.    CURRENT MEDICATIONS  Home Medications     Reviewed by Yulissa Mao R.N. (Registered Nurse) on 07/29/19 at 0626  Med List Status: Partial   Medication Last Dose Status   Biotin w/ Vitamins C & E (HAIR/SKIN/NAILS PO) 7/26/2019 Active   Dapagliflozin-metFORMIN HCl ER 5-1000 MG TABLET SR 24 HR 7/26/2019 Active   Ibuprofen (IBU PO) prn Active   Insulin Degludec 200 UNIT/ML Solution Pen-injector 7/26/2019 Active   Multiple Vitamin (MULTI VITAMIN PO) 7/26/2019 Active   pioglitazone (ACTOS) 30 MG Tab 7/26/2019 Active   Probiotic Product (PROBIOTIC ADVANCED PO) 7/26/2019 Active   Semaglutide 1 MG/DOSE Solution Pen-injector 7/25/2019 Active                ALLERGIES  Allergies   Allergen Reactions   • Bicillin C-R [Penicillin G Proc & Benzathine] Rash     Received inj of Bicillin- reaction was rash. Oral penicillin shows no reaction.   • Ondansetron Hives   • Sulfa Drugs Hives          • Metoclopramide Rash and Vomiting     Rash         PHYSICAL EXAM  VITAL SIGNS: /94   Pulse 85   Temp 36.3 °C (97.3 °F) (Temporal)   Resp 18   Ht 1.6 m (5' 3\")   Wt 107.6 kg (237 lb 3.4 oz)   " LMP 07/12/2019   SpO2 98%   BMI 42.02 kg/m²   Constitutional: Well developed, Well nourished, no acute distress, Non-toxic appearance.   HENT: Normocephalic, Atraumatic, Bilateral external ears normal, Oropharynx moist, No oral exudates, Nose normal.   Eyes: PERRL, EOMI, Conjunctiva normal, No discharge.   Neck: Normal range of motion, No tenderness, Supple, No stridor.   Lymphatic: No lymphadenopathy noted.   Cardiovascular: Normal heart rate, Normal rhythm, No murmurs, No rubs, No gallops.   Thorax & Lungs: Normal breath sounds, No respiratory distress, No wheezing, No chest tenderness.   Abdomen: Obese, soft, nontender, nondistended.  Skin: Warm, Dry, No erythema, No rash.   Back: No tenderness, No CVA tenderness.   Extremities: Intact distal pulses, No edema, No tenderness, No cyanosis, No clubbing.   Neurologic: Alert & oriented x 3, Normal motor function, Normal sensory function, No focal deficits noted.  Pleasant, interactive.      RADIOLOGY/PROCEDURES      COURSE & MEDICAL DECISION MAKING  Pertinent Labs & Imaging studies reviewed. (See chart for details)    Patient presents today with an elevated blood pressure in the setting of having an argument with her significant other.  Now that that has passed the patient's blood pressure is normalized.  She is essentially asymptomatic.  Blood sugar was only slightly elevated prior to arrival.  I do not feel that she would benefit from additional testing.  Urinalysis demonstrates findings possibly consistent with urinary tract infection.  The patient says that she was kind of wondering if she may have a UTI.  She is not really had any dysuria or flank pain.  She says that when she takes antibiotics she typically gets a yeast infection.  Therefore give the patient a prescription for Omnicef and fluconazole.    The patient will return for new or worsening symptoms and is stable at the time of discharge.    The patient is referred to a primary physician for blood  pressure management, diabetic screening, and for all other preventative health concerns.      DISPOSITION:  Patient will be discharged home in stable condition.    FOLLOW UP:  Gary Caal M.D.  [address] [place] [id number] [phone]    Schedule an appointment as soon as possible for a visit       [facility], Emergency Dept  [address]  [place] [address] [phone]    If symptoms worsen      OUTPATIENT MEDICATIONS:  Discharge Medication List as of 7/29/2019  7:08 AM      START taking these medications    Details   cefdinir (OMNICEF) 300 MG Cap Take 1 Cap by mouth 2 times a day for 7 days., Disp-14 Cap, R-0, Normal      fluconazole (DIFLUCAN) 200 MG Tab Take 1 Tab by mouth every day., Disp-1 Tab, R-0, Normal           FINAL IMPRESSION  1. Stress    2. Elevated blood pressure reading    3. Nausea    4. Loss of appetite            Electronically signed by: Demond Solorzano, 7/29/2019 6:59 AM

## 2019-07-29 NOTE — ED TRIAGE NOTES
"Pt here with c/o    Chief Complaint   Patient presents with   • N/V     last night felt like stomach \"was in a knot\"   • Loss of Appetite     has not eaten for 24 hours   • Lightheadedness       /94   Pulse 85   Temp 36.3 °C (97.3 °F) (Temporal)   Resp 18   Ht 1.6 m (5' 3\")   Wt 107.6 kg (237 lb 3.4 oz)   LMP 07/12/2019   SpO2 98%   BMI 42.02 kg/m²   "

## 2019-07-29 NOTE — ED NOTES
D/c pt home,1rx given directly to pharmacy . Pt aware of f/u instructions , aware to return for any changes or concerns. No further questions upon d/c home from ed

## 2019-07-29 NOTE — ED NOTES
Patient states that her and her  were fighting this morning and the neighbor called the police and ambulance.  Patient states that the ambulance people said her blood pressure was high and wanted to take her to the ER.  Patient states that she said no and that her  would bring her. Patient states that she has not been taking her diabetic meds like she should because she hasn't been hungry due to stress. She didn't want to take them on an empty stomach it makes it hurt.

## 2019-08-21 ENCOUNTER — HOSPITAL ENCOUNTER (EMERGENCY)
Facility: MEDICAL CENTER | Age: 39
End: 2019-08-21
Attending: EMERGENCY MEDICINE
Payer: MEDICAID

## 2019-08-21 VITALS
HEIGHT: 64 IN | SYSTOLIC BLOOD PRESSURE: 143 MMHG | DIASTOLIC BLOOD PRESSURE: 94 MMHG | RESPIRATION RATE: 16 BRPM | BODY MASS INDEX: 40.54 KG/M2 | WEIGHT: 237.44 LBS | HEART RATE: 78 BPM | TEMPERATURE: 96.4 F | OXYGEN SATURATION: 100 %

## 2019-08-21 DIAGNOSIS — B37.31 VAGINAL YEAST INFECTION: ICD-10-CM

## 2019-08-21 DIAGNOSIS — R73.9 HYPERGLYCEMIA: ICD-10-CM

## 2019-08-21 LAB
ALBUMIN SERPL BCP-MCNC: 4 G/DL (ref 3.2–4.9)
ALBUMIN/GLOB SERPL: 1 G/DL
ALP SERPL-CCNC: 94 U/L (ref 30–99)
ALT SERPL-CCNC: 54 U/L (ref 2–50)
ANION GAP SERPL CALC-SCNC: 10 MMOL/L (ref 0–11.9)
ANION GAP SERPL CALC-SCNC: 14 MMOL/L (ref 0–11.9)
APPEARANCE UR: CLEAR
AST SERPL-CCNC: 18 U/L (ref 12–45)
BACTERIA GENITAL QL WET PREP: NORMAL
BASOPHILS # BLD AUTO: 1 % (ref 0–1.8)
BASOPHILS # BLD: 0.09 K/UL (ref 0–0.12)
BILIRUB SERPL-MCNC: 0.2 MG/DL (ref 0.1–1.5)
BILIRUB UR QL STRIP.AUTO: NEGATIVE
BUN SERPL-MCNC: 10 MG/DL (ref 8–22)
BUN SERPL-MCNC: 12 MG/DL (ref 8–22)
CALCIUM SERPL-MCNC: 8.1 MG/DL (ref 8.4–10.2)
CALCIUM SERPL-MCNC: 9.3 MG/DL (ref 8.4–10.2)
CHLORIDE SERPL-SCNC: 102 MMOL/L (ref 96–112)
CHLORIDE SERPL-SCNC: 94 MMOL/L (ref 96–112)
CO2 SERPL-SCNC: 21 MMOL/L (ref 20–33)
CO2 SERPL-SCNC: 23 MMOL/L (ref 20–33)
COLOR UR: YELLOW
CREAT SERPL-MCNC: 0.4 MG/DL (ref 0.5–1.4)
CREAT SERPL-MCNC: 0.61 MG/DL (ref 0.5–1.4)
EOSINOPHIL # BLD AUTO: 0.32 K/UL (ref 0–0.51)
EOSINOPHIL NFR BLD: 3.4 % (ref 0–6.9)
ERYTHROCYTE [DISTWIDTH] IN BLOOD BY AUTOMATED COUNT: 40.4 FL (ref 35.9–50)
GLOBULIN SER CALC-MCNC: 4.1 G/DL (ref 1.9–3.5)
GLUCOSE BLD-MCNC: 406 MG/DL (ref 65–99)
GLUCOSE SERPL-MCNC: 272 MG/DL (ref 65–99)
GLUCOSE SERPL-MCNC: 373 MG/DL (ref 65–99)
GLUCOSE UR STRIP.AUTO-MCNC: >=1000 MG/DL
HCT VFR BLD AUTO: 42.9 % (ref 37–47)
HGB BLD-MCNC: 14.4 G/DL (ref 12–16)
IMM GRANULOCYTES # BLD AUTO: 0.04 K/UL (ref 0–0.11)
IMM GRANULOCYTES NFR BLD AUTO: 0.4 % (ref 0–0.9)
KETONES UR STRIP.AUTO-MCNC: NEGATIVE MG/DL
LEUKOCYTE ESTERASE UR QL STRIP.AUTO: NEGATIVE
LIPASE SERPL-CCNC: 44 U/L (ref 7–58)
LYMPHOCYTES # BLD AUTO: 3.79 K/UL (ref 1–4.8)
LYMPHOCYTES NFR BLD: 40.6 % (ref 22–41)
MCH RBC QN AUTO: 26.7 PG (ref 27–33)
MCHC RBC AUTO-ENTMCNC: 33.6 G/DL (ref 33.6–35)
MCV RBC AUTO: 79.6 FL (ref 81.4–97.8)
MICRO URNS: ABNORMAL
MONOCYTES # BLD AUTO: 0.53 K/UL (ref 0–0.85)
MONOCYTES NFR BLD AUTO: 5.7 % (ref 0–13.4)
NEUTROPHILS # BLD AUTO: 4.57 K/UL (ref 2–7.15)
NEUTROPHILS NFR BLD: 48.9 % (ref 44–72)
NITRITE UR QL STRIP.AUTO: NEGATIVE
NRBC # BLD AUTO: 0 K/UL
NRBC BLD-RTO: 0 /100 WBC
PH UR STRIP.AUTO: 5.5 [PH] (ref 5–8)
PLATELET # BLD AUTO: 321 K/UL (ref 164–446)
PMV BLD AUTO: 10.6 FL (ref 9–12.9)
POTASSIUM SERPL-SCNC: 3.4 MMOL/L (ref 3.6–5.5)
POTASSIUM SERPL-SCNC: 3.4 MMOL/L (ref 3.6–5.5)
PROT SERPL-MCNC: 8.1 G/DL (ref 6–8.2)
PROT UR QL STRIP: NEGATIVE MG/DL
RBC # BLD AUTO: 5.39 M/UL (ref 4.2–5.4)
RBC UR QL AUTO: NEGATIVE
SIGNIFICANT IND 70042: NORMAL
SITE SITE: NORMAL
SODIUM SERPL-SCNC: 131 MMOL/L (ref 135–145)
SODIUM SERPL-SCNC: 133 MMOL/L (ref 135–145)
SOURCE SOURCE: NORMAL
SP GR UR STRIP.AUTO: <=1.005
WBC # BLD AUTO: 9.3 K/UL (ref 4.8–10.8)

## 2019-08-21 PROCEDURE — 83690 ASSAY OF LIPASE: CPT

## 2019-08-21 PROCEDURE — 80048 BASIC METABOLIC PNL TOTAL CA: CPT

## 2019-08-21 PROCEDURE — 99285 EMERGENCY DEPT VISIT HI MDM: CPT

## 2019-08-21 PROCEDURE — 82962 GLUCOSE BLOOD TEST: CPT

## 2019-08-21 PROCEDURE — A9270 NON-COVERED ITEM OR SERVICE: HCPCS | Performed by: EMERGENCY MEDICINE

## 2019-08-21 PROCEDURE — 700102 HCHG RX REV CODE 250 W/ 637 OVERRIDE(OP): Performed by: EMERGENCY MEDICINE

## 2019-08-21 PROCEDURE — 700105 HCHG RX REV CODE 258: Performed by: EMERGENCY MEDICINE

## 2019-08-21 PROCEDURE — 80053 COMPREHEN METABOLIC PANEL: CPT

## 2019-08-21 PROCEDURE — 85025 COMPLETE CBC W/AUTO DIFF WBC: CPT

## 2019-08-21 PROCEDURE — 81003 URINALYSIS AUTO W/O SCOPE: CPT

## 2019-08-21 RX ORDER — SODIUM CHLORIDE 9 MG/ML
1000 INJECTION, SOLUTION INTRAVENOUS ONCE
Status: COMPLETED | OUTPATIENT
Start: 2019-08-21 | End: 2019-08-21

## 2019-08-21 RX ORDER — FLUCONAZOLE 200 MG/1
200 TABLET ORAL ONCE
Status: COMPLETED | OUTPATIENT
Start: 2019-08-21 | End: 2019-08-21

## 2019-08-21 RX ADMIN — SODIUM CHLORIDE 1000 ML: 900 INJECTION, SOLUTION INTRAVENOUS at 04:15

## 2019-08-21 RX ADMIN — SODIUM CHLORIDE 1000 ML: 900 INJECTION, SOLUTION INTRAVENOUS at 05:00

## 2019-08-21 RX ADMIN — FLUCONAZOLE 200 MG: 200 TABLET ORAL at 05:45

## 2019-08-21 NOTE — ED TRIAGE NOTES
Pt. States that she belives her sugar is high she is having urinary frequency and increased thirst, states she has not been taking her DM medications nor checking her CBG pt.

## 2019-08-21 NOTE — ED PROVIDER NOTES
ED Provider Note    Addendum: Please see the initial history and physical for details.  The patient had repeat BMP performed which showed lowering of the glucose and no significant change in other electrolytes.  At this time, the patient is stable for discharge.

## 2019-08-21 NOTE — ED PROVIDER NOTES
ED Provider Note    CHIEF COMPLAINT  Chief Complaint   Patient presents with   • Hyperglycemia       HPI  Liana Obrien is a 39 y.o. female who presents to the emergency department with complaint of persistent vaginal irritation, persistent depression and elevated blood sugars.  She says that she is diabetic but does not manage her diabetes well.  She rarely checks her blood sugars.  She occasionally takes her medications.  She notes that she has been under significant emotional stress of recent as she states that her  is currently in a psychiatric half-way facility after multiple reports of persistent suicidal thoughts or possible attempts.  She states that she herself is not suicidal nor homicidal.  She says that this additional stress however has added difficulty with her baseline health and medical care.  She notes a long-standing history of vaginal irritation with prior urinary tract infections and vaginal yeast infections.  Now come to the emerge department to have all this evaluated yet again.  Chart review reveals that the patient did have inpatient stay for a small labial abscess approximately 1-1/2 months ago.  She was discharged with dual antibiotics and outpatient referral for wound care.  She was noncompliant with this.  REVIEW OF SYSTEMS  See HPI for further details. All other systems are negative.     PAST MEDICAL HISTORY   has a past medical history of Bipolar disorder ([facility]) (8/9/2018), Candida vaginitis, Chickenpox, Diabetes 1.5, managed as type 2 ([facility]), DVT (deep venous thrombosis) ([facility]), Dysfunctional uterine bleeding, Fibroids, Herpes, Hypertension, Hypothyroidism due to acquired atrophy of thyroid (1/22/2016), Personal history of venous thrombosis and embolism, Sleep apnea, and Uncontrolled type 2 diabetes mellitus without complication, without long-term current use of insulin ([facility]) (3/12/2015).    SOCIAL HISTORY  Social History     Tobacco Use   • Smoking status: Former Smoker  "    Packs/day: 0.50     Years: 0.30     Pack years: 0.15     Types: Cigarettes     Last attempt to quit: 2018     Years since quittin.6   • Smokeless tobacco: Never Used   • Tobacco comment: for 3 months at a time on and off    Substance and Sexual Activity   • Alcohol use: No     Alcohol/week: 0.0 oz   • Drug use: No   • Sexual activity: Yes     Partners: Male       SURGICAL HISTORY  patient denies any surgical history    CURRENT MEDICATIONS  Home Medications    **Home medications have not yet been reviewed for this encounter**         ALLERGIES  Allergies   Allergen Reactions   • Bicillin C-R [Penicillin G Proc & Benzathine] Rash     Received inj of Bicillin- reaction was rash. Oral penicillin shows no reaction.   • Ondansetron Hives   • Sulfa Drugs Hives          • Metoclopramide Rash and Vomiting     Rash         PHYSICAL EXAM  VITAL SIGNS: /97   Pulse 74   Temp 36.1 °C (97 °F) (Temporal)   Resp 19   Ht 1.626 m (5' 4\")   Wt 107.7 kg (237 lb 7 oz)   SpO2 97%   BMI 40.76 kg/m²  @KELVIN[[id number]::@   Pulse ox interpretation: I interpret this pulse ox as normal.  Constitutional: Alert in no apparent distress.  Morbidly obese  HENT: No signs of trauma, Bilateral external ears normal, Nose normal.   Eyes: Pupils are equal and reactive, Conjunctiva normal, Non-icteric.   Neck: Normal range of motion, No tenderness, Supple, No stridor.    Cardiovascular: Regular rate and rhythm, no murmurs.   Thorax & Lungs: Normal breath sounds, No respiratory distress, No wheezing, No chest tenderness.   Abdomen: Bowel sounds normal, Soft, No tenderness, No masses, No pulsatile masses. No peritoneal signs.  Skin: Warm, Dry, No erythema, No rash.   Back: No bony tenderness, No CVA tenderness.   Extremities: Intact distal pulses, No edema, No tenderness, No cyanosis,  Negative Amanda's sign.   Musculoskeletal: Good range of motion in all major joints. No tenderness to palpation or major deformities noted.   Neurologic: Alert " , Normal motor function, Normal sensory function, No focal deficits noted.   Psychiatric: Affect normal, Judgment normal, Mood normal.       DIAGNOSTIC STUDIES / PROCEDURES      LABS  Results for orders placed or performed during the hospital encounter of 08/21/19   CBC WITH DIFFERENTIAL   Result Value Ref Range    WBC 9.3 4.8 - 10.8 K/uL    RBC 5.39 4.20 - 5.40 M/uL    Hemoglobin 14.4 12.0 - 16.0 g/dL    Hematocrit 42.9 37.0 - 47.0 %    MCV 79.6 (L) 81.4 - 97.8 fL    MCH 26.7 (L) 27.0 - 33.0 pg    MCHC 33.6 33.6 - 35.0 g/dL    RDW 40.4 35.9 - 50.0 fL    Platelet Count 321 164 - 446 K/uL    MPV 10.6 9.0 - 12.9 fL    Neutrophils-Polys 48.90 44.00 - 72.00 %    Lymphocytes 40.60 22.00 - 41.00 %    Monocytes 5.70 0.00 - 13.40 %    Eosinophils 3.40 0.00 - 6.90 %    Basophils 1.00 0.00 - 1.80 %    Immature Granulocytes 0.40 0.00 - 0.90 %    Nucleated RBC 0.00 /100 WBC    Neutrophils (Absolute) 4.57 2.00 - 7.15 K/uL    Lymphs (Absolute) 3.79 1.00 - 4.80 K/uL    Monos (Absolute) 0.53 0.00 - 0.85 K/uL    Eos (Absolute) 0.32 0.00 - 0.51 K/uL    Baso (Absolute) 0.09 0.00 - 0.12 K/uL    Immature Granulocytes (abs) 0.04 0.00 - 0.11 K/uL    NRBC (Absolute) 0.00 K/uL   COMP METABOLIC PANEL   Result Value Ref Range    Sodium 131 (L) 135 - 145 mmol/L    Potassium 3.4 (L) 3.6 - 5.5 mmol/L    Chloride 94 (L) 96 - 112 mmol/L    Co2 23 20 - 33 mmol/L    Anion Gap 14.0 (H) 0.0 - 11.9    Glucose 373 (H) 65 - 99 mg/dL    Bun 12 8 - 22 mg/dL    Creatinine 0.61 0.50 - 1.40 mg/dL    Calcium 9.3 8.4 - 10.2 mg/dL    AST(SGOT) 18 12 - 45 U/L    ALT(SGPT) 54 (H) 2 - 50 U/L    Alkaline Phosphatase 94 30 - 99 U/L    Total Bilirubin 0.2 0.1 - 1.5 mg/dL    Albumin 4.0 3.2 - 4.9 g/dL    Total Protein 8.1 6.0 - 8.2 g/dL    Globulin 4.1 (H) 1.9 - 3.5 g/dL    A-G Ratio 1.0 g/dL   LIPASE   Result Value Ref Range    Lipase 44 7 - 58 U/L   URINALYSIS CULTURE, IF INDICATED   Result Value Ref Range    Color Yellow     Character Clear     Specific Gravity  <=1.005 <1.035    Ph 5.5 5.0 - 8.0    Glucose >=1000 (A) Negative mg/dL    Ketones Negative Negative mg/dL    Protein Negative Negative mg/dL    Bilirubin Negative Negative    Nitrite Negative Negative    Leukocyte Esterase Negative Negative    Occult Blood Negative Negative    Micro Urine Req see below    WET PREP   Result Value Ref Range    Significant Indicator NEG     Source GEN     Site VAGINAL     Wet Prep For Parasites       Moderate yeast.  No motile Trichomonas seen.  No clue cells seen.  Few WBCs seen.     ESTIMATED GFR   Result Value Ref Range    GFR If African American >60 >60 mL/min/1.73 m 2    GFR If Non African American >60 >60 mL/min/1.73 m 2   ACCU-CHEK GLUCOSE   Result Value Ref Range    Glucose - Accu-Ck 406 (HH) 65 - 99 mg/dL             COURSE & MEDICAL DECISION MAKING  Pertinent Labs & Imaging studies reviewed. (See chart for details)  Patient presented the emergency department for above complaint.  Patient is mildly noncompliant with her medications.  Also does not follow a diabetic compliant lifestyle or usage of medications.  She had additional factors within her life including her  being committed secondary to psychiatric concern.  Vaginal swab shows evidence of yeast infection which I believe is the likely causation of some of her symptomatology.  Also likely secondary to her hyperglycemia.  She has been given a single dose of Diflucan here.  She will be prescribed the same for home.  I have given her IV hydration to hopefully assist with her hyperglycemia and overall symptomatology.  I do believe that she will likely be able to be discharged home after a second chemistry panel to show improved anion gap and decreased blood sugar.  I signed out to my colleague Dr. Ganser which will follow and disposition as clinically indicated.      HYDRATION: Based on the patient's presentation of Dehydration the patient was given IV fluids. IV Hydration was used because oral hydration was not  adequate alone. Upon recheck following hydration, the patient was feeling better.  The patient will not drink alcohol nor drive with prescribed medications. The patient will return for worsening symptoms and is stable at the time of discharge. The patient verbalizes understanding and will comply.    FINAL IMPRESSION  1. Hyperglycemia    2. Vaginal yeast infection            Electronically signed by: Russ Irizarry, 8/21/2019 2:53 AM

## 2019-08-21 NOTE — ED NOTES
Pt given written and oral discharge instructions. Pt verbalized understanding of all instructions given. All questions answered. VSS. IVs removed. Pt given f/u instructions and educated on s/s of when to return to the ER. Pt ambulating independently upon time of dc in stable condition.

## 2019-10-24 ENCOUNTER — TELEPHONE (OUTPATIENT)
Dept: MEDICAL GROUP | Facility: PHYSICIAN GROUP | Age: 39
End: 2019-10-24

## 2019-10-24 NOTE — TELEPHONE ENCOUNTER
DOCUMENTATION OF PAR STATUS:    1. Name of Medication & Dose: Ozempic pen injector     2. Name of Prescription Coverage Company & phone #: [employer] [place] [employer]    3. Date Prior Auth Submitted: 10/24/19    4. What information was given to obtain insurance decision? Cover My Meds    5. Prior Auth Status? Pending    6. Patient Notified: yes

## 2019-10-29 NOTE — TELEPHONE ENCOUNTER
Covered drugs include Adlyxin (lixisenatide), Trulicity (dulaglutide) and Victoza (liraglutide) 2 Pen Pack.   Coverage of these requires history of failure, intolerance or contraindication to metformin.

## 2019-10-29 NOTE — TELEPHONE ENCOUNTER
FINAL PRIOR AUTHORIZATION STATUS:    1.  Name of Medication & Dose: Ozempic     2. Prior Auth Status: Denied.  Reason: did not try and fail formulary alternative    3. Action Taken: Pharmacy Notified: no Patient Notified: yes

## 2019-11-01 ENCOUNTER — TELEPHONE (OUTPATIENT)
Dept: MEDICAL GROUP | Facility: PHYSICIAN GROUP | Age: 39
End: 2019-11-01

## 2019-11-02 NOTE — TELEPHONE ENCOUNTER
DOCUMENTATION OF PAR STATUS:    1. Name of Medication & Dose: Tresiba     2. Name of Prescription Coverage Company & phone #: Health Plan [facility]    3. Date Prior Auth Submitted: 11/01/19    4. What information was given to obtain insurance decision? Cover My Meds    5. Prior Auth Status? Pending    6. Patient Notified: yes

## 2019-11-12 NOTE — TELEPHONE ENCOUNTER
FINAL PRIOR AUTHORIZATION STATUS:    1.  Name of Medication & Dose: Tresiba     2. Prior Auth Status: Approved through Cover My Meds     3. Action Taken: Pharmacy Notified: yes Patient Notified: yes

## 2019-11-20 ENCOUNTER — TELEPHONE (OUTPATIENT)
Dept: MEDICAL GROUP | Facility: PHYSICIAN GROUP | Age: 39
End: 2019-11-20

## 2019-11-21 NOTE — TELEPHONE ENCOUNTER
DOCUMENTATION OF PAR STATUS:    1. Name of Medication & Dose: Xigduo 5-1000mg tab     2. Name of Prescription Coverage Company & phone #: [employer] plan [employer] [place]    3. Date Prior Auth Submitted: 11/20/19    4. What information was given to obtain insurance decision? Cover My Meds    5. Prior Auth Status? Pending    6. Patient Notified: yes

## 2019-11-22 NOTE — TELEPHONE ENCOUNTER
FINAL PRIOR AUTHORIZATION STATUS:    1.  Name of Medication & Dose: Xiduo     2. Prior Auth Status: Denied.  Reason: drug is not on prescription drug list.  Needs to have tried Segluromet.    3. Action Taken: Pharmacy Notified: no Patient Notified: yes

## 2020-01-10 ENCOUNTER — HOSPITAL ENCOUNTER (OUTPATIENT)
Facility: MEDICAL CENTER | Age: 40
End: 2020-01-11
Attending: EMERGENCY MEDICINE | Admitting: INTERNAL MEDICINE
Payer: MEDICAID

## 2020-01-10 DIAGNOSIS — B37.9 YEAST INFECTION: ICD-10-CM

## 2020-01-10 DIAGNOSIS — R73.9 HYPERGLYCEMIA: ICD-10-CM

## 2020-01-10 DIAGNOSIS — E87.20 ACIDOSIS: ICD-10-CM

## 2020-01-10 PROBLEM — E11.10 DKA (DIABETIC KETOACIDOSES): Status: ACTIVE | Noted: 2020-01-10

## 2020-01-10 PROBLEM — E86.0 DEHYDRATION: Status: ACTIVE | Noted: 2020-01-10

## 2020-01-10 LAB
ALBUMIN SERPL BCP-MCNC: 4.1 G/DL (ref 3.2–4.9)
ALBUMIN/GLOB SERPL: 1.1 G/DL
ALP SERPL-CCNC: 97 U/L (ref 30–99)
ALT SERPL-CCNC: 19 U/L (ref 2–50)
ANION GAP SERPL CALC-SCNC: 13 MMOL/L (ref 0–11.9)
ANION GAP SERPL CALC-SCNC: 20 MMOL/L (ref 0–11.9)
APPEARANCE UR: ABNORMAL
AST SERPL-CCNC: 11 U/L (ref 12–45)
BACTERIA #/AREA URNS HPF: ABNORMAL /HPF
BACTERIA GENITAL QL WET PREP: NORMAL
BASE EXCESS BLDV CALC-SCNC: -4 MMOL/L
BASOPHILS # BLD AUTO: 0.8 % (ref 0–1.8)
BASOPHILS # BLD: 0.08 K/UL (ref 0–0.12)
BILIRUB SERPL-MCNC: 0.2 MG/DL (ref 0.1–1.5)
BILIRUB UR QL STRIP.AUTO: NEGATIVE
BODY TEMPERATURE: ABNORMAL CENTIGRADE
BUN SERPL-MCNC: 11 MG/DL (ref 8–22)
BUN SERPL-MCNC: 7 MG/DL (ref 8–22)
C TRACH DNA SPEC QL NAA+PROBE: NEGATIVE
CALCIUM SERPL-MCNC: 8.8 MG/DL (ref 8.4–10.2)
CALCIUM SERPL-MCNC: 9.5 MG/DL (ref 8.4–10.2)
CHLORIDE SERPL-SCNC: 102 MMOL/L (ref 96–112)
CHLORIDE SERPL-SCNC: 92 MMOL/L (ref 96–112)
CO2 SERPL-SCNC: 17 MMOL/L (ref 20–33)
CO2 SERPL-SCNC: 20 MMOL/L (ref 20–33)
COLOR UR: YELLOW
CREAT SERPL-MCNC: 0.33 MG/DL (ref 0.5–1.4)
CREAT SERPL-MCNC: 0.53 MG/DL (ref 0.5–1.4)
EOSINOPHIL # BLD AUTO: 0.25 K/UL (ref 0–0.51)
EOSINOPHIL NFR BLD: 2.4 % (ref 0–6.9)
EPI CELLS #/AREA URNS HPF: ABNORMAL /HPF
ERYTHROCYTE [DISTWIDTH] IN BLOOD BY AUTOMATED COUNT: 35.7 FL (ref 35.9–50)
ERYTHROCYTE [DISTWIDTH] IN BLOOD BY AUTOMATED COUNT: 35.8 FL (ref 35.9–50)
GLOBULIN SER CALC-MCNC: 3.9 G/DL (ref 1.9–3.5)
GLUCOSE BLD-MCNC: 232 MG/DL (ref 65–99)
GLUCOSE BLD-MCNC: 268 MG/DL (ref 65–99)
GLUCOSE BLD-MCNC: 277 MG/DL (ref 65–99)
GLUCOSE BLD-MCNC: 293 MG/DL (ref 65–99)
GLUCOSE BLD-MCNC: 464 MG/DL (ref 65–99)
GLUCOSE SERPL-MCNC: 357 MG/DL (ref 65–99)
GLUCOSE SERPL-MCNC: 768 MG/DL (ref 65–99)
GLUCOSE UR STRIP.AUTO-MCNC: >=1000 MG/DL
HCG SERPL QL: NEGATIVE
HCO3 BLDV-SCNC: 20 MMOL/L (ref 24–28)
HCT VFR BLD AUTO: 37.2 % (ref 37–47)
HCT VFR BLD AUTO: 40.3 % (ref 37–47)
HGB BLD-MCNC: 12.1 G/DL (ref 12–16)
HGB BLD-MCNC: 12.9 G/DL (ref 12–16)
IMM GRANULOCYTES # BLD AUTO: 0.05 K/UL (ref 0–0.11)
IMM GRANULOCYTES NFR BLD AUTO: 0.5 % (ref 0–0.9)
KETONES UR STRIP.AUTO-MCNC: ABNORMAL MG/DL
LEUKOCYTE ESTERASE UR QL STRIP.AUTO: ABNORMAL
LIPASE SERPL-CCNC: 37 U/L (ref 7–58)
LYMPHOCYTES # BLD AUTO: 2.48 K/UL (ref 1–4.8)
LYMPHOCYTES NFR BLD: 24 % (ref 22–41)
MAGNESIUM SERPL-MCNC: 1.8 MG/DL (ref 1.5–2.5)
MCH RBC QN AUTO: 24.4 PG (ref 27–33)
MCH RBC QN AUTO: 24.4 PG (ref 27–33)
MCHC RBC AUTO-ENTMCNC: 32 G/DL (ref 33.6–35)
MCHC RBC AUTO-ENTMCNC: 32.5 G/DL (ref 33.6–35)
MCV RBC AUTO: 75.2 FL (ref 81.4–97.8)
MCV RBC AUTO: 76.2 FL (ref 81.4–97.8)
MICRO URNS: ABNORMAL
MONOCYTES # BLD AUTO: 0.68 K/UL (ref 0–0.85)
MONOCYTES NFR BLD AUTO: 6.6 % (ref 0–13.4)
MUCOUS THREADS #/AREA URNS HPF: ABNORMAL /HPF
N GONORRHOEA DNA SPEC QL NAA+PROBE: NEGATIVE
NEUTROPHILS # BLD AUTO: 6.79 K/UL (ref 2–7.15)
NEUTROPHILS NFR BLD: 65.7 % (ref 44–72)
NITRITE UR QL STRIP.AUTO: NEGATIVE
NRBC # BLD AUTO: 0 K/UL
NRBC BLD-RTO: 0 /100 WBC
PCO2 BLDV: 36.2 MMHG (ref 41–51)
PH BLDV: 7.37 [PH] (ref 7.31–7.45)
PH UR STRIP.AUTO: 5 [PH] (ref 5–8)
PLATELET # BLD AUTO: 253 K/UL (ref 164–446)
PLATELET # BLD AUTO: 264 K/UL (ref 164–446)
PMV BLD AUTO: 10.5 FL (ref 9–12.9)
PMV BLD AUTO: 10.5 FL (ref 9–12.9)
PO2 BLDV: 64.6 MMHG (ref 25–40)
POTASSIUM SERPL-SCNC: 3.6 MMOL/L (ref 3.6–5.5)
POTASSIUM SERPL-SCNC: 4.1 MMOL/L (ref 3.6–5.5)
PROT SERPL-MCNC: 8 G/DL (ref 6–8.2)
PROT UR QL STRIP: NEGATIVE MG/DL
RBC # BLD AUTO: 4.95 M/UL (ref 4.2–5.4)
RBC # BLD AUTO: 5.29 M/UL (ref 4.2–5.4)
RBC # URNS HPF: ABNORMAL /HPF
RBC UR QL AUTO: ABNORMAL
SAO2 % BLDV: 91 %
SIGNIFICANT IND 70042: NORMAL
SITE SITE: NORMAL
SODIUM SERPL-SCNC: 129 MMOL/L (ref 135–145)
SODIUM SERPL-SCNC: 135 MMOL/L (ref 135–145)
SOURCE SOURCE: NORMAL
SP GR UR STRIP.AUTO: <=1.005
SPECIMEN SOURCE: NORMAL
WBC # BLD AUTO: 10.3 K/UL (ref 4.8–10.8)
WBC # BLD AUTO: 9.4 K/UL (ref 4.8–10.8)
WBC #/AREA URNS HPF: ABNORMAL /HPF
YEAST BUDDING URNS QL: PRESENT /HPF
YEAST HYPHAE #/AREA URNS HPF: PRESENT /HPF

## 2020-01-10 PROCEDURE — 85027 COMPLETE CBC AUTOMATED: CPT | Mod: XU

## 2020-01-10 PROCEDURE — 700102 HCHG RX REV CODE 250 W/ 637 OVERRIDE(OP): Performed by: INTERNAL MEDICINE

## 2020-01-10 PROCEDURE — 82803 BLOOD GASES ANY COMBINATION: CPT

## 2020-01-10 PROCEDURE — G0378 HOSPITAL OBSERVATION PER HR: HCPCS

## 2020-01-10 PROCEDURE — 85025 COMPLETE CBC W/AUTO DIFF WBC: CPT

## 2020-01-10 PROCEDURE — 87591 N.GONORRHOEAE DNA AMP PROB: CPT

## 2020-01-10 PROCEDURE — 81001 URINALYSIS AUTO W/SCOPE: CPT

## 2020-01-10 PROCEDURE — 80048 BASIC METABOLIC PNL TOTAL CA: CPT

## 2020-01-10 PROCEDURE — 83735 ASSAY OF MAGNESIUM: CPT

## 2020-01-10 PROCEDURE — 96372 THER/PROPH/DIAG INJ SC/IM: CPT

## 2020-01-10 PROCEDURE — 700102 HCHG RX REV CODE 250 W/ 637 OVERRIDE(OP)

## 2020-01-10 PROCEDURE — A9270 NON-COVERED ITEM OR SERVICE: HCPCS | Performed by: EMERGENCY MEDICINE

## 2020-01-10 PROCEDURE — 83690 ASSAY OF LIPASE: CPT

## 2020-01-10 PROCEDURE — 80053 COMPREHEN METABOLIC PANEL: CPT

## 2020-01-10 PROCEDURE — 700105 HCHG RX REV CODE 258: Performed by: INTERNAL MEDICINE

## 2020-01-10 PROCEDURE — 99220 PR INITIAL OBSERVATION CARE,LEVL III: CPT | Performed by: INTERNAL MEDICINE

## 2020-01-10 PROCEDURE — 84703 CHORIONIC GONADOTROPIN ASSAY: CPT

## 2020-01-10 PROCEDURE — 87491 CHLMYD TRACH DNA AMP PROBE: CPT

## 2020-01-10 PROCEDURE — 700105 HCHG RX REV CODE 258: Performed by: EMERGENCY MEDICINE

## 2020-01-10 PROCEDURE — A9270 NON-COVERED ITEM OR SERVICE: HCPCS | Performed by: INTERNAL MEDICINE

## 2020-01-10 PROCEDURE — 99285 EMERGENCY DEPT VISIT HI MDM: CPT

## 2020-01-10 PROCEDURE — 700102 HCHG RX REV CODE 250 W/ 637 OVERRIDE(OP): Performed by: EMERGENCY MEDICINE

## 2020-01-10 PROCEDURE — 36415 COLL VENOUS BLD VENIPUNCTURE: CPT

## 2020-01-10 PROCEDURE — 82962 GLUCOSE BLOOD TEST: CPT | Mod: 91

## 2020-01-10 RX ORDER — DAPAGLIFLOZIN AND METFORMIN HYDROCHLORIDE 5; 1000 MG/1; MG/1
TABLET, FILM COATED, EXTENDED RELEASE ORAL
Status: ON HOLD | COMMUNITY
Start: 2019-11-19 | End: 2020-01-11

## 2020-01-10 RX ORDER — MORPHINE SULFATE 4 MG/ML
4 INJECTION, SOLUTION INTRAMUSCULAR; INTRAVENOUS ONCE
Status: DISCONTINUED | OUTPATIENT
Start: 2020-01-10 | End: 2020-01-10 | Stop reason: CLARIF

## 2020-01-10 RX ORDER — POLYETHYLENE GLYCOL 3350 17 G/17G
1 POWDER, FOR SOLUTION ORAL
Status: DISCONTINUED | OUTPATIENT
Start: 2020-01-10 | End: 2020-01-11 | Stop reason: HOSPADM

## 2020-01-10 RX ORDER — SODIUM CHLORIDE 9 MG/ML
INJECTION, SOLUTION INTRAVENOUS CONTINUOUS
Status: DISCONTINUED | OUTPATIENT
Start: 2020-01-10 | End: 2020-01-11 | Stop reason: HOSPADM

## 2020-01-10 RX ORDER — FLUCONAZOLE 200 MG/1
200 TABLET ORAL ONCE
Status: COMPLETED | OUTPATIENT
Start: 2020-01-10 | End: 2020-01-10

## 2020-01-10 RX ORDER — PROCHLORPERAZINE EDISYLATE 5 MG/ML
10 INJECTION INTRAMUSCULAR; INTRAVENOUS ONCE
Status: DISCONTINUED | OUTPATIENT
Start: 2020-01-10 | End: 2020-01-10 | Stop reason: CLARIF

## 2020-01-10 RX ORDER — ONDANSETRON 4 MG/1
4 TABLET, ORALLY DISINTEGRATING ORAL EVERY 4 HOURS PRN
Status: DISCONTINUED | OUTPATIENT
Start: 2020-01-10 | End: 2020-01-10

## 2020-01-10 RX ORDER — PROMETHAZINE HYDROCHLORIDE 25 MG/1
12.5-25 SUPPOSITORY RECTAL EVERY 4 HOURS PRN
Status: DISCONTINUED | OUTPATIENT
Start: 2020-01-10 | End: 2020-01-11 | Stop reason: HOSPADM

## 2020-01-10 RX ORDER — PROMETHAZINE HYDROCHLORIDE 25 MG/1
12.5-25 TABLET ORAL EVERY 4 HOURS PRN
Status: DISCONTINUED | OUTPATIENT
Start: 2020-01-10 | End: 2020-01-11 | Stop reason: HOSPADM

## 2020-01-10 RX ORDER — DULOXETIN HYDROCHLORIDE 20 MG/1
40 CAPSULE, DELAYED RELEASE ORAL DAILY
Status: DISCONTINUED | OUTPATIENT
Start: 2020-01-10 | End: 2020-01-11 | Stop reason: HOSPADM

## 2020-01-10 RX ORDER — ENALAPRILAT 1.25 MG/ML
1.25 INJECTION INTRAVENOUS EVERY 6 HOURS PRN
Status: DISCONTINUED | OUTPATIENT
Start: 2020-01-10 | End: 2020-01-11 | Stop reason: HOSPADM

## 2020-01-10 RX ORDER — ONDANSETRON 2 MG/ML
4 INJECTION INTRAMUSCULAR; INTRAVENOUS EVERY 4 HOURS PRN
Status: DISCONTINUED | OUTPATIENT
Start: 2020-01-10 | End: 2020-01-10

## 2020-01-10 RX ORDER — SODIUM CHLORIDE, SODIUM LACTATE, POTASSIUM CHLORIDE, CALCIUM CHLORIDE 600; 310; 30; 20 MG/100ML; MG/100ML; MG/100ML; MG/100ML
1000 INJECTION, SOLUTION INTRAVENOUS ONCE
Status: COMPLETED | OUTPATIENT
Start: 2020-01-10 | End: 2020-01-10

## 2020-01-10 RX ORDER — PROCHLORPERAZINE EDISYLATE 5 MG/ML
5-10 INJECTION INTRAMUSCULAR; INTRAVENOUS EVERY 4 HOURS PRN
Status: DISCONTINUED | OUTPATIENT
Start: 2020-01-10 | End: 2020-01-11 | Stop reason: HOSPADM

## 2020-01-10 RX ORDER — AMOXICILLIN 250 MG
2 CAPSULE ORAL 2 TIMES DAILY
Status: DISCONTINUED | OUTPATIENT
Start: 2020-01-10 | End: 2020-01-11 | Stop reason: HOSPADM

## 2020-01-10 RX ORDER — BISACODYL 10 MG
10 SUPPOSITORY, RECTAL RECTAL
Status: DISCONTINUED | OUTPATIENT
Start: 2020-01-10 | End: 2020-01-11 | Stop reason: HOSPADM

## 2020-01-10 RX ORDER — ACETAMINOPHEN 325 MG/1
650 TABLET ORAL EVERY 6 HOURS PRN
Status: DISCONTINUED | OUTPATIENT
Start: 2020-01-10 | End: 2020-01-11 | Stop reason: HOSPADM

## 2020-01-10 RX ORDER — DULOXETIN HYDROCHLORIDE 20 MG/1
20 CAPSULE, DELAYED RELEASE ORAL 2 TIMES DAILY
COMMUNITY
End: 2021-10-27

## 2020-01-10 RX ADMIN — PROMETHAZINE HYDROCHLORIDE 25 MG: 25 TABLET ORAL at 12:38

## 2020-01-10 RX ADMIN — ACETAMINOPHEN 650 MG: 325 TABLET, FILM COATED ORAL at 12:39

## 2020-01-10 RX ADMIN — SODIUM CHLORIDE: 9 INJECTION, SOLUTION INTRAVENOUS at 04:48

## 2020-01-10 RX ADMIN — INSULIN HUMAN 7 UNITS: 100 INJECTION, SOLUTION PARENTERAL at 21:40

## 2020-01-10 RX ADMIN — DULOXETINE HYDROCHLORIDE 40 MG: 20 CAPSULE, DELAYED RELEASE ORAL at 09:45

## 2020-01-10 RX ADMIN — FLUCONAZOLE 200 MG: 200 TABLET ORAL at 03:27

## 2020-01-10 RX ADMIN — INSULIN HUMAN 7 UNITS: 100 INJECTION, SOLUTION PARENTERAL at 07:28

## 2020-01-10 RX ADMIN — INSULIN HUMAN 7 UNITS: 100 INJECTION, SOLUTION PARENTERAL at 11:12

## 2020-01-10 RX ADMIN — SODIUM CHLORIDE: 9 INJECTION, SOLUTION INTRAVENOUS at 13:38

## 2020-01-10 RX ADMIN — SODIUM CHLORIDE, POTASSIUM CHLORIDE, SODIUM LACTATE AND CALCIUM CHLORIDE 1000 ML: 600; 310; 30; 20 INJECTION, SOLUTION INTRAVENOUS at 02:41

## 2020-01-10 RX ADMIN — INSULIN HUMAN 10 UNITS: 100 INJECTION, SOLUTION PARENTERAL at 02:56

## 2020-01-10 RX ADMIN — INSULIN HUMAN 4 UNITS: 100 INJECTION, SOLUTION PARENTERAL at 17:18

## 2020-01-10 ASSESSMENT — ENCOUNTER SYMPTOMS
DEPRESSION: 0
STRIDOR: 0
HEADACHES: 0
CONSTIPATION: 0
MYALGIAS: 0
WEAKNESS: 0
CHILLS: 1
FEVER: 0
DIARRHEA: 0
TINGLING: 0
LOSS OF CONSCIOUSNESS: 0
SHORTNESS OF BREATH: 0
VOMITING: 0
DIZZINESS: 0
COUGH: 0
ABDOMINAL PAIN: 1
SPUTUM PRODUCTION: 0
FALLS: 0
NAUSEA: 1
PALPITATIONS: 0

## 2020-01-10 ASSESSMENT — PATIENT HEALTH QUESTIONNAIRE - PHQ9
2. FEELING DOWN, DEPRESSED, IRRITABLE, OR HOPELESS: NEARLY EVERY DAY
SUM OF ALL RESPONSES TO PHQ9 QUESTIONS 1 AND 2: 6
8. MOVING OR SPEAKING SO SLOWLY THAT OTHER PEOPLE COULD HAVE NOTICED. OR THE OPPOSITE, BEING SO FIGETY OR RESTLESS THAT YOU HAVE BEEN MOVING AROUND A LOT MORE THAN USUAL: NEARLY EVERY DAY
4. FEELING TIRED OR HAVING LITTLE ENERGY: NEARLY EVERY DAY
7. TROUBLE CONCENTRATING ON THINGS, SUCH AS READING THE NEWSPAPER OR WATCHING TELEVISION: NEARLY EVERY DAY
6. FEELING BAD ABOUT YOURSELF - OR THAT YOU ARE A FAILURE OR HAVE LET YOURSELF OR YOUR FAMILY DOWN: NEARLY EVERY DAY
1. LITTLE INTEREST OR PLEASURE IN DOING THINGS: NEARLY EVERY DAY
5. POOR APPETITE OR OVEREATING: NEARLY EVERY DAY
3. TROUBLE FALLING OR STAYING ASLEEP OR SLEEPING TOO MUCH: NEARLY EVERY DAY
9. THOUGHTS THAT YOU WOULD BE BETTER OFF DEAD, OR OF HURTING YOURSELF: NOT AT ALL
SUM OF ALL RESPONSES TO PHQ QUESTIONS 1-9: 24

## 2020-01-10 ASSESSMENT — LIFESTYLE VARIABLES
EVER HAD A DRINK FIRST THING IN THE MORNING TO STEADY YOUR NERVES TO GET RID OF A HANGOVER: NO
HAVE YOU EVER FELT YOU SHOULD CUT DOWN ON YOUR DRINKING: NO
TOTAL SCORE: 0
CONSUMPTION TOTAL: NEGATIVE
EVER_SMOKED: NEVER
EVER FELT BAD OR GUILTY ABOUT YOUR DRINKING: NO
HAVE PEOPLE ANNOYED YOU BY CRITICIZING YOUR DRINKING: NO
TOTAL SCORE: 0
ON A TYPICAL DAY WHEN YOU DRINK ALCOHOL HOW MANY DRINKS DO YOU HAVE: 0
DO YOU DRINK ALCOHOL: NO
AVERAGE NUMBER OF DAYS PER WEEK YOU HAVE A DRINK CONTAINING ALCOHOL: 0
HOW MANY TIMES IN THE PAST YEAR HAVE YOU HAD 5 OR MORE DRINKS IN A DAY: 0
DOES PATIENT WANT TO STOP DRINKING: NO
TOTAL SCORE: 0

## 2020-01-10 NOTE — PROGRESS NOTES
No significant changes from AM assessment noted. Pt reported tooth pain rated 6/10 this AM, endorsed nausea. Tylenol prn added by MD. Pt given prn Tylenol and Phenergan, verbalized relief of symptoms. ACHS blood sugar checks remain in place at this time. Pt given diabetes education by RD today. No other concerns to note at this time.

## 2020-01-10 NOTE — PROGRESS NOTES
Report received from ED RN. Pt awake and alert. Pt arrived to unit via gurney. Pt able to ambulate from gurney to bed without assistance. Pt tolerated well.

## 2020-01-10 NOTE — PROGRESS NOTES
I saw and examed patient and agreed with H&P assessment and plan except listed as below.    Cont ivf and insulin  Monitor BS

## 2020-01-10 NOTE — H&P
Hospital Medicine History & Physical Note    Date of Service  1/10/2020    Primary Care Physician  Gary Caal M.D.    Consultants  None    Code Status  Full    Chief Complaint  Urinary frequency    History of Presenting Illness  39 y.o. female who presented 1/10/2020 with urinary frequency, excessive thirst, abdominal pain.  She also complained of chills and nausea.  She states her symptoms initially started a couple months ago and it continued to worsen.  She states her abdominal pain is bilateral lower quadrants, cramping in nature, 10/10 at its worse.  Upon arrival, patient was noted to have a significant yeast infection as well as profoundly uncontrolled diabetes.  I did discuss the case including labs with the ER physician.    Review of Systems  Review of Systems   Constitutional: Positive for chills. Negative for fever and malaise/fatigue.        Excessive thirst   HENT: Negative for congestion.    Respiratory: Negative for cough, sputum production, shortness of breath and stridor.    Cardiovascular: Negative for chest pain, palpitations and leg swelling.   Gastrointestinal: Positive for abdominal pain and nausea. Negative for constipation, diarrhea and vomiting.   Genitourinary: Positive for frequency. Negative for dysuria and urgency.   Musculoskeletal: Negative for falls and myalgias.   Neurological: Negative for dizziness, tingling, loss of consciousness, weakness and headaches.   Psychiatric/Behavioral: Negative for depression and suicidal ideas.   All other systems reviewed and are negative.      Past Medical History   has a past medical history of Bipolar disorder ([facility]) (8/9/2018), Candida vaginitis, Chickenpox, Diabetes 1.5, managed as type 2 ([facility]), DVT (deep venous thrombosis) ([facility]), Dysfunctional uterine bleeding, Fibroids, Herpes, Hypertension, Hypothyroidism due to acquired atrophy of thyroid (1/22/2016), Personal history of venous thrombosis and embolism, Sleep apnea, and Uncontrolled type 2  diabetes mellitus without complication, without long-term current use of insulin ([facility]) (3/12/2015).    Surgical History  None    Family History  family history includes Cancer in her maternal uncle; Hyperlipidemia in her father; Hypertension in her mother; Sleep Apnea in her brother and mother.     Social History   reports that she quit smoking about 2 years ago. Her smoking use included cigarettes. She has a 0.15 pack-year smoking history. She has never used smokeless tobacco. She reports that she does not drink alcohol or use drugs.    Allergies  Allergies   Allergen Reactions   • Bicillin C-R [Penicillin G Proc & Benzathine] Rash     Received inj of Bicillin- reaction was rash. Oral penicillin shows no reaction.   • Ondansetron Hives   • Sulfa Drugs Hives          • Metoclopramide Rash and Vomiting     Rash         Medications  Prior to Admission Medications   Prescriptions Last Dose Informant Patient Reported? Taking?   Biotin w/ Vitamins C & E (HAIR/SKIN/NAILS PO)  Patient Yes No   Sig: Take 1 Tab by mouth every day.   Dapagliflozin-metFORMIN HCl ER 5-1000 MG TABLET SR 24 HR  Patient No No   Sig: Take 1 tablet by mouth 2 Times a Day.   Ibuprofen (IBU PO)   Yes No   Sig: Take  by mouth.   Insulin Degludec 200 UNIT/ML Solution Pen-injector  Patient No No   Sig: Inject 35 Units as instructed every bedtime.   Multiple Vitamin (MULTI VITAMIN PO)  Patient Yes No   Sig: Take 1 Tab by mouth every day.   Probiotic Product (PROBIOTIC ADVANCED PO)  Patient Yes No   Sig: Take 2 Caps by mouth every day.   Semaglutide 1 MG/DOSE Solution Pen-injector  Patient No No   Sig: Inject 1 mg as instructed every 7 days.   fluconazole (DIFLUCAN) 200 MG Tab   No No   Sig: Take 1 Tab by mouth every day.   pioglitazone (ACTOS) 30 MG Tab  Patient No No   Sig: Take 1 Tab by mouth every day.      Facility-Administered Medications: None       Physical Exam  Temp:  [36.1 °C (97 °F)] 36.1 °C (97 °F)  Pulse:  [81-93] 81  Resp:  [18] 18  BP:  (139-149)/() 139/74  SpO2:  [97 %] 97 %    Physical Exam  Vitals signs and nursing note reviewed.   Constitutional:       General: She is not in acute distress.     Appearance: She is well-developed. She is obese. She is not diaphoretic.   HENT:      Head: Normocephalic and atraumatic.      Right Ear: External ear normal.      Left Ear: External ear normal.      Mouth/Throat:      Mouth: Mucous membranes are dry.      Pharynx: No oropharyngeal exudate.   Eyes:      General: No scleral icterus.        Right eye: No discharge.         Left eye: No discharge.      Extraocular Movements: Extraocular movements intact.   Neck:      Musculoskeletal: Normal range of motion and neck supple.      Trachea: No tracheal deviation.   Cardiovascular:      Rate and Rhythm: Normal rate and regular rhythm.      Heart sounds: No murmur. No friction rub. No gallop.    Pulmonary:      Effort: Pulmonary effort is normal. No respiratory distress.      Breath sounds: Normal breath sounds. No stridor. No wheezing or rales.   Chest:      Chest wall: No tenderness.   Abdominal:      General: Bowel sounds are normal. There is no distension.      Palpations: Abdomen is soft.      Tenderness: There is no tenderness.   Musculoskeletal: Normal range of motion.         General: No tenderness.      Right lower leg: No edema.      Left lower leg: No edema.   Lymphadenopathy:      Cervical: No cervical adenopathy.   Skin:     General: Skin is warm and dry.      Coloration: Skin is not jaundiced.      Findings: No erythema or rash.   Neurological:      General: No focal deficit present.      Mental Status: She is alert and oriented to person, place, and time.      Cranial Nerves: No cranial nerve deficit.   Psychiatric:         Mood and Affect: Mood normal.         Behavior: Behavior normal.         Thought Content: Thought content normal.         Judgment: Judgment normal.         Laboratory:  Recent Labs     01/10/20  0147   WBC 10.3   RBC 5.29    HEMOGLOBIN 12.9   HEMATOCRIT 40.3   MCV 76.2*   MCH 24.4*   MCHC 32.0*   RDW 35.8*   PLATELETCT 264   MPV 10.5     Recent Labs     01/10/20  0147   SODIUM 129*   POTASSIUM 4.1   CHLORIDE 92*   CO2 17*   GLUCOSE 768*   BUN 11   CREATININE 0.53   CALCIUM 9.5     Recent Labs     01/10/20  0147   ALTSGPT 19   ASTSGOT 11*   ALKPHOSPHAT 97   TBILIRUBIN 0.2   LIPASE 37   GLUCOSE 768*         No results for input(s): NTPROBNP in the last 72 hours.      No results for input(s): TROPONINT in the last 72 hours.    Urinalysis:    Recent Labs     01/10/20  0125   SPECGRAVITY <=1.005   GLUCOSEUR >=1000*   KETONES Trace*   NITRITE Negative   LEUKESTERAS Small*   WBCURINE 2-5   RBCURINE 2-5*   BACTERIA Few*   EPITHELCELL Few        Imaging:  No orders to display         Assessment/Plan:  I anticipate this patient is appropriate for observation status at this time.    DKA (diabetic ketoacidoses) (HCC)- (present on admission)  Assessment & Plan  -Mild with a CO2 of 17 anion gap of 20  -At this point, I do not feel she needs an insulin drip, I think she will transition with IV fluids and subcutaneous insulin  -She certainly does need better control of her diabetes and she agrees  -Repeat BMP  -Electrolyte supplementation as needed    Dehydration- (present on admission)  Assessment & Plan  -Significant, due to profound hyperglycemia  -Start IV fluids    Yeast infection- (present on admission)  Assessment & Plan  -Patient has been given Diflucan 200 mg once, this needs to be rechecked in 72 hours to see if she needs to be given additional dosing    Diabetes mellitus with hyperglycemia (HCC)- (present on admission)  Assessment & Plan  -Profoundly uncontrolled with glucose greater than 700  -Hold home diabetic medications  -Start insulin sliding scale  -Adjust as needed  -Obtain diabetic education      VTE prophylaxis: Lovenox

## 2020-01-10 NOTE — PROGRESS NOTES
Bedside report received from night RN. Assumed care of patient. Daily plan of care discussed. Pt awake and alert, denies complaints or concerns at this time. Hourly rounding in place.

## 2020-01-10 NOTE — ED PROVIDER NOTES
ED Provider Note    ER Provider Note         CHIEF COMPLAINT  Chief Complaint   Patient presents with   • Pelvic Pain       HPI  Liana Obrien is a 39 y.o. female who presents to the Emergency Department with pelvic pain.  The patient says that she is having burning over the area.  She denies any flank pain.  She says that the pain radiates up into her lower abdomen.  She denies any chest pain or shortness of breath.  She says she has had a history of yeast infections.  She has some cottage cheese discharge she says.  She denies any diarrhea.  There is no dark or bloody stools.  She says she has not been taking her diabetes medications regularly.    REVIEW OF SYSTEMS  See HPI for further details. All other systems are negative.     PAST MEDICAL HISTORY   has a past medical history of Bipolar disorder ([facility]) (2018), Candida vaginitis, Chickenpox, Diabetes 1.5, managed as type 2 ([facility]), DVT (deep venous thrombosis) ([facility]), Dysfunctional uterine bleeding, Fibroids, Herpes, Hypertension, Hypothyroidism due to acquired atrophy of thyroid (2016), Personal history of venous thrombosis and embolism, Sleep apnea, and Uncontrolled type 2 diabetes mellitus without complication, without long-term current use of insulin ([facility]) (3/12/2015).    SURGICAL HISTORY  patient denies any surgical history    SOCIAL HISTORY  Social History     Tobacco Use   • Smoking status: Former Smoker     Packs/day: 0.50     Years: 0.30     Pack years: 0.15     Types: Cigarettes     Last attempt to quit: 2018     Years since quittin.0   • Smokeless tobacco: Never Used   • Tobacco comment: for 3 months at a time on and off    Substance Use Topics   • Alcohol use: No     Alcohol/week: 0.0 oz   • Drug use: No      Social History     Substance and Sexual Activity   Drug Use No       FAMILY HISTORY  Family History   Problem Relation Age of Onset   • Hypertension Mother    • Sleep Apnea Mother    • Hyperlipidemia Father    • Cancer Maternal  "Uncle    • Sleep Apnea Brother    • Diabetes Neg Hx    • Heart Disease Neg Hx    • Stroke Neg Hx        CURRENT MEDICATIONS  Home Medications    **Home medications have not yet been reviewed for this encounter**         ALLERGIES  Allergies   Allergen Reactions   • Bicillin C-R [Penicillin G Proc & Benzathine] Rash     Received inj of Bicillin- reaction was rash. Oral penicillin shows no reaction.   • Ondansetron Hives   • Sulfa Drugs Hives          • Metoclopramide Rash and Vomiting     Rash         PHYSICAL EXAM  VITAL SIGNS: /74   Pulse 81   Temp 36.1 °C (97 °F) (Temporal)   Resp 18   Ht 1.6 m (5' 3\")   Wt 102.5 kg (225 lb 15.5 oz)   SpO2 97%   BMI 40.03 kg/m²      Constitutional: Alert in no apparent distress.  HENT: No signs of trauma, Bilateral external ears normal, Nose normal.   Eyes: Pupils are equal and reactive, Conjunctiva normal, Non-icteric.   Neck: Normal range of motion, No tenderness, Supple, No stridor.   Lymphatic: No lymphadenopathy noted.   Cardiovascular: Regular rate and rhythm, no murmurs.   Thorax & Lungs: Normal breath sounds, No respiratory distress, No wheezing, No chest tenderness.   Abdomen: Bowel sounds normal, Soft, No tenderness, No masses, No pulsatile masses. No peritoneal signs.  Skin: Warm, Dry, No erythema, No rash.   Back: No bony tenderness, No CVA tenderness.   Extremities: Intact distal pulses, No edema, No tenderness, No cyanosis.  Musculoskeletal: Good range of motion in all major joints. No tenderness to palpation or major deformities noted.   Neurologic: Alert , Normal motor function, Normal sensory function, No focal deficits noted.   Psychiatric: Affect normal, Judgment normal, Mood normal.   : Patient was found to have cottage cheese appearing discharge that is all throughout the patient's inguinal skin area as well as in her vagina.  She has an on her cervix.  The office is closed.      DIAGNOSTIC STUDIES / PROCEDURES        LABS  Labs Reviewed   CBC " WITH DIFFERENTIAL - Abnormal; Notable for the following components:       Result Value    MCV 76.2 (*)     MCH 24.4 (*)     MCHC 32.0 (*)     RDW 35.8 (*)     All other components within normal limits   COMP METABOLIC PANEL - Abnormal; Notable for the following components:    Sodium 129 (*)     Chloride 92 (*)     Co2 17 (*)     Anion Gap 20.0 (*)     Glucose 768 (*)     AST(SGOT) 11 (*)     Globulin 3.9 (*)     All other components within normal limits   URINALYSIS,CULTURE IF INDICATED - Abnormal; Notable for the following components:    Character Hazy (*)     Glucose >=1000 (*)     Ketones Trace (*)     Leukocyte Esterase Small (*)     Occult Blood Small (*)     All other components within normal limits   URINE MICROSCOPIC (W/UA) - Abnormal; Notable for the following components:    RBC 2-5 (*)     Bacteria Few (*)     Budding Yeast Present (*)     Hyphae Yeast Present (*)     All other components within normal limits   VENOUS BLOOD GAS - Abnormal; Notable for the following components:    Venous Bg Pco2 36.2 (*)     Venous Bg Po2 64.6 (*)     Venous Bg Hco3 20 (*)     All other components within normal limits   LIPASE   WET PREP   HCG QUAL SERUM   CHLAMYDIA/GC PCR URINE OR SWAB   ESTIMATED GFR       All labs reviewed by me.      COURSE & MEDICAL DECISION MAKING  Pertinent Labs & Imaging studies reviewed. (See chart for details)    This is a 39 y.o. female that presents with what appears to be a significant yeast infection.  I am concerned that she might have out-of-control diabetes and potentially diabetic ketoacidosis.  I will get basic labs as well as a wet prep.  I will check her for pregnancy.  I will evaluate her for abdominal pain with a lipase as well as CMP to evaluate for liver as well as biliary and pancreatic pathologies.  We will reassess after this.    3:13 AM - Patient seen and examined at bedside.     Patient was found to have hyphae on her urinalysis.  This is clear that she has a yeast infection.   In addition her VBG does not show acidosis however she does have a diminished bicarb.  She has significant hyperglycemia at over 700.  I will give her IV fluids as well as subcutaneous insulin.  Her diabetes is severely out-of-control.  I will admit her for this as well as the sequelae of yeast infection that is severe appearing.  I will treat her with antifungals and admit her in guarded condition.    She has some improvement after IV fluids.        FINAL IMPRESSION  1. Hyperglycemia    2. Acidosis    3. Yeast infection              Electronically signed by: Emre Rollins, 1/10/2020

## 2020-01-10 NOTE — CARE PLAN
Problem: Communication  Goal: The ability to communicate needs accurately and effectively will improve  Outcome: PROGRESSING AS EXPECTED  Intervention: [place] patient and significant other/support system to call light to alert staff of needs  Flowsheets (Taken 1/10/2020 1300)  Oriented to:: All of the Following : Location of Bathroom, Visiting Policy, Unit Routine, Call Light and Bedside Controls, Bedside Rail Policy, Smoking Policy, Rights and Responsibilities, Bedside Report, and Patient Education Notebook  Note:   Pt reoriented to unit in AM. Pt instructed to utilize call light to call for assistance as needed. Pt verbalized understanding, calls appropriately. Hourly rounding in place to ensure needs are met.       Problem: Safety  Goal: Will remain free from falls  Outcome: PROGRESSING AS EXPECTED  Intervention: Implement fall precautions  Flowsheets (Taken 1/10/2020 0900)  Environmental Precautions: Treaded Slipper Socks on Patient;Personal Belongings, Wastebasket, Call Bell etc. in Easy Reach;Report Given to Other Health Care Providers Regarding Fall Risk;Bed in Low Position;Mobility Assessed & Appropriate Sign Placed  Note:   Environmental fall precautions and hourly rounding in place. Pt instructed to call for assistance as needed before mobilizing. Pt verbalized understanding and calls appropriately.

## 2020-01-10 NOTE — ED TRIAGE NOTES
Patient BiB  for pelvic pain and discharge for months. Pain got significantly worse in the past 2 days.

## 2020-01-11 VITALS
HEIGHT: 63 IN | RESPIRATION RATE: 18 BRPM | HEART RATE: 75 BPM | OXYGEN SATURATION: 93 % | DIASTOLIC BLOOD PRESSURE: 93 MMHG | SYSTOLIC BLOOD PRESSURE: 152 MMHG | TEMPERATURE: 97.8 F | WEIGHT: 225.97 LBS | BODY MASS INDEX: 40.04 KG/M2

## 2020-01-11 PROBLEM — E86.0 DEHYDRATION: Status: RESOLVED | Noted: 2020-01-10 | Resolved: 2020-01-11

## 2020-01-11 PROBLEM — B37.9 YEAST INFECTION: Status: RESOLVED | Noted: 2020-01-10 | Resolved: 2020-01-11

## 2020-01-11 PROBLEM — E11.10 DKA (DIABETIC KETOACIDOSES): Status: RESOLVED | Noted: 2020-01-10 | Resolved: 2020-01-11

## 2020-01-11 LAB
ANION GAP SERPL CALC-SCNC: 10 MMOL/L (ref 0–11.9)
BASOPHILS # BLD AUTO: 0.5 % (ref 0–1.8)
BASOPHILS # BLD: 0.04 K/UL (ref 0–0.12)
BUN SERPL-MCNC: 7 MG/DL (ref 8–22)
CALCIUM SERPL-MCNC: 8 MG/DL (ref 8.4–10.2)
CHLORIDE SERPL-SCNC: 103 MMOL/L (ref 96–112)
CO2 SERPL-SCNC: 22 MMOL/L (ref 20–33)
CREAT SERPL-MCNC: 0.28 MG/DL (ref 0.5–1.4)
EOSINOPHIL # BLD AUTO: 0.26 K/UL (ref 0–0.51)
EOSINOPHIL NFR BLD: 3.3 % (ref 0–6.9)
ERYTHROCYTE [DISTWIDTH] IN BLOOD BY AUTOMATED COUNT: 36.9 FL (ref 35.9–50)
GLUCOSE BLD-MCNC: 234 MG/DL (ref 65–99)
GLUCOSE BLD-MCNC: 283 MG/DL (ref 65–99)
GLUCOSE SERPL-MCNC: 249 MG/DL (ref 65–99)
HCT VFR BLD AUTO: 36.8 % (ref 37–47)
HGB BLD-MCNC: 11.8 G/DL (ref 12–16)
IMM GRANULOCYTES # BLD AUTO: 0.02 K/UL (ref 0–0.11)
IMM GRANULOCYTES NFR BLD AUTO: 0.3 % (ref 0–0.9)
LYMPHOCYTES # BLD AUTO: 3.02 K/UL (ref 1–4.8)
LYMPHOCYTES NFR BLD: 38.7 % (ref 22–41)
MCH RBC QN AUTO: 24.5 PG (ref 27–33)
MCHC RBC AUTO-ENTMCNC: 32.1 G/DL (ref 33.6–35)
MCV RBC AUTO: 76.5 FL (ref 81.4–97.8)
MONOCYTES # BLD AUTO: 0.59 K/UL (ref 0–0.85)
MONOCYTES NFR BLD AUTO: 7.6 % (ref 0–13.4)
NEUTROPHILS # BLD AUTO: 3.88 K/UL (ref 2–7.15)
NEUTROPHILS NFR BLD: 49.6 % (ref 44–72)
NRBC # BLD AUTO: 0 K/UL
NRBC BLD-RTO: 0 /100 WBC
PLATELET # BLD AUTO: 254 K/UL (ref 164–446)
PMV BLD AUTO: 10.3 FL (ref 9–12.9)
POTASSIUM SERPL-SCNC: 3.4 MMOL/L (ref 3.6–5.5)
RBC # BLD AUTO: 4.81 M/UL (ref 4.2–5.4)
SODIUM SERPL-SCNC: 135 MMOL/L (ref 135–145)
WBC # BLD AUTO: 7.8 K/UL (ref 4.8–10.8)

## 2020-01-11 PROCEDURE — 96372 THER/PROPH/DIAG INJ SC/IM: CPT

## 2020-01-11 PROCEDURE — G0378 HOSPITAL OBSERVATION PER HR: HCPCS

## 2020-01-11 PROCEDURE — 36415 COLL VENOUS BLD VENIPUNCTURE: CPT

## 2020-01-11 PROCEDURE — 700102 HCHG RX REV CODE 250 W/ 637 OVERRIDE(OP): Performed by: INTERNAL MEDICINE

## 2020-01-11 PROCEDURE — A9270 NON-COVERED ITEM OR SERVICE: HCPCS | Performed by: INTERNAL MEDICINE

## 2020-01-11 PROCEDURE — 99217 PR OBSERVATION CARE DISCHARGE: CPT | Performed by: INTERNAL MEDICINE

## 2020-01-11 PROCEDURE — 80048 BASIC METABOLIC PNL TOTAL CA: CPT

## 2020-01-11 PROCEDURE — 85025 COMPLETE CBC W/AUTO DIFF WBC: CPT

## 2020-01-11 PROCEDURE — 82962 GLUCOSE BLOOD TEST: CPT | Mod: 91

## 2020-01-11 RX ORDER — POTASSIUM CHLORIDE 20 MEQ/1
40 TABLET, EXTENDED RELEASE ORAL ONCE
Status: COMPLETED | OUTPATIENT
Start: 2020-01-11 | End: 2020-01-11

## 2020-01-11 RX ORDER — FLUCONAZOLE 150 MG/1
150 TABLET ORAL DAILY
Qty: 1 TAB | Refills: 0 | Status: SHIPPED | OUTPATIENT
Start: 2020-01-11 | End: 2020-01-12

## 2020-01-11 RX ADMIN — INSULIN HUMAN 4 UNITS: 100 INJECTION, SOLUTION PARENTERAL at 06:27

## 2020-01-11 RX ADMIN — INSULIN HUMAN 7 UNITS: 100 INJECTION, SOLUTION PARENTERAL at 11:02

## 2020-01-11 RX ADMIN — DULOXETINE HYDROCHLORIDE 40 MG: 20 CAPSULE, DELAYED RELEASE ORAL at 06:27

## 2020-01-11 RX ADMIN — POTASSIUM CHLORIDE 40 MEQ: 20 TABLET, EXTENDED RELEASE ORAL at 10:01

## 2020-01-11 NOTE — DISCHARGE INSTRUCTIONS
Discharge Instructions    Discharged to home by car with relative. Discharged via wheelchair, hospital escort: Yes.  Special equipment needed: Not Applicable    Be sure to schedule a follow-up appointment with your primary care doctor or any specialists as instructed.     Discharge Plan:   Diet Plan: Discussed  Activity Level: Discussed  Confirmed Follow up Appointment: Patient to Call and Schedule Appointment  Confirmed Symptoms Management: Discussed  Medication Reconciliation Updated: Yes  Influenza Vaccine Indication: Not indicated: Previously immunized this influenza season and > 8 years of age    I understand that a diet low in cholesterol, fat, and sodium is recommended for good health. Unless I have been given specific instructions below for another diet, I accept this instruction as my diet prescription.   Other diet: diabetic    Special Instructions: None    · Is patient discharged on Warfarin / Coumadin?   No     Depression / Suicide Risk    As you are discharged from this [facility], it is important to learn how to keep safe from harming yourself.    Recognize the warning signs:  · Abrupt changes in personality, positive or negative- including increase in energy   · Giving away possessions  · Change in eating patterns- significant weight changes-  positive or negative  · Change in sleeping patterns- unable to sleep or sleeping all the time   · Unwillingness or inability to communicate  · Depression  · Unusual sadness, discouragement and loneliness  · Talk of wanting to die  · Neglect of personal appearance   · Rebelliousness- reckless behavior  · Withdrawal from people/activities they love  · Confusion- inability to concentrate     If you or a loved one observes any of these behaviors or has concerns about self-harm, here's what you can do:  · Talk about it- your feelings and reasons for harming yourself  · Remove any means that you might use to hurt yourself (examples: pills, rope, extension  cords, firearm)  · Get professional help from the community (Mental Health, Substance Abuse, psychological counseling)  · Do not be alone:Call your Safe Contact- someone whom you trust who will be there for you.  · Call your local CRISIS HOTLINE 405-7290 or 971-101-6945  · Call your local Children's Mobile Crisis Response Team [place] (718) 751-5728 or www.Xeneta  · Call the toll free National Suicide Prevention Hotlines   · National Suicide Prevention Lifeline 102-513-POQD (1469)  · National Hope Line Network 800-SUICIDE (394-0059)

## 2020-01-11 NOTE — DISCHARGE SUMMARY
Discharge Summary    CHIEF COMPLAINT ON ADMISSION  Chief Complaint   Patient presents with   • Pelvic Pain       Reason for Admission  Infection     Admission Date  1/10/2020    CODE STATUS  Full Code    HPI & HOSPITAL COURSE  This is a 39 y.o. female here with past medical history of diabetes admitted for uncontrolled blood sugar and yeast infection.  Patient was given fluconazole.  Patient was educated for diabetes control.  Patient was put on diabetic diet and insulin sliding scale and Lantus and patient's blood sugar better controlled.  Patient was noticed to have mild DKA but quickly resolved with IV fluid rehydration and insulin.  Patient currently stable and recommend patient to be compliant with medication at home for diabetes control and patient agreed.  Patient is medically stable and ready to be discharged home and follow-up as outpatient.       Therefore, she is discharged in fair and stable condition to home with close outpatient follow-up.    The patient recovered much more quickly than anticipated on admission.    Discharge Date  1/11/2020    FOLLOW UP ITEMS POST DISCHARGE  none    DISCHARGE DIAGNOSES  Active Problems:    Diabetes mellitus with hyperglycemia (HCC) POA: Yes  Resolved Problems:    DKA (diabetic ketoacidoses) (HCC) POA: Yes    Yeast infection POA: Yes    Dehydration POA: Yes      FOLLOW UP  Follow-up with PCP as needed.  MEDICATIONS ON DISCHARGE     Medication List      START taking these medications      Instructions   fluconazole 150 MG tablet  Commonly known as:  DIFLUCAN   Take 1 Tab by mouth every day for 1 day.  Dose:  150 mg     Insulin Syringes U-100 0.5 mL   Doctor's comments:  Use one syringe to inject insulin three times daily.  Use one syringe to inject insulin three times daily.        CHANGE how you take these medications      Instructions   insulin glargine 100 UNIT/ML Sopn injection  What changed:  See the new instructions.  Commonly known as:  insulin glargine   Inject  25 Units as instructed every evening.  Dose:  25 Units        CONTINUE taking these medications      Instructions   DULoxetine 20 MG Cpep  Commonly known as:  CYMBALTA   Take 40 mg by mouth every day. Indications: Major Depressive Disorder  Dose:  40 mg     metFORMIN 500 MG Tabs  Commonly known as:  GLUCOPHAGE   Take 1,000 mg by mouth 2 times a day, with meals. Indications: Type 2 Diabetes  Dose:  1,000 mg        STOP taking these medications    Insulin Degludec 200 UNIT/ML Sopn     pioglitazone 30 MG Tabs  Commonly known as:  ACTOS     Semaglutide (1 MG/DOSE) 2 MG/1.5ML Sopn     XIGDUO XR 5-1000 MG Tb24  Generic drug:  Dapagliflozin-metFORMIN HCl ER            Allergies  Allergies   Allergen Reactions   • Bicillin C-R [Penicillin G Proc & Benzathine] Rash     Received inj of Bicillin- reaction was rash. Oral penicillin shows no reaction.   • Ondansetron Hives   • Sulfa Drugs Hives          • Metoclopramide Rash and Vomiting     Rash         DIET  Orders Placed This Encounter   Procedures   • Diet Order Diabetic     Standing Status:   Standing     Number of Occurrences:   1     Order Specific Question:   Diet:     Answer:   Diabetic [3]       ACTIVITY  As tolerated.  Weight bearing as tolerated    CONSULTATIONS  none    PROCEDURES  None    No orders to display     PE:  Gen: AAOx3, NAD  Eyes: PELLA  Neck: no JVD, no lymphadenopathy  Cardia: RRR, no mrg  Lungs: CTAB, no rales, rhonci or wheezing  Abd: NABS, soft, non extended, no mass  EXT: No C/C/E, peripheral pulse 2+ b/l  Neuro: CN II-XII intact, non focal, reflex 2+ symmetrical  Skin: Intact, no lesion, warm  Psych: Appropriate.      LABORATORY  Lab Results   Component Value Date    SODIUM 135 01/11/2020    POTASSIUM 3.4 (L) 01/11/2020    CHLORIDE 103 01/11/2020    CO2 22 01/11/2020    GLUCOSE 249 (H) 01/11/2020    BUN 7 (L) 01/11/2020    CREATININE 0.28 (L) 01/11/2020        Lab Results   Component Value Date    WBC 7.8 01/11/2020    HEMOGLOBIN 11.8 (L)  01/11/2020    HEMATOCRIT 36.8 (L) 01/11/2020    PLATELETCT 254 01/11/2020        Total time of the discharge process exceeds 38 minutes.

## 2020-01-12 NOTE — CARE PLAN
A Discharge instructions reviewed and given to patient pt does not have further questions at this time. Patient understands when to seek immediate medical attention. Re-enforced to patient importance of taking medication as prescribed. Di Voids without difficulty. Discharged to  per w/c at 1418

## 2020-02-10 ENCOUNTER — PATIENT MESSAGE (OUTPATIENT)
Dept: HEALTH INFORMATION MANAGEMENT | Facility: OTHER | Age: 40
End: 2020-02-10

## 2020-02-21 ENCOUNTER — APPOINTMENT (OUTPATIENT)
Dept: RADIOLOGY | Facility: MEDICAL CENTER | Age: 40
End: 2020-02-21
Attending: EMERGENCY MEDICINE
Payer: MEDICAID

## 2020-02-21 ENCOUNTER — HOSPITAL ENCOUNTER (EMERGENCY)
Facility: MEDICAL CENTER | Age: 40
End: 2020-02-21
Attending: EMERGENCY MEDICINE
Payer: MEDICAID

## 2020-02-21 VITALS
SYSTOLIC BLOOD PRESSURE: 147 MMHG | BODY MASS INDEX: 40.75 KG/M2 | OXYGEN SATURATION: 97 % | HEIGHT: 63 IN | TEMPERATURE: 98.4 F | DIASTOLIC BLOOD PRESSURE: 78 MMHG | WEIGHT: 230 LBS | RESPIRATION RATE: 16 BRPM | HEART RATE: 88 BPM
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
## 2020-02-21 DIAGNOSIS — S00.83XA FACIAL CONTUSION, INITIAL ENCOUNTER: ICD-10-CM

## 2020-02-21 PROCEDURE — 70355 PANORAMIC X-RAY OF JAWS: CPT

## 2020-02-21 PROCEDURE — 99284 EMERGENCY DEPT VISIT MOD MDM: CPT

## 2020-02-21 PROCEDURE — A9270 NON-COVERED ITEM OR SERVICE: HCPCS | Performed by: EMERGENCY MEDICINE

## 2020-02-21 PROCEDURE — 700102 HCHG RX REV CODE 250 W/ 637 OVERRIDE(OP): Performed by: EMERGENCY MEDICINE

## 2020-02-21 RX ORDER — IBUPROFEN 600 MG/1
600 TABLET ORAL ONCE
Status: COMPLETED | OUTPATIENT
Start: 2020-02-21 | End: 2020-02-21

## 2020-02-21 RX ORDER — CYCLOBENZAPRINE HCL 10 MG
10 TABLET ORAL 3 TIMES DAILY PRN
Qty: 30 TAB | Refills: 0 | Status: SHIPPED | OUTPATIENT
Start: 2020-02-21 | End: 2020-09-13

## 2020-02-21 RX ORDER — NAPROXEN 500 MG/1
500 TABLET ORAL EVERY 12 HOURS PRN
Qty: 20 TAB | Refills: 0 | Status: SHIPPED | OUTPATIENT
Start: 2020-02-21 | End: 2020-09-13

## 2020-02-21 RX ADMIN — IBUPROFEN 600 MG: 600 TABLET ORAL at 10:19

## 2020-02-21 NOTE — ED NOTES
To d/c patient and patient needs ride home. Attempted to call MTM for patient but MTM did not find patient. Notified social work patient attempting to find ride home.
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
## 2020-04-13 NOTE — TELEPHONE ENCOUNTER
Was the patient seen in the last year in this department? Yes    Does patient have an active prescription for medications requested? No     Received Request Via: Pharmacy      Pt met protocol?: No   Last ov 7/2019 has no upcoming appt   Lab Results   Component Value Date/Time    HBA1C 12.4 (H) 05/09/2019 05:00 AM     Lab Results   Component Value Date/Time    SODIUM 135 01/11/2020 04:38 AM    POTASSIUM 3.4 (L) 01/11/2020 04:38 AM    CHLORIDE 103 01/11/2020 04:38 AM    CO2 22 01/11/2020 04:38 AM    GLUCOSE 249 (H) 01/11/2020 04:38 AM    BUN 7 (L) 01/11/2020 04:38 AM    CREATININE 0.28 (L) 01/11/2020 04:38 AM

## 2020-09-13 ENCOUNTER — HOSPITAL ENCOUNTER (EMERGENCY)
Facility: MEDICAL CENTER | Age: 40
End: 2020-09-13
Attending: EMERGENCY MEDICINE
Payer: MEDICAID

## 2020-09-13 VITALS
RESPIRATION RATE: 18 BRPM | SYSTOLIC BLOOD PRESSURE: 122 MMHG | HEART RATE: 83 BPM | OXYGEN SATURATION: 98 % | DIASTOLIC BLOOD PRESSURE: 72 MMHG | WEIGHT: 214.07 LBS | BODY MASS INDEX: 37.93 KG/M2 | TEMPERATURE: 97.5 F | HEIGHT: 63 IN

## 2020-09-13 DIAGNOSIS — N76.4 LABIAL ABSCESS: ICD-10-CM

## 2020-09-13 DIAGNOSIS — L02.91 ABSCESS: ICD-10-CM

## 2020-09-13 LAB
ALBUMIN SERPL BCP-MCNC: 3.7 G/DL (ref 3.2–4.9)
ALBUMIN/GLOB SERPL: 0.9 G/DL
ALP SERPL-CCNC: 90 U/L (ref 30–99)
ALT SERPL-CCNC: 11 U/L (ref 2–50)
ANION GAP SERPL CALC-SCNC: 11 MMOL/L (ref 7–16)
ANISOCYTOSIS BLD QL SMEAR: ABNORMAL
AST SERPL-CCNC: 10 U/L (ref 12–45)
BASOPHILS # BLD AUTO: 0.8 % (ref 0–1.8)
BASOPHILS # BLD: 0.1 K/UL (ref 0–0.12)
BILIRUB SERPL-MCNC: 0.3 MG/DL (ref 0.1–1.5)
BUN SERPL-MCNC: 7 MG/DL (ref 8–22)
CALCIUM SERPL-MCNC: 8.7 MG/DL (ref 8.4–10.2)
CHLORIDE SERPL-SCNC: 103 MMOL/L (ref 96–112)
CO2 SERPL-SCNC: 21 MMOL/L (ref 20–33)
COMMENT 1642: NORMAL
CREAT SERPL-MCNC: 0.59 MG/DL (ref 0.5–1.4)
EOSINOPHIL # BLD AUTO: 0.27 K/UL (ref 0–0.51)
EOSINOPHIL NFR BLD: 2.2 % (ref 0–6.9)
ERYTHROCYTE [DISTWIDTH] IN BLOOD BY AUTOMATED COUNT: 47.5 FL (ref 35.9–50)
GLOBULIN SER CALC-MCNC: 4.1 G/DL (ref 1.9–3.5)
GLUCOSE SERPL-MCNC: 339 MG/DL (ref 65–99)
HCT VFR BLD AUTO: 33.7 % (ref 37–47)
HGB BLD-MCNC: 9 G/DL (ref 12–16)
IMM GRANULOCYTES # BLD AUTO: 0.06 K/UL (ref 0–0.11)
IMM GRANULOCYTES NFR BLD AUTO: 0.5 % (ref 0–0.9)
LYMPHOCYTES # BLD AUTO: 2.39 K/UL (ref 1–4.8)
LYMPHOCYTES NFR BLD: 19.5 % (ref 22–41)
MCH RBC QN AUTO: 17.9 PG (ref 27–33)
MCHC RBC AUTO-ENTMCNC: 26.7 G/DL (ref 33.6–35)
MCV RBC AUTO: 67.1 FL (ref 81.4–97.8)
MICROCYTES BLD QL SMEAR: ABNORMAL
MONOCYTES # BLD AUTO: 0.95 K/UL (ref 0–0.85)
MONOCYTES NFR BLD AUTO: 7.7 % (ref 0–13.4)
NEUTROPHILS # BLD AUTO: 8.5 K/UL (ref 2–7.15)
NEUTROPHILS NFR BLD: 69.3 % (ref 44–72)
NRBC # BLD AUTO: 0 K/UL
NRBC BLD-RTO: 0 /100 WBC
PLATELET # BLD AUTO: 354 K/UL (ref 164–446)
PLATELET BLD QL SMEAR: NORMAL
PMV BLD AUTO: 9.6 FL (ref 9–12.9)
POLYCHROMASIA BLD QL SMEAR: NORMAL
POTASSIUM SERPL-SCNC: 4.2 MMOL/L (ref 3.6–5.5)
PROT SERPL-MCNC: 7.8 G/DL (ref 6–8.2)
RBC # BLD AUTO: 5.02 M/UL (ref 4.2–5.4)
RBC BLD AUTO: PRESENT
SODIUM SERPL-SCNC: 135 MMOL/L (ref 135–145)
WBC # BLD AUTO: 12.3 K/UL (ref 4.8–10.8)

## 2020-09-13 PROCEDURE — 700111 HCHG RX REV CODE 636 W/ 250 OVERRIDE (IP): Performed by: EMERGENCY MEDICINE

## 2020-09-13 PROCEDURE — 80053 COMPREHEN METABOLIC PANEL: CPT

## 2020-09-13 PROCEDURE — 96376 TX/PRO/DX INJ SAME DRUG ADON: CPT

## 2020-09-13 PROCEDURE — 96367 TX/PROPH/DG ADDL SEQ IV INF: CPT

## 2020-09-13 PROCEDURE — 36415 COLL VENOUS BLD VENIPUNCTURE: CPT

## 2020-09-13 PROCEDURE — 85025 COMPLETE CBC W/AUTO DIFF WBC: CPT

## 2020-09-13 PROCEDURE — 700105 HCHG RX REV CODE 258: Performed by: EMERGENCY MEDICINE

## 2020-09-13 PROCEDURE — 700101 HCHG RX REV CODE 250

## 2020-09-13 PROCEDURE — 700101 HCHG RX REV CODE 250: Performed by: EMERGENCY MEDICINE

## 2020-09-13 PROCEDURE — 96375 TX/PRO/DX INJ NEW DRUG ADDON: CPT

## 2020-09-13 PROCEDURE — 99284 EMERGENCY DEPT VISIT MOD MDM: CPT

## 2020-09-13 PROCEDURE — 96368 THER/DIAG CONCURRENT INF: CPT

## 2020-09-13 PROCEDURE — 56420 I&D BARTHOLINS GLAND ABSCESS: CPT

## 2020-09-13 PROCEDURE — 96365 THER/PROPH/DIAG IV INF INIT: CPT

## 2020-09-13 RX ORDER — ATORVASTATIN CALCIUM 20 MG/1
20 TABLET, FILM COATED ORAL NIGHTLY
Status: SHIPPED | COMMUNITY
End: 2021-10-27

## 2020-09-13 RX ORDER — LISINOPRIL 10 MG/1
5 TABLET ORAL DAILY
Status: SHIPPED | COMMUNITY
End: 2021-10-27

## 2020-09-13 RX ORDER — CLINDAMYCIN HYDROCHLORIDE 300 MG/1
300 CAPSULE ORAL 4 TIMES DAILY
Qty: 28 CAP | Refills: 0 | Status: SHIPPED | OUTPATIENT
Start: 2020-09-13 | End: 2020-09-20

## 2020-09-13 RX ORDER — LIDOCAINE HYDROCHLORIDE 10 MG/ML
20 INJECTION, SOLUTION INFILTRATION; PERINEURAL ONCE
Status: COMPLETED | OUTPATIENT
Start: 2020-09-13 | End: 2020-09-13

## 2020-09-13 RX ORDER — MEDROXYPROGESTERONE ACETATE 10 MG/1
10 TABLET ORAL DAILY
Status: SHIPPED | COMMUNITY
End: 2021-11-29 | Stop reason: SDUPTHER

## 2020-09-13 RX ORDER — FLUCONAZOLE 150 MG/1
150 TABLET ORAL PRN
Status: SHIPPED | COMMUNITY
Start: 2020-07-20 | End: 2020-09-21

## 2020-09-13 RX ORDER — ACETAMINOPHEN 500 MG
1000 TABLET ORAL EVERY 6 HOURS PRN
Status: SHIPPED | COMMUNITY
End: 2021-10-27

## 2020-09-13 RX ORDER — CLINDAMYCIN PHOSPHATE 600 MG/50ML
600 INJECTION, SOLUTION INTRAVENOUS ONCE
Status: COMPLETED | OUTPATIENT
Start: 2020-09-13 | End: 2020-09-13

## 2020-09-13 RX ORDER — HYDROCODONE BITARTRATE AND ACETAMINOPHEN 7.5; 325 MG/1; MG/1
1 TABLET ORAL EVERY 6 HOURS PRN
Qty: 10 TAB | Refills: 0 | Status: SHIPPED | OUTPATIENT
Start: 2020-09-13 | End: 2020-09-16

## 2020-09-13 RX ORDER — INSULIN GLARGINE 100 [IU]/ML
36 INJECTION, SOLUTION SUBCUTANEOUS 2 TIMES DAILY
Status: SHIPPED | COMMUNITY
End: 2021-10-27

## 2020-09-13 RX ORDER — MORPHINE SULFATE 4 MG/ML
4 INJECTION, SOLUTION INTRAMUSCULAR; INTRAVENOUS ONCE
Status: COMPLETED | OUTPATIENT
Start: 2020-09-13 | End: 2020-09-13

## 2020-09-13 RX ORDER — SODIUM CHLORIDE 9 MG/ML
1000 INJECTION, SOLUTION INTRAVENOUS ONCE
Status: COMPLETED | OUTPATIENT
Start: 2020-09-13 | End: 2020-09-13

## 2020-09-13 RX ADMIN — METRONIDAZOLE 500 MG: 500 INJECTION, SOLUTION INTRAVENOUS at 17:46

## 2020-09-13 RX ADMIN — CEFTRIAXONE SODIUM 2 G: 2 INJECTION, POWDER, FOR SOLUTION INTRAMUSCULAR; INTRAVENOUS at 16:43

## 2020-09-13 RX ADMIN — CLINDAMYCIN IN 5 PERCENT DEXTROSE 600 MG: 12 INJECTION, SOLUTION INTRAVENOUS at 16:44

## 2020-09-13 RX ADMIN — LIDOCAINE HYDROCHLORIDE 20 ML: 10 INJECTION, SOLUTION INFILTRATION; PERINEURAL at 12:45

## 2020-09-13 RX ADMIN — SODIUM CHLORIDE 1000 ML: 9 INJECTION, SOLUTION INTRAVENOUS at 12:48

## 2020-09-13 RX ADMIN — MORPHINE SULFATE 4 MG: 4 INJECTION INTRAVENOUS at 12:48

## 2020-09-13 RX ADMIN — MORPHINE SULFATE 4 MG: 4 INJECTION INTRAVENOUS at 17:35

## 2020-09-13 ASSESSMENT — FIBROSIS 4 INDEX: FIB4 SCORE: 0.4

## 2020-09-13 NOTE — ED NOTES
Water provided po per pt request, pt same, no coverage needed for previous blood surgar per erp due to ns infusion. -update to pt who inquired about same

## 2020-09-13 NOTE — ED TRIAGE NOTES
"C/O a developing abscess affecting \"the left side\" of her vagina, worsening for the past 2 days.  Chief Complaint   Patient presents with   • Abscess   • Vaginal Pain   /79   Pulse 89   Temp 36.4 °C (97.5 °F) (Temporal)   Resp 18   Ht 1.6 m (5' 3\")   Wt 97.1 kg (214 lb 1.1 oz)   SpO2 98%   BMI 37.92 kg/m²       "

## 2020-09-13 NOTE — DISCHARGE INSTRUCTIONS
1.  Antibiotics as directed  2.  Return in 2 days for reevaluation  3.  Monitor blood sugars closely

## 2020-09-13 NOTE — ED PROVIDER NOTES
ED Provider Note    CHIEF COMPLAINT  Chief Complaint   Patient presents with   • Abscess   • Vaginal Pain       HPI  Liana Obrien is a 40 y.o. female who presents evaluation of vaginal pain.  Patient states over the last couple days she is developed pain and swelling in the left perivaginal area.  The patient does have a history of diabetes and states her blood sugars have been running high between 300 and 400.  She denies recent: Fever, chills, URI symptoms, cardiorespiratory symptoms, gastrointestinal symptoms, vaginal discharge.  She does relate a history of uterine fibroids states she has had little bleeding.  No other acute symptomatology or complaints.    REVIEW OF SYSTEMS  See HPI for further details. All other systems negative.    PAST MEDICAL HISTORY  Past Medical History:   Diagnosis Date   • Bipolar disorder ([facility]) 8/9/2018   • Candida vaginitis    • Chickenpox    • Diabetes 1.5, managed as type 2 ([facility])    • DVT (deep venous thrombosis) ([facility])    • Dysfunctional uterine bleeding    • Fibroids    • Herpes    • Hypertension    • Hypothyroidism due to acquired atrophy of thyroid 1/22/2016   • Personal history of venous thrombosis and embolism     DVT in BLE years ago   • Sleep apnea    • Uncontrolled type 2 diabetes mellitus without complication, without long-term current use of insulin 3/12/2015       FAMILY HISTORY  Family History   Problem Relation Age of Onset   • Hypertension Mother    • Sleep Apnea Mother    • Hyperlipidemia Father    • Cancer Maternal Uncle    • Sleep Apnea Brother    • Diabetes Neg Hx    • Heart Disease Neg Hx    • Stroke Neg Hx        SOCIAL HISTORY  Resides locally;    SURGICAL HISTORY  History reviewed. No pertinent surgical history.    CURRENT MEDICATIONS  See nurse's notes    ALLERGIES  Allergies   Allergen Reactions   • Bicillin C-R [Penicillin G Proc & Benzathine] Rash     Received inj of Bicillin- reaction was rash. Oral penicillin shows no reaction.   • Ondansetron  "Hives   • Sulfa Drugs Hives          • Metoclopramide Rash and Vomiting     Rash         PHYSICAL EXAM  VITAL SIGNS: /79   Pulse 89   Temp 36.4 °C (97.5 °F) (Temporal)   Resp 18   Ht 1.6 m (5' 3\")   Wt 97.1 kg (214 lb 1.1 oz)   SpO2 98%   BMI 37.92 kg/m²    Constitutional: 40-year-old female, obese, awake, oriented x3 well developed, Non-toxic appearance.   HENT: ,Atraumatic, Bilateral external ears normal, tympanic membranes clear, Oropharynx moist, No oral exudates, Nose normal.   Eyes: PERRL, EOMI, Conjunctiva normal, No discharge.   Neck: Normal range of motion, No tenderness, Supple, No stridor.   Lymphatic: No lymphadenopathy noted.   Cardiovascular: Normal heart rate, Normal rhythm, No murmurs, No rubs, No gallops.   Thorax & Lungs: Normal Equal breath sounds, No respiratory distress, No wheezing, no stridor, no rales. No chest tenderness.   Abdomen: Soft, nontender, nondistended, no organomegaly, positive bowel sounds normal in quality. No guarding or rebound.  Skin: Good skin turgor, pink, warm, dry. No rashes, petechiae, purpura. Normal capillary refill.   Back: No tenderness, No CVA tenderness.   Genitalia: Performed with a female nurse escort at the bedside; the patient has swelling of her left labial area with induration identified; the patient actually has spontaneous purulent drainage over the lateral aspect of the labia;  Extremities: Intact distal pulses, No edema, No tenderness, No cyanosis, No clubbing. Vascular: Pulses are 2+, symmetric in the upper and lower extremities.  Neurologic: Alert & oriented x 3, Normal motor function, Normal sensory function, No gross focal deficits noted.   Psychiatric: Affect normal, Judgment normal, Mood normal.     RADIOLOGY/PROCEDURES  1.  Incision and drainage    COURSE & MEDICAL DECISION MAKING  Pertinent Labs & Imaging studies reviewed. (See chart for details)  1.  IV normal saline 1 L bolus  2.  Morphine 4 mg IV  3.  Clindamycin 60 mg " IV    Laboratory studies: CBC shows white count of 12.3, 69% neutrophils, 19% lymphocytes, 7% monocytes, hemoglobin 9.0, crit 33.7; CMP shows a glucose of 339, otherwise within normal;    Procedure note: The patient was placed in the lithotomy position.  The vaginal area was cleansed with Betadine prep.  As I felt this represented a Bartholin cyst abscess, I initiated a stab wound over the inner labial mucosal area.  I was unable to express any purulent material.  Therefore, I then cleansed the area laterally where the spontaneous drainage was occurring and placed a stab wound in this area.  Purulent drainage was expressed.  I probed the area deeply with a scalpel and sharp scissors to identify and break up any potential loculations.  There is not a lot of drainage but this appears to be mostly a phlegmon.  I then placed 1/4 inch iodoform gauze where the spontaneous drainage was occurring and I placed a stab wound.  I did not feel a Word catheter would be beneficial, though I had available, as this may not represent a Bartholin cyst abscess, though clinically that was the high area of suspicion.  The area was cleansed and dressed    Discussion: At this time, the patient presents with swelling of the left perivaginal area.  I was suspicious of a Bartholin cyst abscess but using a #11 blade and a stab wound in the area did not identify any purulent material.  Patient is quite obese and may be that the abscess formation is more lateral to the normal location.  Therefore, identified the area of the spontaneous drainage laterally and placed iodoform gauze after incision and drainage.  Patient does have associated diabetes with some hyperglycemia but no significant electrolyte abnormalities.  This was treated with IV fluid therapy.  The patient was given a dose of IV antibiotics.  She will be placed on oral antibiotics also.  I have asked her to return in 2 days for reevaluation.  At that time, the patient's phlegmon may  coalesce into a more localized abscess.  Hopefully, the patient will respond to the treatment initiated along with oral antibiotics.  I have discussed the findings and treatment plan with the patient.  She indicates she is comfortable with this explanation disposition.    FINAL IMPRESSION  1. Abscess Acute   2.      Incision and drainage of abscess       PLAN  1.  Appropriate discharge instructions given  2.  Clindamycin 300 mg every 6 hours  3.  Lortab 7.5 mg #10  4.  Return in 2 days for reevaluation    Electronically signed by: Guy G Gansert, M.D., 9/13/2020 12:12 PM

## 2020-09-13 NOTE — ED NOTES
Med rec updated and complete  Allergies reviewed  Pt reports she was not sure the strengths of her medications.  Called [facility] @ [phone] to verify all prescription medications.

## 2020-09-14 NOTE — ED NOTES
Pt rounding, med for pain per request. Iv antibx continue, call light in reach, aware of pending dc

## 2020-09-14 NOTE — ED NOTES
Dc instructions and prescriptions reviewed with pt. To f/u with pcp, return for worsening s/s. Aware of no driving due to meds recvd in er and while taking narcs.

## 2020-09-15 ENCOUNTER — HOSPITAL ENCOUNTER (EMERGENCY)
Facility: MEDICAL CENTER | Age: 40
End: 2020-09-15
Attending: EMERGENCY MEDICINE
Payer: MEDICAID

## 2020-09-15 VITALS
RESPIRATION RATE: 18 BRPM | BODY MASS INDEX: 38.52 KG/M2 | SYSTOLIC BLOOD PRESSURE: 144 MMHG | TEMPERATURE: 97.4 F | HEART RATE: 93 BPM | WEIGHT: 217.37 LBS | HEIGHT: 63 IN | OXYGEN SATURATION: 97 % | DIASTOLIC BLOOD PRESSURE: 70 MMHG

## 2020-09-15 DIAGNOSIS — N76.4 LABIAL ABSCESS: ICD-10-CM

## 2020-09-15 DIAGNOSIS — Z51.89 ENCOUNTER FOR WOUND RE-CHECK: ICD-10-CM

## 2020-09-15 LAB — GLUCOSE BLD-MCNC: 285 MG/DL (ref 65–99)

## 2020-09-15 PROCEDURE — 303977 HCHG I & D

## 2020-09-15 PROCEDURE — 700101 HCHG RX REV CODE 250: Performed by: EMERGENCY MEDICINE

## 2020-09-15 PROCEDURE — 99282 EMERGENCY DEPT VISIT SF MDM: CPT

## 2020-09-15 PROCEDURE — 82962 GLUCOSE BLOOD TEST: CPT

## 2020-09-15 RX ORDER — LIDOCAINE HYDROCHLORIDE AND EPINEPHRINE BITARTRATE 20; .01 MG/ML; MG/ML
10 INJECTION, SOLUTION SUBCUTANEOUS ONCE
Status: COMPLETED | OUTPATIENT
Start: 2020-09-15 | End: 2020-09-15

## 2020-09-15 RX ADMIN — LIDOCAINE HYDROCHLORIDE,EPINEPHRINE BITARTRATE 10 ML: 20; .01 INJECTION, SOLUTION INFILTRATION; PERINEURAL at 21:45

## 2020-09-15 ASSESSMENT — FIBROSIS 4 INDEX: FIB4 SCORE: 0.34

## 2020-09-15 NOTE — Clinical Note
Liana Obrien was seen and treated in our emergency department on 9/15/2020.  She may return to work on 09/17/2020.       If you have any questions or concerns, please don't hesitate to call.      Catrachita Castellanos M.D.

## 2020-09-16 NOTE — ED NOTES
Pt provided with discharge paper work. Declines any questions at this time. Pt to ambulate out of ER.

## 2020-09-16 NOTE — ED PROVIDER NOTES
"ED Provider Note    This patient was cared for during the COVID-19 pandemic. History and physical exam may be limited/truncated by the inherent challenges of PPE and the need to decrease staff exposure to novel coronavirus. Some aspects of disease management may be different to protect staff and help slow the spread of disease. I verified that, if possible, the patient was wearing a mask and I was wearing appropriate PPE every time I encountered the patient.       CHIEF COMPLAINT  Chief Complaint   Patient presents with   • Wound Check     Reports L labial abcess. Seen here on , stating \"I think I needed to come back so that they could change the dressing\". Denies fevers.       HPI  Liana Obrien is a 40 y.o. female who presents for wound recheck.  She was seen 2 days ago for a labial abscess and had a packing placed.  She has had these abscesses before this is only the second time is happened in her groin she gets them under her armpits.  In the past she has been referred to wound care.      REVIEW OF SYSTEMS  Positive for labial abscess, negative for fevers.     PAST MEDICAL HISTORY   has a past medical history of Bipolar disorder ([facility]) (2018), Candida vaginitis, Chickenpox, Diabetes 1.5, managed as type 2 ([facility]), DVT (deep venous thrombosis) ([facility]), Dysfunctional uterine bleeding, Fibroids, Herpes, Hypertension, Hypothyroidism due to acquired atrophy of thyroid (2016), Personal history of venous thrombosis and embolism, Sleep apnea, and Uncontrolled type 2 diabetes mellitus without complication, without long-term current use of insulin (3/12/2015).    SOCIAL HISTORY  Social History     Tobacco Use   • Smoking status: Former Smoker     Packs/day: 0.50     Years: 0.30     Pack years: 0.15     Types: Cigarettes     Quit date:      Years since quittin.7   • Smokeless tobacco: Never Used   • Tobacco comment: for 3 months at a time on and off    Substance and Sexual Activity   • Alcohol " "use: No     Alcohol/week: 0.0 oz   • Drug use: No   • Sexual activity: Yes     Partners: Male       SURGICAL HISTORY  patient denies any surgical history    CURRENT MEDICATIONS  Home Medications    **Home medications have not yet been reviewed for this encounter**         ALLERGIES  Allergies   Allergen Reactions   • Bicillin C-R [Penicillin G Proc & Benzathine] Rash     Received inj of Bicillin- reaction was rash. Oral penicillin shows no reaction.   • Ondansetron Hives   • Sulfa Drugs Hives          • Metoclopramide Rash and Vomiting     Rash         PHYSICAL EXAM  VITAL SIGNS: /70   Pulse 93   Temp 36.3 °C (97.4 °F) (Temporal)   Resp 18   Ht 1.6 m (5' 3\")   Wt 98.6 kg (217 lb 6 oz)   LMP  (LMP Unknown)   SpO2 97%   BMI 38.51 kg/m²     Constitutional: Alert in no apparent distress. Well apearing  HENT: Normocephalic, Atraumatic, Bilateral external ears normal. Nose normal.   Eyes:  Conjunctiva normal, non-icteric.   Lungs: Non-labored respirations  Skin: Warm, Dry, No erythema, No rash.   Neurologic: Alert, Grossly non-focal.   Psychiatric: Affect normal, Judgment normal, Mood normal, Appears appropriate and not intoxicated.   : There is a large area of induration on the left labia.  Packing was removed    DIAGNOSTIC STUDIES / PROCEDURES      COURSE & MEDICAL DECISION MAKING  Pertinent Labs & Imaging studies reviewed. (See chart for details)    This is a 40-year-old who presents for recheck of her labial abscess.  This was incised and drained 2 days ago.  I remove the packing there was a large amount of packing in the left labial abscess.  There does not appear to be a significant amount of erythema or purulent drainage at this time.  However the area still quite indurated.  I anesthetized the area with 2% lidocaine with epinephrine and made a second incision approximately 1/2 cm above the previous incision and placed a vessel loop.    Patient's blood sugar is in the 200s.  I talked her at length " about diabetic control with diet.  I advised her to cut out all sugars and carbs.  She has poorly controlled diabetes which is making willing from this quite difficult.  She is on antibiotics and will continue this.  I have placed a referral for wound care for this as well she is instructed to follow-up with her primary care doctor in the wound care center for recurrent wound care.  If she is unable to do that she should return to the emergency department.  She is agreeable to this plan will be discharged.     The patient will return for new or worsening symptoms and is stable at the time of discharge. Patient was given return precautions. Patient and/or family member verbalizes understanding and will comply.    DISPOSITION:  Patient will be discharged home in stable condition.    FOLLOW UP:  Wound Care Center  [address]  [facility] [id number] [phone]        [facility], Emergency Dept  [address]  [facility] [id number] [phone]    Return for worsening pain, fevers or other concerns.  If you are unable to follow-up with wound care or your primary care doctor in 2 to 3 days for a wound check please return here for wound check.        OUTPATIENT MEDICATIONS:  New Prescriptions    No medications on file       FINAL IMPRESSION  1. Labial abscess  REFERRAL TO WOUND CLINIC   2. Encounter for wound re-check         2.   3.     This dictation has been creating using voice recognition software. The accuracy of the dictation is limited the abilities of the software.  I expect there may be some errors of grammar and possibly content. I made every attempt to manually correct the errors within my dictation. However errors related to this voice recognition software may still exist and should be interpreted within the appropriate context.        The note accurately reflects work and decisions made by me.  Catrachita Castellanos M.D.  9/15/2020  10:23 PM

## 2020-09-21 ENCOUNTER — OFFICE VISIT (OUTPATIENT)
Dept: WOUND CARE | Facility: MEDICAL CENTER | Age: 40
End: 2020-09-21
Attending: EMERGENCY MEDICINE
Payer: MEDICAID

## 2020-09-21 VITALS
HEART RATE: 82 BPM | RESPIRATION RATE: 20 BRPM | OXYGEN SATURATION: 99 % | SYSTOLIC BLOOD PRESSURE: 100 MMHG | TEMPERATURE: 98.7 F | DIASTOLIC BLOOD PRESSURE: 60 MMHG

## 2020-09-21 DIAGNOSIS — L08.9 WOUND INFECTION: ICD-10-CM

## 2020-09-21 DIAGNOSIS — R52 PAIN ASSOCIATED WITH WOUND: ICD-10-CM

## 2020-09-21 DIAGNOSIS — S31.109A NONHEALING OPEN WOUND OF GROIN: Primary | ICD-10-CM

## 2020-09-21 DIAGNOSIS — Z98.890 S/P EXCISIONAL DEBRIDEMENT: ICD-10-CM

## 2020-09-21 DIAGNOSIS — T14.8XXA PAIN ASSOCIATED WITH WOUND: ICD-10-CM

## 2020-09-21 DIAGNOSIS — T14.8XXA WOUND INFECTION: ICD-10-CM

## 2020-09-21 PROCEDURE — 11042 DBRDMT SUBQ TIS 1ST 20SQCM/<: CPT

## 2020-09-21 PROCEDURE — 99213 OFFICE O/P EST LOW 20 MIN: CPT | Mod: 25 | Performed by: NURSE PRACTITIONER

## 2020-09-21 PROCEDURE — 11042 DBRDMT SUBQ TIS 1ST 20SQCM/<: CPT | Performed by: NURSE PRACTITIONER

## 2020-09-21 PROCEDURE — 99213 OFFICE O/P EST LOW 20 MIN: CPT

## 2020-09-21 RX ORDER — LEVOTHYROXINE SODIUM 0.03 MG/1
TABLET ORAL
COMMUNITY
Start: 2020-09-14 | End: 2021-10-27

## 2020-09-21 RX ORDER — ATORVASTATIN CALCIUM 20 MG/1
TABLET, FILM COATED ORAL
COMMUNITY
Start: 2020-09-14 | End: 2021-10-27

## 2020-09-21 RX ORDER — PEN NEEDLE, DIABETIC 29 G X1/2"
NEEDLE, DISPOSABLE MISCELLANEOUS
COMMUNITY
Start: 2020-07-21 | End: 2021-10-27

## 2020-09-21 RX ORDER — LISINOPRIL 10 MG/1
TABLET ORAL
COMMUNITY
Start: 2020-09-14 | End: 2021-10-27

## 2020-09-21 RX ORDER — HYDROCODONE BITARTRATE AND ACETAMINOPHEN 5; 325 MG/1; MG/1
1 TABLET ORAL EVERY 4 HOURS PRN
Qty: 12 TAB | Refills: 0 | Status: SHIPPED | OUTPATIENT
Start: 2020-09-21 | End: 2020-09-23

## 2020-09-21 ASSESSMENT — ENCOUNTER SYMPTOMS
FEVER: 0
HEADACHES: 0
WHEEZING: 0
CHILLS: 0
COUGH: 0
DIZZINESS: 0
DIARRHEA: 0
BLURRED VISION: 0
DOUBLE VISION: 0
NAUSEA: 0
VOMITING: 0
CONSTIPATION: 0
PALPITATIONS: 0
SHORTNESS OF BREATH: 0
WEAKNESS: 0

## 2020-09-21 NOTE — PATIENT INSTRUCTIONS
-Keep dressings clean, dry and covered while bathing. Change dressings if they become over saturated, soiled or fall off; or once in between clinic visits.     -Never walk around the house barefoot. Always wear a rubber soled slipper when walking around the house.    -Should you experience any significant changes in your wound(s), such as infection (redness, swelling, localized heat, increased pain, fever > 101 F, chills) or have any questions regarding your home care instructions, please contact the wound center at [phone]. If after hours, contact your primary care physician or go to the hospital emergency room.
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
## 2020-09-24 ENCOUNTER — OFFICE VISIT (OUTPATIENT)
Dept: WOUND CARE | Facility: MEDICAL CENTER | Age: 40
End: 2020-09-24
Attending: EMERGENCY MEDICINE
Payer: MEDICAID

## 2020-09-24 DIAGNOSIS — S31.109A NONHEALING OPEN WOUND OF GROIN: ICD-10-CM

## 2020-09-24 PROCEDURE — 97602 WOUND(S) CARE NON-SELECTIVE: CPT

## 2020-09-28 ENCOUNTER — OFFICE VISIT (OUTPATIENT)
Dept: WOUND CARE | Facility: MEDICAL CENTER | Age: 40
End: 2020-09-28
Attending: EMERGENCY MEDICINE
Payer: MEDICAID

## 2020-09-28 VITALS
HEART RATE: 78 BPM | SYSTOLIC BLOOD PRESSURE: 115 MMHG | DIASTOLIC BLOOD PRESSURE: 61 MMHG | TEMPERATURE: 97.3 F | RESPIRATION RATE: 16 BRPM | OXYGEN SATURATION: 98 %

## 2020-09-28 DIAGNOSIS — S31.109A NONHEALING OPEN WOUND OF GROIN: Primary | ICD-10-CM

## 2020-09-28 DIAGNOSIS — L08.9 WOUND INFECTION: ICD-10-CM

## 2020-09-28 DIAGNOSIS — T14.8XXA PAIN ASSOCIATED WITH WOUND: ICD-10-CM

## 2020-09-28 DIAGNOSIS — R52 PAIN ASSOCIATED WITH WOUND: ICD-10-CM

## 2020-09-28 DIAGNOSIS — Z98.890 S/P EXCISIONAL DEBRIDEMENT: ICD-10-CM

## 2020-09-28 DIAGNOSIS — T14.8XXA WOUND INFECTION: ICD-10-CM

## 2020-09-28 PROCEDURE — 11042 DBRDMT SUBQ TIS 1ST 20SQCM/<: CPT | Performed by: NURSE PRACTITIONER

## 2020-09-28 PROCEDURE — 11042 DBRDMT SUBQ TIS 1ST 20SQCM/<: CPT

## 2020-09-28 ASSESSMENT — ENCOUNTER SYMPTOMS
SHORTNESS OF BREATH: 0
PALPITATIONS: 0
WEAKNESS: 0
COUGH: 0
FEVER: 0
DIARRHEA: 0
HEADACHES: 0
CONSTIPATION: 0
NAUSEA: 0
BLURRED VISION: 0
DIZZINESS: 0
VOMITING: 0
CHILLS: 0
WHEEZING: 0
DOUBLE VISION: 0

## 2020-09-28 ASSESSMENT — PAIN SCALES - GENERAL: PAINLEVEL: NO PAIN
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
## 2020-10-01 ENCOUNTER — APPOINTMENT (OUTPATIENT)
Dept: WOUND CARE | Facility: MEDICAL CENTER | Age: 40
End: 2020-10-01
Attending: EMERGENCY MEDICINE
Payer: MEDICAID

## 2020-10-05 ENCOUNTER — APPOINTMENT (OUTPATIENT)
Dept: WOUND CARE | Facility: MEDICAL CENTER | Age: 40
End: 2020-10-05
Attending: EMERGENCY MEDICINE
Payer: MEDICAID

## 2020-10-08 ENCOUNTER — APPOINTMENT (OUTPATIENT)
Dept: WOUND CARE | Facility: MEDICAL CENTER | Age: 40
End: 2020-10-08
Attending: EMERGENCY MEDICINE
Payer: MEDICAID

## 2020-10-08 VITALS
HEART RATE: 67 BPM | TEMPERATURE: 98 F | RESPIRATION RATE: 20 BRPM | OXYGEN SATURATION: 98 % | DIASTOLIC BLOOD PRESSURE: 85 MMHG | SYSTOLIC BLOOD PRESSURE: 142 MMHG

## 2020-10-08 DIAGNOSIS — R52 PAIN ASSOCIATED WITH WOUND: ICD-10-CM

## 2020-10-08 DIAGNOSIS — T14.8XXA PAIN ASSOCIATED WITH WOUND: ICD-10-CM

## 2020-10-08 DIAGNOSIS — Z98.890 S/P EXCISIONAL DEBRIDEMENT: ICD-10-CM

## 2020-10-08 DIAGNOSIS — S31.109A NONHEALING OPEN WOUND OF GROIN: Primary | ICD-10-CM

## 2020-10-08 DIAGNOSIS — L08.9 WOUND INFECTION: ICD-10-CM

## 2020-10-08 DIAGNOSIS — T14.8XXA WOUND INFECTION: ICD-10-CM

## 2020-10-08 PROCEDURE — 11042 DBRDMT SUBQ TIS 1ST 20SQCM/<: CPT

## 2020-10-08 PROCEDURE — 11042 DBRDMT SUBQ TIS 1ST 20SQCM/<: CPT | Performed by: NURSE PRACTITIONER

## 2020-10-08 RX ORDER — DOXYCYCLINE HYCLATE 100 MG/1
100 CAPSULE ORAL 2 TIMES DAILY
Qty: 14 EACH | Refills: 0 | Status: SHIPPED | OUTPATIENT
Start: 2020-10-08 | End: 2020-10-15

## 2020-10-08 ASSESSMENT — ENCOUNTER SYMPTOMS
COUGH: 0
DOUBLE VISION: 0
WHEEZING: 0
PALPITATIONS: 0
FEVER: 0
SHORTNESS OF BREATH: 0
DIARRHEA: 0
CHILLS: 0
WEAKNESS: 0
DIZZINESS: 0
HEADACHES: 0
CONSTIPATION: 0
VOMITING: 0
BLURRED VISION: 0
NAUSEA: 0

## 2020-10-08 ASSESSMENT — PAIN SCALES - GENERAL: PAINLEVEL: NO PAIN
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
## 2020-10-12 ENCOUNTER — APPOINTMENT (OUTPATIENT)
Dept: WOUND CARE | Facility: MEDICAL CENTER | Age: 40
End: 2020-10-12
Attending: EMERGENCY MEDICINE
Payer: MEDICAID

## 2020-10-13 ENCOUNTER — APPOINTMENT (OUTPATIENT)
Dept: WOUND CARE | Facility: MEDICAL CENTER | Age: 40
End: 2020-10-13
Attending: EMERGENCY MEDICINE
Payer: MEDICAID

## 2020-10-14 ENCOUNTER — OFFICE VISIT (OUTPATIENT)
Dept: WOUND CARE | Facility: MEDICAL CENTER | Age: 40
End: 2020-10-14
Attending: EMERGENCY MEDICINE
Payer: MEDICAID

## 2020-10-14 VITALS
HEART RATE: 71 BPM | DIASTOLIC BLOOD PRESSURE: 77 MMHG | TEMPERATURE: 97.9 F | RESPIRATION RATE: 20 BRPM | SYSTOLIC BLOOD PRESSURE: 119 MMHG | OXYGEN SATURATION: 98 %

## 2020-10-14 DIAGNOSIS — T14.8XXA WOUND INFECTION: ICD-10-CM

## 2020-10-14 DIAGNOSIS — Z98.890 S/P EXCISIONAL DEBRIDEMENT: ICD-10-CM

## 2020-10-14 DIAGNOSIS — L08.9 WOUND INFECTION: ICD-10-CM

## 2020-10-14 DIAGNOSIS — R52 PAIN ASSOCIATED WITH WOUND: ICD-10-CM

## 2020-10-14 DIAGNOSIS — S31.109A NONHEALING OPEN WOUND OF GROIN: Primary | ICD-10-CM

## 2020-10-14 DIAGNOSIS — T14.8XXA PAIN ASSOCIATED WITH WOUND: ICD-10-CM

## 2020-10-14 PROCEDURE — 99213 OFFICE O/P EST LOW 20 MIN: CPT | Performed by: NURSE PRACTITIONER

## 2020-10-14 PROCEDURE — 99213 OFFICE O/P EST LOW 20 MIN: CPT

## 2020-10-14 ASSESSMENT — ENCOUNTER SYMPTOMS
CHILLS: 0
BLURRED VISION: 0
SHORTNESS OF BREATH: 0
COUGH: 0
PALPITATIONS: 0
DIZZINESS: 0
DOUBLE VISION: 0
WEAKNESS: 0
HEADACHES: 0
DIARRHEA: 0
VOMITING: 0
CONSTIPATION: 0
FEVER: 0
WHEEZING: 0
NAUSEA: 0

## 2020-10-14 ASSESSMENT — PAIN SCALES - GENERAL: PAINLEVEL: NO PAIN
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
## 2020-10-15 ENCOUNTER — APPOINTMENT (OUTPATIENT)
Dept: WOUND CARE | Facility: MEDICAL CENTER | Age: 40
End: 2020-10-15
Attending: EMERGENCY MEDICINE
Payer: MEDICAID

## 2020-10-19 ENCOUNTER — APPOINTMENT (OUTPATIENT)
Dept: WOUND CARE | Facility: MEDICAL CENTER | Age: 40
End: 2020-10-19
Attending: EMERGENCY MEDICINE
Payer: MEDICAID

## 2020-10-26 ENCOUNTER — APPOINTMENT (OUTPATIENT)
Dept: WOUND CARE | Facility: MEDICAL CENTER | Age: 40
End: 2020-10-26
Attending: EMERGENCY MEDICINE
Payer: MEDICAID

## 2020-12-27 ENCOUNTER — HOSPITAL ENCOUNTER (EMERGENCY)
Facility: MEDICAL CENTER | Age: 40
End: 2020-12-27
Attending: EMERGENCY MEDICINE
Payer: MEDICAID

## 2020-12-27 VITALS
RESPIRATION RATE: 16 BRPM | HEART RATE: 80 BPM | SYSTOLIC BLOOD PRESSURE: 159 MMHG | HEIGHT: 63 IN | BODY MASS INDEX: 37.77 KG/M2 | TEMPERATURE: 97 F | DIASTOLIC BLOOD PRESSURE: 102 MMHG | OXYGEN SATURATION: 98 % | WEIGHT: 213.19 LBS

## 2020-12-27 DIAGNOSIS — B37.31 YEAST INFECTION OF THE VAGINA: ICD-10-CM

## 2020-12-27 LAB
APPEARANCE UR: CLEAR
BILIRUB UR QL STRIP.AUTO: NEGATIVE
COLOR UR: YELLOW
GLUCOSE UR STRIP.AUTO-MCNC: >=1000 MG/DL
HCG UR QL: NEGATIVE
KETONES UR STRIP.AUTO-MCNC: 15 MG/DL
LEUKOCYTE ESTERASE UR QL STRIP.AUTO: NEGATIVE
MICRO URNS: ABNORMAL
NITRITE UR QL STRIP.AUTO: NEGATIVE
PH UR STRIP.AUTO: 5.5 [PH] (ref 5–8)
PROT UR QL STRIP: NEGATIVE MG/DL
RBC UR QL AUTO: NEGATIVE
SP GR UR STRIP.AUTO: 1.01

## 2020-12-27 PROCEDURE — A9270 NON-COVERED ITEM OR SERVICE: HCPCS | Performed by: EMERGENCY MEDICINE

## 2020-12-27 PROCEDURE — 87491 CHLMYD TRACH DNA AMP PROBE: CPT

## 2020-12-27 PROCEDURE — 81025 URINE PREGNANCY TEST: CPT

## 2020-12-27 PROCEDURE — 81003 URINALYSIS AUTO W/O SCOPE: CPT

## 2020-12-27 PROCEDURE — 87591 N.GONORRHOEAE DNA AMP PROB: CPT

## 2020-12-27 PROCEDURE — 700102 HCHG RX REV CODE 250 W/ 637 OVERRIDE(OP): Performed by: EMERGENCY MEDICINE

## 2020-12-27 PROCEDURE — 99284 EMERGENCY DEPT VISIT MOD MDM: CPT

## 2020-12-27 RX ORDER — FLUCONAZOLE 200 MG/1
200 TABLET ORAL ONCE
Status: COMPLETED | OUTPATIENT
Start: 2020-12-27 | End: 2020-12-27

## 2020-12-27 RX ORDER — FLUCONAZOLE 150 MG/1
TABLET ORAL
Qty: 2 TAB | Refills: 0 | Status: SHIPPED | OUTPATIENT
Start: 2020-12-27 | End: 2021-10-27

## 2020-12-27 RX ADMIN — FLUCONAZOLE 200 MG: 200 TABLET ORAL at 09:57

## 2020-12-27 ASSESSMENT — FIBROSIS 4 INDEX: FIB4 SCORE: 0.34

## 2020-12-27 NOTE — ED NOTES
"Pt states that she thinks she has a UTI and a yeast infection x2 weeks. She states that she has not tried any OTC medications and denies any abd pain, fevers, SOB, CP or COVID contact. She states that her PCP gave her a prescription (not sure what it was) for her symptoms but she states it only helped for a \"about a day\". She states that she has a hx of blood clots and diabetes but denies any other medical hx   "

## 2020-12-27 NOTE — ED TRIAGE NOTES
"Chief Complaint   Patient presents with   • Painful Urination     x two weeks ago, reports has been on medication x 1 week for UTI, reports is not feeling better     BP (!) 168/112   Pulse 98   Temp 36.2 °C (97.1 °F) (Temporal)   Resp 16   Ht 1.6 m (5' 3\")   Wt 96.7 kg (213 lb 3 oz)   SpO2 95%   BMI 37.76 kg/m²     Covid Screen Negative    "

## 2020-12-27 NOTE — ED PROVIDER NOTES
"ED Provider Note    CHIEF COMPLAINT  Chief Complaint   Patient presents with   • Painful Urination     x two weeks ago, reports has been on medication x 1 week for UTI, reports is not feeling better       HPI  Liana Obrien is a 40 y.o. female who presents with dysuria and pain to the groin especially when urinating.  She is concerned that she might have a yeast infection and or a UTI as she has had several in the past.  She does note that she has difficulty controlling her sugars.  No fevers.  No hematuria or flank pain.  Denies nausea or vomiting.  She did take 1 dose of fluconazole recently for concerns of possible yeast infection.  She notes some whitish discharge.  Denies any pelvic pain except for on the surface on the skin as it feels \"raw\".    REVIEW OF SYSTEMS  See HPI for further details. All other systems are negative.     PAST MEDICAL HISTORY   has a past medical history of Bipolar disorder ([facility]) (2018), Candida vaginitis, Chickenpox, Diabetes 1.5, managed as type 2 ([facility]), DVT (deep venous thrombosis) ([facility]), Dysfunctional uterine bleeding, Fibroids, Herpes, Hypertension, Hypothyroidism due to acquired atrophy of thyroid (2016), Personal history of venous thrombosis and embolism, Sleep apnea, and Uncontrolled type 2 diabetes mellitus without complication, without long-term current use of insulin (3/12/2015).    SOCIAL HISTORY  Social History     Tobacco Use   • Smoking status: Former Smoker     Packs/day: 0.50     Years: 0.30     Pack years: 0.15     Types: Cigarettes     Quit date: 2018     Years since quittin.9   • Smokeless tobacco: Never Used   • Tobacco comment: for 3 months at a time on and off    Substance and Sexual Activity   • Alcohol use: No     Alcohol/week: 0.0 oz   • Drug use: No   • Sexual activity: Yes     Partners: Male       SURGICAL HISTORY  patient denies any surgical history    CURRENT MEDICATIONS  Home Medications    **Home medications have not yet been reviewed " "for this encounter**         ALLERGIES  Allergies   Allergen Reactions   • Bicillin C-R [Penicillin G Proc & Benzathine] Rash     Received inj of Bicillin- reaction was rash. Oral penicillin shows no reaction.   • Ondansetron Hives   • Penicillin G Hives   • Sulfa Drugs Hives          • Metoclopramide Rash and Vomiting     Rash         PHYSICAL EXAM  VITAL SIGNS: BP (!) 168/112   Pulse 98   Temp 36.2 °C (97.1 °F) (Temporal)   Resp 16   Ht 1.6 m (5' 3\")   Wt 96.7 kg (213 lb 3 oz)   SpO2 95%   BMI 37.76 kg/m²   Pulse ox interpretation: I interpret this pulse ox as normal.  Constitutional: Alert in no apparent distress.  HENT: No signs of trauma, Bilateral external ears normal, Nose normal.   Cardiovascular: Regular rate and rhythm.   Thorax & Lungs: Normal breath sounds, No respiratory distress  Abdomen: Bowel sounds normal, Soft, No tenderness, No masses, No pulsatile masses. No peritoneal signs.  : External genitalia exam performed.  Diffuse erythema and tenderness to the labia.  Has some white discharge from the vaginal introitus.  Skin: Warm, Dry, erythema and scant tenderness to the vulva without crepitance or induration.   Back: No bony tenderness, No CVA tenderness.   Extremities: Intact distal pulses, No edema, No tenderness, No cyanosis      DIAGNOSTIC STUDIES / PROCEDURES      LABS  Labs Reviewed   URINALYSIS,CULTURE IF INDICATED - Abnormal; Notable for the following components:       Result Value    Glucose >=1000 (*)     Ketones 15 (*)     All other components within normal limits    Narrative:     Indication for culture:->Patient WITHOUT an indwelling Mojica  catheter in place with new onset of Dysuria, Frequency,  Urgency, and/or Suprapubic pain   HCG QUALITATIVE UR    Narrative:     Indication for culture:->Patient WITHOUT an indwelling Mojica  catheter in place with new onset of Dysuria, Frequency,  Urgency, and/or Suprapubic pain   CHLAMYDIA/GC PCR URINE OR SWAB    Narrative:     Indication for " "culture:->Patient WITHOUT an indwelling Mojica  catheter in place with new onset of Dysuria, Frequency,  Urgency, and/or Suprapubic pain         RADIOLOGY  No orders to display         COURSE & MEDICAL DECISION MAKING    Medications   fluconazole (DIFLUCAN) tablet 200 mg (200 mg Oral Given 12/27/20 0957)       Pertinent Labs & Imaging studies reviewed. (See chart for details)  40 y.o. female with a history of poorly controlled diabetes presenting with dysuria.  No evidence of urinary tract infection on urinalysis.  Glucose in the urine is consistent with the patient's known hyperglycemia.  Encouraged to follow-up with a primary care physician for further glucose management.  She does have evidence of candidal vulvovaginitis likely related to the patient's poor glycemic control.  She was given a dose of fluconazole here.  Given a prescription for 2 more doses to be taken at 72 hours apart if needed.  To follow-up with primary care physician for further management.    The patient will not drink alcohol nor drive with prescribed medications.   The patient was instructed to follow-up with primary care physician for further management.  To return immediately for any worsening symptoms or development of any other concerning signs or symptoms. The patient verbalizes understanding in their own words.    BP (!) 168/112   Pulse 98   Temp 36.2 °C (97.1 °F) (Temporal)   Resp 16   Ht 1.6 m (5' 3\")   Wt 96.7 kg (213 lb 3 oz)   SpO2 95%   BMI 37.76 kg/m²     The patient was referred to primary care where they will receive further BP management.      Edda Nice D.O.  [address] [facility] [id number] [phone]    Schedule an appointment as soon as possible for a visit       [facility], Emergency Dept  [address]  [facility] [id number] [phone]    If symptoms worsen, As needed      FINAL IMPRESSION  1. Yeast infection of the vagina            Electronically signed by: " Alex Mendoza M.D., 12/27/2020 9:01 AM

## 2020-12-27 NOTE — ED NOTES
Pelvic exam performed by ERP with female chaperone at the bedside. Privacy maintained. Pt tolerated procedure well. Pt assisted from lithotomy to supine position after procedure. Warm blanket provided. Call light within reach.

## 2021-02-17 ENCOUNTER — APPOINTMENT (OUTPATIENT)
Dept: RADIOLOGY | Facility: MEDICAL CENTER | Age: 41
End: 2021-02-17
Attending: EMERGENCY MEDICINE
Payer: MEDICAID

## 2021-02-17 ENCOUNTER — HOSPITAL ENCOUNTER (EMERGENCY)
Facility: MEDICAL CENTER | Age: 41
End: 2021-02-17
Attending: EMERGENCY MEDICINE | Admitting: EMERGENCY MEDICINE
Payer: MEDICAID

## 2021-02-17 VITALS
TEMPERATURE: 98.1 F | DIASTOLIC BLOOD PRESSURE: 80 MMHG | HEIGHT: 63 IN | OXYGEN SATURATION: 99 % | SYSTOLIC BLOOD PRESSURE: 132 MMHG | HEART RATE: 75 BPM | WEIGHT: 225.31 LBS | BODY MASS INDEX: 39.92 KG/M2 | RESPIRATION RATE: 16 BRPM

## 2021-02-17 DIAGNOSIS — S90.122A: ICD-10-CM

## 2021-02-17 DIAGNOSIS — R73.9 HYPERGLYCEMIA: ICD-10-CM

## 2021-02-17 LAB
ALBUMIN SERPL BCP-MCNC: 3.7 G/DL (ref 3.2–4.9)
ALBUMIN/GLOB SERPL: 0.9 G/DL
ALP SERPL-CCNC: 113 U/L (ref 30–99)
ALT SERPL-CCNC: 16 U/L (ref 2–50)
ANION GAP SERPL CALC-SCNC: 13 MMOL/L (ref 7–16)
APPEARANCE UR: CLEAR
AST SERPL-CCNC: 9 U/L (ref 12–45)
B-OH-BUTYR SERPL-MCNC: <0.2 MMOL/L (ref 0.02–0.27)
BACTERIA #/AREA URNS HPF: NEGATIVE /HPF
BASE EXCESS BLDV CALC-SCNC: -2 MMOL/L
BASOPHILS # BLD AUTO: 0.7 % (ref 0–1.8)
BASOPHILS # BLD: 0.08 K/UL (ref 0–0.12)
BILIRUB SERPL-MCNC: 0.2 MG/DL (ref 0.1–1.5)
BILIRUB UR QL STRIP.AUTO: NEGATIVE
BODY TEMPERATURE: ABNORMAL CENTIGRADE
BUN SERPL-MCNC: 9 MG/DL (ref 8–22)
CALCIUM SERPL-MCNC: 9.4 MG/DL (ref 8.4–10.2)
CHLORIDE SERPL-SCNC: 94 MMOL/L (ref 96–112)
CO2 SERPL-SCNC: 23 MMOL/L (ref 20–33)
COLOR UR: YELLOW
CREAT SERPL-MCNC: 0.54 MG/DL (ref 0.5–1.4)
EOSINOPHIL # BLD AUTO: 0.31 K/UL (ref 0–0.51)
EOSINOPHIL NFR BLD: 2.6 % (ref 0–6.9)
EPI CELLS #/AREA URNS HPF: NEGATIVE /HPF
ERYTHROCYTE [DISTWIDTH] IN BLOOD BY AUTOMATED COUNT: 42.1 FL (ref 35.9–50)
EST. AVERAGE GLUCOSE BLD GHB EST-MCNC: 410 MG/DL
GLOBULIN SER CALC-MCNC: 3.9 G/DL (ref 1.9–3.5)
GLUCOSE BLD-MCNC: 383 MG/DL (ref 65–99)
GLUCOSE BLD-MCNC: 412 MG/DL (ref 65–99)
GLUCOSE BLD-MCNC: >600 MG/DL (ref 65–99)
GLUCOSE SERPL-MCNC: 627 MG/DL (ref 65–99)
GLUCOSE UR STRIP.AUTO-MCNC: >=1000 MG/DL
HBA1C MFR BLD: 15.9 % (ref 0–5.6)
HCO3 BLDV-SCNC: 23 MMOL/L (ref 24–28)
HCT VFR BLD AUTO: 39.4 % (ref 37–47)
HGB BLD-MCNC: 12.7 G/DL (ref 12–16)
IMM GRANULOCYTES # BLD AUTO: 0.04 K/UL (ref 0–0.11)
IMM GRANULOCYTES NFR BLD AUTO: 0.3 % (ref 0–0.9)
KETONES UR STRIP.AUTO-MCNC: NEGATIVE MG/DL
LEUKOCYTE ESTERASE UR QL STRIP.AUTO: NEGATIVE
LYMPHOCYTES # BLD AUTO: 3.25 K/UL (ref 1–4.8)
LYMPHOCYTES NFR BLD: 27.4 % (ref 22–41)
MAGNESIUM SERPL-MCNC: 1.9 MG/DL (ref 1.5–2.5)
MCH RBC QN AUTO: 25.4 PG (ref 27–33)
MCHC RBC AUTO-ENTMCNC: 32.2 G/DL (ref 33.6–35)
MCV RBC AUTO: 78.8 FL (ref 81.4–97.8)
MICRO URNS: ABNORMAL
MONOCYTES # BLD AUTO: 0.79 K/UL (ref 0–0.85)
MONOCYTES NFR BLD AUTO: 6.6 % (ref 0–13.4)
NEUTROPHILS # BLD AUTO: 7.41 K/UL (ref 2–7.15)
NEUTROPHILS NFR BLD: 62.4 % (ref 44–72)
NITRITE UR QL STRIP.AUTO: NEGATIVE
NRBC # BLD AUTO: 0 K/UL
NRBC BLD-RTO: 0 /100 WBC
OSMOLALITY SERPL: 306 MOSM/KG H2O (ref 278–298)
PCO2 BLDV: 40.9 MMHG (ref 41–51)
PH BLDV: 7.37 [PH] (ref 7.31–7.45)
PH UR STRIP.AUTO: 6 [PH] (ref 5–8)
PHOSPHATE SERPL-MCNC: 4.2 MG/DL (ref 2.5–4.5)
PLATELET # BLD AUTO: 299 K/UL (ref 164–446)
PMV BLD AUTO: 10.4 FL (ref 9–12.9)
PO2 BLDV: 31.4 MMHG (ref 25–40)
POTASSIUM SERPL-SCNC: 4.8 MMOL/L (ref 3.6–5.5)
PROT SERPL-MCNC: 7.6 G/DL (ref 6–8.2)
PROT UR QL STRIP: NEGATIVE MG/DL
RBC # BLD AUTO: 5 M/UL (ref 4.2–5.4)
RBC # URNS HPF: ABNORMAL /HPF
RBC UR QL AUTO: ABNORMAL
SAO2 % BLDV: 56.9 %
SODIUM SERPL-SCNC: 130 MMOL/L (ref 135–145)
SP GR UR STRIP.AUTO: <=1.005
TROPONIN T SERPL-MCNC: <6 NG/L (ref 6–19)
WBC # BLD AUTO: 11.9 K/UL (ref 4.8–10.8)
WBC #/AREA URNS HPF: ABNORMAL /HPF

## 2021-02-17 PROCEDURE — 83036 HEMOGLOBIN GLYCOSYLATED A1C: CPT

## 2021-02-17 PROCEDURE — A9270 NON-COVERED ITEM OR SERVICE: HCPCS | Performed by: EMERGENCY MEDICINE

## 2021-02-17 PROCEDURE — 82962 GLUCOSE BLOOD TEST: CPT

## 2021-02-17 PROCEDURE — 81001 URINALYSIS AUTO W/SCOPE: CPT

## 2021-02-17 PROCEDURE — 83735 ASSAY OF MAGNESIUM: CPT

## 2021-02-17 PROCEDURE — 85025 COMPLETE CBC W/AUTO DIFF WBC: CPT

## 2021-02-17 PROCEDURE — 80053 COMPREHEN METABOLIC PANEL: CPT

## 2021-02-17 PROCEDURE — 82010 KETONE BODYS QUAN: CPT

## 2021-02-17 PROCEDURE — 96372 THER/PROPH/DIAG INJ SC/IM: CPT

## 2021-02-17 PROCEDURE — 84484 ASSAY OF TROPONIN QUANT: CPT

## 2021-02-17 PROCEDURE — 93005 ELECTROCARDIOGRAM TRACING: CPT | Performed by: EMERGENCY MEDICINE

## 2021-02-17 PROCEDURE — 84100 ASSAY OF PHOSPHORUS: CPT

## 2021-02-17 PROCEDURE — 99284 EMERGENCY DEPT VISIT MOD MDM: CPT

## 2021-02-17 PROCEDURE — 700102 HCHG RX REV CODE 250 W/ 637 OVERRIDE(OP): Performed by: EMERGENCY MEDICINE

## 2021-02-17 PROCEDURE — 73630 X-RAY EXAM OF FOOT: CPT | Mod: LT

## 2021-02-17 PROCEDURE — 83930 ASSAY OF BLOOD OSMOLALITY: CPT

## 2021-02-17 PROCEDURE — 700105 HCHG RX REV CODE 258: Performed by: EMERGENCY MEDICINE

## 2021-02-17 PROCEDURE — 82803 BLOOD GASES ANY COMBINATION: CPT

## 2021-02-17 RX ORDER — ACETAMINOPHEN 325 MG/1
650 TABLET ORAL ONCE
Status: COMPLETED | OUTPATIENT
Start: 2021-02-17 | End: 2021-02-17

## 2021-02-17 RX ORDER — SODIUM CHLORIDE 9 MG/ML
1000 INJECTION, SOLUTION INTRAVENOUS ONCE
Status: COMPLETED | OUTPATIENT
Start: 2021-02-17 | End: 2021-02-17

## 2021-02-17 RX ADMIN — INSULIN HUMAN 10 UNITS: 100 INJECTION, SOLUTION PARENTERAL at 04:26

## 2021-02-17 RX ADMIN — SODIUM CHLORIDE 1000 ML: 9 INJECTION, SOLUTION INTRAVENOUS at 02:33

## 2021-02-17 RX ADMIN — SODIUM CHLORIDE 1000 ML: 9 INJECTION, SOLUTION INTRAVENOUS at 03:45

## 2021-02-17 RX ADMIN — ACETAMINOPHEN 650 MG: 325 TABLET, FILM COATED ORAL at 05:00

## 2021-02-17 ASSESSMENT — FIBROSIS 4 INDEX: FIB4 SCORE: 0.35

## 2021-02-17 NOTE — ED PROVIDER NOTES
ED Provider Note    CHIEF COMPLAINT  Chief Complaint   Patient presents with   • High Blood Sugar   • Foot Pain       HPI  Liana Obrien is a 41 y.o. female who presents for evaluation of high blood sugar readings which the patient states have been persistent for a while.  She notes that yesterday her blood sugar was in the 400s but feels increasingly weak and is having high urine output.  She states she is taking her long-acting insulin but is not taking any other medications, including her Metformin.  She notes also that she stubbed her left second toe yesterday and has pain in that area.  She notes no fevers, chills, nausea, vomiting, diarrhea, chest pain, shortness of breath, dysuria or hematuria.    REVIEW OF SYSTEMS  Constitutional: No fevers or chills  Skin: No rashes  HEENT: No sore throat, runny nose, sores, trouble swallowing, trouble speaking.  Neck: No neck pain, stiffness, or masses.  Chest: No pain or rashes  Pulm: No shortness of breath, cough, wheezing, stridor, or pain with inspiration/expiration  Gastrointestinal: No nausea, vomiting, diarrhea, constipation, bloating, melena, hematochezia or abdominal pain.  Genitourinary: No dysuria or hematuria  Musculoskeletal: Left second toe pain.  No swelling or focal muscular weakness.  Neurologic: No sensory or motor changes. No confusion or disorientation.  Psych: Nonsuicidal   Heme: No bleeding or bruising problems.   Immuno: No hx of recurrent infections      PAST MEDICAL HISTORY   has a past medical history of Bipolar disorder ([facility]) (8/9/2018), Candida vaginitis, Chickenpox, Diabetes 1.5, managed as type 2 ([facility]), DVT (deep venous thrombosis) ([facility]), Dysfunctional uterine bleeding, Fibroids, Herpes, Hypertension, Hypothyroidism due to acquired atrophy of thyroid (1/22/2016), Personal history of venous thrombosis and embolism, Sleep apnea, and Uncontrolled type 2 diabetes mellitus without complication, without long-term current use of insulin  "(3/12/2015).    SOCIAL HISTORY  Social History     Tobacco Use   • Smoking status: Former Smoker     Packs/day: 0.50     Years: 0.30     Pack years: 0.15     Types: Cigarettes     Quit date: 2018     Years since quitting: 3.1   • Smokeless tobacco: Never Used   • Tobacco comment: for 3 months at a time on and off    Substance and Sexual Activity   • Alcohol use: No     Alcohol/week: 0.0 oz   • Drug use: No   • Sexual activity: Yes     Partners: Male       SURGICAL HISTORY  patient denies any surgical history    CURRENT MEDICATIONS  Home Medications    **Home medications have not yet been reviewed for this encounter**         ALLERGIES  Allergies   Allergen Reactions   • Bicillin C-R [Penicillin G Proc & Benzathine] Rash     Received inj of Bicillin- reaction was rash. Oral penicillin shows no reaction.   • Ondansetron Hives   • Penicillin G Hives   • Sulfa Drugs Hives          • Metoclopramide Rash and Vomiting     Rash         PHYSICAL EXAM  VITAL SIGNS: /84   Pulse 74   Temp 36.7 °C (98.1 °F) (Oral)   Resp 18   Ht 1.6 m (5' 3\")   Wt 102 kg (225 lb 5 oz)   SpO2 98%   BMI 39.91 kg/m²    Gen: Alert in no apparent distress.  HEENT: No signs of trauma, Bilateral external ears normal, Nose normal. Conjunctiva normal, Non-icteric.   Neck:  No tenderness, Supple, No masses  Lymphatic: No cervical lymphadenopathy noted.   Cardiovascular: Regular rate and rhythm, no murmurs.  Capillary refill less than 3 seconds to all extremities, 2+ distal pulses.  Thorax & Lungs: Normal breath sounds, No respiratory distress, No wheezing bilateral chest rise  Abdomen: Bowel sounds normal, Soft, No tenderness, No masses, No pulsatile masses. No Guarding or rebound  Skin: Warm, Dry, No erythema, No rash noted to exposed areas.   Extremities: Intact distal pulses, No edema  Neurologic: Alert , no facial droop, grossly normal coordination and strength  Psychiatric: Affect pleasant      LABS  Results for orders placed or " performed during the hospital encounter of 02/17/21   CBC w/ Differential   Result Value Ref Range    WBC 11.9 (H) 4.8 - 10.8 K/uL    RBC 5.00 4.20 - 5.40 M/uL    Hemoglobin 12.7 12.0 - 16.0 g/dL    Hematocrit 39.4 37.0 - 47.0 %    MCV 78.8 (L) 81.4 - 97.8 fL    MCH 25.4 (L) 27.0 - 33.0 pg    MCHC 32.2 (L) 33.6 - 35.0 g/dL    RDW 42.1 35.9 - 50.0 fL    Platelet Count 299 164 - 446 K/uL    MPV 10.4 9.0 - 12.9 fL    Neutrophils-Polys 62.40 44.00 - 72.00 %    Lymphocytes 27.40 22.00 - 41.00 %    Monocytes 6.60 0.00 - 13.40 %    Eosinophils 2.60 0.00 - 6.90 %    Basophils 0.70 0.00 - 1.80 %    Immature Granulocytes 0.30 0.00 - 0.90 %    Nucleated RBC 0.00 /100 WBC    Neutrophils (Absolute) 7.41 (H) 2.00 - 7.15 K/uL    Lymphs (Absolute) 3.25 1.00 - 4.80 K/uL    Monos (Absolute) 0.79 0.00 - 0.85 K/uL    Eos (Absolute) 0.31 0.00 - 0.51 K/uL    Baso (Absolute) 0.08 0.00 - 0.12 K/uL    Immature Granulocytes (abs) 0.04 0.00 - 0.11 K/uL    NRBC (Absolute) 0.00 K/uL   Complete Metabolic Panel (CMP)   Result Value Ref Range    Sodium 130 (L) 135 - 145 mmol/L    Potassium 4.8 3.6 - 5.5 mmol/L    Chloride 94 (L) 96 - 112 mmol/L    Co2 23 20 - 33 mmol/L    Anion Gap 13.0 7.0 - 16.0    Glucose 627 (HH) 65 - 99 mg/dL    Bun 9 8 - 22 mg/dL    Creatinine 0.54 0.50 - 1.40 mg/dL    Calcium 9.4 8.4 - 10.2 mg/dL    AST(SGOT) 9 (L) 12 - 45 U/L    ALT(SGPT) 16 2 - 50 U/L    Alkaline Phosphatase 113 (H) 30 - 99 U/L    Total Bilirubin 0.2 0.1 - 1.5 mg/dL    Albumin 3.7 3.2 - 4.9 g/dL    Total Protein 7.6 6.0 - 8.2 g/dL    Globulin 3.9 (H) 1.9 - 3.5 g/dL    A-G Ratio 0.9 g/dL   Troponin   Result Value Ref Range    Troponin T <6 6 - 19 ng/L   Urinalysis, culture if indicated    Specimen: Urine   Result Value Ref Range    Color Yellow     Character Clear     Specific Gravity <=1.005 <1.035    Ph 6.0 5.0 - 8.0    Glucose >=1000 (A) Negative mg/dL    Ketones Negative Negative mg/dL    Protein Negative Negative mg/dL    Bilirubin Negative Negative     Nitrite Negative Negative    Leukocyte Esterase Negative Negative    Occult Blood Small (A) Negative    Micro Urine Req Microscopic    MAGNESIUM   Result Value Ref Range    Magnesium 1.9 1.5 - 2.5 mg/dL   PHOSPHORUS   Result Value Ref Range    Phosphorus 4.2 2.5 - 4.5 mg/dL   VENOUS BLOOD GAS   Result Value Ref Range    Venous Bg Ph 7.37 7.31 - 7.45    Venous Bg Pco2 40.9 (L) 41.0 - 51.0 mmHg    Venous Bg Po2 31.4 25.0 - 40.0 mmHg    Venous Bg O2 Saturation 56.9 %    Venous Bg Hco3 23 (L) 24 - 28 mmol/L    Venous Bg Base Excess -2 mmol/L    Body Temp see below Centigrade   URINE MICROSCOPIC (W/UA)   Result Value Ref Range    WBC 0-2 /hpf    RBC 2-5 (A) /hpf    Bacteria Negative None /hpf    Epithelial Cells Negative Few /hpf   ESTIMATED GFR   Result Value Ref Range    GFR If African American >60 >60 mL/min/1.73 m 2    GFR If Non African American >60 >60 mL/min/1.73 m 2   ACCU-CHEK GLUCOSE   Result Value Ref Range    Glucose - Accu-Ck >600 (HH) 65 - 99 mg/dL   ACCU-CHEK GLUCOSE   Result Value Ref Range    Glucose - Accu-Ck 412 (HH) 65 - 99 mg/dL   EKG (NOW)   Result Value Ref Range    Report       [facility] Emergency Dept.    Test Date:  2021  Pt Name:    PRANAV KRAUSE                Department: [facility]  MRN:        [id number]                      [facility]  Gender:     Female                       Technician:   :        [id number]                   Requested By:GELY WREN  Order #:    [id number]                    Reading MD:    Measurements  Intervals                                Axis  Rate:       82                           P:          60  IN:         164                          QRS:        70  QRSD:       99                           T:          44  QT:         386  QTc:        451    Interpretive Statements  Sinus rhythm  Compared to ECG 2019 23:40:10  No significant changes         RADIOLOGY  DX-FOOT-COMPLETE 3+ LEFT   Final Result         1.  No acute  traumatic bony injury.          HYDRATION: Based on the patient's presentation of Hyperglycemia the patient was given IV fluids. IV Hydration was used because oral hydration was not adequate alone. Upon recheck following hydration, the patient was Improving.    COURSE & MEDICAL DECISION MAKING  Patient arrives for evaluation of what appears to be hyperglycemia in the setting of medication noncompliance.  She also has an unrelated toe injury which will be evaluated by x-ray.  Patient will be worked up for possible DKA or hyperosmolar state.  We will also evaluate for possible infectious etiologies.  With your sugar being too high to read on our meter, it is possible the patient will need hospitalization however we will await diagnostic results.  In the meantime we will hydrate via IV as the patient will be unable to rehydrate orally at this point in a reasonable amount of time.     5:45 AM  Reviewed patient's objective results and evaluated her at bedside. She is tired appearing but otherwise nontoxic. There are no convincing findings to suggest any source of infection and I suspect the cause of her hyperglycemia was medication noncompliance. Patient does not acidotic and has no anion gap. She does not have ketones in her urine and her blood sugar came down quite well with fluids and a small amount of short acting insulin. I do not feel she requires inpatient evaluation as she is not in DKA and has no findings to suggest a hyperosmolar state. She was dehydrated and did require fluids and will be encouraged to start her Metformin, which she only picked up yesterday, upon discharge. She will also be encouraged to contact her primary care physician today to arrange outpatient follow-up to discuss the issue. She will return if her symptoms worsen or change in any way.    FINAL IMPRESSION  1. Hyperglycemia    2. Contusion of second toe, left, initial encounter        Electronically signed by: Ulisses Hawkins M.D.,  2/17/2021 3:56 AM

## 2021-02-17 NOTE — ED NOTES
"Pt arrives to ED ambulating through ED entrance by POV. Pt reports pain to the second toe on left foot x2 days. Pt reports stubbing toe x2 days ago and states \"my dog stepped on the same toe.\" Pt denies taking any OTC pain medication reporting \"the pain comes and goes.\" Pt also reports having high BGC recently, taking long acting insulin as prescribed however pt reports non-compliance with HTN, short acting insulin, and all other meds.   "

## 2021-02-17 NOTE — ED NOTES
Discharge instructions provided.  Pt verbalized the understanding of discharge instructions and importance of monitoring/treating BGC at home. Pt also acknowledges to follow up with PCP and to return to ER if condition worsens. . Pt ambulated out of ER without difficulty.

## 2021-02-27 LAB — EKG IMPRESSION: NORMAL

## 2021-02-28 ENCOUNTER — HOSPITAL ENCOUNTER (EMERGENCY)
Facility: MEDICAL CENTER | Age: 41
End: 2021-02-28
Attending: EMERGENCY MEDICINE
Payer: MEDICAID

## 2021-02-28 VITALS
OXYGEN SATURATION: 99 % | SYSTOLIC BLOOD PRESSURE: 129 MMHG | HEIGHT: 63 IN | TEMPERATURE: 97.2 F | BODY MASS INDEX: 40 KG/M2 | WEIGHT: 225.75 LBS | RESPIRATION RATE: 17 BRPM | DIASTOLIC BLOOD PRESSURE: 86 MMHG | HEART RATE: 74 BPM

## 2021-02-28 DIAGNOSIS — E11.628 TYPE 2 DIABETES MELLITUS WITH OTHER SKIN COMPLICATION, UNSPECIFIED WHETHER LONG TERM INSULIN USE (HCC): ICD-10-CM

## 2021-02-28 DIAGNOSIS — E66.01 MORBID OBESITY (HCC): ICD-10-CM

## 2021-02-28 DIAGNOSIS — N76.4 LABIAL ABSCESS: ICD-10-CM

## 2021-02-28 PROCEDURE — 99283 EMERGENCY DEPT VISIT LOW MDM: CPT

## 2021-02-28 PROCEDURE — 303977 HCHG I & D

## 2021-02-28 PROCEDURE — 700101 HCHG RX REV CODE 250: Performed by: EMERGENCY MEDICINE

## 2021-02-28 PROCEDURE — 700102 HCHG RX REV CODE 250 W/ 637 OVERRIDE(OP): Performed by: EMERGENCY MEDICINE

## 2021-02-28 PROCEDURE — A9270 NON-COVERED ITEM OR SERVICE: HCPCS | Performed by: EMERGENCY MEDICINE

## 2021-02-28 RX ORDER — HYDROCODONE BITARTRATE AND ACETAMINOPHEN 5; 325 MG/1; MG/1
1 TABLET ORAL ONCE
Status: COMPLETED | OUTPATIENT
Start: 2021-02-28 | End: 2021-02-28

## 2021-02-28 RX ORDER — CLINDAMYCIN HYDROCHLORIDE 300 MG/1
300 CAPSULE ORAL 4 TIMES DAILY
Qty: 40 CAPSULE | Refills: 0 | Status: SHIPPED | OUTPATIENT
Start: 2021-02-28 | End: 2021-10-27

## 2021-02-28 RX ORDER — KETOROLAC TROMETHAMINE 10 MG/1
10 TABLET, FILM COATED ORAL 3 TIMES DAILY PRN
Qty: 15 TABLET | Refills: 0 | Status: SHIPPED | OUTPATIENT
Start: 2021-02-28 | End: 2021-11-29

## 2021-02-28 RX ORDER — LIDOCAINE HYDROCHLORIDE 20 MG/ML
20 INJECTION, SOLUTION INFILTRATION; PERINEURAL ONCE
Status: COMPLETED | OUTPATIENT
Start: 2021-02-28 | End: 2021-02-28

## 2021-02-28 RX ORDER — FLUCONAZOLE 200 MG/1
200 TABLET ORAL ONCE
Status: COMPLETED | OUTPATIENT
Start: 2021-02-28 | End: 2021-02-28

## 2021-02-28 RX ORDER — FLUCONAZOLE 200 MG/1
200 TABLET ORAL DAILY
Qty: 10 TABLET | Refills: 0 | Status: SHIPPED | OUTPATIENT
Start: 2021-02-28 | End: 2021-11-29

## 2021-02-28 RX ORDER — CLINDAMYCIN HYDROCHLORIDE 150 MG/1
300 CAPSULE ORAL ONCE
Status: COMPLETED | OUTPATIENT
Start: 2021-02-28 | End: 2021-02-28

## 2021-02-28 RX ADMIN — HYDROCODONE BITARTRATE AND ACETAMINOPHEN 1 TABLET: 5; 325 TABLET ORAL at 06:11

## 2021-02-28 RX ADMIN — FLUCONAZOLE 200 MG: 200 TABLET ORAL at 06:11

## 2021-02-28 RX ADMIN — LIDOCAINE HYDROCHLORIDE 20 ML: 20 INJECTION, SOLUTION INFILTRATION; PERINEURAL at 05:30

## 2021-02-28 RX ADMIN — CLINDAMYCIN HYDROCHLORIDE 300 MG: 150 CAPSULE ORAL at 06:11

## 2021-02-28 ASSESSMENT — FIBROSIS 4 INDEX: FIB4 SCORE: 0.31

## 2021-02-28 NOTE — ED TRIAGE NOTES
"Chief Complaint   Patient presents with   • Abscess     vaginal abscess that has been getting progressively worse since friday.       /92   Pulse 78   Temp 36.1 °C (97 °F) (Temporal)   Resp 14   Ht 1.6 m (5' 3\")   Wt 102 kg (225 lb 12 oz)   SpO2 97%   BMI 39.99 kg/m²     "

## 2021-02-28 NOTE — ED NOTES
Patient given discharge instructions and electronic prescriptions. Patient encouraged to follow up with PCP in 1 week. Patient has no further questions at this time.

## 2021-02-28 NOTE — DISCHARGE INSTRUCTIONS
Warm sitz baths and Epsom salts 2 times daily  Betadine rinses to your genital area after urinating  Take the antibiotics until completely gone with food  Take the Diflucan for yeast infection  Take the pain medication as directed with food  Wear cotton underwear  Follow-up with your primary care provider this week for recheck  Return to the ER sooner if any increased swelling, fever or purulent drainage.

## 2021-02-28 NOTE — ED PROVIDER NOTES
CHIEF COMPLAINT  Chief Complaint   Patient presents with   • Abscess     vaginal abscess that has been getting progressively worse since friday.       HPI  Liana Obrien is a 41 y.o. female who presents this morning with a chief complaint of abscess noted on the labial area on the right that has gotten progressively larger since Friday.  She states she awakened with a small abscess on Friday.  It is now increased in size tremendously.  She has had previous abscesses drained from her female area.  She denies any dysuria or hematuria.  She has had no fever or chills.  She states her fingerstick glucose was 150 just prior to arrival.    REVIEW OF SYSTEMS  See HPI for further details. All other system reviews are negative.    PAST MEDICAL HISTORY  Past Medical History:   Diagnosis Date   • Bipolar disorder ([facility]) 8/9/2018   • Candida vaginitis    • Chickenpox    • Diabetes 1.5, managed as type 2 ([facility])    • DVT (deep venous thrombosis) ([facility])    • Dysfunctional uterine bleeding    • Fibroids    • Herpes    • Hypertension    • Hypothyroidism due to acquired atrophy of thyroid 1/22/2016   • Personal history of venous thrombosis and embolism     DVT in BLE years ago   • Sleep apnea    • Uncontrolled type 2 diabetes mellitus without complication, without long-term current use of insulin 3/12/2015       FAMILY HISTORY  Family History   Problem Relation Age of Onset   • Hypertension Mother    • Sleep Apnea Mother    • Hyperlipidemia Father    • Cancer Maternal Uncle    • Sleep Apnea Brother    • Diabetes Neg Hx    • Heart Disease Neg Hx    • Stroke Neg Hx        SOCIAL HISTORY  Social History     Socioeconomic History   • Marital status: Single     Spouse name: Not on file   • Number of children: Not on file   • Years of education: Not on file   • Highest education level: Not on file   Occupational History   • Not on file   Tobacco Use   • Smoking status: Former Smoker     Packs/day: 0.50     Years: 0.30     Pack years:  "0.15     Types: Cigarettes     Quit date: 2018     Years since quitting: 3.1   • Smokeless tobacco: Never Used   • Tobacco comment: for 3 months at a time on and off    Substance and Sexual Activity   • Alcohol use: No     Alcohol/week: 0.0 oz   • Drug use: No   • Sexual activity: Yes     Partners: Male   Other Topics Concern   • Not on file   Social History Narrative    Works at [employer]      Social Determinants of Health     Financial Resource Strain:    • Difficulty of Paying Living Expenses:    Food Insecurity:    • Worried About Running Out of Food in the Last Year:    • Ran Out of Food in the Last Year:    Transportation Needs:    • Lack of Transportation (Medical):    • Lack of Transportation (Non-Medical):    Physical Activity:    • Days of Exercise per Week:    • Minutes of Exercise per Session:    Stress:    • Feeling of Stress :    Social Connections:    • Frequency of Communication with Friends and Family:    • Frequency of Social Gatherings with Friends and Family:    • Attends [employer] Services:    • Active Member of Clubs or Organizations:    • Attends Club or Organization Meetings:    • Marital Status:    Intimate Partner Violence:    • Fear of Current or Ex-Partner:    • Emotionally Abused:    • Physically Abused:    • Sexually Abused:        SURGICAL HISTORY  History reviewed. No pertinent surgical history.    CURRENT MEDICATIONS  See nurses notes    ALLERGIES  Allergies   Allergen Reactions   • Bicillin C-R [Penicillin G Proc & Benzathine] Rash     Received inj of Bicillin- reaction was rash. Oral penicillin shows no reaction.   • Ondansetron Hives   • Penicillin G Hives   • Sulfa Drugs Hives          • Metoclopramide Rash and Vomiting     Rash         PHYSICAL EXAM  VITAL SIGNS: /86   Pulse 74   Temp 36.2 °C (97.2 °F) (Temporal)   Resp 17   Ht 1.6 m (5' 3\")   Wt 102 kg (225 lb 12 oz)   SpO2 99%   BMI 39.99 kg/m²     Constitutional: Patient is well developed, well nourished " in mild distress and non-toxic appearing.   HENT: Oral mucosa moist.  Eyes: PERRL, EOMI  Neck: Supple . Normal range of motion in flexion, extension and lateral rotation. No tenderness along the bony prominences or paraspinal muscles  Lymphatic: No lymphadenopathy noted.   Cardiovascular: Normal heart rate and rhythm. No murmur  Thorax & Lungs: Clear and equal breath sounds with good excursion. No respiratory distress  Abdomen: Bowel sounds normal in all four quadrants. Soft,nontender, no rebound , guarding, palpable masses.   Skin: Warm, Dry,No rashes.    Genitalia: Right labia reveals a 4 cm raised erythematous tender area of soft tissue swelling.  There is no expressible drainage at this time.  Extremities: Peripheral pulses 4/4 No edema, No tenderness.  Neurologic: Alert & oriented x 3, Normal motor function, Normal sensory function.  Psychiatric: Affect normal, Judgment normal, Mood normal.       COURSE & MEDICAL DECISION MAKING  Pertinent Labs & Imaging studies reviewed. (See chart for details)  Procedure note: Incision and drainage note, patient's labia was prepped in a sterile fashion with Betadine solution.  Lidocaine 1% was used for local anesthesia.  11 blade scalpel was used to drain the labial abscess and no pus was obtained.  There was a large blood clot which was removed.  Patient tolerated the procedure well.  She was given a pad for the drainage.  She is instructed to do warm sitz baths and Epsom salts 2 times daily and follow-up with her primary care physician this week for recheck.  She is to wear cotton underwear only, avoid tight fitting clothing.  She is to return if increased redness, swelling, fever, uncontrolled sugars otherwise follow-up with her primary care doctor    FINAL IMPRESSION  1.  Right labial abscess  2.  Morbid obesity  3.  Insulin-dependent diabetes         Electronically signed by: Claudette Mckeon D.O., 2/28/2021 6:18 [facility] Provider Note

## 2021-05-24 PROCEDURE — 99285 EMERGENCY DEPT VISIT HI MDM: CPT

## 2021-05-24 ASSESSMENT — FIBROSIS 4 INDEX: FIB4 SCORE: 0.31

## 2021-05-25 ENCOUNTER — PATIENT OUTREACH (OUTPATIENT)
Dept: HEALTH INFORMATION MANAGEMENT | Facility: OTHER | Age: 41
End: 2021-05-25

## 2021-05-25 ENCOUNTER — HOSPITAL ENCOUNTER (EMERGENCY)
Facility: MEDICAL CENTER | Age: 41
End: 2021-05-25
Attending: EMERGENCY MEDICINE
Payer: MEDICAID

## 2021-05-25 ENCOUNTER — APPOINTMENT (OUTPATIENT)
Dept: RADIOLOGY | Facility: MEDICAL CENTER | Age: 41
End: 2021-05-25
Attending: EMERGENCY MEDICINE
Payer: MEDICAID

## 2021-05-25 VITALS
RESPIRATION RATE: 18 BRPM | HEART RATE: 77 BPM | WEIGHT: 234.35 LBS | OXYGEN SATURATION: 98 % | HEIGHT: 63 IN | SYSTOLIC BLOOD PRESSURE: 131 MMHG | TEMPERATURE: 96.6 F | DIASTOLIC BLOOD PRESSURE: 73 MMHG | BODY MASS INDEX: 41.52 KG/M2

## 2021-05-25 DIAGNOSIS — N76.4 LABIAL ABSCESS: ICD-10-CM

## 2021-05-25 DIAGNOSIS — R07.9 ACUTE CHEST PAIN: ICD-10-CM

## 2021-05-25 LAB
ALBUMIN SERPL BCP-MCNC: 3.7 G/DL (ref 3.2–4.9)
ALBUMIN/GLOB SERPL: 1 G/DL
ALP SERPL-CCNC: 90 U/L (ref 30–99)
ALT SERPL-CCNC: 14 U/L (ref 2–50)
ANION GAP SERPL CALC-SCNC: 11 MMOL/L (ref 7–16)
APPEARANCE UR: ABNORMAL
AST SERPL-CCNC: 10 U/L (ref 12–45)
BACTERIA #/AREA URNS HPF: ABNORMAL /HPF
BASOPHILS # BLD AUTO: 1 % (ref 0–1.8)
BASOPHILS # BLD: 0.09 K/UL (ref 0–0.12)
BILIRUB SERPL-MCNC: <0.2 MG/DL (ref 0.1–1.5)
BILIRUB UR QL STRIP.AUTO: NEGATIVE
BUN SERPL-MCNC: 10 MG/DL (ref 8–22)
CALCIUM SERPL-MCNC: 8.9 MG/DL (ref 8.4–10.2)
CHLORIDE SERPL-SCNC: 101 MMOL/L (ref 96–112)
CO2 SERPL-SCNC: 23 MMOL/L (ref 20–33)
COLOR UR: YELLOW
CREAT SERPL-MCNC: 0.63 MG/DL (ref 0.5–1.4)
EOSINOPHIL # BLD AUTO: 0.28 K/UL (ref 0–0.51)
EOSINOPHIL NFR BLD: 3.2 % (ref 0–6.9)
EPI CELLS #/AREA URNS HPF: ABNORMAL /HPF
ERYTHROCYTE [DISTWIDTH] IN BLOOD BY AUTOMATED COUNT: 37.2 FL (ref 35.9–50)
GLOBULIN SER CALC-MCNC: 3.6 G/DL (ref 1.9–3.5)
GLUCOSE BLD-MCNC: 202 MG/DL (ref 65–99)
GLUCOSE BLD-MCNC: 300 MG/DL (ref 65–99)
GLUCOSE SERPL-MCNC: 365 MG/DL (ref 65–99)
GLUCOSE UR STRIP.AUTO-MCNC: >=1000 MG/DL
HCG UR QL: NEGATIVE
HCT VFR BLD AUTO: 36.2 % (ref 37–47)
HGB BLD-MCNC: 11.7 G/DL (ref 12–16)
IMM GRANULOCYTES # BLD AUTO: 0.04 K/UL (ref 0–0.11)
IMM GRANULOCYTES NFR BLD AUTO: 0.5 % (ref 0–0.9)
KETONES UR STRIP.AUTO-MCNC: ABNORMAL MG/DL
LACTATE BLD-SCNC: 2.1 MMOL/L (ref 0.5–2)
LEUKOCYTE ESTERASE UR QL STRIP.AUTO: NEGATIVE
LYMPHOCYTES # BLD AUTO: 2.88 K/UL (ref 1–4.8)
LYMPHOCYTES NFR BLD: 32.8 % (ref 22–41)
MCH RBC QN AUTO: 25.5 PG (ref 27–33)
MCHC RBC AUTO-ENTMCNC: 32.3 G/DL (ref 33.6–35)
MCV RBC AUTO: 78.9 FL (ref 81.4–97.8)
MICRO URNS: ABNORMAL
MONOCYTES # BLD AUTO: 0.65 K/UL (ref 0–0.85)
MONOCYTES NFR BLD AUTO: 7.4 % (ref 0–13.4)
MUCOUS THREADS #/AREA URNS HPF: ABNORMAL /HPF
NEUTROPHILS # BLD AUTO: 4.84 K/UL (ref 2–7.15)
NEUTROPHILS NFR BLD: 55.1 % (ref 44–72)
NITRITE UR QL STRIP.AUTO: NEGATIVE
NRBC # BLD AUTO: 0 K/UL
NRBC BLD-RTO: 0 /100 WBC
PH UR STRIP.AUTO: 6 [PH] (ref 5–8)
PLATELET # BLD AUTO: 294 K/UL (ref 164–446)
PMV BLD AUTO: 9.9 FL (ref 9–12.9)
POTASSIUM SERPL-SCNC: 3.9 MMOL/L (ref 3.6–5.5)
PROT SERPL-MCNC: 7.3 G/DL (ref 6–8.2)
PROT UR QL STRIP: NEGATIVE MG/DL
RBC # BLD AUTO: 4.59 M/UL (ref 4.2–5.4)
RBC # URNS HPF: ABNORMAL /HPF
RBC UR QL AUTO: ABNORMAL
SODIUM SERPL-SCNC: 135 MMOL/L (ref 135–145)
SP GR UR STRIP.AUTO: 1.01
TROPONIN T SERPL-MCNC: 7 NG/L (ref 6–19)
TROPONIN T SERPL-MCNC: <6 NG/L (ref 6–19)
UNIDENT CRYS URNS QL MICRO: ABNORMAL /HPF
WBC # BLD AUTO: 8.8 K/UL (ref 4.8–10.8)
WBC #/AREA URNS HPF: ABNORMAL /HPF

## 2021-05-25 PROCEDURE — 83605 ASSAY OF LACTIC ACID: CPT

## 2021-05-25 PROCEDURE — 82962 GLUCOSE BLOOD TEST: CPT

## 2021-05-25 PROCEDURE — 303977 HCHG I & D

## 2021-05-25 PROCEDURE — 96374 THER/PROPH/DIAG INJ IV PUSH: CPT | Mod: XU

## 2021-05-25 PROCEDURE — 84484 ASSAY OF TROPONIN QUANT: CPT

## 2021-05-25 PROCEDURE — 96376 TX/PRO/DX INJ SAME DRUG ADON: CPT | Mod: XU

## 2021-05-25 PROCEDURE — 700102 HCHG RX REV CODE 250 W/ 637 OVERRIDE(OP): Performed by: EMERGENCY MEDICINE

## 2021-05-25 PROCEDURE — 80053 COMPREHEN METABOLIC PANEL: CPT

## 2021-05-25 PROCEDURE — A9270 NON-COVERED ITEM OR SERVICE: HCPCS | Performed by: EMERGENCY MEDICINE

## 2021-05-25 PROCEDURE — 81001 URINALYSIS AUTO W/SCOPE: CPT

## 2021-05-25 PROCEDURE — 81025 URINE PREGNANCY TEST: CPT

## 2021-05-25 PROCEDURE — 700111 HCHG RX REV CODE 636 W/ 250 OVERRIDE (IP): Performed by: EMERGENCY MEDICINE

## 2021-05-25 PROCEDURE — 700105 HCHG RX REV CODE 258: Performed by: EMERGENCY MEDICINE

## 2021-05-25 PROCEDURE — 700101 HCHG RX REV CODE 250: Performed by: EMERGENCY MEDICINE

## 2021-05-25 PROCEDURE — 93005 ELECTROCARDIOGRAM TRACING: CPT | Performed by: EMERGENCY MEDICINE

## 2021-05-25 PROCEDURE — 71045 X-RAY EXAM CHEST 1 VIEW: CPT

## 2021-05-25 PROCEDURE — 85025 COMPLETE CBC W/AUTO DIFF WBC: CPT

## 2021-05-25 PROCEDURE — 96375 TX/PRO/DX INJ NEW DRUG ADDON: CPT | Mod: XU

## 2021-05-25 RX ORDER — MORPHINE SULFATE 4 MG/ML
4 INJECTION, SOLUTION INTRAMUSCULAR; INTRAVENOUS ONCE
Status: COMPLETED | OUTPATIENT
Start: 2021-05-25 | End: 2021-05-25

## 2021-05-25 RX ORDER — SODIUM CHLORIDE 9 MG/ML
1000 INJECTION, SOLUTION INTRAVENOUS ONCE
Status: COMPLETED | OUTPATIENT
Start: 2021-05-25 | End: 2021-05-25

## 2021-05-25 RX ORDER — PROCHLORPERAZINE EDISYLATE 5 MG/ML
10 INJECTION INTRAMUSCULAR; INTRAVENOUS ONCE
Status: COMPLETED | OUTPATIENT
Start: 2021-05-25 | End: 2021-05-25

## 2021-05-25 RX ORDER — HYDROCODONE BITARTRATE AND ACETAMINOPHEN 5; 325 MG/1; MG/1
2 TABLET ORAL ONCE
Status: COMPLETED | OUTPATIENT
Start: 2021-05-25 | End: 2021-05-25

## 2021-05-25 RX ORDER — DOXYCYCLINE 100 MG/1
100 CAPSULE ORAL 2 TIMES DAILY
Qty: 10 CAPSULE | Refills: 0 | Status: SHIPPED | OUTPATIENT
Start: 2021-05-25 | End: 2021-05-30

## 2021-05-25 RX ORDER — LIDOCAINE HYDROCHLORIDE AND EPINEPHRINE BITARTRATE 20; .01 MG/ML; MG/ML
10 INJECTION, SOLUTION SUBCUTANEOUS ONCE
Status: COMPLETED | OUTPATIENT
Start: 2021-05-25 | End: 2021-05-25

## 2021-05-25 RX ORDER — DOXYCYCLINE 100 MG/1
100 TABLET ORAL ONCE
Status: COMPLETED | OUTPATIENT
Start: 2021-05-25 | End: 2021-05-25

## 2021-05-25 RX ADMIN — MORPHINE SULFATE 4 MG: 4 INJECTION INTRAVENOUS at 04:20

## 2021-05-25 RX ADMIN — SODIUM CHLORIDE 1000 ML: 9 INJECTION, SOLUTION INTRAVENOUS at 02:56

## 2021-05-25 RX ADMIN — DOXYCYCLINE 100 MG: 100 TABLET, FILM COATED ORAL at 02:56

## 2021-05-25 RX ADMIN — INSULIN HUMAN 10 UNITS: 100 INJECTION, SOLUTION PARENTERAL at 04:21

## 2021-05-25 RX ADMIN — LIDOCAINE HYDROCHLORIDE,EPINEPHRINE BITARTRATE 10 ML: 20; .01 INJECTION, SOLUTION INFILTRATION; PERINEURAL at 01:15

## 2021-05-25 RX ADMIN — MORPHINE SULFATE 4 MG: 4 INJECTION INTRAVENOUS at 01:59

## 2021-05-25 RX ADMIN — SODIUM CHLORIDE 1000 ML: 9 INJECTION, SOLUTION INTRAVENOUS at 04:44

## 2021-05-25 RX ADMIN — PROCHLORPERAZINE EDISYLATE 10 MG: 5 INJECTION INTRAMUSCULAR; INTRAVENOUS at 04:20

## 2021-05-25 RX ADMIN — HYDROCODONE BITARTRATE AND ACETAMINOPHEN 2 TABLET: 5; 325 TABLET ORAL at 03:23

## 2021-05-25 SDOH — ECONOMIC STABILITY: TRANSPORTATION INSECURITY
IN THE PAST 12 MONTHS, HAS THE LACK OF TRANSPORTATION KEPT YOU FROM MEDICAL APPOINTMENTS OR FROM GETTING MEDICATIONS?: NO

## 2021-05-25 SDOH — ECONOMIC STABILITY: TRANSPORTATION INSECURITY
IN THE PAST 12 MONTHS, HAS LACK OF TRANSPORTATION KEPT YOU FROM MEETINGS, WORK, OR FROM GETTING THINGS NEEDED FOR DAILY LIVING?: NO

## 2021-05-25 ASSESSMENT — ENCOUNTER SYMPTOMS
RESPIRATORY NEGATIVE: 1
NECK PAIN: 0
FOCAL WEAKNESS: 0
HEADACHES: 0
GASTROINTESTINAL NEGATIVE: 1
SHORTNESS OF BREATH: 0
SEIZURES: 0
SORE THROAT: 0
FEVER: 0
EYE PAIN: 0
BACK PAIN: 0
MYALGIAS: 0
CARDIOVASCULAR NEGATIVE: 1
BRUISES/BLEEDS EASILY: 0
BLOOD IN STOOL: 0
FLANK PAIN: 0
EYES NEGATIVE: 1
ABDOMINAL PAIN: 0
HALLUCINATIONS: 0
CONSTITUTIONAL NEGATIVE: 1
WEAKNESS: 0

## 2021-05-25 ASSESSMENT — SOCIAL DETERMINANTS OF HEALTH (SDOH): HOW HARD IS IT FOR YOU TO PAY FOR THE VERY BASICS LIKE FOOD, HOUSING, MEDICAL CARE, AND HEATING?: PATIENT DECLINED

## 2021-05-25 NOTE — PROGRESS NOTES
----- Message from Geovany Lepe MD sent at 1/23/2019  8:39 AM CST -----  The patient's thyroid test was normal. No change in regimen at this time, will continue with same dose of levothyroxine. Please call the patient regarding the plan. Please find out what pharmacy they want to use so I can send prescription to their pharmacy on file. If the patient has questions, let me know and I will try to call them back. Please send a message back in EPIC when done and close the encounter. Thanks. PEYTON Rodrigez called [facility]. Patient is an established patient at [facility]. Patient has been scheduled for June 8th at 4pm. Information will be texted to patient. CHW also updated patient about prescription. Patient had further questions in regards to her meds, pain meds over the counter and what she was given in the ER. CHW transferred the call to Cait STOVER as HEIDYW is not medically trained. Patient greatly appreciated the help.

## 2021-05-25 NOTE — ED NOTES
Pt discharged, reviewed all discharge instructions including medications and follow up, pt verbalizes understanding, and denies questions. Electronic prescription discussed with pt and guest.  Pt instructed not to drive because she has received narcotics

## 2021-05-25 NOTE — ED PROVIDER NOTES
ED Provider Note    CHIEF COMPLAINT  Chief Complaint   Patient presents with   • Abscess       HPI  HPI    41-year-old female with past medical history of diabetes and recurrent labial abscesses presents to the emergency department with chief complaint of her right sided labial abscess.  Patient reports that this has been present for several days now.  She denies any fever.  She denies any dysuria.  She denies any new or different vaginal discharge.  She denies any abdominal pain.  Patient unsure why these keep coming back.  Patient states that she has OB/GYN to follow-up with this week.    Patient denies any vomiting or diarrhea.  Patient denies any other abscesses.  Patient denies history of MRSA.    REVIEW OF SYSTEMS  Review of Systems   Constitutional: Negative.  Negative for fever.   HENT: Negative.  Negative for ear pain and sore throat.    Eyes: Negative.  Negative for pain.   Respiratory: Negative.  Negative for shortness of breath.    Cardiovascular: Negative.  Negative for chest pain.   Gastrointestinal: Negative.  Negative for abdominal pain and blood in stool.   Genitourinary: Negative for dysuria and flank pain.   Musculoskeletal: Negative for back pain, myalgias and neck pain.   Skin: Negative.  Negative for rash.   Neurological: Negative for focal weakness, seizures, weakness and headaches.   Endo/Heme/Allergies: Does not bruise/bleed easily.   Psychiatric/Behavioral: Negative for hallucinations and suicidal ideas.   All other systems reviewed and are negative.      PAST MEDICAL HISTORY   has a past medical history of Bipolar disorder ([facility]) (8/9/2018), Candida vaginitis, Chickenpox, Diabetes 1.5, managed as type 2 ([facility]), DVT (deep venous thrombosis) ([facility]), Dysfunctional uterine bleeding, Fibroids, Herpes, Hypertension, Hypothyroidism due to acquired atrophy of thyroid (1/22/2016), Personal history of venous thrombosis and embolism, Sleep apnea, and Uncontrolled type 2 diabetes mellitus without  "complication, without long-term current use of insulin (3/12/2015).    SOCIAL HISTORY  Social History     Tobacco Use   • Smoking status: Former Smoker     Packs/day: 0.50     Years: 0.30     Pack years: 0.15     Types: Cigarettes     Quit date: 2018     Years since quitting: 3.3   • Smokeless tobacco: Never Used   • Tobacco comment: for 3 months at a time on and off    Substance and Sexual Activity   • Alcohol use: No     Alcohol/week: 0.0 oz   • Drug use: No   • Sexual activity: Yes     Partners: Male       SURGICAL HISTORY  patient denies any surgical history    CURRENT MEDICATIONS  Home Medications    **Home medications have not yet been reviewed for this encounter**         ALLERGIES  Allergies   Allergen Reactions   • Bicillin C-R [Penicillin G Proc & Benzathine] Rash     Received inj of Bicillin- reaction was rash. Oral penicillin shows no reaction.   • Ondansetron Hives   • Penicillin G Hives   • Sulfa Drugs Hives          • Metoclopramide Rash and Vomiting     Rash         PHYSICAL EXAM  VITAL SIGNS: /85   Pulse 84   Temp 35.9 °C (96.6 °F) (Temporal)   Resp 19   Ht 1.6 m (5' 3\")   Wt 106 kg (234 lb 5.6 oz)   SpO2 93%   BMI 41.51 kg/m²  @KELVIN[[id number]::@  Pulse ox interpretation: I interpret this pulse ox as normal.    Physical Exam  HENT:      Head: Normocephalic and atraumatic.      Right Ear: External ear normal.      Left Ear: External ear normal.   Eyes:      General: No scleral icterus.     Conjunctiva/sclera: Conjunctivae normal.   Cardiovascular:      Rate and Rhythm: Normal rate.   Pulmonary:      Effort: Pulmonary effort is normal. No respiratory distress.      Breath sounds: No stridor. No wheezing.   Abdominal:      General: There is no distension.      Tenderness: There is no abdominal tenderness.   Genitourinary:      Musculoskeletal:         General: No deformity. Normal range of motion.      Cervical back: Normal range of motion.   Skin:     General: Skin is warm and dry.      " Findings: No erythema or rash.   Neurological:      Mental Status: She is alert and oriented to person, place, and time.      Coordination: Coordination normal.   Psychiatric:         Mood and Affect: Affect normal.         Judgment: Judgment normal.         DIAGNOSTIC STUDIES / PROCEDURES    INCISION AND DRAINAGE PROCEDURE NOTE:  Patient identification was confirmed and consent was obtained.  This procedure was performed by Dr. Fuller at .  Site: right lateral labia  Sterile procedures observed  Needle size: 27g  Anesthetic used (type and amt): 3cc of 1%lido w/ epi  Blade size: 11  Drainage: Serosanguineous  No packing required.  Site anesthetized, incision made over site, wound drained and explored loculations, covered with dry, sterile dressing. Pt tolerated procedure well without complications. Instructions for care discussed verbally and pt provided with additional written instructions for homecare and f/u.     LABS/EKG  Results for orders placed or performed during the hospital encounter of 05/25/21   CBC WITH DIFFERENTIAL   Result Value Ref Range    WBC 8.8 4.8 - 10.8 K/uL    RBC 4.59 4.20 - 5.40 M/uL    Hemoglobin 11.7 (L) 12.0 - 16.0 g/dL    Hematocrit 36.2 (L) 37.0 - 47.0 %    MCV 78.9 (L) 81.4 - 97.8 fL    MCH 25.5 (L) 27.0 - 33.0 pg    MCHC 32.3 (L) 33.6 - 35.0 g/dL    RDW 37.2 35.9 - 50.0 fL    Platelet Count 294 164 - 446 K/uL    MPV 9.9 9.0 - 12.9 fL    Neutrophils-Polys 55.10 44.00 - 72.00 %    Lymphocytes 32.80 22.00 - 41.00 %    Monocytes 7.40 0.00 - 13.40 %    Eosinophils 3.20 0.00 - 6.90 %    Basophils 1.00 0.00 - 1.80 %    Immature Granulocytes 0.50 0.00 - 0.90 %    Nucleated RBC 0.00 /100 WBC    Neutrophils (Absolute) 4.84 2.00 - 7.15 K/uL    Lymphs (Absolute) 2.88 1.00 - 4.80 K/uL    Monos (Absolute) 0.65 0.00 - 0.85 K/uL    Eos (Absolute) 0.28 0.00 - 0.51 K/uL    Baso (Absolute) 0.09 0.00 - 0.12 K/uL    Immature Granulocytes (abs) 0.04 0.00 - 0.11 K/uL    NRBC (Absolute) 0.00 K/uL   COMP  METABOLIC PANEL   Result Value Ref Range    Sodium 135 135 - 145 mmol/L    Potassium 3.9 3.6 - 5.5 mmol/L    Chloride 101 96 - 112 mmol/L    Co2 23 20 - 33 mmol/L    Anion Gap 11.0 7.0 - 16.0    Glucose 365 (H) 65 - 99 mg/dL    Bun 10 8 - 22 mg/dL    Creatinine 0.63 0.50 - 1.40 mg/dL    Calcium 8.9 8.4 - 10.2 mg/dL    AST(SGOT) 10 (L) 12 - 45 U/L    ALT(SGPT) 14 2 - 50 U/L    Alkaline Phosphatase 90 30 - 99 U/L    Total Bilirubin <0.2 0.1 - 1.5 mg/dL    Albumin 3.7 3.2 - 4.9 g/dL    Total Protein 7.3 6.0 - 8.2 g/dL    Globulin 3.6 (H) 1.9 - 3.5 g/dL    A-G Ratio 1.0 g/dL   URINALYSIS (UA)    Specimen: Urine   Result Value Ref Range    Color Yellow     Character Hazy (A)     Specific Gravity 1.015 <1.035    Ph 6.0 5.0 - 8.0    Glucose >=1000 (A) Negative mg/dL    Ketones Trace (A) Negative mg/dL    Protein Negative Negative mg/dL    Bilirubin Negative Negative    Nitrite Negative Negative    Leukocyte Esterase Negative Negative    Occult Blood Trace (A) Negative    Micro Urine Req Microscopic    LACTIC ACID   Result Value Ref Range    Lactic Acid 2.1 (H) 0.5 - 2.0 mmol/L   URINE MICROSCOPIC (W/UA)   Result Value Ref Range    WBC 0-2 /hpf    RBC 2-5 (A) /hpf    Bacteria Few (A) None /hpf    Epithelial Cells Moderate (A) Few /hpf    Mucous Threads Few /hpf    Urine Crystals Few Amorphous /hpf   ESTIMATED GFR   Result Value Ref Range    GFR If African American >60 >60 mL/min/1.73 m 2    GFR If Non African American >60 >60 mL/min/1.73 m 2   Troponin STAT   Result Value Ref Range    Troponin T <6 6 - 19 ng/L   Troponin in two (2) hours   Result Value Ref Range    Troponin T 7 6 - 19 ng/L   BETA-HCG QUALITATIVE URINE   Result Value Ref Range    Beta-Hcg Urine Negative Negative   EKG   Result Value Ref Range    Report       [facility] Emergency Dept.    Test Date:  2021-05-25  Pt Name:    PRANAV KRAUSE                Department: EDSM  MRN:        [id number]                      Room:       [facility]  6  Gender:     Female                       Technician: [id number]  :        1980                   Requested By:GARCIA STANTON  Order #:    [id number]                    Reading MD:    Measurements  Intervals                                Axis  Rate:       89                           P:          26  ND:         152                          QRS:        57  QRSD:       98                           T:          54  QT:         369  QTc:        449    Interpretive Statements  Sinus rhythm  Compared to ECG 2021 02:25:18  No significant changes     POCT glucose device results   Result Value Ref Range    Glucose - Accu-Ck 300 (H) 65 - 99 mg/dL   POCT glucose device results   Result Value Ref Range    Glucose - Accu-Ck 202 (H) 65 - 99 mg/dL       RADIOLOGY  DX-CHEST-PORTABLE (1 VIEW)   Final Result      Decreasing lung volumes with hazy lower lung zone opacity more likely from atelectasis than consolidation           COURSE & MEDICAL DECISION MAKING  Pertinent Labs & Imaging studies reviewed by me. (See chart for details)  I verified that the patient was wearing a mask and I was wearing appropriate PPE every time I entered the room. The patient's mask was on the patient at all times during my encounter except for a brief view of the oropharynx.     41 y.o. female PMH labial abscess and DM p/w labial abscess.     Differential diagnosis includes but is not limited to:  Hx and PE c/w abscess  Pt w/ no crepitus or tissue necrosis to suggest concern for Necrotizing fascitis at this time  Pt agrees to immediately return to ED if worsen or if pain worsens.  Wound not amenable to packing due to small size  Given 1 L of fluids for hyperglycemia and 10 units of insulin and patient plans to take sliding scale insulin upon return to home.  Started on doxycycline due to sulfa and penicillin allergy.  Patient agrees to call GYN to schedule close follow-up appointment later this week.  Plan d/c home on antibx       4:14 AM   Pt  w/ nausea and acute onset chest pain  Evaluated by me again  Initial trop neg  Troponin/EKG (interpreted by me) without acute ischemia  HEART SCORE: 2  Pain no longer present   Repeat trop negative  7:12 AM pt pain free at this time.      FINAL IMPRESSION  Visit Diagnoses     ICD-10-CM   1. Labial abscess  N76.4   2. Acute chest pain  R07.9              Electronically signed by: Edward Fuller M.D., 5/25/2021 3:22 AM

## 2021-05-25 NOTE — PROGRESS NOTES
Received incoming order from [facility] to schedule patient with PCP appointment and OBGYN appointment. CHW sent an in basket message to the pregnancy center to schedule patient. Patient would like to be scheduled at [facility] or [facility]. CHW offered to call [facility] to see availability for patient. Patient agreed to scheduling at [facility] for now and see if there are better options at [facility]. Patient also had a question in regards to her prescription. The [facility] pharmacy did not have the prescription while she was there. CHW called the pharmacist to look over the prescription as CHW was unable to read it. The pharmacist said the prescription was received today so it might take [facility] 2 to 3 hours ti fill it.   PEYTON Rodrigez informed patient of this. Patient understood and had another question in regards to her prescription. Patient said the ERP would also give her pain meds. PEYTON Rodrigez told the patient this worker is unable to read the prescription correctly but will get in contact with [facility] pharmacist to see if she is able to contact patient and clarify on the prescription.     Patient greatly appreciated the help and will wait for PEYTON Rodrigez to text with appointment information. CHW told patient if she needs further assistance to please call CHW.

## 2021-05-25 NOTE — ED TRIAGE NOTES
Patient presents with complaints of worsening labial abscess. Has a prior history of requiring I&D. Last abscess requiring I&D was over 6 months ago. Denies fever or chills. Has been utilizing Motrin for pain with mild relief.

## 2021-05-26 NOTE — DISCHARGE PLANNING
Call transferred from [facility] as pt had questions about her medications. Pt stated she received morphine in the ED and wanted to make sure it wouldn't interact with any OTC pain medication such as Ibuprofen or Tylenol. CM explained that she could take what she normally takes for pain and that she had also received Norco while here. Pt does have a follow up appt tonny [facility] made her.

## 2021-05-27 LAB — EKG IMPRESSION: NORMAL

## 2021-10-27 ENCOUNTER — OFFICE VISIT (OUTPATIENT)
Dept: INTERNAL MEDICINE | Facility: OTHER | Age: 41
End: 2021-10-27
Payer: MEDICAID

## 2021-10-27 VITALS
DIASTOLIC BLOOD PRESSURE: 77 MMHG | HEART RATE: 82 BPM | BODY MASS INDEX: 42.45 KG/M2 | TEMPERATURE: 97.5 F | OXYGEN SATURATION: 95 % | SYSTOLIC BLOOD PRESSURE: 118 MMHG | WEIGHT: 239.6 LBS | HEIGHT: 63 IN

## 2021-10-27 DIAGNOSIS — E11.649 HYPOGLYCEMIA ASSOCIATED WITH DIABETES (HCC): ICD-10-CM

## 2021-10-27 DIAGNOSIS — E11.65 TYPE 2 DIABETES MELLITUS WITH HYPERGLYCEMIA, WITHOUT LONG-TERM CURRENT USE OF INSULIN (HCC): ICD-10-CM

## 2021-10-27 DIAGNOSIS — R30.0 DYSURIA: ICD-10-CM

## 2021-10-27 DIAGNOSIS — F32.1 CURRENT MODERATE EPISODE OF MAJOR DEPRESSIVE DISORDER WITHOUT PRIOR EPISODE (HCC): ICD-10-CM

## 2021-10-27 PROBLEM — F32.9 MAJOR DEPRESSIVE DISORDER, SINGLE EPISODE, UNSPECIFIED: Status: ACTIVE | Noted: 2020-09-14

## 2021-10-27 PROBLEM — Z78.9 FAILURE OF OUTPATIENT TREATMENT: Status: RESOLVED | Noted: 2019-05-09 | Resolved: 2021-10-27

## 2021-10-27 PROBLEM — T74.91XA DOMESTIC VIOLENCE OF ADULT: Status: RESOLVED | Noted: 2018-09-13 | Resolved: 2021-10-27

## 2021-10-27 PROBLEM — K21.9 GASTRO-ESOPHAGEAL REFLUX DISEASE WITHOUT ESOPHAGITIS: Status: ACTIVE | Noted: 2021-04-14

## 2021-10-27 PROBLEM — E03.9 HYPOTHYROIDISM: Status: ACTIVE | Noted: 2019-11-19

## 2021-10-27 PROBLEM — N76.4 LABIAL ABSCESS: Status: RESOLVED | Noted: 2019-07-15 | Resolved: 2021-10-27

## 2021-10-27 PROBLEM — F31.9 BIPOLAR DISORDER (HCC): Status: RESOLVED | Noted: 2018-08-09 | Resolved: 2021-10-27

## 2021-10-27 PROBLEM — Z86.718 HISTORY OF DEEP VENOUS THROMBOSIS: Status: ACTIVE | Noted: 2019-11-19

## 2021-10-27 PROBLEM — E28.2 POLYCYSTIC OVARY SYNDROME: Status: ACTIVE | Noted: 2019-12-11

## 2021-10-27 LAB
HBA1C MFR BLD: 10 % (ref 0–5.6)
INT CON NEG: NEGATIVE
INT CON POS: POSITIVE

## 2021-10-27 PROCEDURE — 83036 HEMOGLOBIN GLYCOSYLATED A1C: CPT | Mod: GC | Performed by: STUDENT IN AN ORGANIZED HEALTH CARE EDUCATION/TRAINING PROGRAM

## 2021-10-27 PROCEDURE — 99214 OFFICE O/P EST MOD 30 MIN: CPT | Mod: GC | Performed by: STUDENT IN AN ORGANIZED HEALTH CARE EDUCATION/TRAINING PROGRAM

## 2021-10-27 RX ORDER — FLUCONAZOLE 150 MG/1
TABLET ORAL
COMMUNITY
Start: 2020-12-15 | End: 2021-10-27

## 2021-10-27 RX ORDER — INSULIN LISPRO 100 [IU]/ML
INJECTION, SOLUTION INTRAVENOUS; SUBCUTANEOUS
COMMUNITY
Start: 2021-10-22 | End: 2021-10-27

## 2021-10-27 RX ORDER — ATORVASTATIN CALCIUM 20 MG/1
TABLET, FILM COATED ORAL
COMMUNITY
Start: 2020-09-14 | End: 2021-11-29 | Stop reason: SDUPTHER

## 2021-10-27 RX ORDER — LEVOTHYROXINE SODIUM 0.15 MG/1
TABLET ORAL
COMMUNITY
Start: 2021-10-15 | End: 2021-11-29 | Stop reason: SDUPTHER

## 2021-10-27 RX ORDER — OMEPRAZOLE 20 MG/1
CAPSULE, DELAYED RELEASE ORAL
COMMUNITY
Start: 2021-09-14 | End: 2021-10-27

## 2021-10-27 RX ORDER — ALOGLIPTIN 25 MG/1
TABLET, FILM COATED ORAL
COMMUNITY
Start: 2021-09-20 | End: 2021-10-27

## 2021-10-27 RX ORDER — DULOXETIN HYDROCHLORIDE 30 MG/1
30 CAPSULE, DELAYED RELEASE ORAL 2 TIMES DAILY
Qty: 30 CAPSULE | Refills: 1 | Status: SHIPPED | OUTPATIENT
Start: 2021-10-27 | End: 2021-11-29 | Stop reason: SDUPTHER

## 2021-10-27 RX ORDER — BLOOD-GLUCOSE METER
EACH MISCELLANEOUS
COMMUNITY
Start: 2021-02-18 | End: 2021-10-27

## 2021-10-27 RX ORDER — LEVOTHYROXINE SODIUM 0.15 MG/1
TABLET ORAL
COMMUNITY
Start: 2021-06-21 | End: 2021-10-27

## 2021-10-27 RX ORDER — ACYCLOVIR 400 MG/1
400 TABLET ORAL 2 TIMES DAILY
Qty: 14 TABLET | Refills: 0 | Status: SHIPPED | OUTPATIENT
Start: 2021-10-27 | End: 2021-11-03

## 2021-10-27 RX ORDER — DIPHENHYDRAMINE HYDROCHLORIDE 50 MG/30ML
LIQUID ORAL
COMMUNITY
Start: 2021-06-08 | End: 2021-10-27

## 2021-10-27 RX ORDER — DULOXETIN HYDROCHLORIDE 20 MG/1
CAPSULE, DELAYED RELEASE ORAL
COMMUNITY
Start: 2021-06-21 | End: 2021-10-27

## 2021-10-27 RX ORDER — ALCOHOL ANTISEPTIC PADS
PADS, MEDICATED (EA) TOPICAL
COMMUNITY
Start: 2021-06-23 | End: 2021-11-29 | Stop reason: SDUPTHER

## 2021-10-27 RX ORDER — ALOGLIPTIN 25 MG/1
TABLET, FILM COATED ORAL
COMMUNITY
Start: 2021-06-21 | End: 2021-11-29 | Stop reason: SDUPTHER

## 2021-10-27 RX ORDER — LISINOPRIL 10 MG/1
TABLET ORAL
COMMUNITY
Start: 2021-06-21 | End: 2021-11-29 | Stop reason: SDUPTHER

## 2021-10-27 RX ORDER — ACYCLOVIR 50 MG/G
1 OINTMENT TOPICAL
Qty: 1 EACH | Refills: 0 | Status: SHIPPED | OUTPATIENT
Start: 2021-10-27 | End: 2021-11-04

## 2021-10-27 RX ORDER — LEVOTHYROXINE SODIUM 0.1 MG/1
TABLET ORAL
COMMUNITY
Start: 2021-06-21 | End: 2021-10-27

## 2021-10-27 RX ORDER — LEVOTHYROXINE SODIUM 0.1 MG/1
TABLET ORAL
COMMUNITY
Start: 2021-10-06 | End: 2021-10-27

## 2021-10-27 RX ORDER — LEVOTHYROXINE SODIUM 0.05 MG/1
TABLET ORAL
COMMUNITY
Start: 2020-09-14 | End: 2021-10-27

## 2021-10-27 RX ORDER — ALOGLIPTIN 12.5 MG/1
TABLET, FILM COATED ORAL
COMMUNITY
Start: 2021-04-14 | End: 2021-10-27

## 2021-10-27 RX ORDER — INSULIN GLARGINE 100 [IU]/ML
45 INJECTION, SOLUTION SUBCUTANEOUS 2 TIMES DAILY
COMMUNITY
Start: 2021-06-21 | End: 2021-11-29 | Stop reason: SDUPTHER

## 2021-10-27 RX ORDER — INSULIN LISPRO 100 [IU]/ML
INJECTION, SOLUTION INTRAVENOUS; SUBCUTANEOUS
COMMUNITY
Start: 2021-03-01 | End: 2021-10-27

## 2021-10-27 RX ORDER — ATORVASTATIN CALCIUM 10 MG/1
TABLET, FILM COATED ORAL
COMMUNITY
Start: 2021-10-18 | End: 2021-10-27

## 2021-10-27 RX ORDER — OMEPRAZOLE 20 MG/1
CAPSULE, DELAYED RELEASE ORAL
COMMUNITY
Start: 2021-04-14 | End: 2021-11-29 | Stop reason: SDUPTHER

## 2021-10-27 ASSESSMENT — FIBROSIS 4 INDEX: FIB4 SCORE: 0.37
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
## 2021-10-27 NOTE — PATIENT INSTRUCTIONS
Take your oral and topical medication  Increase your duloxetine   Dont change your insulin regimen  Meet with the dietician  Eat a snack at night
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
## 2021-10-28 ENCOUNTER — NON-PROVIDER VISIT (OUTPATIENT)
Dept: INTERNAL MEDICINE | Facility: OTHER | Age: 41
End: 2021-10-28
Payer: MEDICAID

## 2021-10-28 ENCOUNTER — TELEPHONE (OUTPATIENT)
Dept: INTERNAL MEDICINE | Facility: OTHER | Age: 41
End: 2021-10-28

## 2021-10-28 DIAGNOSIS — G47.33 OBSTRUCTIVE SLEEP APNEA, ADULT: ICD-10-CM

## 2021-10-28 DIAGNOSIS — E66.01 MORBID OBESITY (HCC): ICD-10-CM

## 2021-10-28 DIAGNOSIS — E11.21 TYPE 2 DIABETES MELLITUS WITH NEPHROPATHY (HCC): ICD-10-CM

## 2021-10-28 DIAGNOSIS — E28.2 POLYCYSTIC OVARY SYNDROME: ICD-10-CM

## 2021-10-28 PROCEDURE — 99999 PR NO CHARGE: CPT | Performed by: DIETITIAN, REGISTERED

## 2021-10-28 NOTE — PATIENT INSTRUCTIONS
Goals:  1. Walk 5 minutes, 5 days per week. If you feel it become easier, add more time. Make sure to do this after a meal.  2. Continue to check your blood sugars at least 3 times per day before meals; try to do a bedtime blood sugar check   3. Work to limit the soda/juice unless you are having a low blood sugar and then limit to 4 oz at a time.   Try less ice or no ice in your water to help you drink more with it being cold     **We looked at the carbs of your breakfast choices at [facility]: To limit your carbs, stick to a skinny vanilla latte and that will allow room for your bagel   **Start looking a labels and looking up carb information for foods you are eating (goal = 60grams per MEAL)

## 2021-10-28 NOTE — ASSESSMENT & PLAN NOTE
When shes symptomatic, she takes her blood sugars.   They were 94 and 74.   One time this happened at 3AM, and another time at 10 am.   Patient's A1c today in clinic was 10.0, which is slightly better than previously.   Patient's current regimen was 45 BID glargine, SSI humalog (which is usually 10 units for her).   She is also on metformin 850 TID and alogliptin.   Patient encouraged to eat a snack before bed.   She is following with a dietician, and encouraged to follow up with them.

## 2021-10-28 NOTE — PROGRESS NOTES
Liana is here for FU with RD for T2DM  Saw provider yesterday with no changes to her insulin regimen; c/w 45 units BID of long acting insulin and SSI for meals, including metformin  Recent A1c down to 10% which is down from over 11%     Reports her  is in shelter and since that happened she has had time to take care of herself, take her medications as rx and overall in a better place  Reports BS of 74, 94 and another time she couldn't measure it because she felt she could pass out; Each time she was able to eat something and drank soda of about 6 oz and ate something but couldn't remember what she ate  Denies lows in the last 2 weeks   Checking her BS more often - 3 times per day and finding she is doing more of it  She is struggling with limiting soda/juice  Willing to start walking  Eating out still often  Review of food choices shows she is consuming well over 100grams of carbs/meal with high carb foods and large portions    Educated Liana on carb counting, recommended she limit to 60g per meal. There is a huge risk of her having a hypoglycemic event if she takes her 10 u of insulin per meal with 1/2 of the carbs she is used to eating. Recommended we slowly decrease her carb intake to 75g and strongly encouraged her to continue to check pre meal BS, reduce by 1-2 if she sees lows so we don't crash her too hard. Reviewed how to read food labels, how to look up nutrition information.  Set goals; rtc with RD in 1 month.    Time spent: 60 minutes

## 2021-10-28 NOTE — TELEPHONE ENCOUNTER
Angela with [facility] called and advised swab needs to be collected in yellow tube top not red. Please advise if you would like patient to come back so we can recollect. Patient was seen yesterday by

## 2021-10-28 NOTE — ASSESSMENT & PLAN NOTE
PHQ score of 9.   She has a therapist, who she speaks to her home life about.   Patient agreeable to increasing duloxetine 30 BID.

## 2021-10-28 NOTE — TELEPHONE ENCOUNTER
DOCUMENTATION OF PAR STATUS:    1. Name of Medication & Dose: ocyclovir 5% ointment apply 1 g topically eery 3 hours for 8 days     2. Name of Prescription Coverage Company & phone #: [employer] [phone]    3. Date Prior Auth Submitted: 10/28/2021    4. What information was given to obtain insurance decision? Form and notes    5. Prior Auth Letter Approved or Denied? pending    6. Action Taken: Pharmacy/Patient Notified: No

## 2021-11-16 ENCOUNTER — TELEPHONE (OUTPATIENT)
Dept: INTERNAL MEDICINE | Facility: OTHER | Age: 41
End: 2021-11-16

## 2021-11-16 NOTE — TELEPHONE ENCOUNTER
DOCUMENTATION OF PAR STATUS:    1. Name of Medication & Dose: Acyclovir  5% Ointment     2. Name of Prescription Coverage Company & phone #: [place] [employer]    3. Date Prior Auth Submitted: 11/16/21    4. What information was given to obtain insurance decision? R30.0 dx code    5. Prior Auth Status? Pending    6. Patient Notified: no

## 2021-11-19 NOTE — TELEPHONE ENCOUNTER
I called pharmacy to inform ointment being denied. Per [facility] pharmacy they are getting the Acyclovir Tab ready for pt.    Called pt to notify and per pt she uses [facility] in [place] and is needing her refills to be sent there. Pt mentions she is still waiting for her other medications to be refilled as well as there are no additional refills. I asked which medications and pt does not know them all on the top of her head but all of the ones on her list. Pt almost out of her insulin

## 2021-11-29 RX ORDER — OMEPRAZOLE 20 MG/1
CAPSULE, DELAYED RELEASE ORAL
Qty: 90 CAPSULE | Refills: 1 | Status: SHIPPED | OUTPATIENT
Start: 2021-11-29 | End: 2021-12-02 | Stop reason: SDUPTHER

## 2021-11-29 RX ORDER — LEVOTHYROXINE SODIUM 0.15 MG/1
150 TABLET ORAL
Qty: 90 TABLET | Refills: 0 | Status: SHIPPED | OUTPATIENT
Start: 2021-11-29 | End: 2021-12-02 | Stop reason: SDUPTHER

## 2021-11-29 RX ORDER — INSULIN GLARGINE 100 [IU]/ML
45 INJECTION, SOLUTION SUBCUTANEOUS 2 TIMES DAILY
Qty: 10 EACH | Refills: 5 | Status: SHIPPED | OUTPATIENT
Start: 2021-11-29 | End: 2021-12-02 | Stop reason: SDUPTHER

## 2021-11-29 RX ORDER — ATORVASTATIN CALCIUM 20 MG/1
20 TABLET, FILM COATED ORAL DAILY
Qty: 90 TABLET | Refills: 0 | Status: SHIPPED | OUTPATIENT
Start: 2021-11-29 | End: 2021-12-02 | Stop reason: SDUPTHER

## 2021-11-29 RX ORDER — LISINOPRIL 10 MG/1
TABLET ORAL
Qty: 90 TABLET | Refills: 2 | Status: SHIPPED | OUTPATIENT
Start: 2021-11-29 | End: 2021-12-02 | Stop reason: SDUPTHER

## 2021-11-29 RX ORDER — DULOXETIN HYDROCHLORIDE 30 MG/1
30 CAPSULE, DELAYED RELEASE ORAL 2 TIMES DAILY
Qty: 30 CAPSULE | Refills: 1 | Status: SHIPPED | OUTPATIENT
Start: 2021-11-29 | End: 2021-12-02 | Stop reason: SDUPTHER

## 2021-11-29 RX ORDER — ALOGLIPTIN 25 MG/1
1 TABLET, FILM COATED ORAL DAILY
Qty: 30 TABLET | Refills: 6 | Status: SHIPPED | OUTPATIENT
Start: 2021-11-29 | End: 2021-12-02 | Stop reason: SDUPTHER

## 2021-11-29 RX ORDER — MEDROXYPROGESTERONE ACETATE 10 MG/1
10 TABLET ORAL DAILY
Qty: 90 TABLET | Refills: 1 | Status: SHIPPED | OUTPATIENT
Start: 2021-11-29 | End: 2021-12-02 | Stop reason: SDUPTHER

## 2021-11-29 RX ORDER — ALCOHOL ANTISEPTIC PADS
PADS, MEDICATED (EA) TOPICAL
Qty: 100 EACH | Refills: 5 | Status: SHIPPED | OUTPATIENT
Start: 2021-11-29 | End: 2021-12-02 | Stop reason: SDUPTHER

## 2021-12-02 ENCOUNTER — NON-PROVIDER VISIT (OUTPATIENT)
Dept: INTERNAL MEDICINE | Facility: OTHER | Age: 41
End: 2021-12-02
Payer: MEDICAID

## 2021-12-02 ENCOUNTER — TELEPHONE (OUTPATIENT)
Dept: INTERNAL MEDICINE | Facility: OTHER | Age: 41
End: 2021-12-02

## 2021-12-02 DIAGNOSIS — E11.21 TYPE 2 DIABETES MELLITUS WITH NEPHROPATHY (HCC): ICD-10-CM

## 2021-12-02 DIAGNOSIS — E28.2 POLYCYSTIC OVARY SYNDROME: ICD-10-CM

## 2021-12-02 DIAGNOSIS — I10 PRIMARY HYPERTENSION: ICD-10-CM

## 2021-12-02 DIAGNOSIS — K21.9 GASTRO-ESOPHAGEAL REFLUX DISEASE WITHOUT ESOPHAGITIS: ICD-10-CM

## 2021-12-02 DIAGNOSIS — F32.1 CURRENT MODERATE EPISODE OF MAJOR DEPRESSIVE DISORDER WITHOUT PRIOR EPISODE (HCC): ICD-10-CM

## 2021-12-02 DIAGNOSIS — E03.9 HYPOTHYROIDISM, UNSPECIFIED TYPE: ICD-10-CM

## 2021-12-02 PROCEDURE — 97803 MED NUTRITION INDIV SUBSEQ: CPT | Performed by: DIETITIAN, REGISTERED

## 2021-12-02 RX ORDER — ATORVASTATIN CALCIUM 20 MG/1
20 TABLET, FILM COATED ORAL DAILY
Qty: 90 TABLET | Refills: 0 | Status: SHIPPED | OUTPATIENT
Start: 2021-12-02 | End: 2022-03-02

## 2021-12-02 RX ORDER — ALCOHOL ANTISEPTIC PADS
PADS, MEDICATED (EA) TOPICAL
Qty: 100 EACH | Refills: 5 | Status: SHIPPED | OUTPATIENT
Start: 2021-12-02 | End: 2022-03-08 | Stop reason: SDUPTHER

## 2021-12-02 RX ORDER — ALOGLIPTIN 25 MG/1
1 TABLET, FILM COATED ORAL DAILY
Qty: 30 TABLET | Refills: 6 | Status: SHIPPED | OUTPATIENT
Start: 2021-12-02 | End: 2022-04-27

## 2021-12-02 RX ORDER — INSULIN GLARGINE 100 [IU]/ML
45 INJECTION, SOLUTION SUBCUTANEOUS 2 TIMES DAILY
Qty: 10 EACH | Refills: 5 | Status: SHIPPED | OUTPATIENT
Start: 2021-12-02 | End: 2022-04-27

## 2021-12-02 RX ORDER — LISINOPRIL 10 MG/1
TABLET ORAL
Qty: 90 TABLET | Refills: 2 | Status: SHIPPED | OUTPATIENT
Start: 2021-12-02 | End: 2022-03-08 | Stop reason: SDUPTHER

## 2021-12-02 RX ORDER — BLOOD-GLUCOSE METER
1 EACH MISCELLANEOUS 3 TIMES DAILY
Qty: 600 STRIP | Refills: 5 | Status: SHIPPED | OUTPATIENT
Start: 2021-12-02 | End: 2022-05-25

## 2021-12-02 RX ORDER — INSULIN LISPRO 100 [IU]/ML
10 INJECTION, SOLUTION INTRAVENOUS; SUBCUTANEOUS
COMMUNITY
Start: 2021-06-21 | End: 2021-12-02 | Stop reason: SDUPTHER

## 2021-12-02 RX ORDER — MEDROXYPROGESTERONE ACETATE 10 MG/1
10 TABLET ORAL DAILY
Qty: 90 TABLET | Refills: 1 | Status: SHIPPED | OUTPATIENT
Start: 2021-12-02 | End: 2022-03-08 | Stop reason: SDUPTHER

## 2021-12-02 RX ORDER — DULOXETIN HYDROCHLORIDE 30 MG/1
30 CAPSULE, DELAYED RELEASE ORAL 2 TIMES DAILY
Qty: 30 CAPSULE | Refills: 1 | Status: SHIPPED | OUTPATIENT
Start: 2021-12-02 | End: 2022-03-02

## 2021-12-02 RX ORDER — BLOOD-GLUCOSE METER
1 EACH MISCELLANEOUS 3 TIMES DAILY
COMMUNITY
Start: 2021-06-21 | End: 2021-12-02 | Stop reason: SDUPTHER

## 2021-12-02 RX ORDER — LEVOTHYROXINE SODIUM 0.15 MG/1
150 TABLET ORAL
Qty: 90 TABLET | Refills: 0 | Status: SHIPPED | OUTPATIENT
Start: 2021-12-02 | End: 2022-03-02

## 2021-12-02 RX ORDER — INSULIN LISPRO 100 [IU]/ML
10 INJECTION, SOLUTION INTRAVENOUS; SUBCUTANEOUS
Qty: 5 EACH | Refills: 5 | Status: SHIPPED | OUTPATIENT
Start: 2021-12-02 | End: 2022-03-08 | Stop reason: SDUPTHER

## 2021-12-02 RX ORDER — OMEPRAZOLE 20 MG/1
CAPSULE, DELAYED RELEASE ORAL
Qty: 90 CAPSULE | Refills: 1 | Status: SHIPPED | OUTPATIENT
Start: 2021-12-02 | End: 2022-03-08 | Stop reason: SDUPTHER

## 2021-12-02 NOTE — TELEPHONE ENCOUNTER
Pt Came in for an appt with nutrition and stated that she is all out of all of her meds except for her insulin. Please advise

## 2021-12-02 NOTE — PATIENT INSTRUCTIONS
Goals:  1. Healthy snacks to carry with you:  -Premier Protein Shakes or Fairlife shake (30grams)  -Jerky with fruit  - Greek Yogurt   -Hummus and crackers or peanut butter and crackers  -protein bites from [place]  2. Stick to one treat per day (hot chocolate, cookies, pies, muffins, etc)  3. G0 home and take your insulin. You have your new meters to use until you get new test strips. Call the office if you do not get them in the next day or two  4. Check your feet every day

## 2021-12-02 NOTE — PROGRESS NOTES
Liana is here for FU with JACKI for T2DM nutrition education  Prev saw her in October    Reports that she has run out of all of her medictions for the last month and has tried calling the office but there seems to be an issue with pharmacy  She does have insulin but she is afraid to take it because she doesn't have a meter to check her BS (ran out test strips)   Out of her metformin, lisinopril   Pharmacy in chart is up to date     Spoke with the MA who helped verify that her rx were sent in and ready for  2 days ago. Spoke with Liana and told her to  her rx.     Testing BS here in the office and it was 249 and she has not eaten anything  Has had no insulin for at least a week  No sx of excessive thirst or urination   Told her to go right home and take her insulin; we do not have samples here in the office.   Encouraged her to also not go a whole month without taking her meds and to call the office if that happens again    She started doing Instacart which is requiring her to move more and get in more exercise; feeling sore but liking the more movement  Drinking more water, decreased her coffee intake   Sweets during this time of year can be challenging for her  She struggles with snacks during the day    Discussed good foot care with Liana today along with healthy snacks and some foods to keep in her car with her to avoid eating out and eating high carb foods. Provided support and guidance and recommend she come in next month for follow up with RD as she needs ongoing education/support.    Time spent: 60 minutes

## 2021-12-10 NOTE — ASSESSMENT & PLAN NOTE
-Mild with a CO2 of 17 anion gap of 20  -At this point, I do not feel she needs an insulin drip, I think she will transition with IV fluids and subcutaneous insulin  -She certainly does need better control of her diabetes and she agrees  -Repeat BMP  -Electrolyte supplementation as needed  
-Patient has been given Diflucan 200 mg once, this needs to be rechecked in 72 hours to see if she needs to be given additional dosing  
-Profoundly uncontrolled with glucose greater than 700  -Hold home diabetic medications  -Start insulin sliding scale  -Adjust as needed  -Obtain diabetic education  
-Significant, due to profound hyperglycemia  -Start IV fluids  
show

## 2022-01-03 ENCOUNTER — NON-PROVIDER VISIT (OUTPATIENT)
Dept: INTERNAL MEDICINE | Facility: OTHER | Age: 42
End: 2022-01-03
Payer: MEDICAID

## 2022-01-03 DIAGNOSIS — E11.21 TYPE 2 DIABETES MELLITUS WITH NEPHROPATHY (HCC): ICD-10-CM

## 2022-01-03 DIAGNOSIS — G47.33 OBSTRUCTIVE SLEEP APNEA, ADULT: ICD-10-CM

## 2022-01-03 DIAGNOSIS — E66.01 MORBID OBESITY (HCC): ICD-10-CM

## 2022-01-03 PROCEDURE — 97803 MED NUTRITION INDIV SUBSEQ: CPT | Performed by: DIETITIAN, REGISTERED

## 2022-01-03 NOTE — PROGRESS NOTES
"Liana is here for FU with RD for diabetes education  Reports that her blood sugars are over 300 for most days but denies any above 500 and no readings below 100  Checking her blood sugars 3 times per day but wonder if a CGM might be helpful  She feels like her food choices have contributed to her high blood sugars as she reports she is taking her insulin as prescribed  Has been snacking on sweets, cookies, pies, bread and feels like she is overdoing it  Eating bread with coffee with breakfast  Having left overs from family gatherings which is often higher carb foods  Recently hurt her back and not able to do move much but recently feeling better  Was trying to drink more water but has gotten back to no drinking enough  Sleep has been hit or miss - lost her routine with being home so much with the snow and not having a car she can use to get anywhere  She was also drinking more soda/juice  Reports a lot stress     Reviewed with Liana the importance of working with consistency around healthy behaviors vs being \"all or nothing\". Discussed strategies to support her with getting back on track. In discussion, food cost and lack of money for food was identified. She does not qualify for food stamps per patient but is trying to use one food bank. Provided handout on other food schuler/pantry spots in town for her to use to support not taking sugar sweetened beverages and seeing if we can help her with more healthy options. Supporting her with food security is necessary for her health so we will start there along with encouragement to get back to doing what she can do.   Set goals; rtc with RD in 1 month     Time spent: 45 minutes           "

## 2022-01-03 NOTE — PATIENT INSTRUCTIONS
Goals:  1. Walk as much as you can. Aim for 5 minutes, 3 days per week.  Pay careful attention to your back and stop if you feel any pain   2. Get back to your routine. Remind yourself about being consistent  3. Drink more water.Aim for 4 bottles per day. Drink with your meals. Limit that juice to 4-8oz per day until it is gone  4. Make sure to include protein with your meals so that we can delay the blood sugar spike   5. Download [employer] and set up your profile. Play around with the yehuda as much as you can before next appointment.

## 2022-01-17 ENCOUNTER — OFFICE VISIT (OUTPATIENT)
Dept: INTERNAL MEDICINE | Facility: OTHER | Age: 42
End: 2022-01-17
Payer: MEDICAID

## 2022-01-17 VITALS
DIASTOLIC BLOOD PRESSURE: 80 MMHG | TEMPERATURE: 97.3 F | HEART RATE: 85 BPM | BODY MASS INDEX: 42.84 KG/M2 | HEIGHT: 63 IN | OXYGEN SATURATION: 99 % | SYSTOLIC BLOOD PRESSURE: 128 MMHG | WEIGHT: 241.8 LBS

## 2022-01-17 DIAGNOSIS — B37.9 YEAST INFECTION: ICD-10-CM

## 2022-01-17 DIAGNOSIS — M79.672 BILATERAL FOOT PAIN: ICD-10-CM

## 2022-01-17 DIAGNOSIS — E03.8 OTHER SPECIFIED HYPOTHYROIDISM: ICD-10-CM

## 2022-01-17 DIAGNOSIS — M54.50 ACUTE BILATERAL LOW BACK PAIN WITHOUT SCIATICA: ICD-10-CM

## 2022-01-17 DIAGNOSIS — M79.671 BILATERAL FOOT PAIN: ICD-10-CM

## 2022-01-17 DIAGNOSIS — E11.21 TYPE 2 DIABETES MELLITUS WITH NEPHROPATHY (HCC): ICD-10-CM

## 2022-01-17 PROCEDURE — 99213 OFFICE O/P EST LOW 20 MIN: CPT | Mod: GE | Performed by: STUDENT IN AN ORGANIZED HEALTH CARE EDUCATION/TRAINING PROGRAM

## 2022-01-17 RX ORDER — FLUCONAZOLE 100 MG/1
100 TABLET ORAL ONCE
Qty: 1 TABLET | Refills: 5 | Status: SHIPPED | OUTPATIENT
Start: 2022-01-17 | End: 2022-03-07

## 2022-01-17 ASSESSMENT — PATIENT HEALTH QUESTIONNAIRE - PHQ9
1. LITTLE INTEREST OR PLEASURE IN DOING THINGS: NOT AT ALL
SUM OF ALL RESPONSES TO PHQ9 QUESTIONS 1 AND 2: 0
7. TROUBLE CONCENTRATING ON THINGS, SUCH AS READING THE NEWSPAPER OR WATCHING TELEVISION: NOT AT ALL
2. FEELING DOWN, DEPRESSED, IRRITABLE, OR HOPELESS: NOT AT ALL
8. MOVING OR SPEAKING SO SLOWLY THAT OTHER PEOPLE COULD HAVE NOTICED. OR THE OPPOSITE, BEING SO FIGETY OR RESTLESS THAT YOU HAVE BEEN MOVING AROUND A LOT MORE THAN USUAL: NOT AT ALL
9. THOUGHTS THAT YOU WOULD BE BETTER OFF DEAD, OR OF HURTING YOURSELF: NOT AT ALL
6. FEELING BAD ABOUT YOURSELF - OR THAT YOU ARE A FAILURE OR HAVE LET YOURSELF OR YOUR FAMILY DOWN: NOT AL ALL
5. POOR APPETITE OR OVEREATING: NOT AT ALL
SUM OF ALL RESPONSES TO PHQ QUESTIONS 1-9: 0
4. FEELING TIRED OR HAVING LITTLE ENERGY: NOT AT ALL
3. TROUBLE FALLING OR STAYING ASLEEP OR SLEEPING TOO MUCH: NOT AT ALL

## 2022-01-17 ASSESSMENT — PAIN SCALES - GENERAL: PAINLEVEL: 6=MODERATE PAIN

## 2022-01-17 ASSESSMENT — FIBROSIS 4 INDEX: FIB4 SCORE: 0.37

## 2022-01-17 NOTE — PATIENT INSTRUCTIONS
NO medication changes  Start Diflucan, can repeat once if symptoms still present     Referral to Endocrinology - Dr. Mckinnon

## 2022-01-17 NOTE — PROGRESS NOTES
Established Patient    Patient Care Team:  Edda Nice D.O. as PCP - General (Internal Medicine)  Rain Latif (Inactive)    HPI:  Liana Obrien is a 41 y.o. female who presents today with the following Chief Complaint(s): Follow up for Diagnoses of Type 2 diabetes mellitus with nephropathy (HCC), Yeast infection, Other specified hypothyroidism, and Bilateral foot pain were pertinent to this visit.    Type II DM   Measures blood glucose in the AM and PM but spends lots of time outside the house and would benefit from continuous glucose monitoring   Last A1c 10 in  October 2021   Regimen: Insulin 45 Units (Basal),  Metformin, Alogliptin  Seen Ophthalmology: previously seen every six months, now yearly     Yeast Infection   Reports vaginal discharge and discomfort, with dysuria. Same as her previous yeast infections.     Lower Back Pain  Intermittent 1 month, no trauma, never had this pain before  Worse with sitting down and trying to get up   No incontinence, saddle anesthesia,      Bilateral foot pain  History of neuropathy per records and states she was previously on Gabapentin but did not tolerate the drowsiness of the medication.   Intermittent pain to bottom of her feet, worse with prolonged standing and walking   Sitting/Resting improves pain   NO radiation to up legs. No Numbness/Tingling, No weakness    ROS:     Denies any new chest pain or shortness of breath.  No changes to  bowel function.   See HPI.      Past Medical History:   Diagnosis Date   • Bipolar disorder (HCC) 8/9/2018   • Candida vaginitis    • Chickenpox    • Diabetes 1.5, managed as type 2 ([facility])    • DVT (deep venous thrombosis) ([facility])    • Dysfunctional uterine bleeding    • Fibroids    • Herpes    • Hypertension    • Hypothyroidism due to acquired atrophy of thyroid 1/22/2016   • Personal history of venous thrombosis and embolism     DVT in BLE years ago   • Sleep apnea    • Uncontrolled type 2 diabetes mellitus without  complication, without long-term current use of insulin 3/12/2015     Social History     Tobacco Use   • Smoking status: Former Smoker     Packs/day: 0.50     Years: 0.30     Pack years: 0.15     Types: Cigarettes     Quit date:      Years since quittin.0   • Smokeless tobacco: Never Used   • Tobacco comment: for 3 months at a time on and off    Substance Use Topics   • Alcohol use: No     Alcohol/week: 0.0 oz   • Drug use: No     Current Outpatient Medications   Medication Sig Dispense Refill   • metFORMIN (GLUCOPHAGE) 850 MG Tab Take 1 Tablet by mouth 3 times a day for 90 days. 90 Tablet 2   • fluconazole (DIFLUCAN) 100 MG Tab Take 1 Tablet by mouth one time for 1 dose. 1 Tablet 5   • omeprazole (PRILOSEC) 20 MG delayed-release capsule OMEPRAZOLE 20 MG CPDR 90 Capsule 1   • medroxyPROGESTERone (PROVERA) 10 MG Tab Take 1 Tablet by mouth every day. 90 Tablet 1   • lisinopril (PRINIVIL) 10 MG Tab LISINOPRIL 10 MG TABS 90 Tablet 2   • levothyroxine (SYNTHROID) 150 MCG Tab Take 1 Tablet by mouth every morning on an empty stomach for 90 days. 90 Tablet 0   • Insulin Pen Needle 32 G x 4 mm (EASY COMFORT PEN NEEDLES) EASY COMFORT PEN NEEDLES 32G X 4 MM MISC 100 Each 5   • insulin glargine (INSULIN GLARGINE) 100 UNIT/ML Solution Pen-injector injection Inject 45 Units under the skin 2 times a day. 10 Each 5   • DULoxetine (CYMBALTA) 30 MG Cap DR Particles Take 1 Capsule by mouth 2 times a day. 30 Capsule 1   • atorvastatin (LIPITOR) 20 MG Tab Take 1 Tablet by mouth every day for 90 days. 90 Tablet 0   • Alogliptin Benzoate 25 MG Tab Take 1 Tablet by mouth every day. 30 Tablet 6   • insulin lispro (HUMALOG KWIKPEN) 100 UNIT/ML Solution Pen-injector injection PEN Inject 10 Units under the skin 3 times a day before meals. 5 Each 5   • glucose blood (EASY TALK BLOOD GLUCOSE TEST) strip 1 Strip by Other route 3 times a day. (Patient not taking: Reported on 2022) 600 Strip 5     No current facility-administered  "medications for this visit.       Physical Exam:  /80 (BP Location: Left arm, Patient Position: Sitting, BP Cuff Size: Adult long)   Pulse 85   Temp 36.3 °C (97.3 °F) (Temporal)   Ht 1.6 m (5' 3\")   Wt 110 kg (241 lb 12.8 oz)   SpO2 99%   BMI 42.83 kg/m²   General: Well developed, well nourished female, in no distress.  Eyes: Conjuntiva without any obvious injection or erythema.   Cardiovascular: Heart is regular with no murmur  Lungs: Clear to auscultation bilaterally. No wheezes, rhonci or crackles heard. Respiratory effort is normal.  Abd: Soft, non-tender  Ext: No edema  Physical Exam  Cardiovascular:      Pulses:           Dorsalis pedis pulses are 1+ on the right side and 1+ on the left side.   Musculoskeletal:      Right foot: Normal range of motion. No deformity.      Left foot: Normal range of motion. No deformity.   Feet:      Right foot:      Skin integrity: Skin integrity normal.      Left foot:      Skin integrity: Skin integrity normal.      Comments: Monofilament: NEGATIVE  No Ulceration  Decreased hair distribution       Assessment and Plan:   Diagnoses and all orders for this visit:  Type 2 diabetes mellitus with nephropathy (HCC)  -     Referral to Endocrinology  -     metFORMIN (GLUCOPHAGE) 850 MG Tab; Take 1 Tablet by mouth 3 times a day for 90 days.  -     Comp Metabolic Panel; Future  -     Hemoglobin A1c; Future  -     Lipid Profile; Future  -     Microalbumin Creat Ratio Urine - Lab Collect; Future  Yeast infection  -     fluconazole (DIFLUCAN) 100 MG Tab; Take 1 Tablet by mouth one time for 1 dose.  Other specified hypothyroidism  -     TSH; Future  Bilateral foot pain  -     US-EXTREMITY ARTERY LOWER BILAT W/SAMANTA (COMBO); Future      Labs ordered - due at follow up visit.   Refill for diflucan with refills given recurrent yeast infections from uncontrolled DM   Monofilament is negative and without parasthesias questions previous diagnosis of neuropathy. She would benefit from an " SAMANTA to evaluate for PAD given her symptom profile and risk factors for PAD.   Back Pain: Recommend PT given no alarm symptoms, and non-traumatic origin  Follow up in 3 months    NEEDS PAP SMEAR - will schedule next available for pap smear       Edda Nice D.O PGY III  [facility] [employer] [facility]

## 2022-02-16 ENCOUNTER — NON-PROVIDER VISIT (OUTPATIENT)
Dept: INTERNAL MEDICINE | Facility: OTHER | Age: 42
End: 2022-02-16
Payer: MEDICAID

## 2022-02-16 DIAGNOSIS — E66.01 MORBID OBESITY (HCC): ICD-10-CM

## 2022-02-16 DIAGNOSIS — G47.33 OBSTRUCTIVE SLEEP APNEA, ADULT: ICD-10-CM

## 2022-02-16 DIAGNOSIS — I10 HYPERTENSION, UNSPECIFIED TYPE: ICD-10-CM

## 2022-02-16 DIAGNOSIS — E11.21 TYPE 2 DIABETES MELLITUS WITH NEPHROPATHY (HCC): ICD-10-CM

## 2022-02-16 PROCEDURE — 97803 MED NUTRITION INDIV SUBSEQ: CPT | Mod: GT | Performed by: DIETITIAN, REGISTERED

## 2022-02-16 NOTE — PROGRESS NOTES
"Liana is here for FU with RD for nutrition education for her Type 2 DM  She has been drinking more water and has gotten back into a regular routine  She has been working some but not as much as she wants to because she is not feeling well due to higher blood sugars and bleeding from her vagina - she has not called the doctor because she's been told the bleeding is not caused by the insulin although she thinks it does     She is checking her blood sugars every day, but hasn't checked today yet  Reports she is seeing 250-350 blood sugars and \"nothing brings it down\"  She tries to eat veggies and drinking water and nothing is working  Yesterday she took her 45 units morning and night of her long acting Basaglar and took 20 units of Humalog at lunch since she didn't eat in the morning   She is still taking her metformin   Reports she has been consistent with taking her Basaglar     Further probing lead her to indicate she will fall asleep sometimes she'll skip the evening dose of Basaglar   She doesn't like taking the second dose of the basalgar - makes her feel sick and makes her not feel good     She is scheduled with endo on 2/23 with Shira Pulido!  Recommended that she test her blood sugar more often before she meets with Shira so she has better data as I suspect there is a need to adjust her med  Getting a new A1c here by next month - last one in Octover was 10%    Still not eating breakfast, sticking to 2 meals per day and will eat in the middle of the night if she takes the insulin     It appears that Liana continues to be inconsistent with the insulin she is taking and I believe it is due to fear of dropping low. She has a lot of fear also on what the insulin is doing to her that she does think is happening (heavier periods, fatigue, etc) and I encouraged her to discuss this with her endocrinologist. I also discussed the importance of her checking her blood sugars more often as well as working on consistent " carb and protein at her meals and to not snack on those sweets. Discussed the benefits of also reducing her sitting time. Set goals; rtc with RD in 1 month.    Time spent: 60 minutes    This evaluation was conducted via Zoom using secure and encrypted videoconferencing technology. The patient was in a private location in the [facility].     The patient's identity was confirmed and verbal consent was obtained for this virtual visit.

## 2022-02-16 NOTE — PATIENT INSTRUCTIONS
Goals:  1. To prepare for your appointment with Shira Pulido next week, please check her blood sugar using the same meter at least 5 times per day  -first thing upon waking up, again after meals, and again before bed  -When you see Shira, make sure to tell her you are not always taking the evening Basalgar because you do not feel well  -Ask about getting a Continuous Glucose Monitoring system   2. Drink lots of water to stay hydrated  3. Make sure to have protein with each meal and limit those carbohydrates to one thing per meal   4. Limit your sitting time. Make sure to not sit more than 1 hour at a time; get up and move your body

## 2022-02-22 VITALS — BODY MASS INDEX: 42.87 KG/M2 | WEIGHT: 242 LBS

## 2022-02-22 ASSESSMENT — FIBROSIS 4 INDEX: FIB4 SCORE: 0.38

## 2022-03-04 DIAGNOSIS — B37.9 YEAST INFECTION: ICD-10-CM

## 2022-03-07 ENCOUNTER — APPOINTMENT (OUTPATIENT)
Dept: URGENT CARE | Facility: PHYSICIAN GROUP | Age: 42
End: 2022-03-07
Payer: MEDICAID

## 2022-03-07 RX ORDER — FLUCONAZOLE 100 MG/1
100 TABLET ORAL ONCE
Qty: 1 TABLET | Refills: 5 | Status: SHIPPED | OUTPATIENT
Start: 2022-03-07 | End: 2022-03-07

## 2022-03-07 NOTE — TELEPHONE ENCOUNTER
Last seen: 01/17/2022 by Dr. barraza  Next appt: 03/17/2022 with Dr. barraza    Was the patient seen in the last year in this department? Yes   Does patient have an active prescription for medications requested? No   Received Request Via: Pharmacy

## 2022-03-08 DIAGNOSIS — E28.2 POLYCYSTIC OVARY SYNDROME: ICD-10-CM

## 2022-03-08 DIAGNOSIS — K21.9 GASTRO-ESOPHAGEAL REFLUX DISEASE WITHOUT ESOPHAGITIS: ICD-10-CM

## 2022-03-08 DIAGNOSIS — E11.21 TYPE 2 DIABETES MELLITUS WITH NEPHROPATHY (HCC): ICD-10-CM

## 2022-03-08 DIAGNOSIS — I10 PRIMARY HYPERTENSION: ICD-10-CM

## 2022-03-08 DIAGNOSIS — F32.1 CURRENT MODERATE EPISODE OF MAJOR DEPRESSIVE DISORDER WITHOUT PRIOR EPISODE (HCC): ICD-10-CM

## 2022-03-08 DIAGNOSIS — E03.9 HYPOTHYROIDISM, UNSPECIFIED TYPE: ICD-10-CM

## 2022-03-08 RX ORDER — ATORVASTATIN CALCIUM 20 MG/1
20 TABLET, FILM COATED ORAL NIGHTLY
COMMUNITY
End: 2022-03-08 | Stop reason: SDUPTHER

## 2022-03-08 NOTE — TELEPHONE ENCOUNTER
Received request via: Patient    Was the patient seen in the last year in this department? Yes  ls 01/17/22 next 03/17/22  Does the patient have an active prescription (recently filled or refills available) for medication(s) requested? No

## 2022-03-09 DIAGNOSIS — K21.9 GASTRO-ESOPHAGEAL REFLUX DISEASE WITHOUT ESOPHAGITIS: ICD-10-CM

## 2022-03-09 RX ORDER — ATORVASTATIN CALCIUM 20 MG/1
20 TABLET, FILM COATED ORAL NIGHTLY
Qty: 90 TABLET | Refills: 3 | Status: SHIPPED | OUTPATIENT
Start: 2022-03-09 | End: 2022-04-27

## 2022-03-09 RX ORDER — MEDROXYPROGESTERONE ACETATE 10 MG/1
10 TABLET ORAL DAILY
Qty: 90 TABLET | Refills: 1 | Status: SHIPPED | OUTPATIENT
Start: 2022-03-09 | End: 2022-04-27

## 2022-03-09 RX ORDER — LISINOPRIL 10 MG/1
TABLET ORAL
Qty: 90 TABLET | Refills: 2 | Status: SHIPPED | OUTPATIENT
Start: 2022-03-09 | End: 2022-04-27

## 2022-03-09 RX ORDER — ALCOHOL ANTISEPTIC PADS
PADS, MEDICATED (EA) TOPICAL
Qty: 100 EACH | Refills: 5 | Status: SHIPPED | OUTPATIENT
Start: 2022-03-09 | End: 2022-05-25

## 2022-03-09 RX ORDER — INSULIN LISPRO 100 [IU]/ML
20 INJECTION, SOLUTION INTRAVENOUS; SUBCUTANEOUS
Qty: 5 EACH | Refills: 5 | Status: SHIPPED | OUTPATIENT
Start: 2022-03-09 | End: 2022-05-25

## 2022-03-09 RX ORDER — OMEPRAZOLE 20 MG/1
CAPSULE, DELAYED RELEASE ORAL
Qty: 90 CAPSULE | Refills: 1 | Status: SHIPPED | OUTPATIENT
Start: 2022-03-09 | End: 2022-03-10

## 2022-03-09 RX ORDER — DULOXETIN HYDROCHLORIDE 30 MG/1
CAPSULE, DELAYED RELEASE ORAL
Qty: 180 CAPSULE | Refills: 1 | Status: SHIPPED | OUTPATIENT
Start: 2022-03-09 | End: 2022-04-21 | Stop reason: SDUPTHER

## 2022-03-09 RX ORDER — LEVOTHYROXINE SODIUM 0.15 MG/1
TABLET ORAL
Qty: 30 TABLET | Refills: 1 | Status: SHIPPED | OUTPATIENT
Start: 2022-03-09 | End: 2022-07-02

## 2022-03-10 RX ORDER — OMEPRAZOLE 20 MG/1
CAPSULE, DELAYED RELEASE ORAL
Qty: 90 CAPSULE | Refills: 1 | Status: SHIPPED | OUTPATIENT
Start: 2022-03-10 | End: 2022-03-29 | Stop reason: SDUPTHER

## 2022-03-10 RX ORDER — OMEPRAZOLE 20 MG/1
CAPSULE, DELAYED RELEASE ORAL
Qty: 90 CAPSULE | Refills: 1 | Status: SHIPPED | OUTPATIENT
Start: 2022-03-10 | End: 2022-03-10

## 2022-03-10 NOTE — TELEPHONE ENCOUNTER
Omeprazole Refill    Last seen: 1/17/22  by Dr. Nice  Next appt: 3/17/22 with Dr. Nice    Was the patient seen in the last year in this department? Yes   Does patient have an active prescription for medications requested? No   Received Request Via: Pharmacy

## 2022-03-17 ENCOUNTER — APPOINTMENT (OUTPATIENT)
Dept: INTERNAL MEDICINE | Facility: OTHER | Age: 42
End: 2022-03-17
Payer: MEDICAID

## 2022-03-21 ENCOUNTER — HOSPITAL ENCOUNTER (OUTPATIENT)
Dept: RADIOLOGY | Facility: MEDICAL CENTER | Age: 42
End: 2022-03-21
Attending: STUDENT IN AN ORGANIZED HEALTH CARE EDUCATION/TRAINING PROGRAM
Payer: MEDICAID

## 2022-03-21 DIAGNOSIS — M79.671 BILATERAL FOOT PAIN: ICD-10-CM

## 2022-03-21 DIAGNOSIS — M79.672 BILATERAL FOOT PAIN: ICD-10-CM

## 2022-03-21 PROCEDURE — 93922 UPR/L XTREMITY ART 2 LEVELS: CPT

## 2022-03-24 ENCOUNTER — NON-PROVIDER VISIT (OUTPATIENT)
Dept: INTERNAL MEDICINE | Facility: OTHER | Age: 42
End: 2022-03-24
Payer: MEDICAID

## 2022-03-24 VITALS — WEIGHT: 232 LBS | BODY MASS INDEX: 41.1 KG/M2

## 2022-03-24 DIAGNOSIS — E66.01 MORBID OBESITY (HCC): ICD-10-CM

## 2022-03-24 DIAGNOSIS — G47.33 OBSTRUCTIVE SLEEP APNEA, ADULT: ICD-10-CM

## 2022-03-24 DIAGNOSIS — I10 HYPERTENSION, UNSPECIFIED TYPE: ICD-10-CM

## 2022-03-24 DIAGNOSIS — E11.21 TYPE 2 DIABETES MELLITUS WITH NEPHROPATHY (HCC): ICD-10-CM

## 2022-03-24 PROCEDURE — 97803 MED NUTRITION INDIV SUBSEQ: CPT | Performed by: DIETITIAN, REGISTERED

## 2022-03-24 ASSESSMENT — FIBROSIS 4 INDEX: FIB4 SCORE: 0.38

## 2022-03-24 NOTE — PROGRESS NOTES
Liana is here for FU with RD for type 2 diabetes nutrition education and weight management    She finally got to see the endocrinology team with [facility]   Changes they made:  Added ozempic, added Tresiba (30 units once per day) instead of Lantus  No A1c in the office   She continues with Humalog - taking 10 units some days and some days zero     When she first made the changes she saw her BS go up significantly but she saw them again and they said it could take time. She is now having significant UTIs  Strongly encouraged her to call them again   Having challenges with her pharmacy to get her medications     She reports that she did ask them for a CGM but they told her that it won't work correctly if her BS are above 250 and then once she gets down to that then she can get a CGM. If this is true, I strongly disagree with this statement.     She reports she did not check her BS yet today, it is already 10:30am  Yesterday her  in AM and didn't check again  She is inconsistent with her SMBS and often can not check them all day    She has noticed the Ozempic is causing less of an appetite and now eating only 1-2 times per day.   Now she is snacking more often so then does not take Humalog unless it's a meal   She could not remember what her sliding scale is    A day she does not eat until 5pm/6pm and then ends up eating a lot - chips/candy, gatorade or juice - ends up taking no Humalog     Other days she will eat lunch first - cheese, yogurt, fruit cup with beef jerky, or a sandwich with water like yesterday - ended up taking no Humalog yesterday  Then later in the day she had yogurt/cheese - no Humalog    Through this conversation she indicated for the last week she has not taken her Humalog any day this week. She reported she has a fear of eating     She has also noticed weight loss of at least 10 pounds     Reviewed with Liana basic self management skills with regards to her blood sugars. It  seems she needs consistent reinforcement and reminders and education to support her. My biggest concern is that she is not consistent with SMBS and I feel it would benefit her greatly to start a CGM now and not wait. I think if she saw her BS she would react differently, in real time.   Discussed strategies to support her with her reduced appetite and her focus on lean proteins with veggies/fruits vs the processed foods and sugar sweetened beverages that she can do. RTC with RD in 1 month    Time spent: 60 minutes

## 2022-03-24 NOTE — PATIENT INSTRUCTIONS
Goals:  1. Check your blood sugars at least 3 times per day and I will reach out to Endocrinology to ask about the CGM  2. Take your Humalog!!  If you have a smaller meal or a snack, ok to take 3 - 5 units for that  3. Drink lots of water and stick to no calorie beverages   -stick to Crystal Light, Gatorade Zero

## 2022-03-25 ENCOUNTER — PHYSICAL THERAPY (OUTPATIENT)
Dept: PHYSICAL THERAPY | Facility: REHABILITATION | Age: 42
End: 2022-03-25
Attending: STUDENT IN AN ORGANIZED HEALTH CARE EDUCATION/TRAINING PROGRAM
Payer: MEDICAID

## 2022-03-25 DIAGNOSIS — M54.50 ACUTE BILATERAL LOW BACK PAIN WITHOUT SCIATICA: ICD-10-CM

## 2022-03-25 PROCEDURE — 97162 PT EVAL MOD COMPLEX 30 MIN: CPT

## 2022-03-25 SDOH — ECONOMIC STABILITY: GENERAL: QUALITY OF LIFE: GOOD

## 2022-03-25 ASSESSMENT — ENCOUNTER SYMPTOMS
PAIN SCALE AT LOWEST: 0
PAIN SCALE: 5
PAIN SCALE AT HIGHEST: 10

## 2022-03-25 NOTE — OP THERAPY EVALUATION
Outpatient Physical Therapy  INITIAL EVALUATION    [address]  [address]  [facility] [address]  Phone:  [phone]  Fax:  [phone]    Date of Evaluation: 03/25/2022    Patient: Liana Obrien  YOB: 1980  MRN: [id number]     Referring Provider: Edda Nice D.O.  [address] [place] [address]   Referring Diagnosis Acute bilateral low back pain without sciatica [M54.50]     Time Calculation  Start time: 1030  Stop time: 1113 Time Calculation (min): 43 minutes         Chief Complaint: Back Problem and Weakness    Visit Diagnoses     ICD-10-CM   1. Acute bilateral low back pain without sciatica  M54.50       Date of onset of impairment: No data found    Subjective:   History of Present Illness:     Date of onset:  1/25/2022    Mechanism of injury:  Patient presents to physical therapy with a chief complaint of low back pain. The back pain started approximately two months ago without a known mechanism of injury.She has diabetes and has gained some weight from her medications, she thinks that may be contributing to her back pain. She does have a diagnosis of depression and has struggled with that in the past. She has noticed it most when she has to sit for a while, approximately 5-10 minutes, and is unable to change positions to relieve the pain. 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     She did say she has some issues with incontinence that is off and on which is correlated with her blood sugar being high. Patient denied any other or new changes to bowel and bladder incontinence.     She states the pain starts as a mild pressure that will slowly start to build and will sometimes get very sharp. She has noticed pain will occasionally go down her leg to her foot. She doesn't have any numbness or tingling in her lower extremities.     She has increases of back pain when she is sitting, bending down, getting  up from bed, getting down closer to the floor and having to stand back up, standing for more than 10 minutes. Because of her pain he has had to get a chair for the shower. If she is walking in the grocery store and able to use the shopping cart she is able to walk without an increase in symptoms. She hasn't noticed anything that makes her back pain better.   Quality of life:  Good  Sleep disturbance:  Difficulty falling asleep and non-restful sleep  Pain:     Current pain ratin    At best pain ratin    At worst pain rating:  10    Location:  Starts at low back to butt and will move down legs to feet  Patient Goals:     Patient goals for therapy:  Decreased pain and increased strength      Past Medical History:   Diagnosis Date   • Bipolar disorder ([facility]) 2018   • Candida vaginitis    • Chickenpox    • Diabetes 1.5, managed as type 2 ([facility])    • DVT (deep venous thrombosis) ([facility])    • Dysfunctional uterine bleeding    • Fibroids    • Herpes    • Hypertension    • Hypothyroidism due to acquired atrophy of thyroid 2016   • Personal history of venous thrombosis and embolism     DVT in BLE years ago   • Sleep apnea    • Uncontrolled type 2 diabetes mellitus without complication, without long-term current use of insulin 3/12/2015     No past surgical history on file.  Social History     Tobacco Use   • Smoking status: Former Smoker     Packs/day: 0.50     Years: 0.30     Pack years: 0.15     Types: Cigarettes     Quit date: 2018     Years since quittin.2   • Smokeless tobacco: Never Used   • Tobacco comment: for 3 months at a time on and off    Substance Use Topics   • Alcohol use: No     Alcohol/week: 0.0 oz     Family and Occupational History     Socioeconomic History   • Marital status:      Spouse name: Not on file   • Number of children: Not on file   • Years of education: Not on file   • Highest education level: Not on file   Occupational History   • Not on file       Objective     Static  Posture     Comments  Vitals  /84  SPO2: 97%  HR 79    Observations     Additional Observation Details  No seated or standing pain behaviors   Increased bilateral foot pronation during gait    Postural Observations  Seated posture: good  Standing posture: good    Additional Postural Observation Details  Has some increased lumbar lordosis     Hip Screen   Hip range of motion within functional limits with the following exceptions: She had bilateral decreased hip internal rotation more significantly on the right than the left.   Hip strength within functional limits with the following exceptions: Decreased strength with hip flexion, extension, abduction and adduction  Hip joint mobility within functional limits with the following exceptions: Decreased hip internal rotation most likely from tight hip external rotators     Neurological Testing     Reflexes   Left   Patellar (L4): normal (2+)  Achilles (S1): trace (1+)    Right   Patellar (L4): normal (2+)  Achilles (S1): trace (1+)    Myotome testing   Lumbar (left)   L1 (hip flexors): 4+  L2 (hip flexors): 4+  L3 (knee extensors): 4+  L5 (great toe extension): 4+    Lumbar (right)   L1 (hip flexors): 4  L2 (hip flexors): 4  L3 (knee extensors): 4+  L5 (great toe extension): 4+    Palpation     Right   Tenderness of the piriformis.     Active Range of Motion     Lumbar   Flexion: within functional limits  Extension: within functional limits  Left lateral flexion: decreased  Right lateral flexion: decreased  Left rotation: decreased  Right rotation: decreased  Left Hip   Flexion: WFL  Extension: WFL  Abduction: WFL  Adduction: WFL    Right Hip   Flexion: WFL  Extension: WFL  Abduction: WFL  Adduction: WFL    Additional Active Range of Motion Details  Rotation to the right- increase in symptom     Passive Range of Motion   Left Hip   Flexion: WFL  Internal rotation (90/90): 5 degrees with pain    Right Hip   Flexion: WFL  Internal rotation (90/90): 3 degrees with  pain    Joint Play   Spine     Central [place]        T10: hypomobile       T11: hypomobile       T12: hypomobile       L1: hypomobile       L2: hypomobile       L3: hypomobile       L4: hypomobile       L5: painful       S1: painful        Strength:      Left Hip   Planes of Motion   Flexion: 4+  Extension: 4+ (Prone position with bent knee)  Abduction: 3+ (Side lying )  Adduction: 3+ (Side-lying )    Right Hip   Planes of Motion   Flexion: 4+  Extension: 4+ (Prone with knee flexed )  Abduction: 3+ (Side- lying )  Adduction: 3+ (Side lying )    Left Knee   Flexion: 4+  Extension: 4+    Right Knee   Flexion: 4+  Extension: 4+    Left Ankle/Foot   Dorsiflexion: 4+    Right Ankle/Foot   Dorsiflexion: 4+    Additional Strength Details  Sustained bridge hold: 15 seconds     Tests     Left Hip   Positive FADIR.   Negative VIDAL.   Alex: Positive.   90/90 SLR: Positive.   SLR: Negative.     Right Hip   Positive FADIR.   Negative VIDAL.   Alex: Positive.   90/90 SLR: Positive.   SLR: Negative.       Exercises/Treatment  Time-based treatments/modalities:           Assessment, Response and Plan:   Impairments: impaired physical strength    Assessment details:  Patient is a 42 year old female who presents to physical therapy with a chief complaint of low back pain. She has been back pain for the last 2 months without a mechanism of injury. Based off current subjective and objective tests and measures her back pain is likely due to decreased strength and decreased flexibility of hip flexion, internal and external rotation. This can be seen by her sustained bridge hold and MMT as well as decreased knee extension with hamstring length test and quadricept length with Alex testing.She currently has has a 50% disability on the Keyshawn- Rashawn Low Back Pain and Disability Questionnaire. She will continue to benefit from skilled physical therapy to improve her strength, mobility, and muscular endurance.   Barriers to therapy:   Comorbidities and psychosocial  Prognosis: good    Goals:   Short Term Goals:   1. Patient will be compliant with HEP for improved strength, stability and muscular endurance.  2. Patient will be able to sit for 10 minutes with symptoms of 2-3/10   3. Patient will be able to  the shower for 15 minutes with symptoms of 3-4/10   Short term goal time span:  2-4 weeks      Long Term Goals:    1. Patient will be able to perform hip extension, abduction and adduction MMT and receive a 4+/5 to show increases in hip strength for improved muscle balance and stability of her hips and spine.   2. Patient will be able to sit for 20 minutes without an increase in low back symptoms.  3. Patient will have a supine hamstring length test of WNL to show changes in muscle length for improved mobility to decrease external stress on her low back.   4. Patient will demonstrate and improved sustained bridge hold test to demonstrate improvement in lower extremity and back strength.   5. Patient will be able to stand for 30 minutes with 1-2/10 symptoms so she is able to prepare a meal and clean up after for improved independence with ADLs.   Long term goal time span:  6-8 weeks    Plan:   Therapy options:  Physical therapy treatment to continue  Planned therapy interventions:  Neuromuscular Re-education (CPT 89345), Therapeutic Exercise (CPT 17369) and Therapeutic Activities (CPT 00420)  Frequency:  2x week  Duration in weeks:  8  Discussed with:  Patient      Functional Assessment Used  Keyshawn Rashawn Low Back Pain and Disability Score: 50     Referring provider co-signature:  I have reviewed this plan of care and my co-signature certifies the need for services.    Certification Period: 03/25/2022 to  05/20/22    Physician Signature: ________________________________ Date: ______________       [x] As the licensed therapist supervising this student, I was present during the entire treatment session directing the care and reviewing the  assessment plan.  I reviewed all documentation prior to signing. The following changes or alternations were made by the licensed therapist.

## 2022-03-28 ENCOUNTER — APPOINTMENT (OUTPATIENT)
Dept: PHYSICAL THERAPY | Facility: REHABILITATION | Age: 42
End: 2022-03-28
Attending: STUDENT IN AN ORGANIZED HEALTH CARE EDUCATION/TRAINING PROGRAM
Payer: MEDICAID

## 2022-03-29 DIAGNOSIS — K21.9 GASTRO-ESOPHAGEAL REFLUX DISEASE WITHOUT ESOPHAGITIS: ICD-10-CM

## 2022-03-29 RX ORDER — OMEPRAZOLE 20 MG/1
CAPSULE, DELAYED RELEASE ORAL
Qty: 90 CAPSULE | Refills: 1 | Status: SHIPPED | OUTPATIENT
Start: 2022-03-29 | End: 2022-04-27

## 2022-03-29 NOTE — TELEPHONE ENCOUNTER
Last seen: 01/17/2022 by Dr. Nice  Next appt: 04/04/2022 with Dr. Nice    Was the patient seen in the last year in this department? Yes   Does patient have an active prescription for medications requested? No   Received Request Via: Pharmacy

## 2022-04-01 ENCOUNTER — APPOINTMENT (OUTPATIENT)
Dept: PHYSICAL THERAPY | Facility: REHABILITATION | Age: 42
End: 2022-04-01
Attending: STUDENT IN AN ORGANIZED HEALTH CARE EDUCATION/TRAINING PROGRAM
Payer: MEDICAID

## 2022-04-04 ENCOUNTER — PHYSICAL THERAPY (OUTPATIENT)
Dept: PHYSICAL THERAPY | Facility: REHABILITATION | Age: 42
End: 2022-04-04
Attending: STUDENT IN AN ORGANIZED HEALTH CARE EDUCATION/TRAINING PROGRAM
Payer: MEDICAID

## 2022-04-04 DIAGNOSIS — M54.50 ACUTE BILATERAL LOW BACK PAIN WITHOUT SCIATICA: ICD-10-CM

## 2022-04-04 PROCEDURE — 97110 THERAPEUTIC EXERCISES: CPT

## 2022-04-04 NOTE — OP THERAPY DAILY TREATMENT
Outpatient Physical Therapy  DAILY TREATMENT     [address]  [address]  [place] [address]  Phone:  [phone]  Fax:  [phone]    Date: 04/04/2022    Patient: Liana Obrien  YOB: 1980  MRN: [id number]     Time Calculation    Start time: 1440  Stop time: 1513 Time Calculation (min): 33 minutes         Chief Complaint: Back Problem and Weakness    Visit #: 2    SUBJECTIVE:  Her back has been feeling better. She has had some increased pain in her right hip when she is sitting. She thinks the hip pain may be from overcompensating while driving. Stopped wearing sandals and has not been having pain go to her foot as much.     OBJECTIVE:  Current objective measures:   Bridge hold: 30 seconds   Right greater trochanter of the femur was tender to palpation (approximately over the bursa)   Right LE distraction relieved symptoms   Right LE hip joint mobility: 2/6   Left LE hip joint mobility: 3/6         Therapeutic Exercises (CPT 21520):     1. Bridges , 3 X 30 seconds    2. Clam holds , 3 X 1 minute holds bilaterally     5. Piriformis stretch , 3 X 1 minute holds bilaterally     6. Kneeling quad stretch , 3X 1 minute holds bilaterally, Cues for posterior pelvic tilt       Time-based treatments/modalities:    Physical Therapy Timed Treatment Charges  Therapeutic exercise minutes (CPT 30930): 33 minutes        ASSESSMENT:   Response to treatment: Due to reports of hip pain a brief assessment was performed and she was found to have tenderness over the right greater trochanter and decreased hip joint mobility. It is suspected she may have either a bursitis or gluteal tendinitis. Started the session with kevin stretch and pirirformis stretch due to reports of hip pain. She continues to show decreased strength with her bridge holds but today was able to hold it three times for 30 second increments. She did well with her clam hold only having difficulty when  having to lay on her right side. Session was shorter than what was scheduled due to patient arriving 10 minutes late to appointment. She will continue to benefit from skilled physical therapy for improvements in strength, flexibility, and decrease low back pain.     PLAN/RECOMMENDATIONS:   Plan for treatment: therapy treatment to continue next visit.  Planned interventions for next visit: continue with current treatment.      [x] As the licensed therapist supervising this student, I was present during the entire treatment session directing the care and reviewing the assessment plan.  I reviewed all documentation prior to signing. The following changes or alternations were made by the licensed therapist.

## 2022-04-11 ENCOUNTER — APPOINTMENT (OUTPATIENT)
Dept: PHYSICAL THERAPY | Facility: REHABILITATION | Age: 42
End: 2022-04-11
Attending: STUDENT IN AN ORGANIZED HEALTH CARE EDUCATION/TRAINING PROGRAM
Payer: MEDICAID

## 2022-04-14 ENCOUNTER — APPOINTMENT (OUTPATIENT)
Dept: PHYSICAL THERAPY | Facility: REHABILITATION | Age: 42
End: 2022-04-14
Attending: STUDENT IN AN ORGANIZED HEALTH CARE EDUCATION/TRAINING PROGRAM
Payer: MEDICAID

## 2022-04-18 ENCOUNTER — PHYSICAL THERAPY (OUTPATIENT)
Dept: PHYSICAL THERAPY | Facility: REHABILITATION | Age: 42
End: 2022-04-18
Attending: STUDENT IN AN ORGANIZED HEALTH CARE EDUCATION/TRAINING PROGRAM
Payer: MEDICAID

## 2022-04-18 DIAGNOSIS — M54.50 ACUTE BILATERAL LOW BACK PAIN WITHOUT SCIATICA: ICD-10-CM

## 2022-04-18 PROCEDURE — 97110 THERAPEUTIC EXERCISES: CPT

## 2022-04-18 NOTE — OP THERAPY DAILY TREATMENT
Outpatient Physical Therapy  DAILY TREATMENT     [address]  [address]  [place] [address]  Phone:  [phone]  Fax:  [phone]    Date: 04/18/2022    Patient: Liana Obrien  YOB: 1980  MRN: [id number]     Time Calculation    Start time: 1120  Stop time: 1145 Time Calculation (min): 25 minutes         Chief Complaint: Back Problem and Weakness    Visit #: 3    SUBJECTIVE:  Overall her back pain has been feeling better. She still is having difficulty with standing for more than 10 minutes at a time. Has not been doing her exercises at home.     OBJECTIVE:  Current objective measures:           Therapeutic Exercises (CPT 66098):     1. Bridges , 3 X 45 seconds    2. Clam holds , 2 X 1 minute holds bilaterally     3. Pallof press , 3 X 10 bilaterally , Blue theraband     4. Hip hinge, 2 X 5, With PVC pipe     5. Piriformis stretch , Not this time     6. Kneeling quad stretch , Not this time , Cues for posterior pelvic tilt       Time-based treatments/modalities:    Physical Therapy Timed Treatment Charges  Therapeutic exercise minutes (CPT 17732): 24 minutes        ASSESSMENT:   Response to treatment: Continued to work on and review exercises for her home program. She is now able to hold her bridge for 45 seconds at a time. Progressed to standing exercises of pallof press for stability of the spine and a hip hinge using a PVC pipe for tactile cues. She had increased difficulties with performing the hip hinge and continued to round her thoracic spine. She required min/mod cueing for PVC pipe placement and to maintain contact points of her hips, mid back and the back of her head. Due to patients late arrival there was a time limitation for continued progression of exercises. She will continue to benefit from skilled physical therapy for improvements with strength, stability, and muscular endurance.     PLAN/RECOMMENDATIONS:   Plan for treatment:  therapy treatment to continue next visit.  Planned interventions for next visit: continue with current treatment.      [x] As the licensed therapist supervising this student, I was present during the entire treatment session directing the care and reviewing the assessment plan.  I reviewed all documentation prior to signing. The following changes or alternations were made by the licensed therapist.

## 2022-04-19 NOTE — OP THERAPY DAILY TREATMENT
Outpatient Physical Therapy  DAILY TREATMENT     [facility] Physical [address]  [address]  [place] [address]  Phone:  [phone]  Fax:  [phone]    Date: 04/20/2022    Patient: Liana Obrien  YOB: 1980  MRN: [id number]     Time Calculation    Start time: 1107  Stop time: 1145 Time Calculation (min): 38 minutes         Chief Complaint: Back Problem and Weakness    Visit #: 4    SUBJECTIVE:    Overall her back has been feeling better. She has a pressure in her low back on her right side.     OBJECTIVE:  Current objective measures:           Therapeutic Exercises (CPT 07922):     1. Bridges , Not today     2. Clam holds with green TB, 2 X 1 minute holds bilaterally     3. Pallof press , 2 X 10 bilaterally , Black theraband     4. Hip hinge, *pt declined to perform, With PVC pipe     5. Piriformis stretch , Not this time     6. Kneeling quad stretch , Not this time , Cues for posterior pelvic tilt     7. Wall squat , Not this time    8. Single leg bridges , 2 X 10 bilaterally     9. Palloff press with trunk rotation black TB , X 10 bilaterally       Time-based treatments/modalities:    Physical Therapy Timed Treatment Charges  Therapeutic exercise minutes (CPT 49467): 38 minutes        ASSESSMENT:   Response to treatment: Continued to progress strengthening exercises with single leg bridging, and increasing resistance with calm holds and pallof press. Added exercises to increase spinal stability and stability with rotation using the palof press with trunk rotation. She will continue to benefit from skilled physical therapy for improvements with strength and stability of her lumbar spine and decrease pain.     PLAN/RECOMMENDATIONS:   Plan for treatment: therapy treatment to continue next visit.  Planned interventions for next visit: continue with current treatment.      [x] As the licensed therapist supervising this student, I was present during the entire treatment  session directing the care and reviewing the assessment plan.  I reviewed all documentation prior to signing. The following changes or alternations were made by the licensed therapist.

## 2022-04-20 ENCOUNTER — PHYSICAL THERAPY (OUTPATIENT)
Dept: PHYSICAL THERAPY | Facility: REHABILITATION | Age: 42
End: 2022-04-20
Attending: STUDENT IN AN ORGANIZED HEALTH CARE EDUCATION/TRAINING PROGRAM
Payer: MEDICAID

## 2022-04-20 ENCOUNTER — TELEPHONE (OUTPATIENT)
Dept: INTERNAL MEDICINE | Facility: OTHER | Age: 42
End: 2022-04-20

## 2022-04-20 DIAGNOSIS — M54.50 ACUTE BILATERAL LOW BACK PAIN WITHOUT SCIATICA: ICD-10-CM

## 2022-04-20 DIAGNOSIS — F32.1 CURRENT MODERATE EPISODE OF MAJOR DEPRESSIVE DISORDER WITHOUT PRIOR EPISODE (HCC): ICD-10-CM

## 2022-04-20 DIAGNOSIS — E11.21 TYPE 2 DIABETES MELLITUS WITH NEPHROPATHY (HCC): ICD-10-CM

## 2022-04-20 PROCEDURE — 97110 THERAPEUTIC EXERCISES: CPT

## 2022-04-20 RX ORDER — DULOXETIN HYDROCHLORIDE 20 MG/1
30 CAPSULE, DELAYED RELEASE ORAL 2 TIMES DAILY
COMMUNITY
Start: 2022-02-07 | End: 2022-04-27

## 2022-04-20 NOTE — TELEPHONE ENCOUNTER
Received request via: Pharmacy    Was the patient seen in the last year in this department? Yes  Last seen 01/17/2022  Does the patient have an active prescription (recently filled or refills available) for medication(s) requested? No Patient also  needs referral to eye doctor DM yearly check up .

## 2022-04-21 RX ORDER — DULOXETIN HYDROCHLORIDE 30 MG/1
CAPSULE, DELAYED RELEASE ORAL
Qty: 180 CAPSULE | Refills: 1 | Status: SHIPPED | OUTPATIENT
Start: 2022-04-21 | End: 2022-04-27

## 2022-04-25 ENCOUNTER — TELEPHONE (OUTPATIENT)
Dept: PHYSICAL THERAPY | Facility: REHABILITATION | Age: 42
End: 2022-04-25
Payer: MEDICAID

## 2022-04-27 ENCOUNTER — NON-PROVIDER VISIT (OUTPATIENT)
Dept: INTERNAL MEDICINE | Facility: OTHER | Age: 42
End: 2022-04-27
Payer: MEDICAID

## 2022-04-27 ENCOUNTER — HOSPITAL ENCOUNTER (EMERGENCY)
Facility: MEDICAL CENTER | Age: 42
End: 2022-04-27
Attending: EMERGENCY MEDICINE
Payer: MEDICAID

## 2022-04-27 VITALS
BODY MASS INDEX: 39.14 KG/M2 | WEIGHT: 220.9 LBS | HEIGHT: 63 IN | HEART RATE: 93 BPM | RESPIRATION RATE: 18 BRPM | TEMPERATURE: 97.6 F | SYSTOLIC BLOOD PRESSURE: 146 MMHG | OXYGEN SATURATION: 97 % | DIASTOLIC BLOOD PRESSURE: 90 MMHG

## 2022-04-27 DIAGNOSIS — G47.33 OBSTRUCTIVE SLEEP APNEA, ADULT: ICD-10-CM

## 2022-04-27 DIAGNOSIS — N76.0 VULVOVAGINITIS: ICD-10-CM

## 2022-04-27 DIAGNOSIS — Z79.4 TYPE 2 DIABETES MELLITUS WITH HYPERGLYCEMIA, WITH LONG-TERM CURRENT USE OF INSULIN (HCC): ICD-10-CM

## 2022-04-27 DIAGNOSIS — E11.21 TYPE 2 DIABETES MELLITUS WITH NEPHROPATHY (HCC): ICD-10-CM

## 2022-04-27 DIAGNOSIS — E66.01 MORBID OBESITY (HCC): ICD-10-CM

## 2022-04-27 DIAGNOSIS — I10 HYPERTENSION, UNSPECIFIED TYPE: ICD-10-CM

## 2022-04-27 DIAGNOSIS — E11.65 TYPE 2 DIABETES MELLITUS WITH HYPERGLYCEMIA, WITH LONG-TERM CURRENT USE OF INSULIN (HCC): ICD-10-CM

## 2022-04-27 LAB
ALBUMIN SERPL BCP-MCNC: 3.8 G/DL (ref 3.2–4.9)
ALBUMIN/GLOB SERPL: 1.1 G/DL
ALP SERPL-CCNC: 102 U/L (ref 30–99)
ALT SERPL-CCNC: 15 U/L (ref 2–50)
ANION GAP SERPL CALC-SCNC: 16 MMOL/L (ref 7–16)
APPEARANCE UR: CLEAR
AST SERPL-CCNC: 8 U/L (ref 12–45)
BACTERIA #/AREA URNS HPF: ABNORMAL /HPF
BASOPHILS # BLD AUTO: 0.6 % (ref 0–1.8)
BASOPHILS # BLD: 0.07 K/UL (ref 0–0.12)
BILIRUB SERPL-MCNC: 0.3 MG/DL (ref 0.1–1.5)
BILIRUB UR QL STRIP.AUTO: NEGATIVE
BUN SERPL-MCNC: 13 MG/DL (ref 8–22)
CALCIUM SERPL-MCNC: 9.6 MG/DL (ref 8.4–10.2)
CHLORIDE SERPL-SCNC: 95 MMOL/L (ref 96–112)
CO2 SERPL-SCNC: 17 MMOL/L (ref 20–33)
COLOR UR: YELLOW
CREAT SERPL-MCNC: 0.51 MG/DL (ref 0.5–1.4)
EOSINOPHIL # BLD AUTO: 0.15 K/UL (ref 0–0.51)
EOSINOPHIL NFR BLD: 1.3 % (ref 0–6.9)
EPI CELLS #/AREA URNS HPF: ABNORMAL /HPF
ERYTHROCYTE [DISTWIDTH] IN BLOOD BY AUTOMATED COUNT: 39.8 FL (ref 35.9–50)
GFR SERPLBLD CREATININE-BSD FMLA CKD-EPI: 119 ML/MIN/1.73 M 2
GLOBULIN SER CALC-MCNC: 3.6 G/DL (ref 1.9–3.5)
GLUCOSE BLD STRIP.AUTO-MCNC: 437 MG/DL (ref 65–99)
GLUCOSE SERPL-MCNC: 611 MG/DL (ref 65–99)
GLUCOSE UR STRIP.AUTO-MCNC: >=1000 MG/DL
HCG UR QL: NEGATIVE
HCT VFR BLD AUTO: 42.5 % (ref 37–47)
HGB BLD-MCNC: 14.5 G/DL (ref 12–16)
IMM GRANULOCYTES # BLD AUTO: 0.06 K/UL (ref 0–0.11)
IMM GRANULOCYTES NFR BLD AUTO: 0.5 % (ref 0–0.9)
KETONES UR STRIP.AUTO-MCNC: 40 MG/DL
LEUKOCYTE ESTERASE UR QL STRIP.AUTO: ABNORMAL
LYMPHOCYTES # BLD AUTO: 2.47 K/UL (ref 1–4.8)
LYMPHOCYTES NFR BLD: 21.7 % (ref 22–41)
MCH RBC QN AUTO: 27.3 PG (ref 27–33)
MCHC RBC AUTO-ENTMCNC: 34.1 G/DL (ref 33.6–35)
MCV RBC AUTO: 79.9 FL (ref 81.4–97.8)
MICRO URNS: ABNORMAL
MONOCYTES # BLD AUTO: 0.6 K/UL (ref 0–0.85)
MONOCYTES NFR BLD AUTO: 5.3 % (ref 0–13.4)
NEUTROPHILS # BLD AUTO: 8.04 K/UL (ref 2–7.15)
NEUTROPHILS NFR BLD: 70.6 % (ref 44–72)
NITRITE UR QL STRIP.AUTO: NEGATIVE
NRBC # BLD AUTO: 0 K/UL
NRBC BLD-RTO: 0 /100 WBC
PH UR STRIP.AUTO: 5 [PH] (ref 5–8)
PLATELET # BLD AUTO: 290 K/UL (ref 164–446)
PMV BLD AUTO: 10.4 FL (ref 9–12.9)
POTASSIUM SERPL-SCNC: 4.4 MMOL/L (ref 3.6–5.5)
PROT SERPL-MCNC: 7.4 G/DL (ref 6–8.2)
PROT UR QL STRIP: NEGATIVE MG/DL
RBC # BLD AUTO: 5.32 M/UL (ref 4.2–5.4)
RBC # URNS HPF: ABNORMAL /HPF
RBC UR QL AUTO: ABNORMAL
SODIUM SERPL-SCNC: 128 MMOL/L (ref 135–145)
SP GR UR STRIP.AUTO: <=1.005
WBC # BLD AUTO: 11.4 K/UL (ref 4.8–10.8)
WBC #/AREA URNS HPF: ABNORMAL /HPF
YEAST #/AREA URNS HPF: ABNORMAL /HPF
YEAST BUDDING URNS QL: PRESENT /HPF

## 2022-04-27 PROCEDURE — 700102 HCHG RX REV CODE 250 W/ 637 OVERRIDE(OP): Performed by: EMERGENCY MEDICINE

## 2022-04-27 PROCEDURE — 85025 COMPLETE CBC W/AUTO DIFF WBC: CPT

## 2022-04-27 PROCEDURE — 83036 HEMOGLOBIN GLYCOSYLATED A1C: CPT

## 2022-04-27 PROCEDURE — 82962 GLUCOSE BLOOD TEST: CPT

## 2022-04-27 PROCEDURE — 97803 MED NUTRITION INDIV SUBSEQ: CPT | Performed by: DIETITIAN, REGISTERED

## 2022-04-27 PROCEDURE — 81001 URINALYSIS AUTO W/SCOPE: CPT

## 2022-04-27 PROCEDURE — 96372 THER/PROPH/DIAG INJ SC/IM: CPT

## 2022-04-27 PROCEDURE — 36415 COLL VENOUS BLD VENIPUNCTURE: CPT

## 2022-04-27 PROCEDURE — 80053 COMPREHEN METABOLIC PANEL: CPT

## 2022-04-27 PROCEDURE — 99284 EMERGENCY DEPT VISIT MOD MDM: CPT

## 2022-04-27 PROCEDURE — 700105 HCHG RX REV CODE 258: Performed by: EMERGENCY MEDICINE

## 2022-04-27 PROCEDURE — 81025 URINE PREGNANCY TEST: CPT

## 2022-04-27 RX ORDER — NYSTATIN 100000 U/G
CREAM TOPICAL
Qty: 1 APPLICATION | Refills: 1 | Status: SHIPPED | OUTPATIENT
Start: 2022-04-27 | End: 2022-07-02

## 2022-04-27 RX ORDER — FLUCONAZOLE 150 MG/1
150 TABLET ORAL DAILY
Qty: 3 TABLET | Refills: 0 | Status: SHIPPED | OUTPATIENT
Start: 2022-04-27 | End: 2022-04-30

## 2022-04-27 RX ORDER — SEMAGLUTIDE 1.34 MG/ML
0.25 INJECTION, SOLUTION SUBCUTANEOUS
Status: SHIPPED | COMMUNITY
End: 2022-05-25

## 2022-04-27 RX ORDER — SODIUM CHLORIDE, SODIUM LACTATE, POTASSIUM CHLORIDE, CALCIUM CHLORIDE 600; 310; 30; 20 MG/100ML; MG/100ML; MG/100ML; MG/100ML
2000 INJECTION, SOLUTION INTRAVENOUS ONCE
Status: COMPLETED | OUTPATIENT
Start: 2022-04-27 | End: 2022-04-27

## 2022-04-27 RX ORDER — ACETAMINOPHEN 500 MG
1000 TABLET ORAL EVERY 6 HOURS PRN
Status: SHIPPED | COMMUNITY
End: 2022-05-25

## 2022-04-27 RX ORDER — NYSTATIN 100000 U/G
CREAM TOPICAL ONCE
Status: DISCONTINUED | OUTPATIENT
Start: 2022-04-27 | End: 2022-04-27 | Stop reason: HOSPADM

## 2022-04-27 RX ORDER — INSULIN DEGLUDEC 200 U/ML
30 INJECTION, SOLUTION SUBCUTANEOUS DAILY
Status: SHIPPED | COMMUNITY
Start: 2022-04-02 | End: 2022-05-25 | Stop reason: SDUPTHER

## 2022-04-27 RX ADMIN — SODIUM CHLORIDE, POTASSIUM CHLORIDE, SODIUM LACTATE AND CALCIUM CHLORIDE 2000 ML: 600; 310; 30; 20 INJECTION, SOLUTION INTRAVENOUS at 16:55

## 2022-04-27 RX ADMIN — INSULIN HUMAN 5 UNITS: 100 INJECTION, SOLUTION PARENTERAL at 17:27

## 2022-04-27 ASSESSMENT — FIBROSIS 4 INDEX: FIB4 SCORE: 0.38

## 2022-04-27 ASSESSMENT — PAIN DESCRIPTION - PAIN TYPE: TYPE: ACUTE PAIN

## 2022-04-27 NOTE — ED PROVIDER NOTES
ED Provider Note    CHIEF COMPLAINT  Chief Complaint   Patient presents with   • UTI     For the last couple of weeks has been having issues with UTI symptoms     • Vaginal Discharge     Some times having vaginal dischg  but not all the time    • Other     Vaginal area is raw  also c/o having bumps in her vaginal area         HPI  Liana Obrien is a 42 y.o. female who presents the ED with complaints of vaginal discharge discomfort.  The patient states she does have a history of diabetes and her sugars have been slightly out of whack.  She has noticed that she has been having a lot more discomfort when she urinates and she has noticed a rash down her vaginal area.  The symptoms been going on for about a week to 2 weeks so she decided to come to the emergency department for evaluation.  Upon arrival here describes some tactile fevers no chills describes pain around the vaginal area with some discharge denies any other symptoms.    REVIEW OF SYSTEMS  See HPI for further details. All other systems are negative.     PAST MEDICAL HISTORY  Past Medical History:   Diagnosis Date   • Bipolar disorder ([facility]) 8/9/2018   • Candida vaginitis    • Chickenpox    • Diabetes 1.5, managed as type 2 ([facility])    • DVT (deep venous thrombosis) ([facility])    • Dysfunctional uterine bleeding    • Fibroids    • Herpes    • Hypertension    • Hypothyroidism due to acquired atrophy of thyroid 1/22/2016   • Personal history of venous thrombosis and embolism     DVT in BLE years ago   • Sleep apnea    • Uncontrolled type 2 diabetes mellitus without complication, without long-term current use of insulin 3/12/2015       FAMILY HISTORY  Family History   Problem Relation Age of Onset   • Hypertension Mother    • Sleep Apnea Mother    • Hyperlipidemia Father    • Cancer Maternal Uncle    • Sleep Apnea Brother    • Diabetes Neg Hx    • Heart Disease Neg Hx    • Stroke Neg Hx        SOCIAL HISTORY  Social History     Socioeconomic History   • Marital  status:    Tobacco Use   • Smoking status: Former Smoker     Packs/day: 0.50     Years: 0.30     Pack years: 0.15     Types: Cigarettes     Quit date: 2018     Years since quittin.3   • Smokeless tobacco: Never Used   • Tobacco comment: for 3 months at a time on and off    Substance and Sexual Activity   • Alcohol use: No     Alcohol/week: 0.0 oz   • Drug use: No   • Sexual activity: Yes     Partners: Male   Social History Narrative    Works at [employer]      Social Determinants of Health     Financial Resource Strain: Unknown   • Difficulty of Paying Living Expenses: Patient refused   Transportation Needs: No Transportation Needs   • Lack of Transportation (Medical): No   • Lack of Transportation (Non-Medical): No      SERA Lorenzana    SURGICAL HISTORY  History reviewed. No pertinent surgical history.    CURRENT MEDICATIONS  Home Medications     Reviewed by Amari Teague (Pharmacy Tech) on 22 at 1437  Med List Status: Complete   Medication Last Dose Status   acetaminophen (TYLENOL) 500 MG Tab > 4 days Active   glucose blood (EASY TALK BLOOD GLUCOSE TEST) strip Unknown Active   glucose blood strip Unknown Active   insulin lispro (HUMALOG KWIKPEN) 100 UNIT/ML Solution Pen-injector injection PEN > 3 days Active   Insulin Pen Needle 32 G x 4 mm (EASY COMFORT PEN NEEDLES) Unknown Active   levothyroxine (SYNTHROID) 150 MCG Tab 2022 Active   metFORMIN (GLUCOPHAGE) 850 MG Tab > 3 days Active   Multiple Vitamins-Minerals (EMERGEN-C IMMUNE) Pack > 2 weeks Active   Semaglutide,0.25 or 0.5MG/DOS, (OZEMPIC, 0.25 OR 0.5 MG/DOSE,) 2 MG/1.5ML Solution Pen-injector 2022 Active   TRESIBA FLEXTOUCH 200 UNIT/ML Solution Pen-injector > 3 days Active                ALLERGIES  Allergies   Allergen Reactions   • Bicillin C-R [Penicillin G Proc & Benzathine] Rash     Received inj of Bicillin- reaction was rash. Oral penicillin shows no reaction.   • Ondansetron Hives   • Penicillin G  "Hives   • Sulfa Drugs Hives          • Metoclopramide Rash and Vomiting     Rash         PHYSICAL EXAM  VITAL SIGNS: /85   Pulse 90   Temp 36.4 °C (97.6 °F)   Resp 18   Ht 1.6 m (5' 3\")   Wt 100 kg (220 lb 14.4 oz)   SpO2 96%   BMI 39.13 kg/m²    Pulse Oximetry was obtained. It showed a reading of Pulse Oximetry: 96 %.  I interpreted this as nonhypoxic.     Constitutional: Well developed, Well nourished, No acute distress, Non-toxic appearance.   HENT: Normocephalic, Atraumatic, Bilateral external ears normal, bilateral tympanic membranes normal, Oropharynx moist mucous membranes, No oral exudates, Nose normal.   Eyes:  conjunctiva is normal, there are no signs of exudate.   Neck: Supple, no cervical lymphadenopathy, no meningeal signs..   Lymphatic: No lymphadenopathy noted.   Cardiovascular: Regular rate and rhythm without murmurs gallops or rubs.   Thorax & Lungs: Lungs are clear to auscultation bilaterally, there are no wheezes no rales. Chest wall is nontender.  Abdomen: Soft, nontender nondistended. Bowel sounds are present.   : External vaginal area does have a yeast rash and excoriation.  There is some slight discharge.  Skin: Warm, Dry, No erythema,   Back: No tenderness, No CVA tenderness.  Musculoskeletal: , no edema   Psychiatric: Affect normal, Judgment normal, Mood normal.         RADIOLOGY/PROCEDURES  No orders to display       Results for orders placed or performed during the hospital encounter of 04/27/22   URINALYSIS    Specimen: Urine   Result Value Ref Range    Color Yellow     Character Clear     Specific Gravity <=1.005 <1.035    Ph 5.0 5.0 - 8.0    Glucose >=1000 (A) Negative mg/dL    Ketones 40 (A) Negative mg/dL    Protein Negative Negative mg/dL    Bilirubin Negative Negative    Nitrite Negative Negative    Leukocyte Esterase Trace (A) Negative    Occult Blood Trace (A) Negative    Micro Urine Req Microscopic    BETA-HCG QUALITATIVE URINE   Result Value Ref Range    Beta-Hcg " Urine Negative Negative   URINE MICROSCOPIC (W/UA)   Result Value Ref Range    WBC 2-5 /hpf    RBC 2-5 (A) /hpf    Bacteria Few (A) None /hpf    Epithelial Cells Rare Few /hpf    Yeast Cells Few (A) None /hpf    Budding Yeast Present (A) Absent /hpf   CMP   Result Value Ref Range    Sodium 128 (L) 135 - 145 mmol/L    Potassium 4.4 3.6 - 5.5 mmol/L    Chloride 95 (L) 96 - 112 mmol/L    Co2 17 (L) 20 - 33 mmol/L    Anion Gap 16.0 7.0 - 16.0    Glucose 611 (HH) 65 - 99 mg/dL    Bun 13 8 - 22 mg/dL    Creatinine 0.51 0.50 - 1.40 mg/dL    Calcium 9.6 8.4 - 10.2 mg/dL    AST(SGOT) 8 (L) 12 - 45 U/L    ALT(SGPT) 15 2 - 50 U/L    Alkaline Phosphatase 102 (H) 30 - 99 U/L    Total Bilirubin 0.3 0.1 - 1.5 mg/dL    Albumin 3.8 3.2 - 4.9 g/dL    Total Protein 7.4 6.0 - 8.2 g/dL    Globulin 3.6 (H) 1.9 - 3.5 g/dL    A-G Ratio 1.1 g/dL   CBC WITH DIFFERENTIAL   Result Value Ref Range    WBC 11.4 (H) 4.8 - 10.8 K/uL    RBC 5.32 4.20 - 5.40 M/uL    Hemoglobin 14.5 12.0 - 16.0 g/dL    Hematocrit 42.5 37.0 - 47.0 %    MCV 79.9 (L) 81.4 - 97.8 fL    MCH 27.3 27.0 - 33.0 pg    MCHC 34.1 33.6 - 35.0 g/dL    RDW 39.8 35.9 - 50.0 fL    Platelet Count 290 164 - 446 K/uL    MPV 10.4 9.0 - 12.9 fL    Neutrophils-Polys 70.60 44.00 - 72.00 %    Lymphocytes 21.70 (L) 22.00 - 41.00 %    Monocytes 5.30 0.00 - 13.40 %    Eosinophils 1.30 0.00 - 6.90 %    Basophils 0.60 0.00 - 1.80 %    Immature Granulocytes 0.50 0.00 - 0.90 %    Nucleated RBC 0.00 /100 WBC    Neutrophils (Absolute) 8.04 (H) 2.00 - 7.15 K/uL    Lymphs (Absolute) 2.47 1.00 - 4.80 K/uL    Monos (Absolute) 0.60 0.00 - 0.85 K/uL    Eos (Absolute) 0.15 0.00 - 0.51 K/uL    Baso (Absolute) 0.07 0.00 - 0.12 K/uL    Immature Granulocytes (abs) 0.06 0.00 - 0.11 K/uL    NRBC (Absolute) 0.00 K/uL   ESTIMATED GFR   Result Value Ref Range    GFR (CKD-EPI) 119 >60 mL/min/1.73 m 2         COURSE & MEDICAL DECISION MAKING  Pertinent Labs & Imaging studies reviewed. (See chart for details)  Patient  presents for evaluation.  Clinically this is vulvovaginitis most likely candidal.  I will start the patient with oral Diflucan and then external nystatin.  I did do lab tests because she was describing that her sugars have been out of sorts so to speak and she was over thousand glucose in the urine.  Patient's blood glucose was 611.  Her anion gap is normal at 16 so I do not feel she is in DKA however she is very out of control.  I have given her 2 L bolus here as well as a dose of subcutaneous insulin.  Her sugars are coming down to the 400s range.  At this point I recommended for her to continue pushing p.o. fluids and I want her to get into see her endocrinologist she states that she has an appointment next week earlier if possible.  Recommend to start back on her Humalog she states that she has not been taking her Humalog because she has not been eating.  At this point I have recommended for her to take her Humalog and take her fingersticks as dictated.  I will start the patient on 2 days of Diflucan as well as external nystatin cream for her vulvovaginitis.  She is to follow-up with a primary care physician as well and return as needed.  Patient has 6:38 PM fingerstick blood sugar was at 450.  The patient is feeling improved after the fluids and feels comfortable going home.  I have recommended the patient's start taking her 10 units of insulin because she stopped taking because she was not eating.  At this point I do feel the patient is stable for discharge.    FINAL IMPRESSION  1. Vulvovaginitis  nystatin (MYCOSTATIN) 896364 UNIT/GM Cream topical cream    fluconazole (DIFLUCAN) 150 MG tablet   2. Type 2 diabetes mellitus with hyperglycemia, with long-term current use of insulin (HCC)          The patient will return for new or worsening symptoms and is stable at the time of discharge.    The patient is referred to a primary physician for blood pressure management, diabetic screening, and for all other  preventative health concerns.        DISPOSITION:  Patient will be discharged home in stable condition.    FOLLOW UP:  Edda Nice D.O.  [address] [facility] [id number] [phone]    Schedule an appointment as soon as possible for a visit   For further evaluation, Return if any symptoms worsen      OUTPATIENT MEDICATIONS:  New Prescriptions    FLUCONAZOLE (DIFLUCAN) 150 MG TABLET    Take 1 Tablet by mouth every day for 3 doses.    NYSTATIN (MYCOSTATIN) 169609 UNIT/GM CREAM TOPICAL CREAM    Apply to affected area twice daily until 48 hours after symptoms resolve             Electronically signed by: Timoteo Moyer M.D., 4/27/2022 2:41 PM

## 2022-04-27 NOTE — ED NOTES
MD Moyer at bedside for initial evaluation. This RN at bedside as chaperone for external pelvic exam.

## 2022-04-27 NOTE — PROGRESS NOTES
Liana is here for FU with RD for Type 2 DM and weight management  She is quite upset today due to chronic pain she has been under with what she suspects is a yeast infection and a UTI. I asked her if she has called her PCP to get support and she stated that she tried a couple weeks ago and can't seem to get a prescription through her pharmacy and stopped calling.  She also reports being upset with taking all the medications that she is taking.   Feels like the medications she is on is just making her sick and she feels overloaded with the insulin that she has. She denies taking her insulin as prescribed except for the Ozempic but her insurance wont' cover any more so she'll be out of that in one more week  She is not checking her blood sugars  She is not eating; feeling sick and nausea so doesn't feel safe to take her medication  She is losing weight because of a decreased appetite with her ozmepic  Doesn't see the endocrinologist team until next week    Had a long conversation with liana on the importance of her taking her insulin, lee ann if she feels like she is having a UTI as that often causes even higher blood sguars, but that she also needs to check her blood sguars at least 3-4 x per day  She got upset and stated she doesn't want to have to take so many medications  I worked with Liana on figuring out a plan to first get this infection cleared up; encouraged her to go to urgent care as she stated she wants to be seen today. I also encouraged her strongly to take her long acting insulin no matter one and helped reduce her fears of low blood sugars even when she isn't eating much  She was very resistant so encouraged her to talk to her medical doctors about what meds she absolutely needs, focus on getting into doctor ASAP and staying hydrated. I will see her in 1 month after she has this infection cleared up to support nutrition habits.  Set goals; rtc with JACKI in 1 month    Time spent: 60 minutes

## 2022-04-27 NOTE — ED TRIAGE NOTES
"Pt comes in c/o vaginal dischg, pain, redness w/ \"bumps to area  Also having UTI symptoms as well the last couple of weeks  Pt is a diabetic and noted her bld has been running a bit high  In the 200's   "

## 2022-04-27 NOTE — ED NOTES
Med rec updated and complete, per pt  Allergies reviewed, per pt   Pt reports that she has not taken her ATORVASTATIN 20MG, DULOXETINE 20MG and 30MG, LISINOPRIL 10MG, MEDROXYPROGESTERONE 10MG, or OMEPRAZOLE 20MG in the last 2 months. Called [facility] @ [phone] to verify dose of OZEMPIC, pt has not filled at the pharmacy but has been getting samples at her doctors office

## 2022-04-27 NOTE — ED NOTES
Lab called with critical result of Glucose at 611. Critical lab result read back.   Dr. Mattson notified of critical lab result.

## 2022-04-28 LAB
EST. AVERAGE GLUCOSE BLD GHB EST-MCNC: 326 MG/DL
HBA1C MFR BLD: 13 % (ref 4–5.6)

## 2022-04-29 ENCOUNTER — APPOINTMENT (OUTPATIENT)
Dept: PHYSICAL THERAPY | Facility: REHABILITATION | Age: 42
End: 2022-04-29
Attending: STUDENT IN AN ORGANIZED HEALTH CARE EDUCATION/TRAINING PROGRAM
Payer: MEDICAID

## 2022-04-29 VITALS — WEIGHT: 221 LBS | BODY MASS INDEX: 39.15 KG/M2

## 2022-04-29 ASSESSMENT — FIBROSIS 4 INDEX: FIB4 SCORE: 0.3

## 2022-04-29 NOTE — PATIENT INSTRUCTIONS
Goals:  1. Get into your doctor today to get medication for your infections  2. Take your Tresiba/long acting insulin every day  3. Check your blood sugars every day  4. Stay hydrated

## 2022-05-19 ENCOUNTER — APPOINTMENT (OUTPATIENT)
Dept: INTERNAL MEDICINE | Facility: OTHER | Age: 42
End: 2022-05-19
Payer: MEDICAID

## 2022-05-19 ENCOUNTER — APPOINTMENT (OUTPATIENT)
Dept: PHYSICAL THERAPY | Facility: REHABILITATION | Age: 42
End: 2022-05-19
Attending: STUDENT IN AN ORGANIZED HEALTH CARE EDUCATION/TRAINING PROGRAM
Payer: MEDICAID

## 2022-05-25 ENCOUNTER — OFFICE VISIT (OUTPATIENT)
Dept: INTERNAL MEDICINE | Facility: OTHER | Age: 42
End: 2022-05-25
Payer: MEDICAID

## 2022-05-25 VITALS
OXYGEN SATURATION: 95 % | HEART RATE: 79 BPM | WEIGHT: 219 LBS | TEMPERATURE: 96.7 F | DIASTOLIC BLOOD PRESSURE: 87 MMHG | SYSTOLIC BLOOD PRESSURE: 125 MMHG | BODY MASS INDEX: 38.79 KG/M2

## 2022-05-25 DIAGNOSIS — F32.1 CURRENT MODERATE EPISODE OF MAJOR DEPRESSIVE DISORDER WITHOUT PRIOR EPISODE (HCC): ICD-10-CM

## 2022-05-25 DIAGNOSIS — B37.31 CANDIDA VAGINITIS: ICD-10-CM

## 2022-05-25 DIAGNOSIS — E66.9 OBESITY (BMI 30-39.9): ICD-10-CM

## 2022-05-25 DIAGNOSIS — E11.21 TYPE 2 DIABETES MELLITUS WITH NEPHROPATHY (HCC): ICD-10-CM

## 2022-05-25 PROCEDURE — 99214 OFFICE O/P EST MOD 30 MIN: CPT | Mod: GC | Performed by: STUDENT IN AN ORGANIZED HEALTH CARE EDUCATION/TRAINING PROGRAM

## 2022-05-25 RX ORDER — ESCITALOPRAM OXALATE 10 MG/1
10 TABLET ORAL DAILY
Qty: 30 TABLET | Refills: 0 | Status: SHIPPED | OUTPATIENT
Start: 2022-05-25 | End: 2022-06-22

## 2022-05-25 RX ORDER — ATORVASTATIN CALCIUM 20 MG/1
TABLET, FILM COATED ORAL
COMMUNITY
Start: 2022-05-05 | End: 2022-05-25

## 2022-05-25 RX ORDER — FLASH GLUCOSE SCANNING READER
1 EACH MISCELLANEOUS SEE ADMIN INSTRUCTIONS
Qty: 2 EACH | Refills: 3 | Status: SHIPPED | OUTPATIENT
Start: 2022-05-25 | End: 2022-07-02

## 2022-05-25 RX ORDER — FLASH GLUCOSE SENSOR
1 KIT MISCELLANEOUS SEE ADMIN INSTRUCTIONS
Qty: 2 EACH | Refills: 3 | Status: SHIPPED | OUTPATIENT
Start: 2022-05-25 | End: 2022-07-02

## 2022-05-25 RX ORDER — INSULIN DEGLUDEC 200 U/ML
30 INJECTION, SOLUTION SUBCUTANEOUS DAILY
Qty: 4 EACH | Refills: 3 | Status: SHIPPED | OUTPATIENT
Start: 2022-05-25 | End: 2022-07-02

## 2022-05-25 RX ORDER — FLUCONAZOLE 150 MG/1
150 TABLET ORAL DAILY
Qty: 1 TABLET | Refills: 0 | Status: SHIPPED | OUTPATIENT
Start: 2022-05-25 | End: 2022-05-25

## 2022-05-25 RX ORDER — INSULIN DEGLUDEC 200 U/ML
30 INJECTION, SOLUTION SUBCUTANEOUS DAILY
Qty: 5 EACH | Refills: 3 | Status: SHIPPED | OUTPATIENT
Start: 2022-05-25 | End: 2022-05-25

## 2022-05-25 RX ORDER — ESCITALOPRAM OXALATE 10 MG/1
10 TABLET ORAL DAILY
Qty: 30 TABLET | Refills: 0 | Status: SHIPPED | OUTPATIENT
Start: 2022-05-25 | End: 2022-05-25

## 2022-05-25 RX ORDER — ROSUVASTATIN CALCIUM 40 MG/1
40 TABLET, COATED ORAL DAILY
Qty: 90 TABLET | Refills: 0 | Status: SHIPPED | OUTPATIENT
Start: 2022-05-25 | End: 2022-07-02

## 2022-05-25 RX ORDER — DULAGLUTIDE 1.5 MG/.5ML
INJECTION, SOLUTION SUBCUTANEOUS
COMMUNITY
Start: 2022-05-20 | End: 2022-05-25 | Stop reason: SDUPTHER

## 2022-05-25 RX ORDER — DULAGLUTIDE 1.5 MG/.5ML
1 INJECTION, SOLUTION SUBCUTANEOUS
Qty: 12 EACH | Refills: 3 | Status: SHIPPED | OUTPATIENT
Start: 2022-05-25 | End: 2022-08-23

## 2022-05-25 RX ORDER — FLUCONAZOLE 100 MG/1
150 TABLET ORAL ONCE
Qty: 2 TABLET | Refills: 0 | Status: SHIPPED | OUTPATIENT
Start: 2022-05-25 | End: 2022-05-25

## 2022-05-25 RX ORDER — FLUCONAZOLE 150 MG/1
150 TABLET ORAL
Qty: 3 TABLET | Refills: 0 | Status: SHIPPED | OUTPATIENT
Start: 2022-05-25 | End: 2022-06-01

## 2022-05-25 RX ORDER — LISINOPRIL 10 MG/1
TABLET ORAL
COMMUNITY
Start: 2022-05-05 | End: 2022-05-25

## 2022-05-25 ASSESSMENT — FIBROSIS 4 INDEX: FIB4 SCORE: 0.3
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
## 2022-05-26 PROBLEM — E61.1 IRON DEFICIENCY: Status: RESOLVED | Noted: 2018-09-06 | Resolved: 2022-05-26

## 2022-05-26 PROBLEM — B37.31 CANDIDA VAGINITIS: Status: ACTIVE | Noted: 2022-05-26

## 2022-05-26 NOTE — ASSESSMENT & PLAN NOTE
"Regarding Type II DM, Stopped taking most medications because too much side effects. Just laying down, cant work. \"Loaded with too many diabetic medications.\" Doing better without the medications. Only on metformin and degludec insulin and trulicity (and levothyroxine). Stopped lisinopril and statin and premeal lispro TID on her own. As she is having trouble injecting trulicity, cant unlock, need to hold it there, too complicated, she is consider stopping trulicity as well. Lives alone, so nobody can help her. Advised patient to ask pharmacist, maybe hen she picks up medication, how to properly administer this medication.  Otherwise, asked to bring in Trulicity to her next visit, so that we can explain how to properly take this medication.  Overall patient reports that \"they are killing her instead of helping, nobody cares, wants to look at options to get off medications.\" Sees [place]. Advised her to work with endocrinologist. Next mo is next appt. Sees endocrinology. Sees endocrine PA Miguel at [facility]. Last time she saw her 1.5 mo ago, patient reports the PA was not understanding of her desire to decrease her medication regimen. No next appointment. Wants to see a new endocrine MD/PA. Referral placed. Patient agreeable with ophthalmology referral, urine microalbumin, foot exam last done Jan 2022. Although she wants to pursue weight loss and lifestyle changes, declined bariatric surgery.  "
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
## 2022-05-26 NOTE — ASSESSMENT & PLAN NOTE
Reports yeast Infection, vaginal discharge, and discomfort, without dysuria. Same as her previous yeast infections. Symptoms improved with 150 fluconazole, 3 doses in the past. Will send in the same regimen.

## 2022-06-20 NOTE — TELEPHONE ENCOUNTER
Escitalopram refill    Last seen: 5/25/22 by Dr. Urbina  Next appt: None    Was the patient seen in the last year in this department? Yes   Does patient have an active prescription for medications requested? No   Received Request Via: Pharmacy

## 2022-06-22 ENCOUNTER — NON-PROVIDER VISIT (OUTPATIENT)
Dept: INTERNAL MEDICINE | Facility: OTHER | Age: 42
End: 2022-06-22
Payer: MEDICAID

## 2022-06-22 VITALS — BODY MASS INDEX: 36.67 KG/M2 | WEIGHT: 207 LBS

## 2022-06-22 DIAGNOSIS — K21.9 GASTRO-ESOPHAGEAL REFLUX DISEASE WITHOUT ESOPHAGITIS: ICD-10-CM

## 2022-06-22 DIAGNOSIS — I10 HYPERTENSION, UNSPECIFIED TYPE: ICD-10-CM

## 2022-06-22 DIAGNOSIS — E11.21 TYPE 2 DIABETES MELLITUS WITH NEPHROPATHY (HCC): ICD-10-CM

## 2022-06-22 DIAGNOSIS — G47.33 OBSTRUCTIVE SLEEP APNEA, ADULT: ICD-10-CM

## 2022-06-22 DIAGNOSIS — E66.9 OBESITY (BMI 30-39.9): ICD-10-CM

## 2022-06-22 PROCEDURE — 97803 MED NUTRITION INDIV SUBSEQ: CPT | Performed by: DIETITIAN, REGISTERED

## 2022-06-22 RX ORDER — ESCITALOPRAM OXALATE 10 MG/1
TABLET ORAL
Qty: 30 TABLET | Refills: 0 | Status: SHIPPED | OUTPATIENT
Start: 2022-06-22 | End: 2022-06-27

## 2022-06-22 ASSESSMENT — FIBROSIS 4 INDEX: FIB4 SCORE: 0.3

## 2022-06-22 NOTE — PATIENT INSTRUCTIONS
Goals:  When you get a [employer]oothie, try to stick to the smaller size and ask them to add in the vanilla protein powder to increase the protein content which will help those blood sugars. When you make it at home, maybe consider adding in a protein powder or add greek yogurt (Two Good is a good flavor)   Check out [facility] for diabetes support [phone]  Work on making your meals balanced with protein, carbs, and veggies. Try your best to take a few minutes to pack a quick lunch/snacks to take with you to have mid day so you aren't so hungry by dinner   Gym 2-3 days per week for now for 1 hour - try the classes that sound fun to you to start   Ask your doctor about the CGM

## 2022-06-22 NOTE — PROGRESS NOTES
Liana is here for FU with RD for diabetes education    She is checking her BS and morning readings around 215 and does see it increase with late eating or eating more of the unhealthy foods  Just sticking to morning blood sugars     She is taking the Trulicity and sees her appetite decrease so mostly sticking to liquids  Finds that she will feel sick when she doesn't eat her meals  She is not taking her other insulin medications except for Trulicty     24 hour recall:  B - smoothie (home or buys it from [employer]) home: milk, fruit or two, vanilla extract, cinnamon, oatmeal sometimes   L - usually skips  D - [employer] - double stack burger, 4 piece nuggets, fries, frosty, small chili     Encouraged Liana to work on being consistent with her medications and monitoring of her blood sugars.  She recently got a new gym membership at a reduced cost so has been wanting to get started with exercise; we worked together on setting a goal for that to support her with being more active. Also worked with her to plan out meal and snack ideas to support her with not skipping lunch as then that causes her to overeat at dinner and exceed calorie and carbohydratre recommendations. Set goals; handouts provided for ideas. RTC with RD in 1month    Time spent: 60 minute

## 2022-06-27 ENCOUNTER — TELEPHONE (OUTPATIENT)
Dept: ENDOCRINOLOGY | Facility: MEDICAL CENTER | Age: 42
End: 2022-06-27
Payer: MEDICAID

## 2022-06-27 RX ORDER — ESCITALOPRAM OXALATE 10 MG/1
TABLET ORAL
Qty: 90 TABLET | Refills: 1 | Status: SHIPPED | OUTPATIENT
Start: 2022-06-27 | End: 2022-07-02

## 2022-06-27 NOTE — TELEPHONE ENCOUNTER
Last seen: 5/25/22 by Dr. Urbina  Next appt: 7/6/22 with Dr. Amaya    Was the patient seen in the last year in this department? Yes   Does patient have an active prescription for medications requested? No   Received Request Via: Pharmacy

## 2022-06-27 NOTE — TELEPHONE ENCOUNTER
VOICEMAIL  1. Caller Name: Liana Obrien                        Call Back Number: [phone] (home)      2. Message: Patient called and left a message to schedule an appointment. I have called her back to let her know that we do not take her insurance. She understood.     3. Patient approves office to leave a detailed voicemail/MyChart message: N\A

## 2022-07-02 ENCOUNTER — HOSPITAL ENCOUNTER (EMERGENCY)
Facility: MEDICAL CENTER | Age: 42
End: 2022-07-02
Attending: EMERGENCY MEDICINE
Payer: MEDICAID

## 2022-07-02 ENCOUNTER — APPOINTMENT (OUTPATIENT)
Dept: RADIOLOGY | Facility: MEDICAL CENTER | Age: 42
End: 2022-07-02
Attending: EMERGENCY MEDICINE
Payer: MEDICAID

## 2022-07-02 VITALS
HEIGHT: 63 IN | RESPIRATION RATE: 18 BRPM | BODY MASS INDEX: 36.99 KG/M2 | OXYGEN SATURATION: 97 % | SYSTOLIC BLOOD PRESSURE: 147 MMHG | WEIGHT: 208.78 LBS | DIASTOLIC BLOOD PRESSURE: 81 MMHG | TEMPERATURE: 98 F | HEART RATE: 93 BPM

## 2022-07-02 DIAGNOSIS — R73.9 HYPERGLYCEMIA: ICD-10-CM

## 2022-07-02 DIAGNOSIS — L02.31 ABSCESS, GLUTEAL, LEFT: ICD-10-CM

## 2022-07-02 LAB
ALBUMIN SERPL BCP-MCNC: 3.5 G/DL (ref 3.2–4.9)
ALBUMIN/GLOB SERPL: 0.9 G/DL
ALP SERPL-CCNC: 105 U/L (ref 30–99)
ALT SERPL-CCNC: 15 U/L (ref 2–50)
ANION GAP SERPL CALC-SCNC: 13 MMOL/L (ref 7–16)
APPEARANCE UR: CLEAR
AST SERPL-CCNC: 13 U/L (ref 12–45)
BASOPHILS # BLD AUTO: 0.6 % (ref 0–1.8)
BASOPHILS # BLD: 0.08 K/UL (ref 0–0.12)
BILIRUB SERPL-MCNC: 0.2 MG/DL (ref 0.1–1.5)
BILIRUB UR QL STRIP.AUTO: NEGATIVE
BUN SERPL-MCNC: 11 MG/DL (ref 8–22)
CALCIUM SERPL-MCNC: 9.3 MG/DL (ref 8.4–10.2)
CHLORIDE SERPL-SCNC: 98 MMOL/L (ref 96–112)
CO2 SERPL-SCNC: 20 MMOL/L (ref 20–33)
COLOR UR: YELLOW
CREAT SERPL-MCNC: 0.4 MG/DL (ref 0.5–1.4)
EOSINOPHIL # BLD AUTO: 0.24 K/UL (ref 0–0.51)
EOSINOPHIL NFR BLD: 1.9 % (ref 0–6.9)
ERYTHROCYTE [DISTWIDTH] IN BLOOD BY AUTOMATED COUNT: 38.3 FL (ref 35.9–50)
GFR SERPLBLD CREATININE-BSD FMLA CKD-EPI: 126 ML/MIN/1.73 M 2
GLOBULIN SER CALC-MCNC: 3.9 G/DL (ref 1.9–3.5)
GLUCOSE SERPL-MCNC: 360 MG/DL (ref 65–99)
GLUCOSE UR STRIP.AUTO-MCNC: >=1000 MG/DL
HCG SERPL QL: NEGATIVE
HCT VFR BLD AUTO: 42.8 % (ref 37–47)
HGB BLD-MCNC: 14.5 G/DL (ref 12–16)
IMM GRANULOCYTES # BLD AUTO: 0.07 K/UL (ref 0–0.11)
IMM GRANULOCYTES NFR BLD AUTO: 0.5 % (ref 0–0.9)
KETONES UR STRIP.AUTO-MCNC: 15 MG/DL
LACTATE BLD-SCNC: 1.6 MMOL/L (ref 0.5–2)
LEUKOCYTE ESTERASE UR QL STRIP.AUTO: NEGATIVE
LYMPHOCYTES # BLD AUTO: 3.15 K/UL (ref 1–4.8)
LYMPHOCYTES NFR BLD: 24.3 % (ref 22–41)
MCH RBC QN AUTO: 27.7 PG (ref 27–33)
MCHC RBC AUTO-ENTMCNC: 33.9 G/DL (ref 33.6–35)
MCV RBC AUTO: 81.7 FL (ref 81.4–97.8)
MICRO URNS: ABNORMAL
MONOCYTES # BLD AUTO: 0.81 K/UL (ref 0–0.85)
MONOCYTES NFR BLD AUTO: 6.3 % (ref 0–13.4)
NEUTROPHILS # BLD AUTO: 8.59 K/UL (ref 2–7.15)
NEUTROPHILS NFR BLD: 66.4 % (ref 44–72)
NITRITE UR QL STRIP.AUTO: NEGATIVE
NRBC # BLD AUTO: 0 K/UL
NRBC BLD-RTO: 0 /100 WBC
PH UR STRIP.AUTO: 5.5 [PH] (ref 5–8)
PLATELET # BLD AUTO: 262 K/UL (ref 164–446)
PMV BLD AUTO: 10.7 FL (ref 9–12.9)
POTASSIUM SERPL-SCNC: 4.5 MMOL/L (ref 3.6–5.5)
PROT SERPL-MCNC: 7.4 G/DL (ref 6–8.2)
PROT UR QL STRIP: NEGATIVE MG/DL
RBC # BLD AUTO: 5.24 M/UL (ref 4.2–5.4)
RBC UR QL AUTO: NEGATIVE
SODIUM SERPL-SCNC: 131 MMOL/L (ref 135–145)
SP GR UR STRIP.AUTO: 1.02
WBC # BLD AUTO: 12.9 K/UL (ref 4.8–10.8)

## 2022-07-02 PROCEDURE — 96375 TX/PRO/DX INJ NEW DRUG ADDON: CPT | Mod: XU

## 2022-07-02 PROCEDURE — 700111 HCHG RX REV CODE 636 W/ 250 OVERRIDE (IP): Performed by: EMERGENCY MEDICINE

## 2022-07-02 PROCEDURE — 85025 COMPLETE CBC W/AUTO DIFF WBC: CPT

## 2022-07-02 PROCEDURE — 81003 URINALYSIS AUTO W/O SCOPE: CPT

## 2022-07-02 PROCEDURE — 84703 CHORIONIC GONADOTROPIN ASSAY: CPT

## 2022-07-02 PROCEDURE — 80053 COMPREHEN METABOLIC PANEL: CPT

## 2022-07-02 PROCEDURE — 700105 HCHG RX REV CODE 258: Performed by: EMERGENCY MEDICINE

## 2022-07-02 PROCEDURE — 700101 HCHG RX REV CODE 250: Performed by: EMERGENCY MEDICINE

## 2022-07-02 PROCEDURE — 700117 HCHG RX CONTRAST REV CODE 255: Performed by: EMERGENCY MEDICINE

## 2022-07-02 PROCEDURE — 83605 ASSAY OF LACTIC ACID: CPT

## 2022-07-02 PROCEDURE — 303977 HCHG I & D

## 2022-07-02 PROCEDURE — 87040 BLOOD CULTURE FOR BACTERIA: CPT

## 2022-07-02 PROCEDURE — 99284 EMERGENCY DEPT VISIT MOD MDM: CPT

## 2022-07-02 PROCEDURE — 74177 CT ABD & PELVIS W/CONTRAST: CPT

## 2022-07-02 PROCEDURE — 36415 COLL VENOUS BLD VENIPUNCTURE: CPT

## 2022-07-02 PROCEDURE — 96365 THER/PROPH/DIAG IV INF INIT: CPT | Mod: XU

## 2022-07-02 RX ORDER — DOXYCYCLINE 100 MG/1
100 CAPSULE ORAL 2 TIMES DAILY
Qty: 10 CAPSULE | Refills: 0 | Status: SHIPPED | OUTPATIENT
Start: 2022-07-02 | End: 2022-07-07

## 2022-07-02 RX ORDER — INSULIN DEGLUDEC 200 U/ML
26 INJECTION, SOLUTION SUBCUTANEOUS DAILY
Status: SHIPPED | COMMUNITY
End: 2023-03-24

## 2022-07-02 RX ORDER — HYDROCODONE BITARTRATE AND ACETAMINOPHEN 5; 325 MG/1; MG/1
1 TABLET ORAL EVERY 6 HOURS PRN
Qty: 15 TABLET | Refills: 0 | Status: SHIPPED | OUTPATIENT
Start: 2022-07-02 | End: 2022-07-07

## 2022-07-02 RX ORDER — LEVOTHYROXINE SODIUM 0.15 MG/1
150 TABLET ORAL
Status: SHIPPED | COMMUNITY
End: 2022-09-30 | Stop reason: SDUPTHER

## 2022-07-02 RX ORDER — SODIUM CHLORIDE, SODIUM LACTATE, POTASSIUM CHLORIDE, AND CALCIUM CHLORIDE .6; .31; .03; .02 G/100ML; G/100ML; G/100ML; G/100ML
1000 INJECTION, SOLUTION INTRAVENOUS ONCE
Status: COMPLETED | OUTPATIENT
Start: 2022-07-02 | End: 2022-07-02

## 2022-07-02 RX ORDER — KETOROLAC TROMETHAMINE 30 MG/ML
15 INJECTION, SOLUTION INTRAMUSCULAR; INTRAVENOUS ONCE
Status: COMPLETED | OUTPATIENT
Start: 2022-07-02 | End: 2022-07-02

## 2022-07-02 RX ADMIN — SODIUM CHLORIDE, POTASSIUM CHLORIDE, SODIUM LACTATE AND CALCIUM CHLORIDE 1000 ML: 600; 310; 30; 20 INJECTION, SOLUTION INTRAVENOUS at 07:44

## 2022-07-02 RX ADMIN — PIPERACILLIN AND TAZOBACTAM 3.38 G: 3; .375 INJECTION, POWDER, LYOPHILIZED, FOR SOLUTION INTRAVENOUS; PARENTERAL at 07:39

## 2022-07-02 RX ADMIN — KETOROLAC TROMETHAMINE 15 MG: 30 INJECTION, SOLUTION INTRAMUSCULAR at 10:29

## 2022-07-02 RX ADMIN — LIDOCAINE HYDROCHLORIDE 5 ML: 10 INJECTION, SOLUTION INFILTRATION; PERINEURAL at 09:30

## 2022-07-02 RX ADMIN — IOHEXOL 90 ML: 350 INJECTION, SOLUTION INTRAVENOUS at 08:40

## 2022-07-02 ASSESSMENT — FIBROSIS 4 INDEX: FIB4 SCORE: 0.3

## 2022-07-02 NOTE — ED NOTES
Pt will be discharged home with instructions to follow up with primary care provider, or return to ED if symptoms worsen, or for any emergent medical process.  She verbalizes understanding of educational materials provided during this visit and agrees to follow our recommendations.  No exacerbations of initial presentations are noted.  Scripts are given upon dismissal from our facility.  Ambulates independently and denies any new needs.  The patient will return for new onset or worsening symptomatology and is stable at the time of discharge. She was given return precautions and will comply with recommendations.

## 2022-07-02 NOTE — ED NOTES
Assumed care of pt at this specific time; no acute exacerbations in condition are noted since last evaluation.  Call light is within reach and pt is strongly encouraged to call for any assistance. Expected safety measures such as locked and lowest setting gurney, side-rails up, accessible call light have been properly implemented.  Pt verbalizes understanding of these precautions and is able to identify potential needs.

## 2022-07-02 NOTE — ED TRIAGE NOTES
"Chief Complaint   Patient presents with   • Rectal Bleeding   • Rectal Pain     Pt reports rectal pain and bleeding that she noticed a couple of days ago. Pt states sometimes there is a small amount of bright red blood in the toilet but she primarily notices it in her underwear. Pt reports \"it feels hard like a round ball.\" Pt reports pain 10/10 that is constant.       BP (!) 128/92   Pulse (!) 105   Temp 35.9 °C (96.7 °F) (Temporal)   Resp 13   Ht 1.6 m (5' 3\")   Wt 94.7 kg (208 lb 12.4 oz)   SpO2 97%   BMI 36.98 kg/m²     "

## 2022-07-02 NOTE — ED PROVIDER NOTES
"ED Provider Note    CHIEF COMPLAINT  Chief Complaint   Patient presents with   • Rectal Bleeding   • Rectal Pain     Pt reports rectal pain and bleeding that she noticed a couple of days ago. Pt states sometimes there is a small amount of bright red blood in the toilet but she primarily notices it in her underwear. Pt reports \"it feels hard like a round ball.\" Pt reports pain 10/10 that is constant.         HPI  Liana Obrien is a 42 y.o. female who presents with rectal pain for the past 3 days.  She saw her regular doctor and was prescribed a fungal cream.  This has not been helping.  She noticed today some bleeding and bright red blood in the toilet bowl.  She also noticed some blood on her underwear.  She states she feels a round ball in her buttock area.  She is having 10 out of 10 pain.  She denies fevers or vomiting.  She has pain when she has a bowel movement.  Denies abdominal pain.  No back pain.  She has a history of abscesses in the past that have required drainage but nothing in the rectal area.  She denies any chills.  Patient is a diabetic.    REVIEW OF SYSTEMS  See HPI for further details.  Positive for rectal pain and bleeding, denies abdominal pain, dysuria, fevers, chills, vaginal discharge. all other systems are negative.     PAST MEDICAL HISTORY  Past Medical History:   Diagnosis Date   • Bipolar disorder (HCC) 8/9/2018   • Hypothyroidism due to acquired atrophy of thyroid 1/22/2016   • Uncontrolled type 2 diabetes mellitus without complication, without long-term current use of insulin 3/12/2015   • Candida vaginitis    • Chickenpox    • Diabetes 1.5, managed as type 2 ([facility])    • DVT (deep venous thrombosis) ([facility])    • Dysfunctional uterine bleeding    • Fibroids    • Herpes    • Hypertension    • Personal history of venous thrombosis and embolism     DVT in BLE years ago   • Sleep apnea        FAMILY HISTORY  [unfilled]    SOCIAL HISTORY  Social History     Socioeconomic History   • " "Marital status:    Tobacco Use   • Smoking status: Former Smoker     Packs/day: 0.50     Years: 0.30     Pack years: 0.15     Types: Cigarettes     Quit date: 2018     Years since quittin.5   • Smokeless tobacco: Never Used   • Tobacco comment: for 3 months at a time on and off    Vaping Use   • Vaping Use: Never used   Substance and Sexual Activity   • Alcohol use: No     Alcohol/week: 0.0 oz   • Drug use: No   • Sexual activity: Yes     Partners: Male   Social History Narrative    Works at [employer]        SURGICAL HISTORY  No past surgical history on file.    CURRENT MEDICATIONS   Home Medications     Reviewed by Chetna Issa R.N. (Registered Nurse) on 22 at 0555  Med List Status: Not Addressed   Medication Last Dose Status   Continuous Blood Gluc  (FREESTYLE ALEKSANDAR 14 DAY READER) Device  Active   Continuous Blood Gluc Sensor (FREESTYLE ALEKSANDAR 14 DAY SENSOR) Misc  Active   escitalopram (LEXAPRO) 10 MG Tab  Active   glucose blood strip  Active   levothyroxine (SYNTHROID) 150 MCG Tab  Active   nystatin (MYCOSTATIN) 196180 UNIT/GM Cream topical cream  Active   rosuvastatin (CRESTOR) 40 MG tablet  Active   TRESIBA FLEXTOUCH 200 UNIT/ML Solution Pen-injector  Active   TRULICITY 1.5 MG/0.5ML Solution Pen-injector  Active                ALLERGIES  Allergies   Allergen Reactions   • Bicillin C-R [Penicillin G Proc & Benzathine] Rash     Received inj of Bicillin- reaction was rash. Oral penicillin shows no reaction.   • Ondansetron Hives   • Penicillin G Hives   • Sulfa Drugs Hives          • Metoclopramide Rash and Vomiting     Rash         PHYSICAL EXAM  VITAL SIGNS: BP (!) 128/92   Pulse (!) 105   Temp 35.9 °C (96.7 °F) (Temporal)   Resp 13   Ht 1.6 m (5' 3\")   Wt 94.7 kg (208 lb 12.4 oz)   SpO2 97%   BMI 36.98 kg/m²       Constitutional: Well developed, mild acute distress in pain, Non-toxic appearance.   HENT: Normocephalic, Atraumatic, Bilateral external ears normal, Nose " normal.   Eyes: PERRL, EOMI, Conjunctiva normal  Neck: Normal range of motion, No tenderness, Supple  Cardiovascular: tachycardic heart rate, Normal rhythm   Thorax & Lungs: Normal breath sounds, No respiratory distress  Abdomen: Benign abdominal exam,no guarding no rebound, no pulsatile mass, no tenderness, no distention  Rectal: On the left of the anus there is a very tender area.  There are some minimal fluctuance.  There is some minimal drainage from the rectum.  It is somewhat bloody.  There is no hemorrhoids seen.  No fissures seen.  There is also evidence of a fungal rash in this area.  Skin: Warm, Dry, No erythema, see rectal exam  Back: No tenderness, No CVA tenderness.   Extremities: Intact distal pulses, No edema, No tenderness   Neurologic: Alert & oriented x 3, Normal motor function, Normal sensory function, No focal deficits noted.   Psychiatric: appropriate    Labs  Results for orders placed or performed during the hospital encounter of 07/02/22   CBC With Differential   Result Value Ref Range    WBC 12.9 (H) 4.8 - 10.8 K/uL    RBC 5.24 4.20 - 5.40 M/uL    Hemoglobin 14.5 12.0 - 16.0 g/dL    Hematocrit 42.8 37.0 - 47.0 %    MCV 81.7 81.4 - 97.8 fL    MCH 27.7 27.0 - 33.0 pg    MCHC 33.9 33.6 - 35.0 g/dL    RDW 38.3 35.9 - 50.0 fL    Platelet Count 262 164 - 446 K/uL    MPV 10.7 9.0 - 12.9 fL    Neutrophils-Polys 66.40 44.00 - 72.00 %    Lymphocytes 24.30 22.00 - 41.00 %    Monocytes 6.30 0.00 - 13.40 %    Eosinophils 1.90 0.00 - 6.90 %    Basophils 0.60 0.00 - 1.80 %    Immature Granulocytes 0.50 0.00 - 0.90 %    Nucleated RBC 0.00 /100 WBC    Neutrophils (Absolute) 8.59 (H) 2.00 - 7.15 K/uL    Lymphs (Absolute) 3.15 1.00 - 4.80 K/uL    Monos (Absolute) 0.81 0.00 - 0.85 K/uL    Eos (Absolute) 0.24 0.00 - 0.51 K/uL    Baso (Absolute) 0.08 0.00 - 0.12 K/uL    Immature Granulocytes (abs) 0.07 0.00 - 0.11 K/uL    NRBC (Absolute) 0.00 K/uL   Comp Metabolic Panel   Result Value Ref Range    Sodium 131 (L)  135 - 145 mmol/L    Potassium 4.5 3.6 - 5.5 mmol/L    Chloride 98 96 - 112 mmol/L    Co2 20 20 - 33 mmol/L    Anion Gap 13.0 7.0 - 16.0    Glucose 360 (H) 65 - 99 mg/dL    Bun 11 8 - 22 mg/dL    Creatinine 0.40 (L) 0.50 - 1.40 mg/dL    Calcium 9.3 8.4 - 10.2 mg/dL    AST(SGOT) 13 12 - 45 U/L    ALT(SGPT) 15 2 - 50 U/L    Alkaline Phosphatase 105 (H) 30 - 99 U/L    Total Bilirubin 0.2 0.1 - 1.5 mg/dL    Albumin 3.5 3.2 - 4.9 g/dL    Total Protein 7.4 6.0 - 8.2 g/dL    Globulin 3.9 (H) 1.9 - 3.5 g/dL    A-G Ratio 0.9 g/dL   Urinalysis    Specimen: Urine   Result Value Ref Range    Color Yellow     Character Clear     Specific Gravity 1.020 <1.035    Ph 5.5 5.0 - 8.0    Glucose >=1000 (A) Negative mg/dL    Ketones 15 (A) Negative mg/dL    Protein Negative Negative mg/dL    Bilirubin Negative Negative    Nitrite Negative Negative    Leukocyte Esterase Negative Negative    Occult Blood Negative Negative    Micro Urine Req see below    LACTIC ACID   Result Value Ref Range    Lactic Acid 1.6 0.5 - 2.0 mmol/L   ESTIMATED GFR   Result Value Ref Range    GFR (CKD-EPI) 126 >60 mL/min/1.73 m 2   HCG QUAL SERUM   Result Value Ref Range    Beta-Hcg Qualitative Serum Negative Negative       RADIOLOGY/PROCEDURES  CT-ABDOMEN-PELVIS WITH   Final Result      Left intergluteal cleft 2.8 cm subcutaneous abscess is posterior and inferior to the anus      No acute intra-abdominal or pelvic abnormality aside from above average stool volume      Left uterine leiomyoma is similar to comparison          COURSE & MEDICAL DECISION MAKING  Pertinent Labs & Imaging studies reviewed. (See chart for details)  Patient presents emergency department with rectal pain.  There is some bloody discharge in the area.  I suspect the patient has a abscess.  Unable to fully visualize it on exam secondary to pain.  Most of the pain is in the left buttock anus area.  Concerned about a perirectal abscess.  Will obtain a CT to further evaluate this.  Patient is a  diabetic.  Patient states she is driving home and therefore we held pain medication until we have a definitive plan.  Patient is allergic to penicillin.  I spoke with pharmacy we discussed different antibiotic choices.  She has tolerated Augmentin before.  Therefore we will try IV Zosyn.  Will evaluate for possible sepsis.    Patient has a white count of 12.9 without evidence of bandemia.  Sodium is 131.  Glucose is 360 without evidence of a gap acidosis.  CO2 is 20.  Normal renal function.  No anemia.  Urine shows glucose but no ketones.  CT scan has returned and shows a intergluteal subcutaneous abscess.    Incision and Drainage Procedure Note    Indication: Abscess    Procedure: The patient was positioned appropriately and the skin over the incision site was prepped with betadine and draped in a sterile fashion. Local anesthesia was obtained by infiltration using 1% Lidocaine without epinephrine.  An incision was then made over the apex of the lesion then foul smelling and purulent material was expressed. Loculations were broken up using a hemostat and more of the material was able to be expressed. The drainage cavity was packed with sterile gauze.     The patient tolerated the procedure well.    Complications: minimal bleeding    Patient is doing better after incision and drainage of the gluteal abscess.  She has received Zosyn here and she did not have any rash or evidence of an allergic reaction.  Lactic acid is normal.  Blood sugar was elevated and she received IV fluids for this.  I believe she stable for outpatient management.  She will be placed on doxycycline due to her allergy of penicillin and Bactrim.  She is advised to watch her blood sugars closely.  She needs a recheck in 2 days to reevaluate her packing.  Return precautions were given.    In prescribing controlled substances to this patient, I certify that I have obtained and reviewed the medical history of Liana Obrien. I have also made a  good sari effort to obtain applicable records from other providers who have treated the patient and records did not demonstrate any increased risk of substance abuse that would prevent me from prescribing controlled substances.     I have conducted a physical exam and documented it. I have reviewed Ms. Obrien’s prescription history as maintained by the [place] Prescription Monitoring Program.     I have assessed the patient’s risk for abuse, dependency, and addiction using the validated Opioid Risk Tool available at https://www.mdcalc.com/dguvyd-lcxc-buvn-ort-narcotic-abuse.     Given the above, I believe the benefits of controlled substance therapy outweigh the risks. The reasons for prescribing controlled substances include non-narcotic, oral analgesic alternatives have been inadequate for pain control. Accordingly, I have discussed the risk and benefits, treatment plan, and alternative therapies with the patient.     FINAL IMPRESSION     1. Abscess, gluteal, left    2. Hyperglycemia             Electronically signed by: Claudette Whiting M.D., 7/2/2022 6:24 AM

## 2022-07-02 NOTE — DISCHARGE INSTRUCTIONS
Take the antibiotic as directed.  There is packing in your wound and that needs to be removed in 2 days.  If you develop fever, vomiting or worsening pain you need to return sooner for recheck.  Otherwise see your doctor on Tuesday for recheck.  Also monitor your blood sugars closely.  I hope you feel better soon.

## 2022-07-07 LAB
BACTERIA BLD CULT: NORMAL
BACTERIA BLD CULT: NORMAL
SIGNIFICANT IND 70042: NORMAL
SIGNIFICANT IND 70042: NORMAL
SITE SITE: NORMAL
SITE SITE: NORMAL
SOURCE SOURCE: NORMAL
SOURCE SOURCE: NORMAL

## 2022-07-13 ENCOUNTER — OFFICE VISIT (OUTPATIENT)
Dept: INTERNAL MEDICINE | Facility: OTHER | Age: 42
End: 2022-07-13
Payer: MEDICAID

## 2022-07-13 VITALS
HEIGHT: 63 IN | WEIGHT: 204.8 LBS | TEMPERATURE: 97.6 F | SYSTOLIC BLOOD PRESSURE: 103 MMHG | OXYGEN SATURATION: 96 % | BODY MASS INDEX: 36.29 KG/M2 | HEART RATE: 80 BPM | DIASTOLIC BLOOD PRESSURE: 70 MMHG

## 2022-07-13 DIAGNOSIS — E11.21 TYPE 2 DIABETES MELLITUS WITH NEPHROPATHY (HCC): ICD-10-CM

## 2022-07-13 DIAGNOSIS — L02.31 ABSCESS, GLUTEAL: ICD-10-CM

## 2022-07-13 PROBLEM — R30.0 DYSURIA: Status: RESOLVED | Noted: 2020-07-20 | Resolved: 2022-07-13

## 2022-07-13 PROBLEM — B37.31 CANDIDA VAGINITIS: Status: RESOLVED | Noted: 2022-05-26 | Resolved: 2022-07-13

## 2022-07-13 PROBLEM — E11.649 HYPOGLYCEMIA ASSOCIATED WITH DIABETES (HCC): Status: RESOLVED | Noted: 2021-10-27 | Resolved: 2022-07-13

## 2022-07-13 PROCEDURE — 99213 OFFICE O/P EST LOW 20 MIN: CPT | Mod: GE | Performed by: HOSPITALIST

## 2022-07-13 RX ORDER — LEVOFLOXACIN 500 MG/1
500 TABLET, FILM COATED ORAL DAILY
Qty: 7 TABLET | Refills: 0 | Status: ON HOLD | OUTPATIENT
Start: 2022-07-13 | End: 2022-09-26

## 2022-07-13 RX ORDER — LANCETS 28 GAUGE
EACH MISCELLANEOUS
Status: CANCELLED | OUTPATIENT
Start: 2022-07-13

## 2022-07-13 RX ORDER — METRONIDAZOLE 500 MG/1
500 TABLET ORAL 3 TIMES DAILY
Qty: 21 TABLET | Refills: 0 | Status: SHIPPED | OUTPATIENT
Start: 2022-07-13 | End: 2022-08-10

## 2022-07-13 RX ORDER — ATORVASTATIN CALCIUM 20 MG/1
20 TABLET, FILM COATED ORAL
COMMUNITY
Start: 2022-06-29 | End: 2023-06-06

## 2022-07-13 RX ORDER — VENLAFAXINE HYDROCHLORIDE 37.5 MG/1
37.5 CAPSULE, EXTENDED RELEASE ORAL DAILY
Status: ON HOLD | COMMUNITY
Start: 2022-07-11 | End: 2022-09-26

## 2022-07-13 RX ORDER — LISINOPRIL 10 MG/1
10 TABLET ORAL
COMMUNITY
Start: 2022-06-29 | End: 2023-06-06

## 2022-07-13 RX ORDER — LANCETS 28 GAUGE
EACH MISCELLANEOUS
COMMUNITY
End: 2023-01-18

## 2022-07-13 RX ORDER — LANCETS 30 GAUGE
EACH MISCELLANEOUS
Qty: 100 EACH | Refills: 0 | Status: SHIPPED | OUTPATIENT
Start: 2022-07-13 | End: 2023-01-27

## 2022-07-13 ASSESSMENT — ENCOUNTER SYMPTOMS
CHILLS: 0
DIZZINESS: 0
COUGH: 0
SHORTNESS OF BREATH: 0
ABDOMINAL PAIN: 1
FEVER: 0

## 2022-07-13 ASSESSMENT — FIBROSIS 4 INDEX: FIB4 SCORE: 0.54

## 2022-07-13 NOTE — PATIENT INSTRUCTIONS
Thank you for allowing us participate in your care today.  Please take the antibiotics as directed for 7 days.  Return in 2 weeks if abscess returns or is not resolving.  Otherwise follow-up in 5 weeks.

## 2022-07-13 NOTE — PROGRESS NOTES
"Subjective     Liana Obrien is a 42 y.o. female who presents with Follow-Up (Re-est ) and Diabetes            HPI  1. Abscess, gluteal  Patient had a visit to the emergency department on 7/2/2022 secondary to rectal pain.  Upon evaluation patient was found to have an abscess in the left gluteal muscle.  After incision and drainage of the gluteal abscess patient received Zosyn in the ED and was discharged on doxycycline for 5 days; of note patient has an allergy to penicillin and Bactrim.  Patient presents today because she is having pain in the same area of the gluteal abscess.  The pain in the gluteal area is not as severe as it was the required her to go to the ED on 2 July but she reports it does make it difficult for him to sit down comfortably for long periods of time.  Patient reports that after the antibiotics the pain resolved and she felt the area healed but 2 days afterwards she felt it hardened area in the same location and had pain localized to that area.  Patient reports that she has a history of these gluteal abscesses but is unsure when the last one occurred and how it was managed.  Patient does report an allergy to penicillin and Bactrim but feels like she has had success in managing and treating previous gluteal abscesses with \"Flagyl\".    2. Type 2 diabetes mellitus with nephropathy (HCC)  Patient has a history of uncontrolled type 2 diabetes.  She reports her glucose readings at home are typically more than 300 daily.  Patient reports she has been seeing an endocrinologist since last week and has an another appointment next week.  Patient reports that she has been told by endocrinology that she needs to increase her metformin from 500 mg daily to 1000 mg daily.  Patient is also started receiving Trulicity 1.5 mg weekly per endocrinology.  Patient reports that she has had difficulty remaining compliant with her insulin usage because she often experiences vaginal bleeding right after she " "takes insulin.  Patient understands that her uncontrolled sugar levels are related to her recurrent gluteal abscesses and is interested in having better glycemic control.    Review of Systems   Constitutional: Negative for chills and fever.   Respiratory: Negative for cough and shortness of breath.    Cardiovascular: Negative for chest pain.   Gastrointestinal: Positive for abdominal pain (Chronic, intermittent with insulin use).   Musculoskeletal:        Pain of left gluteal muscle   Neurological: Negative for dizziness.              Objective     /70 (BP Location: Left arm, Patient Position: Sitting, BP Cuff Size: Large adult)   Pulse 80   Temp 36.4 °C (97.6 °F) (Temporal)   Ht 1.6 m (5' 3\")   Wt 92.9 kg (204 lb 12.8 oz)   SpO2 96%   BMI 36.28 kg/m²      Physical Exam  Constitutional:       General: She is not in acute distress.     Appearance: She is not ill-appearing or toxic-appearing.   Eyes:      General: No scleral icterus.        Right eye: No discharge.         Left eye: No discharge.   Cardiovascular:      Rate and Rhythm: Normal rate.      Pulses: Normal pulses.   Pulmonary:      Effort: Pulmonary effort is normal. No respiratory distress.      Breath sounds: Normal breath sounds.   Genitourinary:     Comments: On the inner left gluteal cheek an area of tenderness can be appreciated 3 inches to the left of the anus.  There is no visible discharge and the color of the skin is unchanged to that of the and nearby.  Musculoskeletal:         General: No deformity.      Cervical back: Normal range of motion and neck supple. No rigidity.   Skin:     General: Skin is warm and dry.   Neurological:      Gait: Gait normal.   Psychiatric:         Mood and Affect: Mood normal.         Behavior: Behavior normal.                    Assessment & Plan        1. Abscess, gluteal  On physical exam, on the inner left gluteal cheek an area of tenderness can be appreciated 3 inches to the left of the anus.  There " "is no visible discharge and the color of the skin is unchanged to that of the and nearby. Patient has an allergy to penicillin and Bactrim but says she has tolerated \"flagyl\" in the past. Patient advised to take the following medications for 7 days and to give our office a call if symptoms are non-resolved after therapy.   - metroNIDAZOLE (FLAGYL) 500 MG Tab; Take 1 Tablet by mouth 3 times a day.  Dispense: 21 Tablet; Refill: 0  - levoFLOXacin (LEVAQUIN) 500 MG tablet; Take 1 Tablet by mouth every day.  Dispense: 7 Tablet; Refill: 0    2. Type 2 diabetes mellitus with nephropathy (HCC)  Most recent hemgolobin A1c 13% (4/27/22). Patient reports home glucose readings of 300. Is following with endocrinology and is scheduled to increase dose of metformin from 500mg to 1,000mg daily.  - Lancets; Use one  lancet to test blood sugar 3 times a day.  Dispense: 100 Each; Refill: 0  - Metformin per endocrinology  - Dulaglutide per endocrinology  - insulin lispro TID  - Follow up with endocrinology next week  - Encouraged diet high in vegetables, and fiber. And a diet low in salt, refined carbohydrates, cholesterol, saturated fat, and trans fatty acids.    - Encouraged  a minimum of 30 minutes of moderate intensity aerobic exercise (eg, brisk walking) is recommended on five days each week. Or 20 minutes of vigorous-intensity aerobic exercise (eg, jogging) on three days each week.     Follow-up: Thank you for allowing us participate in your care today.  Please take the antibiotics as directed for 7 days.  Return in 7-10 days if the abscess returns or is not resolving.  Otherwise follow-up in 5 weeks.                "

## 2022-07-19 ENCOUNTER — APPOINTMENT (OUTPATIENT)
Dept: INTERNAL MEDICINE | Facility: OTHER | Age: 42
End: 2022-07-19
Payer: MEDICAID

## 2022-07-26 ENCOUNTER — NON-PROVIDER VISIT (OUTPATIENT)
Dept: INTERNAL MEDICINE | Facility: OTHER | Age: 42
End: 2022-07-26
Payer: MEDICAID

## 2022-07-26 DIAGNOSIS — E28.2 POLYCYSTIC OVARY SYNDROME: ICD-10-CM

## 2022-07-26 DIAGNOSIS — E11.21 TYPE 2 DIABETES MELLITUS WITH NEPHROPATHY (HCC): ICD-10-CM

## 2022-07-26 DIAGNOSIS — G47.33 OBSTRUCTIVE SLEEP APNEA, ADULT: ICD-10-CM

## 2022-07-26 DIAGNOSIS — E66.9 OBESITY (BMI 30-39.9): ICD-10-CM

## 2022-07-26 DIAGNOSIS — E55.9 VITAMIN D DEFICIENCY: ICD-10-CM

## 2022-07-26 DIAGNOSIS — K21.9 GASTRO-ESOPHAGEAL REFLUX DISEASE WITHOUT ESOPHAGITIS: ICD-10-CM

## 2022-07-26 DIAGNOSIS — I10 HYPERTENSION, UNSPECIFIED TYPE: ICD-10-CM

## 2022-07-26 PROCEDURE — 97803 MED NUTRITION INDIV SUBSEQ: CPT | Performed by: DIETITIAN, REGISTERED

## 2022-07-26 NOTE — PROGRESS NOTES
Liana is here for FU with RD for diabetes education/support     She is seeing Shira Pulido and shared with her the blood sugar list she's been keeping but didn't bring in today  She is taking the GLP-1 once per week but she goes into the office to take it    Taking SS for Humalog and 1000mg/d metformin and 10 units Tresiba     Recalls her BS to be around high 100's or sometimes up to 350  She was in the Er a couple weeks ago and had an infection so she saw her BS go up     She reports bug situation/rats/mice/ants in her home and finding that she is having bites on her face/arms and she is very frustrated with that. No air conditioning in her home. I asked her about the bugs and she said she isn't sure what they are, they are small, hard to see, not sure if they are in her bed or not or in the couch but thinks they are coming from there. Encouraged her to get someone to come out to identify what they are and to get a quote on getting rid of them as she is renting from her parents. Strongly encouraged this before our next appointment.    Tracking her food and having average of 1,540 calories average protein at 59 grams and we reviewed in detail together today.     B - vanilla wafers, coffee with cream  L - fried zucchini   S - vanilla wafers, coffee with cream  D-  Stuffed crust pizza x 3 slices    Provided some feedback to support Liana on making adjustments to her diet to support her blood sugars and her weight. Set goals; rtc with RD in 2 months    Time spent: 30 minutes

## 2022-07-26 NOTE — PATIENT INSTRUCTIONS
Goals  1.  ttps://adsd.[place].gov/Programs/Seniors/PD_Waiver/Waiver_for_Person%E2%80%99s_with_Physical_Disabilities_(PD)/  2. Call an  to come check out the house and quote for cleaning and to help identify the bugs  Check your bed under the sheets to see if there are any bugs   3. Stay hydrated with this heat  4. Add protein in with your meals to help with stabilizing those blood sugars (hard boiled, greek yogurt, canned tuna or canned chicken)  5. If you don't want water, or something different, stick to crystal light

## 2022-08-10 ENCOUNTER — OFFICE VISIT (OUTPATIENT)
Dept: INTERNAL MEDICINE | Facility: OTHER | Age: 42
End: 2022-08-10
Payer: MEDICAID

## 2022-08-10 VITALS
DIASTOLIC BLOOD PRESSURE: 81 MMHG | TEMPERATURE: 97 F | SYSTOLIC BLOOD PRESSURE: 122 MMHG | HEIGHT: 63 IN | OXYGEN SATURATION: 96 % | HEART RATE: 84 BPM | WEIGHT: 200.6 LBS | BODY MASS INDEX: 35.54 KG/M2

## 2022-08-10 DIAGNOSIS — B37.31 YEAST INFECTION OF THE VAGINA: Primary | ICD-10-CM

## 2022-08-10 DIAGNOSIS — E11.21 TYPE 2 DIABETES MELLITUS WITH NEPHROPATHY (HCC): ICD-10-CM

## 2022-08-10 LAB
HBA1C MFR BLD: 12.5 % (ref 0–5.6)
INT CON NEG: NEGATIVE
INT CON POS: POSITIVE

## 2022-08-10 PROCEDURE — 99214 OFFICE O/P EST MOD 30 MIN: CPT | Mod: GC

## 2022-08-10 PROCEDURE — 83036 HEMOGLOBIN GLYCOSYLATED A1C: CPT

## 2022-08-10 RX ORDER — FLUCONAZOLE 100 MG/1
150 TABLET ORAL
Qty: 5 TABLET | Refills: 0 | Status: SHIPPED | OUTPATIENT
Start: 2022-08-10 | End: 2022-08-17

## 2022-08-10 RX ORDER — VENLAFAXINE 75 MG/1
75 TABLET ORAL DAILY
COMMUNITY
End: 2022-09-27

## 2022-08-10 ASSESSMENT — ENCOUNTER SYMPTOMS
HEADACHES: 0
CHILLS: 0
COUGH: 0
NAUSEA: 0
DOUBLE VISION: 0
SHORTNESS OF BREATH: 0
VOMITING: 0
PALPITATIONS: 0
WEIGHT LOSS: 0
ABDOMINAL PAIN: 0
CONSTIPATION: 0
BLURRED VISION: 0
DIARRHEA: 0
FEVER: 1
DIZZINESS: 0
HEARTBURN: 0

## 2022-08-10 ASSESSMENT — FIBROSIS 4 INDEX: FIB4 SCORE: 0.54

## 2022-08-11 NOTE — PROGRESS NOTES
Chief Complaint   Patient presents with    Frequent Infections     Follow up, infection on glutes       HISTORY OF PRESENT ILLNESS: Patient is a 42 y.o. female established patient who presents today for the following.      Patient presents today due to concern for recurrent gluteal abscess.  Patient has a history of recurrent gluteal abscesses (approximately 4-5 over the past couple years) with most recent having occurred July 13, 2022 for which she presented to our clinic and was given prescription for Flagyl and levofloxacin.  Patient states she took the antibiotics as prescribed, and completed the course, however believes that the abscess had never resolved.  Patient states over the past couple of days, she has noticed increasing pain in her rectum as well as associated discharge (has noted blood in her underwear) and fevers and chills.  She has a history of insulin-dependent type 2 diabetes, and states she knows that her blood sugars have been significantly elevated over the past few days.  Typically they run in the 150s, however over the past couple of days she has noticed her blood sugar rising up to the 300 and 400s.  She denies any trauma to the rectum and denies any abdominal pain, nausea, vomiting, diarrhea, constipation.      Past Medical History:   Diagnosis Date    Bipolar disorder ([facility]) 8/9/2018    Candida vaginitis     Candida vaginitis 5/26/2022    Chickenpox     Diabetes 1.5, managed as type 2 ([facility])     DVT (deep venous thrombosis) ([facility])     Dysfunctional uterine bleeding     Fibroids     Herpes     Hypertension     Hypoglycemia associated with diabetes ([facility]) 10/27/2021    Hypothyroidism due to acquired atrophy of thyroid 1/22/2016    Personal history of venous thrombosis and embolism     DVT in BLE years ago    Sleep apnea     Uncontrolled type 2 diabetes mellitus without complication, without long-term current use of insulin 3/12/2015       Patient Active Problem List    Diagnosis Date Noted     Abscess, gluteal 07/13/2022    Obesity (BMI 30-39.9) 05/25/2022    Gastro-esophageal reflux disease without esophagitis 04/14/2021    Major depressive disorder 09/14/2020    Polycystic ovary syndrome 12/11/2019    History of deep venous thrombosis 11/19/2019    Hypothyroidism 11/19/2019    Hypertension 10/28/2017    Obstructive sleep apnea, adult 07/31/2015    Vitamin D deficiency 03/12/2015    Type 2 diabetes mellitus with nephropathy (HCC) 03/12/2015       Allergies:Bicillin c-r [penicillin g proc & benzathine], Ondansetron, Penicillin g, Sulfa drugs, and Metoclopramide    Current Outpatient Medications   Medication Sig Dispense Refill    venlafaxine (EFFEXOR) 75 MG Tab Take 75 mg by mouth every day.      atorvastatin (LIPITOR) 20 MG Tab Take 20 mg by mouth every day.      lisinopril (PRINIVIL) 10 MG Tab Take 10 mg by mouth every day.      Lancets Ultra Fine Misc       Lancets Use one  lancet to test blood sugar 3 times a day. 100 Each 0    levothyroxine (SYNTHROID) 150 MCG Tab Take 150 mcg by mouth every morning on an empty stomach.      Insulin Degludec (TRESIBA FLEXTOUCH) 200 UNIT/ML Solution Pen-injector Inject 10 Units under the skin every day.      insulin lispro (HUMALOG) 100 UNIT/ML Inject 1-10 Units under the skin 3 times a day before meals. Unknown sliding scale      metFORMIN (GLUCOPHAGE) 500 MG Tab Take 1,000 mg by mouth 2 times a day.      TRULICITY 1.5 MG/0.5ML Solution Pen-injector Inject 1 Each under the skin every 7 days for 90 days. 12 Each 3    venlafaxine XR (EFFEXOR XR) 37.5 MG CAPSULE SR 24 HR Take 37.5 mg by mouth every day. (Patient not taking: Reported on 8/10/2022)      levoFLOXacin (LEVAQUIN) 500 MG tablet Take 1 Tablet by mouth every day. (Patient not taking: Reported on 8/10/2022) 7 Tablet 0     No current facility-administered medications for this visit.       Social History     Tobacco Use    Smoking status: Former     Packs/day: 0.50     Years: 0.30     Pack years: 0.15     Types:  "Cigarettes     Quit date: 2018     Years since quittin.6    Smokeless tobacco: Never    Tobacco comments:     for 3 months at a time on and off    Vaping Use    Vaping Use: Never used   Substance Use Topics    Alcohol use: No     Alcohol/week: 0.0 oz    Drug use: No       Family History   Problem Relation Age of Onset    Hypertension Mother     Sleep Apnea Mother     Hyperlipidemia Father     Cancer Brother     Sleep Apnea Brother     Cancer Maternal Uncle     Diabetes Neg Hx     Heart Disease Neg Hx     Stroke Neg Hx          Review of Systems   Review of Systems   Constitutional:  Positive for fever. Negative for chills and weight loss.   Eyes:  Negative for blurred vision and double vision.   Respiratory:  Negative for cough and shortness of breath.    Cardiovascular:  Negative for chest pain and palpitations.   Gastrointestinal:  Negative for abdominal pain, constipation, diarrhea, heartburn, nausea and vomiting.   Genitourinary:  Negative for dysuria and urgency.   Neurological:  Negative for dizziness and headaches.     Exam:  /81 (BP Location: Left arm, Patient Position: Sitting, BP Cuff Size: Adult)   Pulse 84   Temp 36.1 °C (97 °F) (Temporal)   Ht 1.6 m (5' 3\")   Wt 91 kg (200 lb 9.6 oz)   SpO2 96%  Body mass index is 35.53 kg/m².    ***Constitutional:  Not in acute distress, well appearing.  HEENT:   NC/AT  Cardiovascular: Regular rate and rhythm. No murmurs or gallops.      Lungs:   Clear to auscultation bilaterally. No wheezes or crackles. No respiratory distress.  Abdomen: Not distended, soft, not tender. No guarding or rigidity. No masses.  Extremities:  No cyanosis/clubbing/edema. No obvious deformities.  Skin:  Warm and dry.  No visible rashes.  Neurologic: Alert & oriented x 3, CN II-XII grossly intact, strength and sensation grossly intact.  No focal deficits noted.  Psychiatric:  Affect normal, mood normal, judgment normal.    Assessment/Plan:     1. Abscess, gluteal  ***    2. Type " 2 diabetes mellitus with nephropathy (HCC)  ***  - POCT  A1C completed in clinic, 12.5% previously 13.0% 3 months ago      All imaging results and lab results and consult notes are reviewed at this visit.  Followup: No follow-ups on file.    Please note that this dictation was created using voice recognition software. I have made every reasonable attempt to correct obvious errors, but I expect that there are errors of grammar and possibly content that I did not discover before finalizing the note.

## 2022-08-11 NOTE — PATIENT INSTRUCTIONS
It was great meeting you today Candy!    It appears you have a yeast infection that could have formed after being on the antibiotics.  The abscess that was previously there does not seem to be present any longer.    Have sent in a prescription for fluconazole to treat the yeast infection.  Please take this as prescribed.  In the meantime recommend avoiding any soaps or detergents and using only warm water to clean yourself.    Please schedule with Dr. Anahi Ramirez to follow-up in the next 1 to 2 weeks and to reestablish care.

## 2022-08-11 NOTE — PROGRESS NOTES
Established Patient    Patient Care Team:  Anahi Faria M.D. as PCP - General (Internal Medicine)  Rain Latif (Inactive)    Liana Obrien is a 42 y.o. female who presents today with the following Chief Complaint(s): Follow up for Diagnoses of Abscess, gluteal, Type 2 diabetes mellitus with nephropathy (HCC), and Yeast infection of the vagina were pertinent to this visit.    HPI:  Susan is a 42-year-old female who presents for follow-up of previously diagnosed subcutaneous gluteal abscess on 7/13.  Per Dr. Obregon's note there was no visible discharge at previous visit however had an area of tenderness just left of the anus.  She was treated with 7 days of Flagyl and Levaquin.    Notably she does have a history of uncontrolled type 2 diabetes with a recent hemoglobin A1c of 13.9.  She states that she still has tenderness and describes it as feeling like an abscess.  She denies any drainage, fevers, bleeding.  She denies using any harsh soaps or vigorously cleaning the area.      ROS:     Denies any new chest pain or shortness of breath.  No changes to urinary or bowel function.   See HPI.    Past Medical History:   Diagnosis Date    Bipolar disorder (HCC) 8/9/2018    Candida vaginitis     Candida vaginitis 5/26/2022    Chickenpox     Diabetes 1.5, managed as type 2 (HCC)     DVT (deep venous thrombosis) (HCC)     Dysfunctional uterine bleeding     Fibroids     Herpes     Hypertension     Hypoglycemia associated with diabetes (HCC) 10/27/2021    Hypothyroidism due to acquired atrophy of thyroid 1/22/2016    Personal history of venous thrombosis and embolism     DVT in BLE years ago    Sleep apnea     Uncontrolled type 2 diabetes mellitus without complication, without long-term current use of insulin 3/12/2015     Social History     Tobacco Use    Smoking status: Former     Packs/day: 0.50     Years: 0.30     Pack years: 0.15     Types: Cigarettes     Quit date: 2018     Years since quitting:  "4.6    Smokeless tobacco: Never    Tobacco comments:     for 3 months at a time on and off    Vaping Use    Vaping Use: Never used   Substance Use Topics    Alcohol use: No     Alcohol/week: 0.0 oz    Drug use: No     Current Outpatient Medications   Medication Sig Dispense Refill    venlafaxine (EFFEXOR) 75 MG Tab Take 75 mg by mouth every day.      fluconazole (DIFLUCAN) 100 MG Tab Take 1.5 Tablets by mouth every 3 days for 3 doses. 5 Tablet 0    atorvastatin (LIPITOR) 20 MG Tab Take 20 mg by mouth every day.      lisinopril (PRINIVIL) 10 MG Tab Take 10 mg by mouth every day.      Lancets Ultra Fine Misc       Lancets Use one  lancet to test blood sugar 3 times a day. 100 Each 0    levothyroxine (SYNTHROID) 150 MCG Tab Take 150 mcg by mouth every morning on an empty stomach.      Insulin Degludec (TRESIBA FLEXTOUCH) 200 UNIT/ML Solution Pen-injector Inject 10 Units under the skin every day.      insulin lispro (HUMALOG) 100 UNIT/ML Inject 1-10 Units under the skin 3 times a day before meals. Unknown sliding scale      metFORMIN (GLUCOPHAGE) 500 MG Tab Take 1,000 mg by mouth 2 times a day.      TRULICITY 1.5 MG/0.5ML Solution Pen-injector Inject 1 Each under the skin every 7 days for 90 days. 12 Each 3    venlafaxine XR (EFFEXOR XR) 37.5 MG CAPSULE SR 24 HR Take 37.5 mg by mouth every day. (Patient not taking: Reported on 8/10/2022)      levoFLOXacin (LEVAQUIN) 500 MG tablet Take 1 Tablet by mouth every day. (Patient not taking: Reported on 8/10/2022) 7 Tablet 0     No current facility-administered medications for this visit.     Physical Exam:  /81 (BP Location: Left arm, Patient Position: Sitting, BP Cuff Size: Adult)   Pulse 84   Temp 36.1 °C (97 °F) (Temporal)   Ht 1.6 m (5' 3\")   Wt 91 kg (200 lb 9.6 oz)   SpO2 96%   BMI 35.53 kg/m²   General: Well developed, well nourished female, in no distress.  Eyes: Conjuntiva without any obvious injection or erythema.   Cardiovascular: Heart is regular with " out murmur  Lungs: Clear to auscultation bilaterally. No wheezes, rhonci or crackles heard. Respiratory effort is normal.  Abd: Soft, non-tender  Ext: No edema  : Labia appear irritated, erythematous, and inflamed.  There is no palpable mass, abscess or area of drainage.    Assessment and Plan:   1. Yeast infection of the vagina  Signs symptoms concerning for yeast infection.  In the setting of recent antibiotic use and uncontrolled diabetes this creates a perfect environment for this type of infection.  Will treat with 3 doses of Diflucan.  Patient will need to follow-up with her PCP for diabetes and other healthcare maintenance issues.    - fluconazole (DIFLUCAN) 100 MG Tab; Take 1.5 Tablets by mouth every 3 days for 3 doses.  Dispense: 5 Tablet; Refill: 0    2. Type 2 diabetes mellitus with nephropathy (HCC)  A1c in the office today was 12.5.  Plan to follow-up with PCP in the next week or so for diabetes specific visit.  - POCT  A1C      Return in about 2 weeks (around 8/24/2022) for follow up yeast infection and re-establish.  Patient Instructions   It was great meeting you today Liana!    It appears you have a yeast infection that could have formed after being on the antibiotics.  The abscess that was previously there does not seem to be present any longer.    Have sent in a prescription for fluconazole to treat the yeast infection.  Please take this as prescribed.  In the meantime recommend avoiding any soaps or detergents and using only warm water to clean yourself.    Please schedule with Dr. Anahi Ramirez to follow-up in the next 1 to 2 weeks and to reestablish care.        Mitzi Rodriguez M.D. PGY2  [facility]    This note was created using voice recognition software.  While every attempt is made to ensure accuracy of transcription, occasionally errors occur.

## 2022-08-24 ENCOUNTER — HOSPITAL ENCOUNTER (EMERGENCY)
Facility: MEDICAL CENTER | Age: 42
End: 2022-08-24
Attending: EMERGENCY MEDICINE
Payer: MEDICAID

## 2022-08-24 VITALS
SYSTOLIC BLOOD PRESSURE: 128 MMHG | OXYGEN SATURATION: 96 % | WEIGHT: 203.04 LBS | DIASTOLIC BLOOD PRESSURE: 72 MMHG | BODY MASS INDEX: 35.98 KG/M2 | TEMPERATURE: 97.3 F | HEIGHT: 63 IN | RESPIRATION RATE: 16 BRPM | HEART RATE: 90 BPM

## 2022-08-24 DIAGNOSIS — L02.31 CUTANEOUS ABSCESS OF BUTTOCK: ICD-10-CM

## 2022-08-24 DIAGNOSIS — L02.91 CUTANEOUS ABSCESS, UNSPECIFIED SITE: ICD-10-CM

## 2022-08-24 DIAGNOSIS — L02.31 ABSCESS, GLUTEAL: ICD-10-CM

## 2022-08-24 PROCEDURE — 96372 THER/PROPH/DIAG INJ SC/IM: CPT | Mod: XU

## 2022-08-24 PROCEDURE — 96374 THER/PROPH/DIAG INJ IV PUSH: CPT | Mod: XU

## 2022-08-24 PROCEDURE — 700111 HCHG RX REV CODE 636 W/ 250 OVERRIDE (IP): Performed by: EMERGENCY MEDICINE

## 2022-08-24 PROCEDURE — 700101 HCHG RX REV CODE 250: Performed by: EMERGENCY MEDICINE

## 2022-08-24 PROCEDURE — 99284 EMERGENCY DEPT VISIT MOD MDM: CPT

## 2022-08-24 PROCEDURE — 303977 HCHG I & D

## 2022-08-24 PROCEDURE — A9270 NON-COVERED ITEM OR SERVICE: HCPCS | Performed by: EMERGENCY MEDICINE

## 2022-08-24 PROCEDURE — 700102 HCHG RX REV CODE 250 W/ 637 OVERRIDE(OP): Performed by: EMERGENCY MEDICINE

## 2022-08-24 RX ORDER — CLINDAMYCIN HYDROCHLORIDE 300 MG/1
300 CAPSULE ORAL 3 TIMES DAILY
Qty: 30 CAPSULE | Refills: 0 | Status: SHIPPED | OUTPATIENT
Start: 2022-08-24 | End: 2022-09-03

## 2022-08-24 RX ORDER — OXYCODONE HYDROCHLORIDE 5 MG/1
5 TABLET ORAL EVERY 4 HOURS PRN
Qty: 15 TABLET | Refills: 0 | Status: SHIPPED | OUTPATIENT
Start: 2022-08-24 | End: 2022-09-03

## 2022-08-24 RX ORDER — CLINDAMYCIN HYDROCHLORIDE 150 MG/1
300 CAPSULE ORAL ONCE
Status: COMPLETED | OUTPATIENT
Start: 2022-08-24 | End: 2022-08-24

## 2022-08-24 RX ORDER — LIDOCAINE HYDROCHLORIDE AND EPINEPHRINE BITARTRATE 20; .01 MG/ML; MG/ML
10 INJECTION, SOLUTION SUBCUTANEOUS ONCE
Status: COMPLETED | OUTPATIENT
Start: 2022-08-24 | End: 2022-08-24

## 2022-08-24 RX ORDER — AMOXICILLIN 250 MG
1 CAPSULE ORAL DAILY
Qty: 30 TABLET | Refills: 0 | Status: SHIPPED | OUTPATIENT
Start: 2022-08-24 | End: 2022-09-27

## 2022-08-24 RX ORDER — MIDAZOLAM HYDROCHLORIDE 1 MG/ML
3 INJECTION INTRAMUSCULAR; INTRAVENOUS ONCE
Status: COMPLETED | OUTPATIENT
Start: 2022-08-24 | End: 2022-08-24

## 2022-08-24 RX ADMIN — CLINDAMYCIN HYDROCHLORIDE 300 MG: 150 CAPSULE ORAL at 21:19

## 2022-08-24 RX ADMIN — MIDAZOLAM HYDROCHLORIDE 3 MG: 1 INJECTION, SOLUTION INTRAMUSCULAR; INTRAVENOUS at 20:45

## 2022-08-24 RX ADMIN — LIDOCAINE HYDROCHLORIDE AND EPINEPHRINE 10 ML: 20; 10 INJECTION, SOLUTION INFILTRATION; PERINEURAL at 20:15

## 2022-08-24 RX ADMIN — FENTANYL CITRATE 50 MCG: 50 INJECTION, SOLUTION INTRAMUSCULAR; INTRAVENOUS at 20:06

## 2022-08-24 ASSESSMENT — FIBROSIS 4 INDEX: FIB4 SCORE: 0.54

## 2022-08-25 NOTE — ED NOTES
Gluteal abscess wound dressed  Discussed wound care and signs and symptoms of infection  Patient verbalized understanding

## 2022-08-25 NOTE — ED NOTES
Patient discharged home in stable condition  AVS provided with recommended follow up and home care instructions and education information  Prescription for oxycodone, clindamycin and Senna electronically provided at this time  Patient verbalized understanding  Ambulatory at time of discharge

## 2022-08-25 NOTE — ED PROVIDER NOTES
"ED Provider Note    CHIEF COMPLAINT  Chief Complaint   Patient presents with    Abscess     Rectal abscess for over 1 month, seen here for same. \"I also think I have a yeast infection\"       HPI  Liana Obrien is a 42 y.o. female who presents for possible rectal abscess.  Patient reports she has had history of rectal abscess in the past.  It was last drained here about 2 months ago.  Patient has not seen colorectal for these issues.  Patient reports that she has had symptoms now for the last few days.  She reports it started draining spontaneously a few days ago.  Patient denies any associated fevers or chills.  Patient denies any associated abdominal pain.  Last time patient was here the abscess was simply located in the gluteal cleft with no extension into the anus or rectum    REVIEW OF SYSTEMS  ROS  See HPI for further details. All other systems are negative.     PAST MEDICAL HISTORY   has a past medical history of Bipolar disorder ([facility]) (2018), Candida vaginitis, Candida vaginitis (2022), Chickenpox, Diabetes 1.5, managed as type 2 ([facility]), DVT (deep venous thrombosis) ([facility]), Dysfunctional uterine bleeding, Fibroids, Herpes, Hypertension, Hypoglycemia associated with diabetes ([facility]) (10/27/2021), Hypothyroidism due to acquired atrophy of thyroid (2016), Personal history of venous thrombosis and embolism, Sleep apnea, and Uncontrolled type 2 diabetes mellitus without complication, without long-term current use of insulin (3/12/2015).    SOCIAL HISTORY  Social History     Tobacco Use    Smoking status: Former     Packs/day: 0.50     Years: 0.30     Pack years: 0.15     Types: Cigarettes     Quit date:      Years since quittin.6    Smokeless tobacco: Never    Tobacco comments:     for 3 months at a time on and off    Vaping Use    Vaping Use: Never used   Substance and Sexual Activity    Alcohol use: No     Alcohol/week: 0.0 oz    Drug use: No    Sexual activity: Yes     Partners: Male "       SURGICAL HISTORY  patient denies any surgical history    CURRENT MEDICATIONS  Home Medications       Reviewed by Claudette Lewis R.N. (Registered Nurse) on 08/24/22 at 1737  Med List Status: Not Addressed     Medication Last Dose Status   atorvastatin (LIPITOR) 20 MG Tab  Active   Insulin Degludec (TRESIBA FLEXTOUCH) 200 UNIT/ML Solution Pen-injector  Active   insulin lispro (HUMALOG) 100 UNIT/ML  Active   Lancets  Active   Lancets Ultra Fine Misc  Active   levoFLOXacin (LEVAQUIN) 500 MG tablet  Active   levothyroxine (SYNTHROID) 150 MCG Tab  Active   lisinopril (PRINIVIL) 10 MG Tab  Active   metFORMIN (GLUCOPHAGE) 500 MG Tab  Active   venlafaxine (EFFEXOR) 75 MG Tab  Active   venlafaxine XR (EFFEXOR XR) 37.5 MG CAPSULE SR 24 HR  Active                    ALLERGIES  Allergies   Allergen Reactions    Bicillin C-R [Penicillin G Proc & Benzathine] Rash     Received inj of Bicillin- reaction was rash. Oral penicillin shows no reaction.    Ondansetron Hives    Penicillin G Hives    Sulfa Drugs Hives           Metoclopramide Rash and Vomiting     Rash         PHYSICAL EXAM  Vitals:    08/24/22 1735   BP: 128/81   Pulse: (!) 103   Resp: 16   Temp: 36.3 °C (97.3 °F)   SpO2: 96%       Physical Exam  Constitutional:       Appearance: She is well-developed.   HENT:      Head: Normocephalic and atraumatic.   Eyes:      Conjunctiva/sclera: Conjunctivae normal.   Cardiovascular:      Rate and Rhythm: Normal rate and regular rhythm.   Pulmonary:      Effort: Pulmonary effort is normal.      Breath sounds: Normal breath sounds.   Abdominal:      General: Bowel sounds are normal. There is no distension.      Palpations: Abdomen is soft.      Tenderness: There is no abdominal tenderness. There is no rebound.   Genitourinary:     Comments: Left gluteal cleft with approximately 4 cm fluctuant mass with minimal surrounding erythema.  It is tender.  Rectal exam is clear of any extension of the fluctuance or pain out of proportion.   There is no rectal discharge or gross blood  Musculoskeletal:      Cervical back: Normal range of motion and neck supple.   Skin:     General: Skin is warm and dry.      Findings: No rash.   Neurological:      Mental Status: She is alert and oriented to person, place, and time.   Psychiatric:         Behavior: Behavior normal.       Incision and Drainage Procedure Note    Indication: Abscess    Procedure: The patient was positioned appropriately and the skin over the incision site was prepped with betadine and draped in a sterile fashion. Local anesthesia was obtained by infiltration using 1% Lidocaine with epinephrine.  An incision was then made over the apex of the lesion then foul smelling and purulent material was expressed. Loculations were broken up using a hemostat and more of the material was able to be expressed. The drainage cavity was then irrigated and packed with sterile gauze.    The patient tolerated the procedure well.    Complications: None      COURSE & MEDICAL DECISION MAKING  Pertinent Labs & Imaging studies reviewed. (See chart for details)    Patient here with abscess of the gluteal cleft, there is no evidence of extension into the rectum.  Abscess drained at bedside.  This was facilitated with fentanyl and Versed.  Patient tolerated well.  Lesion packed.  Patient will return in 3 days for wound recheck and return immediately if she develops fever, or other systemic symptoms or symptoms worsen.  Patient to follow-up with colorectal surgeon given the recurrence.    The patient will return for worsening symptoms and is stable at the time of discharge. The patient verbalizes understanding and will comply.    FINAL IMPRESSION    1. Cutaneous abscess of buttock    2. Cutaneous abscess, unspecified site    3. Abscess, gluteal            Electronically signed by: Fabian Riggs M.D., 8/24/2022 7:20 PM

## 2022-08-25 NOTE — DISCHARGE INSTRUCTIONS
You have an abscess in the skin near your rectum.  It does not appear to extend into the rectum but there is always a potential that this does occur.  I would like you to do sitz bath's at least once daily, and take the antibiotics I have prescribed.  I have also prescribed you a stool softener.  It is very important that you either follow-up here or your primary care physician in 3 days if your symptoms are not significantly improved.  Nevertheless you will also need to follow-up with a colorectal surgeon given the recurrence of these abscesses.

## 2022-09-21 ENCOUNTER — HOSPITAL ENCOUNTER (INPATIENT)
Facility: MEDICAL CENTER | Age: 42
LOS: 4 days | DRG: 854 | End: 2022-09-26
Attending: EMERGENCY MEDICINE | Admitting: INTERNAL MEDICINE
Payer: MEDICAID

## 2022-09-21 ENCOUNTER — APPOINTMENT (OUTPATIENT)
Dept: RADIOLOGY | Facility: MEDICAL CENTER | Age: 42
DRG: 854 | End: 2022-09-21
Attending: EMERGENCY MEDICINE
Payer: MEDICAID

## 2022-09-21 DIAGNOSIS — R73.9 HYPERGLYCEMIA: ICD-10-CM

## 2022-09-21 DIAGNOSIS — K61.1 PERIRECTAL ABSCESS: ICD-10-CM

## 2022-09-21 LAB
ALBUMIN SERPL BCP-MCNC: 3.6 G/DL (ref 3.2–4.9)
ALBUMIN/GLOB SERPL: 0.9 G/DL
ALP SERPL-CCNC: 102 U/L (ref 30–99)
ALT SERPL-CCNC: 11 U/L (ref 2–50)
ANION GAP SERPL CALC-SCNC: 14 MMOL/L (ref 7–16)
AST SERPL-CCNC: 13 U/L (ref 12–45)
BASOPHILS # BLD AUTO: 0.5 % (ref 0–1.8)
BASOPHILS # BLD: 0.07 K/UL (ref 0–0.12)
BILIRUB SERPL-MCNC: 0.4 MG/DL (ref 0.1–1.5)
BUN SERPL-MCNC: 8 MG/DL (ref 8–22)
CALCIUM SERPL-MCNC: 8.9 MG/DL (ref 8.4–10.2)
CHLORIDE SERPL-SCNC: 94 MMOL/L (ref 96–112)
CO2 SERPL-SCNC: 20 MMOL/L (ref 20–33)
CREAT SERPL-MCNC: 0.51 MG/DL (ref 0.5–1.4)
EOSINOPHIL # BLD AUTO: 0.08 K/UL (ref 0–0.51)
EOSINOPHIL NFR BLD: 0.5 % (ref 0–6.9)
ERYTHROCYTE [DISTWIDTH] IN BLOOD BY AUTOMATED COUNT: 39.9 FL (ref 35.9–50)
GFR SERPLBLD CREATININE-BSD FMLA CKD-EPI: 119 ML/MIN/1.73 M 2
GLOBULIN SER CALC-MCNC: 3.9 G/DL (ref 1.9–3.5)
GLUCOSE SERPL-MCNC: 558 MG/DL (ref 65–99)
HCG SERPL QL: NEGATIVE
HCT VFR BLD AUTO: 37.8 % (ref 37–47)
HGB BLD-MCNC: 12.7 G/DL (ref 12–16)
IMM GRANULOCYTES # BLD AUTO: 0.07 K/UL (ref 0–0.11)
IMM GRANULOCYTES NFR BLD AUTO: 0.5 % (ref 0–0.9)
LYMPHOCYTES # BLD AUTO: 2.46 K/UL (ref 1–4.8)
LYMPHOCYTES NFR BLD: 16.8 % (ref 22–41)
MCH RBC QN AUTO: 27.7 PG (ref 27–33)
MCHC RBC AUTO-ENTMCNC: 33.6 G/DL (ref 33.6–35)
MCV RBC AUTO: 82.4 FL (ref 81.4–97.8)
MONOCYTES # BLD AUTO: 1.06 K/UL (ref 0–0.85)
MONOCYTES NFR BLD AUTO: 7.3 % (ref 0–13.4)
NEUTROPHILS # BLD AUTO: 10.86 K/UL (ref 2–7.15)
NEUTROPHILS NFR BLD: 74.4 % (ref 44–72)
NRBC # BLD AUTO: 0 K/UL
NRBC BLD-RTO: 0 /100 WBC
PLATELET # BLD AUTO: 265 K/UL (ref 164–446)
PMV BLD AUTO: 10.7 FL (ref 9–12.9)
POTASSIUM SERPL-SCNC: 4.2 MMOL/L (ref 3.6–5.5)
PROT SERPL-MCNC: 7.5 G/DL (ref 6–8.2)
RBC # BLD AUTO: 4.59 M/UL (ref 4.2–5.4)
SODIUM SERPL-SCNC: 128 MMOL/L (ref 135–145)
WBC # BLD AUTO: 14.6 K/UL (ref 4.8–10.8)

## 2022-09-21 PROCEDURE — 96376 TX/PRO/DX INJ SAME DRUG ADON: CPT

## 2022-09-21 PROCEDURE — 84703 CHORIONIC GONADOTROPIN ASSAY: CPT

## 2022-09-21 PROCEDURE — 85025 COMPLETE CBC W/AUTO DIFF WBC: CPT

## 2022-09-21 PROCEDURE — 700111 HCHG RX REV CODE 636 W/ 250 OVERRIDE (IP): Performed by: EMERGENCY MEDICINE

## 2022-09-21 PROCEDURE — 72193 CT PELVIS W/DYE: CPT

## 2022-09-21 PROCEDURE — 96375 TX/PRO/DX INJ NEW DRUG ADDON: CPT

## 2022-09-21 PROCEDURE — 80053 COMPREHEN METABOLIC PANEL: CPT

## 2022-09-21 PROCEDURE — 36415 COLL VENOUS BLD VENIPUNCTURE: CPT

## 2022-09-21 PROCEDURE — 700117 HCHG RX CONTRAST REV CODE 255: Performed by: EMERGENCY MEDICINE

## 2022-09-21 PROCEDURE — 700105 HCHG RX REV CODE 258: Performed by: EMERGENCY MEDICINE

## 2022-09-21 PROCEDURE — 99285 EMERGENCY DEPT VISIT HI MDM: CPT

## 2022-09-21 RX ORDER — MORPHINE SULFATE 4 MG/ML
4 INJECTION INTRAVENOUS ONCE
Status: COMPLETED | OUTPATIENT
Start: 2022-09-21 | End: 2022-09-21

## 2022-09-21 RX ORDER — SODIUM CHLORIDE, SODIUM LACTATE, POTASSIUM CHLORIDE, CALCIUM CHLORIDE 600; 310; 30; 20 MG/100ML; MG/100ML; MG/100ML; MG/100ML
1000 INJECTION, SOLUTION INTRAVENOUS ONCE
Status: COMPLETED | OUTPATIENT
Start: 2022-09-21 | End: 2022-09-21

## 2022-09-21 RX ORDER — SODIUM CHLORIDE 9 MG/ML
1000 INJECTION, SOLUTION INTRAVENOUS ONCE
Status: COMPLETED | OUTPATIENT
Start: 2022-09-21 | End: 2022-09-21

## 2022-09-21 RX ADMIN — MORPHINE SULFATE 4 MG: 4 INJECTION INTRAVENOUS at 21:39

## 2022-09-21 RX ADMIN — SODIUM CHLORIDE 1000 ML: 9 INJECTION, SOLUTION INTRAVENOUS at 21:39

## 2022-09-21 RX ADMIN — IOHEXOL 100 ML: 350 INJECTION, SOLUTION INTRAVENOUS at 22:30

## 2022-09-21 RX ADMIN — SODIUM CHLORIDE, POTASSIUM CHLORIDE, SODIUM LACTATE AND CALCIUM CHLORIDE 1000 ML: 600; 310; 30; 20 INJECTION, SOLUTION INTRAVENOUS at 22:38

## 2022-09-21 RX ADMIN — MORPHINE SULFATE 4 MG: 4 INJECTION INTRAVENOUS at 22:44

## 2022-09-21 ASSESSMENT — PAIN DESCRIPTION - PAIN TYPE
TYPE: ACUTE PAIN
TYPE: ACUTE PAIN

## 2022-09-21 ASSESSMENT — FIBROSIS 4 INDEX: FIB4 SCORE: 0.54

## 2022-09-22 ENCOUNTER — ANESTHESIA EVENT (OUTPATIENT)
Dept: SURGERY | Facility: MEDICAL CENTER | Age: 42
DRG: 854 | End: 2022-09-22
Payer: MEDICAID

## 2022-09-22 ENCOUNTER — TELEPHONE (OUTPATIENT)
Dept: INTERNAL MEDICINE | Facility: OTHER | Age: 42
End: 2022-09-22

## 2022-09-22 ENCOUNTER — APPOINTMENT (OUTPATIENT)
Dept: INTERNAL MEDICINE | Facility: OTHER | Age: 42
End: 2022-09-22
Payer: MEDICAID

## 2022-09-22 ENCOUNTER — ANESTHESIA (OUTPATIENT)
Dept: SURGERY | Facility: MEDICAL CENTER | Age: 42
DRG: 854 | End: 2022-09-22
Payer: MEDICAID

## 2022-09-22 PROBLEM — K61.1 PERIRECTAL ABSCESS: Status: ACTIVE | Noted: 2022-09-22

## 2022-09-22 PROBLEM — A41.9 SEPSIS (HCC): Status: ACTIVE | Noted: 2022-09-22

## 2022-09-22 PROBLEM — E78.5 DYSLIPIDEMIA: Status: ACTIVE | Noted: 2022-09-22

## 2022-09-22 LAB
APPEARANCE UR: ABNORMAL
BACTERIA #/AREA URNS HPF: ABNORMAL /HPF
BILIRUB UR QL STRIP.AUTO: NEGATIVE
COLOR UR: YELLOW
EPI CELLS #/AREA URNS HPF: ABNORMAL /HPF
GLUCOSE BLD STRIP.AUTO-MCNC: 199 MG/DL (ref 65–99)
GLUCOSE BLD STRIP.AUTO-MCNC: 200 MG/DL (ref 65–99)
GLUCOSE BLD STRIP.AUTO-MCNC: 209 MG/DL (ref 65–99)
GLUCOSE BLD STRIP.AUTO-MCNC: 238 MG/DL (ref 65–99)
GLUCOSE BLD STRIP.AUTO-MCNC: 258 MG/DL (ref 65–99)
GLUCOSE UR STRIP.AUTO-MCNC: 500 MG/DL
INR PPP: 1.03 (ref 0.87–1.13)
KETONES UR STRIP.AUTO-MCNC: >=80 MG/DL
LACTATE SERPL-SCNC: 0.9 MMOL/L (ref 0.5–2)
LACTATE SERPL-SCNC: 1 MMOL/L (ref 0.5–2)
LEUKOCYTE ESTERASE UR QL STRIP.AUTO: NEGATIVE
MICRO URNS: ABNORMAL
MUCOUS THREADS #/AREA URNS HPF: ABNORMAL /HPF
NITRITE UR QL STRIP.AUTO: NEGATIVE
PH UR STRIP.AUTO: 5.5 [PH] (ref 5–8)
PROT UR QL STRIP: NEGATIVE MG/DL
PROTHROMBIN TIME: 13.1 SEC (ref 12–14.6)
RBC UR QL AUTO: NEGATIVE
SCCMEC + MECA PNL NOSE NAA+PROBE: NEGATIVE
SP GR UR STRIP.AUTO: 1.02
UNIDENT CRYS URNS QL MICRO: ABNORMAL /HPF
WBC #/AREA URNS HPF: ABNORMAL /HPF
YEAST #/AREA URNS HPF: ABNORMAL /HPF
YEAST BUDDING URNS QL: PRESENT /HPF

## 2022-09-22 PROCEDURE — 160038 HCHG SURGERY MINUTES - EA ADDL 1 MIN LEVEL 2: Performed by: SURGERY

## 2022-09-22 PROCEDURE — 00902 ANES ANORECTAL PX: CPT | Performed by: INTERNAL MEDICINE

## 2022-09-22 PROCEDURE — 160048 HCHG OR STATISTICAL LEVEL 1-5: Performed by: SURGERY

## 2022-09-22 PROCEDURE — 87205 SMEAR GRAM STAIN: CPT

## 2022-09-22 PROCEDURE — 700105 HCHG RX REV CODE 258

## 2022-09-22 PROCEDURE — 700101 HCHG RX REV CODE 250: Performed by: SURGERY

## 2022-09-22 PROCEDURE — 94760 N-INVAS EAR/PLS OXIMETRY 1: CPT

## 2022-09-22 PROCEDURE — 87641 MR-STAPH DNA AMP PROBE: CPT

## 2022-09-22 PROCEDURE — 160002 HCHG RECOVERY MINUTES (STAT): Performed by: SURGERY

## 2022-09-22 PROCEDURE — 36415 COLL VENOUS BLD VENIPUNCTURE: CPT

## 2022-09-22 PROCEDURE — 0D9P0ZX DRAINAGE OF RECTUM, OPEN APPROACH, DIAGNOSTIC: ICD-10-PCS | Performed by: SURGERY

## 2022-09-22 PROCEDURE — A9270 NON-COVERED ITEM OR SERVICE: HCPCS | Performed by: INTERNAL MEDICINE

## 2022-09-22 PROCEDURE — 700111 HCHG RX REV CODE 636 W/ 250 OVERRIDE (IP)

## 2022-09-22 PROCEDURE — 160035 HCHG PACU - 1ST 60 MINS PHASE I: Performed by: SURGERY

## 2022-09-22 PROCEDURE — 700111 HCHG RX REV CODE 636 W/ 250 OVERRIDE (IP): Performed by: ANESTHESIOLOGY

## 2022-09-22 PROCEDURE — 700102 HCHG RX REV CODE 250 W/ 637 OVERRIDE(OP): Performed by: ANESTHESIOLOGY

## 2022-09-22 PROCEDURE — 700105 HCHG RX REV CODE 258: Performed by: INTERNAL MEDICINE

## 2022-09-22 PROCEDURE — 700102 HCHG RX REV CODE 250 W/ 637 OVERRIDE(OP): Performed by: INTERNAL MEDICINE

## 2022-09-22 PROCEDURE — 87076 CULTURE ANAEROBE IDENT EACH: CPT

## 2022-09-22 PROCEDURE — 96375 TX/PRO/DX INJ NEW DRUG ADDON: CPT

## 2022-09-22 PROCEDURE — 81001 URINALYSIS AUTO W/SCOPE: CPT

## 2022-09-22 PROCEDURE — 770006 HCHG ROOM/CARE - MED/SURG/GYN SEMI*

## 2022-09-22 PROCEDURE — 83605 ASSAY OF LACTIC ACID: CPT

## 2022-09-22 PROCEDURE — 700111 HCHG RX REV CODE 636 W/ 250 OVERRIDE (IP): Performed by: INTERNAL MEDICINE

## 2022-09-22 PROCEDURE — 96367 TX/PROPH/DG ADDL SEQ IV INF: CPT

## 2022-09-22 PROCEDURE — 700101 HCHG RX REV CODE 250

## 2022-09-22 PROCEDURE — 160009 HCHG ANES TIME/MIN: Performed by: SURGERY

## 2022-09-22 PROCEDURE — 87070 CULTURE OTHR SPECIMN AEROBIC: CPT

## 2022-09-22 PROCEDURE — 87075 CULTR BACTERIA EXCEPT BLOOD: CPT

## 2022-09-22 PROCEDURE — 82962 GLUCOSE BLOOD TEST: CPT

## 2022-09-22 PROCEDURE — 700111 HCHG RX REV CODE 636 W/ 250 OVERRIDE (IP): Performed by: EMERGENCY MEDICINE

## 2022-09-22 PROCEDURE — 87077 CULTURE AEROBIC IDENTIFY: CPT

## 2022-09-22 PROCEDURE — 700101 HCHG RX REV CODE 250: Performed by: EMERGENCY MEDICINE

## 2022-09-22 PROCEDURE — 160036 HCHG PACU - EA ADDL 30 MINS PHASE I: Performed by: SURGERY

## 2022-09-22 PROCEDURE — 99223 1ST HOSP IP/OBS HIGH 75: CPT | Performed by: INTERNAL MEDICINE

## 2022-09-22 PROCEDURE — 87040 BLOOD CULTURE FOR BACTERIA: CPT

## 2022-09-22 PROCEDURE — 85610 PROTHROMBIN TIME: CPT

## 2022-09-22 PROCEDURE — 96365 THER/PROPH/DIAG IV INF INIT: CPT

## 2022-09-22 PROCEDURE — 160027 HCHG SURGERY MINUTES - 1ST 30 MINS LEVEL 2: Performed by: SURGERY

## 2022-09-22 PROCEDURE — 700105 HCHG RX REV CODE 258: Performed by: SURGERY

## 2022-09-22 PROCEDURE — 700101 HCHG RX REV CODE 250: Performed by: ANESTHESIOLOGY

## 2022-09-22 RX ORDER — LABETALOL HYDROCHLORIDE 5 MG/ML
5 INJECTION, SOLUTION INTRAVENOUS
Status: DISCONTINUED | OUTPATIENT
Start: 2022-09-22 | End: 2022-09-22 | Stop reason: HOSPADM

## 2022-09-22 RX ORDER — METRONIDAZOLE 500 MG/100ML
500 INJECTION, SOLUTION INTRAVENOUS EVERY 6 HOURS
Status: COMPLETED | OUTPATIENT
Start: 2022-09-22 | End: 2022-09-22

## 2022-09-22 RX ORDER — CEFTRIAXONE 1 G/1
1 INJECTION, POWDER, FOR SOLUTION INTRAMUSCULAR; INTRAVENOUS ONCE
Status: COMPLETED | OUTPATIENT
Start: 2022-09-22 | End: 2022-09-22

## 2022-09-22 RX ORDER — MIDAZOLAM HYDROCHLORIDE 1 MG/ML
INJECTION INTRAMUSCULAR; INTRAVENOUS PRN
Status: DISCONTINUED | OUTPATIENT
Start: 2022-09-22 | End: 2022-09-22 | Stop reason: SURG

## 2022-09-22 RX ORDER — SODIUM CHLORIDE, SODIUM LACTATE, POTASSIUM CHLORIDE, CALCIUM CHLORIDE 600; 310; 30; 20 MG/100ML; MG/100ML; MG/100ML; MG/100ML
INJECTION, SOLUTION INTRAVENOUS CONTINUOUS
Status: DISCONTINUED | OUTPATIENT
Start: 2022-09-22 | End: 2022-09-22 | Stop reason: HOSPADM

## 2022-09-22 RX ORDER — OXYCODONE HCL 5 MG/5 ML
5 SOLUTION, ORAL ORAL
Status: DISCONTINUED | OUTPATIENT
Start: 2022-09-22 | End: 2022-09-22 | Stop reason: HOSPADM

## 2022-09-22 RX ORDER — DEXAMETHASONE SODIUM PHOSPHATE 4 MG/ML
INJECTION, SOLUTION INTRA-ARTICULAR; INTRALESIONAL; INTRAMUSCULAR; INTRAVENOUS; SOFT TISSUE PRN
Status: DISCONTINUED | OUTPATIENT
Start: 2022-09-22 | End: 2022-09-22 | Stop reason: SURG

## 2022-09-22 RX ORDER — PROMETHAZINE HYDROCHLORIDE 25 MG/1
12.5-25 SUPPOSITORY RECTAL EVERY 4 HOURS PRN
Status: DISCONTINUED | OUTPATIENT
Start: 2022-09-22 | End: 2022-09-26 | Stop reason: HOSPADM

## 2022-09-22 RX ORDER — HYDROMORPHONE HYDROCHLORIDE 1 MG/ML
0.4 INJECTION, SOLUTION INTRAMUSCULAR; INTRAVENOUS; SUBCUTANEOUS
Status: DISCONTINUED | OUTPATIENT
Start: 2022-09-22 | End: 2022-09-22 | Stop reason: HOSPADM

## 2022-09-22 RX ORDER — OXYCODONE HYDROCHLORIDE 5 MG/1
5 TABLET ORAL
Status: DISCONTINUED | OUTPATIENT
Start: 2022-09-22 | End: 2022-09-26 | Stop reason: HOSPADM

## 2022-09-22 RX ORDER — BISACODYL 10 MG
10 SUPPOSITORY, RECTAL RECTAL
Status: DISCONTINUED | OUTPATIENT
Start: 2022-09-22 | End: 2022-09-26 | Stop reason: HOSPADM

## 2022-09-22 RX ORDER — VENLAFAXINE 75 MG/1
75 TABLET ORAL DAILY
Status: DISCONTINUED | OUTPATIENT
Start: 2022-09-22 | End: 2022-09-26 | Stop reason: HOSPADM

## 2022-09-22 RX ORDER — DIPHENHYDRAMINE HYDROCHLORIDE 50 MG/ML
12.5 INJECTION INTRAMUSCULAR; INTRAVENOUS
Status: DISCONTINUED | OUTPATIENT
Start: 2022-09-22 | End: 2022-09-22 | Stop reason: HOSPADM

## 2022-09-22 RX ORDER — HALOPERIDOL 5 MG/ML
1 INJECTION INTRAMUSCULAR
Status: DISCONTINUED | OUTPATIENT
Start: 2022-09-22 | End: 2022-09-22 | Stop reason: HOSPADM

## 2022-09-22 RX ORDER — SODIUM CHLORIDE, SODIUM LACTATE, POTASSIUM CHLORIDE, AND CALCIUM CHLORIDE .6; .31; .03; .02 G/100ML; G/100ML; G/100ML; G/100ML
1000 INJECTION, SOLUTION INTRAVENOUS
Status: DISCONTINUED | OUTPATIENT
Start: 2022-09-22 | End: 2022-09-26 | Stop reason: HOSPADM

## 2022-09-22 RX ORDER — ALBUTEROL SULFATE 2.5 MG/3ML
2.5 SOLUTION RESPIRATORY (INHALATION)
Status: DISCONTINUED | OUTPATIENT
Start: 2022-09-22 | End: 2022-09-22 | Stop reason: HOSPADM

## 2022-09-22 RX ORDER — HYDROMORPHONE HYDROCHLORIDE 1 MG/ML
0.2 INJECTION, SOLUTION INTRAMUSCULAR; INTRAVENOUS; SUBCUTANEOUS
Status: DISCONTINUED | OUTPATIENT
Start: 2022-09-22 | End: 2022-09-22 | Stop reason: HOSPADM

## 2022-09-22 RX ORDER — LEVOTHYROXINE SODIUM 0.07 MG/1
150 TABLET ORAL
Status: DISCONTINUED | OUTPATIENT
Start: 2022-09-22 | End: 2022-09-26 | Stop reason: HOSPADM

## 2022-09-22 RX ORDER — DEXTROSE MONOHYDRATE 25 G/50ML
12.5 INJECTION, SOLUTION INTRAVENOUS
Status: DISCONTINUED | OUTPATIENT
Start: 2022-09-22 | End: 2022-09-22 | Stop reason: HOSPADM

## 2022-09-22 RX ORDER — OXYCODONE HCL 5 MG/5 ML
10 SOLUTION, ORAL ORAL
Status: DISCONTINUED | OUTPATIENT
Start: 2022-09-22 | End: 2022-09-22 | Stop reason: HOSPADM

## 2022-09-22 RX ORDER — HYDROMORPHONE HYDROCHLORIDE 1 MG/ML
0.5 INJECTION, SOLUTION INTRAMUSCULAR; INTRAVENOUS; SUBCUTANEOUS
Status: DISCONTINUED | OUTPATIENT
Start: 2022-09-22 | End: 2022-09-26 | Stop reason: HOSPADM

## 2022-09-22 RX ORDER — METRONIDAZOLE 500 MG/1
500 TABLET ORAL EVERY 8 HOURS
Status: DISCONTINUED | OUTPATIENT
Start: 2022-09-22 | End: 2022-09-25

## 2022-09-22 RX ORDER — LABETALOL HYDROCHLORIDE 5 MG/ML
10 INJECTION, SOLUTION INTRAVENOUS EVERY 4 HOURS PRN
Status: DISCONTINUED | OUTPATIENT
Start: 2022-09-22 | End: 2022-09-26 | Stop reason: HOSPADM

## 2022-09-22 RX ORDER — SODIUM CHLORIDE 9 MG/ML
INJECTION, SOLUTION INTRAVENOUS CONTINUOUS
Status: DISCONTINUED | OUTPATIENT
Start: 2022-09-22 | End: 2022-09-26 | Stop reason: HOSPADM

## 2022-09-22 RX ORDER — HYDRALAZINE HYDROCHLORIDE 20 MG/ML
5 INJECTION INTRAMUSCULAR; INTRAVENOUS
Status: DISCONTINUED | OUTPATIENT
Start: 2022-09-22 | End: 2022-09-22 | Stop reason: HOSPADM

## 2022-09-22 RX ORDER — MEPERIDINE HYDROCHLORIDE 25 MG/ML
12.5 INJECTION INTRAMUSCULAR; INTRAVENOUS; SUBCUTANEOUS
Status: DISCONTINUED | OUTPATIENT
Start: 2022-09-22 | End: 2022-09-22 | Stop reason: HOSPADM

## 2022-09-22 RX ORDER — LIDOCAINE HYDROCHLORIDE 20 MG/ML
INJECTION, SOLUTION EPIDURAL; INFILTRATION; INTRACAUDAL; PERINEURAL PRN
Status: DISCONTINUED | OUTPATIENT
Start: 2022-09-22 | End: 2022-09-22 | Stop reason: SURG

## 2022-09-22 RX ORDER — ATORVASTATIN CALCIUM 20 MG/1
20 TABLET, FILM COATED ORAL
Status: DISCONTINUED | OUTPATIENT
Start: 2022-09-22 | End: 2022-09-26 | Stop reason: HOSPADM

## 2022-09-22 RX ORDER — PROMETHAZINE HYDROCHLORIDE 25 MG/1
12.5-25 TABLET ORAL EVERY 4 HOURS PRN
Status: DISCONTINUED | OUTPATIENT
Start: 2022-09-22 | End: 2022-09-26 | Stop reason: HOSPADM

## 2022-09-22 RX ORDER — AMOXICILLIN 250 MG
2 CAPSULE ORAL 2 TIMES DAILY
Status: DISCONTINUED | OUTPATIENT
Start: 2022-09-22 | End: 2022-09-26 | Stop reason: HOSPADM

## 2022-09-22 RX ORDER — KETOROLAC TROMETHAMINE 30 MG/ML
INJECTION, SOLUTION INTRAMUSCULAR; INTRAVENOUS PRN
Status: DISCONTINUED | OUTPATIENT
Start: 2022-09-22 | End: 2022-09-22 | Stop reason: SURG

## 2022-09-22 RX ORDER — HYDROMORPHONE HYDROCHLORIDE 1 MG/ML
0.1 INJECTION, SOLUTION INTRAMUSCULAR; INTRAVENOUS; SUBCUTANEOUS
Status: DISCONTINUED | OUTPATIENT
Start: 2022-09-22 | End: 2022-09-22 | Stop reason: HOSPADM

## 2022-09-22 RX ORDER — METOPROLOL TARTRATE 1 MG/ML
1 INJECTION, SOLUTION INTRAVENOUS
Status: DISCONTINUED | OUTPATIENT
Start: 2022-09-22 | End: 2022-09-22 | Stop reason: HOSPADM

## 2022-09-22 RX ORDER — OXYCODONE HYDROCHLORIDE 10 MG/1
10 TABLET ORAL
Status: DISCONTINUED | OUTPATIENT
Start: 2022-09-22 | End: 2022-09-26 | Stop reason: HOSPADM

## 2022-09-22 RX ORDER — LISINOPRIL 5 MG/1
10 TABLET ORAL
Status: DISCONTINUED | OUTPATIENT
Start: 2022-09-22 | End: 2022-09-26 | Stop reason: HOSPADM

## 2022-09-22 RX ORDER — SODIUM CHLORIDE, SODIUM LACTATE, POTASSIUM CHLORIDE, CALCIUM CHLORIDE 600; 310; 30; 20 MG/100ML; MG/100ML; MG/100ML; MG/100ML
INJECTION, SOLUTION INTRAVENOUS CONTINUOUS
Status: ACTIVE | OUTPATIENT
Start: 2022-09-22 | End: 2022-09-22

## 2022-09-22 RX ORDER — PROCHLORPERAZINE EDISYLATE 5 MG/ML
5-10 INJECTION INTRAMUSCULAR; INTRAVENOUS EVERY 4 HOURS PRN
Status: DISCONTINUED | OUTPATIENT
Start: 2022-09-22 | End: 2022-09-26 | Stop reason: HOSPADM

## 2022-09-22 RX ORDER — ACETAMINOPHEN 325 MG/1
650 TABLET ORAL EVERY 6 HOURS PRN
Status: DISCONTINUED | OUTPATIENT
Start: 2022-09-22 | End: 2022-09-26 | Stop reason: HOSPADM

## 2022-09-22 RX ORDER — BUPIVACAINE HYDROCHLORIDE AND EPINEPHRINE 5; 5 MG/ML; UG/ML
INJECTION, SOLUTION EPIDURAL; INTRACAUDAL; PERINEURAL
Status: DISCONTINUED | OUTPATIENT
Start: 2022-09-22 | End: 2022-09-22 | Stop reason: HOSPADM

## 2022-09-22 RX ORDER — POLYETHYLENE GLYCOL 3350 17 G/17G
1 POWDER, FOR SOLUTION ORAL
Status: DISCONTINUED | OUTPATIENT
Start: 2022-09-22 | End: 2022-09-26 | Stop reason: HOSPADM

## 2022-09-22 RX ADMIN — INSULIN HUMAN 2 UNITS: 100 INJECTION, SOLUTION PARENTERAL at 16:27

## 2022-09-22 RX ADMIN — OXYCODONE HYDROCHLORIDE 10 MG: 10 TABLET ORAL at 08:15

## 2022-09-22 RX ADMIN — MIDAZOLAM HYDROCHLORIDE 2 MG: 1 INJECTION, SOLUTION INTRAMUSCULAR; INTRAVENOUS at 14:53

## 2022-09-22 RX ADMIN — OXYCODONE 5 MG: 5 TABLET ORAL at 04:24

## 2022-09-22 RX ADMIN — SENNOSIDES AND DOCUSATE SODIUM 2 TABLET: 50; 8.6 TABLET ORAL at 05:52

## 2022-09-22 RX ADMIN — CEFTRIAXONE SODIUM 1 G: 1 INJECTION, POWDER, FOR SOLUTION INTRAMUSCULAR; INTRAVENOUS at 00:19

## 2022-09-22 RX ADMIN — SODIUM CHLORIDE: 9 INJECTION, SOLUTION INTRAVENOUS at 03:30

## 2022-09-22 RX ADMIN — LIDOCAINE HYDROCHLORIDE 40 MG: 20 INJECTION, SOLUTION EPIDURAL; INFILTRATION; INTRACAUDAL at 14:57

## 2022-09-22 RX ADMIN — KETOROLAC TROMETHAMINE 30 MG: 30 INJECTION, SOLUTION INTRAMUSCULAR at 15:16

## 2022-09-22 RX ADMIN — FENTANYL CITRATE 75 MCG: 50 INJECTION, SOLUTION INTRAMUSCULAR; INTRAVENOUS at 15:02

## 2022-09-22 RX ADMIN — SODIUM CHLORIDE, POTASSIUM CHLORIDE, SODIUM LACTATE AND CALCIUM CHLORIDE: 600; 310; 30; 20 INJECTION, SOLUTION INTRAVENOUS at 14:50

## 2022-09-22 RX ADMIN — METRONIDAZOLE 500 MG: 500 TABLET ORAL at 22:51

## 2022-09-22 RX ADMIN — FENTANYL CITRATE 25 MCG: 50 INJECTION, SOLUTION INTRAMUSCULAR; INTRAVENOUS at 14:57

## 2022-09-22 RX ADMIN — DEXAMETHASONE SODIUM PHOSPHATE 4 MG: 4 INJECTION, SOLUTION INTRA-ARTICULAR; INTRALESIONAL; INTRAMUSCULAR; INTRAVENOUS; SOFT TISSUE at 15:00

## 2022-09-22 RX ADMIN — FENTANYL CITRATE 50 MCG: 50 INJECTION, SOLUTION INTRAMUSCULAR; INTRAVENOUS at 14:47

## 2022-09-22 RX ADMIN — METRONIDAZOLE 500 MG: 500 TABLET ORAL at 17:39

## 2022-09-22 RX ADMIN — METRONIDAZOLE 500 MG: 500 INJECTION, SOLUTION INTRAVENOUS at 00:18

## 2022-09-22 RX ADMIN — SENNOSIDES AND DOCUSATE SODIUM 2 TABLET: 50; 8.6 TABLET ORAL at 17:39

## 2022-09-22 RX ADMIN — LEVOTHYROXINE SODIUM 150 MCG: 0.07 TABLET ORAL at 08:17

## 2022-09-22 RX ADMIN — INSULIN GLARGINE-YFGN 10 UNITS: 100 INJECTION, SOLUTION SUBCUTANEOUS at 17:57

## 2022-09-22 RX ADMIN — VANCOMYCIN HYDROCHLORIDE 2250 MG: 5 INJECTION, POWDER, LYOPHILIZED, FOR SOLUTION INTRAVENOUS at 01:31

## 2022-09-22 RX ADMIN — INSULIN HUMAN 2 UNITS: 100 INJECTION, SOLUTION PARENTERAL at 12:47

## 2022-09-22 RX ADMIN — CEFTRIAXONE SODIUM 2 G: 2 INJECTION, POWDER, FOR SOLUTION INTRAMUSCULAR; INTRAVENOUS at 11:19

## 2022-09-22 RX ADMIN — FENTANYL CITRATE 50 MCG: 50 INJECTION, SOLUTION INTRAMUSCULAR; INTRAVENOUS at 15:12

## 2022-09-22 RX ADMIN — VANCOMYCIN HYDROCHLORIDE 2250 MG: 1 INJECTION, POWDER, LYOPHILIZED, FOR SOLUTION INTRAVENOUS at 23:45

## 2022-09-22 RX ADMIN — PROPOFOL 180 MG: 10 INJECTION, EMULSION INTRAVENOUS at 14:57

## 2022-09-22 ASSESSMENT — ENCOUNTER SYMPTOMS
NAUSEA: 0
FEVER: 0
CHILLS: 0
FALLS: 0
DEPRESSION: 0
WEAKNESS: 0
COUGH: 0
VOMITING: 0
CONSTIPATION: 0
TINGLING: 0
DIARRHEA: 0
PALPITATIONS: 0
LOSS OF CONSCIOUSNESS: 0
SPUTUM PRODUCTION: 0
DIZZINESS: 0
ABDOMINAL PAIN: 0
HEADACHES: 0
STRIDOR: 0
SHORTNESS OF BREATH: 0
MYALGIAS: 0

## 2022-09-22 ASSESSMENT — PAIN DESCRIPTION - PAIN TYPE
TYPE: ACUTE PAIN
TYPE: SURGICAL PAIN
TYPE: ACUTE PAIN
TYPE: ACUTE PAIN
TYPE: SURGICAL PAIN

## 2022-09-22 ASSESSMENT — LIFESTYLE VARIABLES
TOTAL SCORE: 0
TOTAL SCORE: 0
EVER FELT BAD OR GUILTY ABOUT YOUR DRINKING: NO
EVER HAD A DRINK FIRST THING IN THE MORNING TO STEADY YOUR NERVES TO GET RID OF A HANGOVER: NO
CONSUMPTION TOTAL: INCOMPLETE
TOTAL SCORE: 0
ALCOHOL_USE: NO
HAVE PEOPLE ANNOYED YOU BY CRITICIZING YOUR DRINKING: NO
HAVE YOU EVER FELT YOU SHOULD CUT DOWN ON YOUR DRINKING: NO

## 2022-09-22 ASSESSMENT — COGNITIVE AND FUNCTIONAL STATUS - GENERAL
MOBILITY SCORE: 22
WALKING IN HOSPITAL ROOM: A LITTLE
SUGGESTED CMS G CODE MODIFIER MOBILITY: CK
SUGGESTED CMS G CODE MODIFIER DAILY ACTIVITY: CI
MOVING FROM LYING ON BACK TO SITTING ON SIDE OF FLAT BED: A LITTLE
SUGGESTED CMS G CODE MODIFIER MOBILITY: CJ
DRESSING REGULAR LOWER BODY CLOTHING: A LITTLE
HELP NEEDED FOR BATHING: A LITTLE
TOILETING: A LITTLE
DAILY ACTIVITIY SCORE: 20
STANDING UP FROM CHAIR USING ARMS: A LITTLE
DRESSING REGULAR LOWER BODY CLOTHING: A LITTLE
DRESSING REGULAR UPPER BODY CLOTHING: A LITTLE
DAILY ACTIVITIY SCORE: 23
WALKING IN HOSPITAL ROOM: A LITTLE
MOVING TO AND FROM BED TO CHAIR: A LITTLE
MOBILITY SCORE: 18
SUGGESTED CMS G CODE MODIFIER DAILY ACTIVITY: CJ
CLIMB 3 TO 5 STEPS WITH RAILING: A LITTLE
TURNING FROM BACK TO SIDE WHILE IN FLAT BAD: A LITTLE
CLIMB 3 TO 5 STEPS WITH RAILING: A LITTLE

## 2022-09-22 NOTE — OR NURSING
Message sent to the hospitalist regarding patients insulin sliding scale, pt should be receiving insulin coverage while NPO. Orders updated to add NPO scale. I called and spoke with Brittany ARMENTA on [facility] and updated her with the information.

## 2022-09-22 NOTE — ANESTHESIA TIME REPORT
Anesthesia Start and Stop Event Times     Date Time Event    9/22/2022 1427 Ready for Procedure     1450 Anesthesia Start     1529 Anesthesia Stop        Responsible Staff  09/22/22    Name Role Begin End    Timoteo Bruce M.D. Anesth 1450 1500    Ibrahima Soriano M.D. Anesth 1500 1529        Overtime Reason:  no overtime (within assigned shift)    Comments:

## 2022-09-22 NOTE — OR NURSING
1527: pt to PACU from OR via bed. Report received from anesthesia and RN. OPA in place, breathing spontaneous and nonlabored on 6 L O2 via mask. Sravani area packing in place. SBP in 80's, anesthesia aware- otherwise VSS.     1540: no change. SBP improving to mid 90's.     1553: rouses spontaneously and follows commands, OPA removed.     1555: denies pain or nausea. Mesh underwear and pad placed on pt. Weaned to 2 L NC.     1610: called pt's family and updated them.     1627: BG = 200, given 2 units regular insulin per sliding scale order.     1635: meets criteria for transfer to floor, report called to Brittany ARMENTA.     1700: awaiting pt transport, no change.     1721: transport to room completed via bed by CNA.          Behavioral Health

## 2022-09-22 NOTE — ASSESSMENT & PLAN NOTE
-This is Sepsis Present on admission  SIRS criteria identified on my evaluation include: Tachycardia, with heart rate greater than 90 BPM and Leukocytosis, with WBC greater than 12,000  Source is perirectal abscess  Sepsis protocol initiated  Fluid resuscitation ordered per protocol  Crystalloid Fluid Administration: Fluid resuscitation ordered per standard protocol - 30 mL/kg per current or ideal body weight  IV antibiotics as appropriate for source of sepsis  Reassessment: I have reassessed the patient's hemodynamic status  -Start vancomycin and Rocephin  -Await culture results  -Trend lactic acid level  -Await surgical recommendations  -Patient is at risk of worsening, closely monitor her hemodynamics    9/23: It does not seem that the patient meet sepsis criteria today.

## 2022-09-22 NOTE — PROGRESS NOTES
Report received from Albert ARMENTA. Assumed care of pt. Assessment completed. Pt A/A/O x 4. Rating pain 4/10 at this time. Call light and personal belonging within reach. Bed in low position with wheels locked.

## 2022-09-22 NOTE — ED TRIAGE NOTES
"Chief Complaint   Patient presents with    Rectal Pain     Previous infections and pain with abscess at rectum.  Last two days pain has returned, blood and pus from area.  Denies fevers, chills, n/v, diarrhea.       /88   Pulse (!) 113   Temp 36.2 °C (97.1 °F) (Temporal)   Resp 18   Ht 1.6 m (5' 3\")   Wt 88.4 kg (194 lb 14.2 oz)   LMP 09/01/2022 (Exact Date)   SpO2 95%   BMI 34.52 kg/m²       "

## 2022-09-22 NOTE — PROGRESS NOTES
4 Eyes Skin Assessment Completed by Jose VIZCAINO RN and Kit GUEVARA RN.    Head WDL  Ears WDL  Nose WDL  Mouth WDL  Neck WDL  Breast/Chest WDL  Shoulder Blades WDL  Spine WDL  (R) Arm/Elbow/Hand WDL  (L) Arm/Elbow/Hand WDL  Abdomen WDL  Groin WDL  Scrotum/Coccyx/Buttocks Redness  Perirectal abscess  (R) Leg WDL  (L) Leg WDL  (R) Heel/Foot/Toe WDL  (L) Heel/Foot/Toe WDL          Devices In Places Pulse Ox      Interventions In Place Pillows    Possible Skin Injury No    Pictures Uploaded Into Epic N/A  Wound Consult Placed N/A  RN Wound Prevention Protocol Ordered No

## 2022-09-22 NOTE — CONSULTS
General Surgery Consult (ACS)  [facility]          CHIEF COMPLAINT: Rectal pain.    HISTORY OF PRESENT ILLNESS: The patient is a 42 y.o. female, who presents with rectal pain.  It became so severe she presented to the emergency room at [facility].  Here she underwent a work-up to include a CT scan consistent with a perirectal abscess tracking into the buttock.  General surgery was consulted for incision and drainage.  She was admitted to the hospitalist due to her diabetes.     Referring Provider: Dr. Schafer    BMI:Body mass index is 34.52 kg/m².     PAST MEDICAL HISTORY:  has a past medical history of Bipolar disorder ([facility]) (08/09/2018), Candida vaginitis, Candida vaginitis (05/26/2022), Chickenpox, Diabetes 1.5, managed as type 2 ([facility]), DVT (deep venous thrombosis) ([facility]), Dysfunctional uterine bleeding, Herpes, Hypertension, Hypoglycemia associated with diabetes ([facility]) (10/27/2021), Hypothyroidism due to acquired atrophy of thyroid (01/22/2016), Personal history of venous thrombosis and embolism, Sleep apnea, Uncontrolled type 2 diabetes mellitus without complication, without long-term current use of insulin (03/12/2015), and Uterine fibroids in pregnancy, postpartum condition.     PAST SURGICAL HISTORY: patient denies any surgical history     ALLERGIES:   Allergies   Allergen Reactions    Bicillin C-R [Penicillin G Proc & Benzathine] Rash     Received inj of Bicillin- reaction was rash. Oral penicillin shows no reaction.    Ondansetron Hives    Penicillin G Hives    Sulfa Drugs Hives           Metoclopramide Rash and Vomiting     Rash          CURRENT MEDICATIONS:   Home Medications       Reviewed by Jose Frias R.N. (Registered Nurse) on 09/22/22 at 4155  Med List Status: Partial     Medication Last Dose Status   atorvastatin (LIPITOR) 20 MG Tab 9/19/2022 Active   Insulin Degludec (TRESIBA FLEXTOUCH) 200 UNIT/ML Solution Pen-injector 9/21/2022 Active   insulin lispro (HUMALOG) 100 UNIT/ML   "Active   Lancets  Active   Lancets Ultra Fine Misc  Active   levoFLOXacin (LEVAQUIN) 500 MG tablet  Active   levothyroxine (SYNTHROID) 150 MCG Tab 2022 Active   lisinopril (PRINIVIL) 10 MG Tab 2022 Active   metFORMIN (GLUCOPHAGE) 500 MG Tab 2022 Active   senna-docusate (PERICOLACE OR SENOKOT S) 8.6-50 MG Tab  Active   venlafaxine (EFFEXOR) 75 MG Tab  Active   venlafaxine XR (EFFEXOR XR) 37.5 MG CAPSULE SR 24 HR  Active                    FAMILY HISTORY:   Family History   Problem Relation Age of Onset    Hypertension Mother     Sleep Apnea Mother     Hyperlipidemia Father     Cancer Brother     Sleep Apnea Brother     Cancer Maternal Uncle     Diabetes Neg Hx     Heart Disease Neg Hx     Stroke Neg Hx         SOCIAL HISTORY:   Social History     Tobacco Use    Smoking status: Former     Packs/day: 0.50     Years: 0.30     Pack years: 0.15     Types: Cigarettes     Quit date:      Years since quittin.7    Smokeless tobacco: Never    Tobacco comments:     for 3 months at a time on and off    Vaping Use    Vaping Use: Never used   Substance and Sexual Activity    Alcohol use: No     Alcohol/week: 0.0 oz    Drug use: No    Sexual activity: Yes     Partners: Male       REVIEW OF SYSTEMS: Comprehensive review of systems was negative aside from abdominal pain described in the history and physical    PHYSICAL EXAMINATION:     GENERAL: The patient is in no acute distress.   VITAL SIGNS: /73   Pulse 81   Temp 36.6 °C (97.9 °F) (Temporal)   Resp 16   Ht 1.6 m (5' 3\")   Wt 88.4 kg (194 lb 14.2 oz)   SpO2 96%   HEAD AND NECK: Demonstrates symmetric, reactive pupils. Extraocular muscles   are intact. Nares and oropharynx are clear.   NECK: Supple. No adenopathy.  CHEST:No respiratory distress.    CARDIOVASCULAR: Regular rate. The extremities are well perfused.   ABDOMEN: Deferred  Rectal: Left-sided abscess tracking into the buttock.   EXTREMITIES: Examination of the upper and lower extremities " demonstrates no cyanosis edema or clubbing.  NEUROLOGIC: Alert & oriented x 3, Normal motor function, Normal sensory function, No focal deficits noted.    LABORATORY VALUES:   Recent Labs     09/21/22 2125   WBC 14.6*   RBC 4.59   HEMOGLOBIN 12.7   HEMATOCRIT 37.8   MCV 82.4   MCH 27.7   MCHC 33.6   RDW 39.9   PLATELETCT 265   MPV 10.7     Recent Labs     09/21/22 2125   SODIUM 128*   POTASSIUM 4.2   CHLORIDE 94*   CO2 20   GLUCOSE 558*   BUN 8   CREATININE 0.51   CALCIUM 8.9     Recent Labs     09/21/22 2125 09/22/22  0120   ASTSGOT 13  --    ALTSGPT 11  --    TBILIRUBIN 0.4  --    ALKPHOSPHAT 102*  --    GLOBULIN 3.9*  --    INR  --  1.03     Recent Labs     09/22/22  0120   INR 1.03        IMAGING:   CT-PELVIS WITH   Final Result      1.  Left-sided perirectal abscess which tracks caudally into the left upper thigh. The largest portion of this abscess in the left upper thigh measures 5.3 x 3.1 cm in greatest diameters.          Problem List:    Patient Active Problem List    Diagnosis Date Noted    Sepsis ([facility]) 09/22/2022    Perirectal abscess 09/22/2022    Dyslipidemia 09/22/2022    Abscess, gluteal 07/13/2022    Yeast infection of the vagina 05/26/2022    Obesity (BMI 30-39.9) 05/25/2022    Gastro-esophageal reflux disease without esophagitis 04/14/2021    Major depressive disorder 09/14/2020    Polycystic ovary syndrome 12/11/2019    History of deep venous thrombosis 11/19/2019    Hypothyroidism 11/19/2019    Hypertension 10/28/2017    Obstructive sleep apnea, adult 07/31/2015    Vitamin D deficiency 03/12/2015    Type 2 diabetes mellitus with hyperglycemia, without long-term current use of insulin ([facility]) 03/12/2015       IMPRESSION AND PLAN:     1.  Perirectal abscess  2.  Obesity  3.  Diabetes.    1.  The patient will be taken to the operating room for incision and drainage of a perirectal abscess. The surgical conduct was explained. Potential complications including but not limited to infection, bleeding,  damage to adjacent structures, anesthetic complications were discussed in detail. Questions were elicited and answered to her satisfaction. She understands the rationale for surgery and elects to proceed.  Operative consent signed.      2.  wound care will be consulted postoperatively for management        ___________________________________   Rick Cuellar M.D.    [facility]  [phone]    DD: 9/22/2022 DT: 2:13 PM

## 2022-09-22 NOTE — PROGRESS NOTES
" Patient admitted earlier by Dr. Monzon.  For further details please review his H&P.        [facility] Medicine Daily Progress Note    Date of Service  9/22/2022    Chief Complaint  Liana Obrien is a 42 y.o. female admitted 9/21/2022 with rectal pain.    Hospital Course  As per chart review:  \"42 y.o. female who presented 9/21/2022 with rectal pain.  Patient states she began having rectal pain approximately 2 days ago, now also involving her left buttock.  Patient states the pain became severe so she presented to the emergency department.  Patient states it is a sharp pain at times.  Patient does have a history of multiple abscesses that she states started when she was diagnosed with diabetes.  She never states it involved her rectum however.  She states in the past, she has received antibiotics with resolution of her abscess.  Upon arrival, she was noted to have a significant perirectal abscess.  ERP did discuss the case with surgery, I did discuss the case including labs and imaging with the ER physician.\"    Interval Problem Update  9/22: Patient seen at bedside this morning.  Patient still complaining of rectal pain.  Currently n.p.o. for possible intervention by surgery, we appreciate further recommendations.  Patient mentions that she does not always use insulin at home.  She seems to not be compliant with her medications.  We had a long discussion regarding the importance of being compliant with her medications.  She seems to understand.    I have discussed this patient's plan of care and discharge plan at IDT rounds today with Case Management, Nursing, Nursing leadership, and other members of the IDT team.    Consultants/Specialty  general surgery    Code Status  Full Code    Disposition  Patient is not medically cleared for discharge.   Anticipate discharge to pending clinical evolution.    Review of Systems  ROS     Physical Exam  Temp:  [36.2 °C (97.1 °F)-36.4 °C (97.6 °F)] 36.4 °C (97.6 " °F)  Pulse:  [] 89  Resp:  [13-20] 17  BP: (100-130)/(62-88) 118/71  SpO2:  [95 %-99 %] 95 %    Physical Exam    Fluids    Intake/Output Summary (Last 24 hours) at 9/22/2022 0911  Last data filed at 9/21/2022 2338  Gross per 24 hour   Intake 2000 ml   Output --   Net 2000 ml       Laboratory  Recent Labs     09/21/22 2125   WBC 14.6*   RBC 4.59   HEMOGLOBIN 12.7   HEMATOCRIT 37.8   MCV 82.4   MCH 27.7   MCHC 33.6   RDW 39.9   PLATELETCT 265   MPV 10.7     Recent Labs     09/21/22  2125   SODIUM 128*   POTASSIUM 4.2   CHLORIDE 94*   CO2 20   GLUCOSE 558*   BUN 8   CREATININE 0.51   CALCIUM 8.9     Recent Labs     09/22/22  0120   INR 1.03               Imaging  CT-PELVIS WITH   Final Result      1.  Left-sided perirectal abscess which tracks caudally into the left upper thigh. The largest portion of this abscess in the left upper thigh measures 5.3 x 3.1 cm in greatest diameters.           Assessment/Plan  * Sepsis (HCC)- (present on admission)  Assessment & Plan  -This is Sepsis Present on admission  SIRS criteria identified on my evaluation include: Tachycardia, with heart rate greater than 90 BPM and Leukocytosis, with WBC greater than 12,000  Source is perirectal abscess  Sepsis protocol initiated  Fluid resuscitation ordered per protocol  Crystalloid Fluid Administration: Fluid resuscitation ordered per standard protocol - 30 mL/kg per current or ideal body weight  IV antibiotics as appropriate for source of sepsis  Reassessment: I have reassessed the patient's hemodynamic status  -Start vancomycin and Rocephin  -Await culture results  -Trend lactic acid level  -Await surgical recommendations  -Patient is at risk of worsening, closely monitor her hemodynamics    Dyslipidemia- (present on admission)  Assessment & Plan  - Continue home statin    Perirectal abscess- (present on admission)  Assessment & Plan  - Large left-sided perirectal abscess involving the left upper thigh, measures 5.3 x 3.1 cm  -ERP did  discuss case with surgery who is planning on intervention  -Keep patient n.p.o.  -Start vancomycin as well as Rocephin due to penicillin allergy  -Causing sepsis  -Await culture results  -As needed pain meds    Major depressive disorder- (present on admission)  Assessment & Plan  - Continue home Effexor    Hypothyroidism- (present on admission)  Assessment & Plan  - Continue home Synthroid at 150 mcg daily  -No recent TSH    Hypertension- (present on admission)  Assessment & Plan  - Continue home lisinopril  -Start as needed labetalol  -Adjust as needed    Type 2 diabetes mellitus with hyperglycemia, without long-term current use of insulin (HCC)- (present on admission)  Assessment & Plan  - Uncontrolled diabetes is a significant contributing factor to her ongoing abscesses  -She does need improved control  -Hemoglobin A1c in August was 12.5   -now with significant hyperglycemia due to infection/sepsis  -Hold home meds  -Patient will be n.p.o., start insulin sliding scale, while she will be n.p.o., will also start Lantus due to the severity of her hyperglycemia  -No acidosis or elevated anion gap at this time  -Repeat BMP in the morning         VTE prophylaxis: SCDs/TEDs    I have performed a physical exam and reviewed and updated ROS and Plan today (9/22/2022). In review of yesterday's note (9/21/2022), there are no changes except as documented above.

## 2022-09-22 NOTE — ED NOTES
Pt updated on admission status and waiting on bed.   Given ice chips and labs obtained. Denies pain at this time.   Call light within reach and made comfortable with lights dimmed.

## 2022-09-22 NOTE — OP REPORT
OPERATIVE NOTE    PREOPERATIVE DIAGNOSIS: Perirectal abscess    POSTOPERATIVE DIAGNOSIS: Same    PROCEDURE PERFORMED: Incision and drainage of a perirectal abscess    SURGEON: Rick Cuellar M.D.    ASSISTANT: None      ANESTHESIOLOGIST:  Anesthesiologist: Ibrahima Soriano M.D.; Timoteo Bruce M.D.     ANESTHESIA: general     FINDINGS: Perirectal abscess tracking into the left buttock.     WOUND CLASS: Dirty infected    SPECIMEN: Cultures    ESTIMATED BLOOD LOSS: 20  mL    Indications: Patient is a 42-year-old obese diabetic female who presents with a perirectal abscess seen on CT tracking into the left buttock.  Patient was counseled extensively as of the risk first benefits of surgery and agreed to proceed fully informed.    Description:    The patient was prepped and draped in the standard sterile surgical fashion after induction of general anesthesia.  An appropriate timeout was performed and antibiotics delivered.  I began with the patient in a high lithotomy position.    I began the procedure by using a finder needle to definitively locate the abscess cavity.  I sent this for cultures.  I then made an incision near the anus and entered the abscess cavity.  I was rewarded with about 20 to 25 cc of purulence and old clot.    I then copiously irrigated the abscess cavity until clear.  I obtained meticulous hemostasis.  I injected local anesthetic.  I then packed the wound with iodine soaked gauze.  An appropriate dry sterile dressing was applied.  The patient was then extubated to recovery in satisfactory condition.    Disposition:    To the [facility] for recovery.  Medicine team will control her diabetes optimally and I will consult wound care.        ____________________________________     Rick Cuellar M.D.    DT: 9/22/2022  3:17 PM      Cc: Anahi Faria M.D.

## 2022-09-22 NOTE — ASSESSMENT & PLAN NOTE
- Large left-sided perirectal abscess involving the left upper thigh, measures 5.3 x 3.1 cm  -ERP did discuss case with surgery who is planning on intervention  -Keep patient n.p.o.  -Start vancomycin as well as Rocephin due to penicillin allergy  -Causing sepsis  -Await culture results  -As needed pain meds    9/23: Patient underwent incision and drainage of perirectal abscess by general surgery yesterday, we appreciate further recommendations.  Pending cultures.  Continue antibiotics.  Wound care.    9/25: Culture is growing a Streptococcus anginosus and Prevotella bivia.  After discussing with pharmacy we will continue with Zyvox and Flagyl for now.

## 2022-09-22 NOTE — ANESTHESIA PREPROCEDURE EVALUATION
Case: [id number] Date/Time: 09/22/22 1415    Procedure: INCISION AND DRAINAGE, ABSCESS, PERIRECTAL    Location: [facility] OR 05 / SURGERY [facility]    Surgeons: Rick Cuellar M.D.      Denies GERD, had it in the past  Sepsis?  DM poor control with abscress  Relevant Problems   ANESTHESIA   (positive) Obstructive sleep apnea, adult      NEURO   (positive) History of deep venous thrombosis      CARDIAC   (positive) Hypertension      GI   (positive) Gastro-esophageal reflux disease without esophagitis      ENDO   (positive) Hypothyroidism   (positive) Type 2 diabetes mellitus with hyperglycemia, without long-term current use of insulin (HCC)       Physical Exam    Airway   Mallampati: III  TM distance: >3 FB  Neck ROM: full       Cardiovascular - normal exam  Rhythm: regular  Rate: normal  (-) murmur     Dental - normal exam           Pulmonary - normal exam  Breath sounds clear to auscultation     Abdominal    Neurological - normal exam                 Anesthesia Plan    ASA 3- EMERGENT   ASA physical status 3 criteria: diabetes - poorly controlledASA physical status emergent criteria: sepsis    Plan - general               Induction: intravenous    Postoperative Plan: Postoperative administration of opioids is intended.    Pertinent diagnostic labs and testing reviewed    Informed Consent:    Anesthetic plan and risks discussed with patient.    Use of blood products discussed with: patient whom consented to blood products.

## 2022-09-22 NOTE — ANESTHESIA PROCEDURE NOTES
Airway    Date/Time: 9/22/2022 2:58 PM  Performed by: Timoteo Bruce M.D.  Authorized by: Timoteo Bruce M.D.     Location:  OR  Urgency:  Elective  Indications for Airway Management:  Anesthesia      Spontaneous Ventilation: absent    Sedation Level:  Deep  Preoxygenated: Yes    Final Airway Type:  Supraglottic airway  Final Supraglottic Airway:  Standard LMA    SGA Size:  4  Number of Attempts at Approach:  1

## 2022-09-22 NOTE — DISCHARGE PLANNING
Case Management Discharge Planning    Admission Date: 9/21/2022  GMLOS:    ALOS: 0    6-Clicks ADL Score: 23  6-Clicks Mobility Score: 22      Anticipated Discharge Dispo: Discharge Disposition: Discharged to home/self care (01)    DME Needed: No    Action(s) Taken: Updated Provider/Nurse on Discharge Plan    No anticipated DC needs. RN CM will continue to follow.     Escalations Completed: None    Medically Clear: No    Next Steps: Medical clearance    Barriers to Discharge: Medical clearance

## 2022-09-22 NOTE — ED NOTES
Pt resting in bed, call light within reach. No complaints at this time and updated on plan of care. MD at bedside.

## 2022-09-22 NOTE — ASSESSMENT & PLAN NOTE
- Uncontrolled diabetes is a significant contributing factor to her ongoing abscesses  -She does need improved control  -Hemoglobin A1c in August was 12.5   -now with significant hyperglycemia due to infection/sepsis  -Hold home meds  -Patient will be n.p.o., start insulin sliding scale, while she will be n.p.o., will also start Lantus due to the severity of her hyperglycemia  -No acidosis or elevated anion gap at this time  -Repeat BMP in the morning

## 2022-09-22 NOTE — PROGRESS NOTES
"Pharmacy Vancomycin Kinetics Note for 9/22/2022     42 y.o. female on Vancomycin day # 1     Vancomycin Indication (AUC Dosing): Skin/skin structure infection    Provider specified end date: 09/29/22    Active Antibiotics (From admission, onward)      Ordered     Ordering Provider       Thu Sep 22, 2022  4:15 AM    09/22/22 0415  vancomycin 1500 mg/250mL NS IVPB premix  (vancomycin (VANCOCIN) IV (LD + Maintenance))  EVERY 12 HOURS         Darin Monzon D.O.       Thu Sep 22, 2022  1:04 AM    09/22/22 0104  cefTRIAXone (Rocephin) 2 g in  mL IVPB  EVERY 24 HOURS         Darin Monzon D.O.    09/22/22 0104  MD Alert...Vancomycin per Pharmacy  (Abscess (Simple Sepsis))  PHARMACY TO DOSE        Question:  Indication(s) for vancomycin?  Answer:  Skin and soft tissue infection    Darin Monzon D.O.       Thu Sep 22, 2022 12:15 AM    09/22/22 0015  vancomycin 2250 mg/500mL NS IVPB premix  (vancomycin (VANCOCIN) IV (LD + Maintenance))  ONCE         Trav Diggs M.D.            Dosing Weight: 88.4 kg (194 lb 14.2 oz)      Admission History: Admitted on 9/21/2022 for Sepsis ([facility]) [A41.9]  Pertinent history: Presented to ED for rectal pain onset 2 days, noted to have significant perirectal abscess, w/WBC 14.6, HR >90. Received LD of vancomycin and 1x ceftriaxone in ED. Continuing vancomyin and CTX for perirectal abscess.    Allergies:     Bicillin c-r [penicillin g proc & benzathine], Ondansetron, Penicillin g, Sulfa drugs, and Metoclopramide     Pertinent cultures to date:     Results       Procedure Component Value Units Date/Time    Blood Culture [[id number]] Collected: 09/22/22 0125    Order Status: Sent Specimen: Blood from Peripheral Updated: 09/22/22 0142    Narrative:      From different peripheral sites, if not done within the last  24 hours (Per Hospital Policy: Only change specimen source to  \"Line\" if specified by physician order)    Blood Culture [[id number]] Collected: 09/22/22 0130    Order " "Status: Sent Specimen: Blood from Peripheral Updated: 22    Narrative:      From different peripheral sites, if not done within the last  24 hours (Per Hospital Policy: Only change specimen source to  \"Line\" if specified by physician order)    MRSA By PCR (Amp) [[id number]] Collected: 22    Order Status: Sent Specimen: Respirate from Nares Updated: 22    Urinalysis [[id number]]     Order Status: Sent Specimen: Urine             Labs:     Estimated Creatinine Clearance: 151.5 mL/min (by C-G formula based on SCr of 0.51 mg/dL).  Recent Labs     22   WBC 14.6*   NEUTSPOLYS 74.40*     Recent Labs     22   BUN 8   CREATININE 0.51   ALBUMIN 3.6       Intake/Output Summary (Last 24 hours) at 2022 0436  Last data filed at 2022 2338  Gross per 24 hour   Intake 2000 ml   Output --   Net 2000 ml      /67   Pulse 93   Temp 36.2 °C (97.1 °F) (Temporal)   Resp 19   Ht 1.6 m (5' 3\")   Wt 88.4 kg (194 lb 14.2 oz)   SpO2 97%  Temp (24hrs), Av.2 °C (97.1 °F), Min:36.2 °C (97.1 °F), Max:36.2 °C (97.1 °F)      List concerns for Vancomycin clearance:     Obesity    Pharmacokinetics:     AUC kinetics:   Ke (hr ^-1): 0.0955 hr^-1  Half life: 7.26 hr  Clearance: 6.127  Estimated TDD: 3063.5  Estimated Dose: 1187  Estimated interval: 9.3      A/P:     -  Vancomycin dose: 1500 mg (~17 mg/kg) q12h @0100, 1300    -  Next vancomycin level(s): None ordered.    -  Predicted vancomycin AUC from initial AUC test calculator: 490 mg·hr/L    -  Comments: Pt has relatively reasonable renal fx. Renal accumulation exists due to obesity. Pharmacy will monitor and obtain level at steady state.     Alayna Wolfe, PharmD    "

## 2022-09-22 NOTE — OR NURSING
Pt allergies and NPO status verified, home meds reconcilled. Belongings secured. Pt verbalizes understanding of the pain scale, expected course of stay, and plan of care.  Surgical site verified with pt.  The patient arrived to preop with an infiltrated IV and a very edematous RUE. The IV was removed and a new IV was placed under ultrasound guidance to the left AC by anesthesia. A BG was checked prior to the procedure and was 199, anesthesia aware.

## 2022-09-22 NOTE — ED PROVIDER NOTES
Right ED Provider Note    Scribed for Trav Diggs M.D. by Layton Lindsay. 2022, 9:18 PM.    Primary care provider: Anahi Faria M.D.  Means of arrival: Walk-In  History obtained from: Patient  History limited by: None    CHIEF COMPLAINT  Chief Complaint   Patient presents with    Rectal Pain     Previous infections and pain with abscess at rectum.  Last two days pain has returned, blood and pus from area.  Denies fevers, chills, n/v, diarrhea.         HPI  Liana Obrien is a 42 y.o. female who presents to the Emergency Department for rectal pain onset 2 days. The patient reports previous infection and pain from abscess at the rectum. There is currently drainage, both blood and pus. She denies fever, chills, nausea, vomiting, diarrhea, or abdominal pain.    REVIEW OF SYSTEMS  Pertinent positives include rectal pain, productive rectal abscess. Pertinent negatives include fever, chills, nausea, vomiting, diarrhea, or abdominal pain. All other systems negative.    PAST MEDICAL HISTORY   has a past medical history of Bipolar disorder ([facility]) (2018), Candida vaginitis, Candida vaginitis (2022), Chickenpox, Diabetes 1.5, managed as type 2 ([facility]), DVT (deep venous thrombosis) ([facility]), Dysfunctional uterine bleeding, Herpes, Hypertension, Hypoglycemia associated with diabetes ([facility]) (10/27/2021), Hypothyroidism due to acquired atrophy of thyroid (2016), Personal history of venous thrombosis and embolism, Sleep apnea, Uncontrolled type 2 diabetes mellitus without complication, without long-term current use of insulin (2015), and Uterine fibroids in pregnancy, postpartum condition.    SURGICAL HISTORY  patient denies any surgical history    SOCIAL HISTORY  Social History     Tobacco Use    Smoking status: Former     Packs/day: 0.50     Years: 0.30     Pack years: 0.15     Types: Cigarettes     Quit date: 2018     Years since quittin.7    Smokeless tobacco: Never    Tobacco  "comments:     for 3 months at a time on and off    Vaping Use    Vaping Use: Never used   Substance Use Topics    Alcohol use: No     Alcohol/week: 0.0 oz    Drug use: No      Social History     Substance and Sexual Activity   Drug Use No       FAMILY HISTORY  Family History   Problem Relation Age of Onset    Hypertension Mother     Sleep Apnea Mother     Hyperlipidemia Father     Cancer Brother     Sleep Apnea Brother     Cancer Maternal Uncle     Diabetes Neg Hx     Heart Disease Neg Hx     Stroke Neg Hx        CURRENT MEDICATIONS  Home Medications       Reviewed by Ya Pickard R.N. (Registered Nurse) on 09/21/22 at 2047  Med List Status: Partial     Medication Last Dose Status   atorvastatin (LIPITOR) 20 MG Tab  Active   Insulin Degludec (TRESIBA FLEXTOUCH) 200 UNIT/ML Solution Pen-injector  Active   insulin lispro (HUMALOG) 100 UNIT/ML  Active   Lancets  Active   Lancets Ultra Fine Misc  Active   levoFLOXacin (LEVAQUIN) 500 MG tablet  Active   levothyroxine (SYNTHROID) 150 MCG Tab  Active   lisinopril (PRINIVIL) 10 MG Tab  Active   metFORMIN (GLUCOPHAGE) 500 MG Tab  Active   senna-docusate (PERICOLACE OR SENOKOT S) 8.6-50 MG Tab  Active   venlafaxine (EFFEXOR) 75 MG Tab  Active   venlafaxine XR (EFFEXOR XR) 37.5 MG CAPSULE SR 24 HR  Active                    ALLERGIES  Allergies   Allergen Reactions    Bicillin C-R [Penicillin G Proc & Benzathine] Rash     Received inj of Bicillin- reaction was rash. Oral penicillin shows no reaction.    Ondansetron Hives    Penicillin G Hives    Sulfa Drugs Hives           Metoclopramide Rash and Vomiting     Rash         PHYSICAL EXAM  VITAL SIGNS: /88   Pulse (!) 113   Temp 36.2 °C (97.1 °F) (Temporal)   Resp 18   Ht 1.6 m (5' 3\")   Wt 88.4 kg (194 lb 14.2 oz)   LMP 09/01/2022 (Exact Date)   SpO2 95%   BMI 34.52 kg/m²     Constitutional: Well developed, Well nourished, mild distress.   HENT: Normocephalic, Atraumatic, mask in place.  Eyes: Conjunctiva " normal, No discharge.   Cardiovascular: Normal heart rate, Normal rhythm, No murmurs, equal pulses.   Pulmonary: Normal breath sounds, No respiratory distress, No wheezing, No rales, No rhonchi.  Abdomen:Soft, No tenderness, No masses, no rebound, no guarding.   Rectal: 8 cm by 8 cm area on the left gluteal cleft with warmth, induration, and fluctuance in the middle.  Erythema extends down to the anus induration on the left side of the rectal canal. Guaiac negative. Brown stool.   Musculoskeletal: No major deformities noted, No tenderness.   Skin: Warm, Dry, No erythema, No rash.   Neurologic: Alert & oriented x 3, Normal motor function,  No focal deficits noted.   Psychiatric: Affect normal, Judgment normal, Mood normal.     LABS  Results for orders placed or performed during the hospital encounter of 09/21/22   CBC WITH DIFFERENTIAL   Result Value Ref Range    WBC 14.6 (H) 4.8 - 10.8 K/uL    RBC 4.59 4.20 - 5.40 M/uL    Hemoglobin 12.7 12.0 - 16.0 g/dL    Hematocrit 37.8 37.0 - 47.0 %    MCV 82.4 81.4 - 97.8 fL    MCH 27.7 27.0 - 33.0 pg    MCHC 33.6 33.6 - 35.0 g/dL    RDW 39.9 35.9 - 50.0 fL    Platelet Count 265 164 - 446 K/uL    MPV 10.7 9.0 - 12.9 fL    Neutrophils-Polys 74.40 (H) 44.00 - 72.00 %    Lymphocytes 16.80 (L) 22.00 - 41.00 %    Monocytes 7.30 0.00 - 13.40 %    Eosinophils 0.50 0.00 - 6.90 %    Basophils 0.50 0.00 - 1.80 %    Immature Granulocytes 0.50 0.00 - 0.90 %    Nucleated RBC 0.00 /100 WBC    Neutrophils (Absolute) 10.86 (H) 2.00 - 7.15 K/uL    Lymphs (Absolute) 2.46 1.00 - 4.80 K/uL    Monos (Absolute) 1.06 (H) 0.00 - 0.85 K/uL    Eos (Absolute) 0.08 0.00 - 0.51 K/uL    Baso (Absolute) 0.07 0.00 - 0.12 K/uL    Immature Granulocytes (abs) 0.07 0.00 - 0.11 K/uL    NRBC (Absolute) 0.00 K/uL   COMP METABOLIC PANEL   Result Value Ref Range    Sodium 128 (L) 135 - 145 mmol/L    Potassium 4.2 3.6 - 5.5 mmol/L    Chloride 94 (L) 96 - 112 mmol/L    Co2 20 20 - 33 mmol/L    Anion Gap 14.0 7.0 - 16.0     Glucose 558 (HH) 65 - 99 mg/dL    Bun 8 8 - 22 mg/dL    Creatinine 0.51 0.50 - 1.40 mg/dL    Calcium 8.9 8.4 - 10.2 mg/dL    AST(SGOT) 13 12 - 45 U/L    ALT(SGPT) 11 2 - 50 U/L    Alkaline Phosphatase 102 (H) 30 - 99 U/L    Total Bilirubin 0.4 0.1 - 1.5 mg/dL    Albumin 3.6 3.2 - 4.9 g/dL    Total Protein 7.5 6.0 - 8.2 g/dL    Globulin 3.9 (H) 1.9 - 3.5 g/dL    A-G Ratio 0.9 g/dL   HCG QUAL SERUM   Result Value Ref Range    Beta-Hcg Qualitative Serum Negative Negative   ESTIMATED GFR   Result Value Ref Range    GFR (CKD-EPI) 119 >60 mL/min/1.73 m 2      All labs reviewed by me.    RADIOLOGY  CT-PELVIS WITH   Final Result      1.  Left-sided perirectal abscess which tracks caudally into the left upper thigh. The largest portion of this abscess in the left upper thigh measures 5.3 x 3.1 cm in greatest diameters.        The radiologist's interpretation of all radiological studies have been reviewed by me.    COURSE & MEDICAL DECISION MAKING  Pertinent Labs & Imaging studies reviewed. (See chart for details)    9:18 PM - Patient seen and examined at bedside. Patient will be treated with morphine 4mg injection. The patient will receive IV fluids for dehydration. Ordered CT-pelvis, CBC w/ diff, CMP, HCG Qual serum to evaluate her symptoms. The differential diagnoses include but are not limited to: Perirectal abscess, cutaneous abscess, DKA, electrolyte abnormality    There was a positive response to IV fluids after patient reevaluation.    10:29 PM Patient will be treated with LR infusion morphine 4mg injection, and iohexol IV.    12PM discussed the case with Dr. Cuellar general surgery.  He asked that the patient be admitted to the hospitalist kept n.p.o. and he will plan on surgery in the morning.  Discussed the case with Dr. Monzon hospitalist he is agreed to consult on hospitalization      Medical Decision Making: Patient presents with pain erythema and induration from the rectal canal over her left gluteus.  It  appears to be a perirectal abscess.  Patient does have hyperglycemia but no signs of DKA.  I have started on Rocephin, Vanco, Flagyl as recommended by pharmacy.    DISPOSITION:  Patient will be hospitalized by Dr. Monzon in guarded erythema extends down to the anus condition.      FINAL IMPRESSION  1. Perirectal abscess    2. Hyperglycemia          Layton STONE (Scribe), am scribing for, and in the presence of, Trav Diggs M.D.    Electronically signed by: Layton Lindsay (Suzanibmichelet), 9/21/2022    Trav STONE M.D. personally performed the services described in this documentation, as scribed by Layton Lindsay in my presence, and it is both accurate and complete.    The note accurately reflects work and decisions made by me.  Trav Diggs M.D.  9/22/2022  12:44 AM

## 2022-09-22 NOTE — H&P
Hospital Medicine History & Physical Note    Date of Service  9/22/2022    Primary Care Physician  Anahi Faria M.D.    Consultants  general surgery    Specialist Names: Dr Cuellar    Code Status  Full Code    Chief Complaint  Chief Complaint   Patient presents with    Rectal Pain     Previous infections and pain with abscess at rectum.  Last two days pain has returned, blood and pus from area.  Denies fevers, chills, n/v, diarrhea.         History of Presenting Illness  Liana Obrien is a 42 y.o. female who presented 9/21/2022 with rectal pain.  Patient states she began having rectal pain approximately 2 days ago, now also involving her left buttock.  Patient states the pain became severe so she presented to the emergency department.  Patient states it is a sharp pain at times.  Patient does have a history of multiple abscesses that she states started when she was diagnosed with diabetes.  She never states it involved her rectum however.  She states in the past, she has received antibiotics with resolution of her abscess.  Upon arrival, she was noted to have a significant perirectal abscess.  ERP did discuss the case with surgery, I did discuss the case including labs and imaging with the ER physician.    I discussed the plan of care with patient.    Review of Systems  Review of Systems   Constitutional:  Negative for chills, fever and malaise/fatigue.   HENT:  Negative for congestion.    Respiratory:  Negative for cough, sputum production, shortness of breath and stridor.    Cardiovascular:  Negative for chest pain, palpitations and leg swelling.   Gastrointestinal:  Negative for abdominal pain, constipation, diarrhea, nausea and vomiting.   Genitourinary:  Negative for dysuria and urgency.   Musculoskeletal:  Negative for falls and myalgias.        Rectal and left buttock pain   Neurological:  Negative for dizziness, tingling, loss of consciousness, weakness and headaches.   Psychiatric/Behavioral:   Negative for depression and suicidal ideas.    All other systems reviewed and are negative.    Past Medical History   has a past medical history of Bipolar disorder ([facility]) (08/09/2018), Candida vaginitis, Candida vaginitis (05/26/2022), Chickenpox, Diabetes 1.5, managed as type 2 ([facility]), DVT (deep venous thrombosis) ([facility]), Dysfunctional uterine bleeding, Herpes, Hypertension, Hypoglycemia associated with diabetes ([facility]) (10/27/2021), Hypothyroidism due to acquired atrophy of thyroid (01/22/2016), Personal history of venous thrombosis and embolism, Sleep apnea, Uncontrolled type 2 diabetes mellitus without complication, without long-term current use of insulin (03/12/2015), and Uterine fibroids in pregnancy, postpartum condition.    Surgical History   has no past surgical history on file.     Family History  family history includes Cancer in her brother and maternal uncle; Hyperlipidemia in her father; Hypertension in her mother; Sleep Apnea in her brother and mother.   Family history reviewed with patient. There is no family history that is pertinent to the chief complaint.     Social History   reports that she quit smoking about 4 years ago. Her smoking use included cigarettes. She has a 0.15 pack-year smoking history. She has never used smokeless tobacco. She reports that she does not drink alcohol and does not use drugs.    Allergies  Allergies   Allergen Reactions    Bicillin C-R [Penicillin G Proc & Benzathine] Rash     Received inj of Bicillin- reaction was rash. Oral penicillin shows no reaction.    Ondansetron Hives    Penicillin G Hives    Sulfa Drugs Hives           Metoclopramide Rash and Vomiting     Rash         Medications  Prior to Admission Medications   Prescriptions Last Dose Informant Patient Reported? Taking?   Insulin Degludec (TRESIBA FLEXTOUCH) 200 UNIT/ML Solution Pen-injector  Patient Yes No   Sig: Inject 10 Units under the skin every day.   Lancets   No No   Sig: Use one  lancet to test blood  sugar 3 times a day.   Lancets Ultra Fine Misc   Yes No   atorvastatin (LIPITOR) 20 MG Tab   Yes No   Sig: Take 20 mg by mouth every day.   insulin lispro (HUMALOG) 100 UNIT/ML  Patient Yes No   Sig: Inject 1-10 Units under the skin 3 times a day before meals. Unknown sliding scale   levoFLOXacin (LEVAQUIN) 500 MG tablet   No No   Sig: Take 1 Tablet by mouth every day.   Patient not taking: Reported on 8/10/2022   levothyroxine (SYNTHROID) 150 MCG Tab  Patient Yes No   Sig: Take 150 mcg by mouth every morning on an empty stomach.   lisinopril (PRINIVIL) 10 MG Tab   Yes No   Sig: Take 10 mg by mouth every day.   metFORMIN (GLUCOPHAGE) 500 MG Tab  Patient Yes No   Sig: Take 1,000 mg by mouth 2 times a day.   senna-docusate (PERICOLACE OR SENOKOT S) 8.6-50 MG Tab   No No   Sig: Take 1 Tablet by mouth every day.   venlafaxine (EFFEXOR) 75 MG Tab   Yes No   Sig: Take 75 mg by mouth every day.   venlafaxine XR (EFFEXOR XR) 37.5 MG CAPSULE SR 24 HR   Yes No   Sig: Take 37.5 mg by mouth every day.   Patient not taking: Reported on 8/10/2022      Facility-Administered Medications: None       Physical Exam  Temp:  [36.2 °C (97.1 °F)] 36.2 °C (97.1 °F)  Pulse:  [] 92  Resp:  [13-20] 13  BP: (127-130)/(69-88) 127/69  SpO2:  [95 %-99 %] 99 %  Blood Pressure: 127/69   Temperature: 36.2 °C (97.1 °F)   Pulse: 92   Respiration: 13   Pulse Oximetry: 99 %       Physical Exam  Vitals and nursing note reviewed.   Constitutional:       General: She is not in acute distress.     Appearance: She is well-developed. She is not diaphoretic.   HENT:      Head: Normocephalic and atraumatic.      Right Ear: External ear normal.      Left Ear: External ear normal.      Nose: Nose normal. No congestion or rhinorrhea.      Mouth/Throat:      Mouth: Mucous membranes are dry.      Pharynx: No oropharyngeal exudate.   Eyes:      General:         Right eye: No discharge.         Left eye: No discharge.   Neck:      Trachea: No tracheal deviation.    Cardiovascular:      Rate and Rhythm: Normal rate and regular rhythm.      Heart sounds: No murmur heard.    No friction rub. No gallop.   Pulmonary:      Effort: Pulmonary effort is normal. No respiratory distress.      Breath sounds: Normal breath sounds. No stridor. No wheezing or rales.   Chest:      Chest wall: No tenderness.   Abdominal:      General: Bowel sounds are normal. There is no distension.      Palpations: Abdomen is soft.      Tenderness: There is no abdominal tenderness.   Musculoskeletal:      Cervical back: Neck supple.      Right lower leg: No edema.      Left lower leg: No edema.      Comments: Decreased ROM left leg due to tenderness at left buttock   Lymphadenopathy:      Cervical: No cervical adenopathy.   Skin:     General: Skin is warm and dry.      Findings: No erythema or rash.   Neurological:      Mental Status: She is alert and oriented to person, place, and time.      Cranial Nerves: No cranial nerve deficit.   Psychiatric:         Mood and Affect: Mood normal.         Behavior: Behavior normal.         Thought Content: Thought content normal.         Judgment: Judgment normal.       Laboratory:  Recent Labs     09/21/22 2125   WBC 14.6*   RBC 4.59   HEMOGLOBIN 12.7   HEMATOCRIT 37.8   MCV 82.4   MCH 27.7   MCHC 33.6   RDW 39.9   PLATELETCT 265   MPV 10.7     Recent Labs     09/21/22 2125   SODIUM 128*   POTASSIUM 4.2   CHLORIDE 94*   CO2 20   GLUCOSE 558*   BUN 8   CREATININE 0.51   CALCIUM 8.9     Recent Labs     09/21/22 2125   ALTSGPT 11   ASTSGOT 13   ALKPHOSPHAT 102*   TBILIRUBIN 0.4   GLUCOSE 558*         No results for input(s): NTPROBNP in the last 72 hours.      No results for input(s): TROPONINT in the last 72 hours.    Imaging:  CT-PELVIS WITH   Final Result      1.  Left-sided perirectal abscess which tracks caudally into the left upper thigh. The largest portion of this abscess in the left upper thigh measures 5.3 x 3.1 cm in greatest diameters.          no X-Ray or  EKG requiring interpretation    Assessment/Plan:  Justification for Admission Status  I anticipate this patient will require at least two midnights for appropriate medical management, necessitating inpatient admission because perirectal abscess    Patient will need a Med/Surg bed on MEDICAL service .  The need is secondary to perirectal abscess.    * Sepsis (HCC)- (present on admission)  Assessment & Plan  -This is Sepsis Present on admission  SIRS criteria identified on my evaluation include: Tachycardia, with heart rate greater than 90 BPM and Leukocytosis, with WBC greater than 12,000  Source is perirectal abscess  Sepsis protocol initiated  Fluid resuscitation ordered per protocol  Crystalloid Fluid Administration: Fluid resuscitation ordered per standard protocol - 30 mL/kg per current or ideal body weight  IV antibiotics as appropriate for source of sepsis  Reassessment: I have reassessed the patient's hemodynamic status  -Start vancomycin and Rocephin  -Await culture results  -Trend lactic acid level  -Await surgical recommendations  -Patient is at risk of worsening, closely monitor her hemodynamics    Perirectal abscess- (present on admission)  Assessment & Plan  - Large left-sided perirectal abscess involving the left upper thigh, measures 5.3 x 3.1 cm  -ERP did discuss case with surgery who is planning on intervention  -Keep patient n.p.o.  -Start vancomycin as well as Rocephin due to penicillin allergy  -Causing sepsis  -Await culture results  -As needed pain meds    Type 2 diabetes mellitus with hyperglycemia, without long-term current use of insulin (HCC)- (present on admission)  Assessment & Plan  - Uncontrolled diabetes is a significant contributing factor to her ongoing abscesses  -She does need improved control  -Hemoglobin A1c in August was 12.5   -now with significant hyperglycemia due to infection/sepsis  -Hold home meds  -Patient will be n.p.o., start insulin sliding scale, while she will be  n.p.o., will also start Lantus due to the severity of her hyperglycemia  -No acidosis or elevated anion gap at this time  -Repeat BMP in the morning    Dyslipidemia- (present on admission)  Assessment & Plan  - Continue home statin    Major depressive disorder- (present on admission)  Assessment & Plan  - Continue home Effexor    Hypothyroidism- (present on admission)  Assessment & Plan  - Continue home Synthroid at 150 mcg daily  -No recent TSH    Hypertension- (present on admission)  Assessment & Plan  - Continue home lisinopril  -Start as needed labetalol  -Adjust as needed      VTE prophylaxis: SCDs/TEDs

## 2022-09-23 PROBLEM — D64.9 ANEMIA: Status: ACTIVE | Noted: 2022-09-23

## 2022-09-23 LAB
ANION GAP SERPL CALC-SCNC: 12 MMOL/L (ref 7–16)
BASOPHILS # BLD AUTO: 0.4 % (ref 0–1.8)
BASOPHILS # BLD: 0.04 K/UL (ref 0–0.12)
BUN SERPL-MCNC: 7 MG/DL (ref 8–22)
CALCIUM SERPL-MCNC: 7.8 MG/DL (ref 8.4–10.2)
CHLORIDE SERPL-SCNC: 105 MMOL/L (ref 96–112)
CO2 SERPL-SCNC: 17 MMOL/L (ref 20–33)
CREAT SERPL-MCNC: 0.28 MG/DL (ref 0.5–1.4)
EOSINOPHIL # BLD AUTO: 0.03 K/UL (ref 0–0.51)
EOSINOPHIL NFR BLD: 0.3 % (ref 0–6.9)
ERYTHROCYTE [DISTWIDTH] IN BLOOD BY AUTOMATED COUNT: 40.7 FL (ref 35.9–50)
GFR SERPLBLD CREATININE-BSD FMLA CKD-EPI: 138 ML/MIN/1.73 M 2
GLUCOSE BLD STRIP.AUTO-MCNC: 192 MG/DL (ref 65–99)
GLUCOSE BLD STRIP.AUTO-MCNC: 226 MG/DL (ref 65–99)
GLUCOSE BLD STRIP.AUTO-MCNC: 233 MG/DL (ref 65–99)
GLUCOSE BLD STRIP.AUTO-MCNC: 268 MG/DL (ref 65–99)
GLUCOSE BLD STRIP.AUTO-MCNC: 280 MG/DL (ref 65–99)
GLUCOSE SERPL-MCNC: 236 MG/DL (ref 65–99)
GRAM STN SPEC: NORMAL
HCT VFR BLD AUTO: 35.2 % (ref 37–47)
HGB BLD-MCNC: 11.7 G/DL (ref 12–16)
IMM GRANULOCYTES # BLD AUTO: 0.06 K/UL (ref 0–0.11)
IMM GRANULOCYTES NFR BLD AUTO: 0.5 % (ref 0–0.9)
LYMPHOCYTES # BLD AUTO: 2 K/UL (ref 1–4.8)
LYMPHOCYTES NFR BLD: 17.6 % (ref 22–41)
MCH RBC QN AUTO: 27.5 PG (ref 27–33)
MCHC RBC AUTO-ENTMCNC: 33.2 G/DL (ref 33.6–35)
MCV RBC AUTO: 82.8 FL (ref 81.4–97.8)
MONOCYTES # BLD AUTO: 0.63 K/UL (ref 0–0.85)
MONOCYTES NFR BLD AUTO: 5.5 % (ref 0–13.4)
NEUTROPHILS # BLD AUTO: 8.63 K/UL (ref 2–7.15)
NEUTROPHILS NFR BLD: 75.7 % (ref 44–72)
NRBC # BLD AUTO: 0 K/UL
NRBC BLD-RTO: 0 /100 WBC
PLATELET # BLD AUTO: 248 K/UL (ref 164–446)
PMV BLD AUTO: 10.3 FL (ref 9–12.9)
POTASSIUM SERPL-SCNC: 3.6 MMOL/L (ref 3.6–5.5)
RBC # BLD AUTO: 4.25 M/UL (ref 4.2–5.4)
SIGNIFICANT IND 70042: NORMAL
SITE SITE: NORMAL
SODIUM SERPL-SCNC: 134 MMOL/L (ref 135–145)
SOURCE SOURCE: NORMAL
WBC # BLD AUTO: 11.4 K/UL (ref 4.8–10.8)

## 2022-09-23 PROCEDURE — 700101 HCHG RX REV CODE 250

## 2022-09-23 PROCEDURE — A9270 NON-COVERED ITEM OR SERVICE: HCPCS | Performed by: INTERNAL MEDICINE

## 2022-09-23 PROCEDURE — 700111 HCHG RX REV CODE 636 W/ 250 OVERRIDE (IP): Performed by: INTERNAL MEDICINE

## 2022-09-23 PROCEDURE — 700105 HCHG RX REV CODE 258

## 2022-09-23 PROCEDURE — 700102 HCHG RX REV CODE 250 W/ 637 OVERRIDE(OP): Performed by: INTERNAL MEDICINE

## 2022-09-23 PROCEDURE — 770006 HCHG ROOM/CARE - MED/SURG/GYN SEMI*

## 2022-09-23 PROCEDURE — 85025 COMPLETE CBC W/AUTO DIFF WBC: CPT

## 2022-09-23 PROCEDURE — 97162 PT EVAL MOD COMPLEX 30 MIN: CPT

## 2022-09-23 PROCEDURE — 700111 HCHG RX REV CODE 636 W/ 250 OVERRIDE (IP)

## 2022-09-23 PROCEDURE — 82962 GLUCOSE BLOOD TEST: CPT

## 2022-09-23 PROCEDURE — 99233 SBSQ HOSP IP/OBS HIGH 50: CPT | Performed by: INTERNAL MEDICINE

## 2022-09-23 PROCEDURE — 94760 N-INVAS EAR/PLS OXIMETRY 1: CPT

## 2022-09-23 PROCEDURE — 80048 BASIC METABOLIC PNL TOTAL CA: CPT

## 2022-09-23 PROCEDURE — 36415 COLL VENOUS BLD VENIPUNCTURE: CPT

## 2022-09-23 PROCEDURE — 700105 HCHG RX REV CODE 258: Performed by: INTERNAL MEDICINE

## 2022-09-23 RX ORDER — LINEZOLID 600 MG/1
600 TABLET, FILM COATED ORAL EVERY 12 HOURS
Status: DISCONTINUED | OUTPATIENT
Start: 2022-09-23 | End: 2022-09-25

## 2022-09-23 RX ADMIN — ATORVASTATIN CALCIUM 20 MG: 20 TABLET, FILM COATED ORAL at 00:44

## 2022-09-23 RX ADMIN — CEFTRIAXONE SODIUM 2 G: 2 INJECTION, POWDER, FOR SOLUTION INTRAMUSCULAR; INTRAVENOUS at 12:04

## 2022-09-23 RX ADMIN — VENLAFAXINE 75 MG: 75 TABLET ORAL at 05:07

## 2022-09-23 RX ADMIN — LISINOPRIL 10 MG: 5 TABLET ORAL at 00:44

## 2022-09-23 RX ADMIN — LINEZOLID 600 MG: 600 TABLET, FILM COATED ORAL at 12:03

## 2022-09-23 RX ADMIN — OXYCODONE HYDROCHLORIDE 10 MG: 10 TABLET ORAL at 15:15

## 2022-09-23 RX ADMIN — METRONIDAZOLE 500 MG: 500 TABLET ORAL at 21:15

## 2022-09-23 RX ADMIN — LEVOTHYROXINE SODIUM 150 MCG: 0.07 TABLET ORAL at 05:07

## 2022-09-23 RX ADMIN — INSULIN HUMAN 1 UNITS: 100 INJECTION, SOLUTION PARENTERAL at 05:08

## 2022-09-23 RX ADMIN — METRONIDAZOLE 500 MG: 500 TABLET ORAL at 14:23

## 2022-09-23 RX ADMIN — VANCOMYCIN HYDROCHLORIDE 2250 MG: 5 INJECTION, POWDER, LYOPHILIZED, FOR SOLUTION INTRAVENOUS at 00:03

## 2022-09-23 RX ADMIN — OXYCODONE HYDROCHLORIDE 10 MG: 10 TABLET ORAL at 07:36

## 2022-09-23 RX ADMIN — SODIUM CHLORIDE: 9 INJECTION, SOLUTION INTRAVENOUS at 12:16

## 2022-09-23 RX ADMIN — SENNOSIDES AND DOCUSATE SODIUM 2 TABLET: 50; 8.6 TABLET ORAL at 05:07

## 2022-09-23 RX ADMIN — POLYETHYLENE GLYCOL 3350 1 PACKET: 17 POWDER, FOR SOLUTION ORAL at 12:16

## 2022-09-23 RX ADMIN — LINEZOLID 600 MG: 600 TABLET, FILM COATED ORAL at 19:11

## 2022-09-23 RX ADMIN — METRONIDAZOLE 500 MG: 500 TABLET ORAL at 05:07

## 2022-09-23 RX ADMIN — OXYCODONE 5 MG: 5 TABLET ORAL at 23:05

## 2022-09-23 RX ADMIN — SENNOSIDES AND DOCUSATE SODIUM 2 TABLET: 50; 8.6 TABLET ORAL at 17:19

## 2022-09-23 RX ADMIN — INSULIN HUMAN 3 UNITS: 100 INJECTION, SOLUTION PARENTERAL at 00:44

## 2022-09-23 ASSESSMENT — ENCOUNTER SYMPTOMS
BACK PAIN: 0
FEVER: 0
HEADACHES: 0
VOMITING: 0
SHORTNESS OF BREATH: 0
HEARTBURN: 0
BLURRED VISION: 0
PALPITATIONS: 0
ABDOMINAL PAIN: 0
NERVOUS/ANXIOUS: 0
FALLS: 0
COUGH: 0
DIZZINESS: 0
CHILLS: 0
DOUBLE VISION: 0

## 2022-09-23 ASSESSMENT — PAIN DESCRIPTION - PAIN TYPE: TYPE: SURGICAL PAIN

## 2022-09-23 ASSESSMENT — PATIENT HEALTH QUESTIONNAIRE - PHQ9
2. FEELING DOWN, DEPRESSED, IRRITABLE, OR HOPELESS: NOT AT ALL
2. FEELING DOWN, DEPRESSED, IRRITABLE, OR HOPELESS: NOT AT ALL
1. LITTLE INTEREST OR PLEASURE IN DOING THINGS: NOT AT ALL
SUM OF ALL RESPONSES TO PHQ9 QUESTIONS 1 AND 2: 0
1. LITTLE INTEREST OR PLEASURE IN DOING THINGS: NOT AT ALL
SUM OF ALL RESPONSES TO PHQ9 QUESTIONS 1 AND 2: 0

## 2022-09-23 ASSESSMENT — LIFESTYLE VARIABLES
ON A TYPICAL DAY WHEN YOU DRINK ALCOHOL HOW MANY DRINKS DO YOU HAVE: 0
HAVE YOU EVER FELT YOU SHOULD CUT DOWN ON YOUR DRINKING: NO
TOTAL SCORE: 0
TOTAL SCORE: 0
SUBSTANCE_ABUSE: 0
EVER HAD A DRINK FIRST THING IN THE MORNING TO STEADY YOUR NERVES TO GET RID OF A HANGOVER: NO
HAVE PEOPLE ANNOYED YOU BY CRITICIZING YOUR DRINKING: NO
AVERAGE NUMBER OF DAYS PER WEEK YOU HAVE A DRINK CONTAINING ALCOHOL: 0
ALCOHOL_USE: NO
HOW MANY TIMES IN THE PAST YEAR HAVE YOU HAD 5 OR MORE DRINKS IN A DAY: 0
EVER FELT BAD OR GUILTY ABOUT YOUR DRINKING: NO
CONSUMPTION TOTAL: NEGATIVE
TOTAL SCORE: 0

## 2022-09-23 NOTE — FACE TO FACE
Face to Face Supporting Documentation - Home Health    The encounter with this patient was in whole or in part the primary reason for home health admission.    Date of encounter:   Patient:                    MRN:                       YOB: 2022  Liana Obrien  [id number]  1980     Home health to see patient for:  Wound Care    Skilled need for:  Comment: s/p I/D from rectal abscess    Skilled nursing interventions to include:  Wound Care    Homebound status evidenced by:  Needs the assistance of another person in order to leave the home. Leaving home requires a considerable and taxing effort. There is a normal inability to leave the home.    Community Physician to provide follow up care: Anahi Faria M.D.     Optional Interventions? Yes, Details: as per wound care      I certify the face to face encounter for this home health care referral meets the CMS requirements and the encounter/clinical assessment with the patient was, in whole, or in part, for the medical condition(s) listed above, which is the primary reason for home health care. Based on my clinical findings: the service(s) are medically necessary, support the need for home health care, and the homebound criteria are met.  I certify that this patient has had a face to face encounter by myself.  Emre Gaines M.D. - NPI: [id number]

## 2022-09-23 NOTE — WOUND TEAM
[facility]  Inpatient Services  Initial Wound and Skin Care Evaluation    Admission Date: 2022     Last order of IP CONSULT TO WOUND CARE was found on 2022 from Hospital Encounter on 2022     HPI, PMH, SH: Reviewed    Past Surgical History:   Procedure Laterality Date    VT I&D PERIRECTAL ABSCESS  2022    Procedure: INCISION AND DRAINAGE, ABSCESS, PERIRECTAL;  Surgeon: Rick Cuellar M.D.;  Location: SURGERY [facility];  Service: General     Social History     Tobacco Use    Smoking status: Former     Packs/day: 0.50     Years: 0.30     Pack years: 0.15     Types: Cigarettes     Quit date:      Years since quittin.7    Smokeless tobacco: Never    Tobacco comments:     for 3 months at a time on and off    Substance Use Topics    Alcohol use: No     Alcohol/week: 0.0 oz     Chief Complaint   Patient presents with    Rectal Pain     Previous infections and pain with abscess at rectum.  Last two days pain has returned, blood and pus from area.  Denies fevers, chills, n/v, diarrhea.       Diagnosis: Sepsis (HCC) [A41.9]    Unit where seen by Wound Team:      WOUND CONSULT/FOLLOW UP RELATED TO:  Sravani anal abscess with surgical I&D     WOUND HISTORY: Pt presented to ED with rectal pain.  Pt has had problems with abscesses in the area over the past several years.   Hx of DM2.  Pt works part time as a  at a restaurant.  Pt lives by herself.  Her father lives in the area.       WOUND ASSESSMENT/LDA  Wound 22 Incision Perineum 2 in sumanth soaked with betadine used for packing (Active)   Site Assessment Red 22 1000   Periwound Assessment Edema;Ecchymosis - there is a dime size area lateral to open wound 22 1000   Margins  22 0000   Closure  22 1600   Drainage Amount Small 22 1000   Drainage Description Serosanguineous 22 1000   Dressing Options Silver Strip Packing;Absorbent Abdominal Pad 22 1000   Dressing Changed  Changed 09/23/22 1000   Dressing Status Intact 09/23/22 0000   Dressing Change/Treatment Frequency Daily, and As Needed 09/23/22 1000   NEXT Dressing Change/Treatment Date 09/24/22 09/23/22 1000   NEXT Weekly Photo (Inpatient Only) 09/30/22 09/23/22 1000   Non-staged Wound Description Full thickness 09/23/22 1000   Wound Length (cm) 1.5 cm 09/23/22 1000   Wound Width (cm) 1.5 cm 09/23/22 1000   Wound Depth (cm) 1 cm 09/23/22 1000   Wound Surface Area (cm^2) 2.25 cm^2 09/23/22 1000   Wound Volume (cm^3) 2.25 cm^3 09/23/22 1000   Wound Bed Granulation (%) 100 % 09/23/22 1000   Tunneling (cm) 4 cm 09/23/22 1000   Tunneling Clock Position of Wound 3 09/23/22 1000   Wound Odor None 09/23/22 1000   Exposed Structures None 09/23/22 1000   WOUND NURSE ONLY - Time Spent with Patient (mins) 60 09/23/22 1000   Number of days: 1        Vascular:    SAMANTA:   No results found.    Lab Values:    Lab Results   Component Value Date/Time    WBC 11.4 (H) 09/23/2022 03:34 AM    RBC 4.25 09/23/2022 03:34 AM    HEMOGLOBIN 11.7 (L) 09/23/2022 03:34 AM    HEMATOCRIT 35.2 (L) 09/23/2022 03:34 AM    CREACTPROT 1.01 (H) 04/20/2017 07:55 PM    SEDRATEWES 27 (H) 01/29/2016 02:20 PM    HBA1C 12.5 (A) 08/10/2022 05:09 PM        Culture Results show:  No results found for this or any previous visit (from the past 720 hour(s)).    Pain Level/Medicated:  oral pain meds,   MD wrote for IV dilaudid with dressing changes     INTERVENTIONS BY WOUND TEAM:   Performed standard wound care which includes appropriate positioning, dressing removal and non-selective debridement. Pictures and measurements obtained weekly if/when required.  Preparation for Dressing removal:   Cleansed wound with - normal saline  Sharp debridement: na  Primary Dressing: silver strip packing into 3 oclock tract.  Open wound that is packed is just medial to midline - there is a purple discolored area lateral to open wound that is presently closed but may breakdown in time.     Secondary (Outer) Dressing: ABD and hospital underwear    Interdisciplinary consultation: Patient, Bedside RN (), Dr Schafer bedside    EVALUATION / RATIONALE FOR TREATMENT:  Most Recent Date:  : 9/23/22 Pt with clean appearing post surgical wound  that tracts laterally to an overlying area that is purple and may breakdown creating a tunnel.  Strip packing with wick fluid and allow form granular tissue to form.       Goals: Steady decrease in wound area and depth weekly.    WOUND TEAM PLAN OF CARE ([X] for frequency of wound follow up,):   Nursing to follow dressing orders written for wound care. Contact wound team if area fails to progress, deteriorates or with any questions/concerns if something comes up before next scheduled follow up (See below as to whether wound is following and frequency of wound follow up)  Dressing changes by wound team:                   Dressing changes by wound team:                   Follow up 3 times weekly:                NPWT change 3 times weekly:     Follow up 1-2 times weekly:      Follow up Bi-Monthly:                   Follow up as needed:   X  Other (explain):     NURSING PLAN OF CARE ORDERS (X):  Dressing changes: See Dressing Care orders: X  Skin care: See Skin Care orders:   RN Prevention Protocol:   Rectal tube care: See Rectal Tube Care orders:   Other orders:    RSKIN:   CURRENTLY IN PLACE (X), APPLIED THIS VISIT (A), ORDERED (O):   Q shift Andrew:  X  Q shift pressure point assessments:  X    Surface/Positioning   Pressure redistribution mattress            Low Airloss          ICU Low Airloss     Bariatric ANABEL     Waffle cushion        Waffle Overlay          Reposition q 2 hours      TAPs Turning system     Z Patel Pillow     Offloading/Redistribution   Sacral Mepilex (Silicone dressing)     Heel Mepilex (Silicone dressing)         Heel float boots (Prevalon boot)             Float Heels off Bed with Pillows           Respiratory   Silicone O2 tubing         Gray Foam Ear  protectors     Cannula fixation Device (Tender )          High flow offloading Clip    Elastic head band offloading device      Anchorfast                                                         Trach with Optifoam split foam             Containment/Moisture Prevention     Rectal tube or BMS    Purwick/Condom Cath        Mojica Catheter    Barrier wipes           Barrier paste       Antifungal tx      Interdry        Mobilization       Up to chair        Ambulate      PT/OT      Nutrition       Dietician        Diabetes Education      PO     TF     TPN     NPO   # days     Other        Anticipated discharge plans:   LTACH:        SNF/Rehab:                  Home Health Care:      Pt will need close follow up for perianal wound     Outpatient Wound Center:            Self/Family Care:        Other:                  Vac Discharge Needs:   Not Applicable Pt not on a wound vac:    X   Regular Vac while inpatient, alternative dressing at DC:        Regular Vac in use and continued at DC:            Reg. Vac w/ Skin Sub/Biologic in use. Will need to be changed 2x wkly:      Veraflo Vac while inpatient, ok to transition to Regular Vac on Discharge:           Veraflo Vac while inpatient, will need to remain on Veraflo Vac upon discharge:

## 2022-09-23 NOTE — DISCHARGE PLANNING
Case Management Discharge Planning    Admission Date: 9/21/2022  GMLOS:    ALOS: 1    6-Clicks ADL Score: 20  6-Clicks Mobility Score: 18      Anticipated Discharge Dispo: Discharge Disposition: Discharged to home/self care (01)    DME Needed: No    Action(s) Taken: Updated Provider/Nurse on Discharge Plan    Per MD, patient will require  for wound dressings. RN CM will send referral to [facility] once order is placed.  Patient unlikely to be accepted due to Medicaid HMO status.  MD to place wound  clinic referral in case  does not accept.     1128: Choice for [facility] faxed to [facility].     1205: Received call from [facility] who states they are unable to accept patient d/t insurance.  RN CM called wound clinic to set up appointment, no answer.  RN CM to call back at a later time.     1400: RN CK called wound clinic and spoke with Inocencia. Wound appointment scheduled for Tuesday 9/27 @ 1430. MD notified.     Escalations Completed: Provider    Medically Clear: No    Next Steps: Medical clearance    Barriers to Discharge: Outpatient referrals pending

## 2022-09-23 NOTE — CARE PLAN
The patient is Stable - Low risk of patient condition declining or worsening    Shift Goals  Clinical Goals: Pain less <4\10  Patient Goals: rest and comfort    Progress made toward(s) clinical / shift goals:    Problem: Pain - Standard  Goal: Alleviation of pain or a reduction in pain to the patient’s comfort goal  Outcome: Progressing     Problem: Knowledge Deficit - Standard  Goal: Patient and family/care givers will demonstrate understanding of plan of care, disease process/condition, diagnostic tests and medications  Outcome: Progressing     Problem: Hemodynamics  Goal: Patient's hemodynamics, fluid balance and neurologic status will be stable or improve  Outcome: Progressing     Problem: Fluid Volume  Goal: Fluid volume balance will be maintained  Outcome: Progressing     Problem: Urinary - Renal Perfusion  Goal: Ability to achieve and maintain adequate renal perfusion and functioning will improve  Outcome: Progressing     Problem: Respiratory  Goal: Patient will achieve/maintain optimum respiratory ventilation and gas exchange  Outcome: Progressing     Problem: Physical Regulation  Goal: Diagnostic test results will improve  Outcome: Progressing  Goal: Signs and symptoms of infection will decrease  Outcome: Progressing     Problem: Pain - Standard  Goal: Alleviation of pain or a reduction in pain to the patient’s comfort goal  Outcome: Progressing     Problem: Knowledge Deficit - Standard  Goal: Patient and family/care givers will demonstrate understanding of plan of care, disease process/condition, diagnostic tests and medications  Outcome: Progressing     Problem: Hemodynamics  Goal: Patient's hemodynamics, fluid balance and neurologic status will be stable or improve  Outcome: Progressing     Problem: Fluid Volume  Goal: Fluid volume balance will be maintained  Outcome: Progressing     Problem: Urinary - Renal Perfusion  Goal: Ability to achieve and maintain adequate renal perfusion and functioning will  improve  Outcome: Progressing     Problem: Respiratory  Goal: Patient will achieve/maintain optimum respiratory ventilation and gas exchange  Outcome: Progressing     Problem: Physical Regulation  Goal: Diagnostic test results will improve  Outcome: Progressing  Goal: Signs and symptoms of infection will decrease  Outcome: Progressing       Patient is not progressing towards the following goals:

## 2022-09-23 NOTE — CARE PLAN
The patient is Stable - Low risk of patient condition declining or worsening    Shift Goals  Clinical Goals: Pain management  Patient Goals: Rest    Progress made toward(s) clinical / shift goals:    Problem: Pain - Standard  Goal: Alleviation of pain or a reduction in pain to the patient’s comfort goal  9/22/2022 1818 by Brittany Farmer R.N.  Outcome: Progressing  9/22/2022 1817 by Brittany Farmer R.N.  Outcome: Progressing     Problem: Knowledge Deficit - Standard  Goal: Patient and family/care givers will demonstrate understanding of plan of care, disease process/condition, diagnostic tests and medications  9/22/2022 1818 by Brittany Farmer R.N.  Outcome: Progressing  9/22/2022 1817 by Brittany Farmer R.N.  Outcome: Progressing     Problem: Hemodynamics  Goal: Patient's hemodynamics, fluid balance and neurologic status will be stable or improve  9/22/2022 1818 by Brittany Farmer R.N.  Outcome: Progressing  9/22/2022 1817 by Brittany Farmer R.N.  Outcome: Progressing     Problem: Fluid Volume  Goal: Fluid volume balance will be maintained  9/22/2022 1818 by Brittany Farmer R.N.  Outcome: Progressing  9/22/2022 1817 by DERIK FloresN.  Outcome: Progressing     Problem: Urinary - Renal Perfusion  Goal: Ability to achieve and maintain adequate renal perfusion and functioning will improve  9/22/2022 1818 by Brittany Farmer R.N.  Outcome: Progressing  9/22/2022 1817 by Brittany Farmer RSummerN.  Outcome: Progressing     Problem: Respiratory  Goal: Patient will achieve/maintain optimum respiratory ventilation and gas exchange  9/22/2022 1818 by Brittany aFrmer R.N.  Outcome: Progressing  9/22/2022 1817 by DERIK FloresN.  Outcome: Progressing     Problem: Physical Regulation  Goal: Diagnostic test results will improve  9/22/2022 1818 by Brittany Farmer R.N.  Outcome: Progressing  9/22/2022 1817 by DERIK FloresN.  Outcome: Progressing  Goal: Signs and symptoms of infection will  decrease  9/22/2022 1818 by Brittany Farmer RSummerNSummer  Outcome: Progressing  9/22/2022 1817 by Brittany Farmer R.N.  Outcome: Progressing       Patient is not progressing towards the following goals:

## 2022-09-23 NOTE — DISCHARGE PLANNING
Received Choice form at 1128  [facility] Name: [facility]  Referral sent per Choice form @ 1050     @[phone]  [facility]: [facility]  Outcome: Left a voicemail regarding referral status. DPA requested a call back    @[phone]  [facility]: [facility]  Spoke To: Jacobo  Outcome: Per Jacobo, he will call the office and follow up with DPA on acceptance for the pt.

## 2022-09-23 NOTE — ANESTHESIA POSTPROCEDURE EVALUATION
Patient: Liana Obrien    Procedure Summary     Date: 09/22/22 Room / Location:  OR  / SURGERY [facility]    Anesthesia Start: 1450 Anesthesia Stop: 1529    Procedure: INCISION AND DRAINAGE, ABSCESS, PERIRECTAL (Perineum) Diagnosis: (cj rectal abscess)    Surgeons: Rick Cuellar M.D. Responsible Provider: Ibrahima Soriano M.D.    Anesthesia Type: general ASA Status: 3 - Emergent          Final Anesthesia Type: general  Last vitals  BP   Blood Pressure: 127/62    Temp   36.3 °C (97.3 °F)    Pulse   74   Resp   15    SpO2   95 %      Anesthesia Post Evaluation    Patient location during evaluation: PACU  Patient participation: complete - patient participated  Level of consciousness: awake and alert    Airway patency: patent  Anesthetic complications: no  Cardiovascular status: hemodynamically stable  Respiratory status: acceptable  Hydration status: euvolemic    PONV: none          No notable events documented.     Nurse Pain Score: 0 (NPRS)

## 2022-09-23 NOTE — PROGRESS NOTES
Pt is supine when received. Complains of mild pain but declines medical intervention. Dressing in buttock is intact, new pad and underwear were changed due to saturation. Pt verbalizes needs well and demonstrates use of call bell. Call bell in reach    Chart check completed     2323 Vancomycin hanging unopened in bedroom. Pharmacy made aware. New loading dose was established

## 2022-09-23 NOTE — PROGRESS NOTES
"  Hospital Medicine Daily Progress Note    Date of Service  9/23/2022    Chief Complaint  Liana Obrien is a 42 y.o. female admitted 9/21/2022 with rectal pain.    Hospital Course  As per chart review:  \"42 y.o. female who presented 9/21/2022 with rectal pain.  Patient states she began having rectal pain approximately 2 days ago, now also involving her left buttock.  Patient states the pain became severe so she presented to the emergency department.  Patient states it is a sharp pain at times.  Patient does have a history of multiple abscesses that she states started when she was diagnosed with diabetes.  She never states it involved her rectum however.  She states in the past, she has received antibiotics with resolution of her abscess.  Upon arrival, she was noted to have a significant perirectal abscess.  ERP did discuss the case with surgery, I did discuss the case including labs and imaging with the ER physician.\"    Interval Problem Update  9/22: Patient seen at bedside this morning.  Patient still complaining of rectal pain.  Currently n.p.o. for possible intervention by surgery, we appreciate further recommendations.  Patient mentions that she does not always use insulin at home.  She seems to not be compliant with her medications.  We had a long discussion regarding the importance of being compliant with her medications.  She seems to understand.    9/23: Patient seen at bedside this morning.  Patient underwent I&D for perirectal abscess by general surgery.  We appreciate further recommendations.  Pending cultures.  We will continue with antibiotics.  Patient will require wound care.  We have placed referral for home health and outpatient wound care as case management believe that the patient will not qualify for home health.  We appreciate further recommendations.  Lantus at 15 units.  We will monitor closely and make changes accordingly.    I have discussed this patient's plan of care and " discharge plan at IDT rounds today with Case Management, Nursing, Nursing leadership, and other members of the IDT team.    Consultants/Specialty  general surgery    Code Status  Full Code    Disposition  Patient is not medically cleared for discharge.   Anticipate discharge to pending clinical evolution.    Review of Systems  Review of Systems   Constitutional:  Negative for chills and fever.   HENT:  Negative for hearing loss and nosebleeds.    Eyes:  Negative for blurred vision and double vision.   Respiratory:  Negative for cough and shortness of breath.    Cardiovascular:  Negative for chest pain and palpitations.   Gastrointestinal:  Negative for abdominal pain, heartburn and vomiting.   Genitourinary:  Negative for dysuria and urgency.   Musculoskeletal:  Negative for back pain and falls.        Rectal/buttock pain   Skin:  Positive for itching and rash.   Neurological:  Negative for dizziness and headaches.   Psychiatric/Behavioral:  Negative for substance abuse. The patient is not nervous/anxious.    All other systems reviewed and are negative.     Physical Exam  Temp:  [36.3 °C (97.3 °F)-36.8 °C (98.2 °F)] 36.8 °C (98.2 °F)  Pulse:  [70-93] 80  Resp:  [13-18] 18  BP: ()/(46-84) 125/65  SpO2:  [92 %-100 %] 97 %    Physical Exam  Vitals and nursing note reviewed.   Constitutional:       General: She is not in acute distress.     Appearance: She is obese. She is ill-appearing.   HENT:      Head: Normocephalic and atraumatic.      Right Ear: External ear normal.      Left Ear: External ear normal.      Mouth/Throat:      Pharynx: No oropharyngeal exudate or posterior oropharyngeal erythema.   Eyes:      General:         Right eye: No discharge.         Left eye: No discharge.   Cardiovascular:      Rate and Rhythm: Normal rate and regular rhythm.      Pulses: Normal pulses.      Heart sounds: No murmur heard.    No gallop.   Pulmonary:      Effort: Pulmonary effort is normal. No respiratory distress.       Breath sounds: Normal breath sounds. No wheezing or rhonchi.   Abdominal:      General: Bowel sounds are normal. There is no distension.      Palpations: Abdomen is soft.      Tenderness: There is no abdominal tenderness. There is no guarding.   Musculoskeletal:         General: No swelling or tenderness. Normal range of motion.      Cervical back: Normal range of motion and neck supple. No tenderness.   Skin:     General: Skin is warm and dry.      Findings: Erythema present.      Comments: Patient with purulence coming out from incision from I/D.   Neurological:      General: No focal deficit present.      Mental Status: She is alert and oriented to person, place, and time. Mental status is at baseline.      Motor: No weakness.   Psychiatric:         Mood and Affect: Mood normal.         Behavior: Behavior normal.       Fluids    Intake/Output Summary (Last 24 hours) at 9/23/2022 1141  Last data filed at 9/23/2022 0400  Gross per 24 hour   Intake 1020 ml   Output 665 ml   Net 355 ml         Laboratory  Recent Labs     09/21/22 2125 09/23/22  0334   WBC 14.6* 11.4*   RBC 4.59 4.25   HEMOGLOBIN 12.7 11.7*   HEMATOCRIT 37.8 35.2*   MCV 82.4 82.8   MCH 27.7 27.5   MCHC 33.6 33.2*   RDW 39.9 40.7   PLATELETCT 265 248   MPV 10.7 10.3       Recent Labs     09/21/22 2125 09/23/22  0334   SODIUM 128* 134*   POTASSIUM 4.2 3.6   CHLORIDE 94* 105   CO2 20 17*   GLUCOSE 558* 236*   BUN 8 7*   CREATININE 0.51 0.28*   CALCIUM 8.9 7.8*       Recent Labs     09/22/22  0120   INR 1.03                 Imaging  CT-PELVIS WITH   Final Result      1.  Left-sided perirectal abscess which tracks caudally into the left upper thigh. The largest portion of this abscess in the left upper thigh measures 5.3 x 3.1 cm in greatest diameters.             Assessment/Plan  * Perirectal abscess- (present on admission)  Assessment & Plan  - Large left-sided perirectal abscess involving the left upper thigh, measures 5.3 x 3.1 cm  -ERP did discuss  case with surgery who is planning on intervention  -Keep patient n.p.o.  -Start vancomycin as well as Rocephin due to penicillin allergy  -Causing sepsis  -Await culture results  -As needed pain meds    9/23: Patient underwent incision and drainage of perirectal abscess by general surgery yesterday, we appreciate further recommendations.  Pending cultures.  Continue antibiotics.  Wound care.    Anemia  Assessment & Plan  No signs of overt bleeding at this time.  Monitor.  Repeat CBC.    Dyslipidemia- (present on admission)  Assessment & Plan  - Continue home statin    Sepsis (HCC)- (present on admission)  Assessment & Plan  -This is Sepsis Present on admission  SIRS criteria identified on my evaluation include: Tachycardia, with heart rate greater than 90 BPM and Leukocytosis, with WBC greater than 12,000  Source is perirectal abscess  Sepsis protocol initiated  Fluid resuscitation ordered per protocol  Crystalloid Fluid Administration: Fluid resuscitation ordered per standard protocol - 30 mL/kg per current or ideal body weight  IV antibiotics as appropriate for source of sepsis  Reassessment: I have reassessed the patient's hemodynamic status  -Start vancomycin and Rocephin  -Await culture results  -Trend lactic acid level  -Await surgical recommendations  -Patient is at risk of worsening, closely monitor her hemodynamics    9/23: It does not seem that the patient meet sepsis criteria today.    Major depressive disorder- (present on admission)  Assessment & Plan  - Continue home Effexor    Hypothyroidism- (present on admission)  Assessment & Plan  - Continue home Synthroid at 150 mcg daily  -No recent TSH    Hypertension- (present on admission)  Assessment & Plan  - Continue home lisinopril  -Start as needed labetalol  -Adjust as needed    Hyponatremia  Assessment & Plan  Seems to be improving.  Monitor.    Type 2 diabetes mellitus with hyperglycemia, without long-term current use of insulin (HCC)- (present on  admission)  Assessment & Plan  - Uncontrolled diabetes is a significant contributing factor to her ongoing abscesses  -She does need improved control  -Hemoglobin A1c in August was 12.5   -now with significant hyperglycemia due to infection/sepsis  -Hold home meds  -Patient will be n.p.o., start insulin sliding scale, while she will be n.p.o., will also start Lantus due to the severity of her hyperglycemia  -No acidosis or elevated anion gap at this time  -Repeat BMP in the morning         VTE prophylaxis: SCDs/TEDs    I have performed a physical exam and reviewed and updated ROS and Plan today (9/23/2022). In review of yesterday's note (9/22/2022), there are no changes except as documented above.

## 2022-09-23 NOTE — CARE PLAN
The patient is Stable - Low risk of patient condition declining or worsening    Shift Goals  Clinical Goals: pain management  Patient Goals: comfort    Progress made toward(s) clinical / shift goals:  Patient comfortable through day and complying with treatment and care    Patient is not progressing towards the following goals:

## 2022-09-24 LAB
ALBUMIN SERPL BCP-MCNC: 2.7 G/DL (ref 3.2–4.9)
ALBUMIN/GLOB SERPL: 0.8 G/DL
ALP SERPL-CCNC: 85 U/L (ref 30–99)
ALT SERPL-CCNC: 9 U/L (ref 2–50)
ANION GAP SERPL CALC-SCNC: 9 MMOL/L (ref 7–16)
AST SERPL-CCNC: 13 U/L (ref 12–45)
BACTERIA WND AEROBE CULT: ABNORMAL
BACTERIA WND AEROBE CULT: ABNORMAL
BILIRUB SERPL-MCNC: 0.2 MG/DL (ref 0.1–1.5)
BUN SERPL-MCNC: 5 MG/DL (ref 8–22)
CALCIUM SERPL-MCNC: 7.6 MG/DL (ref 8.4–10.2)
CHLORIDE SERPL-SCNC: 107 MMOL/L (ref 96–112)
CO2 SERPL-SCNC: 19 MMOL/L (ref 20–33)
CREAT SERPL-MCNC: 0.28 MG/DL (ref 0.5–1.4)
ERYTHROCYTE [DISTWIDTH] IN BLOOD BY AUTOMATED COUNT: 43.5 FL (ref 35.9–50)
GFR SERPLBLD CREATININE-BSD FMLA CKD-EPI: 138 ML/MIN/1.73 M 2
GLOBULIN SER CALC-MCNC: 3.2 G/DL (ref 1.9–3.5)
GLUCOSE BLD STRIP.AUTO-MCNC: 139 MG/DL (ref 65–99)
GLUCOSE BLD STRIP.AUTO-MCNC: 192 MG/DL (ref 65–99)
GLUCOSE BLD STRIP.AUTO-MCNC: 223 MG/DL (ref 65–99)
GLUCOSE BLD STRIP.AUTO-MCNC: 236 MG/DL (ref 65–99)
GLUCOSE SERPL-MCNC: 172 MG/DL (ref 65–99)
GRAM STN SPEC: ABNORMAL
HCT VFR BLD AUTO: 34.2 % (ref 37–47)
HGB BLD-MCNC: 11.2 G/DL (ref 12–16)
MAGNESIUM SERPL-MCNC: 1.7 MG/DL (ref 1.5–2.5)
MCH RBC QN AUTO: 27.7 PG (ref 27–33)
MCHC RBC AUTO-ENTMCNC: 32.7 G/DL (ref 33.6–35)
MCV RBC AUTO: 84.4 FL (ref 81.4–97.8)
PLATELET # BLD AUTO: 251 K/UL (ref 164–446)
PMV BLD AUTO: 10.8 FL (ref 9–12.9)
POTASSIUM SERPL-SCNC: 3.1 MMOL/L (ref 3.6–5.5)
PROT SERPL-MCNC: 5.9 G/DL (ref 6–8.2)
RBC # BLD AUTO: 4.05 M/UL (ref 4.2–5.4)
SIGNIFICANT IND 70042: ABNORMAL
SITE SITE: ABNORMAL
SODIUM SERPL-SCNC: 135 MMOL/L (ref 135–145)
SOURCE SOURCE: ABNORMAL
WBC # BLD AUTO: 7.7 K/UL (ref 4.8–10.8)

## 2022-09-24 PROCEDURE — 700111 HCHG RX REV CODE 636 W/ 250 OVERRIDE (IP): Performed by: INTERNAL MEDICINE

## 2022-09-24 PROCEDURE — A9270 NON-COVERED ITEM OR SERVICE: HCPCS | Performed by: INTERNAL MEDICINE

## 2022-09-24 PROCEDURE — 700102 HCHG RX REV CODE 250 W/ 637 OVERRIDE(OP): Performed by: INTERNAL MEDICINE

## 2022-09-24 PROCEDURE — 700105 HCHG RX REV CODE 258: Performed by: INTERNAL MEDICINE

## 2022-09-24 PROCEDURE — 99233 SBSQ HOSP IP/OBS HIGH 50: CPT | Performed by: INTERNAL MEDICINE

## 2022-09-24 PROCEDURE — 85027 COMPLETE CBC AUTOMATED: CPT

## 2022-09-24 PROCEDURE — 36415 COLL VENOUS BLD VENIPUNCTURE: CPT

## 2022-09-24 PROCEDURE — 94760 N-INVAS EAR/PLS OXIMETRY 1: CPT

## 2022-09-24 PROCEDURE — 80053 COMPREHEN METABOLIC PANEL: CPT

## 2022-09-24 PROCEDURE — 83735 ASSAY OF MAGNESIUM: CPT

## 2022-09-24 PROCEDURE — 82962 GLUCOSE BLOOD TEST: CPT

## 2022-09-24 PROCEDURE — 770001 HCHG ROOM/CARE - MED/SURG/GYN PRIV*

## 2022-09-24 RX ORDER — MAGNESIUM SULFATE 1 G/100ML
1 INJECTION INTRAVENOUS ONCE
Status: COMPLETED | OUTPATIENT
Start: 2022-09-24 | End: 2022-09-24

## 2022-09-24 RX ORDER — ENOXAPARIN SODIUM 100 MG/ML
40 INJECTION SUBCUTANEOUS DAILY
Status: DISCONTINUED | OUTPATIENT
Start: 2022-09-24 | End: 2022-09-26 | Stop reason: HOSPADM

## 2022-09-24 RX ORDER — POTASSIUM CHLORIDE 20 MEQ/1
40 TABLET, EXTENDED RELEASE ORAL ONCE
Status: COMPLETED | OUTPATIENT
Start: 2022-09-24 | End: 2022-09-24

## 2022-09-24 RX ADMIN — VENLAFAXINE 75 MG: 75 TABLET ORAL at 05:45

## 2022-09-24 RX ADMIN — ENOXAPARIN SODIUM 40 MG: 40 INJECTION SUBCUTANEOUS at 17:07

## 2022-09-24 RX ADMIN — METRONIDAZOLE 500 MG: 500 TABLET ORAL at 05:45

## 2022-09-24 RX ADMIN — LISINOPRIL 10 MG: 5 TABLET ORAL at 01:15

## 2022-09-24 RX ADMIN — LINEZOLID 600 MG: 600 TABLET, FILM COATED ORAL at 17:06

## 2022-09-24 RX ADMIN — MAGNESIUM SULFATE HEPTAHYDRATE 1 G: 1 INJECTION, SOLUTION INTRAVENOUS at 12:25

## 2022-09-24 RX ADMIN — SENNOSIDES AND DOCUSATE SODIUM 2 TABLET: 50; 8.6 TABLET ORAL at 05:45

## 2022-09-24 RX ADMIN — SODIUM CHLORIDE: 9 INJECTION, SOLUTION INTRAVENOUS at 23:18

## 2022-09-24 RX ADMIN — PROCHLORPERAZINE EDISYLATE 10 MG: 5 INJECTION INTRAMUSCULAR; INTRAVENOUS at 06:27

## 2022-09-24 RX ADMIN — LINEZOLID 600 MG: 600 TABLET, FILM COATED ORAL at 05:45

## 2022-09-24 RX ADMIN — LEVOTHYROXINE SODIUM 150 MCG: 0.07 TABLET ORAL at 05:44

## 2022-09-24 RX ADMIN — ATORVASTATIN CALCIUM 20 MG: 20 TABLET, FILM COATED ORAL at 01:16

## 2022-09-24 RX ADMIN — METRONIDAZOLE 500 MG: 500 TABLET ORAL at 21:14

## 2022-09-24 RX ADMIN — PROMETHAZINE HYDROCHLORIDE 25 MG: 25 TABLET ORAL at 09:02

## 2022-09-24 RX ADMIN — CEFTRIAXONE SODIUM 2 G: 2 INJECTION, POWDER, FOR SOLUTION INTRAMUSCULAR; INTRAVENOUS at 11:32

## 2022-09-24 RX ADMIN — POTASSIUM CHLORIDE 40 MEQ: 1500 TABLET, EXTENDED RELEASE ORAL at 12:25

## 2022-09-24 RX ADMIN — METRONIDAZOLE 500 MG: 500 TABLET ORAL at 13:59

## 2022-09-24 ASSESSMENT — ENCOUNTER SYMPTOMS
DOUBLE VISION: 0
COUGH: 0
HEADACHES: 0
ABDOMINAL PAIN: 0
BACK PAIN: 0
FEVER: 0
BLURRED VISION: 0
DIZZINESS: 0
CHILLS: 0
PALPITATIONS: 0
NERVOUS/ANXIOUS: 0
HEARTBURN: 0
VOMITING: 0
SHORTNESS OF BREATH: 0
FALLS: 0

## 2022-09-24 ASSESSMENT — PAIN DESCRIPTION - PAIN TYPE: TYPE: SURGICAL PAIN

## 2022-09-24 ASSESSMENT — LIFESTYLE VARIABLES: SUBSTANCE_ABUSE: 0

## 2022-09-24 NOTE — CARE PLAN
The patient is Stable - Low risk of patient condition declining or worsening    Shift Goals  Clinical Goals: pain and nausea management  Patient Goals: comfort    Progress made toward(s) clinical / shift goals:  no further vomitting after this morning nd no pain through day.    Patient is not progressing towards the following goals:

## 2022-09-24 NOTE — PROGRESS NOTES
Pt supine in bed when received. Family at bedside. No complaints of pain at this time. Verbalizes needs well and demonstrates use of call bell. Call bell in reach    Chart check completed

## 2022-09-24 NOTE — CARE PLAN
The patient is Stable - Low risk of patient condition declining or worsening    Shift Goals  Clinical Goals: wound dressing PRN and pain <4\10  Patient Goals: rest and comfort    Progress made toward(s) clinical / shift goals:    Problem: Pain - Standard  Goal: Alleviation of pain or a reduction in pain to the patient’s comfort goal  Outcome: Progressing       Patient is not progressing towards the following goals:

## 2022-09-24 NOTE — PROGRESS NOTES
"  Hospital Medicine Daily Progress Note    Date of Service  9/24/2022    Chief Complaint  Liana Obrien is a 42 y.o. female admitted 9/21/2022 with rectal pain.    Hospital Course  As per chart review:  \"42 y.o. female who presented 9/21/2022 with rectal pain.  Patient states she began having rectal pain approximately 2 days ago, now also involving her left buttock.  Patient states the pain became severe so she presented to the emergency department.  Patient states it is a sharp pain at times.  Patient does have a history of multiple abscesses that she states started when she was diagnosed with diabetes.  She never states it involved her rectum however.  She states in the past, she has received antibiotics with resolution of her abscess.  Upon arrival, she was noted to have a significant perirectal abscess.  ERP did discuss the case with surgery, I did discuss the case including labs and imaging with the ER physician.\"    Interval Problem Update  9/22: Patient seen at bedside this morning.  Patient still complaining of rectal pain.  Currently n.p.o. for possible intervention by surgery, we appreciate further recommendations.  Patient mentions that she does not always use insulin at home.  She seems to not be compliant with her medications.  We had a long discussion regarding the importance of being compliant with her medications.  She seems to understand.    9/23: Patient seen at bedside this morning.  Patient underwent I&D for perirectal abscess by general surgery.  We appreciate further recommendations.  Pending cultures.  We will continue with antibiotics.  Patient will require wound care.  We have placed referral for home health and outpatient wound care as case management believe that the patient will not qualify for home health.  We appreciate further recommendations.  Lantus at 15 units.  We will monitor closely and make changes accordingly.    9/24: Patient seen at bedside this morning.  We will " continue to follow cultures.  It seems that the patient does not qualify for home health with wound care.  As per case management patient has set up appointment with wound care clinic on Tuesday this means that most likely the patient will have to be discharged on Monday as she will most likely require daily wound care.  Patient lying in bed today, she mentions that she feels better today.  We will continue with antibiotics.  Continue to monitor closely.    I have discussed this patient's plan of care and discharge plan at IDT rounds today with Case Management, Nursing, Nursing leadership, and other members of the IDT team.    Consultants/Specialty  general surgery    Code Status  Full Code    Disposition  Patient is not medically cleared for discharge.   Anticipate discharge to pending clinical evolution.    Review of Systems  Review of Systems   Constitutional:  Negative for chills and fever.   HENT:  Negative for hearing loss and nosebleeds.    Eyes:  Negative for blurred vision and double vision.   Respiratory:  Negative for cough and shortness of breath.    Cardiovascular:  Negative for chest pain and palpitations.   Gastrointestinal:  Negative for abdominal pain, heartburn and vomiting.   Genitourinary:  Negative for dysuria and urgency.   Musculoskeletal:  Negative for back pain and falls.        Rectal/buttock pain   Skin:  Positive for itching and rash.   Neurological:  Negative for dizziness and headaches.   Psychiatric/Behavioral:  Negative for substance abuse. The patient is not nervous/anxious.    All other systems reviewed and are negative.     Physical Exam  Temp:  [35.8 °C (96.5 °F)-36.8 °C (98.2 °F)] 36.7 °C (98 °F)  Pulse:  [58-87] 58  Resp:  [18] 18  BP: (112-130)/(64-77) 121/69  SpO2:  [95 %-99 %] 95 %    Physical Exam  Vitals and nursing note reviewed.   Constitutional:       General: She is not in acute distress.     Appearance: She is obese. She is ill-appearing.   HENT:      Head:  Normocephalic and atraumatic.      Right Ear: External ear normal.      Left Ear: External ear normal.      Mouth/Throat:      Pharynx: No oropharyngeal exudate or posterior oropharyngeal erythema.   Eyes:      General:         Right eye: No discharge.         Left eye: No discharge.   Cardiovascular:      Rate and Rhythm: Normal rate and regular rhythm.      Pulses: Normal pulses.      Heart sounds: No murmur heard.    No gallop.   Pulmonary:      Effort: Pulmonary effort is normal. No respiratory distress.      Breath sounds: Normal breath sounds. No wheezing or rhonchi.   Abdominal:      General: Bowel sounds are normal. There is no distension.      Palpations: Abdomen is soft.      Tenderness: There is no abdominal tenderness. There is no guarding.   Musculoskeletal:         General: No swelling or tenderness. Normal range of motion.      Cervical back: Normal range of motion and neck supple. No tenderness.   Skin:     General: Skin is warm and dry.      Findings: Erythema present.      Comments: Patient with purulence coming out from incision from I/D.   Neurological:      General: No focal deficit present.      Mental Status: She is alert and oriented to person, place, and time. Mental status is at baseline.      Motor: No weakness.   Psychiatric:         Mood and Affect: Mood normal.         Behavior: Behavior normal.       Fluids    Intake/Output Summary (Last 24 hours) at 9/24/2022 1140  Last data filed at 9/24/2022 0900  Gross per 24 hour   Intake 360 ml   Output 850 ml   Net -490 ml         Laboratory  Recent Labs     09/21/22 2125 09/23/22 0334 09/24/22 0339   WBC 14.6* 11.4* 7.7   RBC 4.59 4.25 4.05*   HEMOGLOBIN 12.7 11.7* 11.2*   HEMATOCRIT 37.8 35.2* 34.2*   MCV 82.4 82.8 84.4   MCH 27.7 27.5 27.7   MCHC 33.6 33.2* 32.7*   RDW 39.9 40.7 43.5   PLATELETCT 265 248 251   MPV 10.7 10.3 10.8       Recent Labs     09/21/22 2125 09/23/22 0334 09/24/22 0339   SODIUM 128* 134* 135   POTASSIUM 4.2 3.6  3.1*   CHLORIDE 94* 105 107   CO2 20 17* 19*   GLUCOSE 558* 236* 172*   BUN 8 7* 5*   CREATININE 0.51 0.28* 0.28*   CALCIUM 8.9 7.8* 7.6*       Recent Labs     09/22/22  0120   INR 1.03                 Imaging  CT-PELVIS WITH   Final Result      1.  Left-sided perirectal abscess which tracks caudally into the left upper thigh. The largest portion of this abscess in the left upper thigh measures 5.3 x 3.1 cm in greatest diameters.             Assessment/Plan  * Perirectal abscess- (present on admission)  Assessment & Plan  - Large left-sided perirectal abscess involving the left upper thigh, measures 5.3 x 3.1 cm  -ERP did discuss case with surgery who is planning on intervention  -Keep patient n.p.o.  -Start vancomycin as well as Rocephin due to penicillin allergy  -Causing sepsis  -Await culture results  -As needed pain meds    9/23: Patient underwent incision and drainage of perirectal abscess by general surgery yesterday, we appreciate further recommendations.  Pending cultures.  Continue antibiotics.  Wound care.    Anemia  Assessment & Plan  No signs of overt bleeding at this time.  Monitor.  Repeat CBC.    Dyslipidemia- (present on admission)  Assessment & Plan  - Continue home statin    Sepsis (HCC)- (present on admission)  Assessment & Plan  -This is Sepsis Present on admission  SIRS criteria identified on my evaluation include: Tachycardia, with heart rate greater than 90 BPM and Leukocytosis, with WBC greater than 12,000  Source is perirectal abscess  Sepsis protocol initiated  Fluid resuscitation ordered per protocol  Crystalloid Fluid Administration: Fluid resuscitation ordered per standard protocol - 30 mL/kg per current or ideal body weight  IV antibiotics as appropriate for source of sepsis  Reassessment: I have reassessed the patient's hemodynamic status  -Start vancomycin and Rocephin  -Await culture results  -Trend lactic acid level  -Await surgical recommendations  -Patient is at risk of  worsening, closely monitor her hemodynamics    9/23: It does not seem that the patient meet sepsis criteria today.    Major depressive disorder- (present on admission)  Assessment & Plan  - Continue home Effexor    Hypothyroidism- (present on admission)  Assessment & Plan  - Continue home Synthroid at 150 mcg daily  -No recent TSH    Hypertension- (present on admission)  Assessment & Plan  - Continue home lisinopril  -Start as needed labetalol  -Adjust as needed    Hyponatremia  Assessment & Plan  Seems to be improving.  Monitor.    Hypokalemia  Assessment & Plan  Replace as needed  monitor    Type 2 diabetes mellitus with hyperglycemia, without long-term current use of insulin (HCC)- (present on admission)  Assessment & Plan  - Uncontrolled diabetes is a significant contributing factor to her ongoing abscesses  -She does need improved control  -Hemoglobin A1c in August was 12.5   -now with significant hyperglycemia due to infection/sepsis  -Hold home meds  -Patient will be n.p.o., start insulin sliding scale, while she will be n.p.o., will also start Lantus due to the severity of her hyperglycemia  -No acidosis or elevated anion gap at this time  -Repeat BMP in the morning         VTE prophylaxis: SCDs/TEDs    I have performed a physical exam and reviewed and updated ROS and Plan today (9/24/2022). In review of yesterday's note (9/23/2022), there are no changes except as documented above.

## 2022-09-25 LAB
ALBUMIN SERPL BCP-MCNC: 2.6 G/DL (ref 3.2–4.9)
ALBUMIN/GLOB SERPL: 0.9 G/DL
ALP SERPL-CCNC: 62 U/L (ref 30–99)
ALT SERPL-CCNC: 13 U/L (ref 2–50)
ANION GAP SERPL CALC-SCNC: 9 MMOL/L (ref 7–16)
AST SERPL-CCNC: 15 U/L (ref 12–45)
BACTERIA SPEC ANAEROBE CULT: ABNORMAL
BACTERIA SPEC ANAEROBE CULT: ABNORMAL
BILIRUB SERPL-MCNC: 0.2 MG/DL (ref 0.1–1.5)
BUN SERPL-MCNC: 4 MG/DL (ref 8–22)
CALCIUM SERPL-MCNC: 7.5 MG/DL (ref 8.4–10.2)
CHLORIDE SERPL-SCNC: 109 MMOL/L (ref 96–112)
CO2 SERPL-SCNC: 19 MMOL/L (ref 20–33)
CREAT SERPL-MCNC: 0.27 MG/DL (ref 0.5–1.4)
ERYTHROCYTE [DISTWIDTH] IN BLOOD BY AUTOMATED COUNT: 42 FL (ref 35.9–50)
GFR SERPLBLD CREATININE-BSD FMLA CKD-EPI: 139 ML/MIN/1.73 M 2
GLOBULIN SER CALC-MCNC: 3 G/DL (ref 1.9–3.5)
GLUCOSE BLD STRIP.AUTO-MCNC: 145 MG/DL (ref 65–99)
GLUCOSE BLD STRIP.AUTO-MCNC: 238 MG/DL (ref 65–99)
GLUCOSE BLD STRIP.AUTO-MCNC: 266 MG/DL (ref 65–99)
GLUCOSE BLD STRIP.AUTO-MCNC: 283 MG/DL (ref 65–99)
GLUCOSE SERPL-MCNC: 186 MG/DL (ref 65–99)
HCT VFR BLD AUTO: 34.3 % (ref 37–47)
HGB BLD-MCNC: 11.3 G/DL (ref 12–16)
MAGNESIUM SERPL-MCNC: 1.7 MG/DL (ref 1.5–2.5)
MCH RBC QN AUTO: 27.2 PG (ref 27–33)
MCHC RBC AUTO-ENTMCNC: 32.9 G/DL (ref 33.6–35)
MCV RBC AUTO: 82.7 FL (ref 81.4–97.8)
PLATELET # BLD AUTO: 252 K/UL (ref 164–446)
PMV BLD AUTO: 10.6 FL (ref 9–12.9)
POTASSIUM SERPL-SCNC: 3.3 MMOL/L (ref 3.6–5.5)
PROT SERPL-MCNC: 5.6 G/DL (ref 6–8.2)
RBC # BLD AUTO: 4.15 M/UL (ref 4.2–5.4)
SIGNIFICANT IND 70042: ABNORMAL
SITE SITE: ABNORMAL
SODIUM SERPL-SCNC: 137 MMOL/L (ref 135–145)
SOURCE SOURCE: ABNORMAL
WBC # BLD AUTO: 7.6 K/UL (ref 4.8–10.8)

## 2022-09-25 PROCEDURE — 85027 COMPLETE CBC AUTOMATED: CPT

## 2022-09-25 PROCEDURE — 700111 HCHG RX REV CODE 636 W/ 250 OVERRIDE (IP): Performed by: INTERNAL MEDICINE

## 2022-09-25 PROCEDURE — 82962 GLUCOSE BLOOD TEST: CPT

## 2022-09-25 PROCEDURE — 94760 N-INVAS EAR/PLS OXIMETRY 1: CPT

## 2022-09-25 PROCEDURE — 83735 ASSAY OF MAGNESIUM: CPT

## 2022-09-25 PROCEDURE — 99232 SBSQ HOSP IP/OBS MODERATE 35: CPT | Performed by: INTERNAL MEDICINE

## 2022-09-25 PROCEDURE — A9270 NON-COVERED ITEM OR SERVICE: HCPCS | Performed by: INTERNAL MEDICINE

## 2022-09-25 PROCEDURE — 700102 HCHG RX REV CODE 250 W/ 637 OVERRIDE(OP): Performed by: INTERNAL MEDICINE

## 2022-09-25 PROCEDURE — 80053 COMPREHEN METABOLIC PANEL: CPT

## 2022-09-25 PROCEDURE — 700105 HCHG RX REV CODE 258: Performed by: INTERNAL MEDICINE

## 2022-09-25 PROCEDURE — 770001 HCHG ROOM/CARE - MED/SURG/GYN PRIV*

## 2022-09-25 PROCEDURE — 36415 COLL VENOUS BLD VENIPUNCTURE: CPT

## 2022-09-25 RX ORDER — MAGNESIUM SULFATE 1 G/100ML
1 INJECTION INTRAVENOUS ONCE
Status: COMPLETED | OUTPATIENT
Start: 2022-09-25 | End: 2022-09-25

## 2022-09-25 RX ORDER — POTASSIUM CHLORIDE 20 MEQ/1
40 TABLET, EXTENDED RELEASE ORAL ONCE
Status: COMPLETED | OUTPATIENT
Start: 2022-09-25 | End: 2022-09-25

## 2022-09-25 RX ORDER — METRONIDAZOLE 500 MG/1
500 TABLET ORAL EVERY 8 HOURS
Status: DISCONTINUED | OUTPATIENT
Start: 2022-09-25 | End: 2022-09-26 | Stop reason: HOSPADM

## 2022-09-25 RX ORDER — LINEZOLID 600 MG/1
600 TABLET, FILM COATED ORAL EVERY 12 HOURS
Status: DISCONTINUED | OUTPATIENT
Start: 2022-09-25 | End: 2022-09-26 | Stop reason: HOSPADM

## 2022-09-25 RX ADMIN — SENNOSIDES AND DOCUSATE SODIUM 2 TABLET: 50; 8.6 TABLET ORAL at 05:10

## 2022-09-25 RX ADMIN — MAGNESIUM SULFATE HEPTAHYDRATE 1 G: 1 INJECTION, SOLUTION INTRAVENOUS at 07:42

## 2022-09-25 RX ADMIN — SODIUM CHLORIDE: 9 INJECTION, SOLUTION INTRAVENOUS at 11:28

## 2022-09-25 RX ADMIN — LINEZOLID 600 MG: 600 TABLET, FILM COATED ORAL at 05:10

## 2022-09-25 RX ADMIN — PROCHLORPERAZINE EDISYLATE 10 MG: 5 INJECTION INTRAMUSCULAR; INTRAVENOUS at 06:02

## 2022-09-25 RX ADMIN — OXYCODONE HYDROCHLORIDE 10 MG: 10 TABLET ORAL at 15:09

## 2022-09-25 RX ADMIN — METRONIDAZOLE 500 MG: 500 TABLET ORAL at 14:25

## 2022-09-25 RX ADMIN — SODIUM CHLORIDE: 9 INJECTION, SOLUTION INTRAVENOUS at 21:27

## 2022-09-25 RX ADMIN — VENLAFAXINE 75 MG: 75 TABLET ORAL at 05:10

## 2022-09-25 RX ADMIN — METRONIDAZOLE 500 MG: 500 TABLET ORAL at 21:21

## 2022-09-25 RX ADMIN — ENOXAPARIN SODIUM 40 MG: 40 INJECTION SUBCUTANEOUS at 17:05

## 2022-09-25 RX ADMIN — METRONIDAZOLE 500 MG: 500 TABLET ORAL at 05:10

## 2022-09-25 RX ADMIN — LINEZOLID 600 MG: 600 TABLET, FILM COATED ORAL at 17:05

## 2022-09-25 RX ADMIN — POTASSIUM CHLORIDE 40 MEQ: 1500 TABLET, EXTENDED RELEASE ORAL at 07:42

## 2022-09-25 RX ADMIN — LEVOTHYROXINE SODIUM 150 MCG: 0.07 TABLET ORAL at 05:10

## 2022-09-25 ASSESSMENT — LIFESTYLE VARIABLES: SUBSTANCE_ABUSE: 0

## 2022-09-25 ASSESSMENT — ENCOUNTER SYMPTOMS
HEADACHES: 0
CHILLS: 0
SHORTNESS OF BREATH: 0
DIZZINESS: 0
COUGH: 0
DOUBLE VISION: 0
FEVER: 0
HEARTBURN: 0
ABDOMINAL PAIN: 0
VOMITING: 0
FALLS: 0
BACK PAIN: 0
PALPITATIONS: 0
BLURRED VISION: 0
NERVOUS/ANXIOUS: 0

## 2022-09-25 ASSESSMENT — PAIN DESCRIPTION - PAIN TYPE
TYPE: SURGICAL PAIN

## 2022-09-25 NOTE — PROGRESS NOTES
"  Hospital Medicine Daily Progress Note    Date of Service  9/25/2022    Chief Complaint  Liana Obrien is a 42 y.o. female admitted 9/21/2022 with rectal pain.    Hospital Course  As per chart review:  \"42 y.o. female who presented 9/21/2022 with rectal pain.  Patient states she began having rectal pain approximately 2 days ago, now also involving her left buttock.  Patient states the pain became severe so she presented to the emergency department.  Patient states it is a sharp pain at times.  Patient does have a history of multiple abscesses that she states started when she was diagnosed with diabetes.  She never states it involved her rectum however.  She states in the past, she has received antibiotics with resolution of her abscess.  Upon arrival, she was noted to have a significant perirectal abscess.  ERP did discuss the case with surgery, I did discuss the case including labs and imaging with the ER physician.\"    Interval Problem Update  9/22: Patient seen at bedside this morning.  Patient still complaining of rectal pain.  Currently n.p.o. for possible intervention by surgery, we appreciate further recommendations.  Patient mentions that she does not always use insulin at home.  She seems to not be compliant with her medications.  We had a long discussion regarding the importance of being compliant with her medications.  She seems to understand.    9/23: Patient seen at bedside this morning.  Patient underwent I&D for perirectal abscess by general surgery.  We appreciate further recommendations.  Pending cultures.  We will continue with antibiotics.  Patient will require wound care.  We have placed referral for home health and outpatient wound care as case management believe that the patient will not qualify for home health.  We appreciate further recommendations.  Lantus at 15 units.  We will monitor closely and make changes accordingly.    9/24: Patient seen at bedside this morning.  We will " continue to follow cultures.  It seems that the patient does not qualify for home health with wound care.  As per case management patient has set up appointment with wound care clinic on Tuesday this means that most likely the patient will have to be discharged on Monday as she will most likely require daily wound care.  Patient lying in bed today, she mentions that she feels better today.  We will continue with antibiotics.  Continue to monitor closely.    9/25: Patient seen at bedside this morning.  We will continue with Zyvox and metronidazole.  Patient will most likely be discharged tomorrow so she can get wound care at the outpatient wound care clinic on Tuesday and get wound care here tomorrow.  Patient laying in bed, currently not complaining of overt pain.  We have increased the patient's glargine to 20 units.  Continue to monitor closely.    I have discussed this patient's plan of care and discharge plan at IDT rounds today with Case Management, Nursing, Nursing leadership, and other members of the IDT team.    Consultants/Specialty  general surgery    Code Status  Full Code    Disposition  Patient is not medically cleared for discharge.   Anticipate discharge to pending clinical evolution.    Review of Systems  Review of Systems   Constitutional:  Negative for chills and fever.   HENT:  Negative for hearing loss and nosebleeds.    Eyes:  Negative for blurred vision and double vision.   Respiratory:  Negative for cough and shortness of breath.    Cardiovascular:  Negative for chest pain and palpitations.   Gastrointestinal:  Negative for abdominal pain, heartburn and vomiting.   Genitourinary:  Negative for dysuria and urgency.   Musculoskeletal:  Negative for back pain and falls.        Rectal/buttock pain   Skin:  Positive for itching and rash.   Neurological:  Negative for dizziness and headaches.   Psychiatric/Behavioral:  Negative for substance abuse. The patient is not nervous/anxious.    All other  systems reviewed and are negative.     Physical Exam  Temp:  [36.1 °C (97 °F)-36.8 °C (98.3 °F)] 36.3 °C (97.3 °F)  Pulse:  [58-77] 61  Resp:  [16-18] 16  BP: (119-131)/(56-90) 120/70  SpO2:  [97 %-100 %] 97 %    Physical Exam  Vitals and nursing note reviewed.   Constitutional:       General: She is not in acute distress.     Appearance: She is obese.   HENT:      Head: Normocephalic and atraumatic.      Right Ear: External ear normal.      Left Ear: External ear normal.      Mouth/Throat:      Pharynx: No oropharyngeal exudate or posterior oropharyngeal erythema.   Eyes:      General:         Right eye: No discharge.         Left eye: No discharge.   Cardiovascular:      Rate and Rhythm: Normal rate and regular rhythm.      Pulses: Normal pulses.      Heart sounds: No murmur heard.    No gallop.   Pulmonary:      Effort: Pulmonary effort is normal. No respiratory distress.      Breath sounds: Normal breath sounds. No wheezing or rhonchi.   Abdominal:      General: Bowel sounds are normal. There is no distension.      Palpations: Abdomen is soft.      Tenderness: There is no abdominal tenderness. There is no guarding.   Musculoskeletal:         General: No swelling or tenderness. Normal range of motion.      Cervical back: Normal range of motion and neck supple. No tenderness.   Skin:     General: Skin is warm and dry.      Findings: Erythema present.   Neurological:      General: No focal deficit present.      Mental Status: She is alert and oriented to person, place, and time. Mental status is at baseline.      Motor: No weakness.   Psychiatric:         Mood and Affect: Mood normal.         Behavior: Behavior normal.       Fluids    Intake/Output Summary (Last 24 hours) at 9/25/2022 1326  Last data filed at 9/25/2022 1200  Gross per 24 hour   Intake 960 ml   Output 350 ml   Net 610 ml         Laboratory  Recent Labs     09/23/22  0334 09/24/22  0339 09/25/22  0308   WBC 11.4* 7.7 7.6   RBC 4.25 4.05* 4.15*    HEMOGLOBIN 11.7* 11.2* 11.3*   HEMATOCRIT 35.2* 34.2* 34.3*   MCV 82.8 84.4 82.7   MCH 27.5 27.7 27.2   MCHC 33.2* 32.7* 32.9*   RDW 40.7 43.5 42.0   PLATELETCT 248 251 252   MPV 10.3 10.8 10.6       Recent Labs     09/23/22  0334 09/24/22  0339 09/25/22  0308   SODIUM 134* 135 137   POTASSIUM 3.6 3.1* 3.3*   CHLORIDE 105 107 109   CO2 17* 19* 19*   GLUCOSE 236* 172* 186*   BUN 7* 5* 4*   CREATININE 0.28* 0.28* 0.27*   CALCIUM 7.8* 7.6* 7.5*                       Imaging  CT-PELVIS WITH   Final Result      1.  Left-sided perirectal abscess which tracks caudally into the left upper thigh. The largest portion of this abscess in the left upper thigh measures 5.3 x 3.1 cm in greatest diameters.             Assessment/Plan  * Perirectal abscess- (present on admission)  Assessment & Plan  - Large left-sided perirectal abscess involving the left upper thigh, measures 5.3 x 3.1 cm  -ERP did discuss case with surgery who is planning on intervention  -Keep patient n.p.o.  -Start vancomycin as well as Rocephin due to penicillin allergy  -Causing sepsis  -Await culture results  -As needed pain meds    9/23: Patient underwent incision and drainage of perirectal abscess by general surgery yesterday, we appreciate further recommendations.  Pending cultures.  Continue antibiotics.  Wound care.    9/25: Culture is growing a Streptococcus anginosus and Prevotella bivia.  After discussing with pharmacy we will continue with Zyvox and Flagyl for now.    Anemia  Assessment & Plan  No signs of overt bleeding at this time.  Monitor.  Repeat CBC.    Dyslipidemia- (present on admission)  Assessment & Plan  - Continue home statin    Sepsis (HCC)- (present on admission)  Assessment & Plan  -This is Sepsis Present on admission  SIRS criteria identified on my evaluation include: Tachycardia, with heart rate greater than 90 BPM and Leukocytosis, with WBC greater than 12,000  Source is perirectal abscess  Sepsis protocol initiated  Fluid  resuscitation ordered per protocol  Crystalloid Fluid Administration: Fluid resuscitation ordered per standard protocol - 30 mL/kg per current or ideal body weight  IV antibiotics as appropriate for source of sepsis  Reassessment: I have reassessed the patient's hemodynamic status  -Start vancomycin and Rocephin  -Await culture results  -Trend lactic acid level  -Await surgical recommendations  -Patient is at risk of worsening, closely monitor her hemodynamics    9/23: It does not seem that the patient meet sepsis criteria today.    Major depressive disorder- (present on admission)  Assessment & Plan  - Continue home Effexor    Hypothyroidism- (present on admission)  Assessment & Plan  - Continue home Synthroid at 150 mcg daily  -No recent TSH    Hypertension- (present on admission)  Assessment & Plan  - Continue home lisinopril  -Start as needed labetalol  -Adjust as needed    Hyponatremia  Assessment & Plan  Seems to be improving.  Monitor.    Hypokalemia  Assessment & Plan  Replace as needed  monitor    Type 2 diabetes mellitus with hyperglycemia, without long-term current use of insulin (HCC)- (present on admission)  Assessment & Plan  - Uncontrolled diabetes is a significant contributing factor to her ongoing abscesses  -She does need improved control  -Hemoglobin A1c in August was 12.5   -now with significant hyperglycemia due to infection/sepsis  -Hold home meds  -Patient will be n.p.o., start insulin sliding scale, while she will be n.p.o., will also start Lantus due to the severity of her hyperglycemia  -No acidosis or elevated anion gap at this time  -Repeat BMP in the morning         VTE prophylaxis: enoxaparin ppx    I have performed a physical exam and reviewed and updated ROS and Plan today (9/25/2022). In review of yesterday's note (9/24/2022), there are no changes except as documented above.

## 2022-09-25 NOTE — PROGRESS NOTES
0836 : Received patient from Night shift RN . Patient is awake and alert.No sign of distress. Patient has  nausea, received antinausea meds this early morning. Denies any pain to the rectum, dressing in placed.Fall precaution observed, kept bed in lowest position and call light within reach.     15:00 Patient had shower, prior to wound dressing, PRN pain medication given as per patient request. Dressing change to the left inner rectal as per Wound care

## 2022-09-25 NOTE — CARE PLAN
The patient is Stable - Low risk of patient condition declining or worsening    Shift Goals  Clinical Goals: patient will report no more nausea  Patient Goals: dressing change    Progress made toward(s) clinical / shift goals:  Patient denies any nausea as of the moment. Tolerates current diet.  Patients dressing to be change after her shower as per patients request    Patient is not progressing towards the following goals:    Problem: Pain - Standard  Goal: Alleviation of pain or a reduction in pain to the patient’s comfort goal  9/25/2022 1254 by Kelsi Jacob R.N.  Outcome: Progressing       Problem: Physical Regulation  Goal: Diagnostic test results will improve  9/25/2022 1254 by Kelsi Jacob R.N.  Outcome: Progressing    Goal: Signs and symptoms of infection will decrease  9/25/2022 1254 by Kelsi Jacob, R.N.  Outcome: Progressing       Problem: Gastrointestinal Irritability  Goal: Nausea and vomiting will be absent or improve  Outcome: Progressing

## 2022-09-25 NOTE — PROGRESS NOTES
Pt supine in bed when received. No complaints of pain at this time. Dressings intact. Verbalizes needs well and demonstrates use of call bell. Call bell in reach    Chart check completed

## 2022-09-25 NOTE — CARE PLAN
The patient is Stable - Low risk of patient condition declining or worsening    Shift Goals  Clinical Goals: Dressing management; monitor nausea  Patient Goals: rest and comfort    Progress made toward(s) clinical / shift goals:    Problem: Pain - Standard  Goal: Alleviation of pain or a reduction in pain to the patient’s comfort goal  Outcome: Progressing       Patient is not progressing towards the following goals:

## 2022-09-26 VITALS
HEART RATE: 65 BPM | HEIGHT: 63 IN | TEMPERATURE: 98.1 F | OXYGEN SATURATION: 96 % | SYSTOLIC BLOOD PRESSURE: 117 MMHG | BODY MASS INDEX: 34.53 KG/M2 | DIASTOLIC BLOOD PRESSURE: 87 MMHG | RESPIRATION RATE: 16 BRPM | WEIGHT: 194.89 LBS

## 2022-09-26 LAB
ALBUMIN SERPL BCP-MCNC: 2.6 G/DL (ref 3.2–4.9)
ALBUMIN/GLOB SERPL: 0.8 G/DL
ALP SERPL-CCNC: 60 U/L (ref 30–99)
ALT SERPL-CCNC: 15 U/L (ref 2–50)
ANION GAP SERPL CALC-SCNC: 10 MMOL/L (ref 7–16)
AST SERPL-CCNC: 16 U/L (ref 12–45)
BILIRUB SERPL-MCNC: 0.2 MG/DL (ref 0.1–1.5)
BUN SERPL-MCNC: 7 MG/DL (ref 8–22)
CALCIUM SERPL-MCNC: 7.8 MG/DL (ref 8.4–10.2)
CHLORIDE SERPL-SCNC: 107 MMOL/L (ref 96–112)
CO2 SERPL-SCNC: 18 MMOL/L (ref 20–33)
CREAT SERPL-MCNC: 0.26 MG/DL (ref 0.5–1.4)
ERYTHROCYTE [DISTWIDTH] IN BLOOD BY AUTOMATED COUNT: 43.1 FL (ref 35.9–50)
GFR SERPLBLD CREATININE-BSD FMLA CKD-EPI: 140 ML/MIN/1.73 M 2
GLOBULIN SER CALC-MCNC: 3.1 G/DL (ref 1.9–3.5)
GLUCOSE BLD STRIP.AUTO-MCNC: 154 MG/DL (ref 65–99)
GLUCOSE BLD STRIP.AUTO-MCNC: 200 MG/DL (ref 65–99)
GLUCOSE SERPL-MCNC: 172 MG/DL (ref 65–99)
HCT VFR BLD AUTO: 34.9 % (ref 37–47)
HGB BLD-MCNC: 11.6 G/DL (ref 12–16)
MAGNESIUM SERPL-MCNC: 1.7 MG/DL (ref 1.5–2.5)
MCH RBC QN AUTO: 27.9 PG (ref 27–33)
MCHC RBC AUTO-ENTMCNC: 33.2 G/DL (ref 33.6–35)
MCV RBC AUTO: 83.9 FL (ref 81.4–97.8)
PLATELET # BLD AUTO: 251 K/UL (ref 164–446)
PMV BLD AUTO: 11.4 FL (ref 9–12.9)
POTASSIUM SERPL-SCNC: 3.4 MMOL/L (ref 3.6–5.5)
PROT SERPL-MCNC: 5.7 G/DL (ref 6–8.2)
RBC # BLD AUTO: 4.16 M/UL (ref 4.2–5.4)
SODIUM SERPL-SCNC: 135 MMOL/L (ref 135–145)
WBC # BLD AUTO: 7.5 K/UL (ref 4.8–10.8)

## 2022-09-26 PROCEDURE — 700102 HCHG RX REV CODE 250 W/ 637 OVERRIDE(OP): Performed by: INTERNAL MEDICINE

## 2022-09-26 PROCEDURE — 36415 COLL VENOUS BLD VENIPUNCTURE: CPT

## 2022-09-26 PROCEDURE — 83735 ASSAY OF MAGNESIUM: CPT

## 2022-09-26 PROCEDURE — A9270 NON-COVERED ITEM OR SERVICE: HCPCS | Performed by: INTERNAL MEDICINE

## 2022-09-26 PROCEDURE — 82962 GLUCOSE BLOOD TEST: CPT

## 2022-09-26 PROCEDURE — 85027 COMPLETE CBC AUTOMATED: CPT

## 2022-09-26 PROCEDURE — 80053 COMPREHEN METABOLIC PANEL: CPT

## 2022-09-26 PROCEDURE — 99239 HOSP IP/OBS DSCHRG MGMT >30: CPT | Performed by: INTERNAL MEDICINE

## 2022-09-26 RX ORDER — METRONIDAZOLE 500 MG/1
500 TABLET ORAL EVERY 8 HOURS
Qty: 21 TABLET | Refills: 0 | Status: SHIPPED | OUTPATIENT
Start: 2022-09-26 | End: 2022-10-03

## 2022-09-26 RX ORDER — LINEZOLID 600 MG/1
600 TABLET, FILM COATED ORAL EVERY 12 HOURS
Qty: 14 TABLET | Refills: 0 | Status: SHIPPED | OUTPATIENT
Start: 2022-09-26 | End: 2022-09-27 | Stop reason: SDUPTHER

## 2022-09-26 RX ADMIN — LINEZOLID 600 MG: 600 TABLET, FILM COATED ORAL at 05:37

## 2022-09-26 RX ADMIN — OXYCODONE HYDROCHLORIDE 10 MG: 10 TABLET ORAL at 10:55

## 2022-09-26 RX ADMIN — ATORVASTATIN CALCIUM 20 MG: 20 TABLET, FILM COATED ORAL at 01:48

## 2022-09-26 RX ADMIN — METRONIDAZOLE 500 MG: 500 TABLET ORAL at 05:36

## 2022-09-26 RX ADMIN — LEVOTHYROXINE SODIUM 150 MCG: 0.07 TABLET ORAL at 05:37

## 2022-09-26 RX ADMIN — METRONIDAZOLE 500 MG: 500 TABLET ORAL at 17:06

## 2022-09-26 RX ADMIN — LISINOPRIL 10 MG: 5 TABLET ORAL at 01:47

## 2022-09-26 ASSESSMENT — PAIN DESCRIPTION - PAIN TYPE: TYPE: ACUTE PAIN;SURGICAL PAIN

## 2022-09-26 NOTE — PROGRESS NOTES
Discharging patient per MD order. Patient states understanding of wound appt tomorrow, medications, and instructions.  No other questions at this time.

## 2022-09-26 NOTE — CARE PLAN
The patient is Stable - Low risk of patient condition declining or worsening    Shift Goals  Clinical Goals: wound will be changed before discharge today  Patient Goals: home    Progress made toward(s) clinical / shift goals:  Dressing change per order, patient to be discharged this morning.     Patient is not progressing towards the following goals: NA    Problem: Pain - Standard  Goal: Alleviation of pain or a reduction in pain to the patient’s comfort goal  Outcome: Progressing     Problem: Knowledge Deficit - Standard  Goal: Patient and family/care givers will demonstrate understanding of plan of care, disease process/condition, diagnostic tests and medications  Outcome: Progressing     Problem: Hemodynamics  Goal: Patient's hemodynamics, fluid balance and neurologic status will be stable or improve  Outcome: Progressing     Problem: Fluid Volume  Goal: Fluid volume balance will be maintained  Outcome: Progressing

## 2022-09-26 NOTE — PROGRESS NOTES
Pt is awake in bed. Pt c/o 4/10 pain in rectum. Pt declines interventions at this time. Dressing to rectum is clean, dry, and intact. Pt has no other needs at this time. Low fall precautions in place.

## 2022-09-26 NOTE — CARE PLAN
The patient is Stable - Low risk of patient condition declining or worsening    Shift Goals  Clinical Goals: pt will remain at stated comfort goal of 4/10 pain. Pt will receive all scheduled antibiotics this shift.  Patient Goals: sleep comfortably    Progress made toward(s) clinical / shift goals:      Non pharmacological pain management allowed pt to remain at comfort goal.    Pt received all scheduled antibiotics this shift.    Problem: Pain - Standard  Goal: Alleviation of pain or a reduction in pain to the patient’s comfort goal  Outcome: Progressing     Problem: Knowledge Deficit - Standard  Goal: Patient and family/care givers will demonstrate understanding of plan of care, disease process/condition, diagnostic tests and medications  Outcome: Progressing     Problem: Hemodynamics  Goal: Patient's hemodynamics, fluid balance and neurologic status will be stable or improve  Outcome: Progressing     Problem: Fluid Volume  Goal: Fluid volume balance will be maintained  Outcome: Progressing     Problem: Urinary - Renal Perfusion  Goal: Ability to achieve and maintain adequate renal perfusion and functioning will improve  Outcome: Progressing       Patient is not progressing towards the following goals:

## 2022-09-26 NOTE — DISCHARGE SUMMARY
"Discharge Summary    CHIEF COMPLAINT ON ADMISSION  Chief Complaint   Patient presents with    Rectal Pain     Previous infections and pain with abscess at rectum.  Last two days pain has returned, blood and pus from area.  Denies fevers, chills, n/v, diarrhea.         Reason for Admission  Butt Pain     Admission Date  9/21/2022    CODE STATUS  Full Code    HPI & HOSPITAL COURSE  As per chart review:  \"42 y.o. female who presented 9/21/2022 with rectal pain.  Patient states she began having rectal pain approximately 2 days ago, now also involving her left buttock.  Patient states the pain became severe so she presented to the emergency department.  Patient states it is a sharp pain at times.  Patient does have a history of multiple abscesses that she states started when she was diagnosed with diabetes.  She never states it involved her rectum however.  She states in the past, she has received antibiotics with resolution of her abscess.  Upon arrival, she was noted to have a significant perirectal abscess.  ERP did discuss the case with surgery\"    The patient was admitted for further management and care.  General surgery performed incision and drainage of perirectal abscess.  Cultures were drawn and they reported streptococcus anginosus and Prevotela bivia.  I discussed the case with ID and they recommended to continue with Zyvox and metronidazole to complete antibiotic treatment as an outpatient due to the fact that she is allergic to penicillin.    The patient already has a follow-up appointment tomorrow with wound care clinic as an outpatient.  She will require close follow-up with PCP and general surgery as an outpatient.    The patient was found to have an A1c of 12.5 in August 2022, she supposed to be taking Tresiba 10 units at home, however during this hospitalization we had to increase Lantus to 20 units.  We discussed the importance of being compliant with her medications as she is not very compliant.  She " seems to understand.  I have discussed with her that she will have to increase her dose of Tresiba to 20 units and follow closely with her PCP as an outpatient.  The patient feels much better and she will be discharged today.  She will require close follow-up with PCP and general surgery as an outpatient.    Therefore, she is discharged in fair and stable condition to home with close outpatient follow-up.    The patient met 2-midnight criteria for an inpatient stay at the time of discharge.    Discharge Date  09/26/2022    FOLLOW UP ITEMS POST DISCHARGE  The patient will require to complete antibiotic treatment as an outpatient.  She will require close follow-up with PCP and general surgery.  She will also require close monitoring of her glucose control with PCP as an outpatient.    DISCHARGE DIAGNOSES  Principal Problem:    Perirectal abscess POA: Yes  Active Problems:    Type 2 diabetes mellitus with hyperglycemia, without long-term current use of insulin (HCC) POA: Yes    Hypokalemia POA: Unknown    Hyponatremia POA: Unknown    Hypertension POA: Yes    Hypothyroidism POA: Yes    Major depressive disorder POA: Yes    Sepsis (HCC) POA: Yes    Dyslipidemia POA: Yes    Anemia POA: Unknown  Resolved Problems:    * No resolved hospital problems. *      FOLLOW UP  Future Appointments   Date Time Provider Department Center   9/27/2022  2:30 PM Dick Young M.D. [address]     Anahi Faria M.D.  [address] [facility] [address]  [phone]    Schedule an appointment as soon as possible for a visit      Rick Cuellar M.D.  [address] [facility] [address]  [phone]    Schedule an appointment as soon as possible for a visit        MEDICATIONS ON DISCHARGE     Medication List        START taking these medications        Instructions   linezolid 600 MG Tabs  Commonly known as: ZYVOX   Take 1 Tablet by mouth every 12 hours for 7 days.  Dose: 600 mg     metroNIDAZOLE 500 MG Tabs  Commonly  known as: FLAGYL   Take 1 Tablet by mouth every 8 hours for 7 days.  Dose: 500 mg            CHANGE how you take these medications        Instructions   venlafaxine 75 MG Tabs  What changed: Another medication with the same name was removed. Continue taking this medication, and follow the directions you see here.  Commonly known as: EFFEXOR   Take 75 mg by mouth every day.  Dose: 75 mg            CONTINUE taking these medications        Instructions   atorvastatin 20 MG Tabs  Commonly known as: LIPITOR   Take 20 mg by mouth every day.  Dose: 20 mg     HumaLOG 100 UNIT/ML  Generic drug: insulin lispro   Inject 1-10 Units under the skin 3 times a day before meals. Unknown sliding scale  Dose: 1-10 Units     * Lancets Ultra Fine Misc      * Lancets   Doctor's comments: Or per formulary preference. ICD-10 code: E11.65 Uncontrolled type 2 Diabetes Mellitus  Use one  lancet to test blood sugar 3 times a day.     levothyroxine 150 MCG Tabs  Commonly known as: SYNTHROID   Take 150 mcg by mouth every morning on an empty stomach.  Dose: 150 mcg     lisinopril 10 MG Tabs  Commonly known as: PRINIVIL   Take 10 mg by mouth every day.  Dose: 10 mg     metFORMIN 500 MG Tabs  Commonly known as: GLUCOPHAGE   Take 1,000 mg by mouth 2 times a day.  Dose: 1,000 mg     senna-docusate 8.6-50 MG Tabs  Commonly known as: PERICOLACE or SENOKOT S   Take 1 Tablet by mouth every day.  Dose: 1 Tablet     Tresiba FlexTouch 200 UNIT/ML Sopn  Generic drug: Insulin Degludec   Inject 10 Units under the skin every day.  Dose: 10 Units           * This list has 2 medication(s) that are the same as other medications prescribed for you. Read the directions carefully, and ask your doctor or other care provider to review them with you.                STOP taking these medications      levoFLOXacin 500 MG tablet  Commonly known as: LEVAQUIN              Allergies  Allergies   Allergen Reactions    Bicillin C-R [Penicillin G Proc & Benzathine] Rash      Received inj of Bicillin- reaction was rash. Oral penicillin shows no reaction.    Ondansetron Hives    Penicillin G Hives    Sulfa Drugs Hives           Metoclopramide Rash and Vomiting     Rash         DIET  Orders Placed This Encounter   Procedures    Diet Order Diet: Consistent CHO (Diabetic)     Standing Status:   Standing     Number of Occurrences:   1     Order Specific Question:   Diet:     Answer:   Consistent CHO (Diabetic) [4]       ACTIVITY  As tolerated.  Weight bearing as tolerated    CONSULTATIONS  General Surgery    PROCEDURES  Incision and drainage of a perirectal abscess      CT-PELVIS WITH   Final Result      1.  Left-sided perirectal abscess which tracks caudally into the left upper thigh. The largest portion of this abscess in the left upper thigh measures 5.3 x 3.1 cm in greatest diameters.           LABORATORY  Lab Results   Component Value Date    SODIUM 135 09/26/2022    POTASSIUM 3.4 (L) 09/26/2022    CHLORIDE 107 09/26/2022    CO2 18 (L) 09/26/2022    GLUCOSE 172 (H) 09/26/2022    BUN 7 (L) 09/26/2022    CREATININE 0.26 (L) 09/26/2022        Lab Results   Component Value Date    WBC 7.5 09/26/2022    HEMOGLOBIN 11.6 (L) 09/26/2022    HEMATOCRIT 34.9 (L) 09/26/2022    PLATELETCT 251 09/26/2022        Total time of the discharge process exceeds 35 minutes.

## 2022-09-26 NOTE — DISCHARGE INSTR - WOUND CARE
"LEFT BUTTOCK TANIKA ANAL - open wound that need to be packed is very close to midline, there is a dime size purple area about 2 cm lateral to open wound.  Recommend positioning pt supine with knees bent and opened outward like a frog.  Remove strip packing from open wound, irrigate with saline.  Pack with 1/4\" strip packing - there is a tract that goes 4 cm lateral toward the discolored purple area.  Breaking a cotton swab in half helps to achieve the angle necessary to get strip packing into tract.  Leave an ample tail of strip outside of wound for easy retrieval next dressing change.  Change daily or as needed with if dressing becomes soiled or dislodged  "

## 2022-09-27 ENCOUNTER — OFFICE VISIT (OUTPATIENT)
Dept: WOUND CARE | Facility: MEDICAL CENTER | Age: 42
End: 2022-09-27
Attending: EMERGENCY MEDICINE
Payer: MEDICAID

## 2022-09-27 VITALS
SYSTOLIC BLOOD PRESSURE: 151 MMHG | TEMPERATURE: 97.4 F | HEART RATE: 81 BPM | OXYGEN SATURATION: 98 % | RESPIRATION RATE: 18 BRPM | DIASTOLIC BLOOD PRESSURE: 100 MMHG

## 2022-09-27 DIAGNOSIS — E11.65 UNCONTROLLED TYPE 2 DIABETES MELLITUS WITH HYPERGLYCEMIA (HCC): ICD-10-CM

## 2022-09-27 DIAGNOSIS — K61.1 PERIRECTAL ABSCESS: ICD-10-CM

## 2022-09-27 DIAGNOSIS — E66.9 OBESITY (BMI 30-39.9): ICD-10-CM

## 2022-09-27 DIAGNOSIS — T14.8XXA SURGICAL WOUND PRESENT: ICD-10-CM

## 2022-09-27 PROCEDURE — 99214 OFFICE O/P EST MOD 30 MIN: CPT | Mod: 25 | Performed by: STUDENT IN AN ORGANIZED HEALTH CARE EDUCATION/TRAINING PROGRAM

## 2022-09-27 PROCEDURE — 99214 OFFICE O/P EST MOD 30 MIN: CPT

## 2022-09-27 PROCEDURE — 11042 DBRDMT SUBQ TIS 1ST 20SQCM/<: CPT | Performed by: STUDENT IN AN ORGANIZED HEALTH CARE EDUCATION/TRAINING PROGRAM

## 2022-09-27 PROCEDURE — 11042 DBRDMT SUBQ TIS 1ST 20SQCM/<: CPT

## 2022-09-27 PROCEDURE — RXMED WILLOW AMBULATORY MEDICATION CHARGE: Performed by: STUDENT IN AN ORGANIZED HEALTH CARE EDUCATION/TRAINING PROGRAM

## 2022-09-27 RX ORDER — LINEZOLID 600 MG/1
600 TABLET, FILM COATED ORAL EVERY 12 HOURS
Qty: 14 TABLET | Refills: 0 | Status: SHIPPED | OUTPATIENT
Start: 2022-09-27 | End: 2022-10-05

## 2022-09-27 RX ORDER — BLOOD SUGAR DIAGNOSTIC
STRIP MISCELLANEOUS
COMMUNITY
Start: 2022-09-19 | End: 2023-01-24 | Stop reason: SDUPTHER

## 2022-09-27 RX ORDER — DULAGLUTIDE 1.5 MG/.5ML
3 INJECTION, SOLUTION SUBCUTANEOUS DAILY
COMMUNITY
Start: 2022-09-17 | End: 2022-11-15

## 2022-09-27 NOTE — PROGRESS NOTES
Provider Encounter- Full Thickness wound    HISTORY OF PRESENT ILLNESS  Wound History:    START OF CARE IN CLINIC: 9/27/2022    REFERRING PROVIDER: Emre Yip      WOUND- Full Thickness Wound   LOCATION: Left Perirectal Skin   HISTORY:  42F with Pmhx of uncontrolled DM2 (A1c 12.5), obesity. Patient was admitted to [facility] 9/22-9/26/2022 for perirectal abscess. Patient was taken to OR and underwent I&D of perirectal abscess on 9/21 by Dr. Cuellar. She was treated with IV Abx. She was treating wound with packing. Wound culture grew streptococcus anginosus and prevotella bivia. ID consulted and recommend discharge on Zyvox and flagyl due to PCN allergy. Patient was referred to [facility] for wound care.     Pertinent Medical History: Obesity, Uncontrolled DM2, Perirectal abscess     TOBACCO USE:  Former Smoker    Patient's problem list, allergies, and current medications reviewed and updated in Epic    Interval History:  9/29/2022: Clinic visit with Dick Young MD. Patient reports doing better since discharge from hospital. Denies any fevers or chills. Reports some pain at abscess site, improving. She denies any difficulty defecating, mucus or purulence in stool. Reports glucose 200+, has appointment with PCP tomorrow and appointment with endocrinology in October. Patient has been taking Flagyl but unable to  Zyvox due to cost. I called [facility] and they cost checked Zyvox and estimated co pay is $10, I reordered medication to [facility].    REVIEW OF SYSTEMS:   Review of Systems   Constitutional:  Negative for chills, fever and malaise/fatigue.   Skin:         Open perirectal wound     PHYSICAL EXAMINATION:   BP (!) 151/100 (BP Location: Left arm, Patient Position: Sitting) Comment: RN notified  Pulse 81   Temp 36.3 °C (97.4 °F) (Temporal)   Resp 18   LMP 09/01/2022 (Exact Date)   SpO2 98%     Physical Exam  Constitutional:       General: She is not in acute distress.     Appearance:  She is obese.   Cardiovascular:      Rate and Rhythm: Normal rate.   Pulmonary:      Effort: Pulmonary effort is normal.   Skin:     Comments: Left perirectal wound from I&D of abscess - slough in wound bed, appears tunnel has already partially closed. Has some induration without erythema   Neurological:      Mental Status: She is alert.       WOUND ASSESSMENT  Wound 09/22/22 Full Thickness Wound Perineum Left Sravani rectal (Active)   Wound Image    09/27/22 1447   Site Assessment Pink;Yellow 09/27/22 1447   Periwound Assessment Induration 09/27/22 1447   Margins Attached edges;Epibole (rolled edges) 09/27/22 1447   Drainage Amount HARPER 09/27/22 1447   Drainage Description HARPER 09/27/22 1447   Treatments Cleansed;Topical Lidocaine;Provider debridement 09/27/22 1447   Wound Cleansing Normal Saline Irrigation 09/27/22 1447   Periwound Protectant Skin Protectant Wipes to Periwound 09/27/22 1447   Dressing Cleansing/Solutions Not Applicable 09/27/22 1447   Dressing Options Hydrofiber Silver Strip;Hydrofiber Silver;Transparent Film 09/27/22 1447   Non-staged Wound Description Full thickness 09/27/22 1447   Wound Length (cm) 2 cm 09/27/22 1447   Wound Width (cm) 1.1 cm 09/27/22 1447   Wound Depth (cm) 0.3 cm 09/27/22 1447   Wound Surface Area (cm^2) 2.2 cm^2 09/27/22 1447   Wound Volume (cm^3) 0.66 cm^3 09/27/22 1447   Post-Procedure Length (cm) 2.1 cm 09/27/22 1447   Post-Procedure Width (cm) 1.1 cm 09/27/22 1447   Post-Procedure Depth (cm) 0.5 cm 09/27/22 1447   Post-Procedure Surface Area (cm^2) 2.31 cm^2 09/27/22 1447   Post-Procedure Volume (cm^3) 1.155 cm^3 09/27/22 1447   Wound Healing % 71 09/27/22 1447   Tunneling (cm) 2 cm 09/27/22 1447   Tunneling Clock Position of Wound 3 09/27/22 1447   Undermining (cm) 0 cm 09/27/22 1447   Wound Odor None 09/27/22 1447   Exposed Structures None 09/27/22 1447   Number of days: 7       PROCEDURE:   -2% viscous lidocaine applied topically to wound bed for approximately 5 minutes  prior to debridement  -Curette used to debride wound bed.  Excisional debridement was performed to remove devitalized tissue until healthy, bleeding tissue was visualized.   Entire surface of wound, 2.31 cm2 debrided.  Tissue debrided into the subcutaneous layer.    -Bleeding controlled with manual pressure.    -Wound care completed by wound RN, refer to flowsheet  -Patient tolerated the procedure well, without c/o pain or discomfort.       Pertinent Labs and Diagnostics:    Labs:     A1c:   Lab Results   Component Value Date/Time    HBA1C 12.5 (A) 08/10/2022 05:09 PM          IMAGING: CT Pelvis with 9/21/2022  IMPRESSION:     1.  Left-sided perirectal abscess which tracks caudally into the left upper thigh. The largest portion of this abscess in the left upper thigh measures 5.3 x 3.1 cm in greatest diameters.    VASCULAR STUDIES: None    LAST  WOUND CULTURE:  DATE :   Lab Results   Component Value Date/Time    CULTRSULT (A) 09/22/2022 03:12 PM     Growth noted after further incubation, see below for  organism identification.      CULTRSULT (A) 09/22/2022 03:12 PM     Prevotella bivia  Moderate growth  Beta lactamase positive      CULTRSULT - (A) 09/22/2022 03:12 PM    CULTRSULT Streptococcus anginosus  Moderate growth   (A) 09/22/2022 03:12 PM         ASSESSMENT AND PLAN:     1. Perirectal abscess  2. Surgical wound present  Comments: Admission 9/22-9/26/2022 for perirectal abscess s/p I&D by Dr. Cuellar    9/29/2022  - Wound tract not as deep as previously noted by inpatient wound team, appears to be closing. Continues to have some induration without fluctuance  - Excisional debridement was performed in clinic, medically necessary to promote wound healing.  - Patient has been taking flagyl but unable to afford Zyvox at her pharmacy. I called [facility] and cost checked, Zyvox through [facility] would have $10 co pay. I reordered medication through [facility]  - Discussed need to continue packing wound  - Return to clinic  weekly for assessment, debridement   Wound Care: Hydrofiber silver strip, hydrofiber silver, transparent film    3. Uncontrolled type 2 diabetes mellitus with hyperglycemia (HCC)  Comments: Last A1c 12.5    9/29/2022  - Patient reports glucose 200+  - Counseled on need to control glucose to allow for adequate wound healing and reduce risk of infection. Discussed risks associated with glucose > 180  - She has appointment with PCP and reports future appointment with endocrinology in October 4. Obesity (BMI 30-39.9)  Discussed risks to wound healing and infection  Recommend weight loss    PATIENT EDUCATION  - Importance of adequate nutrition for wound healing  -Advised to go to ER for any increased redness, swelling, drainage, or odor, or if patient develops fever, chills, nausea or vomiting.     My total time spent caring for the patient on the day of the encounter was 30 minutes.   This does not include time spent on separately billable procedures/tests.       Please note that this note may have been created using voice recognition software. I have worked with technical experts from [facility] to optimize the interface.  I have made every reasonable attempt to correct obvious errors, but there may be errors of grammar and possibly content that I did not discover before finalizing the note.    N

## 2022-09-27 NOTE — PATIENT INSTRUCTIONS
-Keep dressings clean and dry. Change dressings once between wound clinic visits, and if they become over saturated, soiled or fall off.     -Should you experience any significant changes in your wound(s), such as signs of infection (increasing redness, swelling, localized heat, increased pain, fever > 101 F, chills) or have any questions regarding your home care instructions, please contact the wound center at [phone]. If after hours, contact your primary care physician or go to the hospital emergency room.     -If you are 5 or more minutes late for an appointment, we reserve the right to cancel and reschedule that appointment. Additionally, if you are habitually late or not showing (3 late cancellations and/or no shows), we reserve the right to cancel your remaining appointments and it will be your responsibility to obtain a new referral if services are still needed.

## 2022-09-28 ENCOUNTER — OFFICE VISIT (OUTPATIENT)
Dept: INTERNAL MEDICINE | Facility: OTHER | Age: 42
End: 2022-09-28
Payer: OTHER MISCELLANEOUS

## 2022-09-28 ENCOUNTER — PHARMACY VISIT (OUTPATIENT)
Dept: PHARMACY | Facility: MEDICAL CENTER | Age: 42
End: 2022-09-28
Payer: OTHER MISCELLANEOUS

## 2022-09-28 VITALS
SYSTOLIC BLOOD PRESSURE: 138 MMHG | BODY MASS INDEX: 36.79 KG/M2 | OXYGEN SATURATION: 98 % | WEIGHT: 207.6 LBS | HEIGHT: 63 IN | TEMPERATURE: 97.1 F | DIASTOLIC BLOOD PRESSURE: 85 MMHG | HEART RATE: 65 BPM

## 2022-09-28 DIAGNOSIS — D64.9 ANEMIA, UNSPECIFIED TYPE: ICD-10-CM

## 2022-09-28 DIAGNOSIS — B37.9 YEAST INFECTION: ICD-10-CM

## 2022-09-28 DIAGNOSIS — K61.1 PERIRECTAL ABSCESS: ICD-10-CM

## 2022-09-28 DIAGNOSIS — Z79.891 CHRONIC USE OF OPIATE DRUG FOR THERAPEUTIC PURPOSE: ICD-10-CM

## 2022-09-28 DIAGNOSIS — I10 PRIMARY HYPERTENSION: ICD-10-CM

## 2022-09-28 DIAGNOSIS — E11.65 TYPE 2 DIABETES MELLITUS WITH HYPERGLYCEMIA, WITHOUT LONG-TERM CURRENT USE OF INSULIN (HCC): ICD-10-CM

## 2022-09-28 PROBLEM — B37.31 YEAST INFECTION OF THE VAGINA: Status: RESOLVED | Noted: 2022-05-26 | Resolved: 2022-09-28

## 2022-09-28 PROBLEM — A41.9 SEPSIS (HCC): Status: RESOLVED | Noted: 2022-09-22 | Resolved: 2022-09-28

## 2022-09-28 PROCEDURE — 99214 OFFICE O/P EST MOD 30 MIN: CPT | Mod: GC | Performed by: STUDENT IN AN ORGANIZED HEALTH CARE EDUCATION/TRAINING PROGRAM

## 2022-09-28 RX ORDER — HYDROCODONE BITARTRATE AND ACETAMINOPHEN 5; 325 MG/1; MG/1
1 TABLET ORAL EVERY 8 HOURS PRN
Qty: 16 TABLET | Refills: 0 | Status: SHIPPED | OUTPATIENT
Start: 2022-09-28 | End: 2022-10-12

## 2022-09-28 RX ORDER — FLUCONAZOLE 150 MG/1
TABLET ORAL
Qty: 2 TABLET | Refills: 1 | Status: SHIPPED | OUTPATIENT
Start: 2022-09-28 | End: 2022-10-12

## 2022-09-28 ASSESSMENT — FIBROSIS 4 INDEX: FIB4 SCORE: 0.69

## 2022-09-28 NOTE — PATIENT INSTRUCTIONS
Increase Tresiba to 24 units daily  Keep a log of your sugar levels and follow up in 1-2 weeks to see if you need further adjustment.   Keep sliding scale as is for now.   Please obtain attached labs  I placed an Rx for Norco for pain control to take 30 min before dressing changes.    We will check anemia labs after infection resolves

## 2022-09-28 NOTE — PROGRESS NOTES
Liana Obrien  is a 42 y.o. female with a PMH of   Patient Active Problem List   Diagnosis    Vitamin D deficiency    Type 2 diabetes mellitus with hyperglycemia, without long-term current use of insulin ([facility])    Hypokalemia    Obstructive sleep apnea, adult    Hypertension    History of deep venous thrombosis    Gastro-esophageal reflux disease without esophagitis    Hypothyroidism    Major depressive disorder    Polycystic ovary syndrome    Obesity (BMI 30-39.9)    Abscess, gluteal    Perirectal abscess    Dyslipidemia    Anemia    here to address the following      Perirectal abscess  CT on 9/21 revealed  left-sided perirectal abscess which tracks caudally into the left upper thigh. The largest portion of this abscess in the left upper thigh measures 5.3 x 3.1 cm in greatest diameters.  Patient underwent vision drainage on 9/22  by Dr. Cuellar   Cultures were drawn and they reported streptococcus anginosus and Prevotela bivia. Sensitivities pending.   She was discharged on 9/26/2022 with 7 days of Zyvox and 7 days of Flagyl end date 10/3.  Started flagyl yesterday and starting Zyvox today   Following with wound care weekly.  Dressing changes  and packing change every 3 days. Seeing surgery in 1 week.   Denies fevers or chills.    Needs pain medications during dressing changes     Type 2 diabetes  A1c of 12.5 in August 2022  She is discharged on 9/26 on Lispro  20 units increased from 10 units daily and lispro1 to 10 units into the skin 3 times a day before meals.  Seeing Shira at Dr. Allen's office.   Sugars a little high since she left the hospital 250s when fasting and go up to 300.   On sliding scale she uses 6-8 units.   Saw [facility] lss than 1 month ago  Denies neuropathy.     Hypertension currently on lisinopril 10 mg daily.  /85 today  BP was a little elevated yesterday   diastolic was 100 yesterday at wound clinic/     Anemia: denies melena hematochezia.     ROS All ROS negative unless  otherwise mentioned in HPI    Vitals:    09/28/22 1440   BP: 138/85   Pulse: 65   Temp: 36.2 °C (97.1 °F)   SpO2: 98%         Physical Exam  Constitutional:       Appearance: Normal appearance.   HENT:      Mouth/Throat:      Mouth: Mucous membranes are moist.      Pharynx: Oropharynx is clear.   Eyes:      Extraocular Movements: Extraocular movements intact.      Conjunctiva/sclera: Conjunctivae normal.      Pupils: Pupils are equal, round, and reactive to light.   Cardiovascular:      Rate and Rhythm: Normal rate and regular rhythm.      Pulses: Normal pulses.      Heart sounds: No murmur heard.  Pulmonary:      Effort: Pulmonary effort is normal.      Breath sounds: Normal breath sounds.   Abdominal:      General: Abdomen is flat. There is no distension.      Palpations: Abdomen is soft.   Musculoskeletal:         General: No swelling or deformity.      Cervical back: Normal range of motion and neck supple.   Skin:     General: Skin is warm and dry.      Comments: Clean incision with packing and no active drainage left of gluteal fold.  No fluctuance or erythema   Neurological:      General: No focal deficit present.      Mental Status: She is alert and oriented to person, place, and time.   Psychiatric:         Mood and Affect: Mood normal.         Behavior: Behavior normal.        Past Medical History:   Diagnosis Date    Bipolar disorder ([facility]) 08/09/2018    Candida vaginitis     Candida vaginitis 05/26/2022    Chickenpox     Diabetes 1.5, managed as type 2 ([facility])     DVT (deep venous thrombosis) ([facility])     Dysfunctional uterine bleeding     Herpes     Hypertension     Hypoglycemia associated with diabetes ([facility]) 10/27/2021    Hypothyroidism due to acquired atrophy of thyroid 01/22/2016    Personal history of venous thrombosis and embolism     DVT in BLE years ago    Sepsis ([facility]) 9/22/2022    Sleep apnea     Uncontrolled type 2 diabetes mellitus without complication, without long-term current use of insulin  2015    Uterine fibroids in pregnancy, postpartum condition         Social History     Socioeconomic History    Marital status:      Spouse name: Not on file    Number of children: Not on file    Years of education: Not on file    Highest education level: Not on file   Occupational History    Not on file   Tobacco Use    Smoking status: Former     Packs/day: 0.50     Years: 0.30     Pack years: 0.15     Types: Cigarettes     Quit date:      Years since quittin.7    Smokeless tobacco: Never    Tobacco comments:     for 3 months at a time on and off    Vaping Use    Vaping Use: Never used   Substance and Sexual Activity    Alcohol use: No     Alcohol/week: 0.0 oz    Drug use: No    Sexual activity: Yes     Partners: Male   Other Topics Concern    Not on file   Social History Narrative    Works at [employer]      Social Determinants of Health     Financial Resource Strain: Not on file   Food Insecurity: Not on file   Transportation Needs: Not on file   Physical Activity: Not on file   Stress: Not on file   Social Connections: Not on file   Intimate Partner Violence: Not on file   Housing Stability: Not on file        Family History   Problem Relation Age of Onset    Hypertension Mother     Sleep Apnea Mother     Hyperlipidemia Father     Cancer Brother     Sleep Apnea Brother     Cancer Maternal Uncle     Diabetes Neg Hx     Heart Disease Neg Hx     Stroke Neg Hx             Current Outpatient Medications:     fluconazole, Take one for yeast infection.  If no improvement in 3 days, take another    HYDROcodone-acetaminophen, 1 Tablet, Oral, Q8HRS PRN    Trulicity, 1.5 mg, Subcutaneous, DAILY, Taking    metFORMIN, 850 mg, Oral, TID, Taking    linezolid, 600 mg, Oral, Q12HRS, Taking    metroNIDAZOLE, 500 mg, Oral, Q8HRS, Taking    atorvastatin, 20 mg, Oral, QDAY, Taking    lisinopril, 10 mg, Oral, QDAY, Taking    levothyroxine, 150 mcg, Oral, AM ES, Taking    Tresiba FlexTouch, 24 Units,  Subcutaneous, DAILY, Taking    HumaLOG, 1-10 Units, Subcutaneous, TID AC, Taking    OneTouch Ultra, 1 STRIP BY OTHER ROUTE 3 TIMES A DAY BEFORE MEALS.    Lancets Ultra Fine,     Lancets, Use one  lancet to test blood sugar 3 times a day.       Perirectal abscess  CT  revealed  left-sided perirectal abscess which tracks caudally into the left upper thigh. The largest portion of this abscess in the left upper thigh measures 5.3 x 3.1 cm in greatest diameters.  Patient underwent vision drainage on 9/22  by Dr. Cuellar   Cultures were drawn and they reported streptococcus anginosus and Prevotela bivia. Sensitivities pending.   She was discharged on 9/26/2022 with 7 days of Zyvox and 7 days of Flagyl end date 10/3.  Started flagyl yesterday and starting Zyvox today  Following with wound care weekly.  Dressing changes and packing change every 3 days. Seeing surgery in 1 week.    No swelling, drainage or fluctuance along area of incision  Sent Rx for norco daily PRN for before dressing changes.     Type 2 diabetes mellitus with hyperglycemia, without long-term current use of insulin ([facility])  A1c of 12.5 in August 2022  She is discharged on 9/26 on Tresiba  20 units increased from 10 units daily and lispro1 to 10 units into the skin 3 times a day before meals. Also on Metformin 850 TID and   Seeing Shira at Dr. Allen's office.   Sugars a little high since she left the hospital 250s when fasting and go up to 300.   On sliding scale she uses 6-8 units premeal  Saw ophtho lss than 1 month ago  Denies neuropathy.   PLAN  Increase Tresiba to 24U daily and follow up in 1-2 weeks     Anemia  denies melena hematochezia  Will resume workup in 1-2 months after time goes by following recent infection    Hypertension  urrently on lisinopril 10 mg daily.  /85 today  BP was a little elevated yesterday  Recommended home BP monitoring

## 2022-09-28 NOTE — ASSESSMENT & PLAN NOTE
CT  revealed  left-sided perirectal abscess which tracks caudally into the left upper thigh. The largest portion of this abscess in the left upper thigh measures 5.3 x 3.1 cm in greatest diameters.  Patient underwent vision drainage on 9/22  by Dr. Cuellar   Cultures were drawn and they reported streptococcus anginosus and Prevotela bivia. Sensitivities pending.   She was discharged on 9/26/2022 with 7 days of Zyvox and 7 days of Flagyl end date 10/3.  Started flagyl yesterday and starting Zyvox today  Following with wound care weekly.  Dressing changes and packing change every 3 days. Seeing surgery in 1 week.    No swelling, drainage or fluctuance along area of incision  Sent Rx for norco daily PRN for before dressing changes.

## 2022-09-28 NOTE — ASSESSMENT & PLAN NOTE
denies melena hematochezia  Will resume workup in 1-2 months after time goes by following recent infection

## 2022-09-28 NOTE — ASSESSMENT & PLAN NOTE
A1c of 12.5 in August 2022  She is discharged on 9/26 on Tresiba  20 units increased from 10 units daily and lispro1 to 10 units into the skin 3 times a day before meals. Also on Metformin 850 TID and   Seeing Shira at Dr. Allen's office.   Sugars a little high since she left the hospital 250s when fasting and go up to 300.   On sliding scale she uses 6-8 units premeal  Saw ophtho lss than 1 month ago  Denies neuropathy.   PLAN  Increase Tresiba to 24U daily and follow up in 1-2 weeks   Basic Metabolic Panel; Future  - MICROALBUMIN CREAT RATIO URINE; Future  - Lipid Profile; Future

## 2022-09-29 ENCOUNTER — APPOINTMENT (OUTPATIENT)
Dept: INTERNAL MEDICINE | Facility: OTHER | Age: 42
End: 2022-09-29
Payer: MEDICAID

## 2022-09-29 ASSESSMENT — ENCOUNTER SYMPTOMS
FEVER: 0
CHILLS: 0

## 2022-09-29 NOTE — ASSESSMENT & PLAN NOTE
urrently on lisinopril 10 mg daily.  /85 today  BP was a little elevated yesterday  Recommended home BP monitoring

## 2022-09-30 DIAGNOSIS — E03.8 OTHER SPECIFIED HYPOTHYROIDISM: ICD-10-CM

## 2022-09-30 NOTE — TELEPHONE ENCOUNTER
Last seen: 9.28.22 by Dr. Faria  Next appt: 10.12.22 with Dr. Terrazas    Was the patient seen in the last year in this department? Yes   Does patient have an active prescription for medications requested? No   Received Request Via: Pharmacy

## 2022-10-03 RX ORDER — LEVOTHYROXINE SODIUM 0.15 MG/1
150 TABLET ORAL
Qty: 90 TABLET | Refills: 1 | Status: SHIPPED | OUTPATIENT
Start: 2022-10-03 | End: 2023-04-27

## 2022-10-04 ENCOUNTER — NON-PROVIDER VISIT (OUTPATIENT)
Dept: WOUND CARE | Facility: MEDICAL CENTER | Age: 42
End: 2022-10-04
Attending: EMERGENCY MEDICINE
Payer: MEDICAID

## 2022-10-04 PROCEDURE — 97602 WOUND(S) CARE NON-SELECTIVE: CPT

## 2022-10-04 NOTE — PROCEDURES
Non-selective debridement to perirectal wound and cj-wound using washcloth and foam cleanser to remove biofilm and non-viable tissue.

## 2022-10-04 NOTE — PATIENT INSTRUCTIONS
-Keep your wound dressing clean, dry, and intact.    -Change your dressing if it becomes soiled, soaked, or falls off.    -Should you experience any significant changes in your wound(s), such as infection (redness, swelling, localized heat, increased pain, fever > 101 F, chills) or have any questions regarding your home care instructions, please contact the wound center at [phone]. If after hours, contact your primary care physician or go to the hospital emergency room.

## 2022-10-11 ENCOUNTER — NON-PROVIDER VISIT (OUTPATIENT)
Dept: WOUND CARE | Facility: MEDICAL CENTER | Age: 42
End: 2022-10-11
Attending: INTERNAL MEDICINE
Payer: MEDICAID

## 2022-10-11 PROBLEM — L02.31 ABSCESS, GLUTEAL: Status: RESOLVED | Noted: 2022-07-13 | Resolved: 2022-10-11

## 2022-10-11 PROBLEM — K61.1 PERIRECTAL ABSCESS: Status: RESOLVED | Noted: 2022-09-22 | Resolved: 2022-10-11

## 2022-10-11 PROCEDURE — 97602 WOUND(S) CARE NON-SELECTIVE: CPT

## 2022-10-11 NOTE — PATIENT INSTRUCTIONS
-Keep your wound dressing clean, dry, and intact.    -Change your dressing if it becomes soiled, soaked, or falls off.    -Should you experience any significant changes in your wound(s), such as infection (redness, swelling, localized heat, increased pain, fever > 101 F, chills) or have any questions regarding your home care instructions, please contact the wound center at [phone]. If after hours, contact your primary care physician or go to the hospital emergency room.

## 2022-10-11 NOTE — PROCEDURES
Non-selective debridement to wound and periwound using puracyn spray and gauze to remove nonviable tissue and biofilm.

## 2022-10-12 ENCOUNTER — OFFICE VISIT (OUTPATIENT)
Dept: INTERNAL MEDICINE | Facility: OTHER | Age: 42
End: 2022-10-12
Payer: OTHER MISCELLANEOUS

## 2022-10-12 VITALS
SYSTOLIC BLOOD PRESSURE: 102 MMHG | BODY MASS INDEX: 35.68 KG/M2 | OXYGEN SATURATION: 99 % | WEIGHT: 201.4 LBS | HEART RATE: 81 BPM | DIASTOLIC BLOOD PRESSURE: 68 MMHG | HEIGHT: 63 IN | TEMPERATURE: 97.1 F

## 2022-10-12 DIAGNOSIS — D64.9 NORMOCYTIC ANEMIA: ICD-10-CM

## 2022-10-12 DIAGNOSIS — E11.65 TYPE 2 DIABETES MELLITUS WITH HYPERGLYCEMIA, WITHOUT LONG-TERM CURRENT USE OF INSULIN (HCC): ICD-10-CM

## 2022-10-12 DIAGNOSIS — Z12.4 CERVICAL CANCER SCREENING: ICD-10-CM

## 2022-10-12 DIAGNOSIS — Z23 ENCOUNTER FOR IMMUNIZATION: ICD-10-CM

## 2022-10-12 DIAGNOSIS — I10 PRIMARY HYPERTENSION: ICD-10-CM

## 2022-10-12 DIAGNOSIS — D25.9 UTERINE LEIOMYOMA, UNSPECIFIED LOCATION: ICD-10-CM

## 2022-10-12 DIAGNOSIS — K61.1 PERIRECTAL ABSCESS: ICD-10-CM

## 2022-10-12 PROCEDURE — 99214 OFFICE O/P EST MOD 30 MIN: CPT | Mod: 25,GC | Performed by: GENERAL PRACTICE

## 2022-10-12 PROCEDURE — 90471 IMMUNIZATION ADMIN: CPT | Performed by: GENERAL PRACTICE

## 2022-10-12 PROCEDURE — 90686 IIV4 VACC NO PRSV 0.5 ML IM: CPT | Performed by: GENERAL PRACTICE

## 2022-10-12 RX ORDER — INSULIN LISPRO 100 [IU]/ML
INJECTION, SOLUTION INTRAVENOUS; SUBCUTANEOUS
COMMUNITY
Start: 2022-10-08 | End: 2022-11-15

## 2022-10-12 RX ORDER — ACETAMINOPHEN 500 MG
500-1000 TABLET ORAL EVERY 6 HOURS PRN
COMMUNITY
End: 2023-08-23

## 2022-10-12 ASSESSMENT — ENCOUNTER SYMPTOMS
CONSTIPATION: 0
HEADACHES: 0
FEVER: 0
WEIGHT LOSS: 0
TINGLING: 1
WEAKNESS: 0
CHILLS: 0
SHORTNESS OF BREATH: 0
NERVOUS/ANXIOUS: 0
SENSORY CHANGE: 1
MYALGIAS: 0
DEPRESSION: 0
WHEEZING: 0
COUGH: 0
HEARTBURN: 0
BLURRED VISION: 0
FALLS: 0
SORE THROAT: 0
DIZZINESS: 0
PALPITATIONS: 0
VOMITING: 0
DIARRHEA: 0
ABDOMINAL PAIN: 0
NAUSEA: 0
DOUBLE VISION: 0

## 2022-10-12 ASSESSMENT — LIFESTYLE VARIABLES: SUBSTANCE_ABUSE: 0

## 2022-10-12 ASSESSMENT — FIBROSIS 4 INDEX: FIB4 SCORE: 0.69

## 2022-10-12 NOTE — PATIENT INSTRUCTIONS
-continue diabetes management per Endocrinology  -will check CBC to see if anemia improved  -check urine microalbumin for diabetes  -Gynecology referral for evaluation for irregular periods and for fibroids as well as updated pap smear.call [phone]  -will schedule an appointment with Dr. Faria within the next 4 weeks  -please get a blood pressure machine. Will place an order. Check blood pressure daily and record. Get one from [employer] or [employer], get one around upper arm,not wrist.

## 2022-10-12 NOTE — PROGRESS NOTES
Established Patient    Liana presents today with the following:    CC: established    HPI: 42 year old female, presents for a routine appointment.PCP Dr. Faria.    Seen by PCP 9/28/22 for diabetes and a perirectal abscess as well as anemia and hypertension. Given prescription for Norco daily PRN before dressing changes for abscess and Tresiba increased from 20->24u daily.     Admitted to [facility] in September 2022 for a perirectal abscess s/p I&D with cultures demonstrating strep anginosus and Prevotela bivia. ID consulted and was treated Zyvox and Metronidazole outpatient which ended 10/3/22. Followed by wound care outpatient.cleared by General surgery last week on appointment. Seen weekly by wound clinic, almost healed and possibly a few more weeks of appointments.    CBC: Hgb 11 MCV 83 CMP: K 3.4, corrected calcium   and Bicarb 18  Anemia: baseline 12-14. Was anemic during admission last month. Has heavy periods, irregular. Needs a new pap smear.    DM2: A1c 12.5% August 2022. Taking Tresiba 24u daily and lispro 3u TIDAC as well as Trulicity and Metformin.fasting glucose 180-195. Was fasting 250's prior to hospitalization last month. Followed by an Ophthamologist annually.renal function good September 2022. Denies neuropathic symptoms. Followed by [facility] Endocrinology with appointment tomorrow.          Patient Active Problem List    Diagnosis Date Noted    Anemia 09/23/2022    Dyslipidemia 09/22/2022    Obesity (BMI 30-39.9) 05/25/2022    Gastro-esophageal reflux disease without esophagitis 04/14/2021    Major depressive disorder 09/14/2020    Polycystic ovary syndrome 12/11/2019    History of deep venous thrombosis 11/19/2019    Hypothyroidism 11/19/2019    Hypertension 10/28/2017    Obstructive sleep apnea, adult 07/31/2015    Vitamin D deficiency 03/12/2015    Type 2 diabetes mellitus with hyperglycemia, without long-term current use of insulin (HCC) 03/12/2015       Social History     Tobacco Use     Smoking status: Former     Packs/day: 0.50     Years: 0.30     Pack years: 0.15     Types: Cigarettes     Quit date: 2018     Years since quittin.7    Smokeless tobacco: Never    Tobacco comments:     for 3 months at a time on and off    Vaping Use    Vaping Use: Never used   Substance Use Topics    Alcohol use: No     Alcohol/week: 0.0 oz    Drug use: No       Current Outpatient Medications   Medication Sig Dispense Refill    HUMALOG KWIKPEN 100 UNIT/ML Solution Pen-injector injection PEN       acetaminophen (TYLENOL) 500 MG Tab Take 500-1,000 mg by mouth every 6 hours as needed.      levothyroxine (SYNTHROID) 150 MCG Tab Take 1 Tablet by mouth every morning on an empty stomach. 90 Tablet 1    TRULICITY 1.5 MG/0.5ML Solution Pen-injector Inject 1.5 mg under the skin every day.      ONETOUCH ULTRA strip 1 STRIP BY OTHER ROUTE 3 TIMES A DAY BEFORE MEALS.      metFORMIN (GLUCOPHAGE) 850 MG Tab Take 850 mg by mouth 3 times a day.      atorvastatin (LIPITOR) 20 MG Tab Take 20 mg by mouth every day.      lisinopril (PRINIVIL) 10 MG Tab Take 10 mg by mouth every day.      Lancets Ultra Fine Misc       Lancets Use one  lancet to test blood sugar 3 times a day. 100 Each 0    Insulin Degludec (TRESIBA FLEXTOUCH) 200 UNIT/ML Solution Pen-injector Inject 24 Units under the skin every day. Indications: Start taking 20 U daily.      insulin lispro (HUMALOG) 100 UNIT/ML Inject 1-10 Units under the skin 3 times a day before meals. Unknown sliding scale       No current facility-administered medications for this visit.       Review of Systems   Constitutional:  Negative for chills, fever, malaise/fatigue and weight loss.   HENT:  Negative for congestion and sore throat.    Eyes:  Negative for blurred vision and double vision.   Respiratory:  Negative for cough, shortness of breath and wheezing.    Cardiovascular:  Negative for chest pain and palpitations.   Gastrointestinal:  Negative for abdominal pain, constipation, diarrhea,  "heartburn, nausea and vomiting.   Genitourinary:  Negative for dysuria, frequency and urgency.   Musculoskeletal:  Negative for falls, joint pain and myalgias.   Skin:  Negative for rash.   Neurological:  Positive for tingling and sensory change. Negative for dizziness, weakness and headaches.   Psychiatric/Behavioral:  Negative for depression, substance abuse and suicidal ideas. The patient is not nervous/anxious.        /68 (BP Location: Left arm, Patient Position: Sitting, BP Cuff Size: Adult)   Pulse 81   Temp 36.2 °C (97.1 °F) (Temporal)   Ht 1.6 m (5' 3\")   Wt 91.4 kg (201 lb 6.4 oz)   SpO2 99%   BMI 35.68 kg/m²       PHYSICAL EXAM:  Physical Exam  Vitals and nursing note reviewed.   Constitutional:       General: She is not in acute distress.     Appearance: Normal appearance. She is not ill-appearing.   HENT:      Head: Normocephalic and atraumatic.   Cardiovascular:      Rate and Rhythm: Normal rate and regular rhythm.      Pulses: Normal pulses.           Dorsalis pedis pulses are 2+ on the right side and 2+ on the left side.        Posterior tibial pulses are 2+ on the right side and 2+ on the left side.      Heart sounds: Normal heart sounds. No murmur heard.    No friction rub. No gallop.   Pulmonary:      Effort: Pulmonary effort is normal. No respiratory distress.      Breath sounds: Normal breath sounds. No wheezing, rhonchi or rales.   Musculoskeletal:      Right foot: Normal range of motion. No deformity, bunion, Charcot foot, foot drop or prominent metatarsal heads.      Left foot: Normal range of motion. No deformity, bunion, Charcot foot, foot drop or prominent metatarsal heads.   Feet:      Right foot:      Protective Sensation: 10 sites tested.  10 sites sensed.      Skin integrity: Dry skin present. No ulcer, blister, skin breakdown, erythema, warmth, callus or fissure.      Toenail Condition: Right toenails are normal.      Left foot:      Protective Sensation: 10 sites tested.  " 10 sites sensed.      Skin integrity: Dry skin present. No ulcer, blister, skin breakdown, erythema, warmth, callus or fissure.      Toenail Condition: Left toenails are normal.   Neurological:      Mental Status: She is alert and oriented to person, place, and time. Mental status is at baseline.      Cranial Nerves: No cranial nerve deficit.      Sensory: No sensory deficit.      Coordination: Coordination normal.   Psychiatric:         Mood and Affect: Mood normal.         Behavior: Behavior normal.       Note: I have reviewed all pertinent labs and diagnostic tests associated with this visit with specific comments listed under the assessment and plan below    Assessment and Plan    1. Perirectal abscess  Improved. Completed antibiotics. Surgery signed off. Followed by wound care.    2. Type 2 diabetes mellitus with hyperglycemia, without long-term current use of insulin (HCC)  Stable. Continue current regimen and get a microalbumin. Will see Endocrinology tomorrow.  - MICROALBUMIN CREAT RATIO URINE; Future    3. Normocytic anemia  May be related to uterine fibroids  - CBC WITHOUT DIFFERENTIAL; Future    4. Cervical cancer screening  - Referral to Gynecology    5. Uterine leiomyoma, unspecified location  - Referral to Gynecology    6. Primary hypertension  Patient counseled to get a blood pressure machine  - DME Other    7. Encounter for immunization  - INFLUENZA VACCINE QUAD INJ (PF)      Followup: Return in about 4 weeks (around 11/9/2022).      Signed by: Bayron Terrazas Jr., M.D.

## 2022-10-18 ENCOUNTER — NON-PROVIDER VISIT (OUTPATIENT)
Dept: WOUND CARE | Facility: MEDICAL CENTER | Age: 42
End: 2022-10-18
Attending: EMERGENCY MEDICINE
Payer: MEDICAID

## 2022-10-18 DIAGNOSIS — K61.1 PERIRECTAL ABSCESS: ICD-10-CM

## 2022-10-18 PROCEDURE — 97602 WOUND(S) CARE NON-SELECTIVE: CPT

## 2022-10-18 PROCEDURE — 99213 OFFICE O/P EST LOW 20 MIN: CPT | Performed by: STUDENT IN AN ORGANIZED HEALTH CARE EDUCATION/TRAINING PROGRAM

## 2022-10-18 NOTE — PROCEDURES
Nonselective debridement with NS cotton tip applicator to remove nonviable tissue from wound bed.     Pt reports new small sanguinous drainage with pain that started few days ago. Pt also reports severe tenderness to swollen blanching erythema to distal periwound.    Dr. Young assessed pt at bedside, and will contact pt's surgeon, Dr. Cuellar.

## 2022-10-18 NOTE — PATIENT INSTRUCTIONS
Tuck small strip of Aquacel Ag between buttocks and change dressing as needed.    Should you experience any significant changes in your wound(s), such as infection (redness, swelling, localized heat, increased pain, fever > 101 F, chills) or have any questions regarding your home care instructions, please contact the wound center at [phone]. If after hours, contact your primary care physician or go to the hospital emergency room.   Keep dressing clean, dry and covered while bathing. Only change dressing if it becomes over saturated, soiled or falls off.

## 2022-10-25 ENCOUNTER — NON-PROVIDER VISIT (OUTPATIENT)
Dept: WOUND CARE | Facility: MEDICAL CENTER | Age: 42
End: 2022-10-25
Attending: EMERGENCY MEDICINE
Payer: MEDICAID

## 2022-10-25 VITALS
DIASTOLIC BLOOD PRESSURE: 104 MMHG | TEMPERATURE: 96.8 F | RESPIRATION RATE: 18 BRPM | OXYGEN SATURATION: 98 % | HEART RATE: 88 BPM | SYSTOLIC BLOOD PRESSURE: 135 MMHG

## 2022-10-25 DIAGNOSIS — K61.1 PERIRECTAL ABSCESS: ICD-10-CM

## 2022-10-25 PROCEDURE — 99211 OFF/OP EST MAY X REQ PHY/QHP: CPT

## 2022-10-25 PROCEDURE — 99212 OFFICE O/P EST SF 10 MIN: CPT | Performed by: STUDENT IN AN ORGANIZED HEALTH CARE EDUCATION/TRAINING PROGRAM

## 2022-10-25 NOTE — PROGRESS NOTES
Brief Wound Care Provider Note    Patient presents to clinic for nursing visit. Her perianal wound looks resolved or near resolution, unfortunately her periwound tissue is more fluctuant, erythematous, and painful. Vitals were stable.    Last week I identified what I believe was recurrence of abscess and notified Dr. Cuellar office. She did not hear back from surgery office. I called again today and discussed with Dr. Cuellar's office staff and she will have surgery appointment tomorrow at 3:45pm.    We discussed pain control and possibility of Abx. As she is not septic and would need source control for Abx to be effective, will hold Abx as she has surgery appointment tomorrow. Discussed adding Ibuprofen to her current tylenol dose for adequate pain control.    My total time spent caring for the patient on the day of the encounter was 10 minutes, reviewing history, assessment, counseling and education, and coordination of care.  This does not include time spent on separately billable procedures/tests.

## 2022-10-25 NOTE — PATIENT INSTRUCTIONS
Should you experience any significant changes in your wound(s) such as infection (redness, swelling, localized heat, increased pain, fever >101 F, chills) go the hospital emergency room.  Follow-up with Dr. Cuellar 10/26/22 at your 3:45pm appointment.

## 2022-11-02 ENCOUNTER — NON-PROVIDER VISIT (OUTPATIENT)
Dept: INTERNAL MEDICINE | Facility: OTHER | Age: 42
End: 2022-11-02
Payer: MEDICAID

## 2022-11-02 VITALS — WEIGHT: 204 LBS | BODY MASS INDEX: 36.14 KG/M2

## 2022-11-02 DIAGNOSIS — F32.1 CURRENT MODERATE EPISODE OF MAJOR DEPRESSIVE DISORDER WITHOUT PRIOR EPISODE (HCC): ICD-10-CM

## 2022-11-02 DIAGNOSIS — E66.9 OBESITY (BMI 30-39.9): ICD-10-CM

## 2022-11-02 DIAGNOSIS — E28.2 POLYCYSTIC OVARY SYNDROME: ICD-10-CM

## 2022-11-02 DIAGNOSIS — E55.9 VITAMIN D DEFICIENCY: ICD-10-CM

## 2022-11-02 DIAGNOSIS — E11.65 TYPE 2 DIABETES MELLITUS WITH HYPERGLYCEMIA, WITHOUT LONG-TERM CURRENT USE OF INSULIN (HCC): ICD-10-CM

## 2022-11-02 DIAGNOSIS — G47.33 OBSTRUCTIVE SLEEP APNEA, ADULT: ICD-10-CM

## 2022-11-02 DIAGNOSIS — K61.1 PERIRECTAL ABSCESS: ICD-10-CM

## 2022-11-02 PROCEDURE — 97803 MED NUTRITION INDIV SUBSEQ: CPT | Performed by: DIETITIAN, REGISTERED

## 2022-11-02 ASSESSMENT — FIBROSIS 4 INDEX: FIB4 SCORE: 0.69

## 2022-11-02 NOTE — PROGRESS NOTES
"Liana Obrien is a 42 y.o. year old, female returns for follow up diabetes self management appt. Has been seeing Omero Johnson RDN for support, Liana not sure why appt was changed, but we will proceed.    Positive changes since last visit:  Trucility continues and dose increased last week to 3MG- nausea, no appetite continues, supports with eating    Meal/Eating/Environment Pattern:  Wound care has consumed time in managing her health, few months ago was in the hospital.   More stable now in her schedule- was walking more, but with wound has been challenging; continues on antibiotic and limited physical activity per MD order    Amelie reports chooses a  muffin or cupcake in the morning, small amounts for medication   Eating pattern:   Once a day meal: dinner usually  Mexican food- when working at [employer]  Does not have a chance to eat at work, works a 4-hour shift, no breaks and always busy; sometimes called into work and cannot eat or prepare anything before she leaves; works late at night some nights and feels bad eating late    Occ cooks at home: ground beef or chicken +sauce and vegetables- canned mixed veggies    Small freezer limits frozen foods to be stored    Social/Environmental:  incarcerated, Liana sees a therapist weekly and visiting family more often along w/friends for community support    Comments:    Liana tests BG ~ 3 times/day: FBG, lunch time and before dinner or before bed; still trying to obtain CGM    Average FB-175 mg/dl  Average before lunch/dinner: 180-190's to 230\"s; occ 290 mg/dl; averaging 250 mg/dl    DM meds: Tresiba 24 u AM, Humalog SS- averages 2-4 u last few weeks, metformin 1000 MG BID    Continues to see Shira Pulido for DM medication management    Liana would love to \"be healthy, lose weight and get off some of my medications\"    Perianal wound remains challenging and painful, discussed addition of wound healing nutrition supplements to assist in " recovery.    Reviewed carb counting basics, breakfast medication choice may not support BG in the day, consider new practices that may help bring BG targets more often.    Liana can follow up with Omero trent: commitments made today.    RTC x next available    Time Spent:  60 minutes

## 2022-11-02 NOTE — PATIENT INSTRUCTIONS
I will work on meal planning and be more prepared  - pack fruit and cottage cheese for work, string cheese+ fruit, PB&1/2 banana sandwich, beef jerky+fruit or vegetable  - bring a protein bar when out and about  - add a fruit or vegetable to my meal or snack  - if morning BG (FBG) above target (>130 mg/dl), keep to 0-15 grams carbohydrate with medications.    Sample Jose Alfredo for wound healing properties    Keep hydrated  - aim for 4-bottles per day: 2 at work, 2 at home

## 2022-11-03 ENCOUNTER — OFFICE VISIT (OUTPATIENT)
Dept: WOUND CARE | Facility: MEDICAL CENTER | Age: 42
End: 2022-11-03
Attending: INTERNAL MEDICINE
Payer: MEDICAID

## 2022-11-03 VITALS
DIASTOLIC BLOOD PRESSURE: 80 MMHG | HEART RATE: 85 BPM | SYSTOLIC BLOOD PRESSURE: 128 MMHG | TEMPERATURE: 96.5 F | OXYGEN SATURATION: 97 % | RESPIRATION RATE: 18 BRPM

## 2022-11-03 DIAGNOSIS — K61.1 PERIRECTAL ABSCESS: ICD-10-CM

## 2022-11-03 DIAGNOSIS — E66.9 OBESITY (BMI 30-39.9): ICD-10-CM

## 2022-11-03 DIAGNOSIS — E11.65 UNCONTROLLED TYPE 2 DIABETES MELLITUS WITH HYPERGLYCEMIA (HCC): ICD-10-CM

## 2022-11-03 DIAGNOSIS — T14.8XXA SURGICAL WOUND PRESENT: Primary | ICD-10-CM

## 2022-11-03 PROCEDURE — 99213 OFFICE O/P EST LOW 20 MIN: CPT | Performed by: NURSE PRACTITIONER

## 2022-11-03 PROCEDURE — 99214 OFFICE O/P EST MOD 30 MIN: CPT

## 2022-11-03 ASSESSMENT — ENCOUNTER SYMPTOMS
CHILLS: 0
VOMITING: 0
HEADACHES: 0
PALPITATIONS: 0
FEVER: 0
SHORTNESS OF BREATH: 0
DIZZINESS: 0
COUGH: 0
NAUSEA: 0

## 2022-11-03 NOTE — PROGRESS NOTES
Provider Encounter- Full Thickness wound    HISTORY OF PRESENT ILLNESS  Wound History:    START OF CARE IN CLINIC: 9/27/2022    REFERRING PROVIDER: Emre Yip      WOUND- Full Thickness Wound   LOCATION: Left Perirectal Skin   HISTORY:  42F with Pmhx of uncontrolled DM2 (A1c 12.5), obesity. Patient was admitted to [facility] 9/22-9/26/2022 for perirectal abscess. Patient was taken to OR and underwent I&D of perirectal abscess on 9/21 by Dr. Cuellar. She was treated with IV Abx. She was treating wound with packing. Wound culture grew streptococcus anginosus and prevotella bivia. ID consulted and recommend discharge on Zyvox and flagyl due to PCN allergy. Patient was referred to [facility] for wound care.     11/03/22: Patient was recently seen in Dr. Cuellar's office where she underwent an additional I&D with removal of purulent drainage and repacking of the wound.  Patient was referred back to [facility] for continued care the left perirectal wound.    Pertinent Medical History: Obesity, Uncontrolled DM2, Perirectal abscess     TOBACCO USE:  Former Smoker    Patient's problem list, allergies, and current medications reviewed and updated in Epic    Interval History:  9/29/2022: Clinic visit with Dick Young MD. Patient reports doing better since discharge from hospital. Denies any fevers or chills. Reports some pain at abscess site, improving. She denies any difficulty defecating, mucus or purulence in stool. Reports glucose 200+, has appointment with PCP tomorrow and appointment with endocrinology in October. Patient has been taking Flagyl but unable to  Zyvox due to cost. I called [facility] and they cost checked Zyvox and estimated co pay is $10, I reordered medication to [facility].    11/3/2022: Clinic visit with DREAD Keller.  Patient states overall she is feeling well denies any fever or chills.  Patient states she has completed her antibiotics.  Patient has been changing the outer  dressing every time that she goes to the bathroom due to soiled the outer dressing.    REVIEW OF SYSTEMS:  Review of Systems   Constitutional:  Negative for chills and fever.   Respiratory:  Negative for cough and shortness of breath.    Cardiovascular:  Negative for chest pain and palpitations.   Gastrointestinal:  Negative for nausea and vomiting.   Skin:         Open wound left buttock  Decreased pain to wound bed   Neurological:  Negative for dizziness and headaches.      PHYSICAL EXAMINATION:   /80 (BP Location: Left arm, Patient Position: Sitting)   Pulse 85   Temp 35.8 °C (96.5 °F) (Temporal)   Resp 18   SpO2 97%     Physical Exam  Constitutional:       General: She is not in acute distress.     Appearance: She is obese.   Cardiovascular:      Rate and Rhythm: Normal rate.   Pulmonary:      Effort: Pulmonary effort is normal. No respiratory distress.   Skin:     Comments: Left perirectal wound from I&D of abscess -wound bed is filled with granulation tissue no slough or purulent drainage.  No odor emanating from the wound bed.   Neurological:      Mental Status: She is alert.       WOUND ASSESSMENT  Wound 11/03/22 Buttocks Left Perirectal (Active)   Wound Image   11/03/22 1525   Site Assessment Red;Early/partial granulation 11/03/22 1525   Periwound Assessment Induration;Painful 11/03/22 1525   Margins Unattached edges 11/03/22 1525   Drainage Amount Moderate 11/03/22 1525   Drainage Description Serosanguineous 11/03/22 1525   Treatments Cleansed;Site care 11/03/22 1525   Wound Cleansing Normal Saline Irrigation 11/03/22 1525   Periwound Protectant Skin Protectant Wipes to Periwound 11/03/22 1525   Dressing Cleansing/Solutions Not Applicable 11/03/22 1525   Dressing Options Hydrofiber Silver Strip;Steri-Strip;Dry Gauze 11/03/22 1525   Non-staged Wound Description Full thickness 11/03/22 1525   Wound Length (cm) 1.5 cm 11/03/22 1525   Wound Width (cm) 0.6 cm 11/03/22 1525   Wound Depth (cm) 1.4 cm  11/03/22 1525   Wound Surface Area (cm^2) 0.9 cm^2 11/03/22 1525   Wound Volume (cm^3) 1.26 cm^3 11/03/22 1525   Tunneling (cm) 0 cm 11/03/22 1525   Undermining (cm) 0 cm 11/03/22 1525   Wound Odor None 11/03/22 1525   Exposed Structures None 11/03/22 1525   Number of days: 0       PROCEDURE:   -No excisional debridement formed clinic today.      Pertinent Labs and Diagnostics:    Labs:     A1c:   Lab Results   Component Value Date/Time    HBA1C 12.5 (A) 08/10/2022 05:09 PM          IMAGING: CT Pelvis with 9/21/2022  IMPRESSION:     1.  Left-sided perirectal abscess which tracks caudally into the left upper thigh. The largest portion of this abscess in the left upper thigh measures 5.3 x 3.1 cm in greatest diameters.    VASCULAR STUDIES: None    LAST  WOUND CULTURE:  DATE :   Lab Results   Component Value Date/Time    CULTRSULT (A) 09/22/2022 03:12 PM     Growth noted after further incubation, see below for  organism identification.      CULTRSULT (A) 09/22/2022 03:12 PM     Prevotella bivia  Moderate growth  Beta lactamase positive      CULTRSULT - (A) 09/22/2022 03:12 PM    CULTRSULT Streptococcus anginosus  Moderate growth   (A) 09/22/2022 03:12 PM         ASSESSMENT AND PLAN:     1. Perirectal abscess  2. Surgical wound present  Comments: Admission 9/22-9/26/2022 for perirectal abscess s/p I&D by Dr. Cuellar  11/03/22: Patient underwent I&D in Dr. Cuellar's office on 10/27/2022  -No excisional debridement required in clinic today.  The wound bed is filling with beefy red granulation tissue and there is no slough or nonviable tissue in need of debridement.  -Patient to return to the clinic twice weekly 1 time a week to be assessed and debrided by a provider if necessary.  The second additional clinic visit for wound dressing only.   Wound Care: Hydrofiber silver strip, Steri-Strip, dry gauze    3. Uncontrolled type 2 diabetes mellitus with hyperglycemia (HCC)  Comments: Last A1c 12.5    11/03/22  -Patient reports  her last blood sugar was 170 this morning before any meals.  -Educated the patient on the need to maintain blood sugars optimally below 140 for wound healing at minimum below 180.  -Patient reports she has established with endocrinology and is currently on IV and oral diabetic medication.  Patient to follow-up with endocrinology as needed.    4. Obesity (BMI 30-39.9)  Discussed risks to wound healing and infection  -Has been recommended the patient that she initiate a exercise regiment as well as improving diet with reduced carbohydrates to improve overall health as well as to decrease blood sugar levels.    PATIENT EDUCATION  - Importance of adequate nutrition for wound healing  -Advised to go to ER for any increased redness, swelling, drainage, or odor, or if patient develops fever, chills, nausea or vomiting.     >20 min spent face to face with patient, >50% of time spent counseling, coordinating care, reviewing records, discussing POC, educating patient        Please note that this note may have been created using voice recognition software. I have worked with technical experts from [employer] to optimize the interface.  I have made every reasonable attempt to correct obvious errors, but there may be errors of grammar and possibly content that I did not discover before finalizing the note.    N

## 2022-11-07 ENCOUNTER — NON-PROVIDER VISIT (OUTPATIENT)
Dept: WOUND CARE | Facility: MEDICAL CENTER | Age: 42
End: 2022-11-07
Attending: INTERNAL MEDICINE
Payer: MEDICAID

## 2022-11-07 PROCEDURE — 97602 WOUND(S) CARE NON-SELECTIVE: CPT

## 2022-11-07 NOTE — PATIENT INSTRUCTIONS
After Visit Summary Wound Care Instructions    Nutrition - Patient instructed increased protein diet unless contraindicated in renal failure (meat, eggs, fish, yogurt, cottage cheese, beans), use of multivitamin with minerals and Arginaid supplementation (check if ok with Primary Care Provider first).    Infection -  instructed signs and symptoms of infection, increased redness and swelling, localized heat over wound and surrounding area/fever/chills/nausea and vomiting, when to call doctor or go to Emergency Room.     Dressing Changes - Instructed patient rationale for wound care products. Instructed to keep dressings clean and dry, shower on clinic days right before coming in. Change your dressing if it becomes soiled, soaked, or falls off.      Dressing change procedure   Remove old dressing.  Cleanse the wound with saline and gauze.  Dry surrounding skin with gauze, then apply skin prep wipe, allow to dry, then apply zinc oxide barrier paste.  Cut a small piece of Aquacel silver dressing, and pack loosely into wound to fill the dead space and the undermining areas.   Cut a larger piece of Aquacel silver and place this on top.   Cover with foam dressing and secure with the hypafix tape if needed.   Change 1-2 x week and as needed at home, come to wound clinic 1 x week.   Change your dressing if it becomes soiled, soaked, or falls off.    Instructed patient about fall prevention.      Diabetic Instruction - Patient instructed keep tight control over Blood Sugar, <140 for optimal wound healing; Implications of loss of protective sensation (LOPS) discussed with patient, including increased risk for amputation.  Advised to check feet at least daily, moisturize feet, and to always wear protective foot wear, arrange meticulous regular foot care by podiatrist or Certified Foot Care Nurse (CFCN). Patient with good understanding.       Questions - should you have any questions regarding your home care instructions,  please contact the wound center at [phone]. If after hours, contact your primary care physician or go to the hospital emergency room.     Keep weight off affected area.    Chair - Shift position every 15 mins while in your chair. May use waffle cushion.   Bed -Avoid direct pressure on affected area or bony prominence. Use positioning devices such as pillows to help with support.    Should you experience any significant changes in your wound(s), such as infection (redness, swelling, localized heat, increased pain, fever > 101 F, chills) or have any questions regarding your home care instructions, please contact the wound center at [phone]. If after hours, contact your primary care physician or go to the hospital emergency room.   Keep dressing clean, dry and covered while bathing. Only change dressing if it becomes over saturated, soiled or falls off.

## 2022-11-10 ENCOUNTER — NON-PROVIDER VISIT (OUTPATIENT)
Dept: WOUND CARE | Facility: MEDICAL CENTER | Age: 42
End: 2022-11-10
Attending: INTERNAL MEDICINE
Payer: MEDICAID

## 2022-11-10 PROCEDURE — 97602 WOUND(S) CARE NON-SELECTIVE: CPT

## 2022-11-10 NOTE — PATIENT INSTRUCTIONS
-Keep your wound dressing clean, dry, and intact.    -Change your dressing every 3-4 days, or if it becomes soiled, soaked, or falls off:  Clean site with soap and water, rinse well, and pat dry  Insert small piece of silver hydrofiber into wound bed - do not over pack  Cover with hyapfix tape and then transparent film    Use Peribottle with warm ater and/or a small bit of baby soap to clean perianal area after bowel movements    -Should you experience any significant changes in your wound(s), such as infection (redness, swelling, localized heat, increased pain, fever > 101 F, chills) or have any questions regarding your home care instructions, please contact the wound center at [phone]. If after hours, contact your primary care physician or go to the hospital emergency room.

## 2022-11-10 NOTE — PROCEDURES
Non-selective debridement to wound and cj-wound using Puracyn spray and gauze to remove biofilm and non-viable tissue.

## 2022-11-14 ENCOUNTER — NON-PROVIDER VISIT (OUTPATIENT)
Dept: WOUND CARE | Facility: MEDICAL CENTER | Age: 42
End: 2022-11-14
Attending: INTERNAL MEDICINE
Payer: MEDICAID

## 2022-11-14 PROCEDURE — 97602 WOUND(S) CARE NON-SELECTIVE: CPT

## 2022-11-14 NOTE — PATIENT INSTRUCTIONS
-Keep your wound dressing clean, dry, and intact.    -Change your dressing if it becomes soiled, soaked, or falls off.    -Should you experience any significant changes in your wound(s), such as infection (redness, swelling, localized heat, increased pain, fever > 101 F, chills) or have any questions regarding your home care instructions, please contact the wound center at [phone]. If after hours, contact your primary care physician or go to the hospital emergency room.

## 2022-11-14 NOTE — PROCEDURES
Non-selective debridement to perirectal wound and cj-wound using Puracyn spray and gauze to remove biofilm and non-viable tissue.

## 2022-11-15 ENCOUNTER — OFFICE VISIT (OUTPATIENT)
Dept: INTERNAL MEDICINE | Facility: OTHER | Age: 42
End: 2022-11-15
Payer: MEDICAID

## 2022-11-15 VITALS
SYSTOLIC BLOOD PRESSURE: 133 MMHG | BODY MASS INDEX: 36 KG/M2 | DIASTOLIC BLOOD PRESSURE: 98 MMHG | WEIGHT: 203.2 LBS | TEMPERATURE: 97.4 F | HEART RATE: 77 BPM | OXYGEN SATURATION: 98 % | HEIGHT: 63 IN

## 2022-11-15 DIAGNOSIS — E78.5 DYSLIPIDEMIA: ICD-10-CM

## 2022-11-15 DIAGNOSIS — G47.33 OBSTRUCTIVE SLEEP APNEA, ADULT: ICD-10-CM

## 2022-11-15 DIAGNOSIS — F32.1 CURRENT MODERATE EPISODE OF MAJOR DEPRESSIVE DISORDER WITHOUT PRIOR EPISODE (HCC): ICD-10-CM

## 2022-11-15 DIAGNOSIS — D21.9 FIBROIDS: ICD-10-CM

## 2022-11-15 DIAGNOSIS — E03.8 OTHER SPECIFIED HYPOTHYROIDISM: ICD-10-CM

## 2022-11-15 DIAGNOSIS — E28.2 POLYCYSTIC OVARY SYNDROME: ICD-10-CM

## 2022-11-15 DIAGNOSIS — E11.65 TYPE 2 DIABETES MELLITUS WITH HYPERGLYCEMIA, WITHOUT LONG-TERM CURRENT USE OF INSULIN (HCC): ICD-10-CM

## 2022-11-15 DIAGNOSIS — D64.9 ANEMIA, UNSPECIFIED TYPE: ICD-10-CM

## 2022-11-15 DIAGNOSIS — I10 PRIMARY HYPERTENSION: ICD-10-CM

## 2022-11-15 DIAGNOSIS — E66.9 OBESITY (BMI 30-39.9): ICD-10-CM

## 2022-11-15 DIAGNOSIS — E55.9 VITAMIN D DEFICIENCY: ICD-10-CM

## 2022-11-15 DIAGNOSIS — N92.6 IRREGULAR PERIODS/MENSTRUAL CYCLES: ICD-10-CM

## 2022-11-15 DIAGNOSIS — K61.1 PERIRECTAL ABSCESS: ICD-10-CM

## 2022-11-15 LAB
HBA1C MFR BLD: 9.6 % (ref 0–5.6)
INT CON NEG: NEGATIVE
INT CON POS: POSITIVE

## 2022-11-15 PROCEDURE — 83036 HEMOGLOBIN GLYCOSYLATED A1C: CPT | Performed by: STUDENT IN AN ORGANIZED HEALTH CARE EDUCATION/TRAINING PROGRAM

## 2022-11-15 PROCEDURE — 99213 OFFICE O/P EST LOW 20 MIN: CPT | Mod: GE | Performed by: STUDENT IN AN ORGANIZED HEALTH CARE EDUCATION/TRAINING PROGRAM

## 2022-11-15 RX ORDER — DULAGLUTIDE 3 MG/.5ML
INJECTION, SOLUTION SUBCUTANEOUS
COMMUNITY
Start: 2022-10-21 | End: 2023-02-03

## 2022-11-15 ASSESSMENT — FIBROSIS 4 INDEX: FIB4 SCORE: 0.69

## 2022-11-15 NOTE — ASSESSMENT & PLAN NOTE
Patient currently on lisinopril 10 mg daily  Blood pressure 133/98 today in clinic.  Patient advised to bring blood pressure log to next visit.

## 2022-11-15 NOTE — ASSESSMENT & PLAN NOTE
Patient reports some stress due to multiple medical conditions.  She states that she has no longer taking any antidepressants  She does follow with a therapist at this time.

## 2022-11-15 NOTE — ASSESSMENT & PLAN NOTE
Patient reports she has not currently using her CPAP as she feels she has suffocated and her mask may need to be changed.  She states that her last sleep study was over a year ago  - Referral to sleep medicine for repeat sleep study and titration

## 2022-11-15 NOTE — ASSESSMENT & PLAN NOTE
Underwent additional I&D on 11/3/2022.  She completed course of antibiotics.  No current fevers or chills.  No fluctuance along abscess noted on exam today.  Currently following with wound care for packing changes twice weekly.

## 2022-11-15 NOTE — ASSESSMENT & PLAN NOTE
Patient has known history of PCOS.   Estrogen-containing OCP is contraindicated due to history of DVT  Pelvic ultrasound 2019 revealed endometrial thickening at 1.26 cm  GYN referral placed  Repeat TVUS

## 2022-11-15 NOTE — ASSESSMENT & PLAN NOTE
Pelvic US 2019 revealed  Heterogeneous solid myometrial masses measuring up to 3.8 cm with largest at the fundus.The endometrial echo complex measures 1.26 cm. Right ovary measures 3.07 cm x 1.94 cm x 2.54 cm.Left ovary measures 3.82 cm x 2.30 cm x 2.64 cm. With follicles present.  - Patient reports irregular menstrual periods  - Anemia on recent labs  - Repeat TVUS  - Gyn referral placed

## 2022-11-15 NOTE — ASSESSMENT & PLAN NOTE
Follows with Dr. Allen, and and [facility] Pulido endocrinology.  A1c 12.5 in Aug 2022  On Tresiba 24U daily.  Humalog ISS 2-4 U before meals. Also on metformin 1000 BID and Trulicity- increased to 3 weekly some time in late Sept.   Saw endo 2 months ago  Sugars range 150-200.  No  lows.   Pain in one toe. No neuropathy.   Had eye exam  In Aug 2022  Following with nutrition.   A1c today in clinic was  9.6  Increase Tresiba to 26 U daily

## 2022-11-15 NOTE — PROGRESS NOTES
Liana Obrien  is a 42 y.o. female with a PMH of   Patient Active Problem List   Diagnosis    Vitamin D deficiency    Type 2 diabetes mellitus with hyperglycemia, without long-term current use of insulin (HCC)    Obstructive sleep apnea, adult    Hypertension    History of deep venous thrombosis    Gastro-esophageal reflux disease without esophagitis    Hypothyroidism    Major depressive disorder    Polycystic ovary syndrome    Obesity (BMI 30-39.9)    Dyslipidemia    Anemia    Perirectal abscess    Fibroids    here to address  the following    T2 DM   A1c 12.5 in Aug 2022  On Tresiba 24U daily.  Humalog ISS 2-4 U before meals. And Trulicity increased to 3 weekly some time in late Sept.   Saw endo 2 months ago  Sugars range 150-200.  No  lows.   Pain in one toe. No neuropathy.   Had eye exam within the year.   Following with nutrition.      Perirectal abscess    11/03/22: Patient was recently seen in Dr. Cuellar's office where she underwent an additional I&D with removal of purulent drainage and repacking of the wound.  Patient was referred back to [facility] for continued care the left perirectal wound. Completed abx 2 weeks ago.  Pain is more tolerable now. Trying not to take any pain meds. Still following with wound care for packing.  No fevers or chills. Last saw wound care Monday. Sees them twice a week during which packing is changed. Denies any diarrhea after completing antibiotics.     HTN:  /98 today.  Only checked a couple times a t home.  Denies headaches, dizziness, shortness of breath, palpitations.     SILVIA  Has CPAP  but not using it recently because she feel she is suffocating.  Hasn't seen sleep med in a year. Feels it needs to be adjusted.  Feels tired sometimes. Has to sleep with head of bed elevated.  Denies leg swelling. Reports orthopnea.      Hypothyroidism:   Gaining some weight as she cannot exercise with the wound. No recent hair or skin changes.     Would like gyn referral irregular  periods for PCOS leiomyoma. No OCP because of DVTs. Goes months without periods and when she gets them they are very heavy. 3-4 pads a day with some large clots.      Pelvic US 2019 revealed  Heterogeneous solid myometrial masses measuring up to 3.8 cm with largest at the fundus.The endometrial echo complex measures 1.26 cm. Right ovary measures 3.07 cm x 1.94 cm x 2.54 cm.Left ovary measures 3.82 cm x 2.30 cm x 2.64 cm. With follicles present.     ROS All ROS negative unless otherwise mentioned in HPI    Vitals:    11/15/22 0944   BP: (!) 133/98   Pulse: 77   Temp: 36.3 °C (97.4 °F)   SpO2: 98%         Physical Exam  Vitals and nursing note reviewed.   Constitutional:       General: She is not in acute distress.     Appearance: Normal appearance. She is not ill-appearing.   HENT:      Head: Normocephalic and atraumatic.   Cardiovascular:      Rate and Rhythm: Normal rate and regular rhythm.      Pulses: Normal pulses.           Dorsalis pedis pulses are 2+ on the right side and 2+ on the left side.        Posterior tibial pulses are 2+ on the right side and 2+ on the left side.      Heart sounds: Normal heart sounds. No murmur heard.    No friction rub. No gallop.   Pulmonary:      Effort: Pulmonary effort is normal. No respiratory distress.      Breath sounds: Normal breath sounds. No wheezing, rhonchi or rales.   Musculoskeletal:      Right foot: Normal range of motion. No deformity, bunion, Charcot foot, foot drop or prominent metatarsal heads.      Left foot: Normal range of motion. No deformity, bunion, Charcot foot, foot drop or prominent metatarsal heads.   Feet:      Right foot:      Skin integrity: Dry skin present. No ulcer, blister, skin breakdown, erythema, warmth, callus or fissure.      Toenail Condition: Right toenails are normal.      Left foot:      Skin integrity: Dry skin present. No ulcer, blister, skin breakdown, erythema, warmth, callus or fissure.      Toenail Condition: Left toenails are  normal.      Comments: Mild erythema along right first toe.   Neurological:      Mental Status: She is alert and oriented to person, place, and time. Mental status is at baseline.      Cranial Nerves: No cranial nerve deficit.      Sensory: No sensory deficit.      Coordination: Coordination normal.   Psychiatric:         Mood and Affect: Mood normal.         Behavior: Behavior normal.          Current Outpatient Medications:     metFORMIN, 1,000 mg, Oral, BID, Taking    Multiple Vitamins-Minerals (EMERGEN-C IMMUNE PO), Take  by mouth., Taking    acetaminophen, 500-1,000 mg, Oral, Q6HRS PRN, PRN    levothyroxine, 150 mcg, Oral, AM ES, Taking    OneTouch Ultra, 1 STRIP BY OTHER ROUTE 3 TIMES A DAY BEFORE MEALS., Taking    atorvastatin, 20 mg, Oral, QDAY, Taking    lisinopril, 10 mg, Oral, QDAY, Taking    Lancets Ultra Fine, , Taking    Lancets, Use one  lancet to test blood sugar 3 times a day., Taking    Tresiba FlexTouch, 26 Units, Subcutaneous, DAILY, Taking    HumaLOG, 1-10 Units, Subcutaneous, TID AC, Taking    Trulicity, INJECT 1 PEN (3MG) INJECTOR SUBCUTANEOUSLY ONCE A WEEK       Type 2 diabetes mellitus with hyperglycemia, without long-term current use of insulin (HCC)  Follows with Dr. Allen, and and [facility] Pulido endocrinology.  A1c 12.5 in Aug 2022  On Tresiba 24U daily.  Humalog ISS 2-4 U before meals. Also on metformin 1000 BID and Trulicity- increased to 3 weekly some time in late Sept.   Saw endo 2 months ago  Sugars range 150-200.  No  lows.   Pain in one toe. No neuropathy.   Had eye exam  In Aug 2022  Following with nutrition.   A1c today in clinic was  9.6  Increase Tresiba to 26 U daily    Anemia  denies melena hematochezia  History notable for PCOS and fibroids  Repeat CBC B12 and Fe panel  TVUS due to history of menstrual irregularities  Patient cannot take OCPs due to history of DVT.  GYN referral placed    Fibroids     Pelvic US 2019 revealed  Heterogeneous solid myometrial masses measuring  up to 3.8 cm with largest at the fundus.The endometrial echo complex measures 1.26 cm. Right ovary measures 3.07 cm x 1.94 cm x 2.54 cm.Left ovary measures 3.82 cm x 2.30 cm x 2.64 cm. With follicles present.  - Patient reports irregular menstrual periods  - Anemia on recent labs  - Repeat TVUS  - Gyn referral placed    Dyslipidemia    No recent labs on file  Patient currently on atorvastatin 20 for diabetes  -Repeat lipid panel    Hypertension  Patient currently on lisinopril 10 mg daily  Blood pressure 133/98 today in clinic.  Patient advised to bring blood pressure log to next visit.    Hypothyroidism  Last TSH was 5.5 in 2019  Patient denies any palpitations, GI changes, skin or hair changes.  Currently on levothyroxine 150 mcg daily  Repeat TSH    Major depressive disorder  Patient reports some stress due to multiple medical conditions.  She states that she has no longer taking any antidepressants  She does follow with a therapist at this time.    Obesity (BMI 30-39.9)  Currently unable to exercise much due to limitations of perirectal abscess  Working with nutrition and trying to improve her diet  We agreed that we will take this 1 step at a time and address her other medical issues to make it easier for her to mobilize and exercise in the near future.    Obstructive sleep apnea, adult  Patient reports she has not currently using her CPAP as she feels she has suffocated and her mask may need to be changed.  She states that her last sleep study was over a year ago  - Referral to sleep medicine for repeat sleep study and titration    Perirectal abscess  Underwent additional I&D on 11/3/2022.  She completed course of antibiotics.  No current fevers or chills.  No fluctuance along abscess noted on exam today.  Currently following with wound care for packing changes twice weekly.    Polycystic ovary syndrome  Patient has known history of PCOS.   Estrogen-containing OCP is contraindicated due to history of  DVT  Pelvic ultrasound 2019 revealed endometrial thickening at 1.26 cm  GYN referral placed  Repeat TVUS

## 2022-11-15 NOTE — ASSESSMENT & PLAN NOTE
Last TSH was 5.5 in 2019  Patient denies any palpitations, GI changes, skin or hair changes.  Currently on levothyroxine 150 mcg daily  Repeat TSH

## 2022-11-15 NOTE — PATIENT INSTRUCTIONS
Please obtain attached labs for next visit.   Increase Tresiba to 26 units daily. Cut back if you have sugars under 80  Please bring a log of your blood pressures to the next visit. Check twice a day for 1 week.   I placed a referral for gyn and sleep medicine and podiatry. You should hear back with information on how to contact them.   If you have fevers and low blood pressure go to ED right away  If the toe get worse let me know right away

## 2022-11-15 NOTE — ASSESSMENT & PLAN NOTE
denies melena hematochezia  History notable for PCOS and fibroids  Repeat CBC B12 and Fe panel  TVUS due to history of menstrual irregularities  Patient cannot take OCPs due to history of DVT.  GYN referral placed

## 2022-11-15 NOTE — ASSESSMENT & PLAN NOTE
Currently unable to exercise much due to limitations of perirectal abscess  Working with nutrition and trying to improve her diet  We agreed that we will take this 1 step at a time and address her other medical issues to make it easier for her to mobilize and exercise in the near future.

## 2022-11-17 ENCOUNTER — NON-PROVIDER VISIT (OUTPATIENT)
Dept: WOUND CARE | Facility: MEDICAL CENTER | Age: 42
End: 2022-11-17
Attending: INTERNAL MEDICINE
Payer: MEDICAID

## 2022-11-17 PROCEDURE — 97602 WOUND(S) CARE NON-SELECTIVE: CPT

## 2022-11-17 NOTE — PATIENT INSTRUCTIONS
-Keep your wound dressing clean, dry, and intact.    -Change your dressing if it becomes soiled, soaked, or falls off.    -Should you experience any significant changes in your wound(s), such as infection (redness, swelling, localized heat, increased pain, fever > 101 F, chills) or have any questions regarding your home care instructions, please contact the wound center at [phone]. If after hours, contact your primary care physician or go to the hospital emergency room.

## 2022-11-17 NOTE — PROCEDURES
Non-selective debridement to perirectal wound and cj-wound using Puracyn and gauze to remove biofilm and non-viable tissue.

## 2022-11-21 ENCOUNTER — NON-PROVIDER VISIT (OUTPATIENT)
Dept: WOUND CARE | Facility: MEDICAL CENTER | Age: 42
End: 2022-11-21
Attending: INTERNAL MEDICINE
Payer: MEDICAID

## 2022-11-21 PROCEDURE — 99211 OFF/OP EST MAY X REQ PHY/QHP: CPT

## 2022-11-21 NOTE — PATIENT INSTRUCTIONS
Should you experience any significant changes in your wound(s) such as infection (redness, swelling, localized heat, increased pain, fever >101 F, chills) or have any questions regarding your home care instructions, please contact the wound center [phone]. If after hours, contact your primary care physician or go the hospital emergency room.  Change dressing if over saturated, soiled or its falling off. Keep your blood sugars under 140 for wound healing. Try exercising your arms.

## 2022-11-25 ENCOUNTER — NON-PROVIDER VISIT (OUTPATIENT)
Dept: WOUND CARE | Facility: MEDICAL CENTER | Age: 42
End: 2022-11-25
Attending: INTERNAL MEDICINE
Payer: MEDICAID

## 2022-11-25 PROCEDURE — 99211 OFF/OP EST MAY X REQ PHY/QHP: CPT

## 2022-11-25 NOTE — PATIENT INSTRUCTIONS
-Keep dressings clean and dry. Change dressings once between wound clinic visits, and if they become over saturated, soiled or fall off.     -Should you experience any significant changes in your wound(s), such as signs of infection (increasing redness, swelling, localized heat, increased pain, fever > 101 F, chills) or have any questions regarding your home care instructions, please contact the wound center at [phone]. If after hours, contact your primary care physician or go to the hospital emergency room.     -If you are 5 or more minutes late for an appointment, we reserve the right to cancel and reschedule that appointment. Additionally, if you are habitually late or not showing (3 late cancellations and/or no shows), we reserve the right to cancel your remaining appointments and it will be your responsibility to obtain a new referral if services are still needed.

## 2022-11-28 ENCOUNTER — NON-PROVIDER VISIT (OUTPATIENT)
Dept: WOUND CARE | Facility: MEDICAL CENTER | Age: 42
End: 2022-11-28
Attending: INTERNAL MEDICINE
Payer: MEDICAID

## 2022-11-28 PROCEDURE — 97602 WOUND(S) CARE NON-SELECTIVE: CPT

## 2022-11-28 NOTE — PROCEDURES
Non selective debridement with moist gauze to remove non-viable biofilm as well as silver nitrate to hypergranulation tissue and then flushed well with normal saline.

## 2022-11-28 NOTE — PATIENT INSTRUCTIONS
Should you experience any significant changes in your wound(s) such as infection (redness, swelling, localized heat, increased pain, fever >101 F, chills) or have any questions regarding your home care instructions, please contact the wound center [phone]. If after hours, contact your primary care physician or go the hospital emergency room.  Keep dressing clean and dry and cover while bathing. Only change dressing if over saturated, soiled or its falling off. Try squatting over a mirror to see if you can see & touch the dressing to try to change it. Keep your blood sugars under tight control for wound healing.

## 2022-12-01 ENCOUNTER — OFFICE VISIT (OUTPATIENT)
Dept: WOUND CARE | Facility: MEDICAL CENTER | Age: 42
End: 2022-12-01
Attending: INTERNAL MEDICINE
Payer: MEDICAID

## 2022-12-01 VITALS
SYSTOLIC BLOOD PRESSURE: 138 MMHG | OXYGEN SATURATION: 95 % | TEMPERATURE: 98 F | DIASTOLIC BLOOD PRESSURE: 86 MMHG | RESPIRATION RATE: 17 BRPM | HEART RATE: 98 BPM

## 2022-12-01 DIAGNOSIS — E66.9 OBESITY (BMI 30-39.9): ICD-10-CM

## 2022-12-01 DIAGNOSIS — E11.65 UNCONTROLLED TYPE 2 DIABETES MELLITUS WITH HYPERGLYCEMIA (HCC): ICD-10-CM

## 2022-12-01 DIAGNOSIS — T14.8XXA SURGICAL WOUND PRESENT: ICD-10-CM

## 2022-12-01 DIAGNOSIS — K61.1 PERIRECTAL ABSCESS: ICD-10-CM

## 2022-12-01 PROCEDURE — 99213 OFFICE O/P EST LOW 20 MIN: CPT | Performed by: NURSE PRACTITIONER

## 2022-12-01 NOTE — PROGRESS NOTES
Provider Encounter- Full Thickness wound    HISTORY OF PRESENT ILLNESS  Wound History:    START OF CARE IN CLINIC: 9/27/2022    REFERRING PROVIDER: Emre Yip      WOUND- Full Thickness Wound   LOCATION: Left Perirectal Skin   HISTORY:  42F with Pmhx of uncontrolled DM2 (A1c 12.5), obesity. Patient was admitted to [facility] 9/22-9/26/2022 for perirectal abscess. Patient was taken to OR and underwent I&D of perirectal abscess on 9/21 by Dr. Cuellar. She was treated with IV Abx. She was treating wound with packing. Wound culture grew streptococcus anginosus and prevotella bivia. ID consulted and recommend discharge on Zyvox and flagyl due to PCN allergy. Patient was referred to [facility] for wound care.     11/03/22: Patient was recently seen in Dr. Cuellar's office where she underwent an additional I&D with removal of purulent drainage and repacking of the wound.  Patient was referred back to [facility] for continued care the left perirectal wound.    Pertinent Medical History: Obesity, Uncontrolled DM2, Perirectal abscess     TOBACCO USE:  Former Smoker    Patient's problem list, allergies, and current medications reviewed and updated in Epic    Interval History:  9/29/2022: Clinic visit with Dick Young MD. Patient reports doing better since discharge from hospital. Denies any fevers or chills. Reports some pain at abscess site, improving. She denies any difficulty defecating, mucus or purulence in stool. Reports glucose 200+, has appointment with PCP tomorrow and appointment with endocrinology in October. Patient has been taking Flagyl but unable to  Zyvox due to cost. I called [facility] and they cost checked Zyvox and estimated co pay is $10, I reordered medication to [facility].    11/3/2022: Clinic visit with DREAD Keller.  Patient states overall she is feeling well denies any fever or chills.  Patient states she has completed her antibiotics.  Patient has been changing the outer  dressing every time that she goes to the bathroom due to soiled the outer dressing.    12/1/2022 : Clinic visit with DREAD Ríos, BRAEDEN-BC, DUKEN, SUNG.   Patient states she is feeling well overall.  She is still having a little bit of pain from her wound, still not able to sit o comfortably.  She has had trouble keeping packing in, has no one at home to help her.  She is instructed to cleanse the area at least twice daily and after each BM using a TANIKA body if the packing falls out again.    REVIEW OF SYSTEMS:  Unchanged from previous clinic visit on 11/3/2022, except as documented in interval history    PHYSICAL EXAMINATION:   /86 (BP Location: Right arm, Patient Position: Sitting)   Pulse 98   Temp 36.7 °C (98 °F) (Temporal)   Resp 17   SpO2 95%     Physical Exam  Constitutional:       General: She is not in acute distress.     Appearance: She is obese.   Cardiovascular:      Rate and Rhythm: Normal rate.   Pulmonary:      Effort: Pulmonary effort is normal. No respiratory distress.   Skin:     Comments: Left perirectal wound from I&D of abscess -tract wound.  Depth has decreased, serosanguineous drainage, no odor, no periwound erythema or induration    Refer to wound flowsheet and photos   Neurological:      Mental Status: She is alert.       WOUND ASSESSMENT  Wound 11/03/22 Buttocks Left Perirectal (Active)   Wound Image    12/01/22 0940   Site Assessment [place] 12/01/22 0940   Periwound Assessment Induration;Scar tissue 12/01/22 0940   Margins Unattached edges 12/01/22 0940   Drainage Amount HARPER 12/01/22 0940   Drainage Description HARPER 12/01/22 0940   Treatments Cleansed;Topical Lidocaine;Provider debridement 12/01/22 0940   Wound Cleansing Normal Saline Irrigation 12/01/22 0940   Periwound Protectant Skin Protectant Wipes to Periwound 12/01/22 0940   Dressing Cleansing/Solutions Not Applicable 12/01/22 0940   Dressing Options Hydrofiber Silver Strip 12/01/22 0940   Dressing Changed Changed  12/01/22 0940   Dressing Change/Treatment Frequency Other (Comments) 11/28/22 0921   Non-staged Wound Description Full thickness 12/01/22 0940   Wound Length (cm) 0.5 cm 12/01/22 0940   Wound Width (cm) 0.1 cm 12/01/22 0940   Wound Depth (cm) 0.2 cm 12/01/22 0940   Wound Surface Area (cm^2) 0.05 cm^2 12/01/22 0940   Wound Volume (cm^3) 0.01 cm^3 12/01/22 0940   Post-Procedure Length (cm) 0.5 cm 12/01/22 0940   Post-Procedure Width (cm) 0.1 cm 12/01/22 0940   Post-Procedure Depth (cm) 0.7 cm 12/01/22 0940   Post-Procedure Surface Area (cm^2) 0.05 cm^2 12/01/22 0940   Post-Procedure Volume (cm^3) 0.035 cm^3 12/01/22 0940   Wound Healing % 99 12/01/22 0940   Wound Bed Granulation (%) 100 % 11/07/22 1100   Wound Bed Granulation (%) - Post-Procedure 100 % 11/17/22 0900   Tunneling (cm) 0 cm 12/01/22 0940   Undermining (cm) 0 cm 12/01/22 0940   Wound Odor None 12/01/22 0940   Exposed Structures None 12/01/22 0940   Number of days: 28       PROCEDURE:   -No excisional debridement formed clinic today.      Pertinent Labs and Diagnostics:    Labs:     A1c:   Lab Results   Component Value Date/Time    HBA1C 9.6 (A) 11/15/2022 10:25 AM            IMAGING: CT Pelvis with 9/21/2022  IMPRESSION:     1.  Left-sided perirectal abscess which tracks caudally into the left upper thigh. The largest portion of this abscess in the left upper thigh measures 5.3 x 3.1 cm in greatest diameters.    VASCULAR STUDIES: None    LAST  WOUND CULTURE:  DATE :   Lab Results   Component Value Date/Time    CULTRSULT (A) 09/22/2022 03:12 PM     Growth noted after further incubation, see below for  organism identification.      CULTRSULT (A) 09/22/2022 03:12 PM     Prevotella bivia  Moderate growth  Beta lactamase positive      CULTRSULT - (A) 09/22/2022 03:12 PM    CULTRSULT Streptococcus anginosus  Moderate growth   (A) 09/22/2022 03:12 PM         ASSESSMENT AND PLAN:     1. Perirectal abscess  2. Surgical wound present  Comments: Admission  9/22-9/26/2022 for perirectal abscess s/p I&D by Dr. Cuellar  11/03/22: Patient underwent I&D in Dr. Cuellar's office on 10/27/2022    12/1/2022: Wound is a tract.  No need for debridement today  -No excisional debridement required in clinic today.  The wound bed is filling with beefy red granulation tissue and there is no slough or nonviable tissue in need of debridement.  -Patient to return to the clinic twice weekly 1 time a week to be assessed and debrided by a provider if necessary.  The second additional clinic visit for wound dressing only.   Wound Care: Hydrofiber silver strip, Steri-Strip, dry gauze    3. Uncontrolled type 2 diabetes mellitus with hyperglycemia (HCC)  Comments: Last A1c 12.5    12/1/2022: Patient reports her blood sugar this morning was 189.  -Adverse effects of hyperglycemia wound healing discussed.  Patient encouraged to keep her blood sugars below 140.  -She is established with an endocrinologist, and states she has an upcoming appointment within the next week or 2.    4. Obesity (BMI 30-39.9)    12/1/2022:   -Benefits of weight loss discussed at previous visit    PATIENT EDUCATION  - Importance of adequate nutrition for wound healing  -Advised to go to ER for any increased redness, swelling, drainage, or odor, or if patient develops fever, chills, nausea or vomiting.     My total time spent caring for the patient on the day of the encounter was 20 minutes.   This does not include time spent on separately billable procedures/tests.         Please note that this note may have been created using voice recognition software. I have worked with technical experts from [employer] to optimize the interface.  I have made every reasonable attempt to correct obvious errors, but there may be errors of grammar and possibly content that I did not discover before finalizing the note.    N

## 2022-12-01 NOTE — PATIENT INSTRUCTIONS
-Keep dressings clean and dry. Change dressings if they become over saturated, soiled or fall off.     -Should you experience any significant changes in your wound, such as signs of infection (increasing redness, swelling, localized heat, increased pain, fever > 101 F, chills) or have any questions regarding your home care instructions, please contact the wound center at [phone]. If after hours, contact your primary care physician or go to the hospital emergency room.     -If you are 5 or more minutes late for an appointment, we reserve the right to cancel and reschedule that appointment. Additionally, if you are habitually late or not showing (3 late cancellations and/or no shows), we reserve the right to cancel your remaining appointments and it will be your responsibility to obtain a new referral if services are still needed.

## 2022-12-05 ENCOUNTER — NON-PROVIDER VISIT (OUTPATIENT)
Dept: WOUND CARE | Facility: MEDICAL CENTER | Age: 42
End: 2022-12-05
Attending: INTERNAL MEDICINE
Payer: MEDICAID

## 2022-12-05 PROCEDURE — 99211 OFF/OP EST MAY X REQ PHY/QHP: CPT

## 2022-12-05 NOTE — PATIENT INSTRUCTIONS
Should you experience any significant changes in your wound(s) such as infection (redness, swelling, localized heat, increased pain, fever >101 F, chills) or have any questions regarding your home care instructions, please contact the wound center [phone]. If after hours, contact your primary care physician or go the hospital emergency room.  Rinse area well with peribottle or shower head wand sprayer after going to the bathroom.

## 2022-12-08 ENCOUNTER — APPOINTMENT (OUTPATIENT)
Dept: WOUND CARE | Facility: MEDICAL CENTER | Age: 42
End: 2022-12-08
Attending: INTERNAL MEDICINE
Payer: MEDICAID

## 2022-12-12 ENCOUNTER — APPOINTMENT (OUTPATIENT)
Dept: WOUND CARE | Facility: MEDICAL CENTER | Age: 42
End: 2022-12-12
Attending: INTERNAL MEDICINE
Payer: MEDICAID

## 2022-12-15 ENCOUNTER — OFFICE VISIT (OUTPATIENT)
Dept: WOUND CARE | Facility: MEDICAL CENTER | Age: 42
End: 2022-12-15
Attending: INTERNAL MEDICINE
Payer: MEDICAID

## 2022-12-15 VITALS
TEMPERATURE: 96.9 F | SYSTOLIC BLOOD PRESSURE: 136 MMHG | RESPIRATION RATE: 18 BRPM | DIASTOLIC BLOOD PRESSURE: 94 MMHG | HEART RATE: 64 BPM | OXYGEN SATURATION: 98 %

## 2022-12-15 DIAGNOSIS — T14.8XXA SURGICAL WOUND PRESENT: ICD-10-CM

## 2022-12-15 DIAGNOSIS — K61.1 PERIRECTAL ABSCESS: ICD-10-CM

## 2022-12-15 DIAGNOSIS — E66.9 OBESITY (BMI 30-39.9): ICD-10-CM

## 2022-12-15 DIAGNOSIS — E11.65 UNCONTROLLED TYPE 2 DIABETES MELLITUS WITH HYPERGLYCEMIA (HCC): ICD-10-CM

## 2022-12-15 PROCEDURE — 11042 DBRDMT SUBQ TIS 1ST 20SQCM/<: CPT

## 2022-12-15 PROCEDURE — 99213 OFFICE O/P EST LOW 20 MIN: CPT | Mod: 25 | Performed by: NURSE PRACTITIONER

## 2022-12-15 PROCEDURE — 11042 DBRDMT SUBQ TIS 1ST 20SQCM/<: CPT | Performed by: NURSE PRACTITIONER

## 2022-12-15 PROCEDURE — 99213 OFFICE O/P EST LOW 20 MIN: CPT

## 2022-12-15 NOTE — PROGRESS NOTES
Provider Encounter- Full Thickness wound    HISTORY OF PRESENT ILLNESS  Wound History:    START OF CARE IN CLINIC: 9/27/2022    REFERRING PROVIDER: Emre Yip      WOUND- Full Thickness Wound   LOCATION: Left Perirectal Skin   HISTORY:  42F with Pmhx of uncontrolled DM2 (A1c 12.5), obesity. Patient was admitted to [facility] 9/22-9/26/2022 for perirectal abscess. Patient was taken to OR and underwent I&D of perirectal abscess on 9/21 by Dr. Cuellar. She was treated with IV Abx. She was treating wound with packing. Wound culture grew streptococcus anginosus and prevotella bivia. ID consulted and recommend discharge on Zyvox and flagyl due to PCN allergy. Patient was referred to [facility] for wound care.     11/03/22: Patient was recently seen in Dr. Cuellar's office where she underwent an additional I&D with removal of purulent drainage and repacking of the wound.  Patient was referred back to [facility] for continued care the left perirectal wound.    Pertinent Medical History: Obesity, Uncontrolled DM2, Perirectal abscess     TOBACCO USE:  Former Smoker    Patient's problem list, allergies, and current medications reviewed and updated in Epic    Interval History:  9/29/2022: Clinic visit with Dick Young MD. Patient reports doing better since discharge from hospital. Denies any fevers or chills. Reports some pain at abscess site, improving. She denies any difficulty defecating, mucus or purulence in stool. Reports glucose 200+, has appointment with PCP tomorrow and appointment with endocrinology in October. Patient has been taking Flagyl but unable to  Zyvox due to cost. I called [facility] and they cost checked Zyvox and estimated co pay is $10, I reordered medication to [facility].    11/3/2022: Clinic visit with DREAD Keller.  Patient states overall she is feeling well denies any fever or chills.  Patient states she has completed her antibiotics.  Patient has been changing the outer  dressing every time that she goes to the bathroom due to soiled the outer dressing.    12/1/2022 : Clinic visit with DREAD Ríos, BOBBY, SUNNY, SUNG.   Patient states she is feeling well overall.  She is still having a little bit of pain from her wound, still not able to sit on comfortably.  She has had trouble keeping packing in, has no one at home to help her.  She is instructed to cleanse the area at least twice daily and after each BM using a TANIKA body if the packing falls out again.    12/15/2022 : Clinic visit with DREAD Ríos, BOBBY, SUNNY, SUNG.   Patient continues to feel well.  She is a bit frustrated with lack of progress from her wound.  She has been unable to keep packing in, and has just been cleaning several times per day.  Small tract persists.  Patient agreed to allow me to excise the skin over this tract in effort to accelerate healing.    REVIEW OF SYSTEMS:  Unchanged from previous clinic visit on 12/1/2022, except as documented in interval history    PHYSICAL EXAMINATION:   BP (!) 136/94 Comment: RN notified  Pulse 64   Temp 36.1 °C (96.9 °F) (Temporal)   Resp 18   SpO2 98%     Physical Exam  Constitutional:       General: She is not in acute distress.     Appearance: She is obese.   Cardiovascular:      Rate and Rhythm: Normal rate.   Pulmonary:      Effort: Pulmonary effort is normal. No respiratory distress.   Skin:     Comments: Left perirectal wound from I&D of abscess -tract wound.  Depth has decreased, serosanguineous drainage, no odor, no periwound erythema or induration    Refer to wound flowsheet and photos   Neurological:      Mental Status: She is alert.       WOUND ASSESSMENT  Wound 11/03/22 Buttocks Left Perirectal (Active)   Wound Image   12/05/22 0930   Site Assessment [place] 12/05/22 0930   Periwound Assessment Scar tissue;Induration 12/05/22 0930   Margins Attached edges;Defined edges 12/05/22 0930   Drainage Amount HARPER 12/05/22 0930   Drainage Description [place]  12/01/22 0940   Treatments Cleansed;Topical Lidocaine 12/05/22 0930   Wound Cleansing Normal Saline Irrigation 12/05/22 0930   Periwound Protectant Skin Protectant Wipes to Periwound 12/05/22 0930   Dressing Cleansing/Solutions Normal Saline 12/05/22 0930   Dressing Options Iodoform Strip Packing;Dry Gauze 12/05/22 0930   Dressing Changed New 12/05/22 0930   Dressing Change/Treatment Frequency Other (Comments) 12/05/22 0930   Non-staged Wound Description Full thickness 12/05/22 0930   Wound Length (cm) 0.3 cm 12/05/22 0930   Wound Width (cm) 0.2 cm 12/05/22 0930   Wound Depth (cm) 0.3 cm 12/05/22 0930   Wound Surface Area (cm^2) 0.06 cm^2 12/05/22 0930   Wound Volume (cm^3) 0.018 cm^3 12/05/22 0930   Post-Procedure Length (cm) 0.3 cm 12/05/22 0930   Post-Procedure Width (cm) 0.2 cm 12/05/22 0930   Post-Procedure Depth (cm) 0.3 cm 12/05/22 0930   Post-Procedure Surface Area (cm^2) 0.06 cm^2 12/05/22 0930   Post-Procedure Volume (cm^3) 0.018 cm^3 12/05/22 0930   Wound Healing % 99 12/05/22 0930   Wound Bed Granulation (%) 100 % 12/05/22 0930   Wound Bed Granulation (%) - Post-Procedure 100 % 11/17/22 0900   Tunneling (cm) 0 cm 12/05/22 0930   Undermining (cm) 0 cm 12/05/22 0930   Wound Odor None 12/05/22 0930   Exposed Structures None 12/05/22 0930   Number of days: 42       DESCRIPTION OF PROCEDURE: Excision of skin over wound tract and debridement of underlying wound    The procedure, rationale for doing so, and the possible risks- including infection, pain and bleeding- have been discussed with the patient.  The patient  verbalized understanding and is agreeable with proceeding.  Consent signed    ANESTHESIA: 1.0 ml 2% lidocaine without epinephrine    PROCEDURE:   -A formal timeout was performed to confirm patient's correct name, correct site, correct procedure and correct laterality.  -Skin prepped with Chlorahexidine.    -Lidocaine injected subcutaneously into periwound tissue  - Scalpel and forceps used to  excise small triangular shape of skin, approximately 0.05cm²  -Curette then used to excise slough and senescent red tissue from the wound bed.  Total area debrided, 0.06 centimeters squared.  Debridement carried into the subcutaneous tissue layer.   -Bleeding controlled with manual pressure.    -Wound care completed by wound RN, refer to flowsheet.    -Patient tolerated the procedure well, without c/o pain or discomfort.           Pertinent Labs and Diagnostics:    Labs:     A1c:   Lab Results   Component Value Date/Time    HBA1C 9.6 (A) 11/15/2022 10:25 AM            IMAGING: CT Pelvis with 9/21/2022  IMPRESSION:     1.  Left-sided perirectal abscess which tracks caudally into the left upper thigh. The largest portion of this abscess in the left upper thigh measures 5.3 x 3.1 cm in greatest diameters.    VASCULAR STUDIES: None    LAST  WOUND CULTURE:  DATE :   Lab Results   Component Value Date/Time    CULTRSULT (A) 09/22/2022 03:12 PM     Growth noted after further incubation, see below for  organism identification.      CULTRSULT (A) 09/22/2022 03:12 PM     Prevotella bivia  Moderate growth  Beta lactamase positive      CULTRSULT - (A) 09/22/2022 03:12 PM    CULTRSULT Streptococcus anginosus  Moderate growth   (A) 09/22/2022 03:12 PM         ASSESSMENT AND PLAN:     1. Perirectal abscess  2. Surgical wound present  Comments: Admission 9/22-9/26/2022 for perirectal abscess s/p I&D by Dr. Cuellar  11/03/22: Patient underwent I&D in Dr. Cuellar's office on 10/27/2022    12/15/2022: Tract persists, no changes in depth.  -Excision of skin overlying wound tract excised in clinic today with local anesthesia  -Excisional debridement of wound bed in clinic today, medically necessary to promote wound healing.  -Dressing applied in clinic today, however will likely not last due to location.  As before, patient may leave wound open and cleanse several times per day.  -Patient to return to clinic weekly for assessment  debridement.  Hopefully today's procedure will accelerate closure of this wound   Wound Care: Hydrofiber silver strip, since parent failed to secure    3. Uncontrolled type 2 diabetes mellitus with hyperglycemia (HCC)  Comments: Last A1c 12.5    12/15/2022: Patient's blood sugar this morning was in the 170s.  -Patient encouraged to keep her blood sugars below 140 in order to optimize wound healing  -S she is already established with endocrinology    4. Obesity (BMI 30-39.9)    12/15/2022:  -Benefits of weight loss discussed at previous visit    PATIENT EDUCATION  - Importance of adequate nutrition for wound healing  -Advised to go to ER for any increased redness, swelling, drainage, or odor, or if patient develops fever, chills, nausea or vomiting.     My total time spent caring for the patient on the day of the encounter was 20 minutes.   This does not include time spent on separately billable procedures/tests.         Please note that this note may have been created using voice recognition software. I have worked with technical experts from [employer] to optimize the interface.  I have made every reasonable attempt to correct obvious errors, but there may be errors of grammar and possibly content that I did not discover before finalizing the note.    N

## 2022-12-15 NOTE — PATIENT INSTRUCTIONS
-Keep dressings clean and dry. Change dressings once between wound clinic visits, and if they become over saturated, soiled or fall off.     -Should you experience any significant changes in your wound(s), such as signs of infection (increasing redness, swelling, localized heat, increased pain, fever > 101 F, chills) or have any questions regarding your home care instructions, please contact the wound center at [phone]. If after hours, contact your primary care physician or go to the hospital emergency room.     -If you are 5 or more minutes late for an appointment, we reserve the right to cancel and reschedule that appointment. Additionally, if you are habitually late or not showing (3 late cancellations and/or no shows), we reserve the right to cancel your remaining appointments and it will be your responsibility to obtain a new referral if services are still needed.

## 2022-12-20 ENCOUNTER — APPOINTMENT (OUTPATIENT)
Dept: RADIOLOGY | Facility: MEDICAL CENTER | Age: 42
End: 2022-12-20
Attending: STUDENT IN AN ORGANIZED HEALTH CARE EDUCATION/TRAINING PROGRAM
Payer: MEDICAID

## 2022-12-22 ENCOUNTER — NON-PROVIDER VISIT (OUTPATIENT)
Dept: WOUND CARE | Facility: MEDICAL CENTER | Age: 42
End: 2022-12-22
Attending: INTERNAL MEDICINE
Payer: MEDICAID

## 2022-12-22 PROCEDURE — 97602 WOUND(S) CARE NON-SELECTIVE: CPT

## 2022-12-22 NOTE — PATIENT INSTRUCTIONS
-Keep your wound dressing clean, dry, and intact.    -Change your dressing if it becomes soiled, soaked, or falls off.    -Should you experience any significant changes in your wound(s), such as infection (redness, swelling, localized heat, increased pain, fever > 101 F, chills) or have any questions regarding your home care instructions, please contact the wound center at [phone]. If after hours, contact your primary care physician or go to the hospital emergency room.

## 2022-12-22 NOTE — PROCEDURES
Non-selective debridement to perirectal wound and cj-wound using normal saline and gauze to remove biofilm and non-viable tissue.

## 2023-01-05 ENCOUNTER — NON-PROVIDER VISIT (OUTPATIENT)
Dept: WOUND CARE | Facility: MEDICAL CENTER | Age: 43
End: 2023-01-05
Attending: INTERNAL MEDICINE
Payer: MEDICAID

## 2023-01-05 DIAGNOSIS — R52 PAIN ASSOCIATED WITH WOUND: ICD-10-CM

## 2023-01-05 DIAGNOSIS — K61.1 PERIRECTAL ABSCESS: ICD-10-CM

## 2023-01-05 DIAGNOSIS — K61.2 ABSCESS OF ANAL AND RECTAL REGIONS: ICD-10-CM

## 2023-01-05 DIAGNOSIS — E11.65 UNCONTROLLED TYPE 2 DIABETES MELLITUS WITH HYPERGLYCEMIA (HCC): ICD-10-CM

## 2023-01-05 DIAGNOSIS — T14.8XXA PAIN ASSOCIATED WITH WOUND: ICD-10-CM

## 2023-01-05 PROCEDURE — 99213 OFFICE O/P EST LOW 20 MIN: CPT | Performed by: NURSE PRACTITIONER

## 2023-01-05 RX ORDER — CLINDAMYCIN HYDROCHLORIDE 300 MG/1
300 CAPSULE ORAL 3 TIMES DAILY
Qty: 30 CAPSULE | Refills: 0 | Status: SHIPPED | OUTPATIENT
Start: 2023-01-05 | End: 2023-01-15

## 2023-01-05 NOTE — PROGRESS NOTES
BRIEF PROVIDER ENCOUNTER    Patient was not on my shedule today, however RN asked me to see patient due to concerns for infection.  Her wound is essentially closed however, extremely tender to palpation.  RN states that when she applied manual pressure she expressed quite a bit of pus.    Lidocaine with epinephrine injected adjacent to scar tissue.  After allowing time for infiltration, attempted to probe with wooden end of a Q-tip.  Unable to find any depth, only a small pinpoint opening.  Area of induration, including scar tissue, approximately 2.5 cm².    Patient informed me that her blood sugars have been quite high as she has not been able to obtain Trulicity from her pharmacy, was told that it is on backorder.  She states that her blood sugars have been in the 300s.    Rx for clindamycin sent to patient's pharmacy.  Patient instructed to discontinue it and let me know if she is having any problems.  I also informed her to go to the ER if she experiences fevers, chills, nausea, vomiting, or if the area becomes more tender and swollen and or she notices increased purulence.    She is to return to clinic early next week.  I have asked that she be placed on my schedule.    My total time spent caring for the patient on the day of the encounter was 20 minutes.   This does not include time spent on separately billable procedures/tests.

## 2023-01-05 NOTE — PATIENT INSTRUCTIONS
-Keep your wound dressing clean, dry, and intact.    -Change your dressing if it becomes soiled, soaked, or falls off.    -Should you experience any significant changes in your wound(s), such as infection (redness, swelling, localized heat, increased pain, fever > 101 F, chills) or have any questions regarding your home care instructions, please contact the wound center at [phone]. If after hours, contact your primary care physician or go to the hospital emergency room.

## 2023-01-10 ENCOUNTER — OFFICE VISIT (OUTPATIENT)
Dept: WOUND CARE | Facility: MEDICAL CENTER | Age: 43
End: 2023-01-10
Attending: INTERNAL MEDICINE
Payer: MEDICAID

## 2023-01-10 VITALS
SYSTOLIC BLOOD PRESSURE: 129 MMHG | RESPIRATION RATE: 17 BRPM | TEMPERATURE: 97.3 F | DIASTOLIC BLOOD PRESSURE: 89 MMHG | HEART RATE: 89 BPM | OXYGEN SATURATION: 98 %

## 2023-01-10 DIAGNOSIS — K61.1 PERIRECTAL ABSCESS: ICD-10-CM

## 2023-01-10 DIAGNOSIS — R52 PAIN ASSOCIATED WITH WOUND: ICD-10-CM

## 2023-01-10 DIAGNOSIS — T14.8XXA PAIN ASSOCIATED WITH WOUND: ICD-10-CM

## 2023-01-10 DIAGNOSIS — K61.2 ABSCESS OF ANAL AND RECTAL REGIONS: ICD-10-CM

## 2023-01-10 PROCEDURE — 99213 OFFICE O/P EST LOW 20 MIN: CPT

## 2023-01-10 PROCEDURE — 99213 OFFICE O/P EST LOW 20 MIN: CPT | Performed by: NURSE PRACTITIONER

## 2023-01-10 NOTE — PATIENT INSTRUCTIONS
-Keep your wound dressing clean, dry, and intact.    -Should you experience any significant changes in your wound(s), such as infection (redness, swelling, localized heat, increased pain, fever > 101 F, chills) or have any questions regarding your home care instructions, please contact the wound center at [phone]. If after hours, contact your primary care physician or go to the hospital emergency room.

## 2023-01-10 NOTE — PROGRESS NOTES
Provider Encounter- Full Thickness wound    HISTORY OF PRESENT ILLNESS  Wound History:    START OF CARE IN CLINIC: 9/27/2022    REFERRING PROVIDER: Emre Yip      WOUND- Full Thickness Wound   LOCATION: Left Perirectal Skin   HISTORY:  42F with Pmhx of uncontrolled DM2 (A1c 12.5), obesity. Patient was admitted to [facility] 9/22-9/26/2022 for perirectal abscess. Patient was taken to OR and underwent I&D of perirectal abscess on 9/21 by Dr. Cuellar. She was treated with IV Abx. She was treating wound with packing. Wound culture grew streptococcus anginosus and prevotella bivia. ID consulted and recommend discharge on Zyvox and flagyl due to PCN allergy. Patient was referred to [facility] for wound care.     11/03/22: Patient was recently seen in Dr. Cuellar's office where she underwent an additional I&D with removal of purulent drainage and repacking of the wound.  Patient was referred back to [facility] for continued care the left perirectal wound.    Pertinent Medical History: Obesity, Uncontrolled DM2, Perirectal abscess     TOBACCO USE:  Former Smoker    Patient's problem list, allergies, and current medications reviewed and updated in Epic    Interval History:  9/29/2022: Clinic visit with Dick Young MD. Patient reports doing better since discharge from hospital. Denies any fevers or chills. Reports some pain at abscess site, improving. She denies any difficulty defecating, mucus or purulence in stool. Reports glucose 200+, has appointment with PCP tomorrow and appointment with endocrinology in October. Patient has been taking Flagyl but unable to  Zyvox due to cost. I called [facility] and they cost checked Zyvox and estimated co pay is $10, I reordered medication to [facility].    11/3/2022: Clinic visit with DREAD Keller.  Patient states overall she is feeling well denies any fever or chills.  Patient states she has completed her antibiotics.  Patient has been changing the outer  dressing every time that she goes to the bathroom due to soiled the outer dressing.    12/1/2022 : Clinic visit with DREAD Ríos, BOBBY, SUNG CORREA.   Patient states she is feeling well overall.  She is still having a little bit of pain from her wound, still not able to sit on comfortably.  She has had trouble keeping packing in, has no one at home to help her.  She is instructed to cleanse the area at least twice daily and after each BM using a TANIKA body if the packing falls out again.    12/15/2022 : Clinic visit with DREAD Ríos, BOBBY, SUNG CORREA.   Patient continues to feel well.  She is a bit frustrated with lack of progress from her wound.  She has been unable to keep packing in, and has just been cleaning several times per day.  Small tract persists.  Patient agreed to allow me to excise the skin over this tract in effort to accelerate healing.    1/10/2023 : Clinic visit with DREAD Ríos FNP-BC, SUNG CORREA.   Liana states she is feeling a little bit better, her wound is , though much improved.  She is tolerating clindamycin, prescribed last visit, with minor GI upset.  She has not noticed any drainage from her wound over the past few days.  She still just using gauze secured by her underwear.  Her blood sugars remain high, over 300.  She has just restarted Trulicity.   Because of ongoing tenderness, and recent duration, I am hesitant to discharge patient from clinic.  I would like her to return next week, at which time we will likely discharge her.  She understands she can call the clinic sooner if her wound deteriorates in the meantime.        REVIEW OF SYSTEMS:  Unchanged from previous clinic visit on 12/1/2022, except as documented in interval history    PHYSICAL EXAMINATION:   There were no vitals taken for this visit.    Physical Exam  Constitutional:       General: She is not in acute distress.     Appearance: She is obese.   Cardiovascular:      Rate and Rhythm:  Normal rate.   Pulmonary:      Effort: Pulmonary effort is normal. No respiratory distress.   Skin:     Comments: Left perirectal wound from I&D of abscess -re epithelialized, tender to palpation, no drainage, periwound induration has decreased    Refer to wound flowsheet and photos   Neurological:      Mental Status: She is alert.       WOUND ASSESSMENT  Wound 11/03/22 Buttocks Left Perirectal (Active)   Wound Image   01/05/23 1000   Site Assessment Epithelialization 01/05/23 1000   Periwound Assessment Scar tissue;Induration 01/05/23 1000   Margins Unattached edges 01/05/23 1000   Drainage Amount Small 01/05/23 1000   Drainage Description Purulent;Sanguineous 01/05/23 1000   Treatments Cleansed;Topical Lidocaine;Injectible Lidocaine;Site care 01/05/23 1000   Wound Cleansing Foam Cleanser/Washcloth 01/05/23 1000   Periwound Protectant Barrier Paste 01/05/23 1000   Dressing Cleansing/Solutions Not Applicable 01/05/23 1000   Dressing Options Dry Gauze 01/05/23 1000   Dressing Changed New 01/05/23 1000   Dressing Change/Treatment Frequency Daily, and As Needed 01/05/23 1000   Non-staged Wound Description Full thickness 01/05/23 1000   Wound Length (cm) 0.2 cm 01/05/23 1000   Wound Width (cm) 0.4 cm 01/05/23 1000   Wound Depth (cm) 0.2 cm 12/22/22 0900   Wound Surface Area (cm^2) 0.08 cm^2 01/05/23 1000   Wound Volume (cm^3) 0.018 cm^3 12/22/22 0900   Post-Procedure Length (cm) 0.2 cm 01/05/23 1000   Post-Procedure Width (cm) 0.4 cm 01/05/23 1000   Post-Procedure Depth (cm) 0.2 cm 12/22/22 0900   Post-Procedure Surface Area (cm^2) 0.08 cm^2 01/05/23 1000   Post-Procedure Volume (cm^3) 0.018 cm^3 12/22/22 0900   Wound Healing % 99 12/22/22 0900   Wound Bed Granulation (%) 100 % 12/05/22 0930   Wound Bed Granulation (%) - Post-Procedure 100 % 11/17/22 0900   Tunneling (cm) 0 cm 01/05/23 1000   Undermining (cm) 0 cm 01/05/23 1000   Wound Odor None 01/05/23 1000   Exposed Structures None 01/05/23 1000   Number of days: 68          PROCEDURE:   -No need for debridement today, wound appears to be closed  -Wound care completed by wound RN, refer to flowsheet.    -Patient tolerated the procedure well, without c/o pain or discomfort.           Pertinent Labs and Diagnostics:    Labs:     A1c:   Lab Results   Component Value Date/Time    HBA1C 9.6 (A) 11/15/2022 10:25 AM            IMAGING: CT Pelvis with 9/21/2022  IMPRESSION:     1.  Left-sided perirectal abscess which tracks caudally into the left upper thigh. The largest portion of this abscess in the left upper thigh measures 5.3 x 3.1 cm in greatest diameters.    VASCULAR STUDIES: None    LAST  WOUND CULTURE:  DATE :   Lab Results   Component Value Date/Time    CULTRSULT (A) 09/22/2022 03:12 PM     Growth noted after further incubation, see below for  organism identification.      CULTRSULT (A) 09/22/2022 03:12 PM     Prevotella bivia  Moderate growth  Beta lactamase positive      CULTRSULT - (A) 09/22/2022 03:12 PM    CULTRSULT Streptococcus anginosus  Moderate growth   (A) 09/22/2022 03:12 PM         ASSESSMENT AND PLAN:     1. Perirectal abscess  2. Surgical wound present  Comments: Admission 9/22-9/26/2022 for perirectal abscess s/p I&D by Dr. Cuellar  11/03/22: Patient underwent I&D in Dr. Cuellar's office on 10/27/2022    1/10/2023: Patient presents today with an open wound, tenderness has decreased, no drainage for the past few days.  -No need for debridement today  -Patient to complete antibiotics as prescribed  -She is to return to clinic next week for reassessment.  We will likely discharge her at that time.  -Pt advised to go to ER for any increased redness, swelling, drainage or odor, or if he develops fever, chills, nausea or vomiting.      Wound Care: Open air, gauze cover dressing secured with patient's underwear    3. Uncontrolled type 2 diabetes mellitus with hyperglycemia (HCC)  Comments: Last A1c 12.5    1/10/2023: Patient's blood sugar this morning was in the 300s.   They have been running high since she ran out of Trulicity.  Medication was on backorder, but she now has it and has restarted  -Patient encouraged to keep her blood sugars below 140 in order to optimize wound healing  -She is already established with endocrinology  -She also has an appoint with her PCP coming up soon    4. Obesity (BMI 30-39.9)    1/10/2023:  -Benefits of weight loss discussed at previous visit    PATIENT EDUCATION  - Importance of adequate nutrition for wound healing  -Advised to go to ER for any increased redness, swelling, drainage, or odor, or if patient develops fever, chills, nausea or vomiting.     My total time spent caring for the patient on the day of the encounter was 20 minutes.   This does not include time spent on separately billable procedures/tests.         Please note that this note may have been created using voice recognition software. I have worked with technical experts from [employer] to optimize the interface.  I have made every reasonable attempt to correct obvious errors, but there may be errors of grammar and possibly content that I did not discover before finalizing the note.    N

## 2023-01-18 ENCOUNTER — OFFICE VISIT (OUTPATIENT)
Dept: WOUND CARE | Facility: MEDICAL CENTER | Age: 43
End: 2023-01-18
Attending: INTERNAL MEDICINE
Payer: MEDICAID

## 2023-01-18 ENCOUNTER — HOSPITAL ENCOUNTER (OUTPATIENT)
Facility: MEDICAL CENTER | Age: 43
End: 2023-01-18
Attending: NURSE PRACTITIONER
Payer: OTHER MISCELLANEOUS

## 2023-01-18 ENCOUNTER — APPOINTMENT (OUTPATIENT)
Dept: RADIOLOGY | Facility: MEDICAL CENTER | Age: 43
End: 2023-01-18
Attending: STUDENT IN AN ORGANIZED HEALTH CARE EDUCATION/TRAINING PROGRAM
Payer: MEDICAID

## 2023-01-18 VITALS
TEMPERATURE: 96.8 F | RESPIRATION RATE: 17 BRPM | HEART RATE: 77 BPM | OXYGEN SATURATION: 100 % | SYSTOLIC BLOOD PRESSURE: 130 MMHG | DIASTOLIC BLOOD PRESSURE: 95 MMHG

## 2023-01-18 DIAGNOSIS — E11.65 UNCONTROLLED TYPE 2 DIABETES MELLITUS WITH HYPERGLYCEMIA (HCC): ICD-10-CM

## 2023-01-18 DIAGNOSIS — K61.1 PERIRECTAL ABSCESS: ICD-10-CM

## 2023-01-18 DIAGNOSIS — E66.9 OBESITY (BMI 30-39.9): ICD-10-CM

## 2023-01-18 DIAGNOSIS — T14.8XXA WOUND INFECTION: ICD-10-CM

## 2023-01-18 DIAGNOSIS — T14.8XXA PAIN ASSOCIATED WITH WOUND: ICD-10-CM

## 2023-01-18 DIAGNOSIS — L08.9 WOUND INFECTION: ICD-10-CM

## 2023-01-18 DIAGNOSIS — R52 PAIN ASSOCIATED WITH WOUND: ICD-10-CM

## 2023-01-18 PROCEDURE — 99213 OFFICE O/P EST LOW 20 MIN: CPT | Performed by: NURSE PRACTITIONER

## 2023-01-18 PROCEDURE — 99213 OFFICE O/P EST LOW 20 MIN: CPT

## 2023-01-18 RX ORDER — PEN NEEDLE, DIABETIC 32GX 5/32"
1 NEEDLE, DISPOSABLE MISCELLANEOUS 4 TIMES DAILY
COMMUNITY
Start: 2022-12-15 | End: 2023-08-23

## 2023-01-18 RX ORDER — DOXYCYCLINE HYCLATE 100 MG
100 TABLET ORAL 2 TIMES DAILY
Qty: 20 TABLET | Refills: 0 | Status: SHIPPED | OUTPATIENT
Start: 2023-01-18 | End: 2023-01-25 | Stop reason: SDUPTHER

## 2023-01-18 RX ORDER — INSULIN LISPRO 100 [IU]/ML
1-10 INJECTION, SOLUTION INTRAVENOUS; SUBCUTANEOUS 3 TIMES DAILY
COMMUNITY
Start: 2022-12-22 | End: 2023-02-03 | Stop reason: SDUPTHER

## 2023-01-18 NOTE — PROGRESS NOTES
Provider Encounter- Full Thickness wound    HISTORY OF PRESENT ILLNESS  Wound History:    START OF CARE IN CLINIC: 9/27/2022    REFERRING PROVIDER: Emre Yip      WOUND- Full Thickness Wound   LOCATION: Left Perirectal Skin   HISTORY:  42F with Pmhx of uncontrolled DM2 (A1c 12.5), obesity. Patient was admitted to [facility] 9/22-9/26/2022 for perirectal abscess. Patient was taken to OR and underwent I&D of perirectal abscess on 9/21 by Dr. Cuellar. She was treated with IV Abx. She was treating wound with packing. Wound culture grew streptococcus anginosus and prevotella bivia. ID consulted and recommend discharge on Zyvox and flagyl due to PCN allergy. Patient was referred to [facility] for wound care.     11/03/22: Patient was recently seen in Dr. Cuellar's office where she underwent an additional I&D with removal of purulent drainage and repacking of the wound.  Patient was referred back to [facility] for continued care the left perirectal wound.    Pertinent Medical History: Obesity, Uncontrolled DM2, Perirectal abscess     TOBACCO USE:  Former Smoker    Patient's problem list, allergies, and current medications reviewed and updated in Epic    Interval History:  9/29/2022: Clinic visit with Dick Young MD. Patient reports doing better since discharge from hospital. Denies any fevers or chills. Reports some pain at abscess site, improving. She denies any difficulty defecating, mucus or purulence in stool. Reports glucose 200+, has appointment with PCP tomorrow and appointment with endocrinology in October. Patient has been taking Flagyl but unable to  Zyvox due to cost. I called [facility] and they cost checked Zyvox and estimated co pay is $10, I reordered medication to [facility].    11/3/2022: Clinic visit with DREAD Keller.  Patient states overall she is feeling well denies any fever or chills.  Patient states she has completed her antibiotics.  Patient has been changing the outer  dressing every time that she goes to the bathroom due to soiled the outer dressing.    12/1/2022 : Clinic visit with DREAD Ríos FNP-BC, SUNG CORREA.   Patient states she is feeling well overall.  She is still having a little bit of pain from her wound, still not able to sit on comfortably.  She has had trouble keeping packing in, has no one at home to help her.  She is instructed to cleanse the area at least twice daily and after each BM using a TANIKA body if the packing falls out again.    12/15/2022 : Clinic visit with DREAD Ríos, BOBBY, SUNG CORREA.   Patient continues to feel well.  She is a bit frustrated with lack of progress from her wound.  She has been unable to keep packing in, and has just been cleaning several times per day.  Small tract persists.  Patient agreed to allow me to excise the skin over this tract in effort to accelerate healing.    1/10/2023 : Clinic visit with DREAD Ríos FNP-BC, SUNG CORREA.   Liana states she is feeling a little bit better, her wound is , though much improved.  She is tolerating clindamycin, prescribed last visit, with minor GI upset.  She has not noticed any drainage from her wound over the past few days.  She still just using gauze secured by her underwear.  Her blood sugars remain high, over 300.  She has just restarted Trulicity.   Because of ongoing tenderness, and recent duration, I am hesitant to discharge patient from clinic.  I would like her to return next week, at which time we will likely discharge her.  She understands she can call the clinic sooner if her wound deteriorates in the meantime.      1/18/2023 : Clinic visit with DREAD Ríos FNP-BC, SUNG CORREA.   Patient presents today with worsening of her wound.  States that she started developing pain again on Friday, and noticed drainage soon after.  She just completed 10-day course of clindamycin last night.  She presents today with a small opening to her wound,  exquisitely tender.  Culture collected in clinic today, Rx for doxycycline sent to patient's pharmacy (patient allergic to sulfa and penicillins).  Will refer patient back to her surgeon Dr. Cuellar.  I also encouraged patient to call Dr. Cuellar's office today to see if she can get in sooner.  I advised her to go to the emergency room if she develops fevers, chills, nausea, vomiting, or if her wound continues to deteriorate.   She reports her blood sugars are still running in the 300s, despite restarting on Trulicity.  She has an appointment with her endocrinologist in February.  Encouraged her to call to see if she get in sooner, or to get into her PCP.    REVIEW OF SYSTEMS:  Unchanged from previous clinic visit on 1/10/2023, except as documented in interval history    PHYSICAL EXAMINATION:   There were no vitals taken for this visit.    Physical Exam  Constitutional:       General: She is not in acute distress.     Appearance: She is obese.   Cardiovascular:      Rate and Rhythm: Normal rate.   Pulmonary:      Effort: Pulmonary effort is normal. No respiratory distress.   Skin:     Comments: Left perirectal wound from I&D of abscess -reopened, small opening, serosanguineous drainage, induration radiating 2-3 cm around wound, exquisitely tender to palpation    Refer to wound flowsheet and photos   Neurological:      Mental Status: She is alert.       WOUND ASSESSMENT  Wound 11/03/22 Buttocks Left Perirectal (Active)   Wound Image   01/10/23 1530   Site Assessment Red;[place] 01/18/23 1000   Periwound Assessment Scar tissue 01/18/23 1000   Margins Attached edges 01/18/23 1000   Drainage Amount None 01/10/23 1530   Drainage Description Purulent;Sanguineous 01/05/23 1000   Treatments Cleansed;Topical Lidocaine;Site care 01/18/23 1000   Wound Cleansing [place] 01/18/23 1000   Periwound Protectant Not Applicable 01/10/23 1530   Dressing Cleansing/Solutions Not Applicable 01/10/23 1530   Dressing Options Dry Gauze  01/05/23 1000   Dressing Changed Observed 01/10/23 1530   Dressing Change/Treatment Frequency Daily, and As Needed 01/10/23 1530   Non-staged Wound Description Full thickness 01/05/23 1000   Wound Length (cm) 1.2 cm 01/18/23 1000   Wound Width (cm) 0.4 cm 01/18/23 1000   Wound Depth (cm) 0.5 cm 01/18/23 1000   Wound Surface Area (cm^2) 0.48 cm^2 01/18/23 1000   Wound Volume (cm^3) 0.24 cm^3 01/18/23 1000   Post-Procedure Length (cm) 0.2 cm 01/05/23 1000   Post-Procedure Width (cm) 0.4 cm 01/05/23 1000   Post-Procedure Depth (cm) 0.2 cm 12/22/22 0900   Post-Procedure Surface Area (cm^2) 0.08 cm^2 01/05/23 1000   Post-Procedure Volume (cm^3) 0.018 cm^3 12/22/22 0900   Wound Healing % 81 01/18/23 1000   Wound Bed Granulation (%) 100 % 12/05/22 0930   Wound Bed Granulation (%) - Post-Procedure 100 % 11/17/22 0900   Tunneling (cm) 0 cm 01/10/23 1530   Undermining (cm) 0 cm 01/10/23 1530   Wound Odor None 01/10/23 1530   Exposed Structures None 01/10/23 1530   Number of days: 76         PROCEDURE:   -2% lidocaine applied topically prior to assessment  -Cotton-tipped applicator used to probe/assess wound  -No debridement of wound necessary today  -Wound care completed by wound RN, refer to flowsheet.    -Patient tolerated the procedure well, without c/o pain or discomfort.           Pertinent Labs and Diagnostics:    Labs:     A1c:   Lab Results   Component Value Date/Time    HBA1C 9.6 (A) 11/15/2022 10:25 AM            IMAGING: CT Pelvis with 9/21/2022  IMPRESSION:     1.  Left-sided perirectal abscess which tracks caudally into the left upper thigh. The largest portion of this abscess in the left upper thigh measures 5.3 x 3.1 cm in greatest diameters.    VASCULAR STUDIES: None    LAST  WOUND CULTURE:  DATE :   Lab Results   Component Value Date/Time    CULTRSULT (A) 09/22/2022 03:12 PM     Growth noted after further incubation, see below for  organism identification.      CULTRSULT (A) 09/22/2022 03:12 PM     Prevotella  bivia  Moderate growth  Beta lactamase positive      CULTRSULT - (A) 09/22/2022 03:12 PM    CULTRSULT Streptococcus anginosus  Moderate growth   (A) 09/22/2022 03:12 PM         ASSESSMENT AND PLAN:     1. Perirectal abscess  2. Surgical wound present  Comments: Admission 9/22-9/26/2022 for perirectal abscess s/p I&D by Dr. Cuellar  11/03/22: Patient underwent I&D in Dr. Cuellar's office on 10/27/2022    1/18/2023: Patient presents today with worsening of her wound, developed pain and drainage 5 days ago.  Prior to that she had shown some improvement after being placed on clindamycin, her wound was essentially resolved last visit.  Presents today as a small opening with periwound induration, and severe tenderness.  -No need for debridement today  -Culture collected from open wound  -Rx for doxycycline sent to patient's pharmacy, may need to adjust based on culture results  -Pt advised to go to ER for any increased redness, swelling, drainage or odor, or if he develops fever, chills, nausea or vomiting.   -Referral back to Dr. Cuellar.  I also advised patient to call Dr. Cuellar's office to see if she can get in soon     Wound Care: Open air, gauze cover dressing secured with patient's underwear    3. Uncontrolled type 2 diabetes mellitus with hyperglycemia (HCC)  Comments: Last A1c 12.5    1/18/2023: Patient's blood sugars continue to run in the 300s despite restarting the Trulicity.  -Patient encouraged to keep her blood sugars below 140 in order to optimize wound healing  -She is already established with endocrinology, has an appointment in February.  I encouraged her to call the office to see if she can get in sooner.  -I also encouraged her to call her PCPs office    4. Obesity (BMI 30-39.9)    1/18/2023:  -Benefits of weight loss discussed at previous visit    PATIENT EDUCATION  - Importance of adequate nutrition for wound healing  -Advised to go to ER for any increased redness, swelling, drainage, or odor, or if  patient develops fever, chills, nausea or vomiting.     My total time spent caring for the patient on the day of the encounter was 20 minutes.   This does not include time spent on separately billable procedures/tests.         Please note that this note may have been created using voice recognition software. I have worked with technical experts from [facility] to optimize the interface.  I have made every reasonable attempt to correct obvious errors, but there may be errors of grammar and possibly content that I did not discover before finalizing the note.    N

## 2023-01-18 NOTE — PATIENT INSTRUCTIONS
-Keep your wound dressing clean, dry, and intact.    -Change your dressing if it becomes soiled, soaked, or falls off.    -Should you experience any significant changes in your wound(s), such as infection (redness, swelling, localized heat, increased pain, fever > 101 F, chills) or have any questions regarding your home care instructions, please contact the wound center at [phone]. If after hours, contact your primary care physician or go to the hospital emergency room.

## 2023-01-20 LAB
FORWARD REASON: SPWHY: NORMAL
FORWARDED TO LAB: SPWHR: NORMAL
SPECIMEN SENT: SPWT1: NORMAL

## 2023-01-24 ENCOUNTER — APPOINTMENT (OUTPATIENT)
Dept: WOUND CARE | Facility: MEDICAL CENTER | Age: 43
End: 2023-01-24
Attending: INTERNAL MEDICINE
Payer: MEDICAID

## 2023-01-24 DIAGNOSIS — E11.65 TYPE 2 DIABETES MELLITUS WITH HYPERGLYCEMIA, WITHOUT LONG-TERM CURRENT USE OF INSULIN (HCC): ICD-10-CM

## 2023-01-24 RX ORDER — BLOOD SUGAR DIAGNOSTIC
STRIP MISCELLANEOUS
Qty: 100 STRIP | Refills: 1 | Status: SHIPPED | OUTPATIENT
Start: 2023-01-24 | End: 2023-03-28

## 2023-01-24 NOTE — TELEPHONE ENCOUNTER
Received request via: Pharmacy    Was the patient seen in the last year in this department? Yes    Does the patient have an active prescription (recently filled or refills available) for medication(s) requested? No    Does the patient have retirement Plus and need 100 day supply (blood pressure, diabetes and cholesterol meds only)? Patient does not have SCP

## 2023-01-25 ENCOUNTER — NON-PROVIDER VISIT (OUTPATIENT)
Dept: WOUND CARE | Facility: MEDICAL CENTER | Age: 43
End: 2023-01-25
Attending: INTERNAL MEDICINE
Payer: MEDICAID

## 2023-01-25 DIAGNOSIS — K61.1 PERIRECTAL ABSCESS: ICD-10-CM

## 2023-01-25 DIAGNOSIS — E11.65 UNCONTROLLED TYPE 2 DIABETES MELLITUS WITH HYPERGLYCEMIA (HCC): ICD-10-CM

## 2023-01-25 DIAGNOSIS — E66.9 OBESITY (BMI 30-39.9): ICD-10-CM

## 2023-01-25 DIAGNOSIS — T14.8XXA WOUND INFECTION: ICD-10-CM

## 2023-01-25 DIAGNOSIS — T14.8XXA SURGICAL WOUND PRESENT: ICD-10-CM

## 2023-01-25 DIAGNOSIS — L08.9 WOUND INFECTION: ICD-10-CM

## 2023-01-25 PROCEDURE — 99213 OFFICE O/P EST LOW 20 MIN: CPT

## 2023-01-25 PROCEDURE — 46040 I&D ISCHIORCT&/PERIRCT ABSC: CPT

## 2023-01-25 PROCEDURE — 99213 OFFICE O/P EST LOW 20 MIN: CPT | Mod: 25 | Performed by: NURSE PRACTITIONER

## 2023-01-25 PROCEDURE — 10060 I&D ABSCESS SIMPLE/SINGLE: CPT | Performed by: NURSE PRACTITIONER

## 2023-01-25 RX ORDER — DOXYCYCLINE HYCLATE 100 MG
100 TABLET ORAL 2 TIMES DAILY
Qty: 20 TABLET | Refills: 0 | Status: SHIPPED | OUTPATIENT
Start: 2023-01-25 | End: 2023-02-03

## 2023-01-25 NOTE — PATIENT INSTRUCTIONS
-Keep your wound dressing clean, dry, and intact.    -Change your dressing if it becomes soiled, soaked, or falls off.    -Should you experience any significant changes in your wound(s), such as infection (redness, swelling, localized heat, increased pain, fever > 101 F, chills) or have any questions regarding your home care instructions, please contact the wound center at [phone]. If after hours, contact your primary care physician or go to the hospital emergency room.

## 2023-01-25 NOTE — PROGRESS NOTES
Brought in provider DREAD Garcai for assessment. After consulting with patient, opted for injectable lidocaine and excision of site.     Order placed to [facility] for daily wound care supplies.      Wound 11/03/22 Buttocks Left Perirectal (Active)   Wound Image    01/25/23 1500   Site Assessment Red;Edema;Fragile;Painful 01/25/23 1500   Periwound Assessment Scar tissue 01/25/23 1500   Margins Attached edges 01/25/23 1500   Drainage Amount Moderate 01/25/23 1500   Drainage Description Sanguineous;Serosanguineous 01/25/23 1500   Treatments Cleansed;Topical Lidocaine;Injectible Lidocaine;Provider debridement 01/25/23 1500   Wound Cleansing Puracyn [place] 01/25/23 1500   Periwound Protectant Skin Protectant Wipes to Periwound 01/25/23 1500   Dressing Cleansing/Solutions Not Applicable 01/25/23 1500   Dressing Options Hydrofiber Silver Strip;Mepilex Lite 01/25/23 1500   Dressing Changed New 01/25/23 1500   Dressing Change/Treatment Frequency Daily, and As Needed 01/25/23 1500   Non-staged Wound Description Full thickness 01/25/23 1500   Wound Length (cm) 1.2 cm 01/18/23 1000   Wound Width (cm) 0.4 cm 01/18/23 1000   Wound Depth (cm) 0.5 cm 01/18/23 1000   Wound Surface Area (cm^2) 0.48 cm^2 01/18/23 1000   Wound Volume (cm^3) 0.24 cm^3 01/18/23 1000   Post-Procedure Length (cm) 0.2 cm 01/25/23 1500   Post-Procedure Width (cm) 1.6 cm 01/25/23 1500   Post-Procedure Depth (cm) 0.4 cm 01/25/23 1500   Post-Procedure Surface Area (cm^2) 0.32 cm^2 01/25/23 1500   Post-Procedure Volume (cm^3) 0.128 cm^3 01/25/23 1500   Wound Healing % 81 01/18/23 1000   Wound Bed Granulation (%) 100 % 12/05/22 0930   Wound Bed Granulation (%) - Post-Procedure 100 % 11/17/22 0900   Tunneling (cm) 0 cm 01/25/23 1500   Undermining (cm) 0 cm 01/25/23 1500   Wound Odor None 01/25/23 1500   Exposed Structures None 01/25/23 1500

## 2023-01-26 ENCOUNTER — APPOINTMENT (OUTPATIENT)
Dept: INTERNAL MEDICINE | Facility: OTHER | Age: 43
End: 2023-01-26
Payer: MEDICAID

## 2023-01-26 DIAGNOSIS — E11.65 TYPE 2 DIABETES MELLITUS WITH HYPERGLYCEMIA, WITHOUT LONG-TERM CURRENT USE OF INSULIN (HCC): Primary | ICD-10-CM

## 2023-01-26 RX ORDER — BLOOD-GLUCOSE METER
EACH MISCELLANEOUS
COMMUNITY
End: 2023-01-26 | Stop reason: SDUPTHER

## 2023-01-26 NOTE — TELEPHONE ENCOUNTER
Patient lvm stating her glucometer broke . Patient needs another One touch Glucometer please route whole kit lancets and strips.

## 2023-01-27 RX ORDER — BLOOD-GLUCOSE METER
1 EACH MISCELLANEOUS DAILY
Qty: 1 KIT | Refills: 1 | Status: SHIPPED | OUTPATIENT
Start: 2023-01-27 | End: 2023-06-07

## 2023-01-27 RX ORDER — LANCETS 30 GAUGE
EACH MISCELLANEOUS
Qty: 100 EACH | Refills: 0 | Status: SHIPPED | OUTPATIENT
Start: 2023-01-27 | End: 2023-02-23

## 2023-01-27 RX ORDER — GLUCOSAMINE HCL/CHONDROITIN SU 500-400 MG
CAPSULE ORAL
Qty: 100 EACH | Refills: 0 | Status: SHIPPED | OUTPATIENT
Start: 2023-01-27 | End: 2023-04-28 | Stop reason: SDUPTHER

## 2023-01-30 ENCOUNTER — NON-PROVIDER VISIT (OUTPATIENT)
Dept: INTERNAL MEDICINE | Facility: OTHER | Age: 43
End: 2023-01-30
Payer: MEDICAID

## 2023-01-30 VITALS — BODY MASS INDEX: 37.02 KG/M2 | WEIGHT: 209 LBS

## 2023-01-30 DIAGNOSIS — E11.65 TYPE 2 DIABETES MELLITUS WITH HYPERGLYCEMIA, WITH LONG-TERM CURRENT USE OF INSULIN (HCC): ICD-10-CM

## 2023-01-30 DIAGNOSIS — D64.9 ANEMIA, UNSPECIFIED TYPE: ICD-10-CM

## 2023-01-30 DIAGNOSIS — K61.1 PERIRECTAL ABSCESS: ICD-10-CM

## 2023-01-30 DIAGNOSIS — G47.33 OBSTRUCTIVE SLEEP APNEA, ADULT: ICD-10-CM

## 2023-01-30 DIAGNOSIS — Z79.4 TYPE 2 DIABETES MELLITUS WITH HYPERGLYCEMIA, WITH LONG-TERM CURRENT USE OF INSULIN (HCC): ICD-10-CM

## 2023-01-30 DIAGNOSIS — E66.9 OBESITY (BMI 30-39.9): ICD-10-CM

## 2023-01-30 DIAGNOSIS — E28.2 POLYCYSTIC OVARY SYNDROME: ICD-10-CM

## 2023-01-30 PROCEDURE — 97803 MED NUTRITION INDIV SUBSEQ: CPT | Performed by: DIETITIAN, REGISTERED

## 2023-01-30 ASSESSMENT — FIBROSIS 4 INDEX: FIB4 SCORE: 0.69

## 2023-01-30 NOTE — PROGRESS NOTES
"Liana Obrien is a 42 y.o. year old, female returns for DSM and perirectal wound.    Liana reports she is off/on [employer] d/t [facility] not supplying, question if mail order may be more benefit for patient.    Positive changes since last visit:    BG have been averaging 160's FBG and throughout day compared to where she was, last 3-4 weeks, although Liana has a meter that continues to have Error codes    Ended job at [employer]-not working, less stress for her overall    Meal/Eating/Environment Pattern:    Challenges: runs of Trulicity, none all of December; just got some beginning of January, last dose was last week and waiting for Rx refill    Not hungry most of the time, feels sick if not eating every two hours  Humalog dropped to 2-4 u [facility]    Routine:  Wake up [id number]  10 AM: coffee + cereal/oatmeal  2 PM:  lunch- vegetable-cucumbers, tomatoes, carrots, spinach-cooked, meat-chicken, frozen meat-lamb, 1- small crackers/1-2 corn 12\" tortilla, some days water w/meals, other days- apple juice +water, EmergenC   Sweet tooth- candy, cookies, whatever is in house; after lunch and dinner; Fresh Baked cookies at [facility], 3-4 mini cookies  Chocolate candy- 1-2 small pieces  Tried to eat fruit for sweet tooth  Energy level is low- deni    Comments:    Liana has been maintaining weight she has lost, but is seeing a rising in her weight and fluctuations. Feels she is not hungry, but feels better when she eats something.    Coached Liana re: listening to her body and what it is saying to her; portions are increased consistently when she is led by her lack of hunger.    New strategies Liana has vocalized to change up her routine and bring in greater support for her diabetes self management and overall wound recovery.      RTC x next available    Time Spent:  60 minutes    "

## 2023-01-30 NOTE — PATIENT INSTRUCTIONS
I will eat more often and choose foods that will support my fullness and health  -remind myself to set alarm to eat something every two hours  -think about my meal as I prepare it- think if it will benefit me and pay attention to portions on my plate    I will limit my sweet intake  - make choice to try a fruit first before cookies, candy  - add a protein to my fruit  - practice HALT- ask questions to myself: hungry, angry, lonely, tired    Contact [employer] for replacement glucometer

## 2023-02-02 ENCOUNTER — HOSPITAL ENCOUNTER (OUTPATIENT)
Dept: RADIOLOGY | Facility: MEDICAL CENTER | Age: 43
End: 2023-02-02
Attending: SURGERY
Payer: MEDICAID

## 2023-02-02 ENCOUNTER — PHARMACY VISIT (OUTPATIENT)
Dept: PHARMACY | Facility: MEDICAL CENTER | Age: 43
End: 2023-02-02
Payer: COMMERCIAL

## 2023-02-02 ENCOUNTER — NON-PROVIDER VISIT (OUTPATIENT)
Dept: WOUND CARE | Facility: MEDICAL CENTER | Age: 43
End: 2023-02-02
Attending: INTERNAL MEDICINE
Payer: MEDICAID

## 2023-02-02 PROCEDURE — 99211 OFF/OP EST MAY X REQ PHY/QHP: CPT

## 2023-02-02 PROCEDURE — RXMED WILLOW AMBULATORY MEDICATION CHARGE: Performed by: NURSE PRACTITIONER

## 2023-02-02 RX ORDER — BLOOD-GLUCOSE METER
EACH MISCELLANEOUS
Qty: 1 KIT | Refills: 0 | Status: SHIPPED | OUTPATIENT
Start: 2023-02-02 | End: 2023-08-23

## 2023-02-02 RX ORDER — DULAGLUTIDE 3 MG/.5ML
INJECTION, SOLUTION SUBCUTANEOUS
Qty: 2 ML | Refills: 0 | Status: SHIPPED | OUTPATIENT
Start: 2023-02-02 | End: 2023-03-24

## 2023-02-02 RX ORDER — DULAGLUTIDE 3 MG/.5ML
INJECTION, SOLUTION SUBCUTANEOUS
Qty: 6 ML | Refills: 3 | Status: SHIPPED | OUTPATIENT
Start: 2023-02-02 | End: 2023-02-03

## 2023-02-02 RX ORDER — DULAGLUTIDE 4.5 MG/.5ML
INJECTION, SOLUTION SUBCUTANEOUS
Qty: 0.5 ML | Refills: 3 | Status: SHIPPED | OUTPATIENT
Start: 2023-02-02 | End: 2023-02-03

## 2023-02-02 NOTE — WOUND TEAM
Pt states she saw surgeon Dr. Membreno 2/1/23.  Pt reports the following changes:    Changed to a different antibiotic (pt doesn't know name).  CT scan ordered and planned today 2/2/23.  Bloodwork completed 2/1/23.  Recommended no wound packing.    Next appt with Dr. Membreno next week 2/8/23.

## 2023-02-02 NOTE — PATIENT INSTRUCTIONS
-Keep your wound dressing clean, dry, and intact.    -Change your dressing as directed if it becomes soiled, soaked, or falls off.    -Should you experience any significant changes in your wound(s), such as infection (redness, swelling, localized heat, increased pain, fever > 101 F, chills) or have any questions regarding your home care instructions, please contact the wound center at [phone]. If after hours, contact your primary care physician or go to the hospital emergency room.

## 2023-02-03 ENCOUNTER — HOSPITAL ENCOUNTER (OUTPATIENT)
Dept: RADIOLOGY | Facility: MEDICAL CENTER | Age: 43
End: 2023-02-03
Attending: SURGERY
Payer: MEDICAID

## 2023-02-03 ENCOUNTER — OFFICE VISIT (OUTPATIENT)
Dept: INTERNAL MEDICINE | Facility: OTHER | Age: 43
End: 2023-02-03
Payer: MEDICAID

## 2023-02-03 VITALS
SYSTOLIC BLOOD PRESSURE: 130 MMHG | HEIGHT: 63 IN | DIASTOLIC BLOOD PRESSURE: 86 MMHG | WEIGHT: 213.2 LBS | OXYGEN SATURATION: 97 % | TEMPERATURE: 97.1 F | BODY MASS INDEX: 37.78 KG/M2 | HEART RATE: 65 BPM

## 2023-02-03 DIAGNOSIS — K61.1 PERIRECTAL ABSCESS: ICD-10-CM

## 2023-02-03 DIAGNOSIS — H52.03 HYPEROPIA OF BOTH EYES: ICD-10-CM

## 2023-02-03 DIAGNOSIS — K61.2 ANORECTAL ABSCESS: ICD-10-CM

## 2023-02-03 DIAGNOSIS — E11.65 TYPE 2 DIABETES MELLITUS WITH HYPERGLYCEMIA, WITHOUT LONG-TERM CURRENT USE OF INSULIN (HCC): ICD-10-CM

## 2023-02-03 PROCEDURE — 99213 OFFICE O/P EST LOW 20 MIN: CPT | Mod: GE | Performed by: STUDENT IN AN ORGANIZED HEALTH CARE EDUCATION/TRAINING PROGRAM

## 2023-02-03 PROCEDURE — 700117 HCHG RX CONTRAST REV CODE 255: Performed by: SURGERY

## 2023-02-03 PROCEDURE — 72192 CT PELVIS W/O DYE: CPT

## 2023-02-03 RX ORDER — CIPROFLOXACIN 500 MG/1
TABLET, FILM COATED ORAL
COMMUNITY
End: 2023-02-13

## 2023-02-03 RX ORDER — INSULIN LISPRO 100 [IU]/ML
1-10 INJECTION, SOLUTION INTRAVENOUS; SUBCUTANEOUS 3 TIMES DAILY
Qty: 5 EACH | Refills: 5 | Status: SHIPPED | OUTPATIENT
Start: 2023-02-03 | End: 2023-04-21 | Stop reason: SDUPTHER

## 2023-02-03 RX ORDER — METRONIDAZOLE 500 MG/1
TABLET ORAL
COMMUNITY
End: 2023-02-13

## 2023-02-03 RX ADMIN — IOHEXOL 25 ML: 240 INJECTION, SOLUTION INTRATHECAL; INTRAVASCULAR; INTRAVENOUS; ORAL at 16:15

## 2023-02-03 ASSESSMENT — FIBROSIS 4 INDEX: FIB4 SCORE: 0.69

## 2023-02-03 ASSESSMENT — PATIENT HEALTH QUESTIONNAIRE - PHQ9: CLINICAL INTERPRETATION OF PHQ2 SCORE: 0

## 2023-02-03 NOTE — PROGRESS NOTES
Liana Obrien  is a 42 y.o. female with a PMH of   Patient Active Problem List   Diagnosis    Vitamin D deficiency    Type 2 diabetes mellitus with hyperglycemia, without long-term current use of insulin (HCC)    Obstructive sleep apnea, adult    Hypertension    History of deep venous thrombosis    Gastro-esophageal reflux disease without esophagitis    Hypothyroidism    Major depressive disorder    Polycystic ovary syndrome    Obesity (BMI 30-39.9)    Dyslipidemia    Anemia    Perirectal abscess    Fibroids    here to address     #PCOS     US  pending. Periods very irregular before,  but now they are  normalizing more. Uses 2-3 pads a day.  Lasts 5-7  days a week. No significant menorrhagia. Gyn consult placed,  waiting on wound closure for US.       #T2 DM     Recently sugars were running high as she was unable to get trulicity on time.  Sugars were in the 160s over the past week. Reports vision changes,more nearsighted. No trouble with vision at night. No nubness tingling. Normal monofilament test this year.     ROS All ROS negative unless otherwise mentioned in HPI    Vitals:    02/03/23 0832   BP: 130/86   Pulse: 65   Temp: 36.2 °C (97.1 °F)   SpO2: 97%         Physical Exam  Vitals and nursing note reviewed.   Constitutional:       General: She is not in acute distress.     Appearance: Normal appearance. She is not ill-appearing.   HENT:      Head: Normocephalic and atraumatic.   Cardiovascular:      Rate and Rhythm: Normal rate and regular rhythm.      Pulses: Normal pulses.           Dorsalis pedis pulses are 2+ on the right side and 2+ on the left side.        Posterior tibial pulses are 2+ on the right side and 2+ on the left side.      Heart sounds: Normal heart sounds. No murmur heard.    No friction rub. No gallop.   Pulmonary:      Effort: Pulmonary effort is normal. No respiratory distress.      Breath sounds: Normal breath sounds. No wheezing, rhonchi or rales.   Musculoskeletal:      Right  foot: Normal range of motion. No deformity, bunion, Charcot foot, foot drop or prominent metatarsal heads.      Left foot: Normal range of motion. No deformity, bunion, Charcot foot, foot drop or prominent metatarsal heads.   Feet:      Right foot:      Skin integrity: Dry skin present. No ulcer, blister, skin breakdown, erythema, warmth, callus or fissure.      Toenail Condition: Right toenails are normal.      Left foot:      Skin integrity: Dry skin present. No ulcer, blister, skin breakdown, erythema, warmth, callus or fissure.      Toenail Condition: Left toenails are normal.      Comments: Mild erythema along right first toe.   Neurological:      Mental Status: She is alert and oriented to person, place, and time. Mental status is at baseline.      Cranial Nerves: No cranial nerve deficit.      Sensory: No sensory deficit.      Coordination: Coordination normal.   Psychiatric:         Mood and Affect: Mood normal.         Behavior: Behavior normal.          Current Outpatient Medications:     metroNIDAZOLE, Flagyl 500 mg tablet  Take 1 tablet every 8 hours by oral route for 7 days., Taking    ciprofloxacin, Cipro 500 mg tablet  Take 1 tablet every 12 hours by oral route for 7 days., Taking    HumaLOG KwikPen, 1-10 Units, Subcutaneous, TID    ONE TOUCH ULTRA 2, Use to test blood glucose levels 2 times a day., Taking    Trulicity, Inject 3 mg subcutaneously once a week, Taking    [facility] Blood Glucose Meter, 1 Each, Does not apply, DAILY, Taking    Blood Glucose Meter Kit, Test blood sugar as recommended by provider. One Touch Ultra 2 blood glucose monitoring kit., Taking    Blood Glucose Test Strips, Use one One Touch Ultra 2 strip to test blood sugar once daily and with symptoms of low blood sugar, Taking    Lancets, Use one One Touch Ultra 2 lancet to test blood sugar once daily and with symptoms of hypoglycemia., Taking    Alcohol Swabs, Wipe site with prep pad prior to injection., Taking    OneTouch Ultra, 1  STRIP BY OTHER ROUTE 3 TIMES A DAY BEFORE MEALS., Taking    BD Pen Needle Milagros 2nd Gen, 1 Device, Subcutaneous, 4X/DAY, Taking    metFORMIN, 1,000 mg, Oral, BID, Taking    Multiple Vitamins-Minerals (EMERGEN-C IMMUNE PO), Take  by mouth., Taking    acetaminophen, 500-1,000 mg, Oral, Q6HRS PRN, PRN    levothyroxine, 150 mcg, Oral, AM ES, Taking    atorvastatin, 20 mg, Oral, QDAY, Taking    lisinopril, 10 mg, Oral, QDAY, Taking    Tresiba FlexTouch, 26 Units, Subcutaneous, DAILY, Taking       Type 2 diabetes mellitus with hyperglycemia, without long-term current use of insulin (HCC)  Follows with Dr. Allen, and and Shira Pulido endocrinology.  A1c 12.5 in Aug 2022 then A1c was  9.6 in Nov 2022  On Tresiba 26U daily.  Humalog ISS 2-4 U before meals. Also on metformin 1000 BID and Trulicity- increased to 3 weekly some time in late Sept.   Saw endo  Yesterday told to keep trulicity at 3   Sugars range 150-200.  No  lows.   No neuropathy.   Had eye exam  In Aug 2022  Following with nutrition.   Microalbumin Cr 11 in Oct 2022   Lipid panel Oct 2022  HDL 57    On Atorvastatin 20mg    Perirectal abscess  Perirectal abscess since June/ July 2022  States its getting smaller pain is getting better less drainage.  No longer packing. No fevers,  no chills.   -On prior CT, noted left-sided perirectal abscess  measures 5.3 x 3.1 cm in greatest diameter and is located medially in the left upper thigh.  - Following with wound care and gen surgery Dr. Membreno.   -Repeat CT abdomen was ordered yesterday  -Treated with doxy, cefdinir, clinda in the past  Now on cipro and flagyl for 7days starting 2/2.       Hyperopia of both eyes  - Referral to Optometry

## 2023-02-03 NOTE — PATIENT INSTRUCTIONS
Follow up in 4 months to go over updates with gyn,  surgery,  DM.   Please get COVID  booster when you have time.

## 2023-02-04 NOTE — ASSESSMENT & PLAN NOTE
Follows with Dr. Allen, and and Shira Pulido endocrinology.  A1c 12.5 in Aug 2022 then A1c was  9.6 in Nov 2022  On Tresiba 26U daily.  Humalog ISS 2-4 U before meals. Also on metformin 1000 BID and Trulicity- increased to 3 weekly some time in late Sept.   Saw endo  Yesterday told to keep trulicity at 3   Sugars range 150-200.  No  lows.   No neuropathy.   Had eye exam  In Aug 2022  Following with nutrition.   Microalbumin Cr 11 in Oct 2022   Lipid panel Oct 2022  HDL 57    On Atorvastatin 20mg

## 2023-02-04 NOTE — ASSESSMENT & PLAN NOTE
Perirectal abscess since June/ July 2022  States its getting smaller pain is getting better less drainage.  No longer packing. No fevers,  no chills.   -On prior CT, noted left-sided perirectal abscess  measures 5.3 x 3.1 cm in greatest diameter and is located medially in the left upper thigh.  - Following with wound care and gen surgery Dr. Membreno.   -Repeat CT abdomen was ordered yesterday  -Treated with doxy, cefdinir, clinda in the past  Now on cipro and flagyl for 7days starting 2/2.

## 2023-02-09 ENCOUNTER — HOSPITAL ENCOUNTER (OUTPATIENT)
Facility: MEDICAL CENTER | Age: 43
End: 2023-02-09
Attending: SURGERY | Admitting: SURGERY
Payer: MEDICAID

## 2023-02-13 ENCOUNTER — HOSPITAL ENCOUNTER (OUTPATIENT)
Facility: MEDICAL CENTER | Age: 43
End: 2023-02-13
Attending: NURSE PRACTITIONER
Payer: MEDICAID

## 2023-02-13 ENCOUNTER — NON-PROVIDER VISIT (OUTPATIENT)
Dept: WOUND CARE | Facility: MEDICAL CENTER | Age: 43
End: 2023-02-13
Attending: INTERNAL MEDICINE
Payer: MEDICAID

## 2023-02-13 DIAGNOSIS — T14.8XXA WOUND INFECTION: ICD-10-CM

## 2023-02-13 DIAGNOSIS — L08.9 WOUND INFECTION: ICD-10-CM

## 2023-02-13 DIAGNOSIS — K61.1 PERIRECTAL ABSCESS: ICD-10-CM

## 2023-02-13 LAB
FORWARD REASON: SPWHY: NORMAL
FORWARDED TO LAB: SPWHR: NORMAL
SPECIMEN SENT (2ND): SPWT2: NORMAL
SPECIMEN SENT (3RD): SPWT3: NORMAL
SPECIMEN SENT (4TH): SPWT4: NORMAL
SPECIMEN SENT: SPWT1: NORMAL

## 2023-02-13 PROCEDURE — 99212 OFFICE O/P EST SF 10 MIN: CPT | Performed by: NURSE PRACTITIONER

## 2023-02-13 PROCEDURE — 97602 WOUND(S) CARE NON-SELECTIVE: CPT

## 2023-02-13 RX ORDER — LINEZOLID 600 MG/1
600 TABLET, FILM COATED ORAL 2 TIMES DAILY
Qty: 14 TABLET | Refills: 0 | Status: SHIPPED | OUTPATIENT
Start: 2023-02-13 | End: 2023-02-20

## 2023-02-13 NOTE — PROGRESS NOTES
Brief provider note:  I was asked to come in assess the patient today in clinic.  Patient is not currently on my schedule.  Patient has increased pain with pus emanating from the wound bed.  Patient has slight increased induration.  A culture was taken in clinic today and I empirically placed the patient on Zyvox.  We will follow culture.  Patient instructed that if she develops fever, chills, nausea, vomiting, increased redness, pain or foul-smelling odor patient should go directly to the ER for further work-up.    >10 min spent face to face with patient, >50% of time spent counseling, coordinating care, reviewing records, discussing POC, educating patient

## 2023-02-13 NOTE — PROCEDURES
Non-selective debridement using normal saline and gauze.  Pt with increase pain, purulent drainage and wound induration.   Provider was consulted and wound culture taken, order for antibiotics called to pharmacy.  Pt instructed to go to ED if fever, chills and increase pain.

## 2023-02-13 NOTE — PATIENT INSTRUCTIONS
-Keep your wound dressing clean, dry, and intact.    -Change your dressing if it becomes soiled, soaked, or falls off.    -Should you experience any significant changes in your wound(s), such as infection (redness, swelling, localized heat, increased pain, fever > 101 F, chills) or have any questions regarding your home care instructions, please contact the wound center at [phone]. If after hours, contact your primary care physician or go to the hospital emergency room.

## 2023-02-20 DIAGNOSIS — E11.65 TYPE 2 DIABETES MELLITUS WITH HYPERGLYCEMIA, WITHOUT LONG-TERM CURRENT USE OF INSULIN (HCC): ICD-10-CM

## 2023-02-21 ENCOUNTER — OFFICE VISIT (OUTPATIENT)
Dept: WOUND CARE | Facility: MEDICAL CENTER | Age: 43
End: 2023-02-21
Attending: INTERNAL MEDICINE
Payer: MEDICAID

## 2023-02-21 DIAGNOSIS — K61.1 PERIRECTAL ABSCESS: Primary | ICD-10-CM

## 2023-02-21 PROCEDURE — RXMED WILLOW AMBULATORY MEDICATION CHARGE: Performed by: NURSE PRACTITIONER

## 2023-02-23 ENCOUNTER — PHARMACY VISIT (OUTPATIENT)
Dept: PHARMACY | Facility: MEDICAL CENTER | Age: 43
End: 2023-02-23
Payer: COMMERCIAL

## 2023-02-23 ENCOUNTER — NON-PROVIDER VISIT (OUTPATIENT)
Dept: WOUND CARE | Facility: MEDICAL CENTER | Age: 43
End: 2023-02-23
Attending: INTERNAL MEDICINE
Payer: MEDICAID

## 2023-02-23 DIAGNOSIS — L08.9 WOUND INFECTION: ICD-10-CM

## 2023-02-23 DIAGNOSIS — K61.1 PERIRECTAL ABSCESS: ICD-10-CM

## 2023-02-23 DIAGNOSIS — T14.8XXA WOUND INFECTION: ICD-10-CM

## 2023-02-23 PROCEDURE — 99212 OFFICE O/P EST SF 10 MIN: CPT | Performed by: NURSE PRACTITIONER

## 2023-02-23 PROCEDURE — 99212 OFFICE O/P EST SF 10 MIN: CPT

## 2023-02-23 RX ORDER — LINEZOLID 600 MG/1
600 TABLET, FILM COATED ORAL 2 TIMES DAILY
Qty: 14 TABLET | Refills: 0 | Status: SHIPPED | OUTPATIENT
Start: 2023-02-23 | End: 2023-02-23

## 2023-02-23 RX ORDER — LINEZOLID 600 MG/1
600 TABLET, FILM COATED ORAL 2 TIMES DAILY
Qty: 14 TABLET | Refills: 0 | Status: SHIPPED | OUTPATIENT
Start: 2023-02-23 | End: 2023-03-20

## 2023-02-23 RX ORDER — LANCETS 33 GAUGE
EACH MISCELLANEOUS
Qty: 100 EACH | Refills: 6 | Status: SHIPPED | OUTPATIENT
Start: 2023-02-23 | End: 2023-04-28 | Stop reason: SDUPTHER

## 2023-02-23 NOTE — PROGRESS NOTES
I was asked to review the patient's culture result.  Patient reports that she was unable to obtain the Zyvox previously prescribed by the referring provider.  Patient presented to clinic today and had some pus from the wound beds which would indicate potential staph infection.  I will put in a prescription for Zyvox for 7 days.  Patient called and informed that the antibiotic has been called into [facility] on [address]    >10 min spent face to face with patient, >50% of time spent counseling, coordinating care, reviewing records, discussing POC, educating patient

## 2023-02-24 ENCOUNTER — TELEPHONE (OUTPATIENT)
Dept: WOUND CARE | Facility: MEDICAL CENTER | Age: 43
End: 2023-02-24
Payer: MEDICAID

## 2023-02-24 NOTE — PROGRESS NOTES
"Patient presents with new perirectal wound superior to original wound. Wound is painful with purulent drainage, inferior wound also continues to have purulent drainage and pain. Patient reports she was unable to get Zyvox prescribed in clinic last week due to the medication still needing prior authorization. Pt reports she has been unable to obtain her diabetes medication for the same reason. States her blood sugar is usually in the 160s but that \"Its been jumping around the last few days and today it was 300.\" Notified Lars CANADA and manager Sachi of wound condition, blood sugar, and inability to obtain Zyvox. [facility], only [facility]vox prescription available is one from the referring provider to clinic. Lars CANADA sent a new prescription for Zyvox to [address] [place] [address] Pharmacy per patient request.   "

## 2023-02-24 NOTE — PATIENT INSTRUCTIONS
-Keep your wound dressing clean, dry, and intact.    -Change your dressing if it becomes soiled, soaked, or falls off.    -Should you experience any significant changes in your wound(s), such as infection (redness, swelling, localized heat, increased pain, fever > 101 F, chills) or have any questions regarding your home care instructions, please contact the wound center at [phone]. If after hours, contact your primary care physician or go to the hospital emergency room.

## 2023-02-25 NOTE — TELEPHONE ENCOUNTER
Eileen from [facility](P#:[phone]) called requesting patient's recent office notes. Office notes faxed to [phone], confirmation fax received.

## 2023-02-28 ENCOUNTER — APPOINTMENT (OUTPATIENT)
Dept: WOUND CARE | Facility: MEDICAL CENTER | Age: 43
End: 2023-02-28
Attending: INTERNAL MEDICINE
Payer: MEDICAID

## 2023-03-01 PROCEDURE — RXMED WILLOW AMBULATORY MEDICATION CHARGE: Performed by: NURSE PRACTITIONER

## 2023-03-06 ENCOUNTER — TELEPHONE (OUTPATIENT)
Dept: WOUND CARE | Facility: MEDICAL CENTER | Age: 43
End: 2023-03-06

## 2023-03-06 ENCOUNTER — PHARMACY VISIT (OUTPATIENT)
Dept: PHARMACY | Facility: MEDICAL CENTER | Age: 43
End: 2023-03-06
Payer: COMMERCIAL

## 2023-03-06 ENCOUNTER — NON-PROVIDER VISIT (OUTPATIENT)
Dept: WOUND CARE | Facility: MEDICAL CENTER | Age: 43
End: 2023-03-06
Attending: INTERNAL MEDICINE
Payer: MEDICAID

## 2023-03-06 DIAGNOSIS — K61.1 PERIRECTAL ABSCESS: ICD-10-CM

## 2023-03-06 PROCEDURE — 99211 OFF/OP EST MAY X REQ PHY/QHP: CPT

## 2023-03-06 NOTE — PROGRESS NOTES
No open areas during today's clinic appointment. Site is indurated and painful. Patient is hesitant to go through with surgery I&D scheduled for 3/30. Per patient, surgeon states that wound is likely due to a fistula. Provided education on fistulas and treatment options.     Per recommendation by surgeon, patient has been doing daily sitz baths and keep dry gauze in between buttocks.     Patient still has not started antibiotics r/t insurance authorization. Reported to Sachi MOY, Nursing Supervisor. Will call patient with follow-up information on antibiotics.     She states that since switching to [facility] that she has been able to get her diabetic medications. Today's FSBS was 303.

## 2023-03-06 NOTE — PATIENT INSTRUCTIONS
-Keep your wound dressing clean, dry, and intact.    -Change your dressing if it becomes soiled, soaked, or falls off.    -Should you experience any significant changes in your wound(s), such as infection (redness, swelling, localized heat, increased pain, fever > 101 F, chills) or have any questions regarding your home care instructions, please contact the wound center at [phone]. If after hours, contact your primary care physician or go to the hospital emergency room.

## 2023-03-11 PROCEDURE — RXMED WILLOW AMBULATORY MEDICATION CHARGE: Performed by: NURSE PRACTITIONER

## 2023-03-13 ENCOUNTER — APPOINTMENT (OUTPATIENT)
Dept: ADMISSIONS | Facility: MEDICAL CENTER | Age: 43
End: 2023-03-13
Payer: MEDICAID

## 2023-03-13 ENCOUNTER — PHARMACY VISIT (OUTPATIENT)
Dept: PHARMACY | Facility: MEDICAL CENTER | Age: 43
End: 2023-03-13
Payer: COMMERCIAL

## 2023-03-20 PROCEDURE — RXMED WILLOW AMBULATORY MEDICATION CHARGE: Performed by: NURSE PRACTITIONER

## 2023-03-21 ENCOUNTER — PHARMACY VISIT (OUTPATIENT)
Dept: PHARMACY | Facility: MEDICAL CENTER | Age: 43
End: 2023-03-21
Payer: COMMERCIAL

## 2023-03-21 ENCOUNTER — OFFICE VISIT (OUTPATIENT)
Dept: WOUND CARE | Facility: MEDICAL CENTER | Age: 43
End: 2023-03-21
Attending: INTERNAL MEDICINE
Payer: MEDICAID

## 2023-03-21 ENCOUNTER — APPOINTMENT (OUTPATIENT)
Dept: ADMISSIONS | Facility: MEDICAL CENTER | Age: 43
End: 2023-03-21
Attending: SURGERY
Payer: MEDICAID

## 2023-03-21 VITALS
SYSTOLIC BLOOD PRESSURE: 154 MMHG | DIASTOLIC BLOOD PRESSURE: 103 MMHG | HEART RATE: 103 BPM | TEMPERATURE: 96.9 F | RESPIRATION RATE: 16 BRPM | OXYGEN SATURATION: 96 %

## 2023-03-21 DIAGNOSIS — K61.1 PERIRECTAL ABSCESS: ICD-10-CM

## 2023-03-21 DIAGNOSIS — T14.8XXA SURGICAL WOUND PRESENT: Primary | ICD-10-CM

## 2023-03-21 DIAGNOSIS — E11.65 UNCONTROLLED TYPE 2 DIABETES MELLITUS WITH HYPERGLYCEMIA (HCC): ICD-10-CM

## 2023-03-21 DIAGNOSIS — E66.9 OBESITY (BMI 30-39.9): ICD-10-CM

## 2023-03-21 PROCEDURE — 99212 OFFICE O/P EST SF 10 MIN: CPT

## 2023-03-21 PROCEDURE — 99213 OFFICE O/P EST LOW 20 MIN: CPT | Performed by: NURSE PRACTITIONER
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
## 2023-03-21 NOTE — PROGRESS NOTES
Advanced [facility]   [facility]   [address]   [address]   [place] [address]   [phone] Fax: [phone]    Discharge Note        Wound location: Left cj rectal buttocks  Date of Discharge: 03/21/23    Assessment:  Discharge patient at this time secondary to wound resolution. Patient still has mild pain and induration around scar tissue. Instructed patient to continue with sitz baths and gauze to protect the area. Patient scheduled for surgery on 03/30/23 - will need a new referral to continue care if needed. Patient verbalizes understanding of all.

## 2023-03-24 ENCOUNTER — PRE-ADMISSION TESTING (OUTPATIENT)
Dept: ADMISSIONS | Facility: MEDICAL CENTER | Age: 43
End: 2023-03-24
Attending: SURGERY
Payer: MEDICAID

## 2023-03-25 NOTE — OR NURSING
RN telephone preadmission appointment completed with Liana Obrien. Liana states her blood sugars have been labial in the 400's. I told her to call her endocrinologist who she states is Shira GALARZA who manages her diabetes. I also told her to refer to Shira Pulido regarding her insulin dosages prior to surgery as she will be fasting after midnight prior to surgery. I advised her to hold Lisinopril and Metformin the morning of surgery per anesthesia guidelines and to hold vitamins and supplements for one week prior to surgery. Liana verbalized understanding of above. I scheduled her for testing BMP, HgbA1C and EKG for Monday 3/27/2023.

## 2023-03-27 ENCOUNTER — PRE-ADMISSION TESTING (OUTPATIENT)
Dept: ADMISSIONS | Facility: MEDICAL CENTER | Age: 43
End: 2023-03-27
Attending: SURGERY
Payer: MEDICAID

## 2023-03-27 ENCOUNTER — PHARMACY VISIT (OUTPATIENT)
Dept: PHARMACY | Facility: MEDICAL CENTER | Age: 43
End: 2023-03-27

## 2023-03-27 DIAGNOSIS — Z01.812 PRE-OPERATIVE LABORATORY EXAMINATION: ICD-10-CM

## 2023-03-27 DIAGNOSIS — Z01.810 PRE-OPERATIVE CARDIOVASCULAR EXAMINATION: ICD-10-CM

## 2023-03-27 LAB — EKG IMPRESSION: NORMAL

## 2023-03-27 PROCEDURE — 93010 ELECTROCARDIOGRAM REPORT: CPT | Performed by: INTERNAL MEDICINE

## 2023-03-27 PROCEDURE — 93005 ELECTROCARDIOGRAM TRACING: CPT

## 2023-03-27 NOTE — OR NURSING
Left message for Marti that lab is unable to obtain a blood sample from Liana.  Labs will be drawn day of surgery unless I hear otherwise from Marti.

## 2023-03-30 RX ORDER — SODIUM CHLORIDE, SODIUM LACTATE, POTASSIUM CHLORIDE, CALCIUM CHLORIDE 600; 310; 30; 20 MG/100ML; MG/100ML; MG/100ML; MG/100ML
INJECTION, SOLUTION INTRAVENOUS CONTINUOUS
Status: CANCELLED | OUTPATIENT
Start: 2023-03-30 | End: 2023-03-30

## 2023-03-30 RX ORDER — ACETAMINOPHEN 500 MG
1000 TABLET ORAL ONCE
Status: CANCELLED | OUTPATIENT
Start: 2023-03-30 | End: 2023-03-30

## 2023-03-30 NOTE — OR NURSING
Called patient to check on arrival to pre-op for scheduled procedure today. Messages left for patient. Unable to contact patient at this time.

## 2023-04-05 ENCOUNTER — NON-PROVIDER VISIT (OUTPATIENT)
Dept: INTERNAL MEDICINE | Facility: OTHER | Age: 43
End: 2023-04-05
Payer: MEDICAID

## 2023-04-05 VITALS — BODY MASS INDEX: 37.2 KG/M2 | WEIGHT: 210 LBS

## 2023-04-05 DIAGNOSIS — G47.33 OBSTRUCTIVE SLEEP APNEA, ADULT: ICD-10-CM

## 2023-04-05 DIAGNOSIS — I10 PRIMARY HYPERTENSION: ICD-10-CM

## 2023-04-05 DIAGNOSIS — K21.9 GASTRO-ESOPHAGEAL REFLUX DISEASE WITHOUT ESOPHAGITIS: ICD-10-CM

## 2023-04-05 DIAGNOSIS — D64.9 ANEMIA, UNSPECIFIED TYPE: ICD-10-CM

## 2023-04-05 DIAGNOSIS — E78.5 DYSLIPIDEMIA: ICD-10-CM

## 2023-04-05 DIAGNOSIS — E11.65 TYPE 2 DIABETES MELLITUS WITH HYPERGLYCEMIA, WITHOUT LONG-TERM CURRENT USE OF INSULIN (HCC): ICD-10-CM

## 2023-04-05 DIAGNOSIS — E66.9 OBESITY (BMI 30-39.9): ICD-10-CM

## 2023-04-05 DIAGNOSIS — K61.1 PERIRECTAL ABSCESS: ICD-10-CM

## 2023-04-05 PROCEDURE — 97803 MED NUTRITION INDIV SUBSEQ: CPT | Performed by: DIETITIAN, REGISTERED

## 2023-04-05 ASSESSMENT — FIBROSIS 4 INDEX: FIB4 SCORE: 0.71

## 2023-04-05 NOTE — PATIENT INSTRUCTIONS
I will limit my carbohydrate intake at this time  - especially with BG running in the 300's  - aim to keep carbs to 0-not more than 30 grams per meal  - discuss with Shira re: increasing Tresiba and what to do in between visits  - support with higher protein and non-starchy vegetables for wound healing and blood sugar control    I will practice exercises that I can do   - listen to my body  - resistance bands  - upper body exercises  - once this week sample out    Lower my stress level  - bring in more arts and crafts    Contact surgeon re: procedure

## 2023-04-21 PROCEDURE — RXMED WILLOW AMBULATORY MEDICATION CHARGE: Performed by: NURSE PRACTITIONER

## 2023-04-23 PROCEDURE — RXMED WILLOW AMBULATORY MEDICATION CHARGE: Performed by: NURSE PRACTITIONER

## 2023-04-25 ENCOUNTER — PHARMACY VISIT (OUTPATIENT)
Dept: PHARMACY | Facility: MEDICAL CENTER | Age: 43
End: 2023-04-25
Payer: COMMERCIAL

## 2023-04-27 PROCEDURE — RXMED WILLOW AMBULATORY MEDICATION CHARGE: Performed by: INTERNAL MEDICINE

## 2023-04-28 DIAGNOSIS — E11.65 TYPE 2 DIABETES MELLITUS WITH HYPERGLYCEMIA, WITHOUT LONG-TERM CURRENT USE OF INSULIN (HCC): ICD-10-CM

## 2023-04-28 DIAGNOSIS — E03.8 OTHER SPECIFIED HYPOTHYROIDISM: ICD-10-CM

## 2023-04-28 NOTE — TELEPHONE ENCOUNTER
Levothyroxine prescription was renewed 4/27/23 with refills.    Received request via: Pharmacy     Was the patient seen in the last year in this department? Yes     Does the patient have an active prescription (recently filled or refills available) for medication(s) requested? Levothyroxine prescription was renewed 4/27/23 with refills.      Does the patient have [facility] and need 100 day supply (blood pressure, diabetes and cholesterol meds only)? Patient does not have SCP

## 2023-05-02 RX ORDER — LEVOTHYROXINE SODIUM 0.15 MG/1
150 TABLET ORAL
Qty: 30 TABLET | Refills: 3 | Status: SHIPPED | OUTPATIENT
Start: 2023-05-02 | End: 2023-08-23

## 2023-05-02 RX ORDER — GLUCOSAMINE HCL/CHONDROITIN SU 500-400 MG
CAPSULE ORAL
Qty: 100 EACH | Refills: 0 | Status: SHIPPED | OUTPATIENT
Start: 2023-05-02 | End: 2023-08-23

## 2023-05-02 RX ORDER — LANCETS 33 GAUGE
EACH MISCELLANEOUS
Qty: 100 EACH | Refills: 6 | Status: SHIPPED | OUTPATIENT
Start: 2023-02-23 | End: 2023-11-21

## 2023-05-08 ENCOUNTER — PHARMACY VISIT (OUTPATIENT)
Dept: PHARMACY | Facility: MEDICAL CENTER | Age: 43
End: 2023-05-08
Payer: COMMERCIAL

## 2023-05-12 PROCEDURE — RXMED WILLOW AMBULATORY MEDICATION CHARGE: Performed by: NURSE PRACTITIONER

## 2023-05-14 ENCOUNTER — HOSPITAL ENCOUNTER (EMERGENCY)
Facility: MEDICAL CENTER | Age: 43
End: 2023-05-15
Attending: EMERGENCY MEDICINE
Payer: MEDICAID

## 2023-05-14 ENCOUNTER — APPOINTMENT (OUTPATIENT)
Dept: RADIOLOGY | Facility: MEDICAL CENTER | Age: 43
End: 2023-05-14
Attending: EMERGENCY MEDICINE
Payer: MEDICAID

## 2023-05-14 DIAGNOSIS — L02.31 ABSCESS AND CELLULITIS OF GLUTEAL REGION: ICD-10-CM

## 2023-05-14 DIAGNOSIS — L03.317 ABSCESS AND CELLULITIS OF GLUTEAL REGION: ICD-10-CM

## 2023-05-14 DIAGNOSIS — R73.9 HYPERGLYCEMIA: ICD-10-CM

## 2023-05-14 PROCEDURE — 99284 EMERGENCY DEPT VISIT MOD MDM: CPT

## 2023-05-14 PROCEDURE — 83605 ASSAY OF LACTIC ACID: CPT

## 2023-05-14 ASSESSMENT — FIBROSIS 4 INDEX: FIB4 SCORE: 0.71

## 2023-05-15 ENCOUNTER — APPOINTMENT (OUTPATIENT)
Dept: RADIOLOGY | Facility: MEDICAL CENTER | Age: 43
End: 2023-05-15
Attending: EMERGENCY MEDICINE
Payer: MEDICAID

## 2023-05-15 VITALS
SYSTOLIC BLOOD PRESSURE: 127 MMHG | DIASTOLIC BLOOD PRESSURE: 79 MMHG | OXYGEN SATURATION: 93 % | HEART RATE: 75 BPM | WEIGHT: 213.41 LBS | HEIGHT: 63 IN | BODY MASS INDEX: 37.81 KG/M2 | RESPIRATION RATE: 16 BRPM | TEMPERATURE: 98.1 F

## 2023-05-15 LAB
ALBUMIN SERPL BCP-MCNC: 3.4 G/DL (ref 3.2–4.9)
ALBUMIN/GLOB SERPL: 0.9 G/DL
ALP SERPL-CCNC: 82 U/L (ref 30–99)
ALT SERPL-CCNC: 11 U/L (ref 2–50)
ANION GAP SERPL CALC-SCNC: 11 MMOL/L (ref 7–16)
AST SERPL-CCNC: 13 U/L (ref 12–45)
BASOPHILS # BLD AUTO: 0.7 % (ref 0–1.8)
BASOPHILS # BLD: 0.08 K/UL (ref 0–0.12)
BILIRUB SERPL-MCNC: 0.2 MG/DL (ref 0.1–1.5)
BUN SERPL-MCNC: 8 MG/DL (ref 8–22)
CALCIUM ALBUM COR SERPL-MCNC: 9.1 MG/DL (ref 8.5–10.5)
CALCIUM SERPL-MCNC: 8.6 MG/DL (ref 8.5–10.5)
CHLORIDE SERPL-SCNC: 101 MMOL/L (ref 96–112)
CO2 SERPL-SCNC: 20 MMOL/L (ref 20–33)
CREAT SERPL-MCNC: 0.34 MG/DL (ref 0.5–1.4)
EOSINOPHIL # BLD AUTO: 0.27 K/UL (ref 0–0.51)
EOSINOPHIL NFR BLD: 2.5 % (ref 0–6.9)
ERYTHROCYTE [DISTWIDTH] IN BLOOD BY AUTOMATED COUNT: 39.8 FL (ref 35.9–50)
GFR SERPLBLD CREATININE-BSD FMLA CKD-EPI: 131 ML/MIN/1.73 M 2
GLOBULIN SER CALC-MCNC: 3.8 G/DL (ref 1.9–3.5)
GLUCOSE SERPL-MCNC: 295 MG/DL (ref 65–99)
HCG SERPL QL: NEGATIVE
HCT VFR BLD AUTO: 39.2 % (ref 37–47)
HGB BLD-MCNC: 12.6 G/DL (ref 12–16)
IMM GRANULOCYTES # BLD AUTO: 0.05 K/UL (ref 0–0.11)
IMM GRANULOCYTES NFR BLD AUTO: 0.5 % (ref 0–0.9)
LACTATE SERPL-SCNC: 1.4 MMOL/L (ref 0.5–2)
LACTATE SERPL-SCNC: 2.2 MMOL/L (ref 0.5–2)
LYMPHOCYTES # BLD AUTO: 3.19 K/UL (ref 1–4.8)
LYMPHOCYTES NFR BLD: 29.8 % (ref 22–41)
MCH RBC QN AUTO: 25.9 PG (ref 27–33)
MCHC RBC AUTO-ENTMCNC: 32.1 G/DL (ref 33.6–35)
MCV RBC AUTO: 80.7 FL (ref 81.4–97.8)
MONOCYTES # BLD AUTO: 0.76 K/UL (ref 0–0.85)
MONOCYTES NFR BLD AUTO: 7.1 % (ref 0–13.4)
NEUTROPHILS # BLD AUTO: 6.37 K/UL (ref 2–7.15)
NEUTROPHILS NFR BLD: 59.4 % (ref 44–72)
NRBC # BLD AUTO: 0 K/UL
NRBC BLD-RTO: 0 /100 WBC
PLATELET # BLD AUTO: 257 K/UL (ref 164–446)
PMV BLD AUTO: 10.7 FL (ref 9–12.9)
POTASSIUM SERPL-SCNC: 3.7 MMOL/L (ref 3.6–5.5)
PROT SERPL-MCNC: 7.2 G/DL (ref 6–8.2)
RBC # BLD AUTO: 4.86 M/UL (ref 4.2–5.4)
SODIUM SERPL-SCNC: 132 MMOL/L (ref 135–145)
WBC # BLD AUTO: 10.7 K/UL (ref 4.8–10.8)

## 2023-05-15 PROCEDURE — RXMED WILLOW AMBULATORY MEDICATION CHARGE: Performed by: STUDENT IN AN ORGANIZED HEALTH CARE EDUCATION/TRAINING PROGRAM

## 2023-05-15 PROCEDURE — 72193 CT PELVIS W/DYE: CPT

## 2023-05-15 PROCEDURE — A9270 NON-COVERED ITEM OR SERVICE: HCPCS | Mod: UD | Performed by: EMERGENCY MEDICINE

## 2023-05-15 PROCEDURE — 80053 COMPREHEN METABOLIC PANEL: CPT

## 2023-05-15 PROCEDURE — 83605 ASSAY OF LACTIC ACID: CPT

## 2023-05-15 PROCEDURE — 700101 HCHG RX REV CODE 250: Mod: UD | Performed by: EMERGENCY MEDICINE

## 2023-05-15 PROCEDURE — 700117 HCHG RX CONTRAST REV CODE 255: Performed by: EMERGENCY MEDICINE

## 2023-05-15 PROCEDURE — 700111 HCHG RX REV CODE 636 W/ 250 OVERRIDE (IP): Mod: UD | Performed by: EMERGENCY MEDICINE

## 2023-05-15 PROCEDURE — 36415 COLL VENOUS BLD VENIPUNCTURE: CPT

## 2023-05-15 PROCEDURE — RXMED WILLOW AMBULATORY MEDICATION CHARGE: Performed by: NURSE PRACTITIONER

## 2023-05-15 PROCEDURE — 96374 THER/PROPH/DIAG INJ IV PUSH: CPT | Mod: XU

## 2023-05-15 PROCEDURE — RXMED WILLOW AMBULATORY MEDICATION CHARGE: Performed by: EMERGENCY MEDICINE

## 2023-05-15 PROCEDURE — 85025 COMPLETE CBC W/AUTO DIFF WBC: CPT

## 2023-05-15 PROCEDURE — 84703 CHORIONIC GONADOTROPIN ASSAY: CPT

## 2023-05-15 PROCEDURE — 303977 HCHG I & D

## 2023-05-15 PROCEDURE — 96375 TX/PRO/DX INJ NEW DRUG ADDON: CPT | Mod: XU

## 2023-05-15 PROCEDURE — 700102 HCHG RX REV CODE 250 W/ 637 OVERRIDE(OP): Mod: UD | Performed by: EMERGENCY MEDICINE

## 2023-05-15 PROCEDURE — 87040 BLOOD CULTURE FOR BACTERIA: CPT

## 2023-05-15 RX ORDER — MORPHINE SULFATE 4 MG/ML
4 INJECTION INTRAVENOUS ONCE
Status: COMPLETED | OUTPATIENT
Start: 2023-05-15 | End: 2023-05-15

## 2023-05-15 RX ORDER — DOXYCYCLINE 100 MG/1
100 TABLET ORAL ONCE
Status: COMPLETED | OUTPATIENT
Start: 2023-05-15 | End: 2023-05-15

## 2023-05-15 RX ORDER — PROCHLORPERAZINE EDISYLATE 5 MG/ML
10 INJECTION INTRAMUSCULAR; INTRAVENOUS ONCE
Status: COMPLETED | OUTPATIENT
Start: 2023-05-15 | End: 2023-05-15

## 2023-05-15 RX ORDER — BUPIVACAINE HYDROCHLORIDE AND EPINEPHRINE 5; 5 MG/ML; UG/ML
10 INJECTION, SOLUTION PERINEURAL ONCE
Status: DISCONTINUED | OUTPATIENT
Start: 2023-05-15 | End: 2023-05-15

## 2023-05-15 RX ORDER — DOXYCYCLINE HYCLATE 100 MG
100 TABLET ORAL 2 TIMES DAILY
Qty: 10 TABLET | Refills: 0 | Status: ACTIVE | OUTPATIENT
Start: 2023-05-15 | End: 2023-05-21

## 2023-05-15 RX ORDER — BUPIVACAINE HYDROCHLORIDE AND EPINEPHRINE 5; 5 MG/ML; UG/ML
10 INJECTION, SOLUTION EPIDURAL; INTRACAUDAL; PERINEURAL ONCE
Status: COMPLETED | OUTPATIENT
Start: 2023-05-15 | End: 2023-05-15

## 2023-05-15 RX ADMIN — DOXYCYCLINE 100 MG: 100 TABLET, FILM COATED ORAL at 04:20

## 2023-05-15 RX ADMIN — MORPHINE SULFATE 4 MG: 4 INJECTION INTRAVENOUS at 02:12

## 2023-05-15 RX ADMIN — PROCHLORPERAZINE EDISYLATE 10 MG: 5 INJECTION INTRAMUSCULAR; INTRAVENOUS at 05:53

## 2023-05-15 RX ADMIN — BUPIVACAINE HYDROCHLORIDE AND EPINEPHRINE BITARTRATE 10 ML: 5; .0091 INJECTION, SOLUTION EPIDURAL; INTRACAUDAL; PERINEURAL at 04:45

## 2023-05-15 RX ADMIN — IOHEXOL 100 ML: 350 INJECTION, SOLUTION INTRAVENOUS at 03:15

## 2023-05-15 RX ADMIN — FENTANYL CITRATE 100 MCG: 50 INJECTION, SOLUTION INTRAMUSCULAR; INTRAVENOUS at 04:37
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
## 2023-05-15 NOTE — DISCHARGE INSTRUCTIONS
You were seen in the emergency department for an abscess. This was drained in the emergency department. Please keep the area clean. Please wash several times a day with soap and water. Gently massage out any pus that forms. Please do not use any harsh chemicals, such as alcohol or hydrogen peroxide.     A loop of surgical rubber was left in place to help allow for further drainage of the wound.  Once the drainage stops you may cut this and remove it, or if it becomes untied on its own, that is okay it will not need to be replaced.    You are being sent home on a course of antibiotics. Please take these as prescribed. Please followup with your regular doctor.    Call your doctor or return to the ER:   -If you develop fevers, chills, worsening pain, spreading redness or other concerning symptoms

## 2023-05-15 NOTE — ED NOTES
Pt nauseated and dry heaving. Pt requesting medication for nausea.    Informed MD; orders received.

## 2023-05-15 NOTE — ED NOTES
Pt discharged, educated on discharge instructions. Discharge papers and instructions provided by previous shift Rn.  Patient was informed about diagnosis, symptom management, risks, and home care instructions.  Patient verbalized understanding and signed discharge instructions. Copy of discharge instructions in chart.  Patient ambulated out with steady gait.  Patient has personal belongings.
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
## 2023-05-15 NOTE — ED TRIAGE NOTES
"Chief Complaint   Patient presents with    Abscess     Rectal abscess for over 1 month, seen here for same. \"I also think I have a yeast infection\"      "
24-May-2023

## 2023-05-16 ENCOUNTER — PHARMACY VISIT (OUTPATIENT)
Dept: PHARMACY | Facility: MEDICAL CENTER | Age: 43
End: 2023-05-16
Payer: COMMERCIAL

## 2023-05-16 PROCEDURE — RXMED WILLOW AMBULATORY MEDICATION CHARGE: Performed by: NURSE PRACTITIONER
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
## 2023-05-19 PROCEDURE — RXMED WILLOW AMBULATORY MEDICATION CHARGE: Performed by: NURSE PRACTITIONER

## 2023-05-23 ENCOUNTER — PHARMACY VISIT (OUTPATIENT)
Dept: PHARMACY | Facility: MEDICAL CENTER | Age: 43
End: 2023-05-23
Payer: COMMERCIAL

## 2023-05-23 PROCEDURE — RXMED WILLOW AMBULATORY MEDICATION CHARGE: Performed by: NURSE PRACTITIONER

## 2023-05-23 PROCEDURE — RXMED WILLOW AMBULATORY MEDICATION CHARGE: Performed by: STUDENT IN AN ORGANIZED HEALTH CARE EDUCATION/TRAINING PROGRAM

## 2023-05-24 ENCOUNTER — NON-PROVIDER VISIT (OUTPATIENT)
Dept: INTERNAL MEDICINE | Facility: OTHER | Age: 43
End: 2023-05-24
Payer: MEDICAID

## 2023-05-24 VITALS — WEIGHT: 211.6 LBS | BODY MASS INDEX: 37.48 KG/M2

## 2023-05-24 DIAGNOSIS — E11.65 TYPE 2 DIABETES MELLITUS WITH HYPERGLYCEMIA, WITHOUT LONG-TERM CURRENT USE OF INSULIN (HCC): ICD-10-CM

## 2023-05-24 DIAGNOSIS — E78.5 DYSLIPIDEMIA: ICD-10-CM

## 2023-05-24 DIAGNOSIS — K21.9 GASTRO-ESOPHAGEAL REFLUX DISEASE WITHOUT ESOPHAGITIS: ICD-10-CM

## 2023-05-24 DIAGNOSIS — E66.9 OBESITY (BMI 30-39.9): ICD-10-CM

## 2023-05-24 DIAGNOSIS — D64.9 ANEMIA, UNSPECIFIED TYPE: ICD-10-CM

## 2023-05-24 DIAGNOSIS — I10 PRIMARY HYPERTENSION: ICD-10-CM

## 2023-05-24 DIAGNOSIS — G47.33 OBSTRUCTIVE SLEEP APNEA, ADULT: ICD-10-CM

## 2023-05-24 DIAGNOSIS — K61.1 PERIRECTAL ABSCESS: ICD-10-CM

## 2023-05-24 PROCEDURE — 97803 MED NUTRITION INDIV SUBSEQ: CPT | Performed by: DIETITIAN, REGISTERED

## 2023-05-24 PROCEDURE — RXMED WILLOW AMBULATORY MEDICATION CHARGE: Performed by: STUDENT IN AN ORGANIZED HEALTH CARE EDUCATION/TRAINING PROGRAM

## 2023-05-24 ASSESSMENT — FIBROSIS 4 INDEX: FIB4 SCORE: 0.66

## 2023-05-24 NOTE — PATIENT INSTRUCTIONS
I will change up some of my practices with current living situation:    0800 coffee + cottage cheese  1000 Snack: 1 slice bread + PB if BG <140 mg; if above, aim for HB egg and veggies  2795-7281: 1 cup spaghetti casserole made in advance (add more vegetables-tomatoes)  or canned tuna or chicken sandwich (add in HB eggs, chickpeas, cucumbers + decker or plain yogurt)  Remember to drink my water- 3-4 20 oz. Glasses of water.  Optional Snack  0266-7845: fruit or vegetable  8284-9572: casserole, lunch item or beans+Queso or yogurt + 6 -inch corn tortilla; sample lentil dishes- cook on stove 1 cup + 1.5 cup liquid simmer x 20-30 min.  Dessert item: water packed canned or fresh fruit+ cottage cheese  Hummus +veggie  1/2 PB&J    FBG goals:  mg/dl  Before meals: 140-180 mg/dl  Two hours after meals: <180 mg/dl
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
## 2023-05-25 PROCEDURE — RXMED WILLOW AMBULATORY MEDICATION CHARGE: Performed by: STUDENT IN AN ORGANIZED HEALTH CARE EDUCATION/TRAINING PROGRAM

## 2023-05-31 ENCOUNTER — OFFICE VISIT (OUTPATIENT)
Dept: WOUND CARE | Facility: MEDICAL CENTER | Age: 43
End: 2023-05-31
Attending: STUDENT IN AN ORGANIZED HEALTH CARE EDUCATION/TRAINING PROGRAM
Payer: MEDICAID

## 2023-05-31 ENCOUNTER — PHARMACY VISIT (OUTPATIENT)
Dept: PHARMACY | Facility: MEDICAL CENTER | Age: 43
End: 2023-05-31
Payer: COMMERCIAL

## 2023-05-31 VITALS
OXYGEN SATURATION: 97 % | HEART RATE: 104 BPM | SYSTOLIC BLOOD PRESSURE: 157 MMHG | TEMPERATURE: 97.1 F | DIASTOLIC BLOOD PRESSURE: 82 MMHG | RESPIRATION RATE: 20 BRPM

## 2023-05-31 DIAGNOSIS — S31.829D GLUTEAL CLEFT WOUND, LEFT, SUBSEQUENT ENCOUNTER: ICD-10-CM

## 2023-05-31 DIAGNOSIS — E11.65 UNCONTROLLED TYPE 2 DIABETES MELLITUS WITH HYPERGLYCEMIA (HCC): ICD-10-CM

## 2023-05-31 DIAGNOSIS — E66.9 OBESITY (BMI 30-39.9): ICD-10-CM

## 2023-05-31 DIAGNOSIS — L02.31 ABSCESS, GLUTEAL, LEFT: ICD-10-CM

## 2023-05-31 PROCEDURE — RXOTC WILLOW AMBULATORY OTC CHARGE: Performed by: PHARMACIST

## 2023-05-31 PROCEDURE — 3077F SYST BP >= 140 MM HG: CPT | Performed by: STUDENT IN AN ORGANIZED HEALTH CARE EDUCATION/TRAINING PROGRAM

## 2023-05-31 PROCEDURE — 99214 OFFICE O/P EST MOD 30 MIN: CPT

## 2023-05-31 PROCEDURE — 99214 OFFICE O/P EST MOD 30 MIN: CPT | Performed by: STUDENT IN AN ORGANIZED HEALTH CARE EDUCATION/TRAINING PROGRAM

## 2023-05-31 PROCEDURE — 3079F DIAST BP 80-89 MM HG: CPT | Performed by: STUDENT IN AN ORGANIZED HEALTH CARE EDUCATION/TRAINING PROGRAM

## 2023-05-31 ASSESSMENT — ENCOUNTER SYMPTOMS
FEVER: 0
VOMITING: 0
ABDOMINAL PAIN: 0
NAUSEA: 0
DIARRHEA: 0
CHILLS: 0
CLAUDICATION: 0

## 2023-05-31 NOTE — PATIENT INSTRUCTIONS
-Keep your wound dressing clean, dry, and intact.    -Change your dressing if it becomes soiled, soaked, or falls off.    -Should you experience any significant changes in your wound(s), such as infection (redness, swelling, localized heat, increased pain, fever > 101 F, chills) or have any questions regarding your home care instructions, please contact the wound center at [phone]. If after hours, contact your primary care physician or go to the hospital emergency room.

## 2023-06-01 ENCOUNTER — APPOINTMENT (OUTPATIENT)
Dept: ADMISSIONS | Facility: MEDICAL CENTER | Age: 43
End: 2023-06-01
Attending: SURGERY
Payer: MEDICAID

## 2023-06-01 NOTE — PROGRESS NOTES
Provider Encounter- Full Thickness wound    HISTORY OF PRESENT ILLNESS  Wound History:    START OF CARE IN CLINIC: 5/31/2023 (return to clinic following I&D in ED)    REFERRING PROVIDER: Emre Yip      WOUND- Full Thickness Wound   LOCATION: Left buttocks   HISTORY:  42F with Pmhx of uncontrolled DM2 (A1c 12.5), obesity. Patient was admitted to [facility] 9/22-9/26/2022 for perirectal abscess. Patient was taken to OR and underwent I&D of perirectal abscess on 9/21 by Dr. Cuellar. She was treated with IV Abx. She was treating wound with packing. Wound culture grew streptococcus anginosus and prevotella bivia. ID consulted and recommend discharge on Zyvox and flagyl due to PCN allergy. Patient was referred to [facility] for wound care.     11/03/22: Patient was recently seen in Dr. Cuellar's office where she underwent an additional I&D with removal of purulent drainage and repacking of the wound.  Patient was referred back to [facility] for continued care the left perirectal wound.    5/31/2023: Patient was previously following in clinic. Was discharged on 3/21/2023. Patient reported increased gluteal pain and presented to ED on 5/14/2023 where she was diagnosed with abscess and cellulitis of gluteal region. ED provider performed I&D and left vessel loop in place to act as drain. She was discharged on doxycycline. She was referred back to surgery, saw Dr. Frances Membreno who plans on rectal exam under anesthesia with possible I&D, possible seton, possible fistulotomy planned for 6/20. Patient was referred to [facility] for further wound care.    Pertinent Medical History: Obesity, Uncontrolled DM2, Perirectal abscess     TOBACCO USE:  Former Smoker    Patient's problem list, allergies, and current medications reviewed and updated in Epic    Interval History:  5/31/2023: Clinic visit with Dick Young MD. Patient reports that she continues to have some pain from I&D site, particularly due to vessel loop left in as drain. She  recently saw surgeon who recommends leaving in place until she can undergo surgery for EUA, possible I&D, possible fistulotomy. Patient has not been applying any dressing and reports that drainage has decreased. We discussed obtaining a seat cushion for comfort, wound be out of pocket and she cannot afford at this time. Goal of care at this point will be to manage any drainage to prevent periwound skin breakdown prior to surgery.    REVIEW OF SYSTEMS:  Review of Systems   Constitutional:  Negative for chills and fever.   Cardiovascular:  Negative for claudication and leg swelling.   Gastrointestinal:  Negative for abdominal pain, diarrhea, nausea and vomiting.   Musculoskeletal:         Left buttocks pain   Skin:         Open wound left buttocks with vessel loop in place to act as drain         PHYSICAL EXAMINATION:   BP (!) 157/82   Pulse (!) 104   Temp 36.2 °C (97.1 °F) (Temporal)   Resp 20   SpO2 97%     Physical Exam  Constitutional:       General: She is not in acute distress.     Appearance: She is obese.   Cardiovascular:      Rate and Rhythm: Tachycardia present.   Pulmonary:      Effort: Pulmonary effort is normal. No respiratory distress.      Breath sounds: No wheezing.   Skin:     Comments: Left gluteal / perirectal abscess s/p I&D - Vessel loop remains in place to act as drain. Mild serosanguinous drainage.  No significant edema, erythema  Refer to wound flowsheet and photos   Neurological:      Mental Status: She is alert.          Wound 05/31/23 Buttocks Left Buttocks (vessel loop suture) (Active)   Wound Image   05/31/23 1600   Site Assessment Pink;Red 05/31/23 1600   Periwound Assessment Intact 05/31/23 1600   Margins HARPER 05/31/23 1600   Drainage Amount Moderate 05/31/23 1600   Drainage Description Serosanguineous 05/31/23 1600   Treatments Cleansed;Site care 05/31/23 1600   Wound Cleansing Foam Cleanser/Washcloth 05/31/23 1600   Periwound Protectant Skin Protectant Wipes to Periwound 05/31/23  1600   Dressing Options Dry Gauze 05/31/23 1600   Dressing Changed New 05/31/23 1600   Dressing Status Clean;Dry;Intact 05/31/23 1600   Dressing Change/Treatment Frequency As Needed 05/31/23 1600   Non-staged Wound Description Full thickness 05/31/23 1600   Tunneling (cm) 0 cm 05/31/23 1600   Undermining (cm) 0 cm 05/31/23 1600   Wound Odor None 05/31/23 1600   Exposed Structures None 05/31/23 1600        Procedure:  - No need for debridement today in clinic         Pertinent Labs and Diagnostics:    Labs:     A1c:   Lab Results   Component Value Date/Time    HBA1C 9.6 (A) 11/15/2022 10:25 AM            IMAGING: CT Pelvis with 9/21/2022  IMPRESSION:     1.  Left-sided perirectal abscess which tracks caudally into the left upper thigh. The largest portion of this abscess in the left upper thigh measures 5.3 x 3.1 cm in greatest diameters.    VASCULAR STUDIES: None    LAST  WOUND CULTURE:  DATE :   Lab Results   Component Value Date/Time    CULTRSULT No growth after 5 days of incubation. 05/15/2023 12:36 AM         ASSESSMENT AND PLAN:     1. Abscess, gluteal, left  2. Gluteal cleft wound, left, subsequent encounter    5/31/2023  - Patient with recurrent abscess and likely perirectal abscess noted on left gluteus s/p I&D in ED  - Vessel loop left in place to act as drain  - No need for debridement today  - Patient reports that drainage has improved.  - Patient had appointment with Dr. Membreno who appears to suspect that this a recurrent perirectal abscess. Plan for surgery 6/20 for examination under anesthesia, possible I&D, possible fistulotomy vs seton  - Discussed with patient that vessel loop to be left in place. Wound care goal will be to manage drainage and preserve periwound tissue until she undergoes surgery  - Patient to use plain gauze in gluteal cleft  - Given squeeze bottle to help keep area clean following bowel movements  - Return to clinic in 2 weeks for assessment   Wound Care: Plain gauze    3.  Uncontrolled type 2 diabetes mellitus with hyperglycemia (HCC)    5/31/2023  - Long history of poorly controlled DM. Her A1c is 9.6 from 12.5  - Patient is working with endocrinology. Reports her fasting glucose between 150-190 and postprandial is in 200s. She reports significant improvement as was previously in the 300-400s  - Counseled on importance of glucose management on wound healing and preventing infections    4. Obesity (BMI 30-39.9)  -Benefits of weight loss on improved blood sugar control and overall health has been previously discussed with the patient on numerous prior clinical appointments. She is working on improving diet for glucose control and weight loss.    PATIENT EDUCATION  - Importance of adequate nutrition for wound healing  -Advised to go to ER for any increased redness, swelling, drainage, or odor, or if patient develops fever, chills, nausea or vomiting.     My total time spent caring for the patient on the day of the encounter was 30 minutes, reviewing history, assessment, counseling and education, and coordination of care.  This does not include time spent on separately billable procedures/tests.      Please note that this note may have been created using voice recognition software. I have worked with technical experts from [employer] to optimize the interface.  I have made every reasonable attempt to correct obvious errors, but there may be errors of grammar and possibly content that I did not discover before finalizing the note.    N

## 2023-06-06 ENCOUNTER — PRE-ADMISSION TESTING (OUTPATIENT)
Dept: ADMISSIONS | Facility: MEDICAL CENTER | Age: 43
End: 2023-06-06
Attending: SURGERY
Payer: MEDICAID

## 2023-06-07 ENCOUNTER — OFFICE VISIT (OUTPATIENT)
Dept: INTERNAL MEDICINE | Facility: OTHER | Age: 43
End: 2023-06-07
Payer: MEDICAID

## 2023-06-07 VITALS
HEIGHT: 63 IN | SYSTOLIC BLOOD PRESSURE: 139 MMHG | HEART RATE: 71 BPM | TEMPERATURE: 97.1 F | WEIGHT: 210.2 LBS | DIASTOLIC BLOOD PRESSURE: 94 MMHG | OXYGEN SATURATION: 100 % | BODY MASS INDEX: 37.25 KG/M2

## 2023-06-07 DIAGNOSIS — Z79.4 TYPE 2 DIABETES MELLITUS WITH HYPERGLYCEMIA, WITH LONG-TERM CURRENT USE OF INSULIN (HCC): ICD-10-CM

## 2023-06-07 DIAGNOSIS — K61.1 PERIRECTAL ABSCESS: ICD-10-CM

## 2023-06-07 DIAGNOSIS — E28.2 POLYCYSTIC OVARY SYNDROME: ICD-10-CM

## 2023-06-07 DIAGNOSIS — E11.65 TYPE 2 DIABETES MELLITUS WITH HYPERGLYCEMIA, WITHOUT LONG-TERM CURRENT USE OF INSULIN (HCC): ICD-10-CM

## 2023-06-07 DIAGNOSIS — E11.65 TYPE 2 DIABETES MELLITUS WITH HYPERGLYCEMIA, WITH LONG-TERM CURRENT USE OF INSULIN (HCC): ICD-10-CM

## 2023-06-07 DIAGNOSIS — E03.8 OTHER SPECIFIED HYPOTHYROIDISM: ICD-10-CM

## 2023-06-07 DIAGNOSIS — I10 PRIMARY HYPERTENSION: ICD-10-CM

## 2023-06-07 PROCEDURE — 99214 OFFICE O/P EST MOD 30 MIN: CPT | Mod: GC | Performed by: STUDENT IN AN ORGANIZED HEALTH CARE EDUCATION/TRAINING PROGRAM

## 2023-06-07 PROCEDURE — 3080F DIAST BP >= 90 MM HG: CPT | Performed by: STUDENT IN AN ORGANIZED HEALTH CARE EDUCATION/TRAINING PROGRAM

## 2023-06-07 PROCEDURE — 3075F SYST BP GE 130 - 139MM HG: CPT | Performed by: STUDENT IN AN ORGANIZED HEALTH CARE EDUCATION/TRAINING PROGRAM

## 2023-06-07 ASSESSMENT — FIBROSIS 4 INDEX: FIB4 SCORE: 0.66
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
## 2023-06-07 NOTE — PATIENT INSTRUCTIONS
Patient: Mary aBll    Procedure: Procedure(s):  ROBOTIC SUPRACERVICAL HYSTERECTOMY WITH BILATERAL SALPINGO OOPHORECTOMY, SACROCOLPOPEXY, PARAVAGINAL REPAIR       Anesthesia Type:  General    Note:  Disposition: Outpatient   Postop Pain Control: Uneventful            Sign Out: Well controlled pain   PONV: No   Neuro/Psych: Uneventful            Sign Out: Acceptable/Baseline neuro status   Airway/Respiratory: Uneventful            Sign Out: Acceptable/Baseline resp. status   CV/Hemodynamics: Uneventful            Sign Out: Acceptable CV status; No obvious hypovolemia; No obvious fluid overload   Other NRE: NONE   DID A NON-ROUTINE EVENT OCCUR? No           Last vitals:  Vitals Value Taken Time   /72 05/10/23 1630   Temp 36.3  C (97.4  F) 05/10/23 1630   Pulse 68 05/10/23 1637   Resp 22 05/10/23 1637   SpO2 98 % 05/10/23 1636   Vitals shown include unvalidated device data.    Electronically Signed By: Tamy Arreguin MD  May 10, 2023  5:00 PM   Please check BP ant home and send me a message with blood pressure for 1 week.   Follow up in 6 weeks to re-establish and fill disability paperwork.   Talk to CM  and [employer] for electricity coverage.   I'll reach out with info on this as well.   Call for heavy bleeding.

## 2023-06-08 RX ORDER — BLOOD PRESSURE TEST KIT
1 KIT MISCELLANEOUS
Qty: 300 EACH | Refills: 6 | Status: SHIPPED | OUTPATIENT
Start: 2023-06-08 | End: 2023-06-23 | Stop reason: SDUPTHER

## 2023-06-08 NOTE — ASSESSMENT & PLAN NOTE
Currently uncontrolled  Continue metformin 1000mg BID,  Tresiba 26U daily, Tulicity 0.5 mg weekly premeal humalog TID  - lipitor 20 daily  - lisinopril 10 daily  - gabapentin 100 TID for neuropathy  - Follow up repeat labs and medication adjustment per endo Planning on increasing Trulicity

## 2023-06-08 NOTE — ASSESSMENT & PLAN NOTE
2.2 cm right ovarian cyst seen on recent CT in May 2023  Unable to undergo TVUS due to perirectal abscess  Unable to go on estrogen containing OCP due to history of VTE  Will follow up with gyn following resolution of perirectal abscess

## 2023-06-08 NOTE — ASSESSMENT & PLAN NOTE
Seen in the ED on 5/15 for worsening rectal pain.  She underwent I&D in the ED and a vessel loop was left in place to act as a drain. CT revealed peripherally enhancing low-density fluid collection in the medial left gluteal subcutaneous fat measuring 1.5 x 2.8 cm.  She is currently following with wound care.  She  is following with surgery for concern for anorectal abscess and possible fistula.     Drain in place- not having drainage at this time. Not on Abx at this time. No fevers or chills.     -Follow up with wound care and surgery to determine drain removal and additional debridement   - BID enema per surgery  - Will hold off on  abx for now in the absence of signs of systemic infection

## 2023-06-08 NOTE — ASSESSMENT & PLAN NOTE
BP mildly elevated on lisinopril 10  Will collect home BP monitoring log to assess need for increasing lisinopril

## 2023-06-16 ENCOUNTER — OFFICE VISIT (OUTPATIENT)
Dept: INTERNAL MEDICINE | Facility: OTHER | Age: 43
End: 2023-06-16
Payer: MEDICAID

## 2023-06-16 ENCOUNTER — PHARMACY VISIT (OUTPATIENT)
Dept: PHARMACY | Facility: MEDICAL CENTER | Age: 43
End: 2023-06-16
Payer: COMMERCIAL

## 2023-06-16 VITALS
OXYGEN SATURATION: 93 % | DIASTOLIC BLOOD PRESSURE: 83 MMHG | WEIGHT: 210 LBS | BODY MASS INDEX: 37.2 KG/M2 | HEART RATE: 85 BPM | SYSTOLIC BLOOD PRESSURE: 125 MMHG | TEMPERATURE: 98.5 F

## 2023-06-16 DIAGNOSIS — E11.65 TYPE 2 DIABETES MELLITUS WITH HYPERGLYCEMIA, WITH LONG-TERM CURRENT USE OF INSULIN (HCC): ICD-10-CM

## 2023-06-16 DIAGNOSIS — Z79.4 TYPE 2 DIABETES MELLITUS WITH HYPERGLYCEMIA, WITH LONG-TERM CURRENT USE OF INSULIN (HCC): ICD-10-CM

## 2023-06-16 DIAGNOSIS — E03.8 OTHER SPECIFIED HYPOTHYROIDISM: ICD-10-CM

## 2023-06-16 DIAGNOSIS — G57.93 NEUROPATHIC PAIN OF BOTH FEET: ICD-10-CM

## 2023-06-16 DIAGNOSIS — F41.9 ANXIETY: ICD-10-CM

## 2023-06-16 PROCEDURE — 99214 OFFICE O/P EST MOD 30 MIN: CPT | Mod: GC | Performed by: HOSPITALIST

## 2023-06-16 PROCEDURE — RXMED WILLOW AMBULATORY MEDICATION CHARGE: Performed by: HOSPITALIST

## 2023-06-16 PROCEDURE — RXMED WILLOW AMBULATORY MEDICATION CHARGE: Performed by: NURSE PRACTITIONER

## 2023-06-16 PROCEDURE — 3079F DIAST BP 80-89 MM HG: CPT | Performed by: HOSPITALIST

## 2023-06-16 PROCEDURE — 3074F SYST BP LT 130 MM HG: CPT | Performed by: HOSPITALIST

## 2023-06-16 RX ORDER — GABAPENTIN 100 MG/1
200 CAPSULE ORAL 3 TIMES DAILY
Qty: 270 CAPSULE | Refills: 2 | Status: SHIPPED | OUTPATIENT
Start: 2023-06-16 | End: 2023-12-15 | Stop reason: SDUPTHER

## 2023-06-16 RX ORDER — ESCITALOPRAM OXALATE 10 MG/1
10 TABLET ORAL DAILY
Qty: 60 TABLET | Refills: 1 | Status: SHIPPED | OUTPATIENT
Start: 2023-06-16 | End: 2023-07-19 | Stop reason: SINTOL

## 2023-06-16 ASSESSMENT — FIBROSIS 4 INDEX: FIB4 SCORE: 0.66
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
## 2023-06-19 ENCOUNTER — PRE-ADMISSION TESTING (OUTPATIENT)
Dept: ADMISSIONS | Facility: MEDICAL CENTER | Age: 43
End: 2023-06-19
Attending: SURGERY
Payer: MEDICAID

## 2023-06-19 ENCOUNTER — ANESTHESIA EVENT (OUTPATIENT)
Dept: SURGERY | Facility: MEDICAL CENTER | Age: 43
End: 2023-06-19
Payer: MEDICAID

## 2023-06-19 DIAGNOSIS — Z01.812 PRE-OPERATIVE LABORATORY EXAMINATION: ICD-10-CM

## 2023-06-19 LAB
EST. AVERAGE GLUCOSE BLD GHB EST-MCNC: 269 MG/DL
HBA1C MFR BLD: 11 % (ref 4–5.6)
HCG SERPL QL: NEGATIVE

## 2023-06-19 PROCEDURE — 36415 COLL VENOUS BLD VENIPUNCTURE: CPT

## 2023-06-19 PROCEDURE — 84703 CHORIONIC GONADOTROPIN ASSAY: CPT

## 2023-06-19 PROCEDURE — 83036 HEMOGLOBIN GLYCOSYLATED A1C: CPT

## 2023-06-20 ENCOUNTER — HOSPITAL ENCOUNTER (OUTPATIENT)
Facility: MEDICAL CENTER | Age: 43
End: 2023-06-20
Attending: SURGERY | Admitting: SURGERY
Payer: MEDICAID

## 2023-06-20 ENCOUNTER — ANESTHESIA (OUTPATIENT)
Dept: SURGERY | Facility: MEDICAL CENTER | Age: 43
End: 2023-06-20
Payer: MEDICAID

## 2023-06-20 ENCOUNTER — PHARMACY VISIT (OUTPATIENT)
Dept: PHARMACY | Facility: MEDICAL CENTER | Age: 43
End: 2023-06-20
Payer: COMMERCIAL

## 2023-06-20 VITALS
OXYGEN SATURATION: 99 % | DIASTOLIC BLOOD PRESSURE: 69 MMHG | HEART RATE: 61 BPM | WEIGHT: 209.88 LBS | SYSTOLIC BLOOD PRESSURE: 151 MMHG | BODY MASS INDEX: 37.19 KG/M2 | TEMPERATURE: 98.3 F | HEIGHT: 63 IN | RESPIRATION RATE: 17 BRPM

## 2023-06-20 DIAGNOSIS — G89.18 POST-OP PAIN: ICD-10-CM

## 2023-06-20 LAB
GLUCOSE BLD STRIP.AUTO-MCNC: 130 MG/DL (ref 65–99)
PATHOLOGY CONSULT NOTE: NORMAL

## 2023-06-20 PROCEDURE — 88304 TISSUE EXAM BY PATHOLOGIST: CPT

## 2023-06-20 PROCEDURE — 160035 HCHG PACU - 1ST 60 MINS PHASE I: Performed by: SURGERY

## 2023-06-20 PROCEDURE — 160027 HCHG SURGERY MINUTES - 1ST 30 MINS LEVEL 2: Performed by: SURGERY

## 2023-06-20 PROCEDURE — 700102 HCHG RX REV CODE 250 W/ 637 OVERRIDE(OP): Mod: UD | Performed by: ANESTHESIOLOGY

## 2023-06-20 PROCEDURE — 00902 ANES ANORECTAL PX: CPT | Performed by: ANESTHESIOLOGY

## 2023-06-20 PROCEDURE — 160025 RECOVERY II MINUTES (STATS): Performed by: SURGERY

## 2023-06-20 PROCEDURE — 160038 HCHG SURGERY MINUTES - EA ADDL 1 MIN LEVEL 2: Performed by: SURGERY

## 2023-06-20 PROCEDURE — 82962 GLUCOSE BLOOD TEST: CPT

## 2023-06-20 PROCEDURE — 700111 HCHG RX REV CODE 636 W/ 250 OVERRIDE (IP): Mod: UD | Performed by: ANESTHESIOLOGY

## 2023-06-20 PROCEDURE — 700105 HCHG RX REV CODE 258: Mod: UD | Performed by: SURGERY

## 2023-06-20 PROCEDURE — RXMED WILLOW AMBULATORY MEDICATION CHARGE: Performed by: SURGERY

## 2023-06-20 PROCEDURE — 700101 HCHG RX REV CODE 250: Mod: UD | Performed by: ANESTHESIOLOGY

## 2023-06-20 PROCEDURE — 160046 HCHG PACU - 1ST 60 MINS PHASE II: Performed by: SURGERY

## 2023-06-20 PROCEDURE — 160002 HCHG RECOVERY MINUTES (STAT): Performed by: SURGERY

## 2023-06-20 PROCEDURE — 700101 HCHG RX REV CODE 250: Mod: UD | Performed by: SURGERY

## 2023-06-20 PROCEDURE — 160036 HCHG PACU - EA ADDL 30 MINS PHASE I: Performed by: SURGERY

## 2023-06-20 PROCEDURE — A9270 NON-COVERED ITEM OR SERVICE: HCPCS | Mod: UD | Performed by: ANESTHESIOLOGY

## 2023-06-20 PROCEDURE — 160009 HCHG ANES TIME/MIN: Performed by: SURGERY

## 2023-06-20 PROCEDURE — 160048 HCHG OR STATISTICAL LEVEL 1-5: Performed by: SURGERY

## 2023-06-20 RX ORDER — HYDROMORPHONE HYDROCHLORIDE 1 MG/ML
0.1 INJECTION, SOLUTION INTRAMUSCULAR; INTRAVENOUS; SUBCUTANEOUS
Status: DISCONTINUED | OUTPATIENT
Start: 2023-06-20 | End: 2023-06-20 | Stop reason: HOSPADM

## 2023-06-20 RX ORDER — LABETALOL HYDROCHLORIDE 5 MG/ML
5 INJECTION, SOLUTION INTRAVENOUS
Status: DISCONTINUED | OUTPATIENT
Start: 2023-06-20 | End: 2023-06-20 | Stop reason: HOSPADM

## 2023-06-20 RX ORDER — OXYCODONE HCL 5 MG/5 ML
5 SOLUTION, ORAL ORAL
Status: DISCONTINUED | OUTPATIENT
Start: 2023-06-20 | End: 2023-06-20 | Stop reason: HOSPADM

## 2023-06-20 RX ORDER — LIDOCAINE HYDROCHLORIDE 20 MG/ML
INJECTION, SOLUTION EPIDURAL; INFILTRATION; INTRACAUDAL; PERINEURAL PRN
Status: DISCONTINUED | OUTPATIENT
Start: 2023-06-20 | End: 2023-06-20 | Stop reason: SURG

## 2023-06-20 RX ORDER — HYDROMORPHONE HYDROCHLORIDE 1 MG/ML
0.2 INJECTION, SOLUTION INTRAMUSCULAR; INTRAVENOUS; SUBCUTANEOUS
Status: DISCONTINUED | OUTPATIENT
Start: 2023-06-20 | End: 2023-06-20 | Stop reason: HOSPADM

## 2023-06-20 RX ORDER — HALOPERIDOL 5 MG/ML
1 INJECTION INTRAMUSCULAR
Status: COMPLETED | OUTPATIENT
Start: 2023-06-20 | End: 2023-06-20

## 2023-06-20 RX ORDER — MEPERIDINE HYDROCHLORIDE 25 MG/ML
6.25 INJECTION INTRAMUSCULAR; INTRAVENOUS; SUBCUTANEOUS
Status: DISCONTINUED | OUTPATIENT
Start: 2023-06-20 | End: 2023-06-20 | Stop reason: HOSPADM

## 2023-06-20 RX ORDER — EPHEDRINE SULFATE 50 MG/ML
5 INJECTION, SOLUTION INTRAVENOUS
Status: DISCONTINUED | OUTPATIENT
Start: 2023-06-20 | End: 2023-06-20 | Stop reason: HOSPADM

## 2023-06-20 RX ORDER — HYDROMORPHONE HYDROCHLORIDE 1 MG/ML
0.4 INJECTION, SOLUTION INTRAMUSCULAR; INTRAVENOUS; SUBCUTANEOUS
Status: DISCONTINUED | OUTPATIENT
Start: 2023-06-20 | End: 2023-06-20 | Stop reason: HOSPADM

## 2023-06-20 RX ORDER — HYDRALAZINE HYDROCHLORIDE 20 MG/ML
5 INJECTION INTRAMUSCULAR; INTRAVENOUS
Status: DISCONTINUED | OUTPATIENT
Start: 2023-06-20 | End: 2023-06-20 | Stop reason: HOSPADM

## 2023-06-20 RX ORDER — SODIUM CHLORIDE, SODIUM LACTATE, POTASSIUM CHLORIDE, CALCIUM CHLORIDE 600; 310; 30; 20 MG/100ML; MG/100ML; MG/100ML; MG/100ML
INJECTION, SOLUTION INTRAVENOUS CONTINUOUS
Status: DISCONTINUED | OUTPATIENT
Start: 2023-06-20 | End: 2023-06-20 | Stop reason: HOSPADM

## 2023-06-20 RX ORDER — BUPIVACAINE HYDROCHLORIDE AND EPINEPHRINE 5; 5 MG/ML; UG/ML
INJECTION, SOLUTION EPIDURAL; INTRACAUDAL; PERINEURAL
Status: DISCONTINUED | OUTPATIENT
Start: 2023-06-20 | End: 2023-06-20 | Stop reason: HOSPADM

## 2023-06-20 RX ORDER — PHENYLEPHRINE HYDROCHLORIDE 10 MG/ML
INJECTION, SOLUTION INTRAMUSCULAR; INTRAVENOUS; SUBCUTANEOUS PRN
Status: DISCONTINUED | OUTPATIENT
Start: 2023-06-20 | End: 2023-06-20 | Stop reason: SURG

## 2023-06-20 RX ORDER — ROCURONIUM BROMIDE 10 MG/ML
INJECTION, SOLUTION INTRAVENOUS PRN
Status: DISCONTINUED | OUTPATIENT
Start: 2023-06-20 | End: 2023-06-20 | Stop reason: SURG

## 2023-06-20 RX ORDER — SCOLOPAMINE TRANSDERMAL SYSTEM 1 MG/1
1 PATCH, EXTENDED RELEASE TRANSDERMAL
Status: DISCONTINUED | OUTPATIENT
Start: 2023-06-20 | End: 2023-06-20 | Stop reason: HOSPADM

## 2023-06-20 RX ORDER — OXYCODONE HCL 5 MG/5 ML
10 SOLUTION, ORAL ORAL
Status: DISCONTINUED | OUTPATIENT
Start: 2023-06-20 | End: 2023-06-20 | Stop reason: HOSPADM

## 2023-06-20 RX ORDER — CEFAZOLIN SODIUM 1 G/3ML
INJECTION, POWDER, FOR SOLUTION INTRAMUSCULAR; INTRAVENOUS PRN
Status: DISCONTINUED | OUTPATIENT
Start: 2023-06-20 | End: 2023-06-20 | Stop reason: SURG

## 2023-06-20 RX ORDER — OXYCODONE HYDROCHLORIDE 5 MG/1
5 TABLET ORAL EVERY 6 HOURS PRN
Qty: 12 TABLET | Refills: 0 | Status: SHIPPED | OUTPATIENT
Start: 2023-06-20 | End: 2023-06-23

## 2023-06-20 RX ORDER — DIPHENHYDRAMINE HYDROCHLORIDE 50 MG/ML
12.5 INJECTION INTRAMUSCULAR; INTRAVENOUS
Status: DISCONTINUED | OUTPATIENT
Start: 2023-06-20 | End: 2023-06-20 | Stop reason: HOSPADM

## 2023-06-20 RX ADMIN — HALOPERIDOL LACTATE 1 MG: 5 INJECTION, SOLUTION INTRAMUSCULAR at 13:20

## 2023-06-20 RX ADMIN — PROPOFOL 50 MG: 10 INJECTION, EMULSION INTRAVENOUS at 12:17

## 2023-06-20 RX ADMIN — FENTANYL CITRATE 50 MCG: 50 INJECTION, SOLUTION INTRAMUSCULAR; INTRAVENOUS at 12:51

## 2023-06-20 RX ADMIN — PROPOFOL 200 MG: 10 INJECTION, EMULSION INTRAVENOUS at 12:15

## 2023-06-20 RX ADMIN — SCOPOLAMINE 1 PATCH: 1.5 PATCH, EXTENDED RELEASE TRANSDERMAL at 11:47

## 2023-06-20 RX ADMIN — HALOPERIDOL LACTATE 1 MG: 5 INJECTION, SOLUTION INTRAMUSCULAR at 13:38

## 2023-06-20 RX ADMIN — FENTANYL CITRATE 50 MCG: 50 INJECTION, SOLUTION INTRAMUSCULAR; INTRAVENOUS at 12:14

## 2023-06-20 RX ADMIN — HYDRALAZINE HYDROCHLORIDE 5 MG: 20 INJECTION, SOLUTION INTRAMUSCULAR; INTRAVENOUS at 13:36

## 2023-06-20 RX ADMIN — MEPERIDINE HYDROCHLORIDE 6.25 MG: 25 INJECTION INTRAMUSCULAR; INTRAVENOUS; SUBCUTANEOUS at 14:10

## 2023-06-20 RX ADMIN — PHENYLEPHRINE HYDROCHLORIDE 100 MCG: 10 INJECTION INTRAVENOUS at 12:38

## 2023-06-20 RX ADMIN — MEPERIDINE HYDROCHLORIDE 6.25 MG: 25 INJECTION INTRAMUSCULAR; INTRAVENOUS; SUBCUTANEOUS at 14:04

## 2023-06-20 RX ADMIN — LIDOCAINE HYDROCHLORIDE 100 MG: 20 INJECTION, SOLUTION EPIDURAL; INFILTRATION; INTRACAUDAL at 12:15

## 2023-06-20 RX ADMIN — SODIUM CHLORIDE, POTASSIUM CHLORIDE, SODIUM LACTATE AND CALCIUM CHLORIDE: 600; 310; 30; 20 INJECTION, SOLUTION INTRAVENOUS at 12:08

## 2023-06-20 RX ADMIN — FENTANYL CITRATE 50 MCG: 50 INJECTION, SOLUTION INTRAMUSCULAR; INTRAVENOUS at 12:38

## 2023-06-20 RX ADMIN — SUGAMMADEX 200 MG: 100 INJECTION, SOLUTION INTRAVENOUS at 12:57

## 2023-06-20 RX ADMIN — MEPERIDINE HYDROCHLORIDE 6.25 MG: 25 INJECTION INTRAMUSCULAR; INTRAVENOUS; SUBCUTANEOUS at 14:40

## 2023-06-20 RX ADMIN — ROCURONIUM BROMIDE 70 MG: 50 INJECTION, SOLUTION INTRAVENOUS at 12:15

## 2023-06-20 RX ADMIN — CEFAZOLIN 2 G: 1 INJECTION, POWDER, FOR SOLUTION INTRAMUSCULAR; INTRAVENOUS at 12:27

## 2023-06-20 ASSESSMENT — FIBROSIS 4 INDEX: FIB4 SCORE: 0.66

## 2023-06-20 NOTE — ANESTHESIA PROCEDURE NOTES
Airway    Date/Time: 6/20/2023 12:17 PM    Performed by: Cris Ma M.D.  Authorized by: Cris Ma M.D.    Location:  OR  Urgency:  Elective  Indications for Airway Management:  Anesthesia      Spontaneous Ventilation: absent    Sedation Level:  Deep  Preoxygenated: Yes    Patient Position:  Sniffing  Mask Difficulty Assessment:  1 - vent by mask  Final Airway Type:  Endotracheal airway  Final Endotracheal Airway:  ETT  Cuffed: Yes    Technique Used for Successful ETT Placement:  Direct laryngoscopy    Insertion Site:  Oral  Blade Type:  Parveen  Laryngoscope Blade/Videolaryngoscope Blade Size:  3  ETT Size (mm):  7.0  Measured from:  Teeth  ETT to Teeth (cm):  22  Placement Verified by: auscultation and capnometry    Cormack-Lehane Classification:  Grade IIa - partial view of glottis  Number of Attempts at Approach:  1

## 2023-06-20 NOTE — OP REPORT
Operative Report     Date of Procedure:  6/20/2023    PreOp Diagnosis:   Recurrent perianal abscesses  Concern for anal fistula     PostOp Diagnosis:   Left buttock abscess cavity     Procedure(s):  Exam under anesthesia  Incision and drainage of left buttock abscess with excision of abscess cavity base    Surgeon:  Frances Membreno M.D.     Assistant:  None    Anesthesiologist/Type of Anesthesia:  Anesthesiologist: Anesthesiologist: Cris Ma M.D./General     Specimen:  Left buttock abscess cavity     Estimated Blood Loss:  10 cc    Wound class:  Dirty     Findings:   Hemoglobin A1c of 11  Superficial left buttock abscess cavity that was linear in nature and ran approximately 1.5 inches along the buttock with no evidence of anal fistula  Possible small amount of cyst contents noted within the abscess cavity but no obvious cyst could be found     Complications:  None    Counts:  Correct     Indications:  43-year-old female with recurrent episodes of left anterior abscesses.  Concern for underlying fistula.  The patient has significantly uncontrolled diabetes with a recent hemoglobin A1c of 13 and repeat yesterday down to 11.     Operative Procedure:  The patient was brought to the operating suite and remained in the supine position on the stretcher.  The patient was placed on cardiopulmonary monitors and general endotracheal anesthesia was induced.  She was rolled into the prone position on bolsters.  The breasts were in good position.  Both arms were placed over to the front on padded arm boards with the shoulder and elbows flexed at approximately 90 degrees.  A blanket and safety strap was placed over the back.  The legs were slightly flexed at the knees and the hips and padded on pillows.  Bilateral SCDs were applied.  Drift was applied.  The buttocks were distracted laterally with tape. 2 g of Ancef were administered.  An upper body warmer was placed. The perineum was prepped and draped in usual  sterile fashion.  The preoperative safety checklist was performed.     On external exam she had a small vessel loop tied within a previous incision and drainage site on the left buttock.  Digital rectal exam was normal.  Anoscopy was performed circumferentially and also normal.  A fistula probe was inserted through the opening over the left buttock where the vessel loop was placed and there appeared to be a small linear tract but not towards the anal canal it was more in a cephalad direction.  Diluted peroxide on an Angiocath was injected and the anal canal was examined and there was no evidence of fistula to the anal canal.  The fistula probes were not able to find any obvious fistula either.  An incision and drainage site was opened up overlying the underlying abscess cavity and the underlying chronic abscess cavity tissue was removed and sent to pathology.  Hemostasis was obtained.  A total of 30 cc of 0.5% Marcaine with epinephrine were injected at the surgical site.  Fluff gauze and mesh panties were applied.  The patient tolerated the procedure well and was stable and extubated when brought to the recovery room.

## 2023-06-20 NOTE — DISCHARGE INSTRUCTIONS
POST-OPERATIVE INSTRUCTIONS      1.  Carefully remove bandage on the day after surgery. You may shower. Good hygiene is essential for proper healing.  Do sitz baths 3 times a day starting tomorrow.  Wearing soft gauze pads or sanitary napkins in your underwear helps to control fluid drainage, discharge of mucous, and bleeding. Change pads and underwear frequently.    2. There are no specific dietary restrictions associated with this surgery. Avoid foods which make you constipated or have diarrhea. Be sure to include wheat bran, fresh fruits and plenty of vegetables in your diet.  Use Metamucil once daily.  Drink 7-8 glasses of water per day.    3. Avoid straining, especially a few days after surgery, when you may experience swelling that feels like an incompletely passed stool or a hemorrhoid. Milk of magnesia should be taken every day starting day of surgery until your first bowel movement.  After the first bowel movement, daily Miralax can be taken for the next 2-3 weeks. Additionally, we advise that you use an over-the-counter stool softener, such as Colace twice daily. Please call our office if no bowel movement within 72 hours.    4. Moderate exercise is healthy. You may walk as much as you like. You are permitted to go up and down stairs slowly while using a handrail. Resume normal activities after first postoperative clinic visit. Avoid heavy lifting or strenuous activity until that time.    5. You may drive when you are comfortable enough to react and move quickly in an emergency and are no longer taking narcotics (usually 1-2 weeks after surgery). Long trips over 25 miles are not advised.    6. Return to work when you feel you are able to work with the restrictions as noted above.    7. For pain, one of the Ibuprofen compounds (Advil, Nuprin, etc.) or Tylenol is suggested. Should these not be effective in managing your discomfort, prescription pain medications should be used sparingly to avoid  constipation.     8. Please call the office to report any of the following:  Temperature of 101 or higher.  Signs of infection, possibly including increasing redness, warmth, tenderness, drainage, foul odor at surgery site  Persistent moderate to severe pain  No bowel movement within 72 hours  Inability to void    9.  Please call office at [phone] to make a follow-up appointment in 1 week.    Frances Membreno M.D.   [facility]  [address]     HOME CARE INSTRUCTIONS    ACTIVITY: Rest and take it easy for the first 24 hours.  A responsible adult is recommended to remain with you during that time.  It is normal to feel sleepy.  We encourage you to not do anything that requires balance, judgment or coordination.    FOR 24 HOURS DO NOT:  Drive, operate machinery or run household appliances.  Drink beer or alcoholic beverages.  Make important decisions or sign legal documents.    SPECIAL INSTRUCTIONS: Follow all instructions given by physician    DIET: To avoid nausea, slowly advance diet as tolerated, avoiding spicy or greasy foods for the first day.  Add more substantial food to your diet according to your physician's instructions.  Babies can be fed formula or breast milk as soon as they are hungry.  INCREASE FLUIDS AND FIBER TO AVOID CONSTIPATION.    SURGICAL DRESSING/BATHING: Carefully remove bandage on the day after surgery. You may shower. Good hygiene is essential for proper healing.  Do sitz baths 3 times a day starting tomorrow.  Wearing soft gauze pads or sanitary napkins in your underwear helps to control fluid drainage, discharge of mucous, and bleeding. Change pads and underwear frequently.    MEDICATIONS: Resume taking daily medication.  Take prescribed pain medication with food.  If no medication is prescribed, you may take non-aspirin pain medication if needed.  PAIN MEDICATION CAN BE VERY CONSTIPATING.  Take a stool softener or laxative such as senokot, pericolace, or  milk of magnesia if needed.    Prescription given for oxycodone.    A follow-up appointment should be arranged with your doctor in 1 week; call to schedule.    You should CALL YOUR PHYSICIAN if you develop:  Fever greater than 101 degrees F.  Pain not relieved by medication, or persistent nausea or vomiting.  Excessive bleeding (blood soaking through dressing) or unexpected drainage from the wound.  Extreme redness or swelling around the incision site, drainage of pus or foul smelling drainage.  Inability to urinate or empty your bladder within 8 hours.  Problems with breathing or chest pain.    You should call 911 if you develop problems with breathing or chest pain.  If you are unable to contact your doctor or surgical center, you should go to the nearest emergency room or urgent care center.  Physician's telephone #:   [phone]       MILD FLU-LIKE SYMPTOMS ARE NORMAL.  YOU MAY EXPERIENCE GENERALIZED MUSCLE ACHES, THROAT IRRITATION, HEADACHE AND/OR SOME NAUSEA.    If any questions arise, call your doctor.  If your doctor is not available, please feel free to call the Surgical Center at [phone].  The Center is open Monday through Friday from 7AM to 7PM.      A registered nurse may call you a few days after your surgery to see how you are doing after your procedure.    You may also receive a survey in the mail within the next two weeks and we ask that you take a few moments to complete the survey and return it to us.  Our goal is to provide you with very good care and we value your comments.     Anorectal Abscess  An abscess is an infected area that contains a collection of pus. An anorectal abscess is an abscess that is near the opening of the anus or around the rectum. Without treatment, an anorectal abscess can become larger and cause other problems, such as a more serious body-wide infection or pain, especially during bowel movements.  What are the causes?  This condition is caused by plugged glands  or an infection in one of these areas:  The anus.  The area between the anus and the scrotum in males or between the anus and the vagina in females (perineum).  What increases the risk?  The following factors may make you more likely to develop this condition:  Diabetes or inflammatory bowel disease.  Having a body defense system (immune system) that is weak.  Engaging in anal sex.  Having a sexually transmitted infection (STI).  Certain kinds of cancer, such as rectal carcinoma, leukemia, or lymphoma.  What are the signs or symptoms?  The main symptom of this condition is pain. The pain may be a throbbing pain that gets worse during bowel movements. Other symptoms include:  Swelling and redness in the area of the abscess. The redness may go beyond the abscess and appear as a red streak on the skin.  A visible, painful lump, or a lump that can be felt when touched.  Bleeding or pus-like discharge from the area.  Fever.  General weakness.  Constipation.  Diarrhea.  How is this diagnosed?  This condition is diagnosed based on your medical history and a physical exam of the affected area.  This may involve examining the rectal area with a gloved hand (digital rectal exam).  Sometimes, the health care provider needs to look into the rectum using a probe, scope, or imaging test.  For women, it may require a careful vaginal exam.  How is this treated?  Treatment for this condition may include:  Incision and drainage surgery. This involves making an incision over the abscess to drain the pus.  Medicines, including antibiotic medicine, pain medicine, stool softeners, or laxatives.  Follow these instructions at home:  Medicines  Take over-the-counter and prescription medicines only as told by your health care provider.  If you were prescribed an antibiotic medicine, use it as told by your health care provider. Do not stop using the antibiotic even if you start to feel better.  Do not drive or use heavy machinery while taking  prescription pain medicine.  Wound care  If gauze was used in the abscess, follow instructions from your health care provider about removing or changing the gauze. It can usually be removed in 2-3 days.  Wash your hands with soap and water before you remove or change your gauze. If soap and water are not available, use hand .  If one or more drains were placed in the abscess cavity, be careful not to pull at them. Your health care provider will tell you how long they need to remain in place.  Check your incision area every day for signs of infection. Check for:  More redness, swelling, or pain.  More fluid or blood.  Warmth.  Pus or a bad smell.  Managing pain, stiffness, and swelling  Take a sitz bath 3-4 times a day and after bowel movements. This will help reduce pain and swelling.  To relieve pain, try sitting:  On a heating pad with the setting on low.  On an inflatable donut-shaped cushion.  If directed, put ice on the affected area:  Put ice in a plastic bag.  Place a towel between your skin and the bag.  Leave the ice on for 20 minutes, 2-3 times a day.  General instructions  Follow any diet instructions given by your health care provider.  Keep all follow-up visits as told by your health care provider. This is important.  Contact a health care provider if you have:  Bleeding from your incision.  Pain, swelling, or redness that does not improve or gets worse.  Trouble passing stool or urine.  Symptoms that return after treatment.  Get help right away if you:  Have problems moving or using your legs.  Have severe or increasing pain.  Have swelling in the affected area that suddenly gets worse.  Have a large increase in bleeding or passing of pus.  Develop chills or a fever.  Summary  An anorectal abscess is an abscess that is near the opening of the anus or around the rectum. An abscess is an infected area that contains a collection of pus.  The main symptom of this condition is pain. It may be a  throbbing pain that gets worse during bowel movements.  Treatment for an anorectal abscess may include surgery to drain the pus from the abscess. Medicines and sitz baths may also be a part of your treatment plan.  This information is not intended to replace advice given to you by your health care provider. Make sure you discuss any questions you have with your health care provider.  Document Released: 12/15/2001 Document Revised: 01/24/2019 Document Reviewed: 01/24/2019  Elsevier Patient Education © 2020 [employer]    Depression / Suicide Risk    As you are discharged from this [facility] facility, it is important to learn how to keep safe from harming yourself.    Recognize the warning signs:  Abrupt changes in personality, positive or negative- including increase in energy   Giving away possessions  Change in eating patterns- significant weight changes-  positive or negative  Change in sleeping patterns- unable to sleep or sleeping all the time   Unwillingness or inability to communicate  Depression  Unusual sadness, discouragement and loneliness  Talk of wanting to die  Neglect of personal appearance   Rebelliousness- reckless behavior  Withdrawal from people/activities they love  Confusion- inability to concentrate     If you or a loved one observes any of these behaviors or has concerns about self-harm, here's what you can do:  Talk about it- your feelings and reasons for harming yourself  Remove any means that you might use to hurt yourself (examples: pills, rope, extension cords, firearm)  Get professional help from the community (Mental Health, Substance Abuse, psychological counseling)  Do not be alone:Call your Safe Contact- someone whom you trust who will be there for you.  Call your local CRISIS HOTLINE 847-4157 or 360-477-4950  Call your local Children's Mobile Crisis Response Team [place] (350) 351-1268 or www.[employer]  Call the toll free National Suicide Prevention Hotlines    National Suicide Prevention Lifeline 479-580-AQOO (7965)  [facility] 800-SUICIDE (583-9376)    I acknowledge receipt and understanding of these Home Care instructions.

## 2023-06-20 NOTE — ANESTHESIA PREPROCEDURE EVALUATION
Case: [id number] Date/Time: 06/20/23 1215    Procedures:       RECTAL EXAM UNDER ANESTHESIA, POSSIBLE INCISION AND DRAINAGE OF ANORECTAL ABSCESS, POSSIBLE SETON, POSSIBLE FISTULOTOMY      INCISION AND DRAINAGE, ABSCESS, PERIRECTAL      PLACEMENT, SETON STITCH    Pre-op diagnosis: ANORECTAL FISTULA    Location: [facility] OR 09 / SURGERY [facility]    Surgeons: Frances Membreno M.D.          Relevant Problems   ANESTHESIA   (positive) Obstructive sleep apnea, adult   (negative) History of anesthesia complications      CARDIAC   (positive) Hypertension      GI   (positive) Gastro-esophageal reflux disease without esophagitis      ENDO   (positive) Hypothyroidism   (positive) Type 2 diabetes mellitus with hyperglycemia, with long-term current use of insulin (HCC)      Other   (positive) Obesity (BMI 30-39.9)   (positive) Perirectal abscess       Physical Exam    Airway   Mallampati: III  TM distance: >3 FB  Neck ROM: full       Cardiovascular - normal exam  Rhythm: regular  Rate: normal  (-) murmur     Dental - normal exam           Pulmonary - normal exam  Breath sounds clear to auscultation     Abdominal    Neurological - normal exam                 Anesthesia Plan    ASA 3       Plan - general       Airway plan will be ETT          Induction: intravenous    Postoperative Plan: Postoperative administration of opioids is intended.    Pertinent diagnostic labs and testing reviewed    Informed Consent:    Anesthetic plan and risks discussed with patient.    Use of blood products discussed with: patient whom consented to blood products.

## 2023-06-20 NOTE — ANESTHESIA POSTPROCEDURE EVALUATION
Patient: Liana Obrien    Procedure Summary     Date: 06/20/23 Room / Location: [facility] 09 / SURGERY [facility]    Anesthesia Start: 1208 Anesthesia Stop: 1306    Procedures:       RECTAL EXAM UNDER ANESTHESIA, INCISION AND DRAINAGE OF ANORECTAL ABSCESS (Anus)      INCISION AND DRAINAGE, ABSCESS, PERIRECTAL (Anus) Diagnosis: (ANORECTAL FISTULA)    Surgeons: Frances Membreno M.D. Responsible Provider: Cris aM M.D.    Anesthesia Type: general ASA Status: 3          Final Anesthesia Type: general  Last vitals  BP   Blood Pressure: 139/61    Temp   35.9 °C (96.6 °F)    Pulse   69   Resp   14    SpO2   96 %      Anesthesia Post Evaluation    Patient location during evaluation: PACU  Patient participation: complete - patient participated  Level of consciousness: awake and alert    Airway patency: patent  Anesthetic complications: no  Cardiovascular status: hemodynamically stable  Respiratory status: acceptable  Hydration status: euvolemic    PONV: none          No notable events documented.     Nurse Pain Score: 0 (NPRS)

## 2023-06-23 DIAGNOSIS — E11.65 TYPE 2 DIABETES MELLITUS WITH HYPERGLYCEMIA, WITHOUT LONG-TERM CURRENT USE OF INSULIN (HCC): ICD-10-CM

## 2023-06-24 PROCEDURE — RXMED WILLOW AMBULATORY MEDICATION CHARGE: Performed by: NURSE PRACTITIONER

## 2023-06-24 PROCEDURE — RXMED WILLOW AMBULATORY MEDICATION CHARGE: Performed by: STUDENT IN AN ORGANIZED HEALTH CARE EDUCATION/TRAINING PROGRAM

## 2023-06-26 NOTE — TELEPHONE ENCOUNTER
Received request via: Pharmacy    Was the patient seen in the last year in this department? Yes    Does the patient have an active prescription (recently filled or refills available) for medication(s) requested? No    Does the patient have residential Plus and need 100 day supply (blood pressure, diabetes and cholesterol meds only)? Patient does not have SCP

## 2023-06-27 PROCEDURE — RXMED WILLOW AMBULATORY MEDICATION CHARGE: Performed by: HOSPITALIST

## 2023-06-27 RX ORDER — BLOOD PRESSURE TEST KIT
1 KIT MISCELLANEOUS
Qty: 300 EACH | Refills: 6 | Status: SHIPPED | OUTPATIENT
Start: 2023-06-27

## 2023-06-28 PROCEDURE — RXMED WILLOW AMBULATORY MEDICATION CHARGE: Performed by: PHYSICIAN ASSISTANT

## 2023-06-28 PROCEDURE — RXMED WILLOW AMBULATORY MEDICATION CHARGE

## 2023-06-28 RX ORDER — LISINOPRIL 10 MG/1
10 TABLET ORAL DAILY
Qty: 30 TABLET | Refills: 0 | Status: SHIPPED | OUTPATIENT
Start: 2023-06-28 | End: 2023-07-19 | Stop reason: SDUPTHER

## 2023-06-28 RX ORDER — ATORVASTATIN CALCIUM 20 MG/1
20 TABLET, FILM COATED ORAL DAILY
Qty: 30 TABLET | Refills: 0 | Status: SHIPPED | OUTPATIENT
Start: 2023-06-28 | End: 2023-07-19 | Stop reason: SDUPTHER

## 2023-06-28 NOTE — TELEPHONE ENCOUNTER
Both medications refilled for 30 days. Please let patient know he will have to request future refills at his next appointment with Dr. Junior.

## 2023-06-30 ENCOUNTER — PHARMACY VISIT (OUTPATIENT)
Dept: PHARMACY | Facility: MEDICAL CENTER | Age: 43
End: 2023-06-30
Payer: COMMERCIAL

## 2023-07-10 PROCEDURE — RXMED WILLOW AMBULATORY MEDICATION CHARGE: Performed by: NURSE PRACTITIONER

## 2023-07-12 ENCOUNTER — PHARMACY VISIT (OUTPATIENT)
Dept: PHARMACY | Facility: MEDICAL CENTER | Age: 43
End: 2023-07-12
Payer: COMMERCIAL

## 2023-07-19 ENCOUNTER — PHARMACY VISIT (OUTPATIENT)
Dept: PHARMACY | Facility: MEDICAL CENTER | Age: 43
End: 2023-07-19
Payer: COMMERCIAL

## 2023-07-19 ENCOUNTER — OFFICE VISIT (OUTPATIENT)
Dept: INTERNAL MEDICINE | Facility: OTHER | Age: 43
End: 2023-07-19
Payer: MEDICAID

## 2023-07-19 VITALS
WEIGHT: 214.4 LBS | HEART RATE: 87 BPM | BODY MASS INDEX: 37.99 KG/M2 | DIASTOLIC BLOOD PRESSURE: 84 MMHG | TEMPERATURE: 97.4 F | OXYGEN SATURATION: 97 % | HEIGHT: 63 IN | SYSTOLIC BLOOD PRESSURE: 126 MMHG

## 2023-07-19 DIAGNOSIS — F41.9 ANXIETY: ICD-10-CM

## 2023-07-19 DIAGNOSIS — N60.42 PERIDUCTAL MASTITIS OF LEFT BREAST: ICD-10-CM

## 2023-07-19 DIAGNOSIS — E55.9 VITAMIN D DEFICIENCY: ICD-10-CM

## 2023-07-19 DIAGNOSIS — K61.1 PERIRECTAL ABSCESS: ICD-10-CM

## 2023-07-19 PROCEDURE — RXMED WILLOW AMBULATORY MEDICATION CHARGE: Performed by: INTERNAL MEDICINE

## 2023-07-19 PROCEDURE — 99214 OFFICE O/P EST MOD 30 MIN: CPT | Performed by: INTERNAL MEDICINE

## 2023-07-19 PROCEDURE — 3074F SYST BP LT 130 MM HG: CPT | Performed by: INTERNAL MEDICINE

## 2023-07-19 PROCEDURE — 3079F DIAST BP 80-89 MM HG: CPT | Performed by: INTERNAL MEDICINE

## 2023-07-19 RX ORDER — SERTRALINE HYDROCHLORIDE 25 MG/1
25 TABLET, FILM COATED ORAL DAILY
Qty: 30 TABLET | Refills: 1 | Status: SHIPPED | OUTPATIENT
Start: 2023-07-19 | End: 2023-09-09

## 2023-07-19 ASSESSMENT — FIBROSIS 4 INDEX: FIB4 SCORE: 0.66

## 2023-07-19 NOTE — PROGRESS NOTES
"          Chief Complaint   Patient presents with    Medication Management     Lexapro possibly causing vomiting    Other     Possible infection on left breast       HPI: Liana Obrien is a 43 y.o. female with past medical history as below     who presented to the clinic for the following.        *Anxiety   Was started on lexapro which has been ineffective and giving side effects of stomach upset with nausea, also daytime sleepiness. Also had episode of food poisoning recently however the side effects from the medication started even before that   Reports thoughts of   \" why live \"  no intent to suicide, no plan . Denies any HI   Does have appointment with therapist tomorrow, who was recently on vacation but now back.   Patient is also interested in seeing  psychiatrist       *Breast Skin lesion   Has had history of bug bites.   Patient states that she first noticed it 2 weeks ago and it has been growing since, she feels mild pain and sensitivity when she showers.   Patient also states that creamy liquid came out once.   On examination there is a small scabbed area with minimal surround erythema. No underlying abscess or fluctuance appreciated . No fevers chills. No axillary Ln appreciated   No  family history of breast cancer and patient has never had a mammogram     *Status post surgery for perirectal abscess/fistula   Patient wants to follow up to see if it is fully healed and was told by surgeon that she did not need to follow up with them anymore as per patient.   On examination there is a well healing incision on left lateral region of anal opening , without any surrounding erythema, fluctuance, warmth      Perirectal abscess     ED on 5/15 for worsening rectal pain.  She underwent I&D in the ED and a vessel loop was left in place to act as a drain. CT revealed peripherally enhancing low-density fluid collection in the medial left gluteal subcutaneous fat measuring 1.5 x 2.8 cm.  She was  following " with wound care.  She  was following with surgery for concern for anorectal abscess and possible fistula, now status post drain removal and surgery   Please see above plan for this problem   .             At the end of the visit patient also mentioned neuropathy and issues with vision - however given chronicity - decided with patient to assess these topics in 2 weeks.     Patient Active Problem List    Diagnosis Date Noted    Anxiety  On lexapro  06/16/2023    Neuropathic pain of both feet  On gabapentin  06/16/2023    Fibroids 11/15/2022     10/12/2022    Anemia  Microcytosis without anemia as per last labs done in may 2023  09/23/2022    Dyslipidemia 09/22/2022    Obesity (BMI 30-39.9) 05/25/2022    Gastro-esophageal reflux disease without esophagitis 04/14/2021    Major depressive disorder 09/14/2020    Polycystic ovary syndrome  2.2 cm right ovarian cyst seen on recent CT in May 2023  Unable to undergo TVUS due to perirectal abscess  Unable to go on estrogen containing OCP due to history of VTE  Will follow up with gyn following resolution of perirectal abscess 12/11/2019    History of deep venous thrombosis 11/19/2019    Hypothyroidism  Levothyroxine 150 mcg 11/19/2019    Hypertension  Mildly elevated on lisinopril 10 - not checking blood pressures at home regularly as the cuff not working  10/28/2017    Obstructive sleep apnea, adult  Trying to set up with cpap 07/31/2015    Vitamin D deficiency 03/12/2015    Type 2 diabetes mellitus with hyperglycemia, with long-term current use of insulin ([facility])  Following up with endocrinology -  A1c of 11 in June 2023   metformin 1000mg BID,  Tresiba 26U daily, Tulicity 0.5 mg weekly premeal humalog TID  - lipitor 20 daily  - lisinopril 10 daily  - gabapentin 100 TID for neuropathy 03/12/2015       Current Outpatient Medications   Medication Sig Dispense Refill    mupirocin (BACTROBAN) 2 % Ointment Apply 1 Application topically 3 times a day for 5 days. 15 g 1    sertraline  (ZOLOFT) 25 MG tablet Take 1 Tablet by mouth every day. 30 Tablet 1    Dulaglutide (TRULICITY) 4.5 MG/0.5ML Solution Pen-injector Inject 4.5 mg subcutaneously once a week 2 mL 3    Alcohol Swabs Pads use one alcohol swab three times a day as needed. 300 Each 6    gabapentin (NEURONTIN) 100 MG Cap Take 2 Capsules by mouth 3 times a day. 270 Capsule 2    Continuous Blood Gluc  (FREESTYLE ALEKSANDAR 2 READER) Device Use 1 device as directed four times a day 1 Each 0    Continuous Blood Gluc Sensor (FREESTYLE ALEKSANDAR 2 SENSOR) Misc Use as directed every two weeks change every 14 days 7 Each 3    Continuous Blood Gluc Sensor (FREESTYLE ALEKSANDAR 2 SENSOR) Misc Use as directed every two weeks, change every 14 days 7 Each 3    Insulin Pen Needle 32 G x 4 mm Use 1 needle subcutaneously 4 times daily 360 Each 3    metFORMIN (GLUCOPHAGE) 500 MG Tab Take 2 Tablets by mouth 2 times a day. 360 Tablet 3    Lancets (ONETOUCH DELICA PLUS [id number]) Misc TEST BLOOD SUGAR ONCE DAILY AND WITH SYMPTOMS OF HYPOGLYCEMIA E11.65 100 Each 6    levothyroxine (SYNTHROID) 150 MCG Tab Take 1 Tablet by mouth every morning on an empty stomach. 30 Tablet 3    glucose blood (ONETOUCH ULTRA) strip TEST BLOOD SUGAR THREE TIMES DAILY AND WITH SYMPTOMS OF LOW BLOOD SUGAR E11.65 100 Strip 1    HUMALOG KWIKPEN 100 UNIT/ML Solution Pen-injector injection PEN Inject 1-10 Units under the skin 3 times a day. Per sliding scale dosing 15 mL 5    Insulin Degludec (TRESIBA FLEXTOUCH) 200 UNIT/ML Solution Pen-injector Inject 26 unit subcutaneously once a day 9 mL 3    acetaminophen (TYLENOL) 500 MG Tab Take 1-2 Tablets by mouth every 6 hours as needed.      lisinopril (PRINIVIL) 10 MG Tab Take 1 tablet by mouth once a day 30 Tablet 2    atorvastatin (LIPITOR) 20 MG Tab Take 1 tablet by mouth once a day 30 Tablet 2    Alcohol Swabs Wipe site with prep pad prior to injection. (Patient not taking: Reported on 7/19/2023) 100 Each 0    Dulaglutide (TRULICITY) 4.5  "MG/0.5ML Solution Pen-injector INJECT 1 PEN INJECTOR SUBCUTANEOUSLY ONCE A WEEK (Patient not taking: Reported on 7/19/2023) 2 mL 3    Blood Glucose Monitoring Suppl (ONE TOUCH ULTRA 2) w/Device Kit Use to test blood glucose levels 2 times a day. 1 Kit 0    Blood Glucose Meter Kit Test blood sugar as recommended by provider. One Touch Ultra 2 blood glucose monitoring kit. (Patient not taking: Reported on 7/19/2023) 1 Kit 0    Blood Glucose Test Strips Use one One Touch Ultra 2 strip to test blood sugar once daily and with symptoms of low blood sugar (Patient not taking: Reported on 7/19/2023) 100 Strip 0    BD PEN NEEDLE SCOTT 2ND GEN Inject 1 Device under the skin 4 times a day.       No current facility-administered medications for this visit.       ROS: As per HPI. Additional pertinent systems as noted below.  Constitutional:  Negative for fever, chills   Cardiovascular:  Negative for chest pain, palpitations   Respiratory:  Negative for cough, sputum production, shortness of breath   Gastrointestinal: recent food poisoning resolved since, rest as in hpi  Skin: positive as in hpi  Psychiatric: Negative for homicidal ideas. Positive as in hpi    /84 (BP Location: Left arm, Patient Position: Sitting, BP Cuff Size: Adult)   Pulse 87   Temp 36.3 °C (97.4 °F) (Temporal)   Ht 1.6 m (5' 3\")   Wt 97.3 kg (214 lb 6.4 oz)   SpO2 97%   BMI 37.98 kg/m²     Physical Exam   Constitutional:  Well developed, well nourished. Not in acute distress   Axilla- No LN appreciated    Gastrointestinal: rectal exam :on inspection there is a well healing incision on left lateral region of anal opening , without any surrounding erythema, fluctuance, warmth   Skin: . On left breast area above nipple there is a small scabbed area with minimal surround erythema. No underlying abscess or fluctuance appreciated   Psychiatric: Judgment normal,  Memory normal     Note: I have reviewed all pertinent labs and diagnostic tests associated " with this visit with specific comments listed under the assessment and plan below    Assessment and Plan    1. Anxiety  Provided patient regarding information on suicide helpline.  Placing urgent referral for psychiatry.  -Patient does have a therapist which she will perform tomorrow.  -Decided patient will be she will change Lexapro to Zoloft given side effects from Lexapro and ineffectiveness so far  - sertraline (ZOLOFT) 25 MG tablet; Take 1 Tablet by mouth every day.  Dispense: 30 Tablet; Refill: 1  - Referral to Psychiatry    2. Periductal mastitis of left breast  -We will perform ultrasound to assess if there is any abscess beneath skin though clinical exam does not reveal any.  Appears to be healing well.  No systemic signs no signs of developing infection hence will abstain from using any oral antibiotics at this time.  However low threshold  - US-SCREENING WHOLE BREAST (3D SCREENING); Future  - mupirocin (BACTROBAN) 2 % Ointment; Apply 1 Application topically 3 times a day for 5 days.  Dispense: 15 g; Refill: 1    3. Perirectal abscess  -History of, status post I&D's, surgery for possible anorectal fistula, surgical scar healing well does not have follow-up with surgery was told that she did not require any.    Followup: Return in about 2 weeks (around 8/2/2023).        Signed by: Main Mcgee M.D.

## 2023-07-25 PROCEDURE — RXMED WILLOW AMBULATORY MEDICATION CHARGE: Performed by: INTERNAL MEDICINE

## 2023-07-25 PROCEDURE — RXMED WILLOW AMBULATORY MEDICATION CHARGE: Performed by: PHYSICIAN ASSISTANT

## 2023-07-25 PROCEDURE — RXMED WILLOW AMBULATORY MEDICATION CHARGE: Performed by: NURSE PRACTITIONER

## 2023-07-25 PROCEDURE — RXMED WILLOW AMBULATORY MEDICATION CHARGE: Performed by: HOSPITALIST

## 2023-07-25 PROCEDURE — RXMED WILLOW AMBULATORY MEDICATION CHARGE: Performed by: STUDENT IN AN ORGANIZED HEALTH CARE EDUCATION/TRAINING PROGRAM

## 2023-08-04 ENCOUNTER — PHARMACY VISIT (OUTPATIENT)
Dept: PHARMACY | Facility: MEDICAL CENTER | Age: 43
End: 2023-08-04
Payer: COMMERCIAL

## 2023-08-04 PROCEDURE — RXMED WILLOW AMBULATORY MEDICATION CHARGE: Performed by: NURSE PRACTITIONER

## 2023-08-05 ENCOUNTER — PHARMACY VISIT (OUTPATIENT)
Dept: PHARMACY | Facility: MEDICAL CENTER | Age: 43
End: 2023-08-05
Payer: COMMERCIAL

## 2023-08-05 PROCEDURE — RXMED WILLOW AMBULATORY MEDICATION CHARGE: Performed by: STUDENT IN AN ORGANIZED HEALTH CARE EDUCATION/TRAINING PROGRAM

## 2023-08-05 PROCEDURE — RXMED WILLOW AMBULATORY MEDICATION CHARGE: Performed by: PHYSICIAN ASSISTANT

## 2023-08-05 PROCEDURE — RXMED WILLOW AMBULATORY MEDICATION CHARGE: Performed by: INTERNAL MEDICINE

## 2023-08-05 RX ORDER — LEVOTHYROXINE SODIUM 0.05 MG/1
50 TABLET ORAL EVERY MORNING
Qty: 30 TABLET | Refills: 0 | Status: SHIPPED | OUTPATIENT
Start: 2023-08-04 | End: 2023-08-23

## 2023-08-05 RX ORDER — LEVOTHYROXINE SODIUM 0.1 MG/1
100 TABLET ORAL DAILY
Qty: 30 TABLET | Refills: 0 | Status: SHIPPED | OUTPATIENT
Start: 2023-08-05 | End: 2023-08-23

## 2023-08-05 RX ORDER — HYDROXYZINE PAMOATE 50 MG/1
50 CAPSULE ORAL EVERY 4 HOURS
Qty: 180 CAPSULE | Refills: 0 | Status: SHIPPED | OUTPATIENT
Start: 2023-08-04 | End: 2023-11-21

## 2023-08-05 RX ORDER — GABAPENTIN 100 MG/1
200 CAPSULE ORAL 3 TIMES DAILY
Qty: 180 CAPSULE | Refills: 0 | Status: SHIPPED | OUTPATIENT
Start: 2023-08-04 | End: 2023-08-23

## 2023-08-05 RX ORDER — ATORVASTATIN CALCIUM 20 MG/1
20 TABLET, FILM COATED ORAL DAILY
Qty: 30 TABLET | Refills: 0 | Status: SHIPPED | OUTPATIENT
Start: 2023-08-04 | End: 2023-08-23

## 2023-08-05 RX ORDER — LISINOPRIL 10 MG/1
10 TABLET ORAL DAILY
Qty: 30 TABLET | Refills: 0 | Status: SHIPPED | OUTPATIENT
Start: 2023-08-04 | End: 2023-08-23

## 2023-08-07 PROCEDURE — RXMED WILLOW AMBULATORY MEDICATION CHARGE: Performed by: STUDENT IN AN ORGANIZED HEALTH CARE EDUCATION/TRAINING PROGRAM

## 2023-08-09 PROCEDURE — RXMED WILLOW AMBULATORY MEDICATION CHARGE: Performed by: REGISTERED NURSE

## 2023-08-10 PROCEDURE — RXMED WILLOW AMBULATORY MEDICATION CHARGE: Performed by: REGISTERED NURSE

## 2023-08-11 ENCOUNTER — PHARMACY VISIT (OUTPATIENT)
Dept: PHARMACY | Facility: MEDICAL CENTER | Age: 43
End: 2023-08-11
Payer: COMMERCIAL

## 2023-08-22 NOTE — PROGRESS NOTES
"          No chief complaint on file.      HPI: Liana Obrien is a 43 y.o. female with past medical history as below     who presented to the clinic for the following.        *Anxiety   Was started on lexapro which has been ineffective and giving side effects of stomach upset with nausea, also daytime sleepiness. Also had episode of food poisoning recently however the side effects from the medication started even before that   Reports thoughts of   \" why live \"  no intent to suicide, no plan . Denies any HI   Does have appointment with therapist tomorrow, who was recently on vacation but now back.   Patient is also interested in seeing  psychiatrist       *Breast Skin lesion   Has had history of bug bites.   Patient states that she first noticed it 2 weeks ago and it has been growing since, she feels mild pain and sensitivity when she showers.   Patient also states that creamy liquid came out once.   On examination there is a small scabbed area with minimal surround erythema. No underlying abscess or fluctuance appreciated . No fevers chills. No axillary Ln appreciated   No  family history of breast cancer and patient has never had a mammogram     *Status post surgery for perirectal abscess/fistula   Patient wants to follow up to see if it is fully healed and was told by surgeon that she did not need to follow up with them anymore as per patient.   On examination there is a well healing incision on left lateral region of anal opening , without any surrounding erythema, fluctuance, warmth      Perirectal abscess     ED on 5/15 for worsening rectal pain.  She underwent I&D in the ED and a vessel loop was left in place to act as a drain. CT revealed peripherally enhancing low-density fluid collection in the medial left gluteal subcutaneous fat measuring 1.5 x 2.8 cm.  She was  following with wound care.  She  was following with surgery for concern for anorectal abscess and possible fistula, now status post " drain removal and surgery   Please see above plan for this problem   .             At the end of the visit patient also mentioned neuropathy and issues with vision - however given chronicity - decided with patient to assess these topics in 2 weeks.     Patient Active Problem List    Diagnosis Date Noted    Anxiety  On lexapro  06/16/2023    Neuropathic pain of both feet  On gabapentin  06/16/2023    Fibroids 11/15/2022     10/12/2022    Anemia  Microcytosis without anemia as per last labs done in may 2023  09/23/2022    Dyslipidemia 09/22/2022    Obesity (BMI 30-39.9) 05/25/2022    Gastro-esophageal reflux disease without esophagitis 04/14/2021    Major depressive disorder 09/14/2020    Polycystic ovary syndrome  2.2 cm right ovarian cyst seen on recent CT in May 2023  Unable to undergo TVUS due to perirectal abscess  Unable to go on estrogen containing OCP due to history of VTE  Will follow up with gyn following resolution of perirectal abscess 12/11/2019    History of deep venous thrombosis 11/19/2019    Hypothyroidism  Levothyroxine 150 mcg 11/19/2019    Hypertension  Mildly elevated on lisinopril 10 - not checking blood pressures at home regularly as the cuff not working  10/28/2017    Obstructive sleep apnea, adult  Trying to set up with cpap 07/31/2015    Vitamin D deficiency 03/12/2015    Type 2 diabetes mellitus with hyperglycemia, with long-term current use of insulin ([facility])  Following up with endocrinology -  A1c of 11 in June 2023   metformin 1000mg BID,  Tresiba 26U daily, Tulicity 0.5 mg weekly premeal humalog TID  - lipitor 20 daily  - lisinopril 10 daily  - gabapentin 100 TID for neuropathy 03/12/2015       Current Outpatient Medications   Medication Sig Dispense Refill    hydrOXYzine pamoate (VISTARIL) 50 MG Cap Take 1 oral capsule as needed every (6-8) hours for anxiety 90 Capsule 2    sertraline (ZOLOFT) 100 MG Tab Take 1 oral tablet once a day 30 Tablet 2    traZODone (DESYREL) 50 MG Tab Take 1  oral tablet at bedtime as needed ( may take 2 as needed) 60 Tablet 2    sertraline (ZOLOFT) 50 MG Tab Take 1 Tablet by mouth every day. 30 Tablet 0    hydrOXYzine pamoate (VISTARIL) 50 MG Cap Take 1 Capsule by mouth every 4 hours. 180 Capsule 0    lisinopril (PRINIVIL) 10 MG Tab Take 1 Tablet by mouth every day. 30 Tablet 0    metFORMIN (GLUCOPHAGE) 500 MG Tab Take 1 Tablet by mouth 2 times a day. 60 Tablet 0    gabapentin (NEURONTIN) 100 MG Cap Take 2 Capsules by mouth 3 times a day. 180 Capsule 0    levothyroxine (SYNTHROID) 50 MCG Tab Take 1 Tablet by mouth every morning. 30 Tablet 0    atorvastatin (LIPITOR) 20 MG Tab Take 1 Tablet by mouth every day. 30 Tablet 0    levothyroxine (SYNTHROID) 100 MCG Tab Take 1 Tablet by mouth every day. 30 Tablet 0    lisinopril (PRINIVIL) 10 MG Tab Take 1 tablet by mouth once a day 30 Tablet 2    atorvastatin (LIPITOR) 20 MG Tab Take 1 tablet by mouth once a day 30 Tablet 2    sertraline (ZOLOFT) 25 MG tablet Take 1 Tablet by mouth every day. 30 Tablet 1    Dulaglutide (TRULICITY) 4.5 MG/0.5ML Solution Pen-injector Inject 4.5 mg subcutaneously once a week 2 mL 3    Alcohol Swabs Pads use one alcohol swab three times a day as needed. 300 Each 6    gabapentin (NEURONTIN) 100 MG Cap Take 2 Capsules by mouth 3 times a day. 270 Capsule 2    Continuous Blood Gluc  (FREESTYLE ALEKSANDAR 2 READER) Device Use 1 device as directed four times a day 1 Each 0    Continuous Blood Gluc Sensor (FREESTYLE ALEKSANDAR 2 SENSOR) Misc Use as directed every two weeks change every 14 days 7 Each 3    Continuous Blood Gluc Sensor (FREESTYLE ALEKSANDAR 2 SENSOR) Misc Use as directed every two weeks, change every 14 days 7 Each 3    Insulin Pen Needle 32 G x 4 mm Use 1 needle subcutaneously 4 times daily 360 Each 3    Alcohol Swabs Wipe site with prep pad prior to injection. (Patient not taking: Reported on 7/19/2023) 100 Each 0    Lancets (ONETOUCH DELICA PLUS [id number]) Misc TEST BLOOD SUGAR ONCE DAILY AND  WITH SYMPTOMS OF HYPOGLYCEMIA E11.65 100 Each 6    levothyroxine (SYNTHROID) 150 MCG Tab Take 1 Tablet by mouth every morning on an empty stomach. 30 Tablet 3    glucose blood (ONETOUCH ULTRA) strip TEST BLOOD SUGAR THREE TIMES DAILY AND WITH SYMPTOMS OF LOW BLOOD SUGAR E11.65 100 Strip 1    HUMALOG KWIKPEN 100 UNIT/ML Solution Pen-injector injection PEN Inject 1-10 Units under the skin 3 times a day. Per sliding scale dosing 15 mL 5    Insulin Degludec (TRESIBA FLEXTOUCH) 200 UNIT/ML Solution Pen-injector Inject 26 unit subcutaneously once a day 9 mL 3    Dulaglutide (TRULICITY) 4.5 MG/0.5ML Solution Pen-injector INJECT 1 PEN INJECTOR SUBCUTANEOUSLY ONCE A WEEK (Patient not taking: Reported on 7/19/2023) 2 mL 3    Blood Glucose Monitoring Suppl (ONE TOUCH ULTRA 2) w/Device Kit Use to test blood glucose levels 2 times a day. 1 Kit 0    Blood Glucose Meter Kit Test blood sugar as recommended by provider. One Touch Ultra 2 blood glucose monitoring kit. (Patient not taking: Reported on 7/19/2023) 1 Kit 0    Blood Glucose Test Strips Use one One Touch Ultra 2 strip to test blood sugar once daily and with symptoms of low blood sugar (Patient not taking: Reported on 7/19/2023) 100 Strip 0    BD PEN NEEDLE SCOTT 2ND GEN Inject 1 Device under the skin 4 times a day.      acetaminophen (TYLENOL) 500 MG Tab Take 1-2 Tablets by mouth every 6 hours as needed.       No current facility-administered medications for this visit.       ROS: As per HPI. Additional pertinent systems as noted below.  Constitutional:  Negative for fever, chills   Cardiovascular:  Negative for chest pain, palpitations   Respiratory:  Negative for cough, sputum production, shortness of breath   Gastrointestinal: recent food poisoning resolved since, rest as in hpi  Skin: positive as in hpi  Psychiatric: Negative for homicidal ideas. Positive as in hpi    There were no vitals taken for this visit.    Physical Exam   Constitutional:  Well developed, well  nourished. Not in acute distress   Axilla- No LN appreciated    Gastrointestinal: rectal exam :on inspection there is a well healing incision on left lateral region of anal opening , without any surrounding erythema, fluctuance, warmth   Skin: . On left breast area above nipple there is a small scabbed area with minimal surround erythema. No underlying abscess or fluctuance appreciated   Psychiatric: Judgment normal,  Memory normal     Note: I have reviewed all pertinent labs and diagnostic tests associated with this visit with specific comments listed under the assessment and plan below    Assessment and Plan    1. Anxiety  Provided patient regarding information on suicide helpline.  Placing urgent referral for psychiatry.  -Patient does have a therapist which she will perform tomorrow.  -Decided patient will be she will change Lexapro to Zoloft given side effects from Lexapro and ineffectiveness so far  - sertraline (ZOLOFT) 25 MG tablet; Take 1 Tablet by mouth every day.  Dispense: 30 Tablet; Refill: 1  - Referral to Psychiatry    2. Periductal mastitis of left breast  -We will perform ultrasound to assess if there is any abscess beneath skin though clinical exam does not reveal any.  Appears to be healing well.  No systemic signs no signs of developing infection hence will abstain from using any oral antibiotics at this time.  However low threshold  - US-SCREENING WHOLE BREAST (3D SCREENING); Future  - mupirocin (BACTROBAN) 2 % Ointment; Apply 1 Application topically 3 times a day for 5 days.  Dispense: 15 g; Refill: 1    3. Perirectal abscess  -History of, status post I&D's, surgery for possible anorectal fistula, surgical scar healing well does not have follow-up with surgery was told that she did not require any.    Followup: No follow-ups on file.        Signed by: Main Mcgee M.D.

## 2023-08-23 ENCOUNTER — PHARMACY VISIT (OUTPATIENT)
Dept: PHARMACY | Facility: MEDICAL CENTER | Age: 43
End: 2023-08-23
Payer: COMMERCIAL

## 2023-08-23 ENCOUNTER — APPOINTMENT (OUTPATIENT)
Dept: INTERNAL MEDICINE | Facility: OTHER | Age: 43
End: 2023-08-23
Payer: MEDICAID

## 2023-08-23 ENCOUNTER — OFFICE VISIT (OUTPATIENT)
Dept: INTERNAL MEDICINE | Facility: OTHER | Age: 43
End: 2023-08-23
Payer: MEDICAID

## 2023-08-23 VITALS
WEIGHT: 213 LBS | OXYGEN SATURATION: 100 % | SYSTOLIC BLOOD PRESSURE: 118 MMHG | HEART RATE: 88 BPM | BODY MASS INDEX: 37.73 KG/M2 | DIASTOLIC BLOOD PRESSURE: 82 MMHG | TEMPERATURE: 97.8 F

## 2023-08-23 DIAGNOSIS — G47.39 OTHER SLEEP APNEA: ICD-10-CM

## 2023-08-23 DIAGNOSIS — E03.9 HYPOTHYROIDISM, UNSPECIFIED TYPE: ICD-10-CM

## 2023-08-23 DIAGNOSIS — G62.9 NEUROPATHY: ICD-10-CM

## 2023-08-23 DIAGNOSIS — E11.65 TYPE 2 DIABETES MELLITUS WITH HYPERGLYCEMIA, WITHOUT LONG-TERM CURRENT USE OF INSULIN (HCC): ICD-10-CM

## 2023-08-23 DIAGNOSIS — E78.5 DYSLIPIDEMIA: ICD-10-CM

## 2023-08-23 DIAGNOSIS — F41.9 ANXIETY: ICD-10-CM

## 2023-08-23 PROCEDURE — 3074F SYST BP LT 130 MM HG: CPT

## 2023-08-23 PROCEDURE — 99214 OFFICE O/P EST MOD 30 MIN: CPT | Mod: GC

## 2023-08-23 PROCEDURE — RXMED WILLOW AMBULATORY MEDICATION CHARGE: Performed by: PHYSICIAN ASSISTANT

## 2023-08-23 PROCEDURE — RXMED WILLOW AMBULATORY MEDICATION CHARGE

## 2023-08-23 PROCEDURE — 3079F DIAST BP 80-89 MM HG: CPT

## 2023-08-23 RX ORDER — LEVOTHYROXINE SODIUM 0.15 MG/1
1 TABLET ORAL
COMMUNITY
End: 2023-09-18 | Stop reason: SDUPTHER

## 2023-08-23 RX ORDER — ERTUGLIFLOZIN 15 MG/1
1 TABLET, FILM COATED ORAL DAILY
Qty: 90 TABLET | Refills: 2 | Status: SHIPPED | OUTPATIENT
Start: 2023-08-23 | End: 2023-11-09

## 2023-08-23 RX ORDER — PIOGLITAZONEHYDROCHLORIDE 15 MG/1
15 TABLET ORAL DAILY
Qty: 30 TABLET | Refills: 2 | Status: SHIPPED | OUTPATIENT
Start: 2023-08-23 | End: 2023-11-09

## 2023-08-23 RX ORDER — ERGOCALCIFEROL 1.25 MG/1
50000 CAPSULE ORAL
Qty: 12 CAPSULE | Refills: 0 | Status: SHIPPED | OUTPATIENT
Start: 2023-08-23 | End: 2023-11-18

## 2023-08-23 RX ORDER — LISINOPRIL 10 MG/1
1 TABLET ORAL
COMMUNITY
End: 2023-09-18 | Stop reason: SDUPTHER

## 2023-08-23 ASSESSMENT — FIBROSIS 4 INDEX: FIB4 SCORE: 0.66

## 2023-08-23 NOTE — PATIENT INSTRUCTIONS
1. This is the eye doctor  LYNDSEY CARRERA  [address] [facility] [address]  Phone: [phone]    2. Please do blood work in fasting    3. We referred you for sleep study, foot doctor

## 2023-08-24 ASSESSMENT — ENCOUNTER SYMPTOMS
GASTROINTESTINAL NEGATIVE: 1
INSOMNIA: 1
CONSTITUTIONAL NEGATIVE: 1
RESPIRATORY NEGATIVE: 1
NEUROLOGICAL NEGATIVE: 1
CARDIOVASCULAR NEGATIVE: 1
NERVOUS/ANXIOUS: 1
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
## 2023-08-28 NOTE — LETTER
September 13, 2018        Re: Liana Obrien      To Whom it May Concern:     I have evaluated Liana Santamariaseth Obrien in my office today and advise a 2 week leave from work until she follows up with me again in 2 weeks.         Please call with questions,       Gary Caal MD     Shoulder Pain   WHAT YOU NEED TO KNOW:   Shoulder pain is a common problem that can affect your daily activities. Pain can be caused by a problem within your shoulder, such as soreness of a tendon or bursa. A tendon is a cord of tough tissue that connects your muscles to your bones. The bursa is a fluid-filled sac that acts as a cushion between a bone and a tendon. Shoulder pain may also be caused by pain that spreads to your shoulder from another part of your body. DISCHARGE INSTRUCTIONS:   Return to the emergency department if:   You have severe pain. You cannot move your arm or shoulder. You have numbness or tingling in your shoulder or arm. Contact your healthcare provider if:   Your pain gets worse or does not go away with treatment. You have trouble moving your arm or shoulder. You have questions or concerns about your condition or care. Medicines: You may need any of the following:  Acetaminophen  decreases pain and fever. It is available without a doctor's order. Ask how much to take and how often to take it. Follow directions. Read the labels of all other medicines you are using to see if they also contain acetaminophen, or ask your doctor or pharmacist. Acetaminophen can cause liver damage if not taken correctly. NSAIDs , such as ibuprofen, help decrease swelling, pain, and fever. This medicine is available with or without a doctor's order. NSAIDs can cause stomach bleeding or kidney problems in certain people. If you take blood thinner medicine, always ask your healthcare provider if NSAIDs are safe for you. Always read the medicine label and follow directions. Take your medicine as directed. Contact your healthcare provider if you think your medicine is not helping or if you have side effects. Tell your provider if you are allergic to any medicine. Keep a list of the medicines, vitamins, and herbs you take. Include the amounts, and when and why you take them.  Bring the list or the pill bottles to follow-up visits. Carry your medicine list with you in case of an emergency. Manage your symptoms:   Apply ice  on your shoulder for 20 to 30 minutes every 2 hours or as directed. Use an ice pack, or put crushed ice in a plastic bag. Cover it with a towel before you apply it to your shoulder. Ice helps prevent tissue damage and decreases swelling and pain. Apply heat if ice does not help your symptoms. Apply heat on your shoulder for 20 to 30 minutes every 2 hours for as many days as directed. Heat helps decrease pain and muscle spasms. Limit activities as directed. Try to avoid repeated overhead movements. Go to physical or occupational therapy as directed. A physical therapist teaches you exercises to help improve movement and strength, and to decrease pain. An occupational therapist teaches you skills to help with your daily activities. Prevent shoulder pain:   Maintain a good range of motion in your shoulder. Ask your healthcare provider which exercises you should do on a regular basis after you have healed. Stretch and strengthen your shoulder. Use proper technique during exercises and sports. Follow up with your healthcare provider or orthopedist as directed:  Write down your questions so you remember to ask them during your visits. © Copyright [employer] 2022 Information is for End User's use only and may not be sold, redistributed or otherwise used for commercial purposes. The above information is an  only. It is not intended as medical advice for individual conditions or treatments. Talk to your doctor, nurse or pharmacist before following any medical regimen to see if it is safe and effective for you. Neck Pain   WHAT YOU NEED TO KNOW:   What do I need to know about neck pain? You may have sudden neck pain that increases quickly. You may instead feel pain build slowly over time.  Neck pain may go away in a few days or weeks, or it may continue for months. The pain may come and go, or be worse with certain movements. The pain may be only in your neck, or it may move to your arms, back, or shoulders. You may also have pain that starts in another body area and moves to your neck. Some types of neck pain are permanent. What causes or increases my risk for neck pain? Stenosis (narrowing) of your spinal column, or degeneration (breakdown) or inflammation of the discs in your neck    Inflammation from a condition such as rheumatoid arthritis, polymyalgia rheumatica, or rotator cuff tendinitis    A condition that affects neck to arm nerves, such as thoracic outlet syndrome or brachial neuritis     Trauma or injury to your neck, such as being hit from behind in a car (whiplash) or sleeping in a bad position    A fracture of a neck bone that causes nerve damage    A medical condition, such as shingles, meningitis, a tumor, or coronary artery disease (CAD)    How is the cause of neck pain diagnosed? Your healthcare provider will ask about your symptoms and when they began. Tell your provider if you were recently in an accident or had another injury to your neck. Your provider will examine your neck and shoulders. You may move your head and arms in certain ways to see if any position causes or relieves the pain. Blood tests  may be used to measure the amount of inflammation or to check for signs of an infection. X-ray or MRI  pictures may show a neck injury or medical condition. Do not enter the MRI room with anything metal. Metal can cause serious damage. Tell the healthcare provider if you have any metal in or on your body. How is neck pain treated? Treatment will depend on what is causing your pain. Medicines  may be prescribed or recommended by your healthcare provider for pain. You may need medicine to treat nerve pain or to stop muscle spasms. Medicines may also be given to reduce inflammation.  Your healthcare provider may inject medicine into a nerve to block pain. Over-the-counter NSAID medicine or acetaminophen may be recommended to help treat minor pain or inflammation. Traction  is used to relieve pressure from nerves. Your head is gently pulled up and away from your neck. This stretches muscles and ligaments and makes more room for the spine. Your healthcare provider will tell you the kind of traction that will help your neck pain. Do not use traction devices at home unless directed by your healthcare provider. Surgery  may be needed if the pain is severe or other treatments do not work. Surgery will not help every kind of neck pain. You may need surgery to stabilize a fractured bone or to remove a tumor. Surgery may also be used to widen a narrowed spinal column or to remove a disc from between neck bones. What can I do to manage or prevent neck pain? Rest your neck as directed. Do not make sudden movements, such as turning your head quickly. Your healthcare provider may recommend you wear a cervical collar for a short time. The collar will prevent you from moving your head. This will give your neck time to heal if an injury is causing your neck pain. Ask your provider when you can return to sports or other normal daily activities. Apply heat as directed. Heat helps relieve pain and swelling. Use a heat wrap, or soak a small towel in warm water. Wring out the extra water. Apply the heat wrap or towel for 20 minutes every hour, or as directed. Apply ice as directed. Ice helps relieve pain and swelling, and can help prevent tissue damage. Use an ice pack, or put ice in a bag. Cover the ice pack or back with a towel before you apply it to your neck. Apply the ice pack or ice for 15 minutes every hour, or as directed. Your provider can tell you how often to apply ice. Do neck exercises as directed. Neck exercises help strengthen the muscles and increase range of motion.  Your provider will tell you which exercises are right for you. The provider may give you instructions or may recommend that you work with a physical therapist. Your provider or therapist can make sure you are doing the exercises correctly. Maintain good posture. Try to keep your head and shoulders lifted when you sit. If you work in front of a computer, make sure the monitor is at the right level. You should not need to look up down to see the screen. You should also not have to lean forward to be able to read what is on the screen. Make sure your keyboard, mouse, and other computer items are placed where you do not have to extend your shoulder to reach them. Get up often if you work in front of a computer or sit for long periods of time. Stretch or walk around to keep your neck muscles loose. When should I seek immediate care? You have an injury that causes neck pain and shooting pain down your arms or legs. Your neck pain suddenly becomes severe. You have neck pain along with numbness, tingling, or weakness in your arms or legs. You have a stiff neck, a headache, and a fever. When should I contact my healthcare provider? You have new or worsening symptoms. Your symptoms continue even after treatment. You have questions or concerns about your condition or care. CARE AGREEMENT:   You have the right to help plan your care. Learn about your health condition and how it may be treated. Discuss treatment options with your healthcare providers to decide what care you want to receive. You always have the right to refuse treatment. The above information is an  only. It is not intended as medical advice for individual conditions or treatments. Talk to your doctor, nurse or pharmacist before following any medical regimen to see if it is safe and effective for you. © Copyright [employer] 2022 Information is for End User's use only and may not be sold, redistributed or otherwise used for commercial purposes.

## 2023-09-01 PROCEDURE — RXMED WILLOW AMBULATORY MEDICATION CHARGE: Performed by: HOSPITALIST

## 2023-09-01 PROCEDURE — RXMED WILLOW AMBULATORY MEDICATION CHARGE: Performed by: STUDENT IN AN ORGANIZED HEALTH CARE EDUCATION/TRAINING PROGRAM

## 2023-09-01 PROCEDURE — RXMED WILLOW AMBULATORY MEDICATION CHARGE: Performed by: INTERNAL MEDICINE

## 2023-09-01 PROCEDURE — RXMED WILLOW AMBULATORY MEDICATION CHARGE: Performed by: NURSE PRACTITIONER

## 2023-09-01 PROCEDURE — RXMED WILLOW AMBULATORY MEDICATION CHARGE: Performed by: PHYSICIAN ASSISTANT

## 2023-09-05 ENCOUNTER — PHARMACY VISIT (OUTPATIENT)
Dept: PHARMACY | Facility: MEDICAL CENTER | Age: 43
End: 2023-09-05
Payer: COMMERCIAL

## 2023-09-09 ENCOUNTER — PHARMACY VISIT (OUTPATIENT)
Dept: PHARMACY | Facility: MEDICAL CENTER | Age: 43
End: 2023-09-09
Payer: COMMERCIAL

## 2023-09-09 PROCEDURE — RXMED WILLOW AMBULATORY MEDICATION CHARGE: Performed by: REGISTERED NURSE

## 2023-09-13 ENCOUNTER — NON-PROVIDER VISIT (OUTPATIENT)
Dept: INTERNAL MEDICINE | Facility: OTHER | Age: 43
End: 2023-09-13
Payer: MEDICAID

## 2023-09-13 VITALS — BODY MASS INDEX: 37.2 KG/M2 | WEIGHT: 210 LBS

## 2023-09-13 DIAGNOSIS — E11.69 TYPE 2 DIABETES MELLITUS WITH OTHER SPECIFIED COMPLICATION, WITH LONG-TERM CURRENT USE OF INSULIN (HCC): ICD-10-CM

## 2023-09-13 DIAGNOSIS — E78.5 DYSLIPIDEMIA: ICD-10-CM

## 2023-09-13 DIAGNOSIS — Z79.4 TYPE 2 DIABETES MELLITUS WITH OTHER SPECIFIED COMPLICATION, WITH LONG-TERM CURRENT USE OF INSULIN (HCC): ICD-10-CM

## 2023-09-13 DIAGNOSIS — D64.9 ANEMIA, UNSPECIFIED TYPE: ICD-10-CM

## 2023-09-13 PROCEDURE — 97803 MED NUTRITION INDIV SUBSEQ: CPT | Performed by: DIETITIAN, REGISTERED

## 2023-09-13 ASSESSMENT — FIBROSIS 4 INDEX: FIB4 SCORE: 0.66

## 2023-09-13 NOTE — PATIENT INSTRUCTIONS
I will add in some exercise now that I can  - 30 min 3/7 days  - getting accountability partners for my health management- from IOP dept    I will eat 3 times a day, 3 opportunities to fuel my body  - choose more than one food group  - Glucerna as a back up plan  - choose a protein and one other food group (veggie, fruit)  - keep my metabolism burning    If FBG <80, start to reduce Tresiba by 10% until you see Dana again or call her    Check blood sugars 2 hours after eating  - aim for <180 mg/dl

## 2023-09-13 NOTE — PROGRESS NOTES
Liana Obrien is a 43 y.o. year old, female returns for diabetes and weight self management follow up.        Positive changes since last visit:    June surgery for fistula- healing after one month  IOP with [facility]- inpatient  x 8 days one month ago    CGM- Zack Sensor 2 - monitoring BG with meds: Tresiba 28 u once a day, Trulicity 4.5 mg once a week, pioglitizone 15 mg once a day, Steglatro (ertugliflozin), 500 MG metformin 2000 MG/day, Humalog SSI: 3 u with food and q4 hours 8, 12 noon, 1600, 2000 SSI    100-150 1 u  150-200 2 u  200-250 3 u  250-300 4 u  300-350 5 u  350-400 6 u      Meal/Eating/Environment Pattern:    Humalog administered even if not eating per [facility] -Dana Yu PA-C    Below target numbers seen 1-2  x in last two weeks  Alerted by alarm on sensor  Takes a Glucerna shake or eats the snack: fig bar or fruit cup, a/s     Latest Reference Range & Units 06/19/23 10:31   Glycohemoglobin 4.0 - 5.6 % 11.0 (H)   (H): Data is abnormally high    Aic from [facility]: 9/2023: 6.8%    Comments:    Liana has been seeing endocrinology specialist, Yasmin Yu PA-C who adjusted her meds and added two oral agents; A1c reflecting medication management.    Getting teeth fixed, dentures arriving, extractions begin end of month    Social: with Hindu[employer] and cousins check on Liana.    Anxiety/Depression: sertraline, trazodone, hydroxyzine  Supplements: D2    Liana is in a better place with her overall health, reinforced commitments to continue to manage her diabetes with lifestyle practices that support her DSM.    RTC x next available    Time Spent:  60 minutes

## 2023-09-20 PROCEDURE — RXMED WILLOW AMBULATORY MEDICATION CHARGE

## 2023-09-22 DIAGNOSIS — N60.42 PERIDUCTAL MASTITIS OF LEFT BREAST: ICD-10-CM

## 2023-09-22 PROCEDURE — RXMED WILLOW AMBULATORY MEDICATION CHARGE: Performed by: HOSPITALIST

## 2023-09-22 PROCEDURE — RXMED WILLOW AMBULATORY MEDICATION CHARGE

## 2023-09-22 PROCEDURE — RXMED WILLOW AMBULATORY MEDICATION CHARGE: Performed by: PHYSICIAN ASSISTANT

## 2023-09-22 RX ORDER — LISINOPRIL 10 MG/1
10 TABLET ORAL
Qty: 30 TABLET | Refills: 0 | Status: SHIPPED | OUTPATIENT
Start: 2023-09-22 | End: 2023-11-22 | Stop reason: SDUPTHER

## 2023-09-22 RX ORDER — LEVOTHYROXINE SODIUM 0.15 MG/1
150 TABLET ORAL
Qty: 30 TABLET | Refills: 0 | Status: SHIPPED | OUTPATIENT
Start: 2023-09-22 | End: 2023-11-22 | Stop reason: SDUPTHER

## 2023-09-25 ENCOUNTER — OFFICE VISIT (OUTPATIENT)
Dept: INTERNAL MEDICINE | Facility: OTHER | Age: 43
End: 2023-09-25
Payer: MEDICAID

## 2023-09-25 VITALS
DIASTOLIC BLOOD PRESSURE: 68 MMHG | HEIGHT: 63 IN | BODY MASS INDEX: 36.64 KG/M2 | HEART RATE: 110 BPM | TEMPERATURE: 96.8 F | WEIGHT: 206.8 LBS | OXYGEN SATURATION: 96 % | SYSTOLIC BLOOD PRESSURE: 96 MMHG

## 2023-09-25 DIAGNOSIS — R01.1 HEART MURMUR: ICD-10-CM

## 2023-09-25 DIAGNOSIS — L98.8 SKIN LESION OF BREAST: ICD-10-CM

## 2023-09-25 DIAGNOSIS — H53.8 BLURRY VISION: ICD-10-CM

## 2023-09-25 DIAGNOSIS — Z23 NEED FOR VACCINATION: ICD-10-CM

## 2023-09-25 DIAGNOSIS — E66.9 OBESITY (BMI 30-39.9): ICD-10-CM

## 2023-09-25 DIAGNOSIS — R06.02 SHORTNESS OF BREATH: ICD-10-CM

## 2023-09-25 DIAGNOSIS — Z12.31 ENCOUNTER FOR SCREENING MAMMOGRAM FOR MALIGNANT NEOPLASM OF BREAST: ICD-10-CM

## 2023-09-25 DIAGNOSIS — F41.9 ANXIETY: ICD-10-CM

## 2023-09-25 DIAGNOSIS — Z86.69 HISTORY OF BLINDNESS: ICD-10-CM

## 2023-09-25 DIAGNOSIS — E78.5 DYSLIPIDEMIA: ICD-10-CM

## 2023-09-25 PROCEDURE — 3078F DIAST BP <80 MM HG: CPT | Performed by: INTERNAL MEDICINE

## 2023-09-25 PROCEDURE — 99214 OFFICE O/P EST MOD 30 MIN: CPT | Mod: 25 | Performed by: INTERNAL MEDICINE

## 2023-09-25 PROCEDURE — 90686 IIV4 VACC NO PRSV 0.5 ML IM: CPT | Performed by: INTERNAL MEDICINE

## 2023-09-25 PROCEDURE — 3074F SYST BP LT 130 MM HG: CPT | Performed by: INTERNAL MEDICINE

## 2023-09-25 PROCEDURE — 90471 IMMUNIZATION ADMIN: CPT | Performed by: INTERNAL MEDICINE

## 2023-09-25 ASSESSMENT — FIBROSIS 4 INDEX: FIB4 SCORE: 0.66

## 2023-09-25 NOTE — PROGRESS NOTES
Chief Complaint   Patient presents with    Follow-Up       HPI: Liana Obrien is a 43 y.o. female with past medical history as below     who presented to the clinic for the following.      *Shortness of breath.  -Patient reports that she has started increasing her level of activity around the house recently and has noticed that she gets very winded just walking around the house.  Denies any cough or wheezing  -Denies any chest pain.  Does report palpitations sometimes when she gets anxious which goes away with some deep breaths.  Denies any lightheadedness, dizziness, pedal edema, orthopnea or PND  -Does have history of sleep apnea, was on CPAP in the past patient does not recall why as she is not on it anymore.  -On examination patient does have a grade 3 systolic murmur.  Patient might have been told that she has a murmur in the past.  -Denies any fevers, chills.  -As per labs in the past patient has had history of anemia, she has regular menstrual cycles sometimes, most recently her menstrual cycle lasted for 10 days.  Has history of PCOS as per chart, ovarian cyst  -Has history of DVT as per chart.      *Anxiety   -Patient is following up with psychiatry, mental health counselor, [facility].  -Was receiving inpatient psychiatry treatment, currently is on Zoloft 100 mg, trazodone 100 mg  at nighttime, hydroxyzine as needed.   -Patient reports that she is doing overall well, trazodone helps most of the nights.  Sleep however she does have some nights when she is unable to fall asleep.    *Blurry vision  -Patient reports that about 6 years ago she had a significant blurry vision of both eyes which lasted for several months, when she was almost declared blind legally, was evaluated by ophthalmologist, unsure what the diagnosis was.  -Since then however her vision has significantly improved, currently has intermittent blurring of vision on both eyes, which when comes on lasts for a day  to a month continuously.  -Denies any headaches, flashes floaters blind spots, eye pain, redness or aura  -We had placed ophthalmology referral on the last visit however patient was not able to attend the appointment due to being inpatient for mental health related issues.  -Patient is requesting for new referral.  -Patient denies having any dry eyes at this time    *Breast Skin lesion   Has had history of bug bites.   -On examination now only has superficial scar, no deeper concerning tissue on palpation.  -Patient did have another spot with similar complaints, which again is currently a scar.  -Had ordered ultrasound breast at the time which has not been done.  -Given resolution of lump will go ahead and proceed to routine mammogram screening.    *Status post surgery for perirectal abscess/fistula   -Healing well as per patient    *Obesity  -Patient is working with nutrition specialist as well as trying to get more exercise as per recommendations of nutrition specialist.                Patient Active Problem List    Diagnosis Date Noted    Anxiety  On Zoloft 100 mg, trazodone 100 mg daily at night 06/16/2023    Neuropathic pain of both feet  On gabapentin  06/16/2023    Fibroids 11/15/2022     10/12/2022    Anemia  Microcytosis without anemia as per last labs done in may 2023  09/23/2022    Dyslipidemia 09/22/2022    Obesity (BMI 30-39.9) 05/25/2022    Gastro-esophageal reflux disease without esophagitis 04/14/2021    Major depressive disorder 09/14/2020    Polycystic ovary syndrome  2.2 cm right ovarian cyst seen on recent CT in May 2023  Unable to undergo TVUS due to perirectal abscess  Unable to go on estrogen containing OCP due to history of VTE  Will follow up with gyn following resolution of perirectal abscess 12/11/2019    History of deep venous thrombosis 11/19/2019    Hypothyroidism  Levothyroxine 150 mcg 11/19/2019    Hypertension  Mildly elevated on lisinopril 10 - not checking blood pressures at home  regularly as the cuff not working  10/28/2017    Obstructive sleep apnea, adult  Trying to set up with cpap 07/31/2015    Vitamin D deficiency 03/12/2015    Type 2 diabetes mellitus with hyperglycemia, with long-term current use of insulin ([facility])  Following up with endocrinology -  A1c of 11 in June 2023   metformin 1000mg BID,  Tresiba 26U daily, Tulicity 0.5 mg weekly premeal humalog TID  - lipitor 20 daily  - lisinopril 10 daily  - gabapentin 100 TID for neuropathy 03/12/2015       Current Outpatient Medications   Medication Sig Dispense Refill    mupirocin (BACTROBAN) 2 % Ointment Apply 1 Application topically 3 times a day for 5 days. 22 g 0    lisinopril (PRINIVIL) 10 MG Tab Take 1 Tablet by mouth every day. 30 Tablet 0    levothyroxine (SYNTHROID) 150 MCG Tab Take 1 Tablet by mouth every morning on an empty stomach. 30 Tablet 0    hydrOXYzine pamoate (VISTARIL) 50 MG Cap Take 1 capsule by mouth every (6-8) hours as needed for anxiety 90 Capsule 2    sertraline (ZOLOFT) 100 MG Tab Take 1 tablet by mouth once a day 30 Tablet 2    traZODone (DESYREL) 100 MG Tab Take 2 tablets by mouth at bedtime as needed 60 Tablet 2    pioglitazone (ACTOS) 15 MG Tab Take 1 tablet by mouth once a day 30 Tablet 2    Ertugliflozin L-PyroglutamicAc (STEGLATRO) 15 MG Tab Take 1 tablet by mouth once a day 90 Tablet 2    ergocalciferol (DRISDOL) 60502 UNIT capsule Take 1 Capsule by mouth every 7 days. 12 Capsule 0    hydrOXYzine pamoate (VISTARIL) 50 MG Cap Take 1 oral capsule as needed every (6-8) hours for anxiety 90 Capsule 2    traZODone (DESYREL) 50 MG Tab Take 1 oral tablet at bedtime as needed ( may take 2 as needed) 60 Tablet 2    metFORMIN (GLUCOPHAGE) 500 MG Tab Take 1 Tablet by mouth 2 times a day. (Patient taking differently: Take 1,000 mg by mouth 2 times a day.) 60 Tablet 0    atorvastatin (LIPITOR) 20 MG Tab Take 1 tablet by mouth once a day 30 Tablet 2    Dulaglutide (TRULICITY) 4.5 MG/0.5ML Solution Pen-injector Inject  4.5 mg subcutaneously once a week 2 mL 3    Alcohol Swabs Pads use one alcohol swab three times a day as needed. 300 Each 6    gabapentin (NEURONTIN) 100 MG Cap Take 2 Capsules by mouth 3 times a day. 270 Capsule 2    Continuous Blood Gluc Sensor (FREESTYLE ALEKSANDAR 2 SENSOR) Misc Use as directed every two weeks, change every 14 days 7 Each 3    Insulin Pen Needle 32 G x 4 mm Use 1 needle subcutaneously 4 times daily 360 Each 3    Lancets (ONETOUCH DELICA PLUS LOAQCB81Y) Misc TEST BLOOD SUGAR ONCE DAILY AND WITH SYMPTOMS OF HYPOGLYCEMIA E11.65 100 Each 6    glucose blood (ONETOUCH ULTRA) strip TEST BLOOD SUGAR THREE TIMES DAILY AND WITH SYMPTOMS OF LOW BLOOD SUGAR E11.65 100 Strip 1    HUMALOG KWIKPEN 100 UNIT/ML Solution Pen-injector injection PEN Inject 1-10 Units under the skin 3 times a day. Per sliding scale dosing 15 mL 5    Insulin Degludec (TRESIBA FLEXTOUCH) 200 UNIT/ML Solution Pen-injector Inject 26 unit subcutaneously once a day 9 mL 3    Blood Glucose Meter Kit Test blood sugar as recommended by provider. One Touch Ultra 2 blood glucose monitoring kit. 1 Kit 0    Blood Glucose Test Strips Use one One Touch Ultra 2 strip to test blood sugar once daily and with symptoms of low blood sugar 100 Strip 0    sertraline (ZOLOFT) 100 MG Tab Take 1 oral tablet once a day (Patient not taking: Reported on 8/23/2023) 30 Tablet 2    hydrOXYzine pamoate (VISTARIL) 50 MG Cap Take 1 Capsule by mouth every 4 hours. 180 Capsule 0    Dulaglutide (TRULICITY) 4.5 MG/0.5ML Solution Pen-injector INJECT 1 PEN INJECTOR SUBCUTANEOUSLY ONCE A WEEK 2 mL 3     No current facility-administered medications for this visit.       ROS: As per HPI. Additional pertinent systems as noted below.  Constitutional:  Negative for fever, chills   Cardiovascular:  Negative for chest pain, palpitations positive for shortness of breath,  Respiratory:  Negative for cough, sputum production, positive for shortness of breath   Skin: As in HPI regarding  "breast tissue  Psychiatric: Negative for homicidal ideas. Positive as in hpi    BP 96/68 (BP Location: Right arm, Patient Position: Sitting, BP Cuff Size: Adult)   Pulse (!) 110   Temp 36 °C (96.8 °F) (Temporal)   Ht 1.6 m (5' 3\")   Wt 93.8 kg (206 lb 12.8 oz)   SpO2 96%   BMI 36.63 kg/m²     Physical Exam   Constitutional:  Well developed, well nourished. Not in acute distress   Skin: . On left breast area above nipple there is a small area of erythema/bruising without any fluctuance or mass palpable beneath it.  No scaling appreciated.  Similar lesion slightly below that area which is new compared to the past.  . No underlying abscess or fluctuance appreciated  Cardiovascular: Patient has a grade 3 systolic murmur appreciated best on the aortic area  Respiratory: During interview patient is able to speak in long sentences without feeling short of breath, however after returning to room from having stepped out for a few minutes patient did appear out of breath even while seated on the chair.  Clear to auscultation, symmetrical breath sounds with no added breath sounds appreciated.  Psychiatric: Judgment normal,  Memory normal     Note: I have reviewed all pertinent labs and diagnostic tests associated with this visit with specific comments listed under the assessment and plan below    Assessment and Plan    1. Shortness of breath  -Most likely secondary to deconditioning however given grade 3 murmur have to rule out cardiology causes.  Of note patient is also on pioglitazone for diabetes  -Do not suspect lung pathology at this time given clear lungs on examination and absence of cough or wheeze or recent infections.  I am also obtaining a CBC with differential given history of anemia, prolonged menstrual cycle recently.  -Also obtain thyroid function studies.  -Patient has not had CPAP for a long time, and patient will follow-up with them.  - EC-ECHOCARDIOGRAM COMPLETE W/O CONT; Future  - CBC WITH " DIFFERENTIAL; Future  - TSH WITH REFLEX TO FT4; Future    2. Heart murmur  Grade 3 systolic, with symptoms of shortness of breath, palpitations which may or may not be associated with anxiety as mentioned above  - EC-ECHOCARDIOGRAM COMPLETE W/O CONT; Future    3. Anxiety  -Better controlled on Zoloft, hydroxyzine as needed, trazodone for insomnia.  -Following up with psychiatry, psychologist and behavioral health will    4. Dyslipidemia    - Lipid Profile; Future    5. Blurry vision  -Better compared to 6 years ago, referral to ophthalmology placed for intermittent blurring of vision, without diplopia, any other localizing symptom.  -Present even before hydroxyzine was started.  - Referral to Ophthalmology    6. History of blindness  -As per patient, extreme blurriness to the point where therefore she was almost legally declared blind bilaterally.  -However since then she has not had similar episode, however does get intermittent blurriness of vision in both eyes without any other localizing symptoms or symptoms suggestive of migraine/MS.  -Referral to ophthalmology placed as above  - Referral to Ophthalmology    7. Skin lesion of breast  -Currently without any abscess/mass beneath the skin lesions.  -We will obtain screening mammogram.    8. Obesity (BMI 30-39.9)  -Patient is working with nutrition specialist as well as trying to increase the level of activity, limited currently by shortness of breath which is currently being worked up as above.    9. Encounter for screening mammogram for malignant neoplasm of breast    - MA-SCREENING MAMMO BILAT W/TOMOSYNTHESIS W/CAD; Future    10. Need for vaccination    - INFLUENZA VACCINE QUAD INJ (PF)     Followup: Return in about 1 month (around 10/25/2023).        Signed by: Main Mcgee M.D.

## 2023-09-25 NOTE — LETTER
[employer] [facility]  Anahi Faria M.D.  [address] [place] [address]  Fax: [phone]   Authorization for Release/Disclosure of   Protected Health Information   Name: LIANA OBRIEN : 1980 SSN: [id number]   Address: [address]  [place] [address] Phone:    [phone] (home)    I authorize the entity listed below to release/disclose the PHI below to:   [facility]/Anahi Faria M.D. and Main Mcgee M.D.   Provider or Entity Name:     Address   City, State, [facility]   Phone:      Fax:     Reason for request: continuity of care   Information to be released:    [  ] LAST COLONOSCOPY,  including any PATH REPORT and follow-up  [  ] LAST FIT/COLOGUARD RESULT [  ] LAST DEXA  [  ] LAST MAMMOGRAM  [  ] LAST PAP  [  ] LAST LABS [  ] RETINA EXAM REPORT  [  ] IMMUNIZATION RECORDS  [  ] Release all info      [  ] Check here and initial the line next to each item to release ALL health information INCLUDING  _____ Care and treatment for drug and / or alcohol abuse  _____ HIV testing, infection status, or AIDS  _____ Genetic Testing    DATES OF SERVICE OR TIME PERIOD TO BE DISCLOSED: _____________  I understand and acknowledge that:  * This Authorization may be revoked at any time by you in writing, except if your health information has already been used or disclosed.  * Your health information that will be used or disclosed as a result of you signing this authorization could be re-disclosed by the recipient. If this occurs, your re-disclosed health information may no longer be protected by State or Federal laws.  * You may refuse to sign this Authorization. Your refusal will not affect your ability to obtain treatment.  * This Authorization becomes effective upon signing and will  on (date) __________.      If no date is indicated, this Authorization will  one (1) year from the signature date.    Name: Liana Obrien  Signature: Date:   2023     PLEASE FAX REQUESTED  RECORDS BACK TO: [phone]

## 2023-09-26 PROBLEM — L98.8 SKIN LESION OF BREAST: Status: ACTIVE | Noted: 2023-09-26

## 2023-10-01 PROCEDURE — RXMED WILLOW AMBULATORY MEDICATION CHARGE: Performed by: STUDENT IN AN ORGANIZED HEALTH CARE EDUCATION/TRAINING PROGRAM

## 2023-10-01 PROCEDURE — RXMED WILLOW AMBULATORY MEDICATION CHARGE: Performed by: INTERNAL MEDICINE

## 2023-10-01 PROCEDURE — RXMED WILLOW AMBULATORY MEDICATION CHARGE: Performed by: PHYSICIAN ASSISTANT

## 2023-10-01 PROCEDURE — RXMED WILLOW AMBULATORY MEDICATION CHARGE: Performed by: HOSPITALIST

## 2023-10-01 PROCEDURE — RXMED WILLOW AMBULATORY MEDICATION CHARGE: Performed by: NURSE PRACTITIONER

## 2023-10-05 PROCEDURE — RXMED WILLOW AMBULATORY MEDICATION CHARGE: Performed by: REGISTERED NURSE

## 2023-10-05 PROCEDURE — RXMED WILLOW AMBULATORY MEDICATION CHARGE

## 2023-10-06 ENCOUNTER — PHARMACY VISIT (OUTPATIENT)
Dept: PHARMACY | Facility: MEDICAL CENTER | Age: 43
End: 2023-10-06
Payer: COMMERCIAL

## 2023-10-06 RX ORDER — INSULIN DEGLUDEC 200 U/ML
INJECTION, SOLUTION SUBCUTANEOUS
Qty: 9 ML | Refills: 3 | Status: CANCELLED | OUTPATIENT
Start: 2023-10-06

## 2023-10-17 PROCEDURE — RXMED WILLOW AMBULATORY MEDICATION CHARGE: Performed by: PHYSICIAN ASSISTANT

## 2023-10-20 ENCOUNTER — APPOINTMENT (OUTPATIENT)
Dept: INTERNAL MEDICINE | Facility: OTHER | Age: 43
End: 2023-10-20
Payer: MEDICAID

## 2023-10-21 ENCOUNTER — PHARMACY VISIT (OUTPATIENT)
Dept: PHARMACY | Facility: MEDICAL CENTER | Age: 43
End: 2023-10-21
Payer: COMMERCIAL

## 2023-10-21 PROCEDURE — RXMED WILLOW AMBULATORY MEDICATION CHARGE: Performed by: PHYSICIAN ASSISTANT

## 2023-10-25 ENCOUNTER — APPOINTMENT (OUTPATIENT)
Dept: INTERNAL MEDICINE | Facility: OTHER | Age: 43
End: 2023-10-25
Payer: MEDICAID

## 2023-10-26 ENCOUNTER — APPOINTMENT (OUTPATIENT)
Dept: INTERNAL MEDICINE | Facility: OTHER | Age: 43
End: 2023-10-26
Payer: MEDICAID

## 2023-10-26 ENCOUNTER — OFFICE VISIT (OUTPATIENT)
Dept: INTERNAL MEDICINE | Facility: OTHER | Age: 43
End: 2023-10-26
Payer: MEDICAID

## 2023-10-26 VITALS
BODY MASS INDEX: 35.22 KG/M2 | DIASTOLIC BLOOD PRESSURE: 83 MMHG | WEIGHT: 198.8 LBS | HEIGHT: 63 IN | SYSTOLIC BLOOD PRESSURE: 133 MMHG | OXYGEN SATURATION: 96 % | HEART RATE: 85 BPM | TEMPERATURE: 97.4 F

## 2023-10-26 DIAGNOSIS — Z12.83 SKIN EXAM FOR MALIGNANT NEOPLASM: ICD-10-CM

## 2023-10-26 DIAGNOSIS — L30.9 DERMATITIS: ICD-10-CM

## 2023-10-26 DIAGNOSIS — H66.90 ACUTE OTITIS MEDIA, UNSPECIFIED OTITIS MEDIA TYPE: ICD-10-CM

## 2023-10-26 DIAGNOSIS — H60.501 ACUTE OTITIS EXTERNA OF RIGHT EAR, UNSPECIFIED TYPE: ICD-10-CM

## 2023-10-26 DIAGNOSIS — L91.8 SKIN TAG: ICD-10-CM

## 2023-10-26 PROCEDURE — 99214 OFFICE O/P EST MOD 30 MIN: CPT | Performed by: INTERNAL MEDICINE

## 2023-10-26 PROCEDURE — 3079F DIAST BP 80-89 MM HG: CPT | Performed by: INTERNAL MEDICINE

## 2023-10-26 PROCEDURE — RXMED WILLOW AMBULATORY MEDICATION CHARGE: Performed by: INTERNAL MEDICINE

## 2023-10-26 PROCEDURE — 3075F SYST BP GE 130 - 139MM HG: CPT | Performed by: INTERNAL MEDICINE

## 2023-10-26 RX ORDER — CEFPODOXIME PROXETIL 200 MG/1
200 TABLET, FILM COATED ORAL 2 TIMES DAILY
Qty: 10 TABLET | Refills: 0 | Status: SHIPPED | OUTPATIENT
Start: 2023-10-26 | End: 2023-10-30

## 2023-10-26 RX ORDER — HYDROCORTISONE AND ACETIC ACID 20.75; 10.375 MG/ML; MG/ML
SOLUTION AURICULAR (OTIC)
Qty: 10 ML | Refills: 1 | Status: SHIPPED | OUTPATIENT
Start: 2023-10-26 | End: 2023-11-21

## 2023-10-26 ASSESSMENT — FIBROSIS 4 INDEX: FIB4 SCORE: 0.66

## 2023-10-26 NOTE — PROGRESS NOTES
Chief Complaint   Patient presents with    Otalgia     Pain in right ear, also feels like there is water in ear    Bump     Bump on abdomen tthat seems to be getting bigger    Medication Refill     Gabapentin        HPI: Liana Obrien is a 43 y.o. female with past medical history as below     who presented to the clinic for the following.      Otalgia - right , feels like there is water   - on going for three days   - no swimming , no q tip use  No discharge  No hearing loss, no tinnitus   No other upper resp symptoms, expect mild epistaxis    No teeth grinding , does have poor dentition and cavities pending dental work   - does have dry mouth from medications.       *Skin tag   -on lower abdomen - grown - wants to be rmoved,  -Also on exam noted to have erythema on lower abdominal region below skin fold- no symptoms         Other problems not discussed on this visit     *Shortness of breath.  -Patient reports that she has started increasing her level of activity around the house recently and has noticed that she gets very winded just walking around the house.  Denies any cough or wheezing  -Denies any chest pain.  Does report palpitations sometimes when she gets anxious which goes away with some deep breaths.  Denies any lightheadedness, dizziness, pedal edema, orthopnea or PND  -Does have history of sleep apnea, was on CPAP in the past patient does not recall why as she is not on it anymore.  -On examination patient does have a grade 3 systolic murmur.  Patient might have been told that she has a murmur in the past.  -Denies any fevers, chills.  -As per labs in the past patient has had history of anemia, she has regular menstrual cycles sometimes, most recently her menstrual cycle lasted for 10 days.  Has history of PCOS as per chart, ovarian cyst  -Has history of DVT as per chart.      *Anxiety   -Patient is following up with psychiatry, mental health counselor, [facility].  -Was  receiving inpatient psychiatry treatment, currently is on Zoloft 100 mg, trazodone 100 mg  at nighttime, hydroxyzine as needed.   -Patient reports that she is doing overall well, trazodone helps most of the nights.  Sleep however she does have some nights when she is unable to fall asleep.    *Blurry vision  -Patient reports that about 6 years ago she had a significant blurry vision of both eyes which lasted for several months, when she was almost declared blind legally, was evaluated by ophthalmologist, unsure what the diagnosis was.  -Since then however her vision has significantly improved, currently has intermittent blurring of vision on both eyes, which when comes on lasts for a day to a month continuously.  -Denies any headaches, flashes floaters blind spots, eye pain, redness or aura  -We had placed ophthalmology referral on the last visit however patient was not able to attend the appointment due to being inpatient for mental health related issues.  -Patient is requesting for new referral.  -Patient denies having any dry eyes at this time    *Breast Skin lesion   Has had history of bug bites.   -On examination now only has superficial scar, no deeper concerning tissue on palpation.  -Patient did have another spot with similar complaints, which again is currently a scar.  -Had ordered ultrasound breast at the time which has not been done.  -Given resolution of lump will go ahead and proceed to routine mammogram screening.    *Status post surgery for perirectal abscess/fistula   -Healing well as per patient    *Obesity  -Patient is working with nutrition specialist as well as trying to get more exercise as per recommendations of nutrition specialist.                Patient Active Problem List    Diagnosis Date Noted    Anxiety  On Zoloft 100 mg, trazodone 100 mg daily at night 06/16/2023    Neuropathic pain of both feet  On gabapentin  06/16/2023    Fibroids 11/15/2022     10/12/2022    Anemia  Microcytosis  without anemia as per last labs done in may 2023  09/23/2022    Dyslipidemia 09/22/2022    Obesity (BMI 30-39.9) 05/25/2022    Gastro-esophageal reflux disease without esophagitis 04/14/2021    Major depressive disorder 09/14/2020    Polycystic ovary syndrome  2.2 cm right ovarian cyst seen on recent CT in May 2023  Unable to undergo TVUS due to perirectal abscess  Unable to go on estrogen containing OCP due to history of VTE  Will follow up with gyn following resolution of perirectal abscess 12/11/2019    History of deep venous thrombosis 11/19/2019    Hypothyroidism  Levothyroxine 150 mcg 11/19/2019    Hypertension  Mildly elevated on lisinopril 10 - not checking blood pressures at home regularly as the cuff not working  10/28/2017    Obstructive sleep apnea, adult  Trying to set up with cpap 07/31/2015    Vitamin D deficiency 03/12/2015    Type 2 diabetes mellitus with hyperglycemia, with long-term current use of insulin (HCC)  Following up with endocrinology -  A1c of 11 in June 2023   metformin 1000mg BID,  Tresiba 26U daily, Tulicity 0.5 mg weekly premeal humalog TID  - lipitor 20 daily  - lisinopril 10 daily  - gabapentin 100 TID for neuropathy 03/12/2015       Current Outpatient Medications   Medication Sig Dispense Refill    acetic acid-hydrocortisone (VOSOL-HC) 1-2 % Solution Insert saturated wick of cotton; keep moist for 24 hours by adding 3 drops every 4 to 6 hours. Remove wick after 24 hours and instill 5 drops 3 times daily for 7 days 10 mL 1    cefpodoxime (VANTIN) 200 MG Tab Take 1 Tablet by mouth 2 times a day. 10 Tablet 0    pioglitazone (ACTOS) 15 MG Tab Take 1 tablet by mouth once a day 30 Tablet 2    insulin lispro (HUMALOG KWIKPEN) 100 UNIT/ML SC SOPN injection PEN Inject 15 unit subcutaneously three times a day 15 mL 1    metFORMIN (GLUCOPHAGE) 500 MG Tab TAKE 2 TABLETS BY MOUTH TWICE A  Tablet 3    Dulaglutide (TRULICITY) 4.5 MG/0.5ML Solution Pen-injector Inject 4.5 mg subcutaneously  once a week 2 mL 3    atorvastatin (LIPITOR) 20 MG Tab Take 1 tablet by mouth once a day 30 Tablet 3    Insulin Degludec (TRESIBA FLEXTOUCH) 200 UNIT/ML Solution Pen-injector Inject 26 unit subcutaneously once a day 9 mL 3    lisinopril (PRINIVIL) 10 MG Tab Take 1 Tablet by mouth every day. 30 Tablet 0    levothyroxine (SYNTHROID) 150 MCG Tab Take 1 Tablet by mouth every morning on an empty stomach. 30 Tablet 0    hydrOXYzine pamoate (VISTARIL) 50 MG Cap Take 1 capsule by mouth every (6-8) hours as needed for anxiety 90 Capsule 2    sertraline (ZOLOFT) 100 MG Tab Take 1 tablet by mouth once a day 30 Tablet 2    traZODone (DESYREL) 100 MG Tab Take 2 tablets by mouth at bedtime as needed 60 Tablet 2    Ertugliflozin L-PyroglutamicAc (STEGLATRO) 15 MG Tab Take 1 tablet by mouth once a day 90 Tablet 2    ergocalciferol (DRISDOL) 55928 UNIT capsule Take 1 Capsule by mouth every 7 days. 12 Capsule 0    hydrOXYzine pamoate (VISTARIL) 50 MG Cap Take 1 oral capsule as needed every (6-8) hours for anxiety 90 Capsule 2    Alcohol Swabs Pads use one alcohol swab three times a day as needed. 300 Each 6    gabapentin (NEURONTIN) 100 MG Cap Take 2 Capsules by mouth 3 times a day. 270 Capsule 2    Continuous Blood Gluc Sensor (FREESTYLE ALEKSANDAR 2 SENSOR) Misc Use as directed every two weeks, change every 14 days 7 Each 3    Insulin Pen Needle 32 G x 4 mm Use 1 needle subcutaneously 4 times daily 360 Each 3    Lancets (ONETOUCH DELICA PLUS GMPVAA02S) Misc TEST BLOOD SUGAR ONCE DAILY AND WITH SYMPTOMS OF HYPOGLYCEMIA E11.65 100 Each 6    glucose blood (ONETOUCH ULTRA) strip TEST BLOOD SUGAR THREE TIMES DAILY AND WITH SYMPTOMS OF LOW BLOOD SUGAR E11.65 100 Strip 1    HUMALOG KWIKPEN 100 UNIT/ML Solution Pen-injector injection PEN Inject 1-10 Units under the skin 3 times a day. Per sliding scale dosing 15 mL 5    Blood Glucose Meter Kit Test blood sugar as recommended by provider. One Touch Ultra 2 blood glucose monitoring kit. 1 Kit 0  "   Blood Glucose Test Strips Use one One Touch Ultra 2 strip to test blood sugar once daily and with symptoms of low blood sugar 100 Strip 0    Continuous Blood Gluc Sensor (FREESTYLE ALEKSANDAR 2 SENSOR) [facility] Use 1 kit as directed every two weeks change every 14 days (Patient not taking: Reported on 10/26/2023) 7 Each 3    pioglitazone (ACTOS) 15 MG Tab Take 1 tablet by mouth once a day (Patient not taking: Reported on 10/26/2023) 30 Tablet 2    sertraline (ZOLOFT) 100 MG Tab Take 1 oral tablet once a day (Patient not taking: Reported on 8/23/2023) 30 Tablet 2    traZODone (DESYREL) 50 MG Tab Take 1 oral tablet at bedtime as needed ( may take 2 as needed) (Patient not taking: Reported on 10/26/2023) 60 Tablet 2    hydrOXYzine pamoate (VISTARIL) 50 MG Cap Take 1 Capsule by mouth every 4 hours. (Patient not taking: Reported on 10/26/2023) 180 Capsule 0    Dulaglutide (TRULICITY) 4.5 MG/0.5ML Solution Pen-injector INJECT 1 PEN INJECTOR SUBCUTANEOUSLY ONCE A WEEK (Patient not taking: Reported on 10/26/2023) 2 mL 3     No current facility-administered medications for this visit.       ROS: As per HPI. Additional pertinent systems as noted below.  Constitutional:  Negative for fever, chills   HEENT : As in hpi  Skin : as in hpi  Cardiovascular:  Negative for chest pain, palpitations positive for shortness of breath,  Respiratory:  Negative for cough, sputum production, positive for shortness of breath       /83 (BP Location: Right arm, Patient Position: Sitting, BP Cuff Size: Adult)   Pulse 85   Temp 36.3 °C (97.4 °F) (Temporal)   Ht 1.6 m (5' 3\")   Wt 90.2 kg (198 lb 12.8 oz)   SpO2 96%   BMI 35.22 kg/m²     Physical Exam   Constitutional:  Well developed, well nourished. Not in acute distress   HEENT : Left ear - normal externa and pearly white TM, right has some erythema in externa with mildy opaque TM   Skin: . Skin tag on lower abdomen - with thick root   - Developing dermatitis vs intertrigo on region below " skin fold       Note: I have reviewed all pertinent labs and diagnostic tests associated with this visit with specific comments listed under the assessment and plan below    Assessment and Plan    1. Acute otitis externa of right ear, unspecified type    - acetic acid-hydrocortisone (VOSOL-HC) 1-2 % Solution; Insert saturated wick of cotton; keep moist for 24 hours by adding 3 drops every 4 to 6 hours. Remove wick after 24 hours and instill 5 drops 3 times daily for 7 days  Dispense: 10 mL; Refill: 1    2. Acute otitis media, unspecified otitis media type  Allergy to penicillin IV - not oral   - cefpodoxime (VANTIN) 200 MG Tab; Take 1 Tablet by mouth 2 times a day.  Dispense: 10 Tablet; Refill: 0    3. Skin tag  With thick root - unable to do cryo - will need to be incised  - Referral to Dermatology    4. Skin exam for malignant neoplasm    - Referral to Dermatology     5. Dermatitis  Encouraged keeping area beneath skin fold dry and clean - use of barrier cream /powder     Followup: Return in about 2 weeks (around 11/9/2023).        Signed by: Main Mcgee M.D.

## 2023-10-27 ENCOUNTER — PHARMACY VISIT (OUTPATIENT)
Dept: PHARMACY | Facility: MEDICAL CENTER | Age: 43
End: 2023-10-27
Payer: COMMERCIAL

## 2023-10-27 PROCEDURE — RXMED WILLOW AMBULATORY MEDICATION CHARGE: Performed by: PHYSICIAN ASSISTANT

## 2023-10-27 PROCEDURE — RXMED WILLOW AMBULATORY MEDICATION CHARGE: Performed by: REGISTERED NURSE

## 2023-10-29 PROBLEM — L30.9 DERMATITIS: Status: ACTIVE | Noted: 2023-10-29

## 2023-10-29 PROCEDURE — RXMED WILLOW AMBULATORY MEDICATION CHARGE: Performed by: PHYSICIAN ASSISTANT

## 2023-10-30 ENCOUNTER — TELEPHONE (OUTPATIENT)
Dept: INTERNAL MEDICINE | Facility: OTHER | Age: 43
End: 2023-10-30
Payer: MEDICAID

## 2023-10-30 DIAGNOSIS — H60.501 ACUTE OTITIS EXTERNA OF RIGHT EAR, UNSPECIFIED TYPE: ICD-10-CM

## 2023-10-30 DIAGNOSIS — H66.90 ACUTE OTITIS MEDIA, UNSPECIFIED OTITIS MEDIA TYPE: ICD-10-CM

## 2023-10-30 PROCEDURE — RXMED WILLOW AMBULATORY MEDICATION CHARGE: Performed by: INTERNAL MEDICINE

## 2023-10-30 RX ORDER — CEFDINIR 300 MG/1
300 CAPSULE ORAL 2 TIMES DAILY
Qty: 10 CAPSULE | Refills: 0 | Status: SHIPPED | OUTPATIENT
Start: 2023-10-30 | End: 2023-11-09 | Stop reason: SINTOL

## 2023-10-30 NOTE — TELEPHONE ENCOUNTER
Received msg from Nick Varela on teams on Saturday : 3: 46 pm on teams Sating that cefpodoxime is not covered and requires PA -insurane prefers cefdinir. Order placed for cefdinir.   Cefpodoxime was placed in the first placed due to penicillin allergy reported

## 2023-10-31 ENCOUNTER — PHARMACY VISIT (OUTPATIENT)
Dept: PHARMACY | Facility: MEDICAL CENTER | Age: 43
End: 2023-10-31
Payer: COMMERCIAL

## 2023-11-06 ENCOUNTER — NON-PROVIDER VISIT (OUTPATIENT)
Dept: INTERNAL MEDICINE | Facility: OTHER | Age: 43
End: 2023-11-06
Payer: MEDICAID

## 2023-11-06 VITALS — WEIGHT: 199 LBS | BODY MASS INDEX: 35.25 KG/M2

## 2023-11-06 DIAGNOSIS — G57.93 NEUROPATHIC PAIN OF BOTH FEET: ICD-10-CM

## 2023-11-06 DIAGNOSIS — E11.65 TYPE 2 DIABETES MELLITUS WITH HYPERGLYCEMIA, WITH LONG-TERM CURRENT USE OF INSULIN (HCC): ICD-10-CM

## 2023-11-06 DIAGNOSIS — Z79.4 TYPE 2 DIABETES MELLITUS WITH HYPERGLYCEMIA, WITH LONG-TERM CURRENT USE OF INSULIN (HCC): ICD-10-CM

## 2023-11-06 DIAGNOSIS — E78.5 DYSLIPIDEMIA: ICD-10-CM

## 2023-11-06 DIAGNOSIS — F41.9 ANXIETY: ICD-10-CM

## 2023-11-06 DIAGNOSIS — I10 HYPERTENSION, UNSPECIFIED TYPE: ICD-10-CM

## 2023-11-06 PROCEDURE — 97803 MED NUTRITION INDIV SUBSEQ: CPT | Performed by: DIETITIAN, REGISTERED

## 2023-11-06 ASSESSMENT — FIBROSIS 4 INDEX: FIB4 SCORE: 0.66

## 2023-11-06 NOTE — PROGRESS NOTES
"Liana Obrien is a 43 y.o. year old, female returns for diabetes self management follow up.    Positive changes since last visit:    Paperwork filed by deadline for divorce     Liana reports she was taking her DM meds up until 9/28- except Trulicity once a week; stopped insulins and other oral meds    Per Endocrinolgoy- Dana Yu reported:  A1c 10/16/23 download: 6.8% (Zack Sensor average)  8/14/23: 8.3%  6/19/23: 11%  5/9/23: 13.6%    Meal/Eating/Environment Pattern:    Missed a month of inpatient behavioral program- all of October, d/t  filing for divorce, aunt needing surgery, life stressors    Dentures and dental procedures postponed d/t sickness, started with an ear infection    Liana reports high stressors with \"roller coaster\" BG  Has not been wearing Zack sensor since it fell off  Current ear infection, neuropathy in feet worsening- received steroid injection  Overwhelmed and not able to take medications             Recent n/v/d after medication in the morning or after eating; unable to eat much throughout the day x one week- no insulins given to herself    11/3 Freestyle CGM  1930 280 mg/dl   1730 237 mg/dl  0857 198 mg/dl  0110 267 mg/dl    TIR over 30 days 34%, over 90 days 54%; Above Target 66%  Below Target: one occurrence x 90 days    Comments:    Liana cannot remember when she last saw Dana Yu, has avoided d/t not wanting more medication    Reports +yeast infection comes and go, has not sought treatment  Liana states she takes 14 medications per day and is not feeling any better, overwhelmed with medication Rx    Contacted Dana Yu today to coordinate medical care and medications:  - Liana to make a follow up appt with Dana today, last appt was last month  - Start back on Tresiba at 30 u, SS Humalog before each meal or as indicated with BG  - Continue Trulicity once a week, 4.5 MG  - ok to stop pioglitizone, Steglatro until she sees Dana Yu    Log her " medication and BG on forms provided today- to give Liana more organization and feel less overwhelmed.    RTC x next available    Time Spent:  60 minutes

## 2023-11-07 NOTE — PROGRESS NOTES
Chief Complaint   Patient presents with    Cold Symptoms     Cold symptoms for 4 days.    Follow-Up     Ear infection.  Has not been able to take medication due to having diarrhea and vomiting     Palpitations    Yeast Infection       HPI: Liana Obrien is a 43 y.o. female with past medical history as below     who presented to the clinic for the following.    *URI   -Since Saturday   -Cold congestion, chills, no fevers, no bodyaches, has also been having nausea, vomiting and diarrhea for about one and half weeks.   Diarrhea is now better but she was having diarrhea as soon as she ate something and was very watery- now better but patient says that it is variable and has not completely gone.   -Patient was taking antibiotics for er infection, however was throwing up the antibiotic, her left ear s better however the right ear still feels congested and hurts.   On examination there is no rupture of TM on right, mild erythema on externa, could not appreciate bulging of TM, patient does have cervical LN on right side, right lower molars tooth cavity with pain    *Polypharmacy   Patient believes that the nausea, vomiting and diarrhea is from her medications.   As per patient, her endocrinologist has stopped all the oral meds for diabetes that she was on and is currently on on insulin degludec 30 units now , humalog based on sliding scale and trulicity   Pioglitazone, steglatro and metformin were stopped .  Blood sugars have been ranging from 150-400, patient states that she has also been under a lot of stress with her uncle's  recently , aunts surgery etc   Patient wants to know if she should continue her lisinopril and statin   Patient is on as needed hydroxyzine which she is taking once or upto twice as needed   Taking trazodone 100 mg and sertraline through psychiatry   -Patient is also on gabapentin, she got steroid injection for foot 1 month ago and wants to know if she should continue that .    Patient also reports that she had to stop Vitamin C because it interacted with one of the medications that she was on and and she feels that she is getting more sick because she has not been able to take the vitamin C       Patient additionally reports that she has been experiencing more palpitations associated with some lightheadedness       *Blurry vision   -described below   -Patient was able to finally reach the ophthalmologist , however was told that her insurance would not be taken             Other problems not discussed on this visit     *Skin tag   -on lower abdomen - grown - wants to be rmoved,  -Also on exam noted to have erythema on lower abdominal region below skin fold- no symptoms     *Shortness of breath.  -Patient reports that she has started increasing her level of activity around the house recently and has noticed that she gets very winded just walking around the house.  Denies any cough or wheezing  -Denies any chest pain.  Does report palpitations sometimes when she gets anxious which goes away with some deep breaths.  Denies any lightheadedness, dizziness, pedal edema, orthopnea or PND  -Does have history of sleep apnea, was on CPAP in the past patient does not recall why as she is not on it anymore.  -On examination patient does have a grade 3 systolic murmur.  Patient might have been told that she has a murmur in the past.  -Denies any fevers, chills.  -As per labs in the past patient has had history of anemia, she has regular menstrual cycles sometimes, most recently her menstrual cycle lasted for 10 days.  Has history of PCOS as per chart, ovarian cyst  -Has history of DVT as per chart.      *Anxiety   -Patient is following up with psychiatry, mental health counselor, [facility].  -Was receiving inpatient psychiatry treatment, currently is on Zoloft 100 mg, trazodone 100 mg  at nighttime, hydroxyzine as needed.   -Patient reports that she is doing overall well, trazodone helps  most of the nights.  Sleep however she does have some nights when she is unable to fall asleep.    *Blurry vision  -Patient reports that about 6 years ago she had a significant blurry vision of both eyes which lasted for several months, when she was almost declared blind legally, was evaluated by ophthalmologist, unsure what the diagnosis was.  -Since then however her vision has significantly improved, currently has intermittent blurring of vision on both eyes, which when comes on lasts for a day to a month continuously.  -Denies any headaches, flashes floaters blind spots, eye pain, redness or aura  -We had placed ophthalmology referral on the last visit however patient was not able to attend the appointment due to being inpatient for mental health related issues.  -Patient is requesting for new referral.  -Patient denies having any dry eyes at this time    *Breast Skin lesion   Has had history of bug bites.   -On examination now only has superficial scar, no deeper concerning tissue on palpation.  -Patient did have another spot with similar complaints, which again is currently a scar.  -Had ordered ultrasound breast at the time which has not been done.  -Given resolution of lump will go ahead and proceed to routine mammogram screening.    *Status post surgery for perirectal abscess/fistula   -Healing well as per patient    *Obesity  -Patient is working with nutrition specialist as well as trying to get more exercise as per recommendations of nutrition specialist.        Patient Active Problem List    Diagnosis Date Noted    Anxiety  On Zoloft 100 mg, trazodone 100 mg daily at night 06/16/2023    Neuropathic pain of both feet  On gabapentin  06/16/2023    Fibroids 11/15/2022     10/12/2022    Anemia  Microcytosis without anemia as per last labs done in may 2023  09/23/2022    Dyslipidemia 09/22/2022    Obesity (BMI 30-39.9) 05/25/2022    Gastro-esophageal reflux disease without esophagitis 04/14/2021    Major  depressive disorder 09/14/2020    Polycystic ovary syndrome  2.2 cm right ovarian cyst seen on recent CT in May 2023  Unable to undergo TVUS due to perirectal abscess  Unable to go on estrogen containing OCP due to history of VTE  Will follow up with gyn following resolution of perirectal abscess 12/11/2019    History of deep venous thrombosis 11/19/2019    Hypothyroidism  Levothyroxine 150 mcg 11/19/2019    Hypertension  Mildly elevated on lisinopril 10 - not checking blood pressures at home regularly as the cuff not working  10/28/2017    Obstructive sleep apnea, adult  Trying to set up with cpap 07/31/2015    Vitamin D deficiency 03/12/2015    Type 2 diabetes mellitus with hyperglycemia, with long-term current use of insulin ([facility])  Following up with endocrinology -  A1c of 11 in June 2023   metformin 1000mg BID,  Tresiba 26U daily, Tulicity 0.5 mg weekly premeal humalog TID  - lipitor 20 daily  - lisinopril 10 daily  - gabapentin 100 TID for neuropathy 03/12/2015       Current Outpatient Medications   Medication Sig Dispense Refill    Nirmatrelvir&Ritonavir 300/100 20 x 150 MG & 10 x 100MG Tablet Therapy Pack Take 300 mg nirmatrelvir (two 150 mg tablets) with 100 mg ritonavir (one 100 mg tablet) by mouth, with all three tablets taken together twice daily for 5 days. 30 Each 0    insulin lispro (HUMALOG KWIKPEN) 100 UNIT/ML SC SOPN injection PEN Inject 15 unit subcutaneously three times a day 15 mL 1    Dulaglutide (TRULICITY) 4.5 MG/0.5ML Solution Pen-injector Inject 4.5 mg subcutaneously once a week 2 mL 3    Insulin Degludec (TRESIBA FLEXTOUCH) 200 UNIT/ML Solution Pen-injector Inject 26 unit subcutaneously once a day 9 mL 3    sertraline (ZOLOFT) 100 MG Tab Take 1 tablet by mouth once a day 30 Tablet 2    traZODone (DESYREL) 100 MG Tab Take 2 tablets by mouth at bedtime as needed 60 Tablet 2    cefdinir (OMNICEF) 300 MG Cap Take 1 Capsule by mouth 2 times a day. 10 Capsule 0    acetic acid-hydrocortisone  (VOSOL-HC) 1-2 % Solution Insert saturated wick of cotton; keep moist for 24 hours by adding 3 drops every 4 to 6 hours. Remove wick after 24 hours and instill 5 drops 3 times daily for 7 days 10 mL 1    pioglitazone (ACTOS) 15 MG Tab Take 1 tablet by mouth once a day 30 Tablet 2    metFORMIN (GLUCOPHAGE) 500 MG Tab TAKE 2 TABLETS BY MOUTH TWICE A  Tablet 3    Continuous Blood Gluc Sensor (FREESTYLE ALEKSANDAR 2 SENSOR) Misc Use 1 kit as directed every two weeks change every 14 days 7 Each 3    atorvastatin (LIPITOR) 20 MG Tab Take 1 tablet by mouth once a day 30 Tablet 3    lisinopril (PRINIVIL) 10 MG Tab Take 1 Tablet by mouth every day. 30 Tablet 0    levothyroxine (SYNTHROID) 150 MCG Tab Take 1 Tablet by mouth every morning on an empty stomach. 30 Tablet 0    hydrOXYzine pamoate (VISTARIL) 50 MG Cap Take 1 capsule by mouth every (6-8) hours as needed for anxiety 90 Capsule 2    pioglitazone (ACTOS) 15 MG Tab Take 1 tablet by mouth once a day (Patient not taking: Reported on 10/26/2023) 30 Tablet 2    Ertugliflozin L-PyroglutamicAc (STEGLATRO) 15 MG Tab Take 1 tablet by mouth once a day 90 Tablet 2    ergocalciferol (DRISDOL) 74351 UNIT capsule Take 1 Capsule by mouth every 7 days. 12 Capsule 0    hydrOXYzine pamoate (VISTARIL) 50 MG Cap Take 1 oral capsule as needed every (6-8) hours for anxiety 90 Capsule 2    sertraline (ZOLOFT) 100 MG Tab Take 1 oral tablet once a day 30 Tablet 2    traZODone (DESYREL) 50 MG Tab Take 1 oral tablet at bedtime as needed ( may take 2 as needed) 60 Tablet 2    hydrOXYzine pamoate (VISTARIL) 50 MG Cap Take 1 Capsule by mouth every 4 hours. (Patient not taking: Reported on 10/26/2023) 180 Capsule 0    Alcohol Swabs Pads use one alcohol swab three times a day as needed. 300 Each 6    gabapentin (NEURONTIN) 100 MG Cap Take 2 Capsules by mouth 3 times a day. 270 Capsule 2    Continuous Blood Gluc Sensor (FREESTYLE ALEKSANDAR 2 SENSOR) Misc Use as directed every two weeks, change every 14  "days 7 Each 3    Insulin Pen Needle 32 G x 4 mm Use 1 needle subcutaneously 4 times daily 360 Each 3    Lancets (ONETOUCH DELICA PLUS [id number]) Misc TEST BLOOD SUGAR ONCE DAILY AND WITH SYMPTOMS OF HYPOGLYCEMIA E11.65 100 Each 6    glucose blood (ONETOUCH ULTRA) strip TEST BLOOD SUGAR THREE TIMES DAILY AND WITH SYMPTOMS OF LOW BLOOD SUGAR E11.65 100 Strip 1    HUMALOG KWIKPEN 100 UNIT/ML Solution Pen-injector injection PEN Inject 1-10 Units under the skin 3 times a day. Per sliding scale dosing 15 mL 5    Dulaglutide (TRULICITY) 4.5 MG/0.5ML Solution Pen-injector INJECT 1 PEN INJECTOR SUBCUTANEOUSLY ONCE A WEEK (Patient not taking: Reported on 10/26/2023) 2 mL 3    Blood Glucose Meter Kit Test blood sugar as recommended by provider. One Touch Ultra 2 blood glucose monitoring kit. 1 Kit 0    Blood Glucose Test Strips Use one One Touch Ultra 2 strip to test blood sugar once daily and with symptoms of low blood sugar 100 Strip 0     No current facility-administered medications for this visit.       ROS: As per HPI. Additional pertinent systems as noted below.  Constitutional:  Negative for fever, chills   HEENT : As in hpi  Cardiovascular:  Negative for chest pain, positive for shortness of breath,positive for palpitations and dizziness with it - more frequent   Respiratory:  Negative for cough, sputum production, positive for shortness of breath       /82 (BP Location: Left arm, Patient Position: Sitting, BP Cuff Size: Adult)   Pulse 86   Temp 36.3 °C (97.3 °F) (Temporal)   Ht 1.6 m (5' 3\")   Wt 90.6 kg (199 lb 12.8 oz)   SpO2 97%   BMI 35.39 kg/m²     Physical Exam   Constitutional:  Well developed, well nourished. Not in acute distress   HEENT :On examination there is no rupture of TM on right, mild erythema on externa, could not appreciate bulging of TM, patient does have cervical LN on right side, right lower molars tooth cavity with pain  CVS : Regular rate and rhythm, systolic murmur appreciated  "   Respi- diffusely diminished breath sounds     Note: I have reviewed all pertinent labs and diagnostic tests associated with this visit with specific comments listed under the assessment and plan below    Assessment and Plan    1. COVID  -Patient's test for POC RSV, flu, COVID came back positive for COVID after the visit.  -Communicated results with patient, discussed regarding Paxil which patient is interested in taking.  Patient is on day 4 of symptoms that are pertinent to upper respiratory system.  -Explained interaction of Paxlovid with atorvastatin and recommended to hold the medication, also instructed patient to decrease dose of trazodone due to interaction and to seek medical attention immediately and stop Paxlovid if there is any sedation or any heart symptoms.  -We will recheck thyroid function test after patient's recovery to ensure that those have not been affected by Paxlovid.  -Nausea vomiting diarrhea may or may not be associated with a cold.  Especially given acute onset symptoms   -Also recommended conservative management for symptoms, isolation, wearing mask.    - Nirmatrelvir&Ritonavir 300/100 20 x 150 MG & 10 x 100MG Tablet Therapy Pack; Take 300 mg nirmatrelvir (two 150 mg tablets) with 100 mg ritonavir (one 100 mg tablet) by mouth, with all three tablets taken together twice daily for 5 days.  Dispense: 30 Each; Refill: 0    2. Upper respiratory tract infection, unspecified type  -Point-of-care testing done came back positive for COVID, management as above.  -Hold off on standing antibiotics for right ear given possibility of worsening diarrhea/C. difficile.    - POCT CEPHEID COV-2, FLU A/B, RSV - PCR    3. Nausea and vomiting, unspecified vomiting type  -May or may not be associated with COVID given acute onset, other differentials include H. pylori infection, medication side effects which patient seems to think is the cause.  -Management of COVID is about, if not resolving then will  consider H. pylori testing.  -Referral to pharmacotherapy for polypharmacy,   -Unfortunately we cannot remove her diabetes medication which includes long-acting insulin, short acting insulin and GLP-1 agonist.  Endocrinology has stopped all oral medications including metformin pioglitazone and SGLT2 at this time.  She has follow-up with them.  -Patient is on 3 psychiatry medications sertraline, hydroxyzine as needed which is almost daily up to 2 tablets, and trazodone 100 mg.  Encourage patient to address this with a psychiatrist.  -Patient is also on gabapentin for neuropathy, status post injection on the foot with some relief, patient will attempt to wean off gabapentin if pain is getting exacerbated then she will go back on the same dose.  -Lisinopril is currently not causing any low blood pressures as per blood pressure in clinic today.  Patient given instructions to keep an eye on blood pressure given that she is having nausea vomiting diarrhea with the risk of hypotension and RICHARD when she can hold it however she may need it for control of blood pressure, to be reassessed after acute illness phase.  -Recommended patient to take atorvastatin at least 3 times a week or every other day.  -We cannot stop thyroid medication hence continue this.  -Okay to hold vitamin D until we get labs rechecked    -- POCT CEPHEID COV-2, FLU A/B, RSV - PCR    4. Diarrhea, unspecified type  -Present with any food, given recent antibiotic use will pursue C. difficile testing.  -May also be associated with COVID, management as above.  -If persisting will consider other stool studies, colonoscopy if needed.  -Patient has been on Trulicity for a while with no recent dose changes., less likely the cause for diarrhea.    - C Diff by PCR rflx Toxin; Future  - POCT CEPHEID COV-2, FLU A/B, RSV - PCR    5. Palpitations  -Patient reports that she is having more frequent palpitations associated lightheadedness.  Could be associated with high  sugars, hypovolemia, encourage hydration, management of causes for hypovolemia as above.  -Recommend endocrinology opinion for better management of sugars.  -Heart rate is regular at this time, patient does have a systolic murmur that is grade 3, echocardiogram pending.  Patient still could be having PSVT runs which is not caught on today's clinic visit.  -We will pursue Holter monitor given increased frequency of palpitations however if it resolves after acute phase of illness then we will hold off on the Holter monitor  -Does have a history of DVT however not persistently tachycardic at this time not on any beta-blockers, no asymmetrical swelling of the legs or hypoxia noted hence less likely from any thromboembolic event.  However continue to monitor  -Patient does also suffer from anxiety, is under a lot of stress which could be resulting in the palpitations.  -We will also check for thyroid function studies which were ordered on the last visit, patient will try to lower the labs to soon as possible.  Defer until patient has passed the episode of current illness with COVID    6. Type 2 diabetes mellitus with hyperglycemia, with long-term current use of insulin ([facility])  -We will check C-peptide to see if patient is responding to Trulicity and will consider discontinuing it if it is not helping.  - HEMOGLOBIN A1C; Future  - C-PEPTIDE; Future    7. Polypharmacy  -Patient feels that her nausea vomiting and diarrhea is secondary to all the medications she is on.  --Referral to pharmacotherapy for polypharmacy,   -Unfortunately we cannot remove her diabetes medication which includes long-acting insulin, short acting insulin and GLP-1 agonist.  Endocrinology has stopped all oral medications including metformin pioglitazone and SGLT2 at this time.  She has follow-up with them.  -Patient is on 3 psychiatry medications sertraline, hydroxyzine as needed which is almost daily up to 2 tablets, and trazodone 100 mg.  Encourage  patient to address this with a psychiatrist.  -Patient is also on gabapentin for neuropathy, status post injection on the foot with some relief, patient will attempt to wean off gabapentin if pain is getting exacerbated then she will go back on the same dose.  -Lisinopril is currently not causing any low blood pressures as per blood pressure in clinic today.  Patient given instructions to keep an eye on blood pressure given that she is having nausea vomiting diarrhea with the risk of hypotension and RICHARD when she can hold it however she may need it for control of blood pressure, to be reassessed after acute illness phase.  -Recommended patient to take atorvastatin at least 3 times a week or every other day.  -We cannot stop thyroid medication hence continue this.  -Okay to hold vitamin D until we get labs rechecked    - Referral to Pharmacotherapy Service    8. Medication side effect    - Referral to Pharmacotherapy Service    9. Blurry vision  -Patient's insurance was not covered by the place she was was referred to, we will place a new referral.  In the meantime encourage patient to see an optometrist    Followup: Return in about 1 week (around 11/15/2023).        Signed by: Main Mcgee M.D.

## 2023-11-08 ENCOUNTER — OFFICE VISIT (OUTPATIENT)
Dept: INTERNAL MEDICINE | Facility: OTHER | Age: 43
End: 2023-11-08
Payer: MEDICAID

## 2023-11-08 VITALS
DIASTOLIC BLOOD PRESSURE: 82 MMHG | OXYGEN SATURATION: 97 % | HEIGHT: 63 IN | WEIGHT: 199.8 LBS | BODY MASS INDEX: 35.4 KG/M2 | SYSTOLIC BLOOD PRESSURE: 117 MMHG | HEART RATE: 86 BPM | TEMPERATURE: 97.3 F

## 2023-11-08 DIAGNOSIS — U07.1 COVID: ICD-10-CM

## 2023-11-08 DIAGNOSIS — E11.65 TYPE 2 DIABETES MELLITUS WITH HYPERGLYCEMIA, WITH LONG-TERM CURRENT USE OF INSULIN (HCC): ICD-10-CM

## 2023-11-08 DIAGNOSIS — R19.7 DIARRHEA, UNSPECIFIED TYPE: ICD-10-CM

## 2023-11-08 DIAGNOSIS — Z79.4 TYPE 2 DIABETES MELLITUS WITH HYPERGLYCEMIA, WITH LONG-TERM CURRENT USE OF INSULIN (HCC): ICD-10-CM

## 2023-11-08 DIAGNOSIS — R11.2 NAUSEA AND VOMITING, UNSPECIFIED VOMITING TYPE: ICD-10-CM

## 2023-11-08 DIAGNOSIS — J06.9 UPPER RESPIRATORY TRACT INFECTION, UNSPECIFIED TYPE: ICD-10-CM

## 2023-11-08 DIAGNOSIS — Z79.899 POLYPHARMACY: ICD-10-CM

## 2023-11-08 DIAGNOSIS — H53.8 BLURRY VISION: ICD-10-CM

## 2023-11-08 DIAGNOSIS — T88.7XXA MEDICATION SIDE EFFECT: ICD-10-CM

## 2023-11-08 DIAGNOSIS — R00.2 PALPITATIONS: ICD-10-CM

## 2023-11-08 LAB
FLUAV RNA SPEC QL NAA+PROBE: NEGATIVE
FLUBV RNA SPEC QL NAA+PROBE: NEGATIVE
RSV RNA SPEC QL NAA+PROBE: NEGATIVE
SARS-COV-2 RNA RESP QL NAA+PROBE: POSITIVE

## 2023-11-08 PROCEDURE — 3074F SYST BP LT 130 MM HG: CPT | Performed by: INTERNAL MEDICINE

## 2023-11-08 PROCEDURE — 3079F DIAST BP 80-89 MM HG: CPT | Performed by: INTERNAL MEDICINE

## 2023-11-08 PROCEDURE — 99214 OFFICE O/P EST MOD 30 MIN: CPT | Performed by: INTERNAL MEDICINE

## 2023-11-08 PROCEDURE — 87637 SARSCOV2&INF A&B&RSV AMP PRB: CPT | Mod: QW | Performed by: INTERNAL MEDICINE

## 2023-11-08 ASSESSMENT — FIBROSIS 4 INDEX: FIB4 SCORE: 0.66

## 2023-11-09 ENCOUNTER — TELEPHONE (OUTPATIENT)
Dept: VASCULAR LAB | Facility: MEDICAL CENTER | Age: 43
End: 2023-11-09
Payer: MEDICAID

## 2023-11-09 ENCOUNTER — PHARMACY VISIT (OUTPATIENT)
Dept: PHARMACY | Facility: MEDICAL CENTER | Age: 43
End: 2023-11-09
Payer: COMMERCIAL

## 2023-11-09 PROBLEM — H53.8 BLURRY VISION: Status: ACTIVE | Noted: 2023-11-09

## 2023-11-09 PROBLEM — R00.2 PALPITATIONS: Status: ACTIVE | Noted: 2023-11-09

## 2023-11-09 PROCEDURE — RXMED WILLOW AMBULATORY MEDICATION CHARGE: Performed by: INTERNAL MEDICINE

## 2023-11-09 NOTE — TELEPHONE ENCOUNTER
[facility]     Received polypharmacy/medication management referral from Dr. Mcgee on 11/8/23.     Called and spoke to patient. Initial visit scheduled on 11/21/23 at 4pm.     Insurance: Medicaid  PCP: [facility]  Locations to be seen: [place]      If no response by 12/8/23 (1 month from referral date) OR 2 no shows/cancellations, will remove from referral list and send FYI to referring provider    [facility] at [phone], fax [phone]    Nick Mijares, PharmD, BCACP

## 2023-11-13 LAB — HBA1C MFR BLD: 8.5 % (ref ?–5.8)

## 2023-11-15 ENCOUNTER — PHARMACY VISIT (OUTPATIENT)
Dept: PHARMACY | Facility: MEDICAL CENTER | Age: 43
End: 2023-11-15
Payer: COMMERCIAL

## 2023-11-15 PROCEDURE — RXMED WILLOW AMBULATORY MEDICATION CHARGE: Performed by: HOSPITALIST

## 2023-11-15 PROCEDURE — RXMED WILLOW AMBULATORY MEDICATION CHARGE: Performed by: PHYSICIAN ASSISTANT

## 2023-11-15 PROCEDURE — RXMED WILLOW AMBULATORY MEDICATION CHARGE

## 2023-11-15 RX ORDER — TRAZODONE HYDROCHLORIDE 100 MG/1
TABLET ORAL
Qty: 60 TABLET | Refills: 3 | Status: SHIPPED | OUTPATIENT
Start: 2023-11-15 | End: 2024-03-13

## 2023-11-15 RX ORDER — SERTRALINE HYDROCHLORIDE 100 MG/1
TABLET, FILM COATED ORAL
Qty: 30 TABLET | Refills: 3 | Status: SHIPPED | OUTPATIENT
Start: 2023-11-15 | End: 2024-02-15

## 2023-11-15 RX ORDER — HYDROXYZINE PAMOATE 50 MG/1
CAPSULE ORAL
Qty: 90 CAPSULE | Refills: 3 | Status: SHIPPED | OUTPATIENT
Start: 2023-11-15 | End: 2024-03-13

## 2023-11-15 RX ORDER — PEN NEEDLE, DIABETIC 32GX 5/32"
NEEDLE, DISPOSABLE MISCELLANEOUS
Qty: 360 EACH | Refills: 3 | Status: CANCELLED | OUTPATIENT
Start: 2023-11-15

## 2023-11-16 PROCEDURE — RXMED WILLOW AMBULATORY MEDICATION CHARGE

## 2023-11-17 ENCOUNTER — OFFICE VISIT (OUTPATIENT)
Dept: INTERNAL MEDICINE | Facility: OTHER | Age: 43
End: 2023-11-17
Payer: MEDICAID

## 2023-11-17 VITALS
HEIGHT: 63 IN | HEART RATE: 82 BPM | DIASTOLIC BLOOD PRESSURE: 82 MMHG | WEIGHT: 204 LBS | SYSTOLIC BLOOD PRESSURE: 124 MMHG | OXYGEN SATURATION: 97 % | TEMPERATURE: 97.6 F | BODY MASS INDEX: 36.14 KG/M2

## 2023-11-17 DIAGNOSIS — R71.8 MICROCYTOSIS: ICD-10-CM

## 2023-11-17 DIAGNOSIS — U07.1 COVID: ICD-10-CM

## 2023-11-17 DIAGNOSIS — Z79.4 TYPE 2 DIABETES MELLITUS WITH HYPERGLYCEMIA, WITH LONG-TERM CURRENT USE OF INSULIN (HCC): ICD-10-CM

## 2023-11-17 DIAGNOSIS — E11.65 TYPE 2 DIABETES MELLITUS WITH HYPERGLYCEMIA, WITH LONG-TERM CURRENT USE OF INSULIN (HCC): ICD-10-CM

## 2023-11-17 DIAGNOSIS — Z79.899 POLYPHARMACY: ICD-10-CM

## 2023-11-17 DIAGNOSIS — E03.9 HYPOTHYROIDISM, UNSPECIFIED TYPE: ICD-10-CM

## 2023-11-17 DIAGNOSIS — R00.0 TACHYCARDIA: ICD-10-CM

## 2023-11-17 PROCEDURE — 3074F SYST BP LT 130 MM HG: CPT | Performed by: INTERNAL MEDICINE

## 2023-11-17 PROCEDURE — 99214 OFFICE O/P EST MOD 30 MIN: CPT | Performed by: INTERNAL MEDICINE

## 2023-11-17 PROCEDURE — 3079F DIAST BP 80-89 MM HG: CPT | Performed by: INTERNAL MEDICINE

## 2023-11-17 ASSESSMENT — FIBROSIS 4 INDEX: FIB4 SCORE: 0.66

## 2023-11-17 NOTE — PROGRESS NOTES
Chief Complaint   Patient presents with    Follow-Up       HPI: Liana Obrien is a 43 y.o. female with past medical history as below who presented to the clinic for the following.    Post COVID   -Patient is feeling less congested, is having slight sore throat, shortness of breath is better. Palpitations are much better  -Patient's nausea vomiting has resolved, diarrhea is completely resolved.    -Is having regular bowel movements twice a day      *Polypharmacy   Patient believed that the nausea, vomiting and diarrhea is from her medications.   As per patient, her endocrinologist has stopped all the oral meds for diabetes that she was on and is currently on on insulin degludec 30 units now , humalog based on sliding scale and trulicity   Pioglitazone, steglatro and metformin were stopped . However patient continued to have nausea, vomiting diarrhea which has since spontaneously resolved - hence likely associated with COVID   Recommended to continue lisinopril daily and atorvastatin 3 times a week   Patient is on as needed hydroxyzine which she is taking once or upto twice as needed   Taking trazodone 100 mg and sertraline through psychiatry - will address these with psychiatry   -Patient is also on gabapentin, she got steroid injection for foot 1 month will try to wean off of that   Patient also reports that she had to stop Vitamin C because it interacted with one of the medications that she was on and and she feels that she is getting more sick because she has not been able to take the vitamin C       *Shortness of breath.  -Patient reports that she has started increasing her level of activity around the house recently and has noticed that she gets very winded just walking around the house.  Denies any cough or wheezing.   This has however gotten better since last visit   -Denies any chest pain.  Did  report palpitations sometimes when she gets anxious which goes away with some deep breaths, which  is much better now, however she does note tachycardia when she is up and about on her watch however she does not feel any different    Denies any lightheadedness, dizziness, pedal edema, orthopnea or PND  -Does have history of sleep apnea, was on CPAP in the past patient does not recall why as she is not on it anymore.  -On examination patient patient did have a grade three systolic murmur that was present previously however on examination today - unable to appreciate that murmur   -Denies any fevers, chills.  -As per labs in the past patient has had history of anemia, she has regular menstrual cycles sometimes, most recently her menstrual cycle lasted for 10 days.  Has history of PCOS as per chart, ovarian cyst  -Last cbc from November 2023 shows microcytosis without anemia , MCV- 75, normal RDW and other cell lines. PBS not commented on   -Has history of DVT as per chart.    *Tachycardia   -Patient as above, reports that she has noted that her watch is showing heart rate above 110 when she is up and about   -Not present today at rest and patient is asymptomatic  -Pending echocardiogram for symptoms mentioned above and a systolic murmur that I appreciated on previous visit , which however is not present today   Last cbc showed         Type 2 dm   -reports that the sugars in morning are close to 150, starts creeping up after 12 pm until midnight, dinner is sometimes around 300 or more as per patient   Taking q4-6 hours short acting along with 3 units before each meal - explained that we need to increase dinner time   Patient has cgm that alarms for hypoglycemia   Latest A1C was 8.5        Other problems not discussed on this visit   *Blurry vision   -described below   -Patient was able to finally reach the ophthalmologist , however was told that her insurance would not be taken   *Skin tag   -on lower abdomen - grown - wants to be rmoved,  -Also on exam noted to have erythema on lower abdominal region below skin  fold- no symptoms     *Anxiety   -Patient is following up with psychiatry, mental health counselor, [facility].  -Was receiving inpatient psychiatry treatment, currently is on Zoloft 100 mg, trazodone 100 mg  at nighttime, hydroxyzine as needed.   -Patient reports that she is doing overall well, trazodone helps most of the nights.  Sleep however she does have some nights when she is unable to fall asleep.    *Blurry vision  -Patient reports that about 6 years ago she had a significant blurry vision of both eyes which lasted for several months, when she was almost declared blind legally, was evaluated by ophthalmologist, unsure what the diagnosis was.  -Since then however her vision has significantly improved, currently has intermittent blurring of vision on both eyes, which when comes on lasts for a day to a month continuously.  -Denies any headaches, flashes floaters blind spots, eye pain, redness or aura  -We had placed ophthalmology referral on the last visit however patient was not able to attend the appointment due to being inpatient for mental health related issues.  -Patient is requesting for new referral.  -Patient denies having any dry eyes at this time    *Breast Skin lesion   Has had history of bug bites.   -On examination now only has superficial scar, no deeper concerning tissue on palpation.  -Patient did have another spot with similar complaints, which again is currently a scar.  -Had ordered ultrasound breast at the time which has not been done.  -Given resolution of lump will go ahead and proceed to routine mammogram screening.    *Status post surgery for perirectal abscess/fistula   -Healing well as per patient    *Obesity  -Patient is working with nutrition specialist as well as trying to get more exercise as per recommendations of nutrition specialist.        Patient Active Problem List    Diagnosis Date Noted    Anxiety  On Zoloft 100 mg, trazodone 100 mg daily at night, hydroxyzine  06/16/2023    Neuropathic pain of both feet  On gabapentin  06/16/2023    Fibroids 11/15/2022     10/12/2022    Anemia  Microcytosis without anemia as per last labs done in november 2023 09/23/2022    Dyslipidemia 09/22/2022    Obesity (BMI 30-39.9) 05/25/2022    Gastro-esophageal reflux disease without esophagitis 04/14/2021    Major depressive disorder 09/14/2020    Polycystic ovary syndrome  2.2 cm right ovarian cyst seen on recent CT in May 2023  Unable to undergo TVUS due to perirectal abscess  Unable to go on estrogen containing OCP due to history of VTE  Will follow up with gyn following resolution of perirectal abscess 12/11/2019    History of deep venous thrombosis 11/19/2019    Hypothyroidism  Levothyroxine 150 mcg 11/19/2019    Hypertension  Mildly elevated on lisinopril 10 - not checking blood pressures at home regularly as the cuff not working  10/28/2017    Obstructive sleep apnea, adult  Trying to set up with cpap 07/31/2015    Vitamin D deficiency 03/12/2015    Type 2 diabetes mellitus with hyperglycemia, with long-term current use of insulin (HCC)  Following up with endocrinology -  A1c of 8.5 in June 2023     Tresiba 30U daily, Tulicity 0.5 mg weekly premeal humalog TID  - lipitor 20 daily  - lisinopril 10 daily  - gabapentin 100 TID for neuropathy 03/12/2015       Current Outpatient Medications   Medication Sig Dispense Refill    hydrOXYzine pamoate (VISTARIL) 50 MG Cap Take 1 oral capsule as needed every (6-8) hours for anxiety 90 Capsule 3    sertraline (ZOLOFT) 100 MG Tab Take 1 oral tablet once a day 30 Tablet 3    traZODone (DESYREL) 100 MG Tab Take 2 oral tablet at bedtime as needed 60 Tablet 3    acetic acid-hydrocortisone (VOSOL-HC) 1-2 % Solution Insert saturated wick of cotton; keep moist for 24 hours by adding 3 drops every 4 to 6 hours. Remove wick after 24 hours and instill 5 drops 3 times daily for 7 days 10 mL 1    insulin lispro (HUMALOG KWIKPEN) 100 UNIT/ML SC SOPN injection PEN  Inject 15 unit subcutaneously three times a day 15 mL 1    Continuous Blood Gluc Sensor (FREESTYLE ALEKSANDAR 2 SENSOR) [facility] Use 1 kit as directed every two weeks change every 14 days 7 Each 3    Dulaglutide (TRULICITY) 4.5 MG/0.5ML Solution Pen-injector Inject 4.5 mg subcutaneously once a week 2 mL 3    atorvastatin (LIPITOR) 20 MG Tab Take 1 tablet by mouth once a day 30 Tablet 3    Insulin Degludec (TRESIBA FLEXTOUCH) 200 UNIT/ML Solution Pen-injector Inject 26 unit subcutaneously once a day 9 mL 3    lisinopril (PRINIVIL) 10 MG Tab Take 1 Tablet by mouth every day. 30 Tablet 0    levothyroxine (SYNTHROID) 150 MCG Tab Take 1 Tablet by mouth every morning on an empty stomach. 30 Tablet 0    hydrOXYzine pamoate (VISTARIL) 50 MG Cap Take 1 capsule by mouth every (6-8) hours as needed for anxiety 90 Capsule 2    sertraline (ZOLOFT) 100 MG Tab Take 1 tablet by mouth once a day 30 Tablet 2    traZODone (DESYREL) 100 MG Tab Take 2 tablets by mouth at bedtime as needed 60 Tablet 2    hydrOXYzine pamoate (VISTARIL) 50 MG Cap Take 1 oral capsule as needed every (6-8) hours for anxiety 90 Capsule 2    hydrOXYzine pamoate (VISTARIL) 50 MG Cap Take 1 Capsule by mouth every 4 hours. 180 Capsule 0    Alcohol Swabs Pads use one alcohol swab three times a day as needed. 300 Each 6    gabapentin (NEURONTIN) 100 MG Cap Take 2 Capsules by mouth 3 times a day. 270 Capsule 2    Continuous Blood Gluc Sensor (FREESTYLE ALEKSANDAR 2 SENSOR) Misc Use as directed every two weeks, change every 14 days 7 Each 3    Insulin Pen Needle 32 G x 4 mm Use 1 needle subcutaneously 4 times daily 360 Each 3    Lancets (ONETOUCH DELICA PLUS AATMOB11E) Misc TEST BLOOD SUGAR ONCE DAILY AND WITH SYMPTOMS OF HYPOGLYCEMIA E11.65 100 Each 6    glucose blood (ONETOUCH ULTRA) strip TEST BLOOD SUGAR THREE TIMES DAILY AND WITH SYMPTOMS OF LOW BLOOD SUGAR E11.65 100 Strip 1    HUMALOG KWIKPEN 100 UNIT/ML Solution Pen-injector injection PEN Inject 1-10 Units under the skin 3  "times a day. Per sliding scale dosing 15 mL 5    Dulaglutide (TRULICITY) 4.5 MG/0.5ML Solution Pen-injector INJECT 1 PEN INJECTOR SUBCUTANEOUSLY ONCE A WEEK 2 mL 3    Blood Glucose Meter Kit Test blood sugar as recommended by provider. One Touch Ultra 2 blood glucose monitoring kit. 1 Kit 0    Blood Glucose Test Strips Use one One Touch Ultra 2 strip to test blood sugar once daily and with symptoms of low blood sugar 100 Strip 0    Nirmatrelvir&Ritonavir 300/100 20 x 150 MG & 10 x 100MG Tablet Therapy Pack Take 300 mg nirmatrelvir (two 150 mg tablets) with 100 mg ritonavir (one 100 mg tablet) by mouth, with all three tablets taken together twice daily for 5 days. 30 Each 0     No current facility-administered medications for this visit.       ROS: As per HPI. Additional pertinent systems as noted below.  Constitutional:  Negative for fever, chills   HEENT : As in hpi  Cardiovascular:  Negative for chest pain, positive as in hpi  Respiratory:  Negative for cough, sputum production, positive as in hpi      /82 (BP Location: Right arm, Patient Position: Sitting, BP Cuff Size: Adult)   Pulse 82   Temp 36.4 °C (97.6 °F) (Temporal)   Ht 1.6 m (5' 3\")   Wt 92.5 kg (204 lb)   SpO2 97%   BMI 36.14 kg/m²     Physical Exam   Constitutional:  . Not in acute distress   CVS : Regular rate and rhythm, systolic murmur not appreciated today     Respi- diffusely diminished breath sounds     Note: I have reviewed all pertinent labs and diagnostic tests associated with this visit with specific comments listed under the assessment and plan below    Assessment and Plan    1. COVID  -Patient's test for POC RSV, flu, COVID came back positive for COVID last visit - recovering well , tolerated paxlovid well atorvastatin held at the time   TSH repeated after that is 0.9, patient reported some tachycardia with exertion   Will recheck TSH in 6 weeks.   Pending echo  Nausea, vomiting diarrhea resolved - likely related to COVID     2. " Type 2 diabetes mellitus with hyperglycemia, with long-term current use of insulin ([facility])  Instructed to increase dinner short acting insulin to 5 units instead of 3 units   Patient is currently taking 3 units before each meal on top of a sliding scale q4-6 hours   Long acting and also trulicity   Has follow up with endocrinology , however had to reschedule last appointment hence making the above change until able to see them for elevated night time insulin at > 300  C peptide normal - continue trulicity     3. Hypothyroidism, unspecified type  Tsh low end of normal free t4 not reflexed , repeat in 6 weeks with free t4   Recent paxlovid use,   Also patient reports tachycardia with ambulation hence important to check that     4. Tachycardia  Only with exertion , had palpitations that was sporadic before which has resolved since.   Pending echo for reported shortness of breath on previous visits which may be associated with deconditioning made worse by covid, additionally had a systolic grade three murmur which is not appreciated today   Has history of DVT , however no persistent tachy and no asymmetric swelling of lower extremities or hypoxia   Will pursue EKG and holter if continues to persist   Encouraged hydration   Work up for low MCV with next set if labs - may have iron deficiency - vs - thalassemia?      5. Polypharmacy  -Patient feels that her nausea vomiting and diarrhea is secondary to all the medications she is on. However those have since resolved and was most likely associated with COVID infection . Oral DM medications were stopped prior to resolution of these symptoms hence less likely may have been causing it however may have been contributing   -Unfortunately we cannot remove her diabetes medication which includes long-acting insulin, short acting insulin and GLP-1 agonist.  Will also be following up with endocrinology   -Patient is on 3 psychiatry medications sertraline, hydroxyzine as needed which is  almost daily up to 2 tablets, and trazodone 100 mg.  Encourage patient to address this with a psychiatrist.  -Patient is also on gabapentin for neuropathy, status post injection on the foot with some relief, patient will attempt to wean off gabapentin if pain is getting exacerbated then she will go back on the same dose.  -Lisinopril is currently not causing any low blood pressures as per blood pressure in clinic today.    -Recommended patient to take atorvastatin at least 3 times a week or every other day. LDL : 110  -We cannot stop thyroid medication hence continue this.repeat TSH in 6 weeks for reasons mentioned above  -Okay to hold vitamin D until we get labs rechecked- will do that - will check in 6 weeks.      Followup: Return in about 3 weeks (around 12/8/2023).        Signed by: Main Mcgee M.D.

## 2023-11-18 PROBLEM — U07.1 COVID: Status: ACTIVE | Noted: 2023-11-18

## 2023-11-18 PROBLEM — R00.2 PALPITATIONS: Status: RESOLVED | Noted: 2023-11-09 | Resolved: 2023-11-18

## 2023-11-18 PROBLEM — Z79.899 POLYPHARMACY: Status: ACTIVE | Noted: 2023-11-18

## 2023-11-18 PROBLEM — R71.8 MICROCYTOSIS: Status: ACTIVE | Noted: 2023-11-18

## 2023-11-18 PROBLEM — R00.0 TACHYCARDIA: Status: ACTIVE | Noted: 2023-11-18

## 2023-11-21 ENCOUNTER — NON-PROVIDER VISIT (OUTPATIENT)
Dept: VASCULAR LAB | Facility: MEDICAL CENTER | Age: 43
End: 2023-11-21
Attending: INTERNAL MEDICINE
Payer: MEDICAID

## 2023-11-21 ENCOUNTER — PHARMACY VISIT (OUTPATIENT)
Dept: PHARMACY | Facility: MEDICAL CENTER | Age: 43
End: 2023-11-21
Payer: COMMERCIAL

## 2023-11-21 DIAGNOSIS — Z79.4 TYPE 2 DIABETES MELLITUS WITH HYPERGLYCEMIA, WITH LONG-TERM CURRENT USE OF INSULIN (HCC): Primary | ICD-10-CM

## 2023-11-21 DIAGNOSIS — E11.65 TYPE 2 DIABETES MELLITUS WITH HYPERGLYCEMIA, WITH LONG-TERM CURRENT USE OF INSULIN (HCC): Primary | ICD-10-CM

## 2023-11-21 PROCEDURE — 99213 OFFICE O/P EST LOW 20 MIN: CPT

## 2023-11-21 NOTE — Clinical Note
Angel Mcgee!  Would you please consider placing a referral specifically for diabetes management? Patient would like to continue to follow up with us and would like our assistance in optimizing her diabetes medications.  Thank you, Jesus Cazares, PharmD, BCACP

## 2023-11-21 NOTE — PROGRESS NOTES
Patient Consult Note    Subjective/Objective:    Liana Obrien is a 43 y.o. female who presents to clinic visit with [facility] for medication review and education. Patient was referred by PCP.    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    Asthma 03/24/2023    history of childhood asthma    Back pain 03/24/2023    lower back    Bipolar disorder ([facility]) 08/09/2018    Bowel habit changes 03/24/2023    has episodes of constipation and diarrhea    Breath shortness 03/24/2023    on exertion    Candida vaginitis 05/26/2022    Chickenpox     history of    Dental disorder 03/24/2023    loose teeth    Diabetes 1.5, managed as type 2 ([facility])     insulin dependent    DVT (deep venous thrombosis) ([facility])     Dysfunctional uterine bleeding     Heart burn     Herpes     High cholesterol 03/24/2023    on medication due diabetes    Hypertension 03/24/2023    on medication due to diabetes    Hypoglycemia associated with diabetes ([facility]) 10/27/2021    Hypothyroidism due to acquired atrophy of thyroid 01/22/2016    Indigestion     Palpitations 11/09/2023    Personal history of venous thrombosis and embolism     DVT in BLE years ago    Sepsis ([facility]) 09/22/2022    Sleep apnea     pt states that she was diagnosed in the past; will be retested; does not use cpap    Snoring 06/06/2023    sleep study done in the past; will be having another sleep study done in the near future    Uncontrolled type 2 diabetes mellitus without complication, without long-term current use of insulin 03/12/2015    Urinary incontinence 03/24/2023    stress incontinence    Uterine fibroids in pregnancy, postpartum condition        Medication Management:  Adherence: denies missed doses recently but does admit missing most of her medications from late 09/2023 to early 11/2023 due to life events.  Affordability: no issues except received a letter from insurance stating that Trulicity will no longer be covered. Patient will double  check the letter to see what will be preferred moving forward.    Current Outpatient Medications    Medication Instructions     acetic acid-hydrocortisone (VOSOL-HC) 1-2 % Solution Insert saturated wick of cotton; keep moist for 24 hours by adding 3 drops every 4 to 6 hours. Remove wick after 24 hours and instill 5 drops 3 times daily for 7 days Completed, removed from medication list    Alcohol Swabs Pads use one alcohol swab three times a day as needed. As directed    atorvastatin (LIPITOR) 20 MG Tab Take 1 tablet by mouth once a day Decreased by PCP to three times a week; updated medication list    Blood Glucose Meter Kit Test blood sugar as recommended by provider. One Touch Ultra 2 blood glucose monitoring kit. No longer using, removed from medication list    Blood Glucose Test Strips Use one One Touch Ultra 2 strip to test blood sugar once daily and with symptoms of low blood sugar No longer using, removed from medication list    Continuous Blood Gluc Sensor (FREESTYLE ALEKSANDAR 2 SENSOR) Misc Use as directed every two weeks, change every 14 days Duplicate, removed from medication list    Continuous Blood Gluc Sensor (FREESTYLE ALEKSANDAR 2 SENSOR) Misc Use 1 kit as directed every two weeks change every 14 days As directed    Dulaglutide (TRULICITY) 4.5 MG/0.5ML Solution Pen-injector INJECT 1 PEN INJECTOR SUBCUTANEOUSLY ONCE A WEEK Duplicate, removed from medication list    Dulaglutide (TRULICITY) 4.5 MG/0.5ML Solution Pen-injector Inject 4.5 mg subcutaneously once a week As directed    gabapentin (NEURONTIN) 200 mg, Oral, 3 TIMES DAILY Usually just takes BID. Feels that this is ineffective. Reports neuropathy comes and goes.    glucose blood (ONETOUCH ULTRA) strip TEST BLOOD SUGAR THREE TIMES DAILY AND WITH SYMPTOMS OF LOW BLOOD SUGAR E11.65 No longer using, removed from medication list    HumaLOG KwikPen 1-10 Units, Subcutaneous, 3 TIMES DAILY, Per sliding scale dosing Follows sliding scale (3 units before meals + 1  unit every 50 > 150). Injects 6-8 units typically.    hydrOXYzine pamoate (VISTARIL) 50 MG Cap Take 1 oral capsule as needed every (6-8) hours for anxiety Duplicate, removed from medication list    hydrOXYzine pamoate (VISTARIL) 50 MG Cap Take 1 capsule by mouth every (6-8) hours as needed for anxiety Duplicate, removed from medication list    hydrOXYzine pamoate (VISTARIL) 50 MG Cap Take 1 oral capsule as needed every (6-8) hours for anxiety As directed    hydrOXYzine pamoate (VISTARIL) 50 mg, Oral, EVERY 4 HOURS Duplicate, removed from medication list    Insulin Degludec (TRESIBA FLEXTOUCH) 200 UNIT/ML Solution Pen-injector Inject 26 unit subcutaneously once a day Injecting 30 units daily, medication list updated    insulin lispro (HUMALOG KWIKPEN) 100 UNIT/ML SC SOPN injection PEN Inject 15 unit subcutaneously three times a day Duplicate, removed from medication list    Insulin Pen Needle 32 G x 4 mm Use 1 needle subcutaneously 4 times daily As directed    Lancets (ONETOUCH DELICA PLUS [id number]) Misc TEST BLOOD SUGAR ONCE DAILY AND WITH SYMPTOMS OF HYPOGLYCEMIA E11.65 No longer using, removed from medication list    levothyroxine (SYNTHROID) 150 mcg, Oral, EACH MORNING ON EMPTY STOMACH As directed    lisinopril (PRINIVIL) 10 mg, Oral, EVERY DAY As directed    Nirmatrelvir&Ritonavir 300/100 20 x 150 MG & 10 x 100MG Tablet Therapy Pack Take 300 mg nirmatrelvir (two 150 mg tablets) with 100 mg ritonavir (one 100 mg tablet) by mouth, with all three tablets taken together twice daily for 5 days. Completed, removed from medication list    sertraline (ZOLOFT) 100 MG Tab Take 1 tablet by mouth once a day Duplicate, removed from medication list    sertraline (ZOLOFT) 100 MG Tab Take 1 oral tablet once a day As directed    traZODone (DESYREL) 100 MG Tab Take 2 tablets by mouth at bedtime as needed Duplicate, removed from medication list    traZODone (DESYREL) 100 MG Tab Take 2 oral tablet at bedtime as needed As directed      Other prescriptions:  Has a prescription for metformin, pioglitazone, and ?Steglatro (patient unsure). States that endocrinology previously discontinued these due to ADEs, but during her visit today, she was advised to resume these medications. Patient states she would like to be on minimal medications if possible. States she does not understand why she should resume these medications when most recent A1c shows improvement.    OTCs, herbals, and supplements:  None    ADRs:  Patient denies N/V, constipation, HA, dizziness, SOB, CP, edema, cough, pain.  Does report diarrhea every time she eats. Also reports increased gas.  Nausea/vomiting/GI issues subsided when d/c the 3 oral DM meds noted above, therefore prefers not to resume at this time.    Others:  Patient now seeing a foot specialist for neuropathy and states she is getting steroid injections every month (next dose is tomorrow).  Also regularly following up with dietitian  Patient is interested in referral to pharmacotherapy for DM mgmt. Will send request to PCP.    Vitals:  Declined  There were no vitals filed for this visit.    Pertinent Labs (See Media):  A1c 11/13/23: 8.5%     Latest Reference Range & Units 05/15/23 00:15   Sodium 135 - 145 mmol/L 132 (L)   Potassium 3.6 - 5.5 mmol/L 3.7   Chloride 96 - 112 mmol/L 101   Co2 20 - 33 mmol/L 20   Anion Gap 7.0 - 16.0  11.0   Glucose 65 - 99 mg/dL 295 (H)   Bun 8 - 22 mg/dL 8   Creatinine 0.50 - 1.40 mg/dL 0.34 (L)   (L): Data is abnormally low  (H): Data is abnormally high    Estimated CrCl: > 100 mL/min (Based on sCr = 0.34 mg/dL, Actual Wt = 92.5 kg)  eGFR 131    Lipid Panel 11/13/23        Assessment / Plan:    Medication reconciliation:  Reviewed all medications, indications, dosing, frequency, administration, and potential ADRs with pt.  Medication list updated as described above.  Indications/Dosing:   All medications are appropriate except no recent UACR available to determine appropriateness of  lisinopril  LDL at 110 which is above goal of < 100. Statin therapy was recently decreased from daily to three times weekly. Would recommend increasing back to daily and continuing to work on lifestyle modifications (especially given that patient wants to minimize pill burden).  If additional control is needed, can consider addition of Zetia 10 mg daily.  Patient reports that gabapentin is not effective.  Can consider switching to pregabalin (Lyrica).  Patient is also seeing foot specialist and receiving steroid injections monthly. Can also consider d/c gabapentin and holding off on adding alternative medication to see if steroid injections will be sufficient in controlling sx.  Adherence:  No issues at this time.   ADRs:  GI issues possibly secondary to Trulicity, though reports some improvement since discontinuation of metformin, pioglitazone, and ?Steglatro. Will have to transition to alternative GLP1a due to insurance coverage. When this is done, will monitor for worsening/resolution of sx.  Sertraline can also be associated with GI issues, however given that anxiety is well controlled on this, prefer not to make any changes to regimen unless absolutely necessary.  Drug Interactions:  No significant DDI noted  Monitoring:  Continue using Zack sensor to monitor glucose readings  UACR due, lab ordered  Clinical Goals:   A1c < 7% (preferably < 6.5% if possible given age)    Minimize pill burden  Cost-effective alternatives:  Trulicity will no longer be covered by insurance. Patient to check which GLP1a will be covered moving forward.    Recommendations to the physician:    See above recommendations in blue font.  Patient would also like to f/u with pharmacotherapy clinic for DM mgmt. Please consider placing a referral for this.    Patient verbalized understanding.  All questions answered to her satisfaction.    Follow Up:  3 weeks for DM mgmt if referral is received from PCP  (1 week after PCP)          [facility] Pharmacotherapy Program Consent      Name    Liana Obrien    MRN number: [id number]    the following are guidelines for participation in the [facility] Pharmacotherapy Program.     I, ____Liana Obrien_____, understand and voluntarily agree to participate in the [facility] Pharmacotherapy Program and to have services provided to me by pharmacists working in collaboration with my provider.    I understand the pharmacist within the [facility] Pharmacotherapy Program may initiate, modify or discontinue my medications, order appropriate testing and appointments, perform exams, monitor treatment, and make clinical evaluations and decisions pursuant to a collaborative practice agreement with my provider.  I understand the pharmacist within the [facility] Pharmacotherapy Program is not a physician, osteopathic physician, advanced practice registered nurse or physician assistant and may not diagnose.  I will take all my medications as instructed and not change the way I take it without first talking to my provider or a pharmacist within the [facility] Pharmacotherapy Program.  I understand that if I am late to my appointment I may not be able to be seen by a pharmacist at that time and will have to reschedule my appointment.  During appointment with pharmacist I understand that pharmacist has the right not to answer questions or perform services outside the pharmacist’s scope of practice.  By signing below, I provide informed consent for the pharmacist to provide these services and for my participation in the [facility] Pharmacotherapy Program.      Liana Obrien           [id number]          11/21/23  Patient Name                   MRN number  Date     ____Obtained verbal consent from Liana Obrien____  Patient Signature

## 2023-11-22 DIAGNOSIS — I10 HYPERTENSION, UNSPECIFIED TYPE: ICD-10-CM

## 2023-11-22 DIAGNOSIS — E03.9 HYPOTHYROIDISM, UNSPECIFIED TYPE: ICD-10-CM

## 2023-11-22 PROCEDURE — RXMED WILLOW AMBULATORY MEDICATION CHARGE: Performed by: PHYSICIAN ASSISTANT

## 2023-11-24 ENCOUNTER — HOSPITAL ENCOUNTER (OUTPATIENT)
Dept: CARDIOLOGY | Facility: MEDICAL CENTER | Age: 43
End: 2023-11-24
Attending: INTERNAL MEDICINE
Payer: MEDICAID

## 2023-11-24 DIAGNOSIS — R06.02 SHORTNESS OF BREATH: ICD-10-CM

## 2023-11-24 DIAGNOSIS — R01.1 HEART MURMUR: ICD-10-CM

## 2023-11-24 LAB
LV EJECT FRACT  99904: 55
LV EJECT FRACT MOD 2C 99903: 62.81
LV EJECT FRACT MOD 4C 99902: 64.85
LV EJECT FRACT MOD BP 99901: 62.98

## 2023-11-24 PROCEDURE — 93306 TTE W/DOPPLER COMPLETE: CPT

## 2023-11-24 PROCEDURE — 93306 TTE W/DOPPLER COMPLETE: CPT | Mod: 26 | Performed by: INTERNAL MEDICINE

## 2023-11-25 ENCOUNTER — PHARMACY VISIT (OUTPATIENT)
Dept: PHARMACY | Facility: MEDICAL CENTER | Age: 43
End: 2023-11-25
Payer: COMMERCIAL

## 2023-11-28 ENCOUNTER — APPOINTMENT (OUTPATIENT)
Dept: INTERNAL MEDICINE | Facility: OTHER | Age: 43
End: 2023-11-28
Payer: MEDICAID

## 2023-11-28 RX ORDER — LISINOPRIL 10 MG/1
10 TABLET ORAL
Qty: 90 TABLET | Refills: 1 | Status: SHIPPED | OUTPATIENT
Start: 2023-11-28

## 2023-11-28 RX ORDER — LEVOTHYROXINE SODIUM 0.15 MG/1
150 TABLET ORAL
Qty: 90 TABLET | Refills: 1 | Status: SHIPPED | OUTPATIENT
Start: 2023-11-28

## 2023-12-02 PROCEDURE — RXMED WILLOW AMBULATORY MEDICATION CHARGE: Performed by: INTERNAL MEDICINE

## 2023-12-05 NOTE — PROGRESS NOTES
Chief Complaint   Patient presents with    Results     Echocardiogram results       HPI: Liana Obrien is a 43 y.o. female with past medical history as below who presented to the clinic for the following.      Type 2 dm   -reports that the sugars in morning are close to 150, starts creeping up after 12 pm until midnight, patient however most recently was woken up by low sugar when she was sleeping - she did not feel any different   Taking q4 hours short acting along with 3 units before each meal - explained that we need to increase dinner time - we had planned to take 5 units at dinner instead of 3 , however patient has not done that despite that was having the low sugar   Latest A1C was 8.5  Patients endocrinologist wanting to add back some of the oral meds that she was on, and if not she was told that she may have to go on insulin pump- patient does not want this patient wants to know if we can take over diabetes care. Glargine was also increased to 30 units recently   I did place a pharmacy referral due to patients concerns of polypharmacy -they recommended addition of Zetia and conversion f gabapentin to lyrica. -   They will also be seeing her for help with management of diabetes   Patient instructed to bring log of her sugars so that we can help change short acting which is most likley causing the low. It was  however a one time occurrence - may have been associated with how much she ate that day   Patient is also working with the nutrition specialist at this time      Status post dental procedure   -Associated with swelling - healing well     *Polypharmacy     Patient believed that the nausea, vomiting and diarrhea is from her medications.   As per patient, her endocrinologist has stopped all the oral meds for diabetes that she was on and is currently on on insulin degludec 30 units now , humalog based on sliding scale and trulicity   Pioglitazone, steglatro and metformin were stopped .  However patient continued to have nausea, vomiting diarrhea which has since spontaneously resolved - hence likely associated with COVID   Recommended to continue lisinopril daily and atorvastatin 3 times a week   Patient is on as needed hydroxyzine which she is taking once or upto twice as needed   Taking trazodone 100 mg and sertraline through psychiatry - will address these with psychiatry   -Patient was on gabapentin which she has stopped since  she got steroid injection for foot which helped with the pain and that has resolved since   Patient also reports that she had to stop Vitamin C because it interacted with one of the medications that she was on and and she feels that she is getting more sick because she has not been able to take the vitamin C       *Shortness of breath.  Resolved   -Patient reported that she felt that with increasing her level of activity around the house recently and had noticed that she gets very winded just walking around the house.  Denied any cough or wheezing.   -Denied any chest pain.  Did  report palpitations sometimes when she gets anxious which goes away with some deep breaths, which is also resolved now   Denied any lightheadedness, dizziness, pedal edema, orthopnea or PND  -Does have history of sleep apnea, was on CPAP in the past patient does not recall why as she is not on it anymore.  -On examination patient patient did have a grade three systolic murmur that was present previously however on subsequent examination  - unable to appreciate that murmur.   Echocardiogram recently done was also not concerning except trace MR, mild PI concentric hypertrophy of left ventricle    -Denied any fevers, chills.  -As per labs in the past patient has had history of anemia, she has regular menstrual cycles sometimes, most recently her menstrual cycle lasted for 10 days.  Has history of PCOS as per chart, ovarian cyst  -Last cbc from November 2023 shows microcytosis without anemia , MCV-  75, normal RDW and other cell lines. PBS not commented on   -Has history of DVT as per chart.    *Tachycardia   resolved  -noted that her watch - heart rate above 110 when she is up and about   -Not present today at rest and patient is asymptomatic  -echo unremarkable  Last cbc showed       History of COVID -recovered well           Other problems not discussed on this visit   *Blurry vision   -described below   -Patient was able to finally reach the ophthalmologist , however was told that her insurance would not be taken   *Skin tag   -on lower abdomen - grown - wants to be rmoved,  -Also on exam noted to have erythema on lower abdominal region below skin fold- no symptoms     *Anxiety   -Patient is following up with psychiatry, mental health counselor, [facility].  -Was receiving inpatient psychiatry treatment, currently is on Zoloft 100 mg, trazodone 100 mg  at nighttime, hydroxyzine as needed.   -Patient reports that she is doing overall well, trazodone helps most of the nights.  Sleep however she does have some nights when she is unable to fall asleep.    *Blurry vision  -Patient reports that about 6 years ago she had a significant blurry vision of both eyes which lasted for several months, when she was almost declared blind legally, was evaluated by ophthalmologist, unsure what the diagnosis was.  -Since then however her vision has significantly improved, currently has intermittent blurring of vision on both eyes, which when comes on lasts for a day to a month continuously.  -Denies any headaches, flashes floaters blind spots, eye pain, redness or aura  -We had placed ophthalmology referral on the last visit however patient was not able to attend the appointment due to being inpatient for mental health related issues.  -Patient is requesting for new referral.  -Patient denies having any dry eyes at this time    *Breast Skin lesion   Has had history of bug bites.   -On examination now only has  superficial scar, no deeper concerning tissue on palpation.  -Patient did have another spot with similar complaints, which again is currently a scar.  -Had ordered ultrasound breast at the time which has not been done.  -Given resolution of lump will go ahead and proceed to routine mammogram screening.    *Status post surgery for perirectal abscess/fistula   -Healing well as per patient    *Obesity  -Patient is working with nutrition specialist as well as trying to get more exercise as per recommendations of nutrition specialist.          Patient Active Problem List    Diagnosis Date Noted    Anxiety  On Zoloft 100 mg, trazodone 100 mg daily at night, hydroxyzine 06/16/2023    Neuropathic pain of both feet  On gabapentin  06/16/2023    Fibroids 11/15/2022     10/12/2022    Anemia  Microcytosis without anemia as per last labs done in november 2023 09/23/2022    Dyslipidemia 09/22/2022    Obesity (BMI 30-39.9) 05/25/2022    Gastro-esophageal reflux disease without esophagitis 04/14/2021    Major depressive disorder 09/14/2020    Polycystic ovary syndrome  2.2 cm right ovarian cyst seen on recent CT in May 2023  Unable to undergo TVUS due to perirectal abscess  Unable to go on estrogen containing OCP due to history of VTE  Will follow up with gyn following resolution of perirectal abscess 12/11/2019    History of deep venous thrombosis 11/19/2019    Hypothyroidism  Levothyroxine 150 mcg 11/19/2019    Hypertension  Mildly elevated on lisinopril 10 - not checking blood pressures at home regularly as the cuff not working  10/28/2017    Obstructive sleep apnea, adult  Trying to set up with cpap 07/31/2015    Vitamin D deficiency 03/12/2015    Type 2 diabetes mellitus with hyperglycemia, with long-term current use of insulin (HCC)  Following up with endocrinology -  A1c of 8.5 in June 2023     Tresiba 30U daily, Tulicity 0.5 mg weekly premeal humalog TID  - lipitor 20 daily  - lisinopril 10 daily  - gabapentin 100 TID for  neuropathy 03/12/2015       Current Outpatient Medications   Medication Sig Dispense Refill    lisinopril (PRINIVIL) 10 MG Tab Take 1 Tablet by mouth every day. 90 Tablet 1    levothyroxine (SYNTHROID) 150 MCG Tab Take 1 Tablet by mouth every morning on an empty stomach. 90 Tablet 1    hydrOXYzine pamoate (VISTARIL) 50 MG Cap Take 1 oral capsule as needed every (6-8) hours for anxiety 90 Capsule 3    sertraline (ZOLOFT) 100 MG Tab Take 1 oral tablet once a day 30 Tablet 3    traZODone (DESYREL) 100 MG Tab Take 2 oral tablet at bedtime as needed 60 Tablet 3    Continuous Blood Gluc Sensor (FREESTYLE ALEKSANDAR 2 SENSOR) [facility] Use 1 kit as directed every two weeks change every 14 days 7 Each 3    Dulaglutide (TRULICITY) 4.5 MG/0.5ML Solution Pen-injector Inject 4.5 mg subcutaneously once a week 2 mL 3    atorvastatin (LIPITOR) 20 MG Tab Take 1 tablet by mouth once a day (Patient taking differently: Take 20 mg by mouth 3 times a week.) 30 Tablet 3    Insulin Degludec (TRESIBA FLEXTOUCH) 200 UNIT/ML Solution Pen-injector Inject 26 unit subcutaneously once a day (Patient taking differently: Inject 30 Units under the skin every day.) 9 mL 3    Alcohol Swabs Pads use one alcohol swab three times a day as needed. 300 Each 6    gabapentin (NEURONTIN) 100 MG Cap Take 2 Capsules by mouth 3 times a day. (Patient taking differently: Take 200 mg by mouth 2 times a day.) 270 Capsule 2    Insulin Pen Needle 32 G x 4 mm Use 1 needle subcutaneously 4 times daily 360 Each 3    HUMALOG KWIKPEN 100 UNIT/ML Solution Pen-injector injection PEN Inject 1-10 Units under the skin 3 times a day. Per sliding scale dosing 15 mL 5     No current facility-administered medications for this visit.       ROS: As per HPI. Additional pertinent systems as noted below.  Constitutional:  Negative for fever, chills   HEENT : As in hpi  Cardiovascular:  Negative for chest pain, positive as in hpi  Respiratory:  Negative for cough, sputum production, positive as  "in hpi      /74 (BP Location: Left arm, Patient Position: Sitting, BP Cuff Size: Adult)   Pulse 84   Temp 36.3 °C (97.4 °F) (Temporal)   Ht 1.6 m (5' 3\")   Wt 92 kg (202 lb 12.8 oz)   SpO2 98%   BMI 35.92 kg/m²     Physical Exam   Constitutional:  . Not in acute distress   CVS : Regular rate and rhythm, systolic murmur not appreciated today     Respi- diffusely diminished breath sounds   HEENT - swelling of gums, healing well , facial swelling    Note: I have reviewed all pertinent labs and diagnostic tests associated with this visit with specific comments listed under the assessment and plan below    Assessment and Plan    1. Type 2 diabetes mellitus with hyperglycemia, with long-term current use of insulin (HCC)  Patient is currently taking 3 units before each meal on top of a sliding scale q4   Long acting 30 units  and also trulicity   C peptide normal - continue trulicity   Patients endocrinologist wanting to add back some of the oral meds that she was on, and if not she was told that she may have to go on insulin pump- patient does not want this patient wants to know if we can take over diabetes care. Glargine was also increased to 30 units recently   -Also referral to pharmacy placed.   Explained that her A1C has improved however she was on the oral meds, we need to see how her sugars are trending before we can completely take over diabetes care from endo -  Explained that the short acting is likely causing the low and we need to decrease that and keep the same long acting dose if possible  - instructed to take log of sugars to pharmacy during their appointment soon and also we will closely follow up to see to adjust short acting .  -Also instructed to cross check any lows detected by cgm with her glucometer to determine that the number is accurate        2. History of recent dental procedure  Sugars may be temporarily high due to a stress response, however also is important to control sugars " during this healing period - close follow up of patient with log of sugars   Also appreciate assistance from pharmacy specialist     3. Polypharmacy    -Patient feels that her nausea vomiting and diarrhea is secondary to all the medications she is on. However those have since resolved and was most likely associated with COVID infection . Oral DM medications were stopped prior to resolution of these symptoms hence less likely may have been causing it however may have been contributing   -Unfortunately we cannot remove her diabetes medication which includes long-acting insulin, short acting insulin and GLP-1 agonist.    Plan for diabetes as above  -Patient is on 3 psychiatry medications sertraline, hydroxyzine as needed which is almost daily up to 2 tablets, and trazodone 100 mg.  Encourage patient to address this with a psychiatrist.  -Patient stopped gabapentin   -Lisinopril is currently not causing any low blood pressures as per blood pressure in clinic today.    -Recommended patient to take atorvastatin at least 3 times a week or every other day. LDL : 110  -We cannot stop thyroid medication hence continue this.repeat TSH in 6 weeks for reasons mentioned above  -Okay to hold vitamin D until we get labs rechecked- will do that - will check in 6 weeks.           Followup: Return in about 2 weeks (around 12/20/2023).        Signed by: Main Mcgee M.D.

## 2023-12-06 ENCOUNTER — OFFICE VISIT (OUTPATIENT)
Dept: INTERNAL MEDICINE | Facility: OTHER | Age: 43
End: 2023-12-06
Payer: MEDICAID

## 2023-12-06 VITALS
HEIGHT: 63 IN | OXYGEN SATURATION: 98 % | HEART RATE: 84 BPM | TEMPERATURE: 97.4 F | WEIGHT: 202.8 LBS | SYSTOLIC BLOOD PRESSURE: 116 MMHG | DIASTOLIC BLOOD PRESSURE: 74 MMHG | BODY MASS INDEX: 35.93 KG/M2

## 2023-12-06 DIAGNOSIS — E11.65 TYPE 2 DIABETES MELLITUS WITH HYPERGLYCEMIA, WITH LONG-TERM CURRENT USE OF INSULIN (HCC): ICD-10-CM

## 2023-12-06 DIAGNOSIS — Z98.890 HISTORY OF RECENT DENTAL PROCEDURE: ICD-10-CM

## 2023-12-06 DIAGNOSIS — Z79.899 POLYPHARMACY: ICD-10-CM

## 2023-12-06 DIAGNOSIS — Z79.4 TYPE 2 DIABETES MELLITUS WITH HYPERGLYCEMIA, WITH LONG-TERM CURRENT USE OF INSULIN (HCC): ICD-10-CM

## 2023-12-06 PROCEDURE — 99214 OFFICE O/P EST MOD 30 MIN: CPT | Performed by: INTERNAL MEDICINE

## 2023-12-06 PROCEDURE — 3074F SYST BP LT 130 MM HG: CPT | Performed by: INTERNAL MEDICINE

## 2023-12-06 PROCEDURE — 3078F DIAST BP <80 MM HG: CPT | Performed by: INTERNAL MEDICINE

## 2023-12-06 ASSESSMENT — FIBROSIS 4 INDEX: FIB4 SCORE: 0.66

## 2023-12-08 DIAGNOSIS — E11.65 TYPE 2 DIABETES MELLITUS WITH HYPERGLYCEMIA, WITH LONG-TERM CURRENT USE OF INSULIN (HCC): Primary | ICD-10-CM

## 2023-12-08 DIAGNOSIS — Z79.4 TYPE 2 DIABETES MELLITUS WITH HYPERGLYCEMIA, WITH LONG-TERM CURRENT USE OF INSULIN (HCC): Primary | ICD-10-CM

## 2023-12-10 PROBLEM — Z98.890 HISTORY OF RECENT DENTAL PROCEDURE: Status: ACTIVE | Noted: 2023-12-10

## 2023-12-12 ENCOUNTER — PHARMACY VISIT (OUTPATIENT)
Dept: PHARMACY | Facility: MEDICAL CENTER | Age: 43
End: 2023-12-12
Payer: COMMERCIAL

## 2023-12-12 ENCOUNTER — NON-PROVIDER VISIT (OUTPATIENT)
Dept: VASCULAR LAB | Facility: MEDICAL CENTER | Age: 43
End: 2023-12-12
Attending: INTERNAL MEDICINE
Payer: MEDICAID

## 2023-12-12 DIAGNOSIS — Z79.4 TYPE 2 DIABETES MELLITUS WITH HYPERGLYCEMIA, WITH LONG-TERM CURRENT USE OF INSULIN (HCC): Primary | ICD-10-CM

## 2023-12-12 DIAGNOSIS — E11.65 TYPE 2 DIABETES MELLITUS WITH HYPERGLYCEMIA, WITH LONG-TERM CURRENT USE OF INSULIN (HCC): Primary | ICD-10-CM

## 2023-12-12 DIAGNOSIS — E11.65 TYPE 2 DIABETES MELLITUS WITH HYPERGLYCEMIA, WITHOUT LONG-TERM CURRENT USE OF INSULIN (HCC): ICD-10-CM

## 2023-12-12 PROCEDURE — RXMED WILLOW AMBULATORY MEDICATION CHARGE: Performed by: PHYSICIAN ASSISTANT

## 2023-12-12 PROCEDURE — RXMED WILLOW AMBULATORY MEDICATION CHARGE: Performed by: HOSPITALIST

## 2023-12-12 PROCEDURE — RXMED WILLOW AMBULATORY MEDICATION CHARGE: Performed by: INTERNAL MEDICINE

## 2023-12-12 PROCEDURE — RXMED WILLOW AMBULATORY MEDICATION CHARGE

## 2023-12-12 PROCEDURE — 99212 OFFICE O/P EST SF 10 MIN: CPT

## 2023-12-12 RX ORDER — INSULIN LISPRO 100 [IU]/ML
1-10 INJECTION, SOLUTION INTRAVENOUS; SUBCUTANEOUS 3 TIMES DAILY
Qty: 15 ML | Refills: 4 | Status: SHIPPED | OUTPATIENT
Start: 2023-10-16 | End: 2023-12-14

## 2023-12-12 RX ORDER — METFORMIN HYDROCHLORIDE 500 MG/1
1000 TABLET, EXTENDED RELEASE ORAL 2 TIMES DAILY
Qty: 120 TABLET | Refills: 2 | Status: SHIPPED | OUTPATIENT
Start: 2023-12-12 | End: 2024-03-13

## 2023-12-12 RX ORDER — PEN NEEDLE, DIABETIC 32GX 5/32"
NEEDLE, DISPOSABLE MISCELLANEOUS
Qty: 360 EACH | Refills: 3 | Status: CANCELLED | OUTPATIENT
Start: 2023-11-15

## 2023-12-12 NOTE — PROGRESS NOTES
Patient Consult Note    Primary care physician: Main Mcgee M.D.    Reason for consult: Management of Uncontrolled Type 2 Diabetes    HPI:  Liana Obrien is a 43 y.o. old patient who comes in today for evaluation of above stated problem.    Allergies:  Bicillin c-r [penicillin g proc & benzathine], Ondansetron, Penicillin g, Sulfa drugs, and Metoclopramide    Current Diabetes Medication Regimen:  GLP-1 Agent: dulaglutide (Trulicity) 4.5 mg weekly  Basal Insulin: Tresiba 30 units daily  Prandial Insulin: Humalog 3 units + sliding scale before meals (1 unit every 50 > 150)  Usually 4-6 units    Previous Diabetes Medications and Reason for Discontinuation:  Metformin  Pioglitazone  Steglatro    Potential Barriers to Care:  Adherence: reports missing pre-lunch dose of Humalog  Side effects: occasional diarrhea almost immediately after eating, but states this depends on what she eats (typically when she eats sweets)  Affordability: no issues, but previously reported that she received a letter stating Trulicity will no longer be covered by insurance. Patient has yet to check the letter to see what alternative GLP1a is preferred. Patient also states that she is having issues picking up Rx for Humalog from the pharmacy. Will attempt to determine barrier.  Others: recent dental procedure last week (10 extractions), gradually feeling better    SMBG: Zack 2  14-days:  Sensor usage: 91%  TIR: 59%  Hyperglycemia: 40%  Hypoglycemia: 1%  Average glucose: 165    Hypoglycemia:  Hypoglycemia awareness: Yes  Nocturnal hypoglycemia: None  Hypoglycemia:   One episode recently in the evening (glucose decreased from 119 to 65 within 30 mins)  Pt's treatment of Hypoglycemia  Discussed 15:15 Rule    Lifestyle:  Current Exercise: none  Exercise Goal: increase as tolerated    Dietary:  Soft foods d/t recent dental extraction; bread, noodles, smoothies (fruits/milk/oats)  Working on resuming usual diet  Breakfast 8-9, lunch 2-3,  dinner 6-8    Preventative Management:  BP regimen (ACEi/ARB): lisinopril 10 mg  Statin: atorvastatin (Lipitor) 20 mg three times a week  LDL <100: no (see Media for most recent labs)  Last Lipid Panel 11/13/23      Last Microalbumin/Cr:  Lab Results   Component Value Date/Time    MALBCRT 30 08/11/2018 08:21 AM    MICROALBUR 4.1 08/11/2018 08:21 AM     Last A1c:  Lab Results   Component Value Date/Time    HBA1C 8.5 (A) 11/13/2023 12:00 AM      Last Retinal Scan: 08/2022, due    Updated caregaps    Other Pertinent Labs (See Media):    Latest Reference Range & Units 05/15/23 00:15   Sodium 135 - 145 mmol/L 132 (L)   Potassium 3.6 - 5.5 mmol/L 3.7   Chloride 96 - 112 mmol/L 101   Co2 20 - 33 mmol/L 20   Anion Gap 7.0 - 16.0  11.0   Glucose 65 - 99 mg/dL 295 (H)   Bun 8 - 22 mg/dL 8   Creatinine 0.50 - 1.40 mg/dL 0.34 (L)   (L): Data is abnormally low  (H): Data is abnormally high     Estimated CrCl: > 100 mL/min (Based on sCr = 0.34 mg/dL, Actual Wt = 92.5 kg)  eGFR 131     Physical Examination:  Vital signs: There were no vitals taken for this visit. There is no height or weight on file to calculate BMI.    Foot Exam:  Monofilament exam: 08/2023    Assessment and Plan:    1. DMII  Basic physiology of DMII was explained to patient as well as microvascular/macrovascular complications. The importance of increasing physical activity to improve diabetes control was discussed with the patient. Patient was also educated on changing diet and making better choices to help control blood sugar.  Discussed Goals: FBG 80 - 130, 2hPP < 180, a1c < 7.0%  Last A1c above goal at 8.5%  TIR also not at goal of > 70%  Patient is agreeable to work on lifestyle modifications to help control BG  Per CGM, glucose trends up in the evening (prior to dinner then remains elevated until bedtime), likely due to missing lunch-time dose of Humalog. Discussed importance of medication adherence and pt will work on this.  Pt would also benefit from  rechallenging metformin therapy to increase insulin sensitivity. Will utilize ER formulation and slowly titrate to prevent tolerability issues.  If glucose levels show improvement with addition of metformin, can consider discontinuing bolus insulin to prevent injection fatigue  LDL at 110 which is above goal of < 100. Statin therapy was recently decreased from daily to three times weekly. Would recommend increasing back to daily and continuing to work on lifestyle modifications (especially given that patient wants to minimize pill burden). Will discuss this at next visit given time constraints today.  UACR due, lab previously ordered    Medication changes:  Start metformin  mg once daily for one week.  If no side effects, increase to 500 mg twice daily on week 2.  If no side effects, increase to 500 mg in the morning and 1000 mg in the evening on week 3  If no side effects, increase to 1000 mg twice daily on week 4.     Continue:  Inject Trulicity 4.5 mg weekly  Inject Tresiba 30 units daily  Inject Humalog 3 units + sliding scale before meals (1 unit every 50 > 150)     Lifestyle changes:  Increase exercise as tolerated  Focus on low carb diet. Incorporate more veggies and protein.    Follow Up:  1 months    Jesus Cazares, PharmD    CC:   Main Mcgee M.D.  Mu Mckinnon MD

## 2023-12-13 NOTE — PATIENT INSTRUCTIONS
Start metformin  mg once daily for one week.  If no side effects, increase to 500 mg twice daily on week 2.  If no side effects, increase to 500 mg in the morning and 1000 mg in the evening on week 3  If no side effects, increase to 1000 mg twice daily on week 4.    Inject Trulicity 4.5 mg weekly  Inject Tresiba 30 units daily  Inject Humalog 3 units + sliding scale before meals (1 unit every 50 > 150)

## 2023-12-14 DIAGNOSIS — Z79.4 TYPE 2 DIABETES MELLITUS WITH HYPERGLYCEMIA, WITH LONG-TERM CURRENT USE OF INSULIN (HCC): Primary | ICD-10-CM

## 2023-12-14 DIAGNOSIS — E11.65 TYPE 2 DIABETES MELLITUS WITH HYPERGLYCEMIA, WITH LONG-TERM CURRENT USE OF INSULIN (HCC): Primary | ICD-10-CM

## 2023-12-14 RX ORDER — INSULIN LISPRO 100 [IU]/ML
1-10 INJECTION, SOLUTION INTRAVENOUS; SUBCUTANEOUS
Qty: 15 ML | Refills: 3 | Status: SHIPPED | OUTPATIENT
Start: 2023-12-14 | End: 2024-03-13

## 2023-12-15 ENCOUNTER — OFFICE VISIT (OUTPATIENT)
Dept: INTERNAL MEDICINE | Facility: OTHER | Age: 43
End: 2023-12-15
Payer: MEDICAID

## 2023-12-15 VITALS
OXYGEN SATURATION: 98 % | BODY MASS INDEX: 36.46 KG/M2 | HEART RATE: 81 BPM | WEIGHT: 205.8 LBS | SYSTOLIC BLOOD PRESSURE: 120 MMHG | TEMPERATURE: 97.1 F | DIASTOLIC BLOOD PRESSURE: 79 MMHG | HEIGHT: 63 IN

## 2023-12-15 DIAGNOSIS — Z79.4 TYPE 2 DIABETES MELLITUS WITH HYPERGLYCEMIA, WITH LONG-TERM CURRENT USE OF INSULIN (HCC): ICD-10-CM

## 2023-12-15 DIAGNOSIS — E11.65 TYPE 2 DIABETES MELLITUS WITH HYPERGLYCEMIA, WITH LONG-TERM CURRENT USE OF INSULIN (HCC): ICD-10-CM

## 2023-12-15 DIAGNOSIS — G47.33 OBSTRUCTIVE SLEEP APNEA SYNDROME: ICD-10-CM

## 2023-12-15 DIAGNOSIS — G57.93 NEUROPATHIC PAIN OF BOTH FEET: ICD-10-CM

## 2023-12-15 PROCEDURE — 99214 OFFICE O/P EST MOD 30 MIN: CPT | Performed by: INTERNAL MEDICINE

## 2023-12-15 PROCEDURE — RXMED WILLOW AMBULATORY MEDICATION CHARGE: Performed by: INTERNAL MEDICINE

## 2023-12-15 PROCEDURE — 3078F DIAST BP <80 MM HG: CPT | Performed by: INTERNAL MEDICINE

## 2023-12-15 PROCEDURE — 3074F SYST BP LT 130 MM HG: CPT | Performed by: INTERNAL MEDICINE

## 2023-12-15 RX ORDER — GABAPENTIN 100 MG/1
100 CAPSULE ORAL 3 TIMES DAILY
Qty: 90 CAPSULE | Refills: 1 | Status: ON HOLD | OUTPATIENT
Start: 2023-12-15 | End: 2024-03-18

## 2023-12-15 ASSESSMENT — FIBROSIS 4 INDEX: FIB4 SCORE: 0.66

## 2023-12-15 NOTE — PROGRESS NOTES
ADDENDUM 12/15/2023 3:09 PM:    Confirmed with pharmacy that Rx went through insurance without issues. Called and spoke to patient and advised to follow med instructions as directed by PCP today. Patient verbalized understanding of instructions.    Jesus Cazares, KadieD, BCACP

## 2023-12-15 NOTE — PROGRESS NOTES
PA required for Humalog, but appears that insulin lispro (generic is preferred). Rx resent.          Jesus Cazares, PharmD, BCACP

## 2023-12-15 NOTE — PROGRESS NOTES
Chief Complaint   Patient presents with    Follow-Up       HPI: Liana Obrien is a 43 y.o. female with past medical history as below who presented to the clinic for the following.      Type 2 dm   Sliding scale on top of 3 units before each meal -   Latest A1C was 8.5  Reviewing sugars - well controlled the last 2 days in mornings, however with lows detected at 1 am or 2 am and once at 6 am however these lows were followed by increase to > 100 without any intervention . Patient has not cross checked these numbers with the glucometer .evening readings at around dinner time is generally high   No symptoms at these time   Sleeping when it alarmed      Glargine was also increased to 30 units recently by endo, patient being followed by Pharmacy for diabetes .  Insulin refilled   They have recommended addition of metformin   Patient is not sure if she should take it because  of the low sugars   Patient also may have missed lunch time insulin          *Foot pain   -On bilateral plantar surface, throughout the day   -Pain is sharp   -Is seeing podiatry   -Getting cortisone injections.   -Next one not due until end of the month   -On previous visit we had decided to stop the  gabapentin that she was on as her pain was better controlled, Wants to retry gabapentin  Metformin       Status post dental procedure   -Associated with swelling - much better   There is some remnant discomfort which however is improving        Sleep apnea  -Sleep study at home was done however she has not heard back from them                     Other problems not discussed on this visit   *Polypharmacy     Patient believed that the nausea, vomiting and diarrhea is from her medications.   As per patient, her endocrinologist has stopped all the oral meds for diabetes that she was on and is currently on on insulin degludec 30 units now , humalog based on sliding scale and trulicity   Pioglitazone, steglatro and metformin were stopped .  However patient continued to have nausea, vomiting diarrhea which has since spontaneously resolved - hence likely associated with COVID   Recommended to continue lisinopril daily and atorvastatin 3 times a week   Patient is on as needed hydroxyzine which she is taking once or upto twice as needed   Taking trazodone 100 mg and sertraline through psychiatry - will address these with psychiatry   -Patient was on gabapentin which she has stopped since  she got steroid injection for foot which helped with the pain and that has resolved since   Patient also reports that she had to stop Vitamin C because it interacted with one of the medications that she was on and and she feels that she is getting more sick because she has not been able to take the vitamin C   *Blurry vision     *Shortness of breath.  Resolved   -Patient reported that she felt that with increasing her level of activity around the house recently and had noticed that she gets very winded just walking around the house.  Denied any cough or wheezing.   -Denied any chest pain.  Did  report palpitations sometimes when she gets anxious which goes away with some deep breaths, which is also resolved now   Denied any lightheadedness, dizziness, pedal edema, orthopnea or PND  -Does have history of sleep apnea, was on CPAP in the past patient does not recall why as she is not on it anymore.  -On examination patient patient did have a grade three systolic murmur that was present previously however on subsequent examination  - unable to appreciate that murmur.   Echocardiogram recently done was also not concerning except trace MR, mild PI concentric hypertrophy of left ventricle    -Denied any fevers, chills.  -As per labs in the past patient has had history of anemia, she has regular menstrual cycles sometimes, most recently her menstrual cycle lasted for 10 days.  Has history of PCOS as per chart, ovarian cyst  -Last cbc from November 2023 shows microcytosis without  anemia , MCV- 75, normal RDW and other cell lines. PBS not commented on   -Has history of DVT as per chart.    *Tachycardia   resolved  -noted that her watch - heart rate above 110 when she is up and about   -Not present today at rest and patient is asymptomatic  -echo unremarkable  Last cbc showed       History of COVID -recovered well     -described below   -Patient was able to finally reach the ophthalmologist , however was told that her insurance would not be taken   *Skin tag   -on lower abdomen - grown - wants to be rmoved,  -Also on exam noted to have erythema on lower abdominal region below skin fold- no symptoms     *Anxiety   -Patient is following up with psychiatry, mental health counselor, [place] [facility].  -Was receiving inpatient psychiatry treatment, currently is on Zoloft 100 mg, trazodone 100 mg  at nighttime, hydroxyzine as needed.   -Patient reports that she is doing overall well, trazodone helps most of the nights.  Sleep however she does have some nights when she is unable to fall asleep.    *Blurry vision  -Patient reports that about 6 years ago she had a significant blurry vision of both eyes which lasted for several months, when she was almost declared blind legally, was evaluated by ophthalmologist, unsure what the diagnosis was.  -Since then however her vision has significantly improved, currently has intermittent blurring of vision on both eyes, which when comes on lasts for a day to a month continuously.  -Denies any headaches, flashes floaters blind spots, eye pain, redness or aura  -We had placed ophthalmology referral on the last visit however patient was not able to attend the appointment due to being inpatient for mental health related issues.  -Patient is requesting for new referral.  -Patient denies having any dry eyes at this time    *Breast Skin lesion   Has had history of bug bites.   -On examination now only has superficial scar, no deeper concerning tissue on  palpation.  -Patient did have another spot with similar complaints, which again is currently a scar.  -Had ordered ultrasound breast at the time which has not been done.  -Given resolution of lump will go ahead and proceed to routine mammogram screening.    *Status post surgery for perirectal abscess/fistula   -Healing well as per patient    *Obesity  -Patient is working with nutrition specialist as well as trying to get more exercise as per recommendations of nutrition specialist.          Patient Active Problem List    Diagnosis Date Noted    Anxiety  On Zoloft 100 mg, trazodone 100 mg daily at night, hydroxyzine 06/16/2023    Neuropathic pain of both feet  On gabapentin  06/16/2023    Fibroids 11/15/2022     10/12/2022    Anemia  Microcytosis without anemia as per last labs done in november 2023 09/23/2022    Dyslipidemia 09/22/2022    Obesity (BMI 30-39.9) 05/25/2022    Gastro-esophageal reflux disease without esophagitis 04/14/2021    Major depressive disorder 09/14/2020    Polycystic ovary syndrome  2.2 cm right ovarian cyst seen on recent CT in May 2023  Unable to undergo TVUS due to perirectal abscess  Unable to go on estrogen containing OCP due to history of VTE  Will follow up with gyn following resolution of perirectal abscess 12/11/2019    History of deep venous thrombosis 11/19/2019    Hypothyroidism  Levothyroxine 150 mcg 11/19/2019    Hypertension  Mildly elevated on lisinopril 10 - not checking blood pressures at home regularly as the cuff not working  10/28/2017    Obstructive sleep apnea, adult  Trying to set up with cpap 07/31/2015    Vitamin D deficiency 03/12/2015    Type 2 diabetes mellitus with hyperglycemia, with long-term current use of insulin (HCC)  Following up with endocrinology -  A1c of 8.5 in June 2023     Tresiba 30U daily, Tulicity 0.5 mg weekly premeal humalog TID  - lipitor 20 daily  - lisinopril 10 daily  - gabapentin 100 TID for neuropathy 03/12/2015       Current Outpatient  Medications   Medication Sig Dispense Refill    gabapentin (NEURONTIN) 100 MG Cap Take 1 Capsule by mouth 3 times a day. 90 Capsule 1    insulin lispro 100 UNIT/ML SC SOPN injection PEN Inject 1-10 Units under the skin 3 times a day before meals. Or as directed (max 50 units/day) 15 mL 3    metFORMIN ER (GLUCOPHAGE XR) 500 MG TABLET SR 24 HR Take 2 Tablets by mouth 2 times a day. 120 Tablet 2    lisinopril (PRINIVIL) 10 MG Tab Take 1 Tablet by mouth every day. 90 Tablet 1    levothyroxine (SYNTHROID) 150 MCG Tab Take 1 Tablet by mouth every morning on an empty stomach. 90 Tablet 1    hydrOXYzine pamoate (VISTARIL) 50 MG Cap Take 1 oral capsule as needed every (6-8) hours for anxiety 90 Capsule 3    sertraline (ZOLOFT) 100 MG Tab Take 1 oral tablet once a day 30 Tablet 3    traZODone (DESYREL) 100 MG Tab Take 2 oral tablet at bedtime as needed 60 Tablet 3    Continuous Blood Gluc Sensor (FREESTYLE ALEKSANDAR 2 SENSOR) [facility] [place] 1 kit as directed every two weeks change every 14 days 7 Each 3    Dulaglutide (TRULICITY) 4.5 MG/0.5ML Solution Pen-injector Inject 4.5 mg subcutaneously once a week 2 mL 3    atorvastatin (LIPITOR) 20 MG Tab Take 1 tablet by mouth once a day (Patient taking differently: Take 20 mg by mouth 3 times a week.) 30 Tablet 3    Insulin Degludec (TRESIBA FLEXTOUCH) 200 UNIT/ML Solution Pen-injector Inject 26 unit subcutaneously once a day (Patient taking differently: Inject 30 Units under the skin every day.) 9 mL 3    Alcohol Swabs Pads use one alcohol swab three times a day as needed. 300 Each 6    Insulin Pen Needle 32 G x 4 mm Use 1 needle subcutaneously 4 times daily 360 Each 3     No current facility-administered medications for this visit.       ROS: As per HPI. Additional pertinent systems as noted below.  Constitutional:  Negative for fever, chills   HEENT : As in hpi  Cardiovascular:  Negative for chest pain, positive as in hpi  Respiratory:  Negative for cough, sputum production, positive as in  "hpi      /79 (BP Location: Right arm, Patient Position: Sitting, BP Cuff Size: Adult)   Pulse 81   Temp 36.2 °C (97.1 °F) (Temporal)   Ht 1.6 m (5' 3\")   Wt 93.4 kg (205 lb 12.8 oz)   SpO2 98%   BMI 36.46 kg/m²     Physical Exam   Constitutional:  . Not in acute distress   CVS : Regular rate and rhythm, systolic murmur not appreciated today     Respi- diffusely diminished breath sounds     Note: I have reviewed all pertinent labs and diagnostic tests associated with this visit with specific comments listed under the assessment and plan below    Assessment and Plan    1. Type 2 diabetes mellitus with hyperglycemia, with long-term current use of insulin (HCC)  Patient recommended not to take sliding scale for 2 days with dinner but to only take the 3 units  If no more lows then restart sliding scale   Recommended to check with glucometer when there are Lows on CGM to confirm lows as there have been discrepancies based on trends.  Continue 30 units   Follow up in 2 weeks     2. Neuropathic pain of both feet  Restart gabapentin   Stretching exercise of plantar fascitis   Appreciate podiatry recommendations   - gabapentin (NEURONTIN) 100 MG Cap; Take 1 Capsule by mouth 3 times a day.  Dispense: 90 Capsule; Refill: 1    3. Obstructive sleep apnea syndrome  Able to locate sleep study - will fax over information to their office to set up auto pap          Followup: Return in about 2 weeks (around 12/29/2023).        Signed by: Main Mcgee M.D.  "

## 2023-12-15 NOTE — LETTER
[facility]  Anahi Faria M.D.  [address] [place] [address]  Fax: [phone]   Authorization for Release/Disclosure of   Protected Health Information   Name: LIANA OBRIEN : 1980 SSN: [id number]   Address: [address]  [place] [address] Phone:    [phone] (home)    I authorize the entity listed below to release/disclose the PHI below to:   [facility]/Anahi Faria M.D. and Karin Junior M.D.   Provider or Entity Name: Endocrinology     Address   City, State, Zip   Phone:      Fax:     Reason for request: continuity of care   Information to be released:    [  ] LAST COLONOSCOPY,  including any PATH REPORT and follow-up  [  ] LAST FIT/COLOGUARD RESULT [  ] LAST DEXA  [  ] LAST MAMMOGRAM  [  ] LAST PAP  [  ] LAST LABS [  ] RETINA EXAM REPORT  [  ] IMMUNIZATION RECORDS  [  ] Release all info      [  ] Check here and initial the line next to each item to release ALL health information INCLUDING  _____ Care and treatment for drug and / or alcohol abuse  _____ HIV testing, infection status, or AIDS  _____ Genetic Testing    DATES OF SERVICE OR TIME PERIOD TO BE DISCLOSED: _____________  I understand and acknowledge that:  * This Authorization may be revoked at any time by you in writing, except if your health information has already been used or disclosed.  * Your health information that will be used or disclosed as a result of you signing this authorization could be re-disclosed by the recipient. If this occurs, your re-disclosed health information may no longer be protected by State or Federal laws.  * You may refuse to sign this Authorization. Your refusal will not affect your ability to obtain treatment.  * This Authorization becomes effective upon signing and will  on (date) __________.      If no date is indicated, this Authorization will  one (1) year from the signature date.    Name: Liana Obrien  Signature: Date:   2023      PLEASE FAX REQUESTED RECORDS BACK TO: [phone]   none

## 2023-12-17 PROBLEM — G47.33 OBSTRUCTIVE SLEEP APNEA SYNDROME: Status: ACTIVE | Noted: 2023-12-17

## 2023-12-17 PROCEDURE — RXMED WILLOW AMBULATORY MEDICATION CHARGE: Performed by: PHYSICIAN ASSISTANT

## 2023-12-19 ENCOUNTER — PHARMACY VISIT (OUTPATIENT)
Dept: PHARMACY | Facility: MEDICAL CENTER | Age: 43
End: 2023-12-19
Payer: COMMERCIAL

## 2024-01-04 NOTE — PROGRESS NOTES
No chief complaint on file.      HPI: Liana Obrien is a 43 y.o. female with past medical history as below who presented to the clinic for the following.      Type 2 dm   Sliding scale on top of 3 units before each meal -   Latest A1C was 8.5  Reviewing sugars - well controlled the last 2 days in mornings, however with lows detected at 1 am or 2 am and once at 6 am however these lows were followed by increase to > 100 without any intervention . Patient has not cross checked these numbers with the glucometer .evening readings at around dinner time is generally high   No symptoms at these time   Sleeping when it alarmed      Glargine was also increased to 30 units recently by endo, patient being followed by Pharmacy for diabetes .  Insulin refilled   They have recommended addition of metformin   Patient is not sure if she should take it because  of the low sugars   Patient also may have missed lunch time insulin          *Foot pain   -On bilateral plantar surface, throughout the day   -Pain is sharp   -Is seeing podiatry   -Getting cortisone injections.   -Next one not due until end of the month   -On previous visit we had decided to stop the  gabapentin that she was on as her pain was better controlled, Wants to retry gabapentin  Metformin       Status post dental procedure   -Associated with swelling - much better   There is some remnant discomfort which however is improving        Sleep apnea  -Sleep study at home was done however she has not heard back from them                     Other problems not discussed on this visit   *Polypharmacy     Patient believed that the nausea, vomiting and diarrhea is from her medications.   As per patient, her endocrinologist has stopped all the oral meds for diabetes that she was on and is currently on on insulin degludec 30 units now , humalog based on sliding scale and trulicity   Pioglitazone, steglatro and metformin were stopped . However patient continued  to have nausea, vomiting diarrhea which has since spontaneously resolved - hence likely associated with COVID   Recommended to continue lisinopril daily and atorvastatin 3 times a week   Patient is on as needed hydroxyzine which she is taking once or upto twice as needed   Taking trazodone 100 mg and sertraline through psychiatry - will address these with psychiatry   -Patient was on gabapentin which she has stopped since  she got steroid injection for foot which helped with the pain and that has resolved since   Patient also reports that she had to stop Vitamin C because it interacted with one of the medications that she was on and and she feels that she is getting more sick because she has not been able to take the vitamin C   *Blurry vision     *Shortness of breath.  Resolved   -Patient reported that she felt that with increasing her level of activity around the house recently and had noticed that she gets very winded just walking around the house.  Denied any cough or wheezing.   -Denied any chest pain.  Did  report palpitations sometimes when she gets anxious which goes away with some deep breaths, which is also resolved now   Denied any lightheadedness, dizziness, pedal edema, orthopnea or PND  -Does have history of sleep apnea, was on CPAP in the past patient does not recall why as she is not on it anymore.  -On examination patient patient did have a grade three systolic murmur that was present previously however on subsequent examination  - unable to appreciate that murmur.   Echocardiogram recently done was also not concerning except trace MR, mild PI concentric hypertrophy of left ventricle    -Denied any fevers, chills.  -As per labs in the past patient has had history of anemia, she has regular menstrual cycles sometimes, most recently her menstrual cycle lasted for 10 days.  Has history of PCOS as per chart, ovarian cyst  -Last cbc from November 2023 shows microcytosis without anemia , MCV- 75, normal  RDW and other cell lines. PBS not commented on   -Has history of DVT as per chart.    *Tachycardia   resolved  -noted that her watch - heart rate above 110 when she is up and about   -Not present today at rest and patient is asymptomatic  -echo unremarkable  Last cbc showed       History of COVID -recovered well     -described below   -Patient was able to finally reach the ophthalmologist , however was told that her insurance would not be taken   *Skin tag   -on lower abdomen - grown - wants to be rmoved,  -Also on exam noted to have erythema on lower abdominal region below skin fold- no symptoms     *Anxiety   -Patient is following up with psychiatry, mental health counselor, [place] [facility].  -Was receiving inpatient psychiatry treatment, currently is on Zoloft 100 mg, trazodone 100 mg  at nighttime, hydroxyzine as needed.   -Patient reports that she is doing overall well, trazodone helps most of the nights.  Sleep however she does have some nights when she is unable to fall asleep.    *Blurry vision  -Patient reports that about 6 years ago she had a significant blurry vision of both eyes which lasted for several months, when she was almost declared blind legally, was evaluated by ophthalmologist, unsure what the diagnosis was.  -Since then however her vision has significantly improved, currently has intermittent blurring of vision on both eyes, which when comes on lasts for a day to a month continuously.  -Denies any headaches, flashes floaters blind spots, eye pain, redness or aura  -We had placed ophthalmology referral on the last visit however patient was not able to attend the appointment due to being inpatient for mental health related issues.  -Patient is requesting for new referral.  -Patient denies having any dry eyes at this time    *Breast Skin lesion   Has had history of bug bites.   -On examination now only has superficial scar, no deeper concerning tissue on palpation.  -Patient did have  another spot with similar complaints, which again is currently a scar.  -Had ordered ultrasound breast at the time which has not been done.  -Given resolution of lump will go ahead and proceed to routine mammogram screening.    *Status post surgery for perirectal abscess/fistula   -Healing well as per patient    *Obesity  -Patient is working with nutrition specialist as well as trying to get more exercise as per recommendations of nutrition specialist.          Patient Active Problem List    Diagnosis Date Noted    Anxiety  On Zoloft 100 mg, trazodone 100 mg daily at night, hydroxyzine 06/16/2023    Neuropathic pain of both feet  On gabapentin  06/16/2023    Fibroids 11/15/2022     10/12/2022    Anemia  Microcytosis without anemia as per last labs done in november 2023 09/23/2022    Dyslipidemia 09/22/2022    Obesity (BMI 30-39.9) 05/25/2022    Gastro-esophageal reflux disease without esophagitis 04/14/2021    Major depressive disorder 09/14/2020    Polycystic ovary syndrome  2.2 cm right ovarian cyst seen on recent CT in May 2023  Unable to undergo TVUS due to perirectal abscess  Unable to go on estrogen containing OCP due to history of VTE  Will follow up with gyn following resolution of perirectal abscess 12/11/2019    History of deep venous thrombosis 11/19/2019    Hypothyroidism  Levothyroxine 150 mcg 11/19/2019    Hypertension  Mildly elevated on lisinopril 10 - not checking blood pressures at home regularly as the cuff not working  10/28/2017    Obstructive sleep apnea, adult  Trying to set up with cpap 07/31/2015    Vitamin D deficiency 03/12/2015    Type 2 diabetes mellitus with hyperglycemia, with long-term current use of insulin (HCC)  Following up with endocrinology -  A1c of 8.5 in June 2023     Tresiba 30U daily, Tulicity 0.5 mg weekly premeal humalog TID  - lipitor 20 daily  - lisinopril 10 daily  - gabapentin 100 TID for neuropathy 03/12/2015       Current Outpatient Medications   Medication Sig  Dispense Refill    metFORMIN (GLUCOPHAGE) 500 MG Tab TAKE 2 TABLETS BY MOUTH TWICE A  Tablet 3    pioglitazone (ACTOS) 15 MG Tab Take 1 tablet by mouth once a day 90 Tablet 3    Ertugliflozin L-PyroglutamicAc (STEGLATRO) 15 MG Tab Take 1 tablet by mouth in the morning 90 Tablet 3    Glucagon (BAQSIMI TWO PACK) 3 mg/dose Inhale 1 SPRAY into the nostril as a single dose for severe hypoglycemia 2 Each 3    gabapentin (NEURONTIN) 100 MG Cap Take 1 Capsule by mouth 3 times a day. 90 Capsule 1    insulin lispro 100 UNIT/ML SC SOPN injection PEN Inject 1-10 Units under the skin 3 times a day before meals. Or as directed (max 50 units/day) 15 mL 3    metFORMIN ER (GLUCOPHAGE XR) 500 MG TABLET SR 24 HR Take 2 Tablets by mouth 2 times a day. 120 Tablet 2    lisinopril (PRINIVIL) 10 MG Tab Take 1 Tablet by mouth every day. 90 Tablet 1    levothyroxine (SYNTHROID) 150 MCG Tab Take 1 Tablet by mouth every morning on an empty stomach. 90 Tablet 1    hydrOXYzine pamoate (VISTARIL) 50 MG Cap Take 1 oral capsule as needed every (6-8) hours for anxiety 90 Capsule 3    sertraline (ZOLOFT) 100 MG Tab Take 1 oral tablet once a day 30 Tablet 3    traZODone (DESYREL) 100 MG Tab Take 2 oral tablet at bedtime as needed 60 Tablet 3    Continuous Blood Gluc Sensor (FREESTYLE ALEKSANDAR 2 SENSOR) [facility] Use 1 kit as directed every two weeks change every 14 days 7 Each 3    Dulaglutide (TRULICITY) 4.5 MG/0.5ML Solution Pen-injector Inject 4.5 mg subcutaneously once a week 2 mL 3    atorvastatin (LIPITOR) 20 MG Tab Take 1 tablet by mouth once a day (Patient taking differently: Take 20 mg by mouth 3 times a week.) 30 Tablet 3    Insulin Degludec (TRESIBA FLEXTOUCH) 200 UNIT/ML Solution Pen-injector Inject 26 unit subcutaneously once a day (Patient taking differently: Inject 30 Units under the skin every day.) 9 mL 3    Alcohol Swabs Pads use one alcohol swab three times a day as needed. 300 Each 6    Insulin Pen Needle 32 G x 4 mm Use 1 needle  subcutaneously 4 times daily 360 Each 3     No current facility-administered medications for this visit.       ROS: As per HPI. Additional pertinent systems as noted below.  Constitutional:  Negative for fever, chills   HEENT : As in hpi  Cardiovascular:  Negative for chest pain, positive as in hpi  Respiratory:  Negative for cough, sputum production, positive as in hpi      There were no vitals taken for this visit.    Physical Exam   Constitutional:  . Not in acute distress   CVS : Regular rate and rhythm, systolic murmur not appreciated today     Respi- diffusely diminished breath sounds     Note: I have reviewed all pertinent labs and diagnostic tests associated with this visit with specific comments listed under the assessment and plan below    Assessment and Plan    1. Type 2 diabetes mellitus with hyperglycemia, with long-term current use of insulin (HCC)  Patient recommended not to take sliding scale for 2 days with dinner but to only take the 3 units  If no more lows then restart sliding scale   Recommended to check with glucometer when there are Lows on CGM to confirm lows as there have been discrepancies based on trends.  Continue 30 units   Follow up in 2 weeks     2. Neuropathic pain of both feet  Restart gabapentin   Stretching exercise of plantar fascitis   Appreciate podiatry recommendations   - gabapentin (NEURONTIN) 100 MG Cap; Take 1 Capsule by mouth 3 times a day.  Dispense: 90 Capsule; Refill: 1    3. Obstructive sleep apnea syndrome  Able to locate sleep study - will fax over information to their office to set up auto pap          Followup: No follow-ups on file.        Signed by: Main Mcgee M.D.

## 2024-01-05 ENCOUNTER — APPOINTMENT (OUTPATIENT)
Dept: INTERNAL MEDICINE | Facility: OTHER | Age: 44
End: 2024-01-05
Payer: MEDICAID

## 2024-01-08 NOTE — PROGRESS NOTES
Chief Complaint   Patient presents with    Vaginal Bleeding     Vaginal bleeding since 12/15    Medication Refill     Pen needles       HPI: Liana Obrien is a 43 y.o. female with past medical history as below who presented to the clinic for the following.    *Nausea   *Dizziness  more like lightheadedness  *:Diarrhea - 2-3 times a day - loose, non bloody, non watery   Abdominal cramps associated with periods   -more than a week now.   -Symptoms may be associated with increased dose of metformin as well.  Has been having period since December 15 - it has happened before however resolved on its own. At that time she had noticed that it happened when she was started on some diabetic medications   Pelvic USG showed fibroids in the past   Has not seen gynecology yet   Patient has also been noticing higher sugars   Zack sensor coming out - not working well   Highest sugar was 300 , no lows - fasting was 150, has pharmacy appointment next week , was also seen by endo who recommended restarting her other oral meds - however she has not yet and she ema not desire to do that .  -Patient is also stressed out recently, dealing with court stuff.  Goes to [facility] place to sleep at night, during the day by herself at home.    Type 2 dm   Sliding scale on top of 3 units before each meal -   Latest A1C was 8.5  Unable to check sugars well due to sensor not working, has been checking with fingersticks however not routinely.  -Highest sugar was 300 , no lows - fasting was 150, has pharmacy appointment next week , was also seen by endo who recommended restarting her other oral meds - however she has not yet and she ema not desire to do that .  -Does have symptoms as above.  -She is currently following dietary recommendations suggested to nutrition specialist.    Sleep apnea  -Sleep study at home was done however she has not heard back from them             Other problems not discussed on this visit     *Foot pain    -On bilateral plantar surface, throughout the day   -Pain is sharp   -Is seeing podiatry   -Getting cortisone injections.   -Next one not due until end of the month   -On previous visit we had decided to stop the  gabapentin that she was on as her pain was better controlled, Wants to retry gabapentin  Metformin     Status post dental procedure   -Associated with swelling - much better   There is some remnant discomfort which however is improving     *Polypharmacy   Patient believed that the nausea, vomiting and diarrhea is from her medications.   As per patient, her endocrinologist has stopped all the oral meds for diabetes that she was on and is currently on on insulin degludec 30 units now , humalog based on sliding scale and trulicity   Pioglitazone, steglatro and metformin were stopped . However patient continued to have nausea, vomiting diarrhea which has since spontaneously resolved - hence likely associated with COVID   Recommended to continue lisinopril daily and atorvastatin 3 times a week   Patient is on as needed hydroxyzine which she is taking once or upto twice as needed   Taking trazodone 100 mg and sertraline through psychiatry - will address these with psychiatry   -Patient was on gabapentin which she has stopped since  she got steroid injection for foot which helped with the pain and that has resolved since   Patient also reports that she had to stop Vitamin C because it interacted with one of the medications that she was on and and she feels that she is getting more sick because she has not been able to take the vitamin C   *Blurry vision-checked with optometrist.  -Minimal changes were associated with diabetes as per patient otherwise within normal limits.  *Shortness of breath.  Resolved   -Patient reported that she felt that with increasing her level of activity around the house recently and had noticed that she gets very winded just walking around the house.  Denied any cough or wheezing.    -Denied any chest pain.  Did  report palpitations sometimes when she gets anxious which goes away with some deep breaths, which is also resolved now   Denied any lightheadedness, dizziness, pedal edema, orthopnea or PND  -Does have history of sleep apnea, was on CPAP in the past patient does not recall why as she is not on it anymore.  -On examination patient patient did have a grade three systolic murmur that was present previously however on subsequent examination  - unable to appreciate that murmur.   Echocardiogram recently done was also not concerning except trace MR, mild PI concentric hypertrophy of left ventricle    -Denied any fevers, chills.  -As per labs in the past patient has had history of anemia, she has regular menstrual cycles sometimes, most recently her menstrual cycle lasted for 10 days.  Has history of PCOS as per chart, ovarian cyst  -Last cbc from November 2023 shows microcytosis without anemia , MCV- 75, normal RDW and other cell lines. PBS not commented on   -Has history of DVT as per chart.    *Tachycardia   resolved  -noted that her watch - heart rate above 110 when she is up and about   -Not present today at rest and patient is asymptomatic  -echo unremarkable  Last cbc showed       History of COVID -recovered well     -described below   -Patient was able to finally reach the ophthalmologist , however was told that her insurance would not be taken   *Skin tag   -on lower abdomen - grown - wants to be rmoved,  -Also on exam noted to have erythema on lower abdominal region below skin fold- no symptoms     *Anxiety   -Patient is following up with psychiatry, mental health counselor, [facility].  -Was receiving inpatient psychiatry treatment, currently is on Zoloft 100 mg, trazodone 100 mg  at nighttime, hydroxyzine as needed.   -Patient reports that she is doing overall well, trazodone helps most of the nights.  Sleep however she does have some nights when she is unable to fall  asleep.    *Blurry vision  -Patient reports that about 6 years ago she had a significant blurry vision of both eyes which lasted for several months, when she was almost declared blind legally, was evaluated by ophthalmologist, unsure what the diagnosis was.  -Since then however her vision has significantly improved, currently has intermittent blurring of vision on both eyes, which when comes on lasts for a day to a month continuously.  -Denies any headaches, flashes floaters blind spots, eye pain, redness or aura  -We had placed ophthalmology referral on the last visit however patient was not able to attend the appointment due to being inpatient for mental health related issues.  -Patient is requesting for new referral.  -Patient denies having any dry eyes at this time    *Breast Skin lesion   Has had history of bug bites.   -On examination now only has superficial scar, no deeper concerning tissue on palpation.  -Patient did have another spot with similar complaints, which again is currently a scar.  -Had ordered ultrasound breast at the time which has not been done.  -Given resolution of lump will go ahead and proceed to routine mammogram screening.    *Status post surgery for perirectal abscess/fistula   -Healing well as per patient    *Obesity  -Patient is working with nutrition specialist as well as trying to get more exercise as per recommendations of nutrition specialist.          Patient Active Problem List    Diagnosis Date Noted    Anxiety  On Zoloft 100 mg, trazodone 100 mg daily at night, hydroxyzine 06/16/2023    Neuropathic pain of both feet  On gabapentin  06/16/2023    Fibroids 11/15/2022     10/12/2022    Anemia  Microcytosis without anemia as per last labs done in november 2023 09/23/2022    Dyslipidemia 09/22/2022    Obesity (BMI 30-39.9) 05/25/2022    Gastro-esophageal reflux disease without esophagitis 04/14/2021    Major depressive disorder 09/14/2020    Polycystic ovary syndrome  2.2 cm right  ovarian cyst seen on recent CT in May 2023  Unable to undergo TVUS due to perirectal abscess  Unable to go on estrogen containing OCP due to history of VTE  Will follow up with gyn following resolution of perirectal abscess 12/11/2019    History of deep venous thrombosis 11/19/2019    Hypothyroidism  Levothyroxine 150 mcg 11/19/2019    Hypertension  Mildly elevated on lisinopril 10 - not checking blood pressures at home regularly as the cuff not working  10/28/2017    Obstructive sleep apnea, adult  Trying to set up with cpap 07/31/2015    Vitamin D deficiency 03/12/2015    Type 2 diabetes mellitus with hyperglycemia, with long-term current use of insulin (HCC)  Following up with endocrinology -  A1c of 8.5 in June 2023     Tresiba 30U daily, Tulicity 0.5 mg weekly premeal humalog TID  - lipitor 20 daily  - lisinopril 10 daily  - gabapentin 100 TID for neuropathy 03/12/2015       Current Outpatient Medications   Medication Sig Dispense Refill    metFORMIN (GLUCOPHAGE) 500 MG Tab TAKE 2 TABLETS BY MOUTH TWICE A  Tablet 3    pioglitazone (ACTOS) 15 MG Tab Take 1 tablet by mouth once a day 90 Tablet 3    Ertugliflozin L-PyroglutamicAc (STEGLATRO) 15 MG Tab Take 1 tablet by mouth in the morning 90 Tablet 3    Glucagon (BAQSIMI TWO PACK) 3 mg/dose Inhale 1 SPRAY into the nostril as a single dose for severe hypoglycemia 2 Each 3    gabapentin (NEURONTIN) 100 MG Cap Take 1 Capsule by mouth 3 times a day. 90 Capsule 1    insulin lispro 100 UNIT/ML SC SOPN injection PEN Inject 1-10 Units under the skin 3 times a day before meals. Or as directed (max 50 units/day) 15 mL 3    metFORMIN ER (GLUCOPHAGE XR) 500 MG TABLET SR 24 HR Take 2 Tablets by mouth 2 times a day. 120 Tablet 2    lisinopril (PRINIVIL) 10 MG Tab Take 1 Tablet by mouth every day. 90 Tablet 1    levothyroxine (SYNTHROID) 150 MCG Tab Take 1 Tablet by mouth every morning on an empty stomach. 90 Tablet 1    hydrOXYzine pamoate (VISTARIL) 50 MG Cap Take 1  "oral capsule as needed every (6-8) hours for anxiety 90 Capsule 3    sertraline (ZOLOFT) 100 MG Tab Take 1 oral tablet once a day 30 Tablet 3    traZODone (DESYREL) 100 MG Tab Take 2 oral tablet at bedtime as needed 60 Tablet 3    Continuous Blood Gluc Sensor (FREESTYLE ALEKSANDAR 2 SENSOR) [facility] Use 1 kit as directed every two weeks change every 14 days 7 Each 3    Dulaglutide (TRULICITY) 4.5 MG/0.5ML Solution Pen-injector Inject 4.5 mg subcutaneously once a week 2 mL 3    atorvastatin (LIPITOR) 20 MG Tab Take 1 tablet by mouth once a day (Patient taking differently: Take 20 mg by mouth 3 times a week.) 30 Tablet 3    Insulin Degludec (TRESIBA FLEXTOUCH) 200 UNIT/ML Solution Pen-injector Inject 26 unit subcutaneously once a day (Patient taking differently: Inject 30 Units under the skin every day.) 9 mL 3    Alcohol Swabs Pads use one alcohol swab three times a day as needed. 300 Each 6    Insulin Pen Needle 32 G x 4 mm Use 1 needle subcutaneously 4 times daily 360 Each 3     No current facility-administered medications for this visit.       ROS: As per HPI. Additional pertinent systems as noted below.  Constitutional:  Negative for fever, chills   HEENT : As in hpi  Cardiovascular:  Negative for chest pain, positive as in hpi  Respiratory:  Negative for cough, sputum production, positive as in hpi      /80 (BP Location: Left arm, Patient Position: Sitting, BP Cuff Size: Adult)   Pulse 90   Temp 36.4 °C (97.5 °F) (Temporal)   Ht 1.6 m (5' 3\")   Wt 91.4 kg (201 lb 6.4 oz)   SpO2 97%   BMI 35.68 kg/m²     Physical Exam   Constitutional:  . Not in acute distress   CVS : Regular rate and rhythm, systolic murmur not appreciated today     Respi- diffusely diminished breath sounds     Note: I have reviewed all pertinent labs and diagnostic tests associated with this visit with specific comments listed under the assessment and plan below    Assessment and Plan      1. Menorrhagia with irregular cycle  -Has happened " in the past, does have ultrasound evidence of fibroids in the past, bleeding is slowing at this time.  -Symptoms of nausea lightheadedness, intermittent headaches possibly associated with excessive bleeding, check CBC.  -Referral to gynecology has been placed, thyroid has been checked in the past which was normal  - Referral to Gynecology    2. Fibroids  Possibly causing menorrhagia.  Plan as above  - Referral to Gynecology    3. Lightheadedness  Possibly secondary to dehydration, menorrhagia loss of blood, check CBC.  -Encouraged hydration.    4. Type 2 diabetes mellitus with hyperglycemia, with long-term current use of insulin (HCC)    -Patient has been unable to check her sugars regularly, denies any hypoglycemic episode.  Encouraged to go to pharmacy to replace sensor that was not working and to administer new sensor.  -Is taking 30 units of long-acting, Trulicity and taking sliding scale on top of 3 units of short acting.  -Patient has started metformin.  Above symptoms may have been associated with increased dose of metformin to 1000 mg.  -Metformin less likely the cause of the above symptoms however encouraged to go back to 500 mg and see if symptoms resolve if not resolving then continue taking 1000 mg.  -Workup for above symptoms, possibly being associated with high sugars or menorrhagia.    - CBC WITH DIFFERENTIAL; Future    5. Nausea  -Encourage hydration, decrease dose of metformin to see if symptoms improve if not go back on the previous dose.  -Associated with lightheadedness possibly from menorrhagia.  Referral to gynecology placed for this    6. Diarrhea, unspecified type  - -Encourage hydration, decrease dose of metformin to see if symptoms improve if not go back on the previous dose.      Followup: Return in about 1 month (around 2/10/2024).        Signed by: Main Mcgee M.D.

## 2024-01-10 ENCOUNTER — OFFICE VISIT (OUTPATIENT)
Dept: INTERNAL MEDICINE | Facility: OTHER | Age: 44
End: 2024-01-10
Payer: MEDICAID

## 2024-01-10 VITALS
HEART RATE: 90 BPM | HEIGHT: 63 IN | WEIGHT: 201.4 LBS | SYSTOLIC BLOOD PRESSURE: 122 MMHG | BODY MASS INDEX: 35.68 KG/M2 | DIASTOLIC BLOOD PRESSURE: 80 MMHG | OXYGEN SATURATION: 97 % | TEMPERATURE: 97.5 F

## 2024-01-10 DIAGNOSIS — R11.0 NAUSEA: ICD-10-CM

## 2024-01-10 DIAGNOSIS — R19.7 DIARRHEA, UNSPECIFIED TYPE: ICD-10-CM

## 2024-01-10 DIAGNOSIS — R42 LIGHTHEADEDNESS: ICD-10-CM

## 2024-01-10 DIAGNOSIS — E11.65 TYPE 2 DIABETES MELLITUS WITH HYPERGLYCEMIA, WITH LONG-TERM CURRENT USE OF INSULIN (HCC): ICD-10-CM

## 2024-01-10 DIAGNOSIS — D21.9 FIBROIDS: ICD-10-CM

## 2024-01-10 DIAGNOSIS — Z79.4 TYPE 2 DIABETES MELLITUS WITH HYPERGLYCEMIA, WITH LONG-TERM CURRENT USE OF INSULIN (HCC): ICD-10-CM

## 2024-01-10 DIAGNOSIS — N92.1 MENORRHAGIA WITH IRREGULAR CYCLE: ICD-10-CM

## 2024-01-10 PROCEDURE — 99214 OFFICE O/P EST MOD 30 MIN: CPT | Performed by: INTERNAL MEDICINE

## 2024-01-10 PROCEDURE — 3074F SYST BP LT 130 MM HG: CPT | Performed by: INTERNAL MEDICINE

## 2024-01-10 PROCEDURE — 3079F DIAST BP 80-89 MM HG: CPT | Performed by: INTERNAL MEDICINE

## 2024-01-10 ASSESSMENT — PATIENT HEALTH QUESTIONNAIRE - PHQ9
8. MOVING OR SPEAKING SO SLOWLY THAT OTHER PEOPLE COULD HAVE NOTICED. OR THE OPPOSITE, BEING SO FIGETY OR RESTLESS THAT YOU HAVE BEEN MOVING AROUND A LOT MORE THAN USUAL: NOT AT ALL
SUM OF ALL RESPONSES TO PHQ9 QUESTIONS 1 AND 2: 0
3. TROUBLE FALLING OR STAYING ASLEEP OR SLEEPING TOO MUCH: SEVERAL DAYS
2. FEELING DOWN, DEPRESSED, IRRITABLE, OR HOPELESS: NOT AT ALL
SUM OF ALL RESPONSES TO PHQ QUESTIONS 1-9: 1
4. FEELING TIRED OR HAVING LITTLE ENERGY: NOT AT ALL
5. POOR APPETITE OR OVEREATING: NOT AT ALL
7. TROUBLE CONCENTRATING ON THINGS, SUCH AS READING THE NEWSPAPER OR WATCHING TELEVISION: NOT AT ALL
9. THOUGHTS THAT YOU WOULD BE BETTER OFF DEAD, OR OF HURTING YOURSELF: NOT AT ALL
6. FEELING BAD ABOUT YOURSELF - OR THAT YOU ARE A FAILURE OR HAVE LET YOURSELF OR YOUR FAMILY DOWN: NOT AL ALL
1. LITTLE INTEREST OR PLEASURE IN DOING THINGS: NOT AT ALL

## 2024-01-10 ASSESSMENT — FIBROSIS 4 INDEX: FIB4 SCORE: 0.66

## 2024-01-12 PROBLEM — R42 LIGHTHEADEDNESS: Status: ACTIVE | Noted: 2024-01-12

## 2024-01-12 PROBLEM — N92.0 MENORRHAGIA: Status: ACTIVE | Noted: 2024-01-12

## 2024-01-12 PROBLEM — R19.7 DIARRHEA: Status: ACTIVE | Noted: 2024-01-12

## 2024-01-17 ENCOUNTER — APPOINTMENT (OUTPATIENT)
Dept: INTERNAL MEDICINE | Facility: OTHER | Age: 44
End: 2024-01-17
Payer: MEDICAID

## 2024-01-18 ENCOUNTER — NON-PROVIDER VISIT (OUTPATIENT)
Dept: VASCULAR LAB | Facility: MEDICAL CENTER | Age: 44
End: 2024-01-18
Attending: INTERNAL MEDICINE
Payer: MEDICAID

## 2024-01-18 ENCOUNTER — APPOINTMENT (OUTPATIENT)
Dept: INTERNAL MEDICINE | Facility: OTHER | Age: 44
End: 2024-01-18
Payer: MEDICAID

## 2024-01-18 PROCEDURE — RXMED WILLOW AMBULATORY MEDICATION CHARGE

## 2024-01-18 PROCEDURE — RXMED WILLOW AMBULATORY MEDICATION CHARGE: Performed by: HOSPITALIST

## 2024-01-18 PROCEDURE — 99212 OFFICE O/P EST SF 10 MIN: CPT

## 2024-01-18 PROCEDURE — RXMED WILLOW AMBULATORY MEDICATION CHARGE: Performed by: INTERNAL MEDICINE

## 2024-01-18 PROCEDURE — RXMED WILLOW AMBULATORY MEDICATION CHARGE: Performed by: PHYSICIAN ASSISTANT

## 2024-01-18 NOTE — PROGRESS NOTES
Patient Consult Note    Primary care physician: Main Mcgee M.D.    Reason for consult: Management of Uncontrolled Type 2 Diabetes    HPI:  Liana Obrien is a 43 y.o. old patient who comes in today for evaluation of above stated problem.    Allergies:  Bicillin c-r [penicillin g proc & benzathine], Ondansetron, Penicillin g, Sulfa drugs, and Metoclopramide    Current Diabetes Medication Regimen:  GLP-1 Agent: dulaglutide (Trulicity) 4.5 mg weekly  Basal Insulin: Tresiba 30 units daily  Prandial Insulin: Humalog 3 units + sliding scale before meals (1 unit every 50 > 150)   Usually just injecting 3-5 units  Metformin  mg daily -- tried to take BID for a month but had diarrhea    Previous Diabetes Medications and Reason for Discontinuation:  Metformin  Pioglitazone  Steglatro    Potential Barriers to Care:  Adherence: denies missed doses   Side effects: diarrhea from metformin  mg BID, resolved when she decreased back to daily dosing  Affordability: no issues    SMBG: Zack 2  7-days:  Sensor usage: 81%  TIR: 83%  Hyperglycemia: 17%  Hypoglycemia: 0%  Average glucose: 136    14-days:  Sensor usage: 47%  TIR: 82%  Hyperglycemia: 18%  Hypoglycemia: 0%  Average glucose: 138    Hypoglycemia:  Hypoglycemia awareness: Yes  Nocturnal hypoglycemia: None  Hypoglycemia:  None  Pt's treatment of Hypoglycemia  Discussed 15:15 Rule    Lifestyle:  Current Exercise: trying to walk, one hour once a week  Exercise Goal: increase as tolerated, aim for 150 mins/week    Dietary:  Appetite has been low and only eating one meal a day  Snacks on cheese and nuts  Usually only eats dinner; tries to keep it well balanced    Preventative Management:  BP regimen (ACEi/ARB): lisinopril 10 mg  Statin: atorvastatin (Lipitor) 20 mg three times a week  LDL <100: no (see Media for most recent labs)  Last Lipid Panel 11/13/23      Last Microalbumin/Cr:  Lab Results   Component Value Date/Time    [facility] 30 08/11/2018 08:21 AM     MICROALBUR 4.1 08/11/2018 08:21 AM     Last A1c:  Lab Results   Component Value Date/Time    HBA1C 8.5 (A) 11/13/2023 12:00 AM      Last Retinal Scan: 08/2022, due    Updated caregaps    Other Pertinent Labs (See Media):    Latest Reference Range & Units 05/15/23 00:15   Sodium 135 - 145 mmol/L 132 (L)   Potassium 3.6 - 5.5 mmol/L 3.7   Chloride 96 - 112 mmol/L 101   Co2 20 - 33 mmol/L 20   Anion Gap 7.0 - 16.0  11.0   Glucose 65 - 99 mg/dL 295 (H)   Bun 8 - 22 mg/dL 8   Creatinine 0.50 - 1.40 mg/dL 0.34 (L)   (L): Data is abnormally low  (H): Data is abnormally high     Estimated CrCl: > 100 mL/min (Based on sCr = 0.34 mg/dL, Actual Wt = 92.5 kg)  eGFR 131     Physical Examination:  Vital signs: There were no vitals taken for this visit. There is no height or weight on file to calculate BMI.    Foot Exam:  Monofilament exam: 08/2023    Assessment and Plan:    1. DMII  Discussed Goals: FBG 80 - 130, 2hPP < 180, a1c < 7.0%  Last A1c above goal at 8.5%  7-day TIR at goal of > 70%  Patient is working on lifestyle modifications  Patient initiated metformin at last visit, however unable to tolerate >500 mg daily despite taking ER formulation. Will keep at the lowest tolerable dose as this would still help increase insulin sensitivity  Given recent CGM trends, will continue current regimen  She is on a very low dose of bolus insulin and only administering this once a day d/t poor appetite and only eating one meal daily. May discuss alternative non-insulin pharmacotherapy pending repeat A1c next month.   LDL at 110 which is above goal of < 100. Statin therapy was recently decreased from daily to three times weekly. Would recommend increasing back to daily and continuing to work on lifestyle modifications (especially given that patient wants to minimize pill burden). Will discuss this at next visit given time constraints today.  UACR due, lab previously ordered    Medication changes:  None    Continue:  Metformin  mg  daily  Trulicity 4.5 mg weekly  Tresiba 30 units daily  Humalog 3 units + sliding scale before meals (1 unit every 50 > 150)     Lifestyle changes:  Increase exercise as tolerated  Focus on low carb diet. Incorporate more veggies and protein.    Follow Up:  1 month    Jesus Cazares, PharmD    CC:   Main Mcgee M.D.

## 2024-01-19 PROCEDURE — RXMED WILLOW AMBULATORY MEDICATION CHARGE: Performed by: INTERNAL MEDICINE

## 2024-01-23 ENCOUNTER — PHARMACY VISIT (OUTPATIENT)
Dept: PHARMACY | Facility: MEDICAL CENTER | Age: 44
End: 2024-01-23
Payer: COMMERCIAL

## 2024-01-23 PROCEDURE — RXMED WILLOW AMBULATORY MEDICATION CHARGE

## 2024-01-29 ENCOUNTER — NON-PROVIDER VISIT (OUTPATIENT)
Dept: INTERNAL MEDICINE | Facility: OTHER | Age: 44
End: 2024-01-29
Payer: MEDICAID

## 2024-01-29 VITALS — WEIGHT: 198 LBS | BODY MASS INDEX: 36.44 KG/M2 | HEIGHT: 62 IN

## 2024-01-29 DIAGNOSIS — Z79.4 TYPE 2 DIABETES MELLITUS WITH HYPERGLYCEMIA, WITH LONG-TERM CURRENT USE OF INSULIN (HCC): ICD-10-CM

## 2024-01-29 DIAGNOSIS — E66.9 OBESITY (BMI 30-39.9): ICD-10-CM

## 2024-01-29 DIAGNOSIS — E11.65 TYPE 2 DIABETES MELLITUS WITH HYPERGLYCEMIA, WITH LONG-TERM CURRENT USE OF INSULIN (HCC): ICD-10-CM

## 2024-01-29 PROCEDURE — 97803 MED NUTRITION INDIV SUBSEQ: CPT | Performed by: DIETITIAN, REGISTERED

## 2024-01-29 ASSESSMENT — FIBROSIS 4 INDEX: FIB4 SCORE: 0.66

## 2024-01-29 NOTE — PATIENT INSTRUCTIONS
"I will drink at least 4-bottles water per day    I will eat a fruit or a vegetable every time I eat- even without a fridge    I will start walking 15 min every day and use weights 15 min  3/7    My Health Profile:    PHYSICAL ASSESSMENT  Current Height:  61.5\"  Ht Readings from Last 1 Encounters:   01/10/24 1.6 m (5' 3\")     Current Weight:  198#  Wt Readings from Last 1 Encounters:   01/10/24 91.4 kg (201 lb 6.4 oz)     BMI:  36    Goal Weight:  160#    Total Body Water (lbs): 82.6#  Total Body Water (%): 41.7%  Visceral Fat: 11   (Range: Less than 13)  Total Body Fat: (lbs): 83.2#  % Body Fat: 42%   Muscle Mass (lbs): 109.2#  Muscle Mass (%): 55%  Fat-Free Mass: 115#  Resting Metabolic Rate: 1550 calories  Weight Loss Calories: 9037-3031 calories  Maintenance Calories: 1800 calories   Protein needs: 72-90 grams protein per day    NUTRITION PHYSICAL ASSESSMENT:  Waist cm: 49.5\"  "

## 2024-01-29 NOTE — PROGRESS NOTES
"Liana Obrien is a 43 y.o. year old, female returns for diabetes self management follow up.    Positive changes since last visit:    Diabetes is under control  Polypharmacy medication review: ER Metformin 500 MG all that can be tolerated, not able to increase to 2-pills/BID; currently at 500 MG total once a day; Tresiba 30 u PM, Trulicity 4.5 MG injectable  Yasmin Yu - endocrinology    Meal/Eating/Environment Pattern:    Liana would like to see some weight loss  FB-150 mg/dl   Before meals  Before bed:   Time in [employer] x 30 days:  72%  Average B mg/dl    Moved back to her home after living at her parents since November  Home without power and refrigeration- currently choosing cereals, sandwiches     Latest Reference Range & Units 08/10/22 17:09 11/15/22 10:25 23 10:31 23 00:00   Glycohemoglobin 5.8 % 12.5 ! 9.6 ! 11.0 (H) 8.5 ! (E)     My Health Profile:    PHYSICAL ASSESSMENT  Current Height:  61.5\"  Ht Readings from Last 1 Encounters:   01/10/24 1.6 m (5' 3\")     Current Weight:  198#  Wt Readings from Last 1 Encounters:   01/10/24 91.4 kg (201 lb 6.4 oz)     BMI:  36    Goal Weight:  160#    Total Body Water (lbs): 82.6#  Total Body Water (%): 41.7%  Visceral Fat: 11   (Range: Less than 13)  Total Body Fat: (lbs): 83.2#  % Body Fat: 42%   Muscle Mass (lbs): 109.2#  Muscle Mass (%): 55%  Fat-Free Mass: 115#  Resting Metabolic Rate: 1550 calories  Weight Loss Calories: 1968-2253 calories  Maintenance Calories: 1800 calories   Protein needs: 72-90 grams protein per day    NUTRITION PHYSICAL ASSESSMENT:  Waist cm: 49.5\"    Comments:    Challenges: ex-; ordered to pay her, to begin alimony support, which will help her in getting her electricity turned back on.    Parents not a healthy support for Liana- finding her independence and working towards building her strength and fortitude to manage her diabetes and overall risk reduction.    Liana is applying for work, hopes to " land a new job    No CPAP machine with [employer]  Remains on trazadone for sleep: sleeps [id number]    Reinforced new commitments despite lack of refrigeration-strategies to make healthy habits even with limitations and life challenges.    RTC x next available    Time Spent:  60 minutes

## 2024-01-30 ENCOUNTER — APPOINTMENT (OUTPATIENT)
Dept: INTERNAL MEDICINE | Facility: OTHER | Age: 44
End: 2024-01-30
Payer: MEDICAID

## 2024-02-05 ENCOUNTER — TELEPHONE (OUTPATIENT)
Dept: INTERNAL MEDICINE | Facility: OTHER | Age: 44
End: 2024-02-05

## 2024-02-05 ENCOUNTER — OFFICE VISIT (OUTPATIENT)
Dept: INTERNAL MEDICINE | Facility: OTHER | Age: 44
End: 2024-02-05
Payer: MEDICAID

## 2024-02-05 VITALS
WEIGHT: 201.4 LBS | OXYGEN SATURATION: 97 % | HEIGHT: 63 IN | TEMPERATURE: 97.4 F | BODY MASS INDEX: 35.68 KG/M2 | DIASTOLIC BLOOD PRESSURE: 60 MMHG | HEART RATE: 89 BPM | SYSTOLIC BLOOD PRESSURE: 99 MMHG

## 2024-02-05 DIAGNOSIS — Z02.9 ADMINISTRATIVE ENCOUNTER: ICD-10-CM

## 2024-02-05 DIAGNOSIS — G47.33 OBSTRUCTIVE SLEEP APNEA SYNDROME: ICD-10-CM

## 2024-02-05 DIAGNOSIS — G57.93 NEUROPATHIC PAIN OF BOTH FEET: ICD-10-CM

## 2024-02-05 DIAGNOSIS — E11.65 TYPE 2 DIABETES MELLITUS WITH HYPERGLYCEMIA, WITH LONG-TERM CURRENT USE OF INSULIN (HCC): ICD-10-CM

## 2024-02-05 DIAGNOSIS — Z79.4 TYPE 2 DIABETES MELLITUS WITH HYPERGLYCEMIA, WITH LONG-TERM CURRENT USE OF INSULIN (HCC): ICD-10-CM

## 2024-02-05 DIAGNOSIS — N92.1 MENORRHAGIA WITH IRREGULAR CYCLE: ICD-10-CM

## 2024-02-05 PROBLEM — R42 LIGHTHEADEDNESS: Status: RESOLVED | Noted: 2024-01-12 | Resolved: 2024-02-05

## 2024-02-05 PROBLEM — R00.0 TACHYCARDIA: Status: RESOLVED | Noted: 2023-11-18 | Resolved: 2024-02-05

## 2024-02-05 PROCEDURE — 3074F SYST BP LT 130 MM HG: CPT | Performed by: INTERNAL MEDICINE

## 2024-02-05 PROCEDURE — 3078F DIAST BP <80 MM HG: CPT | Performed by: INTERNAL MEDICINE

## 2024-02-05 PROCEDURE — 99214 OFFICE O/P EST MOD 30 MIN: CPT | Performed by: INTERNAL MEDICINE

## 2024-02-05 PROCEDURE — 1126F AMNT PAIN NOTED NONE PRSNT: CPT | Performed by: INTERNAL MEDICINE

## 2024-02-05 PROCEDURE — RXMED WILLOW AMBULATORY MEDICATION CHARGE: Performed by: INTERNAL MEDICINE

## 2024-02-05 ASSESSMENT — PAIN SCALES - GENERAL: PAINLEVEL: NO PAIN

## 2024-02-05 ASSESSMENT — FIBROSIS 4 INDEX: FIB4 SCORE: 0.66

## 2024-02-05 NOTE — PROGRESS NOTES
Chief Complaint   Patient presents with    Diabetes    Paperwork    Medication Refill     Patient here for DM paperwork for work and refills       HPI: Liana Obrien is a 43 y.o. female with past medical history as below who presented to the clinic for the following.    *Nausea   *Dizziness  *:Diarrhea   Resolved     *Menorrhagia   *Fibroids   Referred to gynecology on the last visit - has not heard back - provided with information       *Type 2 dm   Latest A1C was 8.5  -Highest sugar was 300 , few in 122 140, majority in 200s.  Patient reported low that the machine reported however no numbers reported by the machine.  She was asymptomatic noted in the morning when she woke up.  -She is currently following dietary recommendations suggested to nutrition specialist.  -Is also following up with pharmacy, is on metformin low-dose 500, Tresiba 30 units, 3 units Premeal with sliding scale. Trulicity   -Patient is requesting for paperwork for her new job as a  for night shifts which was filled out scanned into chart.  -Patient is currently adjusting to this schedule, shifts are 12-hour long starting at 4 to 7 PM does have pharmacy appointment next week to discuss this as well.  -Last eye exam in December.  -Does have neuropathy listed as problems, patient however does not have any tingling numbness does have foot pain was seeing a podiatrist getting injections around the ankle.  -Is taking gabapentin which helps with the pain.  -Patient has normal monofilament test today with decreased sensation only on the right great toe.  -Pulses intact, vibration sense slightly reduced in the great toes only.  -Proprioception intact.  -requesting for refill for pen needles     Sleep apnea  -Sleep study at home was done however she has not heard back from them   Needs to be faxed over again - was faxed in the past by Dr. Sharif       Other problems not discussed on this visit     *Foot pain   -On bilateral  plantar surface, throughout the day   -Pain is sharp   -Is seeing podiatry   -Getting cortisone injections.   -Next one not due until end of the month   -On previous visit we had decided to stop the  gabapentin that she was on as her pain was better controlled, Wants to retry gabapentin  Metformin     Status post dental procedure   -Associated with swelling - much better   There is some remnant discomfort which however is improving     *Polypharmacy   Patient believed that the nausea, vomiting and diarrhea is from her medications.   As per patient, her endocrinologist has stopped all the oral meds for diabetes that she was on and is currently on on insulin degludec 30 units now , humalog based on sliding scale and trulicity   Pioglitazone, steglatro and metformin were stopped . However patient continued to have nausea, vomiting diarrhea which has since spontaneously resolved - hence likely associated with COVID   Recommended to continue lisinopril daily and atorvastatin 3 times a week   Patient is on as needed hydroxyzine which she is taking once or upto twice as needed   Taking trazodone 100 mg and sertraline through psychiatry - will address these with psychiatry   -Patient was on gabapentin which she has stopped since  she got steroid injection for foot which helped with the pain and that has resolved since   Patient also reports that she had to stop Vitamin C because it interacted with one of the medications that she was on and and she feels that she is getting more sick because she has not been able to take the vitamin C   *Blurry vision-checked with optometrist.  -Minimal changes were associated with diabetes as per patient otherwise within normal limits.  *Shortness of breath.  Resolved   -Patient reported that she felt that with increasing her level of activity around the house recently and had noticed that she gets very winded just walking around the house.  Denied any cough or wheezing.   -Denied any chest  pain.  Did  report palpitations sometimes when she gets anxious which goes away with some deep breaths, which is also resolved now   Denied any lightheadedness, dizziness, pedal edema, orthopnea or PND  -Does have history of sleep apnea, was on CPAP in the past patient does not recall why as she is not on it anymore.  -On examination patient patient did have a grade three systolic murmur that was present previously however on subsequent examination  - unable to appreciate that murmur.   Echocardiogram recently done was also not concerning except trace MR, mild PI concentric hypertrophy of left ventricle    -Denied any fevers, chills.  -As per labs in the past patient has had history of anemia, she has regular menstrual cycles sometimes, most recently her menstrual cycle lasted for 10 days.  Has history of PCOS as per chart, ovarian cyst  -Last cbc from November 2023 shows microcytosis without anemia , MCV- 75, normal RDW and other cell lines. PBS not commented on   -Has history of DVT as per chart.    *Tachycardia   resolved  -noted that her watch - heart rate above 110 when she is up and about   -Not present today at rest and patient is asymptomatic  -echo unremarkable  Last cbc showed       History of COVID -recovered well     -described below   -Patient was able to finally reach the ophthalmologist , however was told that her insurance would not be taken   *Skin tag   -on lower abdomen - grown - wants to be rmoved,  -Also on exam noted to have erythema on lower abdominal region below skin fold- no symptoms     *Anxiety   -Patient is following up with psychiatry, mental health counselor, [facility].  -Was receiving inpatient psychiatry treatment, currently is on Zoloft 100 mg, trazodone 100 mg  at nighttime, hydroxyzine as needed.   -Patient reports that she is doing overall well, trazodone helps most of the nights.  Sleep however she does have some nights when she is unable to fall asleep.    *Blurry  vision  -Patient reports that about 6 years ago she had a significant blurry vision of both eyes which lasted for several months, when she was almost declared blind legally, was evaluated by ophthalmologist, unsure what the diagnosis was.  -Since then however her vision has significantly improved, currently has intermittent blurring of vision on both eyes, which when comes on lasts for a day to a month continuously.  -Denies any headaches, flashes floaters blind spots, eye pain, redness or aura  -We had placed ophthalmology referral on the last visit however patient was not able to attend the appointment due to being inpatient for mental health related issues.  -Patient is requesting for new referral.  -Patient denies having any dry eyes at this time    *Breast Skin lesion   Has had history of bug bites.   -On examination now only has superficial scar, no deeper concerning tissue on palpation.  -Patient did have another spot with similar complaints, which again is currently a scar.  -Had ordered ultrasound breast at the time which has not been done.  -Given resolution of lump will go ahead and proceed to routine mammogram screening.    *Status post surgery for perirectal abscess/fistula   -Healing well as per patient    *Obesity  -Patient is working with nutrition specialist as well as trying to get more exercise as per recommendations of nutrition specialist.          Patient Active Problem List    Diagnosis Date Noted    Anxiety  On Zoloft 100 mg, trazodone 100 mg daily at night, hydroxyzine 06/16/2023    Neuropathic pain of both feet  On gabapentin  06/16/2023    Fibroids 11/15/2022     10/12/2022    Anemia  Microcytosis without anemia as per last labs done in november 2023 09/23/2022    Dyslipidemia 09/22/2022    Obesity (BMI 30-39.9) 05/25/2022    Gastro-esophageal reflux disease without esophagitis 04/14/2021    Major depressive disorder 09/14/2020    Polycystic ovary syndrome  2.2 cm right ovarian cyst seen  on recent CT in May 2023  Unable to undergo TVUS due to perirectal abscess  Unable to go on estrogen containing OCP due to history of VTE  Will follow up with gyn following resolution of perirectal abscess 12/11/2019    History of deep venous thrombosis 11/19/2019    Hypothyroidism  Levothyroxine 150 mcg 11/19/2019    Hypertension  Mildly elevated on lisinopril 10 - not checking blood pressures at home regularly as the cuff not working  10/28/2017    Obstructive sleep apnea, adult  Trying to set up with cpap 07/31/2015    Vitamin D deficiency 03/12/2015    Type 2 diabetes mellitus with hyperglycemia, with long-term current use of insulin (HCC)  Following up with endocrinology -  A1c of 8.5 in June 2023     Tresiba 30U daily, Tulicity 0.5 mg weekly premeal humalog TID  - lipitor 20 daily  - lisinopril 10 daily  - gabapentin 100 TID for neuropathy 03/12/2015       Current Outpatient Medications   Medication Sig Dispense Refill    Insulin Pen Needle 32 G x 4 mm Use 1 needle subcutaneously 4 times daily 360 Each 3    pioglitazone (ACTOS) 15 MG Tab Take 1 tablet by mouth once a day 90 Tablet 3    Ertugliflozin L-PyroglutamicAc (STEGLATRO) 15 MG Tab Take 1 tablet by mouth in the morning 90 Tablet 3    Glucagon (BAQSIMI TWO PACK) 3 mg/dose Inhale 1 SPRAY into the nostril as a single dose for severe hypoglycemia 2 Each 3    gabapentin (NEURONTIN) 100 MG Cap Take 1 Capsule by mouth 3 times a day. 90 Capsule 1    insulin lispro 100 UNIT/ML SC SOPN injection PEN Inject 1-10 Units under the skin 3 times a day before meals. Or as directed (max 50 units/day) 15 mL 3    metFORMIN ER (GLUCOPHAGE XR) 500 MG TABLET SR 24 HR Take 2 Tablets by mouth 2 times a day. 120 Tablet 2    lisinopril (PRINIVIL) 10 MG Tab Take 1 Tablet by mouth every day. 90 Tablet 1    levothyroxine (SYNTHROID) 150 MCG Tab Take 1 Tablet by mouth every morning on an empty stomach. 90 Tablet 1    hydrOXYzine pamoate (VISTARIL) 50 MG Cap Take 1 oral capsule as  "needed every (6-8) hours for anxiety 90 Capsule 3    sertraline (ZOLOFT) 100 MG Tab Take 1 oral tablet once a day 30 Tablet 3    traZODone (DESYREL) 100 MG Tab Take 2 oral tablet at bedtime as needed 60 Tablet 3    Continuous Blood Gluc Sensor (FREESTYLE ALEKSANDAR 2 SENSOR) [facility] [place] 1 kit as directed every two weeks change every 14 days 7 Each 3    Dulaglutide (TRULICITY) 4.5 MG/0.5ML Solution Pen-injector Inject 4.5 mg subcutaneously once a week 2 mL 3    atorvastatin (LIPITOR) 20 MG Tab Take 1 tablet by mouth once a day (Patient taking differently: Take 20 mg by mouth 3 times a week.) 30 Tablet 3    Insulin Degludec (TRESIBA FLEXTOUCH) 200 UNIT/ML Solution Pen-injector Inject 26 unit subcutaneously once a day (Patient taking differently: Inject 30 Units under the skin every day.) 9 mL 3    Alcohol Swabs Pads use one alcohol swab three times a day as needed. 300 Each 6     No current facility-administered medications for this visit.       ROS: As per HPI. Additional pertinent systems as noted below.  Constitutional:  Negative for fever, chills   Cardiovascular:  Negative for chest pain, positive as in hpi  Respiratory:  Negative for cough, sputum production, positive as in hpi      BP 99/60 (BP Location: Right arm, Patient Position: Sitting, BP Cuff Size: Adult long)   Pulse 89   Temp 36.3 °C (97.4 °F) (Temporal)   Ht 1.6 m (5' 3\")   Wt 91.4 kg (201 lb 6.4 oz)   LMP  (LMP Unknown) Comment: 12/15/2023  SpO2 97%   Breastfeeding No   BMI 35.68 kg/m²     Physical Exam   Constitutional:  . Not in acute distress   CVS : Regular rate and rhythm, systolic murmur not appreciated today     Respi- diffusely diminished breath sounds   -Extremity: Abrasions and is mildly reduced on both great big toes intact elsewhere.  Monofilament test normal except for right big toe.  Pulses intact no swelling    Note: I have reviewed all pertinent labs and diagnostic tests associated with this visit with specific comments listed under " the assessment and plan below    Assessment and Plan      1. Type 2 diabetes mellitus with hyperglycemia, with long-term current use of insulin (HCC)  -Refilled pen needles.  -Next ophthalmology evaluation in December 2024, patient reports that some scar tissue was noted on examination denies having been told of any history of glaucoma macular edema or cataracts, is not sure if there is any proliferative diabetic changes.  -Records not available at this time.  -Continue Trulicity, metformin and Tresiba and sliding scale at this time.  -Patient recommended to crosscheck lows detected by continuous glucose monitor with glucometer to ensure the accuracy.  -Patient was provided with freestyle henna 2 device sample given that patient reported that the device is not reading it well.  -Appreciate pharmacy recommendations given now changed to night shift duties.  - Insulin Pen Needle 32 G x 4 mm; Use 1 needle subcutaneously 4 times daily  Dispense: 360 Each; Refill: 3    2. Neuropathic pain of both feet  -Monofilament test was essentially normal except at the right great toe.  Continue gabapentin.  Does not have classical diabetes associated neuropathy symptoms, does have slightly affected vibration sense in bilateral toes    3. Obstructive sleep apnea syndrome  -Will fax over paperwork filled out in the past for obstructive sleep apnea again.    4. Menorrhagia with irregular cycle  -Provided with details of her gynecology referral    5. Administrative encounter  -Provided paperwork for transportation services where she is working out at this time    Followup: Return in about 6 weeks (around 3/18/2024).        Signed by: Main Mcgee M.D.

## 2024-02-05 NOTE — PATIENT INSTRUCTIONS
[facility] [place] [address]  [place] [address]  Phone: [phone]

## 2024-02-05 NOTE — TELEPHONE ENCOUNTER
Please fax again to [facility] - the form filled out and scanned on November 2nd 2023 by Dr. Graham for cpap- as patient reports that she has not heard anything since the sleep study

## 2024-02-06 NOTE — TELEPHONE ENCOUNTER
I called [facility] at [phone] they received cpap prescription form. They refer cpap order to [facility]  phone number [phone]. [facility] is the only medical equipment that patient insurance takes. I was told they are several month back order due to no equipment available. Patient notify via phone call. Patient was given [facility] phone number [phone].

## 2024-02-07 PROCEDURE — RXMED WILLOW AMBULATORY MEDICATION CHARGE

## 2024-02-12 PROCEDURE — RXMED WILLOW AMBULATORY MEDICATION CHARGE

## 2024-02-13 PROCEDURE — RXMED WILLOW AMBULATORY MEDICATION CHARGE

## 2024-02-15 ENCOUNTER — NON-PROVIDER VISIT (OUTPATIENT)
Dept: VASCULAR LAB | Facility: MEDICAL CENTER | Age: 44
End: 2024-02-15
Attending: INTERNAL MEDICINE
Payer: MEDICAID

## 2024-02-15 ENCOUNTER — PHARMACY VISIT (OUTPATIENT)
Dept: PHARMACY | Facility: MEDICAL CENTER | Age: 44
End: 2024-02-15
Payer: COMMERCIAL

## 2024-02-15 VITALS — HEART RATE: 93 BPM | SYSTOLIC BLOOD PRESSURE: 108 MMHG | DIASTOLIC BLOOD PRESSURE: 74 MMHG

## 2024-02-15 DIAGNOSIS — E11.65 TYPE 2 DIABETES MELLITUS WITH HYPERGLYCEMIA, WITH LONG-TERM CURRENT USE OF INSULIN (HCC): Primary | ICD-10-CM

## 2024-02-15 DIAGNOSIS — Z79.4 TYPE 2 DIABETES MELLITUS WITH HYPERGLYCEMIA, WITH LONG-TERM CURRENT USE OF INSULIN (HCC): Primary | ICD-10-CM

## 2024-02-15 LAB
HBA1C MFR BLD: 8.4 % (ref ?–5.8)
POCT INT CON NEG: NEGATIVE
POCT INT CON POS: POSITIVE

## 2024-02-15 PROCEDURE — 83036 HEMOGLOBIN GLYCOSYLATED A1C: CPT

## 2024-02-15 PROCEDURE — 99212 OFFICE O/P EST SF 10 MIN: CPT

## 2024-02-15 NOTE — PROGRESS NOTES
Patient Consult Note    Primary care physician: Main Mcgee M.D.    Reason for consult: Management of Uncontrolled Type 2 Diabetes    HPI:  Liana Obrien is a 43 y.o. old patient who comes in today for evaluation of above stated problem.    Allergies:  Bicillin c-r [penicillin g proc & benzathine], Ondansetron, Penicillin g, Sulfa drugs, and Metoclopramide    Current Diabetes Medication Regimen:  GLP-1 Agent: dulaglutide (Trulicity) 4.5 mg weekly  Basal Insulin: Tresiba 30 units daily  Prandial Insulin: Humalog 3 units + sliding scale before meals (1 unit every 50 > 150)   Usually just injecting 3-5 units, but reports having to administer 10 units recently  Metformin  mg BID -- increased by pt and tolerating well    Previous Diabetes Medications and Reason for Discontinuation:  Metformin  Pioglitazone  Steglatro    Potential Barriers to Care:  Adherence: reports missed doses since starting new job  Missed Trulicity last week  3-4 days last week, she forgot to take either all AM meds or all PM meds  States she's missed more doses of Humalog than she has injected over the last two weeks  Side effects: none  Affordability: no issues  Others: started new job and schedule varies, right now start time for work is between 3pm-7pm and works for 12 hrs (ie 3pm to 3am, 7pm to 7am)    SMBG: Zack 2  7-days:  Sensor usage: 70%  TIR: 11%  Hyperglycemia: 89%  Hypoglycemia: 0%  Average glucose: 264    14-days:  Sensor usage: 71%  TIR: 17%  Hyperglycemia: 83%  Hypoglycemia: 0%  Average glucose: 249    Hypoglycemia:  Hypoglycemia awareness: Yes  Nocturnal hypoglycemia: None  Hypoglycemia:  None  Pt's treatment of Hypoglycemia  Discussed 15:15 Rule    Lifestyle:  Current Exercise: previously trying to walk one hour once a week but limited at this time  Exercise Goal: increase as tolerated, aim for 150 mins/week    Dietary:  Works with a nutritionist  Snacking throughout the work day  Has been drinking soda, gatorade  and eating chips  Meals have been variable -- silvio gallegos  Patient will work on resuming previous dietary modifications    Preventative Management:  BP regimen (ACEi/ARB): lisinopril 10 mg  Statin: atorvastatin (Lipitor) 20 mg three times a week  LDL <100: no (see Media for most recent labs)  Last Lipid Panel 11/13/23      Last Microalbumin/Cr:  Lab Results   Component Value Date/Time    MALBCRT 30 08/11/2018 08:21 AM    MICROALBUR 4.1 08/11/2018 08:21 AM     Last A1c:  Lab Results   Component Value Date/Time    HBA1C 8.5 (A) 11/13/2023 12:00 AM      Last Retinal Scan: 08/2022, due    Updated caregaps    Other Pertinent Labs (See Media):    Latest Reference Range & Units 05/15/23 00:15   Sodium 135 - 145 mmol/L 132 (L)   Potassium 3.6 - 5.5 mmol/L 3.7   Chloride 96 - 112 mmol/L 101   Co2 20 - 33 mmol/L 20   Anion Gap 7.0 - 16.0  11.0   Glucose 65 - 99 mg/dL 295 (H)   Bun 8 - 22 mg/dL 8   Creatinine 0.50 - 1.40 mg/dL 0.34 (L)   (L): Data is abnormally low  (H): Data is abnormally high     Estimated CrCl: > 100 mL/min (Based on sCr = 0.34 mg/dL, Actual Wt = 92.5 kg)  eGFR 131     Physical Examination:  Vital signs: LMP  (LMP Unknown) Comment: 12/15/2023 There is no height or weight on file to calculate BMI.    Foot Exam:  Monofilament exam: 08/2023    Assessment and Plan:    1. DMII  Discussed Goals: FBG 80 - 130, 2hPP < 180, a1c < 7.0%  A1c remains above goal at 8.4%, slight improvement from A1c of 8.5%  7-day and 14-day TIR not at goal of > 70%  Glucose readings have trended up due to non-adherence to medications and previous lifestyle modifications  Discussed establishing new routines to improve medication adherence  Pt did increase metformin to BID dosing and is now tolerating this well. Will continue current dose at this time but consider further increase if pt continues to tolerate the medication well  Pt has been injecting Humalog rarely due to variability in work schedule. For simplicity of medication  regimen, will hold off on bolus insulin for now and focus on titrating basal insulin.  Also discussed timing of medications to prevent missed doses  Encouraged patient to resume previous dietary modifications  LDL at 110 which is above goal of < 100. Pt confirms she is now taking statin therapy daily.  UACR due, lab previously ordered    Medication changes:  Increase Tresiba to 36 units daily  Hold Humalog for now  Polypharmacy/Med Mgmt:  Take all medications before bedtime (after work, between 3am to 7am) except for levothyroxine.  Take levothyroxine when you wake up before going to work.  Take two tablets of metformin together, but if not tolerated, take one tablet before bedtime and one tablet with meal (during work break)  Take the 2nd dose of gabapentin with meal (during work break)    Continue:  Metformin  mg BID or 1000 mg daily  Trulicity 4.5 mg weekly    Lifestyle changes:  Increase exercise as tolerated  Focus on low carb diet. Incorporate more veggies and protein.    Follow Up:  6 weeks, will f/u by MyChart/phone in the interim    Jesus Cazares PharmD    CC:   Main Mcgee M.D.

## 2024-02-15 NOTE — PATIENT INSTRUCTIONS
Increase Tresiba to 36 units daily  Stop Humalog for now    Take all medications before bedtime (after work, between 3am to 7am) except for levothyroxine.  Take levothyroxine when you wake up before going to work.    You can take two tablets of metformin together, but if you develop diarrhea, please take one tablet before bedtime and one tablet with your meal (during work break)  Take the 2nd dose of gabapentin with your meal (during work break)

## 2024-02-26 ENCOUNTER — TELEPHONE (OUTPATIENT)
Dept: VASCULAR LAB | Facility: MEDICAL CENTER | Age: 44
End: 2024-02-26
Payer: MEDICAID

## 2024-02-27 NOTE — TELEPHONE ENCOUNTER
Called patient to follow up on BG readings. She reports that she did not have a sensor on the past week and had just applied one yesterday. She reports glucose levels to be in the 300s but also forgot to inject her basal insulin last night. Her work schedule is now during the day so she will work on establishing a routine with her meds.    Given that there are no glucose readings available since increasing her basal insulin dose, will not make any changes at this time. Will call back later this week to discuss BG readings.    Jesus Cazares, KadieD, BCACP

## 2024-03-01 NOTE — TELEPHONE ENCOUNTER
Called patient to f/u on BG readings. Unable to reach, LVM with call back number. Of note, patient has an appt with PCP next week.    Jesus Cazares, KadieD, BCACP

## 2024-03-06 ENCOUNTER — APPOINTMENT (OUTPATIENT)
Dept: INTERNAL MEDICINE | Facility: OTHER | Age: 44
End: 2024-03-06
Payer: MEDICAID

## 2024-03-06 PROCEDURE — RXMED WILLOW AMBULATORY MEDICATION CHARGE: Performed by: INTERNAL MEDICINE

## 2024-03-06 PROCEDURE — RXMED WILLOW AMBULATORY MEDICATION CHARGE: Performed by: PHYSICIAN ASSISTANT

## 2024-03-06 PROCEDURE — RXMED WILLOW AMBULATORY MEDICATION CHARGE: Performed by: HOSPITALIST

## 2024-03-12 ENCOUNTER — PHARMACY VISIT (OUTPATIENT)
Dept: PHARMACY | Facility: MEDICAL CENTER | Age: 44
End: 2024-03-12
Payer: COMMERCIAL

## 2024-03-12 PROCEDURE — RXOTC WILLOW AMBULATORY OTC CHARGE: Performed by: PHARMACIST
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
## 2024-03-13 ENCOUNTER — HOSPITAL ENCOUNTER (INPATIENT)
Facility: MEDICAL CENTER | Age: 44
LOS: 5 days | DRG: 638 | End: 2024-03-18
Attending: EMERGENCY MEDICINE | Admitting: HOSPITALIST
Payer: MEDICAID

## 2024-03-13 ENCOUNTER — OFFICE VISIT (OUTPATIENT)
Dept: INTERNAL MEDICINE | Facility: OTHER | Age: 44
End: 2024-03-13
Payer: MEDICAID

## 2024-03-13 VITALS
HEIGHT: 61 IN | WEIGHT: 206.2 LBS | HEART RATE: 92 BPM | OXYGEN SATURATION: 98 % | DIASTOLIC BLOOD PRESSURE: 75 MMHG | TEMPERATURE: 97 F | SYSTOLIC BLOOD PRESSURE: 116 MMHG | BODY MASS INDEX: 38.93 KG/M2

## 2024-03-13 DIAGNOSIS — Z79.4 TYPE 2 DIABETES MELLITUS WITH HYPERGLYCEMIA, WITH LONG-TERM CURRENT USE OF INSULIN (HCC): ICD-10-CM

## 2024-03-13 DIAGNOSIS — N93.9 ABNORMAL UTERINE BLEEDING: ICD-10-CM

## 2024-03-13 DIAGNOSIS — E55.9 VITAMIN D DEFICIENCY: ICD-10-CM

## 2024-03-13 DIAGNOSIS — E11.65 TYPE 2 DIABETES MELLITUS WITH HYPERGLYCEMIA, WITH LONG-TERM CURRENT USE OF INSULIN (HCC): ICD-10-CM

## 2024-03-13 DIAGNOSIS — R32 URINARY INCONTINENCE, UNSPECIFIED TYPE: ICD-10-CM

## 2024-03-13 DIAGNOSIS — R73.9 HYPERGLYCEMIA: ICD-10-CM

## 2024-03-13 DIAGNOSIS — R23.8 SKIN BREAKDOWN: ICD-10-CM

## 2024-03-13 DIAGNOSIS — D50.9 IRON DEFICIENCY ANEMIA, UNSPECIFIED IRON DEFICIENCY ANEMIA TYPE: ICD-10-CM

## 2024-03-13 DIAGNOSIS — R11.2 NAUSEA AND VOMITING, UNSPECIFIED VOMITING TYPE: ICD-10-CM

## 2024-03-13 PROBLEM — E87.1 HYPONATREMIA: Status: ACTIVE | Noted: 2024-03-13

## 2024-03-13 PROBLEM — D72.829 LEUKOCYTOSIS: Status: ACTIVE | Noted: 2024-03-13

## 2024-03-13 LAB
ALBUMIN SERPL BCP-MCNC: 3.9 G/DL (ref 3.2–4.9)
ALBUMIN/GLOB SERPL: 1.1 G/DL
ALP SERPL-CCNC: 109 U/L (ref 30–99)
ALT SERPL-CCNC: 14 U/L (ref 2–50)
ANION GAP SERPL CALC-SCNC: 12 MMOL/L (ref 7–16)
ANISOCYTOSIS BLD QL SMEAR: ABNORMAL
AST SERPL-CCNC: 13 U/L (ref 12–45)
BASE EXCESS BLDV CALC-SCNC: -3 MMOL/L
BASOPHILS # BLD AUTO: 0.6 % (ref 0–1.8)
BASOPHILS # BLD: 0.08 K/UL (ref 0–0.12)
BILIRUB SERPL-MCNC: 0.2 MG/DL (ref 0.1–1.5)
BODY TEMPERATURE: 36.3 CENTIGRADE
BUN SERPL-MCNC: 12 MG/DL (ref 8–22)
CALCIUM ALBUM COR SERPL-MCNC: 9.1 MG/DL (ref 8.5–10.5)
CALCIUM SERPL-MCNC: 9 MG/DL (ref 8.4–10.2)
CHLORIDE SERPL-SCNC: 94 MMOL/L (ref 96–112)
CO2 SERPL-SCNC: 22 MMOL/L (ref 20–33)
CREAT SERPL-MCNC: 0.44 MG/DL (ref 0.5–1.4)
EOSINOPHIL # BLD AUTO: 0.23 K/UL (ref 0–0.51)
EOSINOPHIL NFR BLD: 1.6 % (ref 0–6.9)
ERYTHROCYTE [DISTWIDTH] IN BLOOD BY AUTOMATED COUNT: 38.3 FL (ref 35.9–50)
GFR SERPLBLD CREATININE-BSD FMLA CKD-EPI: 122 ML/MIN/1.73 M 2
GLOBULIN SER CALC-MCNC: 3.7 G/DL (ref 1.9–3.5)
GLUCOSE BLD STRIP.AUTO-MCNC: 235 MG/DL (ref 65–99)
GLUCOSE BLD STRIP.AUTO-MCNC: 278 MG/DL (ref 65–99)
GLUCOSE BLD STRIP.AUTO-MCNC: 333 MG/DL (ref 65–99)
GLUCOSE BLD-MCNC: 492 MG/DL (ref 65–99)
GLUCOSE SERPL-MCNC: 492 MG/DL (ref 65–99)
HCG SERPL QL: NEGATIVE
HCO3 BLDV-SCNC: 21 MMOL/L (ref 24–28)
HCT VFR BLD AUTO: 34.8 % (ref 37–47)
HGB BLD-MCNC: 10.6 G/DL (ref 12–16)
HYPOCHROMIA BLD QL SMEAR: ABNORMAL
IMM GRANULOCYTES # BLD AUTO: 0.09 K/UL (ref 0–0.11)
IMM GRANULOCYTES NFR BLD AUTO: 0.6 % (ref 0–0.9)
INHALED O2 FLOW RATE: 100 L/MIN
LYMPHOCYTES # BLD AUTO: 1.89 K/UL (ref 1–4.8)
LYMPHOCYTES NFR BLD: 13.4 % (ref 22–41)
MCH RBC QN AUTO: 20.3 PG (ref 27–33)
MCHC RBC AUTO-ENTMCNC: 30.5 G/DL (ref 32.2–35.5)
MCV RBC AUTO: 66.5 FL (ref 81.4–97.8)
MICROCYTES BLD QL SMEAR: ABNORMAL
MONOCYTES # BLD AUTO: 0.93 K/UL (ref 0–0.85)
MONOCYTES NFR BLD AUTO: 6.6 % (ref 0–13.4)
NEUTROPHILS # BLD AUTO: 10.93 K/UL (ref 1.82–7.42)
NEUTROPHILS NFR BLD: 77.2 % (ref 44–72)
NRBC # BLD AUTO: 0 K/UL
NRBC BLD-RTO: 0 /100 WBC (ref 0–0.2)
PCO2 BLDV: 35.3 MMHG (ref 41–51)
PH BLDV: 7.39 [PH] (ref 7.31–7.45)
PLATELET # BLD AUTO: 337 K/UL (ref 164–446)
PLATELET BLD QL SMEAR: ADEQUATE
PMV BLD AUTO: 10.7 FL (ref 9–12.9)
PO2 BLDV: 35.1 MMHG (ref 25–40)
POTASSIUM SERPL-SCNC: 4.2 MMOL/L (ref 3.6–5.5)
PROT SERPL-MCNC: 7.6 G/DL (ref 6–8.2)
RBC # BLD AUTO: 5.23 M/UL (ref 4.2–5.4)
RBC BLD AUTO: NORMAL
SAO2 % BLDV: 65.2 %
SODIUM SERPL-SCNC: 128 MMOL/L (ref 135–145)
WBC # BLD AUTO: 14.2 K/UL (ref 4.8–10.8)

## 2024-03-13 PROCEDURE — 36415 COLL VENOUS BLD VENIPUNCTURE: CPT

## 2024-03-13 PROCEDURE — 700105 HCHG RX REV CODE 258: Performed by: EMERGENCY MEDICINE

## 2024-03-13 PROCEDURE — 96372 THER/PROPH/DIAG INJ SC/IM: CPT

## 2024-03-13 PROCEDURE — 3078F DIAST BP <80 MM HG: CPT | Performed by: INTERNAL MEDICINE

## 2024-03-13 PROCEDURE — 96374 THER/PROPH/DIAG INJ IV PUSH: CPT

## 2024-03-13 PROCEDURE — 80053 COMPREHEN METABOLIC PANEL: CPT

## 2024-03-13 PROCEDURE — 87086 URINE CULTURE/COLONY COUNT: CPT

## 2024-03-13 PROCEDURE — 3074F SYST BP LT 130 MM HG: CPT | Performed by: INTERNAL MEDICINE

## 2024-03-13 PROCEDURE — 99214 OFFICE O/P EST MOD 30 MIN: CPT | Performed by: INTERNAL MEDICINE

## 2024-03-13 PROCEDURE — 99285 EMERGENCY DEPT VISIT HI MDM: CPT

## 2024-03-13 PROCEDURE — 84703 CHORIONIC GONADOTROPIN ASSAY: CPT

## 2024-03-13 PROCEDURE — 82803 BLOOD GASES ANY COMBINATION: CPT

## 2024-03-13 PROCEDURE — A9270 NON-COVERED ITEM OR SERVICE: HCPCS | Performed by: HOSPITALIST

## 2024-03-13 PROCEDURE — 82962 GLUCOSE BLOOD TEST: CPT | Performed by: INTERNAL MEDICINE

## 2024-03-13 PROCEDURE — 99223 1ST HOSP IP/OBS HIGH 75: CPT | Performed by: HOSPITALIST

## 2024-03-13 PROCEDURE — 700111 HCHG RX REV CODE 636 W/ 250 OVERRIDE (IP): Performed by: EMERGENCY MEDICINE

## 2024-03-13 PROCEDURE — 1125F AMNT PAIN NOTED PAIN PRSNT: CPT | Performed by: INTERNAL MEDICINE

## 2024-03-13 PROCEDURE — 94760 N-INVAS EAR/PLS OXIMETRY 1: CPT

## 2024-03-13 PROCEDURE — 770001 HCHG ROOM/CARE - MED/SURG/GYN PRIV*

## 2024-03-13 PROCEDURE — 82962 GLUCOSE BLOOD TEST: CPT

## 2024-03-13 PROCEDURE — 700105 HCHG RX REV CODE 258: Performed by: HOSPITALIST

## 2024-03-13 PROCEDURE — 700102 HCHG RX REV CODE 250 W/ 637 OVERRIDE(OP): Performed by: HOSPITALIST

## 2024-03-13 PROCEDURE — 85025 COMPLETE CBC W/AUTO DIFF WBC: CPT

## 2024-03-13 RX ORDER — SODIUM CHLORIDE 9 MG/ML
INJECTION, SOLUTION INTRAVENOUS CONTINUOUS
Status: DISCONTINUED | OUTPATIENT
Start: 2024-03-13 | End: 2024-03-17

## 2024-03-13 RX ORDER — INSULIN LISPRO 100 [IU]/ML
0.2 INJECTION, SOLUTION INTRAVENOUS; SUBCUTANEOUS
Status: DISCONTINUED | OUTPATIENT
Start: 2024-03-13 | End: 2024-03-14

## 2024-03-13 RX ORDER — METFORMIN HYDROCHLORIDE 500 MG/1
500 TABLET, EXTENDED RELEASE ORAL 2 TIMES DAILY
COMMUNITY

## 2024-03-13 RX ORDER — ATORVASTATIN CALCIUM 20 MG/1
20 TABLET, FILM COATED ORAL DAILY
Status: DISCONTINUED | OUTPATIENT
Start: 2024-03-14 | End: 2024-03-18 | Stop reason: HOSPADM

## 2024-03-13 RX ORDER — PROCHLORPERAZINE EDISYLATE 5 MG/ML
10 INJECTION INTRAMUSCULAR; INTRAVENOUS ONCE
Status: COMPLETED | OUTPATIENT
Start: 2024-03-13 | End: 2024-03-13

## 2024-03-13 RX ORDER — GABAPENTIN 100 MG/1
100 CAPSULE ORAL 3 TIMES DAILY
Status: DISCONTINUED | OUTPATIENT
Start: 2024-03-13 | End: 2024-03-17

## 2024-03-13 RX ORDER — SODIUM CHLORIDE, SODIUM LACTATE, POTASSIUM CHLORIDE, CALCIUM CHLORIDE 600; 310; 30; 20 MG/100ML; MG/100ML; MG/100ML; MG/100ML
1000 INJECTION, SOLUTION INTRAVENOUS ONCE
Status: COMPLETED | OUTPATIENT
Start: 2024-03-13 | End: 2024-03-13

## 2024-03-13 RX ORDER — HYDROXYZINE HYDROCHLORIDE 25 MG/1
50 TABLET, FILM COATED ORAL 3 TIMES DAILY PRN
Status: DISCONTINUED | OUTPATIENT
Start: 2024-03-13 | End: 2024-03-18 | Stop reason: HOSPADM

## 2024-03-13 RX ORDER — AMOXICILLIN 250 MG
2 CAPSULE ORAL 2 TIMES DAILY
Status: DISCONTINUED | OUTPATIENT
Start: 2024-03-13 | End: 2024-03-18 | Stop reason: HOSPADM

## 2024-03-13 RX ORDER — LEVOTHYROXINE SODIUM 0.07 MG/1
150 TABLET ORAL
Status: DISCONTINUED | OUTPATIENT
Start: 2024-03-14 | End: 2024-03-18 | Stop reason: HOSPADM

## 2024-03-13 RX ORDER — INSULIN LISPRO 100 [IU]/ML
1-6 INJECTION, SOLUTION INTRAVENOUS; SUBCUTANEOUS
Status: DISCONTINUED | OUTPATIENT
Start: 2024-03-13 | End: 2024-03-14

## 2024-03-13 RX ORDER — POLYETHYLENE GLYCOL 3350 17 G/17G
1 POWDER, FOR SOLUTION ORAL
Status: DISCONTINUED | OUTPATIENT
Start: 2024-03-13 | End: 2024-03-18 | Stop reason: HOSPADM

## 2024-03-13 RX ORDER — CALCIUM CARBONATE 500 MG/1
500 TABLET, CHEWABLE ORAL 3 TIMES DAILY PRN
Status: DISCONTINUED | OUTPATIENT
Start: 2024-03-13 | End: 2024-03-18 | Stop reason: HOSPADM

## 2024-03-13 RX ORDER — LISINOPRIL 5 MG/1
10 TABLET ORAL DAILY
Status: DISCONTINUED | OUTPATIENT
Start: 2024-03-14 | End: 2024-03-18 | Stop reason: HOSPADM

## 2024-03-13 RX ORDER — HYDROXYZINE PAMOATE 50 MG/1
50 CAPSULE ORAL 3 TIMES DAILY PRN
Status: DISCONTINUED | OUTPATIENT
Start: 2024-03-13 | End: 2024-03-13

## 2024-03-13 RX ORDER — TRAZODONE HYDROCHLORIDE 50 MG/1
100 TABLET ORAL
Status: DISCONTINUED | OUTPATIENT
Start: 2024-03-13 | End: 2024-03-18 | Stop reason: HOSPADM

## 2024-03-13 RX ADMIN — RIVAROXABAN 10 MG: 10 TABLET, FILM COATED ORAL at 18:35

## 2024-03-13 RX ADMIN — SODIUM CHLORIDE: 9 INJECTION, SOLUTION INTRAVENOUS at 18:22

## 2024-03-13 RX ADMIN — SODIUM CHLORIDE, POTASSIUM CHLORIDE, SODIUM LACTATE AND CALCIUM CHLORIDE 1000 ML: 600; 310; 30; 20 INJECTION, SOLUTION INTRAVENOUS at 14:57

## 2024-03-13 RX ADMIN — PROCHLORPERAZINE EDISYLATE 10 MG: 5 INJECTION INTRAMUSCULAR; INTRAVENOUS at 14:57

## 2024-03-13 RX ADMIN — GABAPENTIN 100 MG: 100 CAPSULE ORAL at 19:13

## 2024-03-13 RX ADMIN — INSULIN LISPRO 6 UNITS: 100 INJECTION, SOLUTION INTRAVENOUS; SUBCUTANEOUS at 18:33

## 2024-03-13 RX ADMIN — INSULIN LISPRO 2 UNITS: 100 INJECTION, SOLUTION INTRAVENOUS; SUBCUTANEOUS at 21:39

## 2024-03-13 RX ADMIN — INSULIN LISPRO 3 UNITS: 100 INJECTION, SOLUTION INTRAVENOUS; SUBCUTANEOUS at 18:33

## 2024-03-13 RX ADMIN — INSULIN GLARGINE-YFGN 8 UNITS: 100 INJECTION, SOLUTION SUBCUTANEOUS at 18:32

## 2024-03-13 ASSESSMENT — ENCOUNTER SYMPTOMS
COUGH: 0
NERVOUS/ANXIOUS: 0
FLANK PAIN: 0
NAUSEA: 1
DIZZINESS: 1
FEVER: 0
EYE REDNESS: 0
MYALGIAS: 0
POLYDIPSIA: 1
STRIDOR: 0
FOCAL WEAKNESS: 0
BRUISES/BLEEDS EASILY: 0
CHILLS: 0
SHORTNESS OF BREATH: 0
VOMITING: 1
EYE DISCHARGE: 0

## 2024-03-13 ASSESSMENT — COGNITIVE AND FUNCTIONAL STATUS - GENERAL
DAILY ACTIVITIY SCORE: 24
SUGGESTED CMS G CODE MODIFIER MOBILITY: CH
SUGGESTED CMS G CODE MODIFIER DAILY ACTIVITY: CH
MOBILITY SCORE: 24

## 2024-03-13 ASSESSMENT — LIFESTYLE VARIABLES
HAVE YOU EVER FELT YOU SHOULD CUT DOWN ON YOUR DRINKING: NO
AVERAGE NUMBER OF DAYS PER WEEK YOU HAVE A DRINK CONTAINING ALCOHOL: 0
EVER HAD A DRINK FIRST THING IN THE MORNING TO STEADY YOUR NERVES TO GET RID OF A HANGOVER: NO
TOTAL SCORE: 0
HAVE PEOPLE ANNOYED YOU BY CRITICIZING YOUR DRINKING: NO
CONSUMPTION TOTAL: NEGATIVE
TOTAL SCORE: 0
ALCOHOL_USE: NO
TOTAL SCORE: 0
EVER FELT BAD OR GUILTY ABOUT YOUR DRINKING: NO
HOW MANY TIMES IN THE PAST YEAR HAVE YOU HAD 5 OR MORE DRINKS IN A DAY: 0
ON A TYPICAL DAY WHEN YOU DRINK ALCOHOL HOW MANY DRINKS DO YOU HAVE: 0

## 2024-03-13 ASSESSMENT — PATIENT HEALTH QUESTIONNAIRE - PHQ9
6. FEELING BAD ABOUT YOURSELF - OR THAT YOU ARE A FAILURE OR HAVE LET YOURSELF OR YOUR FAMILY DOWN: SEVERAL DAYS
7. TROUBLE CONCENTRATING ON THINGS, SUCH AS READING THE NEWSPAPER OR WATCHING TELEVISION: MORE THAN HALF THE DAYS
8. MOVING OR SPEAKING SO SLOWLY THAT OTHER PEOPLE COULD HAVE NOTICED. OR THE OPPOSITE, BEING SO FIGETY OR RESTLESS THAT YOU HAVE BEEN MOVING AROUND A LOT MORE THAN USUAL: NOT AT ALL
SUM OF ALL RESPONSES TO PHQ9 QUESTIONS 1 AND 2: 4
5. POOR APPETITE OR OVEREATING: MORE THAN HALF THE DAYS
SUM OF ALL RESPONSES TO PHQ QUESTIONS 1-9: 14
2. FEELING DOWN, DEPRESSED, IRRITABLE, OR HOPELESS: MORE THAN HALF THE DAYS
9. THOUGHTS THAT YOU WOULD BE BETTER OFF DEAD, OR OF HURTING YOURSELF: SEVERAL DAYS
1. LITTLE INTEREST OR PLEASURE IN DOING THINGS: MORE THAN HALF THE DAYS
3. TROUBLE FALLING OR STAYING ASLEEP OR SLEEPING TOO MUCH: MORE THAN HALF THE DAYS
4. FEELING TIRED OR HAVING LITTLE ENERGY: MORE THAN HALF THE DAYS

## 2024-03-13 ASSESSMENT — FIBROSIS 4 INDEX
FIB4 SCORE: 0.67
FIB4 SCORE: 0.67

## 2024-03-13 ASSESSMENT — PAIN SCALES - GENERAL: PAINLEVEL: 10=SEVERE PAIN

## 2024-03-13 ASSESSMENT — PAIN DESCRIPTION - PAIN TYPE: TYPE: ACUTE PAIN

## 2024-03-13 NOTE — H&P
Hospital Medicine History & Physical Note    Date of Service  3/13/2024    Primary Care Physician  Main Mcgee M.D.    Consultants  None     Code Status  Full Code    Chief Complaint  Chief Complaint   Patient presents with    N/V     History of Presenting Illness  Liana Obrien is a 44 y.o. female with a past medical history of diabetes, hypertension and hypothyroidism who presented 3/13/2024 with generalized weakness nausea and vomiting.  Patient reports that she has not been feeling well over the past 1 week.  She has been having progressively worsening generalized weakness nausea vomiting and increased frequency of urination.  She denies noticing any fevers or chills.  She denies having cough shortness of breath or lower extremity pain redness or swelling.  In the emergency room she was found to have marked elevated blood sugars of 1492 with hyponatremia with a sodium level of 128..    I discussed the plan of care with emergency physician, and the patient.    Review of Systems  Review of Systems   Constitutional:  Positive for malaise/fatigue. Negative for chills and fever.   Eyes:  Negative for discharge and redness.   Respiratory:  Negative for cough, shortness of breath and stridor.    Cardiovascular:  Negative for chest pain and leg swelling.   Gastrointestinal:  Positive for nausea and vomiting.   Genitourinary:  Negative for flank pain.        Increased urinary frequency   Musculoskeletal:  Negative for myalgias.   Skin: Negative.    Neurological:  Positive for dizziness. Negative for focal weakness.   Endo/Heme/Allergies:  Positive for polydipsia. Does not bruise/bleed easily.        Excessive thirst   Psychiatric/Behavioral:  The patient is not nervous/anxious.      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), Asthma (03/24/2023), Back pain (03/24/2023), Bipolar disorder (HCC) (08/09/2018), Bowel habit changes (03/24/2023), Breath shortness (03/24/2023),  Candida vaginitis (05/26/2022), Chickenpox, Dental disorder (03/24/2023), Diabetes 1.5, managed as type 2 ([facility]), DVT (deep venous thrombosis) ([facility]), Dysfunctional uterine bleeding, Heart burn, Herpes, High cholesterol (03/24/2023), Hypertension (03/24/2023), Hypoglycemia associated with diabetes ([facility]) (10/27/2021), Hypothyroidism due to acquired atrophy of thyroid (01/22/2016), Indigestion, Lightheadedness (01/12/2024), Nausea (06/17/2015), Palpitations (11/09/2023), Personal history of venous thrombosis and embolism, Sepsis ([facility]) (09/22/2022), Sleep apnea, Snoring (06/06/2023), Tachycardia (11/18/2023), Uncontrolled type 2 diabetes mellitus without complication, without long-term current use of insulin (03/12/2015), Urinary incontinence (03/24/2023), and Uterine fibroids in pregnancy, postpartum condition.    Surgical History   has a past surgical history that includes pr i&d perirectal abscess (9/22/2022); pr surg diagnostic exam, anorectal (N/A, 6/20/2023); and pr i&d perirectal abscess (N/A, 6/20/2023).     Family History  family history includes Cancer in her brother and maternal uncle; Hyperlipidemia in her father; Hypertension in her mother; Sleep Apnea in her brother and mother.      Social History   reports that she quit smoking about 24 years ago. Her smoking use included cigarettes. She started smoking about 24 years ago. She has a 12 pack-year smoking history. She has never been exposed to tobacco smoke. She has never used smokeless tobacco. She reports that she does not drink alcohol and does not use drugs.    Allergies  Allergies   Allergen Reactions    Bicillin C-R [Penicillin G Proc & Benzathine] Rash     Received inj of Bicillin- reaction was rash. Oral penicillin shows no reaction.    Ondansetron Hives    Penicillin G Hives    Sulfa Drugs Hives           Metoclopramide Rash and Vomiting     Rash       Medications  Prior to Admission Medications   Prescriptions Last Dose Informant Patient Reported?  Taking?   Alcohol Swabs Pads   No No   Sig: use one alcohol swab three times a day as needed.   Continuous Blood Gluc Sensor (FREESTYLE ALEKSANDAR 2 SENSOR) [facility]   No No   Sig: Use 1 kit as directed every two weeks change every 14 days   Dulaglutide (TRULICITY) 4.5 MG/0.5ML Solution Pen-injector   No No   Sig: Inject 4.5 mg subcutaneously once a week   Glucagon (BAQSIMI TWO PACK) 3 mg/dose   No No   Sig: Inhale 1 SPRAY into the nostril as a single dose for severe hypoglycemia   Insulin Degludec (TRESIBA FLEXTOUCH) 200 UNIT/ML Solution Pen-injector   No No   Sig: Inject 26 unit subcutaneously once a day   Patient taking differently: Inject 36 Units under the skin every day.   Insulin Pen Needle 32 G x 4 mm   No No   Sig: Use 1 needle subcutaneously 4 times daily   atorvastatin (LIPITOR) 20 MG Tab   No No   Sig: Take 1 tablet by mouth once a day   gabapentin (NEURONTIN) 100 MG Cap   No No   Sig: Take 1 Capsule by mouth 3 times a day.   hydrOXYzine pamoate (VISTARIL) 50 MG Cap   No No   Sig: Take 1 oral capsule as needed every (6-8) hours for anxiety   hydrOXYzine pamoate (VISTARIL) 50 MG Cap   No No   Sig: Take 1 oral capsule as needed every (6-8) hours for anxiety   insulin lispro 100 UNIT/ML SC SOPN injection PEN   No No   Sig: Inject 1-10 Units under the skin 3 times a day before meals. Or as directed (max 50 units/day)   levothyroxine (SYNTHROID) 150 MCG Tab   No No   Sig: Take 1 Tablet by mouth every morning on an empty stomach.   lisinopril (PRINIVIL) 10 MG Tab   No No   Sig: Take 1 Tablet by mouth every day.   metFORMIN ER (GLUCOPHAGE XR) 500 MG TABLET SR 24 HR   No No   Sig: Take 2 Tablets by mouth 2 times a day.   Patient taking differently: Take 500 mg by mouth 2 times a day.   sertraline (ZOLOFT) 100 MG Tab   No No   Sig: Take 1.5 tablets by mouth once a day   traZODone (DESYREL) 100 MG Tab   No No   Sig: Take 2 oral tablet at bedtime as needed   traZODone (DESYREL) 100 MG Tab   No No   Sig: Take 2 tablets by  mouth every night at bedtime as needed      Facility-Administered Medications: None     Physical Exam  Temp:  [36.1 °C (97 °F)-36.7 °C (98.1 °F)] 36.7 °C (98.1 °F)  Pulse:  [78-92] 78  Resp:  [17-18] 17  BP: (116-143)/(72-75) 119/74  SpO2:  [98 %-100 %] 99 %  Blood Pressure: (!) 143/72   Temperature: 36.3 °C (97.3 °F)   Pulse: 81   Respiration: 18   Pulse Oximetry: 100 %     Physical Exam  Constitutional:       General: She is not in acute distress.  HENT:      Head: Normocephalic and atraumatic.      Right Ear: External ear normal.      Left Ear: External ear normal.      Nose: No congestion or rhinorrhea.      Mouth/Throat:      Mouth: Mucous membranes are dry.      Pharynx: No oropharyngeal exudate or posterior oropharyngeal erythema.   Eyes:      General: No scleral icterus.        Right eye: No discharge.         Left eye: No discharge.      Conjunctiva/sclera: Conjunctivae normal.      Pupils: Pupils are equal, round, and reactive to light.   Cardiovascular:      Rate and Rhythm: Regular rhythm. Tachycardia present.      Heart sounds:      No friction rub. No gallop.   Pulmonary:      Effort: Pulmonary effort is normal.   Abdominal:      General: Abdomen is flat. There is no distension.      Tenderness: There is no guarding.      Comments: No abdominal tenderness   Musculoskeletal:         General: No swelling.      Cervical back: Neck supple. No rigidity. No muscular tenderness.      Right lower leg: No edema.      Left lower leg: No edema.   Skin:     Capillary Refill: Capillary refill takes 2 to 3 seconds.      Coloration: Skin is not jaundiced or pale.      Findings: No bruising or erythema.   Neurological:      Mental Status: She is alert and oriented to person, place, and time.   Psychiatric:         Mood and Affect: Mood normal.         Judgment: Judgment normal.       Laboratory:  Recent Labs     03/13/24  1442   WBC 14.2*   RBC 5.23   HEMOGLOBIN 10.6*   HEMATOCRIT 34.8*   MCV 66.5*   MCH 20.3*   MCHC  "30.5*   RDW 38.3   PLATELETCT 337   MPV 10.7     Recent Labs     03/13/24  1442   SODIUM 128*   POTASSIUM 4.2   CHLORIDE 94*   CO2 22   GLUCOSE 492*   BUN 12   CREATININE 0.44*   CALCIUM 9.0     Recent Labs     03/13/24  1442   ALTSGPT 14   ASTSGOT 13   ALKPHOSPHAT 109*   TBILIRUBIN 0.2   GLUCOSE 492*         No results for input(s): \"NTPROBNP\" in the last 72 hours.      No results for input(s): \"TROPONINT\" in the last 72 hours.    Imaging:  No orders to display     Assessment/Plan:  Justification for Admission Status  I anticipate this patient will require at least two midnights for appropriate medical management, necessitating inpatient admission because patient has severe dehydration with intractable nausea and vomiting with elevated blood sugars.  Will require intravenous fluids, insulin and close monitoring of electrolytes and sugars.  She is at risk for DKA.    Patient will need a Med/Surg bed on medical service.  The patient has severe dehydration with hyperglycemia.    * Hyponatremia- (present on admission)  Assessment & Plan  Hypovolemic hyponatremia with a component of pseudohyponatremia due to hyperglycemia  I expect Na to improve with IVF, and correcting blood sugars     Type 2 diabetes mellitus with hyperglycemia, with long-term current use of insulin ([facility])- (present on admission)  Assessment & Plan  With marked hyperglycemia  Last glycated hemoglobin was 8.4%  I will start long & short acting insulin for now  I will order Accu-Checks, hypoglycemia protocol  Adjust according to blood sugars trend.     Intractable nausea and vomiting- (present on admission)  Assessment & Plan  No abdominal pain or tenderness.  I will start antiemetics as needed  Differentials include viral infection versus gastroparesis.  Bowel rest with a modified diet and supportive care   I will start Intravenous fluids  Continue serial clinical examinations, consider CT scan of the abdomen/pelvis if develops abdominal pain or " symptoms do not improve with above measures  Monitor electrolytes and replace accordingly      Leukocytosis- (present on admission)  Assessment & Plan  Likely reactive secondary to severe dehydration  No focal sign of infection at this point.  Daily clinical evaluation for focal point of infection.  Continue to montior off antibiotics for now    Anemia- (present on admission)  Assessment & Plan  No evidence of gross bleeding.  Monitor hemoglobin. I will order a follow-up CBC.  Transfuse for a hemoglobin of less than or equal to 7.     Dehydration- (present on admission)  Assessment & Plan  Likely secondary to reduced oral intake, and increase in insensible loss due to osmotic diuresis.  Encourage oral intake as tolerated, antiemetics as needed.  Intravenous hydration until adequate oral intake is achieved.     Hypertension- (present on admission)  Assessment & Plan  I will start lisinopril with hold parameters    Dyslipidemia- (present on admission)  Assessment & Plan  Cardiac diet.  I will start atorvastatin    Major depressive disorder- (present on admission)  Assessment & Plan  I will start sertraline    Hypothyroidism- (present on admission)  Assessment & Plan  I will start levothyroxine    Gastroesophageal reflux disease- (present on admission)  Assessment & Plan  I will start Tums as needed      VTE prophylaxis: SCDs/TEDs and Xarelto 10 mg daily as prophylaxis

## 2024-03-13 NOTE — ED NOTES
Admitting orders received. Waiting for bed assignment.  Poc explained to pt  No further needs at this time.  Good relief from nausea
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
## 2024-03-13 NOTE — ED TRIAGE NOTES
Pt to ed by [facility].  Hx DM  C/o n/v , blood sugar been running high  [facility] unable to place iv.  Fs [id number]  pPTA

## 2024-03-14 PROBLEM — Z71.89 ADVANCE CARE PLANNING: Status: ACTIVE | Noted: 2024-02-05

## 2024-03-14 PROBLEM — E83.42 HYPOMAGNESEMIA: Status: ACTIVE | Noted: 2024-03-14

## 2024-03-14 PROBLEM — N39.0 UTI (URINARY TRACT INFECTION): Status: ACTIVE | Noted: 2024-03-14

## 2024-03-14 LAB
ALBUMIN SERPL BCP-MCNC: 3.2 G/DL (ref 3.2–4.9)
ALBUMIN/GLOB SERPL: 1 G/DL
ALP SERPL-CCNC: 81 U/L (ref 30–99)
ALT SERPL-CCNC: 11 U/L (ref 2–50)
ANION GAP SERPL CALC-SCNC: 9 MMOL/L (ref 7–16)
APPEARANCE UR: CLEAR
AST SERPL-CCNC: 8 U/L (ref 12–45)
BACTERIA #/AREA URNS HPF: ABNORMAL /HPF
BILIRUB SERPL-MCNC: 0.2 MG/DL (ref 0.1–1.5)
BILIRUB UR QL STRIP.AUTO: NEGATIVE
BUN SERPL-MCNC: 8 MG/DL (ref 8–22)
CALCIUM ALBUM COR SERPL-MCNC: 8.8 MG/DL (ref 8.5–10.5)
CALCIUM SERPL-MCNC: 8.2 MG/DL (ref 8.4–10.2)
CHLORIDE SERPL-SCNC: 102 MMOL/L (ref 96–112)
CO2 SERPL-SCNC: 24 MMOL/L (ref 20–33)
COLOR UR: YELLOW
CREAT SERPL-MCNC: 0.46 MG/DL (ref 0.5–1.4)
EPI CELLS #/AREA URNS HPF: ABNORMAL /HPF
ERYTHROCYTE [DISTWIDTH] IN BLOOD BY AUTOMATED COUNT: 39.3 FL (ref 35.9–50)
GFR SERPLBLD CREATININE-BSD FMLA CKD-EPI: 121 ML/MIN/1.73 M 2
GLOBULIN SER CALC-MCNC: 3.1 G/DL (ref 1.9–3.5)
GLUCOSE BLD STRIP.AUTO-MCNC: 272 MG/DL (ref 65–99)
GLUCOSE BLD STRIP.AUTO-MCNC: 317 MG/DL (ref 65–99)
GLUCOSE BLD STRIP.AUTO-MCNC: 340 MG/DL (ref 65–99)
GLUCOSE BLD STRIP.AUTO-MCNC: 436 MG/DL (ref 65–99)
GLUCOSE SERPL-MCNC: 185 MG/DL (ref 65–99)
GLUCOSE UR STRIP.AUTO-MCNC: >=1000 MG/DL
HCT VFR BLD AUTO: 31.2 % (ref 37–47)
HGB BLD-MCNC: 9.3 G/DL (ref 12–16)
KETONES UR STRIP.AUTO-MCNC: ABNORMAL MG/DL
LEUKOCYTE ESTERASE UR QL STRIP.AUTO: ABNORMAL
MAGNESIUM SERPL-MCNC: 1.8 MG/DL (ref 1.5–2.5)
MCH RBC QN AUTO: 20.1 PG (ref 27–33)
MCHC RBC AUTO-ENTMCNC: 29.8 G/DL (ref 32.2–35.5)
MCV RBC AUTO: 67.4 FL (ref 81.4–97.8)
MICRO URNS: ABNORMAL
NITRITE UR QL STRIP.AUTO: NEGATIVE
PH UR STRIP.AUTO: 6.5 [PH] (ref 5–8)
PLATELET # BLD AUTO: 286 K/UL (ref 164–446)
PMV BLD AUTO: 10 FL (ref 9–12.9)
POTASSIUM SERPL-SCNC: 3.3 MMOL/L (ref 3.6–5.5)
PROT SERPL-MCNC: 6.3 G/DL (ref 6–8.2)
PROT UR QL STRIP: NEGATIVE MG/DL
RBC # BLD AUTO: 4.63 M/UL (ref 4.2–5.4)
RBC # URNS HPF: ABNORMAL /HPF
RBC UR QL AUTO: NEGATIVE
SODIUM SERPL-SCNC: 135 MMOL/L (ref 135–145)
SP GR UR STRIP.AUTO: 1.01
WBC # BLD AUTO: 11.6 K/UL (ref 4.8–10.8)
WBC #/AREA URNS HPF: ABNORMAL /HPF

## 2024-03-14 PROCEDURE — 85027 COMPLETE CBC AUTOMATED: CPT

## 2024-03-14 PROCEDURE — 700105 HCHG RX REV CODE 258: Performed by: INTERNAL MEDICINE

## 2024-03-14 PROCEDURE — 770001 HCHG ROOM/CARE - MED/SURG/GYN PRIV*

## 2024-03-14 PROCEDURE — 82962 GLUCOSE BLOOD TEST: CPT | Mod: 91

## 2024-03-14 PROCEDURE — 36415 COLL VENOUS BLD VENIPUNCTURE: CPT

## 2024-03-14 PROCEDURE — 700105 HCHG RX REV CODE 258: Performed by: HOSPITALIST

## 2024-03-14 PROCEDURE — 83735 ASSAY OF MAGNESIUM: CPT

## 2024-03-14 PROCEDURE — 99233 SBSQ HOSP IP/OBS HIGH 50: CPT | Mod: 25 | Performed by: INTERNAL MEDICINE

## 2024-03-14 PROCEDURE — 99497 ADVNCD CARE PLAN 30 MIN: CPT | Performed by: INTERNAL MEDICINE

## 2024-03-14 PROCEDURE — A9270 NON-COVERED ITEM OR SERVICE: HCPCS | Performed by: HOSPITALIST

## 2024-03-14 PROCEDURE — 700111 HCHG RX REV CODE 636 W/ 250 OVERRIDE (IP): Mod: JZ | Performed by: INTERNAL MEDICINE

## 2024-03-14 PROCEDURE — 81001 URINALYSIS AUTO W/SCOPE: CPT

## 2024-03-14 PROCEDURE — A9270 NON-COVERED ITEM OR SERVICE: HCPCS | Mod: JZ | Performed by: INTERNAL MEDICINE

## 2024-03-14 PROCEDURE — 94760 N-INVAS EAR/PLS OXIMETRY 1: CPT

## 2024-03-14 PROCEDURE — 80053 COMPREHEN METABOLIC PANEL: CPT

## 2024-03-14 PROCEDURE — 700102 HCHG RX REV CODE 250 W/ 637 OVERRIDE(OP): Performed by: HOSPITALIST

## 2024-03-14 PROCEDURE — 700102 HCHG RX REV CODE 250 W/ 637 OVERRIDE(OP): Mod: JZ | Performed by: INTERNAL MEDICINE

## 2024-03-14 RX ORDER — PROCHLORPERAZINE EDISYLATE 5 MG/ML
10 INJECTION INTRAMUSCULAR; INTRAVENOUS EVERY 6 HOURS PRN
Status: DISCONTINUED | OUTPATIENT
Start: 2024-03-14 | End: 2024-03-18 | Stop reason: HOSPADM

## 2024-03-14 RX ORDER — POTASSIUM CHLORIDE 20 MEQ/1
40 TABLET, EXTENDED RELEASE ORAL ONCE
Status: COMPLETED | OUTPATIENT
Start: 2024-03-14 | End: 2024-03-14

## 2024-03-14 RX ORDER — MAGNESIUM SULFATE HEPTAHYDRATE 40 MG/ML
2 INJECTION, SOLUTION INTRAVENOUS ONCE
Status: COMPLETED | OUTPATIENT
Start: 2024-03-14 | End: 2024-03-14

## 2024-03-14 RX ADMIN — GABAPENTIN 100 MG: 100 CAPSULE ORAL at 05:50

## 2024-03-14 RX ADMIN — INSULIN GLARGINE-YFGN 8 UNITS: 100 INJECTION, SOLUTION SUBCUTANEOUS at 17:04

## 2024-03-14 RX ADMIN — ATORVASTATIN CALCIUM 20 MG: 20 TABLET, FILM COATED ORAL at 05:50

## 2024-03-14 RX ADMIN — SERTRALINE HYDROCHLORIDE 150 MG: 50 TABLET ORAL at 05:50

## 2024-03-14 RX ADMIN — MAGNESIUM SULFATE HEPTAHYDRATE 2 G: 2 INJECTION, SOLUTION INTRAVENOUS at 04:36

## 2024-03-14 RX ADMIN — INSULIN LISPRO 3 UNITS: 100 INJECTION, SOLUTION INTRAVENOUS; SUBCUTANEOUS at 05:55

## 2024-03-14 RX ADMIN — LEVOTHYROXINE SODIUM 150 MCG: 0.07 TABLET ORAL at 05:50

## 2024-03-14 RX ADMIN — RIVAROXABAN 10 MG: 10 TABLET, FILM COATED ORAL at 17:10

## 2024-03-14 RX ADMIN — TRAZODONE HYDROCHLORIDE 100 MG: 50 TABLET ORAL at 21:10

## 2024-03-14 RX ADMIN — DOCUSATE SODIUM 50MG AND SENNOSIDES 8.6MG 2 TABLET: 8.6; 5 TABLET, FILM COATED ORAL at 17:09

## 2024-03-14 RX ADMIN — SODIUM CHLORIDE: 9 INJECTION, SOLUTION INTRAVENOUS at 17:38

## 2024-03-14 RX ADMIN — POTASSIUM CHLORIDE 40 MEQ: 1500 TABLET, EXTENDED RELEASE ORAL at 04:34

## 2024-03-14 RX ADMIN — INSULIN GLARGINE-YFGN 8 UNITS: 100 INJECTION, SOLUTION SUBCUTANEOUS at 05:55

## 2024-03-14 RX ADMIN — GABAPENTIN 100 MG: 100 CAPSULE ORAL at 11:09

## 2024-03-14 RX ADMIN — SODIUM CHLORIDE: 9 INJECTION, SOLUTION INTRAVENOUS at 04:35

## 2024-03-14 RX ADMIN — INSULIN LISPRO 4 UNITS: 100 INJECTION, SOLUTION INTRAVENOUS; SUBCUTANEOUS at 11:07

## 2024-03-14 RX ADMIN — GABAPENTIN 100 MG: 100 CAPSULE ORAL at 17:09

## 2024-03-14 RX ADMIN — CEFTRIAXONE SODIUM 1000 MG: 1 INJECTION, POWDER, FOR SOLUTION INTRAMUSCULAR; INTRAVENOUS at 15:02

## 2024-03-14 ASSESSMENT — ENCOUNTER SYMPTOMS
BLURRED VISION: 0
COUGH: 0
BACK PAIN: 0
FEVER: 0
PALPITATIONS: 0
DIZZINESS: 0
ABDOMINAL PAIN: 0
VOMITING: 0
FALLS: 0
DOUBLE VISION: 0
HEADACHES: 0
NERVOUS/ANXIOUS: 0
HEARTBURN: 0
CHILLS: 0
SHORTNESS OF BREATH: 0

## 2024-03-14 ASSESSMENT — PATIENT HEALTH QUESTIONNAIRE - PHQ9
7. TROUBLE CONCENTRATING ON THINGS, SUCH AS READING THE NEWSPAPER OR WATCHING TELEVISION: MORE THAN HALF THE DAYS
1. LITTLE INTEREST OR PLEASURE IN DOING THINGS: MORE THAN HALF THE DAYS
3. TROUBLE FALLING OR STAYING ASLEEP OR SLEEPING TOO MUCH: MORE THAN HALF THE DAYS
5. POOR APPETITE OR OVEREATING: MORE THAN HALF THE DAYS
SUM OF ALL RESPONSES TO PHQ QUESTIONS 1-9: 14
SUM OF ALL RESPONSES TO PHQ9 QUESTIONS 1 AND 2: 4
8. MOVING OR SPEAKING SO SLOWLY THAT OTHER PEOPLE COULD HAVE NOTICED. OR THE OPPOSITE, BEING SO FIGETY OR RESTLESS THAT YOU HAVE BEEN MOVING AROUND A LOT MORE THAN USUAL: NOT AT ALL
6. FEELING BAD ABOUT YOURSELF - OR THAT YOU ARE A FAILURE OR HAVE LET YOURSELF OR YOUR FAMILY DOWN: SEVERAL DAYS
4. FEELING TIRED OR HAVING LITTLE ENERGY: MORE THAN HALF THE DAYS
9. THOUGHTS THAT YOU WOULD BE BETTER OFF DEAD, OR OF HURTING YOURSELF: SEVERAL DAYS
2. FEELING DOWN, DEPRESSED, IRRITABLE, OR HOPELESS: MORE THAN HALF THE DAYS

## 2024-03-14 ASSESSMENT — PAIN DESCRIPTION - PAIN TYPE
TYPE: ACUTE PAIN
TYPE: ACUTE PAIN

## 2024-03-14 ASSESSMENT — LIFESTYLE VARIABLES: SUBSTANCE_ABUSE: 0

## 2024-03-14 NOTE — CARE PLAN
The patient is Stable - Low risk of patient condition declining or worsening    Shift Goals  Clinical Goals: Monitor blood glucose results. Pt will remain free from falls or injury throughout the shift.  Patient Goals: sleep and rest  Family Goals: n/a    Progress made toward(s) clinical / shift goals:  Blood glucose monitored within the shift. Pt is free from falls or injury throughout the shift. Pt seen sleeping comfortably in between rounds; even and unlabored breathing noted. Hourly rounding in progress.     Patient is not progressing towards the following goals: n/a

## 2024-03-14 NOTE — ASSESSMENT & PLAN NOTE
Probably reactive  Also concern for UTI  We have started ceftriaxone    3/15: Improved, we will continue with antibiotics, monitor

## 2024-03-14 NOTE — PROGRESS NOTES
Received report from day shift nurse, Sarina RN. Assumed pt care at 1915.   Pt is A&Ox4, resting comfortably in bed. Pt on room air; no signs of SOB/respiratory distress. Labs noted, VSS. Pt denies pain at this moment. Needs attended well Fall precautions in place. Bed at lowest position. Call light and personal belongings within reach. Encouraged to call for assistance. Plan of care on going, no further concerns as of present.   Hourly rounding in progress.     2139 Informed pt of new order for urinalysis and urine culture and urine sample needs to be collected; pt states understanding.  Hourly rounding in progress.    0258 Urine sample collected; sample sent to lab by [facility].    0608 Pt naseauous and vomting clear emesis at this time. Notified Dr. Monzon of concern and that no prn medication is on MAR.  Hourly rounding in progress.

## 2024-03-14 NOTE — PROGRESS NOTES
BSSR received from night RN. Pt is resting comfortably in bed, not in any distress on RA at 93%. Denies any pain or nausea at this time. Discussed POC. Fall risk precautions in place, locked bed in lowest position and call light within reach. All needs met at this time. Hourly rounding in place.    Stage 12: Additional Anesthesia Type: 1% lidocaine with epinephrine

## 2024-03-14 NOTE — PROGRESS NOTES
4 Eyes Skin Assessment Completed by KATHI Branch and KATHI BOSS.    Head WDL  Ears WDL  Nose WDL  Mouth WDL  Neck WDL  Breast/Chest WDL  Shoulder Blades WDL  Spine WDL  (R) Arm/Elbow/Hand WDL  (L) Arm/Elbow/Hand WDL  Abdomen WDL  Groin WDL  Scrotum/Coccyx/Buttocks WDL  (R) Leg WDL  (L) Leg WDL  (R) Heel/Foot/Toe WDL  (L) Heel/Foot/Toe WDL          Devices In Places Pulse Ox and SCD's      Interventions In Place Pillows    Possible Skin Injury No    Pictures Uploaded Into Epic N/A  Wound Consult Placed N/A  RN Wound Prevention Protocol Ordered No

## 2024-03-14 NOTE — PROGRESS NOTES
Received report from ER RN. Pt is transported via gurney and able to walk to bathroom and bed, not in any distress on RA. Denies pain or N/V. Discussed POC. Oriented to room. VSS. Fall risk precaution in place, locked bed in lowest position and call light within reach.
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
## 2024-03-14 NOTE — ASSESSMENT & PLAN NOTE
Likely secondary to reduced oral intake, and increase in insensible loss due to osmotic diuresis.  Encourage oral intake as tolerated, antiemetics as needed.  Intravenous hydration until adequate oral intake is achieved.

## 2024-03-14 NOTE — ASSESSMENT & PLAN NOTE
With marked hyperglycemia  Last glycated hemoglobin was 8.4%  I will start long & short acting insulin for now  I will order Accu-Checks, hypoglycemia protocol  Adjust according to blood sugars trend.     3/14: Continue with lantus 8 U BID, ISS medium. Monitor and make changes accordingly.    3/15: We have increased Lantus to 12 units twice daily, however starting tomorrow we will do Lantus q. Evening.  Monitor closely and make changes accordingly.    3/16: We will do 35 U lantus. Monitor  closely.    3/17: We will increase to lantus 50 U.

## 2024-03-14 NOTE — ASSESSMENT & PLAN NOTE
Hemoglobin seems to be stable.  Monitor.  No signs of overt bleeding.    3/15: Patient with iron deficiency, we have started the patient on IV replacement by pharmacy.    3/16: Continue IV iron replacement    3/17: Patient completed IV iron treatment today.

## 2024-03-14 NOTE — ASSESSMENT & PLAN NOTE
3/14: I discussed with patient for at least 16 minutes further goals of treatment.  This included CODE STATUS which include DNR/DNI and full code.  The patient would like to be full code.  We also discussed further treatment options.  The patient would like to have full treatment options.  This includes invasive and noninvasive procedures as needed.

## 2024-03-14 NOTE — ASSESSMENT & PLAN NOTE
Patient complaining of urinary frequency.  UA concerning for UTI.  Will start the patient on ceftriaxone.  Will order blood cultures and urine cultures.    3/17: Continue antibiotics, follow cultures

## 2024-03-14 NOTE — CARE PLAN
The patient is Stable - Low risk of patient condition declining or worsening    Shift Goals  Clinical Goals: Pt's nausea and BG will be monitor, on SI screen q shift  Patient Goals: Able to manage pain, rest comfortably  Family Goals: n/a    Progress made toward(s) clinical / shift goals:  Pt denies any nausea or pain throughout this shift. BG and abx continued monitor per MAR. SI screen done. Pt did not sustain any fall during this shift.     Patient is not progressing towards the following goals:

## 2024-03-14 NOTE — ED NOTES
"Med rec is complete per Patient at bedside.  Per Pt, \"doctor was weaning her off of Lispro\". Per Pt, last had it 1-2 months ago.  Allergies reviewed.    Has patient had any outside antibiotics in the last 30 days? N    Any Anticoagulants (rivaroxaban, apixaban, edoxaban, dabigatran, warfarin, enoxaparin) taken in the last 14 days? N         Pharmacy/Pharmacies Pt utilizes : [facility]         "

## 2024-03-14 NOTE — ASSESSMENT & PLAN NOTE
No abdominal pain or tenderness.  I will start antiemetics as needed  Differentials include viral infection versus gastroparesis.  Bowel rest with a modified diet and supportive care   I will start Intravenous fluids  Continue serial clinical examinations, consider CT scan of the abdomen/pelvis if develops abdominal pain or symptoms do not improve with above measures  Monitor electrolytes and replace accordingly      3/14: improved

## 2024-03-14 NOTE — ASSESSMENT & PLAN NOTE
Continue sertraline    3/14: The patient states that in the past 2 weeks she has been dealing with her divorce and it has been very stressful for her.  She mentions that she has had suicidal ideation in the past 2 weeks, however she is adamant that she is not currently having suicidal ideation.  We have consulted psychiatry, we appreciate further recommendations.    3/16: Psychiatry following patient, we appreciate further recommendations.  We have ordered TSH and vitamin D level.

## 2024-03-14 NOTE — PROGRESS NOTES
"Hospital Medicine Daily Progress Note    Date of Service  3/14/2024    Chief Complaint  Liana Obrien is a 44 y.o. female admitted 3/13/2024 with N/V    Hospital Course  As per chart review:  \"44 y.o. female with a past medical history of diabetes, hypertension and hypothyroidism who presented 3/13/2024 with generalized weakness nausea and vomiting.  Patient reports that she has not been feeling well over the past 1 week.  She has been having progressively worsening generalized weakness nausea vomiting and increased frequency of urination.  She denies noticing any fevers or chills.  She denies having cough shortness of breath or lower extremity pain redness or swelling.  In the emergency room she was found to have marked elevated blood sugars of 1492 with hyponatremia with a sodium level of 128.\"    Interval Problem Update  3/14: Patient seen at bedside this morning.  Patient laying in bed, overall she feels better.  Still complaining of some urinary frequency.  We have started the patient on ceftriaxone as there is concern for urinary tract infection.  Will follow cultures.  Also we have ordered iron studies.  Hemoglobin this morning is 9.3, we have repeated hemoglobin.  No signs of overt bleeding at this time.  Will continue to monitor glucose values.  Continue with Lantus 8 units twice daily and medium insulin sliding scale for now.  Also the patient was saying that in the past 2 weeks she has had suicidal ideation, however she is adamant that today she is not having suicidal ideation.  She seems tearful and says that she has been going through her divorce in the past 2 weeks.  We have consulted psychiatry, we appreciate further recommendations.    I have discussed this patient's plan of care and discharge plan at IDT rounds today with Case Management, Nursing, Nursing leadership, and other members of the IDT team.    Consultants/Specialty  Psychiatry    Code Status  Full Code    Disposition  The patient " is not medically cleared for discharge to home or a post-acute facility.        Review of Systems  Review of Systems   Constitutional:  Positive for malaise/fatigue. Negative for chills and fever.   HENT:  Negative for hearing loss and nosebleeds.    Eyes:  Negative for blurred vision and double vision.   Respiratory:  Negative for cough and shortness of breath.    Cardiovascular:  Negative for chest pain and palpitations.   Gastrointestinal:  Negative for abdominal pain, heartburn and vomiting.   Genitourinary:  Positive for frequency.   Musculoskeletal:  Negative for back pain and falls.   Skin:  Negative for itching and rash.   Neurological:  Negative for dizziness and headaches.   Psychiatric/Behavioral:  Negative for substance abuse. The patient is not nervous/anxious.    All other systems reviewed and are negative.       Physical Exam  Temp:  [36.3 °C (97.3 °F)-37.2 °C (98.9 °F)] 37.2 °C (98.9 °F)  Pulse:  [71-81] 73  Resp:  [17-18] 18  BP: (112-143)/(45-74) 114/74  SpO2:  [93 %-100 %] 96 %    Physical Exam  Vitals and nursing note reviewed.   Constitutional:       General: She is not in acute distress.     Appearance: She is obese. She is ill-appearing.   HENT:      Head: Normocephalic and atraumatic.      Right Ear: External ear normal.      Left Ear: External ear normal.      Mouth/Throat:      Pharynx: No oropharyngeal exudate or posterior oropharyngeal erythema.   Eyes:      General:         Right eye: No discharge.         Left eye: No discharge.   Cardiovascular:      Rate and Rhythm: Normal rate and regular rhythm.      Pulses: Normal pulses.      Heart sounds: No murmur heard.     No gallop.   Pulmonary:      Effort: Pulmonary effort is normal. No respiratory distress.      Breath sounds: Normal breath sounds. No wheezing or rhonchi.   Abdominal:      General: Bowel sounds are normal. There is no distension.      Palpations: Abdomen is soft.      Tenderness: There is no abdominal tenderness. There is  no guarding.   Musculoskeletal:         General: No swelling or tenderness. Normal range of motion.      Cervical back: Normal range of motion and neck supple. No tenderness.   Skin:     General: Skin is warm and dry.   Neurological:      General: No focal deficit present.      Mental Status: She is alert and oriented to person, place, and time. Mental status is at baseline.      Motor: No weakness.   Psychiatric:         Mood and Affect: Mood normal.         Behavior: Behavior normal.         Fluids    Intake/Output Summary (Last 24 hours) at 3/14/2024 1335  Last data filed at 3/14/2024 1012  Gross per 24 hour   Intake 1261 ml   Output 1275 ml   Net -14 ml       Laboratory  Recent Labs     03/13/24  1442 03/14/24  0221   WBC 14.2* 11.6*   RBC 5.23 4.63   HEMOGLOBIN 10.6* 9.3*   HEMATOCRIT 34.8* 31.2*   MCV 66.5* 67.4*   MCH 20.3* 20.1*   MCHC 30.5* 29.8*   RDW 38.3 39.3   PLATELETCT 337 286   MPV 10.7 10.0     Recent Labs     03/13/24  1442 03/14/24 0221   SODIUM 128* 135   POTASSIUM 4.2 3.3*   CHLORIDE 94* 102   CO2 22 24   GLUCOSE 492* 185*   BUN 12 8   CREATININE 0.44* 0.46*   CALCIUM 9.0 8.2*                   Imaging  No orders to display        Assessment/Plan  * Type 2 diabetes mellitus with hyperglycemia, with long-term current use of insulin (HCC)- (present on admission)  Assessment & Plan  With marked hyperglycemia  Last glycated hemoglobin was 8.4%  I will start long & short acting insulin for now  I will order Accu-Checks, hypoglycemia protocol  Adjust according to blood sugars trend.     3/14: Continue with lantus 8 U BID, ISS medium. Monitor and make changes accordingly.    UTI (urinary tract infection)  Assessment & Plan  Patient complaining of urinary frequency.  UA concerning for UTI.  Will start the patient on ceftriaxone.  Will order blood cultures and urine cultures.    Anemia- (present on admission)  Assessment & Plan  No signs of overt bleeding  Hb this morning is 9.3, we will repeat  hemoglobin.  Will order iron studies.    Major depressive disorder- (present on admission)  Assessment & Plan  Continue sertraline    3/14: The patient states that in the past 2 weeks she has been dealing with her divorce and it has been very stressful for her.  She mentions that she has had suicidal ideation in the past 2 weeks, however she is adamant that she is not currently having suicidal ideation.  We have consulted psychiatry, we appreciate further recommendations.    Hypomagnesemia  Assessment & Plan  Replace as needed  monitor    Intractable nausea and vomiting- (present on admission)  Assessment & Plan  No abdominal pain or tenderness.  I will start antiemetics as needed  Differentials include viral infection versus gastroparesis.  Bowel rest with a modified diet and supportive care   I will start Intravenous fluids  Continue serial clinical examinations, consider CT scan of the abdomen/pelvis if develops abdominal pain or symptoms do not improve with above measures  Monitor electrolytes and replace accordingly      3/14: improved    Leukocytosis- (present on admission)  Assessment & Plan  Probably reactive  Also concern for UTI  We have started ceftriaxone    Hyponatremia- (present on admission)  Assessment & Plan  Hypovolemic hyponatremia with a component of pseudohyponatremia due to hyperglycemia  I expect Na to improve with IVF, and correcting blood sugars     3/14: Improved    Advance care planning- (present on admission)  Assessment & Plan  I discussed with patient for at least 16 minutes further goals of treatment.  This included CODE STATUS which include DNR/DNI and full code.  The patient would like to be full code.  We also discussed further treatment options.  The patient would like to have full treatment options.  This includes invasive and noninvasive procedures as needed.    Dyslipidemia- (present on admission)  Assessment & Plan  Cardiac diet.  continue atorvastatin    Obesity (BMI 30-39.9)-  (present on admission)  Assessment & Plan   on lifestyle modifications  Patient will require close follow up as outpatient    Hypothyroidism- (present on admission)  Assessment & Plan  continue levothyroxine    Gastroesophageal reflux disease- (present on admission)  Assessment & Plan  Tums as needed    Dehydration- (present on admission)  Assessment & Plan  Likely secondary to reduced oral intake, and increase in insensible loss due to osmotic diuresis.  Encourage oral intake as tolerated, antiemetics as needed.  Intravenous hydration until adequate oral intake is achieved.     Hypertension- (present on admission)  Assessment & Plan  continue lisinopril with hold parameters    Hypokalemia- (present on admission)  Assessment & Plan  Replace as needed  monitor         VTE prophylaxis: xarelto    I have performed a physical exam and reviewed and updated ROS and Plan today (3/14/2024). In review of yesterday's note (3/13/2024), there are no changes except as documented above.      I spend at least 51 minutes providing care for this patient.  This included face-to-face interview, physical examination.  Review of lab work including CBC, CMP, magnesium.  Discussion with multidisciplinary team including case management, nursing staff and pharmacy.  Creating plan of care, reviewing orders.    Moreover, I discussed with patient for at least 16 minutes further goals of treatment.  This included CODE STATUS which include DNR/DNI and full code.  The patient would like to be full code.  We also discussed further treatment options.  The patient would like to have full treatment options.  This includes invasive and noninvasive procedures as needed.

## 2024-03-15 LAB
ALBUMIN SERPL BCP-MCNC: 3.3 G/DL (ref 3.2–4.9)
ALBUMIN/GLOB SERPL: 1 G/DL
ALP SERPL-CCNC: 78 U/L (ref 30–99)
ALT SERPL-CCNC: 12 U/L (ref 2–50)
ANION GAP SERPL CALC-SCNC: 10 MMOL/L (ref 7–16)
AST SERPL-CCNC: 9 U/L (ref 12–45)
BILIRUB SERPL-MCNC: <0.2 MG/DL (ref 0.1–1.5)
BUN SERPL-MCNC: 7 MG/DL (ref 8–22)
CALCIUM ALBUM COR SERPL-MCNC: 8.8 MG/DL (ref 8.5–10.5)
CALCIUM SERPL-MCNC: 8.2 MG/DL (ref 8.4–10.2)
CHLORIDE SERPL-SCNC: 104 MMOL/L (ref 96–112)
CO2 SERPL-SCNC: 21 MMOL/L (ref 20–33)
CREAT SERPL-MCNC: 0.39 MG/DL (ref 0.5–1.4)
ERYTHROCYTE [DISTWIDTH] IN BLOOD BY AUTOMATED COUNT: 40.7 FL (ref 35.9–50)
FERRITIN SERPL-MCNC: 7.1 NG/ML (ref 10–291)
GFR SERPLBLD CREATININE-BSD FMLA CKD-EPI: 126 ML/MIN/1.73 M 2
GLOBULIN SER CALC-MCNC: 3.3 G/DL (ref 1.9–3.5)
GLUCOSE BLD STRIP.AUTO-MCNC: 227 MG/DL (ref 65–99)
GLUCOSE BLD STRIP.AUTO-MCNC: 297 MG/DL (ref 65–99)
GLUCOSE BLD STRIP.AUTO-MCNC: 304 MG/DL (ref 65–99)
GLUCOSE BLD STRIP.AUTO-MCNC: 350 MG/DL (ref 65–99)
GLUCOSE SERPL-MCNC: 300 MG/DL (ref 65–99)
HCT VFR BLD AUTO: 33.2 % (ref 37–47)
HGB BLD-MCNC: 9.7 G/DL (ref 12–16)
IRON SATN MFR SERPL: 5 % (ref 15–55)
IRON SERPL-MCNC: 14 UG/DL (ref 40–170)
MAGNESIUM SERPL-MCNC: 1.9 MG/DL (ref 1.5–2.5)
MCH RBC QN AUTO: 19.8 PG (ref 27–33)
MCHC RBC AUTO-ENTMCNC: 29.2 G/DL (ref 32.2–35.5)
MCV RBC AUTO: 67.6 FL (ref 81.4–97.8)
PLATELET # BLD AUTO: 290 K/UL (ref 164–446)
PMV BLD AUTO: 10 FL (ref 9–12.9)
POTASSIUM SERPL-SCNC: 3.6 MMOL/L (ref 3.6–5.5)
PROT SERPL-MCNC: 6.6 G/DL (ref 6–8.2)
RBC # BLD AUTO: 4.91 M/UL (ref 4.2–5.4)
SODIUM SERPL-SCNC: 135 MMOL/L (ref 135–145)
TIBC SERPL-MCNC: 275 UG/DL (ref 250–450)
UIBC SERPL-MCNC: 261 UG/DL (ref 110–370)
WBC # BLD AUTO: 8.2 K/UL (ref 4.8–10.8)

## 2024-03-15 PROCEDURE — 82962 GLUCOSE BLOOD TEST: CPT

## 2024-03-15 PROCEDURE — 80053 COMPREHEN METABOLIC PANEL: CPT

## 2024-03-15 PROCEDURE — 700102 HCHG RX REV CODE 250 W/ 637 OVERRIDE(OP): Performed by: HOSPITALIST

## 2024-03-15 PROCEDURE — 770001 HCHG ROOM/CARE - MED/SURG/GYN PRIV*

## 2024-03-15 PROCEDURE — 83540 ASSAY OF IRON: CPT

## 2024-03-15 PROCEDURE — 82728 ASSAY OF FERRITIN: CPT

## 2024-03-15 PROCEDURE — 700105 HCHG RX REV CODE 258: Performed by: INTERNAL MEDICINE

## 2024-03-15 PROCEDURE — 700111 HCHG RX REV CODE 636 W/ 250 OVERRIDE (IP): Mod: JZ | Performed by: INTERNAL MEDICINE

## 2024-03-15 PROCEDURE — 99233 SBSQ HOSP IP/OBS HIGH 50: CPT | Performed by: INTERNAL MEDICINE

## 2024-03-15 PROCEDURE — A9270 NON-COVERED ITEM OR SERVICE: HCPCS | Performed by: HOSPITALIST

## 2024-03-15 PROCEDURE — 700105 HCHG RX REV CODE 258: Performed by: HOSPITALIST

## 2024-03-15 PROCEDURE — A9270 NON-COVERED ITEM OR SERVICE: HCPCS | Mod: JZ | Performed by: INTERNAL MEDICINE

## 2024-03-15 PROCEDURE — 94760 N-INVAS EAR/PLS OXIMETRY 1: CPT

## 2024-03-15 PROCEDURE — 83550 IRON BINDING TEST: CPT

## 2024-03-15 PROCEDURE — 85027 COMPLETE CBC AUTOMATED: CPT

## 2024-03-15 PROCEDURE — 36415 COLL VENOUS BLD VENIPUNCTURE: CPT

## 2024-03-15 PROCEDURE — 700102 HCHG RX REV CODE 250 W/ 637 OVERRIDE(OP): Mod: JZ | Performed by: INTERNAL MEDICINE

## 2024-03-15 PROCEDURE — 87040 BLOOD CULTURE FOR BACTERIA: CPT

## 2024-03-15 PROCEDURE — 83735 ASSAY OF MAGNESIUM: CPT

## 2024-03-15 RX ORDER — POTASSIUM CHLORIDE 20 MEQ/1
40 TABLET, EXTENDED RELEASE ORAL ONCE
Status: COMPLETED | OUTPATIENT
Start: 2024-03-15 | End: 2024-03-15

## 2024-03-15 RX ORDER — NITROFURANTOIN 25; 75 MG/1; MG/1
100 CAPSULE ORAL 2 TIMES DAILY WITH MEALS
Status: COMPLETED | OUTPATIENT
Start: 2024-03-16 | End: 2024-03-18

## 2024-03-15 RX ORDER — MAGNESIUM SULFATE HEPTAHYDRATE 40 MG/ML
2 INJECTION, SOLUTION INTRAVENOUS ONCE
Status: COMPLETED | OUTPATIENT
Start: 2024-03-15 | End: 2024-03-15

## 2024-03-15 RX ORDER — NITROFURANTOIN 25; 75 MG/1; MG/1
100 CAPSULE ORAL 2 TIMES DAILY WITH MEALS
Status: DISCONTINUED | OUTPATIENT
Start: 2024-03-15 | End: 2024-03-15

## 2024-03-15 RX ORDER — POTASSIUM CHLORIDE 20 MEQ/1
40 TABLET, EXTENDED RELEASE ORAL ONCE
Status: DISCONTINUED | OUTPATIENT
Start: 2024-03-15 | End: 2024-03-15

## 2024-03-15 RX ORDER — LACTOBACILLUS RHAMNOSUS GG 10B CELL
1 CAPSULE ORAL
Status: DISCONTINUED | OUTPATIENT
Start: 2024-03-16 | End: 2024-03-18 | Stop reason: HOSPADM

## 2024-03-15 RX ADMIN — RIVAROXABAN 10 MG: 10 TABLET, FILM COATED ORAL at 17:05

## 2024-03-15 RX ADMIN — TRAZODONE HYDROCHLORIDE 100 MG: 50 TABLET ORAL at 22:42

## 2024-03-15 RX ADMIN — POTASSIUM CHLORIDE 40 MEQ: 1500 TABLET, EXTENDED RELEASE ORAL at 07:48

## 2024-03-15 RX ADMIN — SODIUM CHLORIDE 1000 ML: 9 INJECTION, SOLUTION INTRAVENOUS at 04:47

## 2024-03-15 RX ADMIN — SODIUM CHLORIDE 250 MG: 9 INJECTION, SOLUTION INTRAVENOUS at 17:21

## 2024-03-15 RX ADMIN — LEVOTHYROXINE SODIUM 150 MCG: 0.07 TABLET ORAL at 04:54

## 2024-03-15 RX ADMIN — MAGNESIUM SULFATE HEPTAHYDRATE 2 G: 2 INJECTION, SOLUTION INTRAVENOUS at 07:50

## 2024-03-15 RX ADMIN — SERTRALINE HYDROCHLORIDE 150 MG: 50 TABLET ORAL at 04:52

## 2024-03-15 RX ADMIN — GABAPENTIN 100 MG: 100 CAPSULE ORAL at 04:49

## 2024-03-15 RX ADMIN — GABAPENTIN 100 MG: 100 CAPSULE ORAL at 11:22

## 2024-03-15 RX ADMIN — ATORVASTATIN CALCIUM 20 MG: 20 TABLET, FILM COATED ORAL at 04:50

## 2024-03-15 RX ADMIN — CEFTRIAXONE SODIUM 1000 MG: 1 INJECTION, POWDER, FOR SOLUTION INTRAMUSCULAR; INTRAVENOUS at 04:47

## 2024-03-15 RX ADMIN — DOCUSATE SODIUM 50MG AND SENNOSIDES 8.6MG 2 TABLET: 8.6; 5 TABLET, FILM COATED ORAL at 17:05

## 2024-03-15 RX ADMIN — DOCUSATE SODIUM 50MG AND SENNOSIDES 8.6MG 2 TABLET: 8.6; 5 TABLET, FILM COATED ORAL at 04:50

## 2024-03-15 RX ADMIN — INSULIN GLARGINE-YFGN 8 UNITS: 100 INJECTION, SOLUTION SUBCUTANEOUS at 05:07

## 2024-03-15 RX ADMIN — LISINOPRIL 10 MG: 5 TABLET ORAL at 04:58

## 2024-03-15 RX ADMIN — SODIUM CHLORIDE: 9 INJECTION, SOLUTION INTRAVENOUS at 17:20

## 2024-03-15 RX ADMIN — GABAPENTIN 100 MG: 100 CAPSULE ORAL at 17:05

## 2024-03-15 ASSESSMENT — ENCOUNTER SYMPTOMS
CHILLS: 0
FEVER: 0
SHORTNESS OF BREATH: 0
BLURRED VISION: 0
HEARTBURN: 0
ABDOMINAL PAIN: 0
PALPITATIONS: 0
DIZZINESS: 0
VOMITING: 0
COUGH: 0
NERVOUS/ANXIOUS: 0
FALLS: 0
DOUBLE VISION: 0
BACK PAIN: 0
HEADACHES: 0

## 2024-03-15 ASSESSMENT — LIFESTYLE VARIABLES: SUBSTANCE_ABUSE: 0

## 2024-03-15 ASSESSMENT — PAIN DESCRIPTION - PAIN TYPE
TYPE: ACUTE PAIN
TYPE: ACUTE PAIN

## 2024-03-15 ASSESSMENT — FIBROSIS 4 INDEX: FIB4 SCORE: 0.37

## 2024-03-15 NOTE — PROGRESS NOTES
Bedside report received from night RN. Assumed care of patient. Alert and oriented X4, wakes easily to voice. On RA, no SOB noted. Daily plan of care discussed. Pt denies pain, nausea and vomiting at this time. Bed at lowest position. Call light and personal belongings within reach. Hourly rounding in place.       1253 Pt had a shower and reported that she noticed increased redness on her panus and groin, states she already noticed it prior to admission, picture taken and uploaded in Epic. Barrier cream applied as per protocol.

## 2024-03-15 NOTE — PROGRESS NOTES
Received bedside report from morning RN, resumed care of pt. Pt alert and oriented x4, resting comfortably in bed, resp even and unlabored no s/s of resp distress. No complaints at this time, no report of pain/discomfort. Safety measures in place, treaded socks on, bed alarm on. Call light within reach. Hourly rounding in place.    0450-Received a call from Dr. Schafer regarding pt blood work, he ordered blood cx but lab didn't draw on 03/14, h&h, tom according to the report I received from morning RN pt is hard stick, per Dr. Schafer they need to do blood work, asap.    0500- called lab to let them known, Dr Schafer wanted to do blood work ASAP.  0510- Phlebotomist came and nathalie a blood.
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
## 2024-03-15 NOTE — PROGRESS NOTES
"Hospital Medicine Daily Progress Note    Date of Service  3/15/2024    Chief Complaint  Liana Obrien is a 44 y.o. female admitted 3/13/2024 with N/V    Hospital Course  As per chart review:  \"44 y.o. female with a past medical history of diabetes, hypertension and hypothyroidism who presented 3/13/2024 with generalized weakness nausea and vomiting.  Patient reports that she has not been feeling well over the past 1 week.  She has been having progressively worsening generalized weakness nausea vomiting and increased frequency of urination.  She denies noticing any fevers or chills.  She denies having cough shortness of breath or lower extremity pain redness or swelling.  In the emergency room she was found to have marked elevated blood sugars of 1492 with hyponatremia with a sodium level of 128.\"    Interval Problem Update  3/14: Patient seen at bedside this morning.  Patient laying in bed, overall she feels better.  Still complaining of some urinary frequency.  We have started the patient on ceftriaxone as there is concern for urinary tract infection.  Will follow cultures.  Also we have ordered iron studies.  Hemoglobin this morning is 9.3, we have repeated hemoglobin.  No signs of overt bleeding at this time.  Will continue to monitor glucose values.  Continue with Lantus 8 units twice daily and medium insulin sliding scale for now.  Also the patient was saying that in the past 2 weeks she has had suicidal ideation, however she is adamant that today she is not having suicidal ideation.  She seems tearful and says that she has been going through her divorce in the past 2 weeks.  We have consulted psychiatry, we appreciate further recommendations.    3/15: Patient seen at bedside this morning.  Overall she feels better.  Denying suicidal ideation.  Psychiatry following the patient, we appreciate further recommendations.  We have ordered TSH and vitamin D level.  Patient with iron deficiency anemia so we " have started the patient on IV replacement therapy.  Pharmacy.  We have increased Lantus to 12 units twice daily.  After discussing with ID pharmacy we have transition to Macrobid.  Continue to monitor closely.    I have discussed this patient's plan of care and discharge plan at IDT rounds today with Case Management, Nursing, Nursing leadership, and other members of the IDT team.    Consultants/Specialty  Psychiatry    Code Status  Full Code    Disposition  The patient is not medically cleared for discharge to home or a post-acute facility.        Review of Systems  Review of Systems   Constitutional:  Positive for malaise/fatigue. Negative for chills and fever.   HENT:  Negative for hearing loss and nosebleeds.    Eyes:  Negative for blurred vision and double vision.   Respiratory:  Negative for cough and shortness of breath.    Cardiovascular:  Negative for chest pain and palpitations.   Gastrointestinal:  Negative for abdominal pain, heartburn and vomiting.   Genitourinary:  Positive for frequency.   Musculoskeletal:  Negative for back pain and falls.   Skin:  Negative for itching and rash.   Neurological:  Negative for dizziness and headaches.   Psychiatric/Behavioral:  Negative for substance abuse. The patient is not nervous/anxious.    All other systems reviewed and are negative.       Physical Exam  Temp:  [36.1 °C (97 °F)-36.7 °C (98 °F)] 36.5 °C (97.7 °F)  Pulse:  [74-84] 84  Resp:  [17-18] 17  BP: (116-136)/(68-76) 126/68  SpO2:  [92 %-98 %] 96 %    Physical Exam  Vitals and nursing note reviewed.   Constitutional:       General: She is not in acute distress.     Appearance: She is obese. She is ill-appearing.   HENT:      Head: Normocephalic and atraumatic.      Right Ear: External ear normal.      Left Ear: External ear normal.      Mouth/Throat:      Pharynx: No oropharyngeal exudate or posterior oropharyngeal erythema.   Eyes:      General:         Right eye: No discharge.         Left eye: No  discharge.   Cardiovascular:      Rate and Rhythm: Normal rate and regular rhythm.      Pulses: Normal pulses.      Heart sounds: No murmur heard.     No gallop.   Pulmonary:      Effort: Pulmonary effort is normal. No respiratory distress.      Breath sounds: Normal breath sounds. No wheezing or rhonchi.   Abdominal:      General: Bowel sounds are normal. There is no distension.      Palpations: Abdomen is soft.      Tenderness: There is no abdominal tenderness. There is no guarding.   Musculoskeletal:         General: No swelling or tenderness. Normal range of motion.      Cervical back: Normal range of motion and neck supple. No tenderness.   Skin:     General: Skin is warm and dry.   Neurological:      General: No focal deficit present.      Mental Status: She is alert and oriented to person, place, and time. Mental status is at baseline.      Motor: No weakness.   Psychiatric:         Mood and Affect: Mood normal.         Behavior: Behavior normal.         Fluids    Intake/Output Summary (Last 24 hours) at 3/15/2024 1601  Last data filed at 3/15/2024 1400  Gross per 24 hour   Intake 490 ml   Output 2550 ml   Net -2060 ml       Laboratory  Recent Labs     03/13/24  1442 03/14/24  0221 03/15/24  0528   WBC 14.2* 11.6* 8.2   RBC 5.23 4.63 4.91   HEMOGLOBIN 10.6* 9.3* 9.7*   HEMATOCRIT 34.8* 31.2* 33.2*   MCV 66.5* 67.4* 67.6*   MCH 20.3* 20.1* 19.8*   MCHC 30.5* 29.8* 29.2*   RDW 38.3 39.3 40.7   PLATELETCT 337 286 290   MPV 10.7 10.0 10.0     Recent Labs     03/13/24  1442 03/14/24  0221 03/15/24  0528   SODIUM 128* 135 135   POTASSIUM 4.2 3.3* 3.6   CHLORIDE 94* 102 104   CO2 22 24 21   GLUCOSE 492* 185* 300*   BUN 12 8 7*   CREATININE 0.44* 0.46* 0.39*   CALCIUM 9.0 8.2* 8.2*                   Imaging  No orders to display        Assessment/Plan  * Type 2 diabetes mellitus with hyperglycemia, with long-term current use of insulin (HCC)- (present on admission)  Assessment & Plan  With marked hyperglycemia  Last  glycated hemoglobin was 8.4%  I will start long & short acting insulin for now  I will order Accu-Checks, hypoglycemia protocol  Adjust according to blood sugars trend.     3/14: Continue with lantus 8 U BID, ISS medium. Monitor and make changes accordingly.    3/15: We have increased Lantus to 12 units twice daily, however starting tomorrow we will do 26 units Lantus q. Evening if needed as per her home dose.  Monitor closely and make changes accordingly.    UTI (urinary tract infection)  Assessment & Plan  Patient complaining of urinary frequency.  UA concerning for UTI.  Will start the patient on ceftriaxone.  Will order blood cultures and urine cultures.    3/15: Continue antibiotics, follow cultures    Anemia- (present on admission)  Assessment & Plan  Hemoglobin seems to be stable.  Monitor.  No signs of overt bleeding.    3/15: Patient with iron deficiency, we have started the patient on IV replacement by pharmacy.    Major depressive disorder- (present on admission)  Assessment & Plan  Continue sertraline    3/14: The patient states that in the past 2 weeks she has been dealing with her divorce and it has been very stressful for her.  She mentions that she has had suicidal ideation in the past 2 weeks, however she is adamant that she is not currently having suicidal ideation.  We have consulted psychiatry, we appreciate further recommendations.    3/15: Psychiatry following patient, we appreciate further recommendations.  We have ordered TSH and vitamin D level.    Hypomagnesemia  Assessment & Plan  Replace as needed  monitor    Intractable nausea and vomiting- (present on admission)  Assessment & Plan  No abdominal pain or tenderness.  I will start antiemetics as needed  Differentials include viral infection versus gastroparesis.  Bowel rest with a modified diet and supportive care   I will start Intravenous fluids  Continue serial clinical examinations, consider CT scan of the abdomen/pelvis if develops  abdominal pain or symptoms do not improve with above measures  Monitor electrolytes and replace accordingly      3/14: improved    Leukocytosis- (present on admission)  Assessment & Plan  Probably reactive  Also concern for UTI  We have started ceftriaxone    3/15: Improved, we will continue with antibiotics, monitor    Hyponatremia- (present on admission)  Assessment & Plan  Hypovolemic hyponatremia with a component of pseudohyponatremia due to hyperglycemia  I expect Na to improve with IVF, and correcting blood sugars     3/14: Improved    Advance care planning- (present on admission)  Assessment & Plan  I discussed with patient for at least 16 minutes further goals of treatment.  This included CODE STATUS which include DNR/DNI and full code.  The patient would like to be full code.  We also discussed further treatment options.  The patient would like to have full treatment options.  This includes invasive and noninvasive procedures as needed.    Dyslipidemia- (present on admission)  Assessment & Plan  Cardiac diet.  continue atorvastatin    Obesity (BMI 30-39.9)- (present on admission)  Assessment & Plan   on lifestyle modifications  Patient will require close follow up as outpatient    Hypothyroidism- (present on admission)  Assessment & Plan  continue levothyroxine    Gastroesophageal reflux disease- (present on admission)  Assessment & Plan  Tums as needed    Dehydration- (present on admission)  Assessment & Plan  Likely secondary to reduced oral intake, and increase in insensible loss due to osmotic diuresis.  Encourage oral intake as tolerated, antiemetics as needed.  Intravenous hydration until adequate oral intake is achieved.     Hypertension- (present on admission)  Assessment & Plan  continue lisinopril with hold parameters    Hypokalemia- (present on admission)  Assessment & Plan  Replace as needed  monitor         VTE prophylaxis: xarelto    I have performed a physical exam and reviewed and  updated ROS and Plan today (3/15/2024). In review of yesterday's note (3/14/2024), there are no changes except as documented above.      I spend at least 52 minutes providing care for this patient.  This included face-to-face interview, physical examination.  Review of lab work including CBC, CMP, magnesium.  Consulting and discussing with psychiatry. Discussion with multidisciplinary team including case management, nursing staff and pharmacy.  Creating plan of care, reviewing orders.
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
## 2024-03-15 NOTE — CARE PLAN
The patient is Stable - Low risk of patient condition declining or worsening    Shift Goals  Clinical Goals: Monitor BS level, promote pt education about diabetes  Patient Goals: feel better so I can go back home  Family Goals: n/a    Progress made toward(s) clinical / shift goals:  Monitor bs,  educate pt regarding diabetes about diet and activity.    Patient is not progressing towards the following goals:

## 2024-03-16 LAB
25(OH)D3 SERPL-MCNC: 11 NG/ML (ref 30–100)
ALBUMIN SERPL BCP-MCNC: 3.2 G/DL (ref 3.2–4.9)
ALBUMIN/GLOB SERPL: 1.1 G/DL
ALP SERPL-CCNC: 65 U/L (ref 30–99)
ALT SERPL-CCNC: 12 U/L (ref 2–50)
ANION GAP SERPL CALC-SCNC: 10 MMOL/L (ref 7–16)
AST SERPL-CCNC: 13 U/L (ref 12–45)
BACTERIA UR CULT: NORMAL
BILIRUB SERPL-MCNC: <0.2 MG/DL (ref 0.1–1.5)
BUN SERPL-MCNC: 7 MG/DL (ref 8–22)
CALCIUM ALBUM COR SERPL-MCNC: 8.8 MG/DL (ref 8.5–10.5)
CALCIUM SERPL-MCNC: 8.2 MG/DL (ref 8.4–10.2)
CHLORIDE SERPL-SCNC: 107 MMOL/L (ref 96–112)
CO2 SERPL-SCNC: 19 MMOL/L (ref 20–33)
CREAT SERPL-MCNC: 0.3 MG/DL (ref 0.5–1.4)
ERYTHROCYTE [DISTWIDTH] IN BLOOD BY AUTOMATED COUNT: 43.8 FL (ref 35.9–50)
GFR SERPLBLD CREATININE-BSD FMLA CKD-EPI: 134 ML/MIN/1.73 M 2
GLOBULIN SER CALC-MCNC: 2.9 G/DL (ref 1.9–3.5)
GLUCOSE BLD STRIP.AUTO-MCNC: 216 MG/DL (ref 65–99)
GLUCOSE BLD STRIP.AUTO-MCNC: 293 MG/DL (ref 65–99)
GLUCOSE BLD STRIP.AUTO-MCNC: 298 MG/DL (ref 65–99)
GLUCOSE BLD STRIP.AUTO-MCNC: 305 MG/DL (ref 65–99)
GLUCOSE SERPL-MCNC: 190 MG/DL (ref 65–99)
HCT VFR BLD AUTO: 34.4 % (ref 37–47)
HGB BLD-MCNC: 9.7 G/DL (ref 12–16)
MAGNESIUM SERPL-MCNC: 2 MG/DL (ref 1.5–2.5)
MCH RBC QN AUTO: 20.4 PG (ref 27–33)
MCHC RBC AUTO-ENTMCNC: 28.2 G/DL (ref 32.2–35.5)
MCV RBC AUTO: 72.3 FL (ref 81.4–97.8)
PLATELET # BLD AUTO: 260 K/UL (ref 164–446)
PMV BLD AUTO: 10.7 FL (ref 9–12.9)
POTASSIUM SERPL-SCNC: 4.1 MMOL/L (ref 3.6–5.5)
PROT SERPL-MCNC: 6.1 G/DL (ref 6–8.2)
RBC # BLD AUTO: 4.76 M/UL (ref 4.2–5.4)
SIGNIFICANT IND 70042: NORMAL
SITE SITE: NORMAL
SODIUM SERPL-SCNC: 136 MMOL/L (ref 135–145)
SOURCE SOURCE: NORMAL
TSH SERPL DL<=0.005 MIU/L-ACNC: 0.49 UIU/ML (ref 0.38–5.33)
WBC # BLD AUTO: 8.2 K/UL (ref 4.8–10.8)

## 2024-03-16 PROCEDURE — 36415 COLL VENOUS BLD VENIPUNCTURE: CPT

## 2024-03-16 PROCEDURE — A9270 NON-COVERED ITEM OR SERVICE: HCPCS | Performed by: HOSPITALIST

## 2024-03-16 PROCEDURE — 99233 SBSQ HOSP IP/OBS HIGH 50: CPT | Performed by: INTERNAL MEDICINE

## 2024-03-16 PROCEDURE — 82962 GLUCOSE BLOOD TEST: CPT | Mod: 91

## 2024-03-16 PROCEDURE — 84443 ASSAY THYROID STIM HORMONE: CPT

## 2024-03-16 PROCEDURE — 94760 N-INVAS EAR/PLS OXIMETRY 1: CPT

## 2024-03-16 PROCEDURE — 80053 COMPREHEN METABOLIC PANEL: CPT

## 2024-03-16 PROCEDURE — A9270 NON-COVERED ITEM OR SERVICE: HCPCS | Performed by: INTERNAL MEDICINE

## 2024-03-16 PROCEDURE — 83735 ASSAY OF MAGNESIUM: CPT

## 2024-03-16 PROCEDURE — 770001 HCHG ROOM/CARE - MED/SURG/GYN PRIV*

## 2024-03-16 PROCEDURE — 700102 HCHG RX REV CODE 250 W/ 637 OVERRIDE(OP): Performed by: HOSPITALIST

## 2024-03-16 PROCEDURE — 700105 HCHG RX REV CODE 258: Performed by: INTERNAL MEDICINE

## 2024-03-16 PROCEDURE — 700105 HCHG RX REV CODE 258: Performed by: HOSPITALIST

## 2024-03-16 PROCEDURE — 85027 COMPLETE CBC AUTOMATED: CPT

## 2024-03-16 PROCEDURE — 700111 HCHG RX REV CODE 636 W/ 250 OVERRIDE (IP): Mod: JZ | Performed by: INTERNAL MEDICINE

## 2024-03-16 PROCEDURE — 94660 CPAP INITIATION&MGMT: CPT

## 2024-03-16 PROCEDURE — 82306 VITAMIN D 25 HYDROXY: CPT

## 2024-03-16 PROCEDURE — 700102 HCHG RX REV CODE 250 W/ 637 OVERRIDE(OP): Performed by: INTERNAL MEDICINE

## 2024-03-16 RX ORDER — VITAMIN B COMPLEX
1000 TABLET ORAL DAILY
Status: DISCONTINUED | OUTPATIENT
Start: 2024-03-16 | End: 2024-03-18 | Stop reason: HOSPADM

## 2024-03-16 RX ADMIN — SERTRALINE HYDROCHLORIDE 150 MG: 50 TABLET ORAL at 06:11

## 2024-03-16 RX ADMIN — NITROFURANTOIN MONOHYDRATE/MACROCRYSTALS 100 MG: 75; 25 CAPSULE ORAL at 17:09

## 2024-03-16 RX ADMIN — SODIUM CHLORIDE: 9 INJECTION, SOLUTION INTRAVENOUS at 16:00

## 2024-03-16 RX ADMIN — DOCUSATE SODIUM 50MG AND SENNOSIDES 8.6MG 2 TABLET: 8.6; 5 TABLET, FILM COATED ORAL at 17:08

## 2024-03-16 RX ADMIN — DOCUSATE SODIUM 50MG AND SENNOSIDES 8.6MG 2 TABLET: 8.6; 5 TABLET, FILM COATED ORAL at 06:11

## 2024-03-16 RX ADMIN — GABAPENTIN 100 MG: 100 CAPSULE ORAL at 06:11

## 2024-03-16 RX ADMIN — NITROFURANTOIN MONOHYDRATE/MACROCRYSTALS 100 MG: 75; 25 CAPSULE ORAL at 07:59

## 2024-03-16 RX ADMIN — SODIUM CHLORIDE 250 MG: 9 INJECTION, SOLUTION INTRAVENOUS at 17:08

## 2024-03-16 RX ADMIN — SODIUM CHLORIDE 250 MG: 9 INJECTION, SOLUTION INTRAVENOUS at 06:29

## 2024-03-16 RX ADMIN — GABAPENTIN 100 MG: 100 CAPSULE ORAL at 11:36

## 2024-03-16 RX ADMIN — LEVOTHYROXINE SODIUM 150 MCG: 0.07 TABLET ORAL at 05:01

## 2024-03-16 RX ADMIN — SODIUM CHLORIDE: 9 INJECTION, SOLUTION INTRAVENOUS at 04:40

## 2024-03-16 RX ADMIN — Medication 1 CAPSULE: at 07:59

## 2024-03-16 RX ADMIN — TRAZODONE HYDROCHLORIDE 100 MG: 50 TABLET ORAL at 21:16

## 2024-03-16 RX ADMIN — RIVAROXABAN 10 MG: 10 TABLET, FILM COATED ORAL at 17:09

## 2024-03-16 RX ADMIN — GABAPENTIN 100 MG: 100 CAPSULE ORAL at 17:08

## 2024-03-16 RX ADMIN — Medication 1000 UNITS: at 16:39

## 2024-03-16 RX ADMIN — ATORVASTATIN CALCIUM 20 MG: 20 TABLET, FILM COATED ORAL at 17:09

## 2024-03-16 ASSESSMENT — ENCOUNTER SYMPTOMS
COUGH: 0
BLURRED VISION: 0
HEARTBURN: 0
VOMITING: 0
CHILLS: 0
FEVER: 0
PALPITATIONS: 0
DOUBLE VISION: 0
BACK PAIN: 0
ABDOMINAL PAIN: 0
DIZZINESS: 0
HEADACHES: 0
FALLS: 0
SHORTNESS OF BREATH: 0
NERVOUS/ANXIOUS: 0

## 2024-03-16 ASSESSMENT — PATIENT HEALTH QUESTIONNAIRE - PHQ9
2. FEELING DOWN, DEPRESSED, IRRITABLE, OR HOPELESS: SEVERAL DAYS
1. LITTLE INTEREST OR PLEASURE IN DOING THINGS: SEVERAL DAYS
8. MOVING OR SPEAKING SO SLOWLY THAT OTHER PEOPLE COULD HAVE NOTICED. OR THE OPPOSITE, BEING SO FIGETY OR RESTLESS THAT YOU HAVE BEEN MOVING AROUND A LOT MORE THAN USUAL: NOT AT ALL
SUM OF ALL RESPONSES TO PHQ9 QUESTIONS 1 AND 2: 2
SUM OF ALL RESPONSES TO PHQ QUESTIONS 1-9: 7
4. FEELING TIRED OR HAVING LITTLE ENERGY: SEVERAL DAYS
3. TROUBLE FALLING OR STAYING ASLEEP OR SLEEPING TOO MUCH: SEVERAL DAYS
5. POOR APPETITE OR OVEREATING: SEVERAL DAYS
6. FEELING BAD ABOUT YOURSELF - OR THAT YOU ARE A FAILURE OR HAVE LET YOURSELF OR YOUR FAMILY DOWN: SEVERAL DAYS
7. TROUBLE CONCENTRATING ON THINGS, SUCH AS READING THE NEWSPAPER OR WATCHING TELEVISION: SEVERAL DAYS
9. THOUGHTS THAT YOU WOULD BE BETTER OFF DEAD, OR OF HURTING YOURSELF: NOT AT ALL

## 2024-03-16 ASSESSMENT — PAIN DESCRIPTION - PAIN TYPE
TYPE: ACUTE PAIN
TYPE: ACUTE PAIN

## 2024-03-16 ASSESSMENT — FIBROSIS 4 INDEX: FIB4 SCORE: 0.64

## 2024-03-16 ASSESSMENT — LIFESTYLE VARIABLES: SUBSTANCE_ABUSE: 0
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
## 2024-03-16 NOTE — CARE PLAN
The patient is Stable - Low risk of patient condition declining or worsening    Shift Goals  Clinical Goals: monitor blood glucose readings, s/s of hypoglycemia  Patient Goals: rest  Family Goals: yoly    Progress made toward(s) clinical / shift goals: Blood glucose monitoring done and insulin coverage given as per order. No reported or noted s/s of hypoglycemia.    Problem: Knowledge Deficit - Standard  Goal: Patient and family/care givers will demonstrate understanding of plan of care, disease process/condition, diagnostic tests and medications  Description: Target End Date:  1-3 days or as soon as patient condition allows    Document in Patient Education    1.  Patient and family/caregiver oriented to unit, equipment, visitation policy and means for communicating concern  2.  Complete/review Learning Assessment  3.  Assess knowledge level of disease process/condition, treatment plan, diagnostic tests and medications  4.  Explain disease process/condition, treatment plan, diagnostic tests and medications  Outcome: Progressing     Problem: Diabetes Management  Goal: Patient will achieve and maintain glucose in satisfactory range  Description: Target End Date:  Prior to discharge or change in level of care    1.  Monitor glucose levels per provider order  2.  Assess for signs and symptoms of hyperglycemia (abdominal pain, bloating, nausea or vomiting)  3.  Assess for signs and symptoms of hypoglycemia (anxiety, tremors, slurred speech, change in LOC, tachycardia, fatigue)  4.  Monitor for early signs and symptoms of infection  5.  Monitor daily weights  6.  Consult to diabetes educator  Outcome: Progressing     Problem: Infection - Diabetes  Goal: Patient will remain free from signs and symptoms of infection  Description: Target End Date:  Prior to discharge or change in level of care    1.  Monitor for signs and symptoms of infection  2.  Monitor for changes in breath sounds (crackles, rhonchi)  3.  Place in  semi-Fowlers position  4.  Encourage and assist with oral hygiene  5.  Patient and family/caregiver instructed on hand washing techniques  6.  Patient will be instructed on proper hygiene practices with finger sticks and insulin injections  Outcome: Progressing       Patient is not progressing towards the following goals:

## 2024-03-16 NOTE — PROGRESS NOTES
"Hospital Medicine Daily Progress Note    Date of Service  3/16/2024    Chief Complaint  Liana Obrien is a 44 y.o. female admitted 3/13/2024 with N/V    Hospital Course  As per chart review:  \"44 y.o. female with a past medical history of diabetes, hypertension and hypothyroidism who presented 3/13/2024 with generalized weakness nausea and vomiting.  Patient reports that she has not been feeling well over the past 1 week.  She has been having progressively worsening generalized weakness nausea vomiting and increased frequency of urination.  She denies noticing any fevers or chills.  She denies having cough shortness of breath or lower extremity pain redness or swelling.  In the emergency room she was found to have marked elevated blood sugars of 1492 with hyponatremia with a sodium level of 128.\"    Interval Problem Update  3/14: Patient seen at bedside this morning.  Patient laying in bed, overall she feels better.  Still complaining of some urinary frequency.  We have started the patient on ceftriaxone as there is concern for urinary tract infection.  Will follow cultures.  Also we have ordered iron studies.  Hemoglobin this morning is 9.3, we have repeated hemoglobin.  No signs of overt bleeding at this time.  Will continue to monitor glucose values.  Continue with Lantus 8 units twice daily and medium insulin sliding scale for now.  Also the patient was saying that in the past 2 weeks she has had suicidal ideation, however she is adamant that today she is not having suicidal ideation.  She seems tearful and says that she has been going through her divorce in the past 2 weeks.  We have consulted psychiatry, we appreciate further recommendations.    3/15: Patient seen at bedside this morning.  Overall she feels better.  Denying suicidal ideation.  Psychiatry following the patient, we appreciate further recommendations.  We have ordered TSH and vitamin D level.  Patient with iron deficiency anemia so we " have started the patient on IV replacement therapy.  Pharmacy.  We have increased Lantus to 12 units twice daily.  After discussing with ID pharmacy we have transition to Macrobid.  Continue to monitor closely.    3/16: Patient seen at bedside this morning.  She continues to improve clinically.  We have increased Lantus to 35 units q. evening.  Continue to monitor closely.  Continue with IV iron replacement.    I have discussed this patient's plan of care and discharge plan at IDT rounds today with Case Management, Nursing, Nursing leadership, and other members of the IDT team.    Consultants/Specialty  Psychiatry    Code Status  Full Code    Disposition  The patient is not medically cleared for discharge to home or a post-acute facility.        Review of Systems  Review of Systems   Constitutional:  Positive for malaise/fatigue. Negative for chills and fever.   HENT:  Negative for hearing loss and nosebleeds.    Eyes:  Negative for blurred vision and double vision.   Respiratory:  Negative for cough and shortness of breath.    Cardiovascular:  Negative for chest pain and palpitations.   Gastrointestinal:  Negative for abdominal pain, heartburn and vomiting.   Genitourinary:  Positive for frequency.   Musculoskeletal:  Negative for back pain and falls.   Skin:  Negative for itching and rash.   Neurological:  Negative for dizziness and headaches.   Psychiatric/Behavioral:  Negative for substance abuse. The patient is not nervous/anxious.    All other systems reviewed and are negative.       Physical Exam  Temp:  [36.3 °C (97.3 °F)-36.5 °C (97.7 °F)] 36.4 °C (97.5 °F)  Pulse:  [69-82] 80  Resp:  [16-18] 18  BP: (106-133)/(51-90) 121/76  SpO2:  [92 %-96 %] 96 %    Physical Exam  Vitals and nursing note reviewed.   Constitutional:       General: She is not in acute distress.     Appearance: She is obese. She is ill-appearing.   HENT:      Head: Normocephalic and atraumatic.      Right Ear: External ear normal.      Left  Ear: External ear normal.      Mouth/Throat:      Pharynx: No oropharyngeal exudate or posterior oropharyngeal erythema.   Eyes:      General:         Right eye: No discharge.         Left eye: No discharge.   Cardiovascular:      Rate and Rhythm: Normal rate and regular rhythm.      Pulses: Normal pulses.      Heart sounds: No murmur heard.     No gallop.   Pulmonary:      Effort: Pulmonary effort is normal. No respiratory distress.      Breath sounds: Normal breath sounds. No wheezing or rhonchi.   Abdominal:      General: Bowel sounds are normal. There is no distension.      Palpations: Abdomen is soft.      Tenderness: There is no abdominal tenderness. There is no guarding.   Musculoskeletal:         General: No swelling or tenderness. Normal range of motion.      Cervical back: Normal range of motion and neck supple. No tenderness.   Skin:     General: Skin is warm and dry.   Neurological:      General: No focal deficit present.      Mental Status: She is alert and oriented to person, place, and time. Mental status is at baseline.      Motor: No weakness.   Psychiatric:         Mood and Affect: Mood normal.         Behavior: Behavior normal.         Fluids    Intake/Output Summary (Last 24 hours) at 3/16/2024 1538  Last data filed at 3/16/2024 1300  Gross per 24 hour   Intake 3402.78 ml   Output 3050 ml   Net 352.78 ml       Laboratory  Recent Labs     03/14/24  0221 03/15/24  0528 03/16/24  0202   WBC 11.6* 8.2 8.2   RBC 4.63 4.91 4.76   HEMOGLOBIN 9.3* 9.7* 9.7*   HEMATOCRIT 31.2* 33.2* 34.4*   MCV 67.4* 67.6* 72.3*   MCH 20.1* 19.8* 20.4*   MCHC 29.8* 29.2* 28.2*   RDW 39.3 40.7 43.8   PLATELETCT 286 290 260   MPV 10.0 10.0 10.7     Recent Labs     03/14/24  0221 03/15/24  0528 03/16/24  0202   SODIUM 135 135 136   POTASSIUM 3.3* 3.6 4.1   CHLORIDE 102 104 107   CO2 24 21 19*   GLUCOSE 185* 300* 190*   BUN 8 7* 7*   CREATININE 0.46* 0.39* 0.30*   CALCIUM 8.2* 8.2* 8.2*                   Imaging  No orders to  display        Assessment/Plan  * Type 2 diabetes mellitus with hyperglycemia, with long-term current use of insulin (HCC)- (present on admission)  Assessment & Plan  With marked hyperglycemia  Last glycated hemoglobin was 8.4%  I will start long & short acting insulin for now  I will order Accu-Checks, hypoglycemia protocol  Adjust according to blood sugars trend.     3/14: Continue with lantus 8 U BID, ISS medium. Monitor and make changes accordingly.    3/15: We have increased Lantus to 12 units twice daily, however starting tomorrow we will do Lantus q. Evening.  Monitor closely and make changes accordingly.    3/16: We will do 35 U lantus. Monitor  closely.    UTI (urinary tract infection)  Assessment & Plan  Patient complaining of urinary frequency.  UA concerning for UTI.  Will start the patient on ceftriaxone.  Will order blood cultures and urine cultures.    3/16: Continue antibiotics, follow cultures    Anemia- (present on admission)  Assessment & Plan  Hemoglobin seems to be stable.  Monitor.  No signs of overt bleeding.    3/15: Patient with iron deficiency, we have started the patient on IV replacement by pharmacy.    3/16: Continue IV iron replacement    Major depressive disorder- (present on admission)  Assessment & Plan  Continue sertraline    3/14: The patient states that in the past 2 weeks she has been dealing with her divorce and it has been very stressful for her.  She mentions that she has had suicidal ideation in the past 2 weeks, however she is adamant that she is not currently having suicidal ideation.  We have consulted psychiatry, we appreciate further recommendations.    3/16: Psychiatry following patient, we appreciate further recommendations.  We have ordered TSH and vitamin D level.    Hypomagnesemia  Assessment & Plan  Replace as needed  monitor    Intractable nausea and vomiting- (present on admission)  Assessment & Plan  No abdominal pain or tenderness.  I will start antiemetics as  needed  Differentials include viral infection versus gastroparesis.  Bowel rest with a modified diet and supportive care   I will start Intravenous fluids  Continue serial clinical examinations, consider CT scan of the abdomen/pelvis if develops abdominal pain or symptoms do not improve with above measures  Monitor electrolytes and replace accordingly      3/14: improved    Leukocytosis- (present on admission)  Assessment & Plan  Probably reactive  Also concern for UTI  We have started ceftriaxone    3/15: Improved, we will continue with antibiotics, monitor    Hyponatremia- (present on admission)  Assessment & Plan  Hypovolemic hyponatremia with a component of pseudohyponatremia due to hyperglycemia  I expect Na to improve with IVF, and correcting blood sugars     3/14: Improved    Advance care planning- (present on admission)  Assessment & Plan  I discussed with patient for at least 16 minutes further goals of treatment.  This included CODE STATUS which include DNR/DNI and full code.  The patient would like to be full code.  We also discussed further treatment options.  The patient would like to have full treatment options.  This includes invasive and noninvasive procedures as needed.    Dyslipidemia- (present on admission)  Assessment & Plan  Cardiac diet.  continue atorvastatin    Obesity (BMI 30-39.9)- (present on admission)  Assessment & Plan   on lifestyle modifications  Patient will require close follow up as outpatient    Hypothyroidism- (present on admission)  Assessment & Plan  continue levothyroxine    Gastroesophageal reflux disease- (present on admission)  Assessment & Plan  Tums as needed    Dehydration- (present on admission)  Assessment & Plan  Likely secondary to reduced oral intake, and increase in insensible loss due to osmotic diuresis.  Encourage oral intake as tolerated, antiemetics as needed.  Intravenous hydration until adequate oral intake is achieved.     Hypertension- (present on  admission)  Assessment & Plan  continue lisinopril with hold parameters    Hypokalemia- (present on admission)  Assessment & Plan  Replace as needed  monitor         VTE prophylaxis: xarelto    I have performed a physical exam and reviewed and updated ROS and Plan today (3/16/2024). In review of yesterday's note (3/15/2024), there are no changes except as documented above.      I spend at least 51 minutes providing care for this patient.  This included face-to-face interview, physical examination.  Review of lab work including CBC, CMP, magnesium.  Discussing with psychiatry. Discussion with multidisciplinary team including case management, nursing staff and pharmacy.  Creating plan of care, reviewing orders.

## 2024-03-16 NOTE — CARE PLAN
The patient is Stable - Low risk of patient condition declining or worsening    Shift Goals  Clinical Goals: Monitor blood glucose, encouraged to ambulate through the shift  Patient Goals: rest and comfort  Family Goals: yoly    Progress made toward(s) clinical / shift goals:Blood glucose monitored, insulin administered as per order. Pt encouraged to ambulate through the shift and was compliant. She was up in the chair for meals and ambulate to the bathroom.     Patient is not progressing towards the following goals:

## 2024-03-16 NOTE — PROGRESS NOTES
1900 Received report from [facility] nurse  2040 Performed assessment  Pt is A&Ox4, no complaints of pain, updated on POC.  Call light within reach, hourly rounding in place, bed in lowest position.

## 2024-03-16 NOTE — PROGRESS NOTES
Bedside report received from night RN. Assumed care of patient. Alert and oriented X4, resting comfortably in bed. On RA, no SOB noted. Daily plan of care discussed. Ambulation encouraged. Pt denies pain, nausea  and vomiting at this time.   Safety precautions in place. Hourly rounding ongoing.

## 2024-03-16 NOTE — CARE PLAN
The patient is Stable - Low risk of patient condition declining or worsening    Shift Goals  Clinical Goals: monitor blood sugar and for s/s of hyper/hypoglycemia, remain free from falls throughout my shift, maintain SPO2 at or above 90% throughout my shift and while using CPAP/BiPAP  Patient Goals: rest  Family Goals: N/A    Progress made toward(s) clinical / shift goals:  Q6 blood glucose check were performed and coverage was provided by medication per MAR, pt remained free from falls throughout my shift, SPO2 was maintained at or above 90% throughout my shift with CPAP/BiPAP    Problem: Knowledge Deficit - Standard  Goal: Patient and family/care givers will demonstrate understanding of plan of care, disease process/condition, diagnostic tests and medications  Outcome: Progressing     Problem: Depression  Goal: Patient and family/caregiver will verbalize accurate information about at least two of the possible causes of depression, three-four of the signs and symptoms of depression  Outcome: Progressing     Problem: Provide Safe Environment  Goal: Suicide environmental safety, protocols, policies, and practices will be implemented  Outcome: Progressing     Problem: Psychosocial  Goal: Patient's ability to identify and develop effective coping behaviors will improve  Outcome: Progressing  Goal: Patient's ability to identify and utilize available support systems will improve  Outcome: Progressing     Problem: Infection - Standard  Goal: Patient will remain free from infection  Outcome: Progressing     Problem: Fall Risk  Goal: Patient will remain free from falls  Outcome: Progressing       Patient is not progressing towards the following goals:

## 2024-03-17 LAB
ANION GAP SERPL CALC-SCNC: 8 MMOL/L (ref 7–16)
BUN SERPL-MCNC: 7 MG/DL (ref 8–22)
CALCIUM SERPL-MCNC: 8.2 MG/DL (ref 8.4–10.2)
CHLORIDE SERPL-SCNC: 104 MMOL/L (ref 96–112)
CO2 SERPL-SCNC: 22 MMOL/L (ref 20–33)
CREAT SERPL-MCNC: 0.32 MG/DL (ref 0.5–1.4)
GFR SERPLBLD CREATININE-BSD FMLA CKD-EPI: 132 ML/MIN/1.73 M 2
GLUCOSE BLD STRIP.AUTO-MCNC: 222 MG/DL (ref 65–99)
GLUCOSE BLD STRIP.AUTO-MCNC: 247 MG/DL (ref 65–99)
GLUCOSE BLD STRIP.AUTO-MCNC: 274 MG/DL (ref 65–99)
GLUCOSE BLD STRIP.AUTO-MCNC: 298 MG/DL (ref 65–99)
GLUCOSE SERPL-MCNC: 266 MG/DL (ref 65–99)
POTASSIUM SERPL-SCNC: 4 MMOL/L (ref 3.6–5.5)
SODIUM SERPL-SCNC: 134 MMOL/L (ref 135–145)

## 2024-03-17 PROCEDURE — 94760 N-INVAS EAR/PLS OXIMETRY 1: CPT

## 2024-03-17 PROCEDURE — 700111 HCHG RX REV CODE 636 W/ 250 OVERRIDE (IP): Mod: JZ | Performed by: INTERNAL MEDICINE

## 2024-03-17 PROCEDURE — 82962 GLUCOSE BLOOD TEST: CPT | Mod: 91

## 2024-03-17 PROCEDURE — 700102 HCHG RX REV CODE 250 W/ 637 OVERRIDE(OP): Performed by: INTERNAL MEDICINE

## 2024-03-17 PROCEDURE — 700102 HCHG RX REV CODE 250 W/ 637 OVERRIDE(OP): Performed by: HOSPITALIST

## 2024-03-17 PROCEDURE — 99233 SBSQ HOSP IP/OBS HIGH 50: CPT | Performed by: INTERNAL MEDICINE

## 2024-03-17 PROCEDURE — 80048 BASIC METABOLIC PNL TOTAL CA: CPT

## 2024-03-17 PROCEDURE — A9270 NON-COVERED ITEM OR SERVICE: HCPCS | Performed by: HOSPITALIST

## 2024-03-17 PROCEDURE — 36415 COLL VENOUS BLD VENIPUNCTURE: CPT

## 2024-03-17 PROCEDURE — 700105 HCHG RX REV CODE 258: Performed by: INTERNAL MEDICINE

## 2024-03-17 PROCEDURE — 770001 HCHG ROOM/CARE - MED/SURG/GYN PRIV*

## 2024-03-17 PROCEDURE — A9270 NON-COVERED ITEM OR SERVICE: HCPCS | Performed by: INTERNAL MEDICINE

## 2024-03-17 RX ORDER — GABAPENTIN 100 MG/1
200 CAPSULE ORAL 3 TIMES DAILY
Status: DISCONTINUED | OUTPATIENT
Start: 2024-03-17 | End: 2024-03-18 | Stop reason: HOSPADM

## 2024-03-17 RX ORDER — METFORMIN HYDROCHLORIDE 500 MG/1
500 TABLET, EXTENDED RELEASE ORAL 2 TIMES DAILY WITH MEALS
Status: DISCONTINUED | OUTPATIENT
Start: 2024-03-17 | End: 2024-03-18 | Stop reason: HOSPADM

## 2024-03-17 RX ADMIN — GABAPENTIN 100 MG: 100 CAPSULE ORAL at 07:11

## 2024-03-17 RX ADMIN — ATORVASTATIN CALCIUM 20 MG: 20 TABLET, FILM COATED ORAL at 07:11

## 2024-03-17 RX ADMIN — NITROFURANTOIN MONOHYDRATE/MACROCRYSTALS 100 MG: 75; 25 CAPSULE ORAL at 16:37

## 2024-03-17 RX ADMIN — SODIUM CHLORIDE 250 MG: 9 INJECTION, SOLUTION INTRAVENOUS at 08:49

## 2024-03-17 RX ADMIN — RIVAROXABAN 10 MG: 10 TABLET, FILM COATED ORAL at 17:07

## 2024-03-17 RX ADMIN — NITROFURANTOIN MONOHYDRATE/MACROCRYSTALS 100 MG: 75; 25 CAPSULE ORAL at 07:46

## 2024-03-17 RX ADMIN — Medication 1 CAPSULE: at 12:14

## 2024-03-17 RX ADMIN — GABAPENTIN 100 MG: 100 CAPSULE ORAL at 12:14

## 2024-03-17 RX ADMIN — Medication 1000 UNITS: at 07:12

## 2024-03-17 RX ADMIN — METFORMIN HYDROCHLORIDE 500 MG: 500 TABLET, EXTENDED RELEASE ORAL at 16:37

## 2024-03-17 RX ADMIN — LEVOTHYROXINE SODIUM 150 MCG: 0.07 TABLET ORAL at 07:13

## 2024-03-17 RX ADMIN — SERTRALINE HYDROCHLORIDE 150 MG: 50 TABLET ORAL at 07:12

## 2024-03-17 RX ADMIN — GABAPENTIN 200 MG: 100 CAPSULE ORAL at 17:07

## 2024-03-17 RX ADMIN — DOCUSATE SODIUM 50MG AND SENNOSIDES 8.6MG 2 TABLET: 8.6; 5 TABLET, FILM COATED ORAL at 07:10

## 2024-03-17 RX ADMIN — LISINOPRIL 10 MG: 5 TABLET ORAL at 07:12

## 2024-03-17 RX ADMIN — TRAZODONE HYDROCHLORIDE 100 MG: 50 TABLET ORAL at 21:28

## 2024-03-17 RX ADMIN — NITROFURANTOIN MONOHYDRATE/MACROCRYSTALS 100 MG: 75; 25 CAPSULE ORAL at 07:38

## 2024-03-17 ASSESSMENT — ENCOUNTER SYMPTOMS
DIZZINESS: 0
FALLS: 0
FEVER: 0
DOUBLE VISION: 0
SHORTNESS OF BREATH: 0
HEADACHES: 0
COUGH: 0
NERVOUS/ANXIOUS: 0
ABDOMINAL PAIN: 0
VOMITING: 0
PALPITATIONS: 0
BACK PAIN: 0
BLURRED VISION: 0
CHILLS: 0
HEARTBURN: 0

## 2024-03-17 ASSESSMENT — PATIENT HEALTH QUESTIONNAIRE - PHQ9
6. FEELING BAD ABOUT YOURSELF - OR THAT YOU ARE A FAILURE OR HAVE LET YOURSELF OR YOUR FAMILY DOWN: SEVERAL DAYS
SUM OF ALL RESPONSES TO PHQ QUESTIONS 1-9: 7
1. LITTLE INTEREST OR PLEASURE IN DOING THINGS: SEVERAL DAYS
SUM OF ALL RESPONSES TO PHQ9 QUESTIONS 1 AND 2: 2
4. FEELING TIRED OR HAVING LITTLE ENERGY: SEVERAL DAYS
8. MOVING OR SPEAKING SO SLOWLY THAT OTHER PEOPLE COULD HAVE NOTICED. OR THE OPPOSITE, BEING SO FIGETY OR RESTLESS THAT YOU HAVE BEEN MOVING AROUND A LOT MORE THAN USUAL: NOT AT ALL
5. POOR APPETITE OR OVEREATING: SEVERAL DAYS
2. FEELING DOWN, DEPRESSED, IRRITABLE, OR HOPELESS: SEVERAL DAYS
9. THOUGHTS THAT YOU WOULD BE BETTER OFF DEAD, OR OF HURTING YOURSELF: NOT AT ALL
7. TROUBLE CONCENTRATING ON THINGS, SUCH AS READING THE NEWSPAPER OR WATCHING TELEVISION: SEVERAL DAYS
3. TROUBLE FALLING OR STAYING ASLEEP OR SLEEPING TOO MUCH: SEVERAL DAYS

## 2024-03-17 ASSESSMENT — FIBROSIS 4 INDEX: FIB4 SCORE: 0.64

## 2024-03-17 ASSESSMENT — LIFESTYLE VARIABLES: SUBSTANCE_ABUSE: 0

## 2024-03-17 ASSESSMENT — PAIN DESCRIPTION - PAIN TYPE: TYPE: ACUTE PAIN

## 2024-03-17 NOTE — PROGRESS NOTES
"Hospital Medicine Daily Progress Note    Date of Service  3/17/2024    Chief Complaint  Liana Obrien is a 44 y.o. female admitted 3/13/2024 with N/V    Hospital Course  As per chart review:  \"44 y.o. female with a past medical history of diabetes, hypertension and hypothyroidism who presented 3/13/2024 with generalized weakness nausea and vomiting.  Patient reports that she has not been feeling well over the past 1 week.  She has been having progressively worsening generalized weakness nausea vomiting and increased frequency of urination.  She denies noticing any fevers or chills.  She denies having cough shortness of breath or lower extremity pain redness or swelling.  In the emergency room she was found to have marked elevated blood sugars of 1492 with hyponatremia with a sodium level of 128.\"    Interval Problem Update  3/14: Patient seen at bedside this morning.  Patient laying in bed, overall she feels better.  Still complaining of some urinary frequency.  We have started the patient on ceftriaxone as there is concern for urinary tract infection.  Will follow cultures.  Also we have ordered iron studies.  Hemoglobin this morning is 9.3, we have repeated hemoglobin.  No signs of overt bleeding at this time.  Will continue to monitor glucose values.  Continue with Lantus 8 units twice daily and medium insulin sliding scale for now.  Also the patient was saying that in the past 2 weeks she has had suicidal ideation, however she is adamant that today she is not having suicidal ideation.  She seems tearful and says that she has been going through her divorce in the past 2 weeks.  We have consulted psychiatry, we appreciate further recommendations.    3/15: Patient seen at bedside this morning.  Overall she feels better.  Denying suicidal ideation.  Psychiatry following the patient, we appreciate further recommendations.  We have ordered TSH and vitamin D level.  Patient with iron deficiency anemia so we " have started the patient on IV replacement therapy.  Pharmacy.  We have increased Lantus to 12 units twice daily.  After discussing with ID pharmacy we have transition to Macrobid.  Continue to monitor closely.    3/16: Patient seen at bedside this morning.  She continues to improve clinically.  We have increased Lantus to 35 units q. evening.  Continue to monitor closely.  Continue with IV iron replacement.    3/17: Patient seen at bedside this morning.  Currently not complaining of pain.  However still with significantly elevated blood glucose.  We have increased Lantus to 50 units.  Continue to monitor closely.  Patient to complete IV iron treatment today.    I have discussed this patient's plan of care and discharge plan at IDT rounds today with Case Management, Nursing, Nursing leadership, and other members of the IDT team.    Consultants/Specialty  Psychiatry    Code Status  Full Code    Disposition  The patient is not medically cleared for discharge to home or a post-acute facility.        Review of Systems  Review of Systems   Constitutional:  Positive for malaise/fatigue. Negative for chills and fever.   HENT:  Negative for hearing loss and nosebleeds.    Eyes:  Negative for blurred vision and double vision.   Respiratory:  Negative for cough and shortness of breath.    Cardiovascular:  Negative for chest pain and palpitations.   Gastrointestinal:  Negative for abdominal pain, heartburn and vomiting.   Genitourinary:  Positive for frequency.   Musculoskeletal:  Negative for back pain and falls.   Skin:  Negative for itching and rash.   Neurological:  Negative for dizziness and headaches.   Psychiatric/Behavioral:  Negative for substance abuse. The patient is not nervous/anxious.    All other systems reviewed and are negative.       Physical Exam  Temp:  [36.4 °C (97.5 °F)-36.7 °C (98 °F)] 36.4 °C (97.5 °F)  Pulse:  [77-89] 77  Resp:  [18-19] 18  BP: ()/(60-83) 112/60  SpO2:  [93 %-94 %] 93  %    Physical Exam  Vitals and nursing note reviewed.   Constitutional:       General: She is not in acute distress.     Appearance: She is obese. She is ill-appearing.   HENT:      Head: Normocephalic and atraumatic.      Right Ear: External ear normal.      Left Ear: External ear normal.      Mouth/Throat:      Pharynx: No oropharyngeal exudate or posterior oropharyngeal erythema.   Eyes:      General:         Right eye: No discharge.         Left eye: No discharge.   Cardiovascular:      Rate and Rhythm: Normal rate and regular rhythm.      Pulses: Normal pulses.      Heart sounds: No murmur heard.     No gallop.   Pulmonary:      Effort: Pulmonary effort is normal. No respiratory distress.      Breath sounds: Normal breath sounds. No wheezing or rhonchi.   Abdominal:      General: Bowel sounds are normal. There is no distension.      Palpations: Abdomen is soft.      Tenderness: There is no abdominal tenderness. There is no guarding.   Musculoskeletal:         General: No swelling or tenderness. Normal range of motion.      Cervical back: Normal range of motion and neck supple. No tenderness.   Skin:     General: Skin is warm and dry.   Neurological:      General: No focal deficit present.      Mental Status: She is alert and oriented to person, place, and time. Mental status is at baseline.      Motor: No weakness.   Psychiatric:         Mood and Affect: Mood normal.         Behavior: Behavior normal.         Fluids    Intake/Output Summary (Last 24 hours) at 3/17/2024 1240  Last data filed at 3/17/2024 1003  Gross per 24 hour   Intake 675.64 ml   Output 300 ml   Net 375.64 ml       Laboratory  Recent Labs     03/15/24  0528 03/16/24  0202   WBC 8.2 8.2   RBC 4.91 4.76   HEMOGLOBIN 9.7* 9.7*   HEMATOCRIT 33.2* 34.4*   MCV 67.6* 72.3*   MCH 19.8* 20.4*   MCHC 29.2* 28.2*   RDW 40.7 43.8   PLATELETCT 290 260   MPV 10.0 10.7     Recent Labs     03/15/24  0528 03/16/24  0202 03/17/24  0502   SODIUM 135 136 134*    POTASSIUM 3.6 4.1 4.0   CHLORIDE 104 107 104   CO2 21 19* 22   GLUCOSE 300* 190* 266*   BUN 7* 7* 7*   CREATININE 0.39* 0.30* 0.32*   CALCIUM 8.2* 8.2* 8.2*                   Imaging  No orders to display        Assessment/Plan  * Type 2 diabetes mellitus with hyperglycemia, with long-term current use of insulin (HCC)- (present on admission)  Assessment & Plan  With marked hyperglycemia  Last glycated hemoglobin was 8.4%  I will start long & short acting insulin for now  I will order Accu-Checks, hypoglycemia protocol  Adjust according to blood sugars trend.     3/14: Continue with lantus 8 U BID, ISS medium. Monitor and make changes accordingly.    3/15: We have increased Lantus to 12 units twice daily, however starting tomorrow we will do Lantus q. Evening.  Monitor closely and make changes accordingly.    3/16: We will do 35 U lantus. Monitor  closely.    3/17: We will increase to lantus 50 U.    UTI (urinary tract infection)  Assessment & Plan  Patient complaining of urinary frequency.  UA concerning for UTI.  Will start the patient on ceftriaxone.  Will order blood cultures and urine cultures.    3/17: Continue antibiotics, follow cultures    Anemia- (present on admission)  Assessment & Plan  Hemoglobin seems to be stable.  Monitor.  No signs of overt bleeding.    3/15: Patient with iron deficiency, we have started the patient on IV replacement by pharmacy.    3/16: Continue IV iron replacement    3/17: Patient completed IV iron treatment today.    Major depressive disorder- (present on admission)  Assessment & Plan  Continue sertraline    3/14: The patient states that in the past 2 weeks she has been dealing with her divorce and it has been very stressful for her.  She mentions that she has had suicidal ideation in the past 2 weeks, however she is adamant that she is not currently having suicidal ideation.  We have consulted psychiatry, we appreciate further recommendations.    3/16: Psychiatry following  patient, we appreciate further recommendations.  We have ordered TSH and vitamin D level.    Hypomagnesemia  Assessment & Plan  Replace as needed  monitor    Intractable nausea and vomiting- (present on admission)  Assessment & Plan  No abdominal pain or tenderness.  I will start antiemetics as needed  Differentials include viral infection versus gastroparesis.  Bowel rest with a modified diet and supportive care   I will start Intravenous fluids  Continue serial clinical examinations, consider CT scan of the abdomen/pelvis if develops abdominal pain or symptoms do not improve with above measures  Monitor electrolytes and replace accordingly      3/14: improved    Leukocytosis- (present on admission)  Assessment & Plan  Probably reactive  Also concern for UTI  We have started ceftriaxone    3/15: Improved, we will continue with antibiotics, monitor    Hyponatremia- (present on admission)  Assessment & Plan  Mild  monitor      Advance care planning- (present on admission)  Assessment & Plan  3/14: I discussed with patient for at least 16 minutes further goals of treatment.  This included CODE STATUS which include DNR/DNI and full code.  The patient would like to be full code.  We also discussed further treatment options.  The patient would like to have full treatment options.  This includes invasive and noninvasive procedures as needed.    Dyslipidemia- (present on admission)  Assessment & Plan  Cardiac diet.  continue atorvastatin    Obesity (BMI 30-39.9)- (present on admission)  Assessment & Plan   on lifestyle modifications  Patient will require close follow up as outpatient    Hypothyroidism- (present on admission)  Assessment & Plan  continue levothyroxine    Gastroesophageal reflux disease- (present on admission)  Assessment & Plan  Tums as needed    Dehydration- (present on admission)  Assessment & Plan  Likely secondary to reduced oral intake, and increase in insensible loss due to osmotic  diuresis.  Encourage oral intake as tolerated, antiemetics as needed.  Intravenous hydration until adequate oral intake is achieved.     Hypertension- (present on admission)  Assessment & Plan  continue lisinopril with hold parameters    Hypokalemia- (present on admission)  Assessment & Plan  Replace as needed  monitor         VTE prophylaxis: xarelto    I have performed a physical exam and reviewed and updated ROS and Plan today (3/17/2024). In review of yesterday's note (3/16/2024), there are no changes except as documented above.      I spend at least 52 minutes providing care for this patient.  This included face-to-face interview, physical examination.  Review of lab work including CBC, BMP.  Discussion with multidisciplinary team including case management, nursing staff and pharmacy.  Creating plan of care, reviewing orders.

## 2024-03-17 NOTE — CARE PLAN
The patient is Stable - Low risk of patient condition declining or worsening    Shift Goals  Clinical Goals: Continued monitoring of blood glucose, pt to remain free from falls throughout shift.  Patient Goals: Sleep well tonight  Family Goals: yoly    Progress made toward(s) clinical / shift goals: Pt remained free from falls throughout shift with fall precautions in place including call light within reach, bed alarm activated, bed in lowest position.    Patient is not progressing towards the following goals:

## 2024-03-17 NOTE — PROGRESS NOTES
Report received from Shira ARMENTA, assumed care of pt at 1915   POC and medications reviewed with pt. Pt verbalized understanding.   AOx4  C/o nothing at this time.  Denies pain, SOB, or dizziness at this time.   Safety measures in place.  Hourly rounding in place.

## 2024-03-18 ENCOUNTER — PHARMACY VISIT (OUTPATIENT)
Dept: PHARMACY | Facility: MEDICAL CENTER | Age: 44
End: 2024-03-18
Payer: COMMERCIAL

## 2024-03-18 VITALS
HEIGHT: 61 IN | TEMPERATURE: 97.9 F | RESPIRATION RATE: 17 BRPM | SYSTOLIC BLOOD PRESSURE: 107 MMHG | OXYGEN SATURATION: 97 % | DIASTOLIC BLOOD PRESSURE: 63 MMHG | HEART RATE: 82 BPM | BODY MASS INDEX: 40.17 KG/M2 | WEIGHT: 212.74 LBS

## 2024-03-18 PROBLEM — N93.9 ABNORMAL UTERINE BLEEDING: Status: ACTIVE | Noted: 2024-03-18

## 2024-03-18 PROBLEM — D50.9 IRON DEFICIENCY ANEMIA: Status: ACTIVE | Noted: 2024-03-18

## 2024-03-18 LAB
ANION GAP SERPL CALC-SCNC: 10 MMOL/L (ref 7–16)
BUN SERPL-MCNC: 7 MG/DL (ref 8–22)
CALCIUM SERPL-MCNC: 8.7 MG/DL (ref 8.4–10.2)
CHLORIDE SERPL-SCNC: 107 MMOL/L (ref 96–112)
CO2 SERPL-SCNC: 22 MMOL/L (ref 20–33)
CREAT SERPL-MCNC: 0.32 MG/DL (ref 0.5–1.4)
GFR SERPLBLD CREATININE-BSD FMLA CKD-EPI: 132 ML/MIN/1.73 M 2
GLUCOSE BLD STRIP.AUTO-MCNC: 135 MG/DL (ref 65–99)
GLUCOSE BLD STRIP.AUTO-MCNC: 255 MG/DL (ref 65–99)
GLUCOSE SERPL-MCNC: 134 MG/DL (ref 65–99)
POTASSIUM SERPL-SCNC: 3.3 MMOL/L (ref 3.6–5.5)
SODIUM SERPL-SCNC: 139 MMOL/L (ref 135–145)

## 2024-03-18 PROCEDURE — 36415 COLL VENOUS BLD VENIPUNCTURE: CPT

## 2024-03-18 PROCEDURE — A9270 NON-COVERED ITEM OR SERVICE: HCPCS | Performed by: HOSPITALIST

## 2024-03-18 PROCEDURE — 700102 HCHG RX REV CODE 250 W/ 637 OVERRIDE(OP): Performed by: HOSPITALIST

## 2024-03-18 PROCEDURE — 700102 HCHG RX REV CODE 250 W/ 637 OVERRIDE(OP): Performed by: INTERNAL MEDICINE

## 2024-03-18 PROCEDURE — A9270 NON-COVERED ITEM OR SERVICE: HCPCS | Performed by: INTERNAL MEDICINE

## 2024-03-18 PROCEDURE — 99239 HOSP IP/OBS DSCHRG MGMT >30: CPT | Performed by: INTERNAL MEDICINE

## 2024-03-18 PROCEDURE — 82962 GLUCOSE BLOOD TEST: CPT

## 2024-03-18 PROCEDURE — 94760 N-INVAS EAR/PLS OXIMETRY 1: CPT

## 2024-03-18 PROCEDURE — 80048 BASIC METABOLIC PNL TOTAL CA: CPT

## 2024-03-18 PROCEDURE — RXMED WILLOW AMBULATORY MEDICATION CHARGE: Performed by: INTERNAL MEDICINE

## 2024-03-18 RX ORDER — INSULIN DEGLUDEC 200 U/ML
50 INJECTION, SOLUTION SUBCUTANEOUS EVERY EVENING
Qty: 9 ML | Refills: 3 | Status: ACTIVE | OUTPATIENT
Start: 2024-03-18

## 2024-03-18 RX ORDER — INSULIN LISPRO 100 [IU]/ML
2-9 INJECTION, SOLUTION INTRAVENOUS; SUBCUTANEOUS
Qty: 9 ML | Refills: 3 | Status: ACTIVE | OUTPATIENT
Start: 2024-03-18

## 2024-03-18 RX ORDER — FERROUS SULFATE 325(65) MG
325 TABLET ORAL DAILY
Qty: 30 TABLET | Refills: 0 | Status: SHIPPED | OUTPATIENT
Start: 2024-03-18

## 2024-03-18 RX ORDER — FLUCONAZOLE 200 MG/1
200 TABLET ORAL ONCE
Status: COMPLETED | OUTPATIENT
Start: 2024-03-18 | End: 2024-03-18

## 2024-03-18 RX ORDER — POTASSIUM CHLORIDE 20 MEQ/1
40 TABLET, EXTENDED RELEASE ORAL
Status: COMPLETED | OUTPATIENT
Start: 2024-03-18 | End: 2024-03-18

## 2024-03-18 RX ORDER — GABAPENTIN 100 MG/1
200 CAPSULE ORAL 3 TIMES DAILY
Qty: 180 CAPSULE | Refills: 0 | Status: SHIPPED | OUTPATIENT
Start: 2024-03-18

## 2024-03-18 RX ORDER — ATORVASTATIN CALCIUM 40 MG/1
40 TABLET, FILM COATED ORAL NIGHTLY
Qty: 30 TABLET | Refills: 0 | Status: SHIPPED | OUTPATIENT
Start: 2024-03-18

## 2024-03-18 RX ADMIN — GABAPENTIN 200 MG: 100 CAPSULE ORAL at 11:49

## 2024-03-18 RX ADMIN — GABAPENTIN 200 MG: 100 CAPSULE ORAL at 06:28

## 2024-03-18 RX ADMIN — POTASSIUM CHLORIDE 40 MEQ: 1500 TABLET, EXTENDED RELEASE ORAL at 06:36

## 2024-03-18 RX ADMIN — LISINOPRIL 10 MG: 5 TABLET ORAL at 06:28

## 2024-03-18 RX ADMIN — SERTRALINE HYDROCHLORIDE 150 MG: 50 TABLET ORAL at 06:28

## 2024-03-18 RX ADMIN — NITROFURANTOIN MONOHYDRATE/MACROCRYSTALS 100 MG: 75; 25 CAPSULE ORAL at 08:10

## 2024-03-18 RX ADMIN — Medication 1000 UNITS: at 06:29

## 2024-03-18 RX ADMIN — ATORVASTATIN CALCIUM 20 MG: 20 TABLET, FILM COATED ORAL at 06:29

## 2024-03-18 RX ADMIN — LEVOTHYROXINE SODIUM 150 MCG: 0.07 TABLET ORAL at 06:29

## 2024-03-18 RX ADMIN — POTASSIUM CHLORIDE 40 MEQ: 1500 TABLET, EXTENDED RELEASE ORAL at 08:10

## 2024-03-18 RX ADMIN — FLUCONAZOLE 200 MG: 200 TABLET ORAL at 13:01

## 2024-03-18 RX ADMIN — Medication 1 CAPSULE: at 08:10

## 2024-03-18 RX ADMIN — METFORMIN HYDROCHLORIDE 500 MG: 500 TABLET, EXTENDED RELEASE ORAL at 08:10

## 2024-03-18 ASSESSMENT — PAIN DESCRIPTION - PAIN TYPE: TYPE: ACUTE PAIN

## 2024-03-18 ASSESSMENT — PATIENT HEALTH QUESTIONNAIRE - PHQ9
1. LITTLE INTEREST OR PLEASURE IN DOING THINGS: SEVERAL DAYS
3. TROUBLE FALLING OR STAYING ASLEEP OR SLEEPING TOO MUCH: SEVERAL DAYS
2. FEELING DOWN, DEPRESSED, IRRITABLE, OR HOPELESS: SEVERAL DAYS
8. MOVING OR SPEAKING SO SLOWLY THAT OTHER PEOPLE COULD HAVE NOTICED. OR THE OPPOSITE, BEING SO FIGETY OR RESTLESS THAT YOU HAVE BEEN MOVING AROUND A LOT MORE THAN USUAL: NOT AT ALL
6. FEELING BAD ABOUT YOURSELF - OR THAT YOU ARE A FAILURE OR HAVE LET YOURSELF OR YOUR FAMILY DOWN: SEVERAL DAYS
SUM OF ALL RESPONSES TO PHQ9 QUESTIONS 1 AND 2: 2
4. FEELING TIRED OR HAVING LITTLE ENERGY: SEVERAL DAYS
9. THOUGHTS THAT YOU WOULD BE BETTER OFF DEAD, OR OF HURTING YOURSELF: NOT AT ALL
7. TROUBLE CONCENTRATING ON THINGS, SUCH AS READING THE NEWSPAPER OR WATCHING TELEVISION: SEVERAL DAYS
5. POOR APPETITE OR OVEREATING: SEVERAL DAYS
SUM OF ALL RESPONSES TO PHQ QUESTIONS 1-9: 7

## 2024-03-18 ASSESSMENT — FIBROSIS 4 INDEX: FIB4 SCORE: 0.64

## 2024-03-18 NOTE — PROGRESS NOTES
2210- Assessed feet and they are slightly red and blanching, skin dry and previous 7/10 pain is now a 5/10 pain.

## 2024-03-18 NOTE — PROGRESS NOTES
Report received from Saida ARMENTA, assumed care of pt at 1915   POC and medications reviewed with pt. Pt verbalized understanding.   AOx4  C/o neuropathy and pain 7/10 in both feet at this time. Also pain at iv site.  Denies SOB, or dizziness at this time.   Safety measures in place.  Hourly rounding in place.

## 2024-03-18 NOTE — PROGRESS NOTES
- Assisted pt for discharge; DC papers signed and instructions given; belongings w/ patient; IV access removed aseptically, no bleeding noted; pt will be assisted home w/ parents; belongings w/ pt.     - Dr Schafer aware of pt's DC.    - pt education given on insulin meds.   - pharmacy called and informed on pt's DC time at 1500; Meds to beds given at pt's bedside.     1421 - called [phone] Yasmin answered and made appts for PCP, GI, OBGYN, and endocrinologist.    - pt wheeled w/ transport.

## 2024-03-18 NOTE — DISCHARGE INSTRUCTIONS
For insulin lispro (short acting) please go over with patient dosages range:    Before every meal (3 times a day) and also before going to bed.    For glucose:  70   - 150  mg/dL =      0 Units  151 - 200  mg/dL =    2 Units  201 - 250  mg/dL =    3 Units  251 - 300  mg/dL  =   5 Units  301 - 350  mg/dL  =   6 Units  351 - 400 mg/dL   =   8 Units  Over 400 mg/dL   =   9 Units  Over 400 mg/dL x 2 consecutive FSBG = 9 Units and call MD    Initiate Hypoglycemia protocol and notify MD and PharmD if FSBG level is less than or equal to 70 mg/dL

## 2024-03-18 NOTE — CARE PLAN
The patient is Stable - Low risk of patient condition declining or worsening    Shift Goals  Clinical Goals: monitor pt's blood glucose and remain free from falls throughout shift.  Patient Goals: another good nights sleep.  Family Goals: yoly    Progress made toward(s) clinical / shift goals:  pt remained free from falls/injury throughout shift.    Patient is not progressing towards the following goals:Pt blood glucose still 200-300 mg/dl during BG checks.
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
## 2024-03-18 NOTE — DISCHARGE SUMMARY
"Discharge Summary    CHIEF COMPLAINT ON ADMISSION  Chief Complaint   Patient presents with    N/V       Reason for Admission  EMS     Admission Date  3/13/2024    CODE STATUS  Full Code    HPI & HOSPITAL COURSE  As per chart review:  \"44 y.o. female with a past medical history of diabetes, hypertension and hypothyroidism who presented 3/13/2024 with generalized weakness nausea and vomiting.  Patient reports that she has not been feeling well over the past 1 week.  She has been having progressively worsening generalized weakness nausea vomiting and increased frequency of urination.  She denies noticing any fevers or chills.  She denies having cough shortness of breath or lower extremity pain redness or swelling.  In the emergency room she was found to have marked elevated blood sugars of 492 with hyponatremia with a sodium level of 128.\"    The patient was admitted for further management and care.  The patient has uncontrolled diabetes.  While hospitalized with titrated insulin to better control her glucose.  The patient will be discharged today on long-acting insulin to 50 units.  The patient uses degludec at home and we were using Lantus while hospitalized.  I talked to pharmacy and it seems that these are interchangeably one-to-one ratio.  Will also discharge the patient on insulin sliding scale.  The patient understands how to use insulin and how to modify the dose of insulin if needed due to low blood sugar values.  We have also placed referral to endocrinology as advised to follow-up as an outpatient as she will most likely benefit from tighter glucose control as she is requiring high doses of insulin.    Also while hospitalized the patient was found to have iron deficiency anemia.  She was started on IV replacement iron therapy.  Upon further questioning with the patient it seems that the patient has had in the past abnormal uterine bleeding.  We have placed referral to OB/GYN.  Also the patient mentions that " last year she had an episode of hematemesis.  We have also placed referral to GI as she is 44 and they might want to proceed with EGD and or colonoscopy sooner.    The patient was also complaining of dysuria upon admission.  The patient already completed treatment for urinary tract infection.    Also today that on the day of discharge the patient complaining of yeast infection so we gave the patient 1 dose of fluconazole while hospitalized.  She will require close follow-up with PCP as an outpatient to make sure that she does not require further intervention.    The patient seen at bedside this morning.  Overall she feels much better and would like to go home.  Will discharge patient home.  She will require close follow-up with PCP, OB/GYN, GI and endocrinology as an outpatient.    Of note while hospitalized the patient also mentioned that she has been going through a lot of stress in the past couple weeks as she is going through a divorce, we consulted with psychiatry who evaluated the patient while hospitalized.  They did not recommend any medication changes at this time I spoke to psychiatry it seems the patient already has medications at home and already has follow-up appointment with her psychotherapy team.  From psychiatry standpoint the patient can also be discharged.    Therefore, she is discharged in fair and stable condition to home with close outpatient follow-up.    The patient met 2-midnight criteria for an inpatient stay at the time of discharge.    Discharge Date  03/18/2024    FOLLOW UP ITEMS POST DISCHARGE  The patient will require close follow-up with PCP, GI, OB/GYN and endocrinology.  She also has routine follow-up with her psychotherapy team.    DISCHARGE DIAGNOSES  Principal Problem:    Type 2 diabetes mellitus with hyperglycemia, with long-term current use of insulin (HCC) (POA: Yes)      Overview: Not able to exercise as much retcal of the rectal abscess, currently only       lifting weights.  Reports intermittent vision loss in the left eye of       several years duration. First incident was 5 years and last for 3 months.       Vision has returned. Was referred  To ophthalmology and was last seen fall       of last year. Vision issues happening 1-2 month. Sees endocrinologist       every 3 months, most recently las week. Is not sure of is she has had a       retinal screen.   Active Problems:    Major depressive disorder (POA: Yes)    Anemia (POA: Yes)    UTI (urinary tract infection) (POA: Unknown)    Hypokalemia (POA: Yes)    Hypertension (POA: Yes)    Yeast infection (POA: Yes)    Dehydration (POA: Yes)    Gastroesophageal reflux disease (POA: Yes)    Hypothyroidism (POA: Yes)    Obesity (BMI 30-39.9) (POA: Yes)    Dyslipidemia (POA: Yes)    Advance care planning (POA: Yes)    Hyponatremia (POA: Yes)    Leukocytosis (POA: Yes)    Intractable nausea and vomiting (POA: Yes)    Hypomagnesemia (POA: Unknown)    Iron deficiency anemia (POA: Unknown)  Resolved Problems:    * No resolved hospital problems. *      FOLLOW UP  Future Appointments   Date Time Provider Department Center   3/21/2024 11:30 AM [facility] EXAM 5 VMED None   3/26/2024  4:00 PM Angelia Garg   3/27/2024  2:00 PM Main Mcgee M.D. [place] Forest Mcgee M.D.  [address] [facility] [address]  [phone]    Schedule an appointment as soon as possible for a visit      Gastroenterology    Schedule an appointment as soon as possible for a visit      Endocrinology    Schedule an appointment as soon as possible for a visit        MEDICATIONS ON DISCHARGE     Medication List        START taking these medications        Instructions   ferrous sulfate 325 (65 Fe) MG tablet   Take 1 Tablet by mouth every day.  Dose: 325 mg     vitamin D 1000 UNIT Tabs  Start taking on: March 19, 2024  Commonly known as: Cholecalciferol   Take 1 Tablet by mouth every day.  Dose: 1,000 Units            CHANGE how you take these  medications        Instructions   atorvastatin 40 MG Tabs  What changed:   medication strength  how much to take  when to take this  Commonly known as: Lipitor   Take 1 Tablet by mouth every evening.  Dose: 40 mg     gabapentin 100 MG Caps  What changed: how much to take  Commonly known as: Neurontin   Take 2 Capsules by mouth 3 times a day.  Dose: 200 mg     insulin lispro 100 UNIT/ML Sopn injection PEN  What changed:   how much to take  when to take this  additional instructions  Commonly known as: HumaLOG/AdmeLOG   Inject 2-9 Units under the skin 4 Times a Day,Before Meals and at Bedtime.  Dose: 2-9 Units     Tresiba FlexTouch 200 UNIT/ML Sopn  What changed:   how much to take  how to take this  when to take this  Generic drug: Insulin Degludec   Doctor's comments: USE Rx DISCOUNT CARD: $601.35,  BIN:[id number],  PCN:ELLIOT,  Group:[id number],  ID:[id number]  Inject 50 Units under the skin every evening.  Dose: 50 Units            CONTINUE taking these medications        Instructions   Alcohol Swabs Pads   use one alcohol swab three times a day as needed.  Dose: 1 Each     Baqsimi Two Pack 3 MG/DOSE  Generic drug: Glucagon   Doctor's comments: USE Rx DISCOUNT CARD: $277.31,  BIN:[id number],  PCN:ELLIOT,  Group:[id number],  ID:[id number]  Inhale 1 SPRAY into the nostril as a single dose for severe hypoglycemia     BD Pen Needle Milagros U/F  Generic drug: Insulin Pen Needle 32 G x 4 mm   Use 1 needle subcutaneously 4 times daily     FreeStyle Zack 2 Sensor Misc   Use 1 kit as directed every two weeks change every 14 days     hydrOXYzine pamoate 50 MG Caps  Commonly known as: Vistaril   Take 1 oral capsule as needed every (6-8) hours for anxiety     levothyroxine 150 MCG Tabs  Commonly known as: Synthroid   Take 1 Tablet by mouth every morning on an empty stomach.  Dose: 150 mcg     lisinopril 10 MG Tabs  Commonly known as: Prinivil   Take 1 Tablet by mouth every day.  Dose: 10 mg     metFORMIN  MG Tb24  Commonly known as: Glucophage  XR   Take 500 mg by mouth 2 times a day.  Dose: 500 mg     sertraline 100 MG Tabs  Commonly known as: Zoloft   Take 1.5 tablets by mouth once a day     traZODone 100 MG Tabs  Commonly known as: Desyrel   Take 2 tablets by mouth every night at bedtime as needed            STOP taking these medications      Trulicity 4.5 MG/0.5ML Sopn  Generic drug: Dulaglutide              Allergies  Allergies   Allergen Reactions    Bicillin C-R [Penicillin G Proc & Benzathine] Rash     Received inj of Bicillin- reaction was rash. Oral penicillin shows no reaction.    Ondansetron Hives    Penicillin G Hives    Sulfa Drugs Hives           Metoclopramide Rash and Vomiting     Rash         DIET  Orders Placed This Encounter   Procedures    Diet Order Diet: Low Fiber(GI Soft); Second Modifier: (optional): Consistent CHO (Diabetic)     Standing Status:   Standing     Number of Occurrences:   1     Order Specific Question:   Diet:     Answer:   Low Fiber(GI Soft) [2]     Order Specific Question:   Second Modifier: (optional)     Answer:   Consistent CHO (Diabetic) [4]       ACTIVITY  As tolerated.  Weight bearing as tolerated    CONSULTATIONS  Psychiatry    PROCEDURES  IV iron replacement        LABORATORY  Lab Results   Component Value Date    SODIUM 139 03/18/2024    POTASSIUM 3.3 (L) 03/18/2024    CHLORIDE 107 03/18/2024    CO2 22 03/18/2024    GLUCOSE 134 (H) 03/18/2024    BUN 7 (L) 03/18/2024    CREATININE 0.32 (L) 03/18/2024        Lab Results   Component Value Date    WBC 8.2 03/16/2024    HEMOGLOBIN 9.7 (L) 03/16/2024    HEMATOCRIT 34.4 (L) 03/16/2024    PLATELETCT 260 03/16/2024        Total time of the discharge process exceeds 31 minutes.

## 2024-03-18 NOTE — PROGRESS NOTES
Received report from Maurice ARMENTA. Pt resting in bed, awake Pt A&O x4, on RA. Pt denies pain at this time. Pt updated with POC which includes pain mgt and monitor labs Call light within reach, bed alarm on, bed in lowest position, fall precautions in place, all needs met at this time.

## 2024-03-18 NOTE — CARE PLAN
The patient is Stable - Low risk of patient condition declining or worsening    Shift Goals  Clinical Goals: Pt's pain will be tolerable at end of shift; monitor safety.  Patient Goals: Rest  Family Goals: na    Progress made toward(s) clinical / shift goals:      Pt's pain was tolerable and pt stated that she was feeling better.     Patient is not progressing towards the following goals:      Problem: Depression  Goal: Patient and family/caregiver will verbalize accurate information about at least two of the possible causes of depression, three-four of the signs and symptoms of depression  Outcome: Progressing     Problem: Pain - Standard  Goal: Alleviation of pain or a reduction in pain to the patient’s comfort goal  Outcome: Progressing

## 2024-03-19 ENCOUNTER — TELEPHONE (OUTPATIENT)
Dept: HEALTH INFORMATION MANAGEMENT | Facility: OTHER | Age: 44
End: 2024-03-19
Payer: MEDICAID

## 2024-03-21 ENCOUNTER — APPOINTMENT (OUTPATIENT)
Dept: VASCULAR LAB | Facility: MEDICAL CENTER | Age: 44
End: 2024-03-21
Attending: INTERNAL MEDICINE
Payer: MEDICAID

## 2024-03-26 ENCOUNTER — APPOINTMENT (OUTPATIENT)
Dept: INTERNAL MEDICINE | Facility: OTHER | Age: 44
End: 2024-03-26
Payer: MEDICAID

## 2024-03-27 ENCOUNTER — OFFICE VISIT (OUTPATIENT)
Dept: INTERNAL MEDICINE | Facility: OTHER | Age: 44
End: 2024-03-27
Payer: MEDICAID

## 2024-03-27 VITALS
DIASTOLIC BLOOD PRESSURE: 80 MMHG | WEIGHT: 207 LBS | HEART RATE: 93 BPM | BODY MASS INDEX: 36.68 KG/M2 | HEIGHT: 63 IN | TEMPERATURE: 97.6 F | SYSTOLIC BLOOD PRESSURE: 128 MMHG | OXYGEN SATURATION: 98 %

## 2024-03-27 DIAGNOSIS — E87.6 HYPOKALEMIA: ICD-10-CM

## 2024-03-27 DIAGNOSIS — D50.8 OTHER IRON DEFICIENCY ANEMIA: ICD-10-CM

## 2024-03-27 DIAGNOSIS — Z79.4 TYPE 2 DIABETES MELLITUS WITH HYPERGLYCEMIA, WITH LONG-TERM CURRENT USE OF INSULIN (HCC): ICD-10-CM

## 2024-03-27 DIAGNOSIS — E11.65 TYPE 2 DIABETES MELLITUS WITH HYPERGLYCEMIA, WITH LONG-TERM CURRENT USE OF INSULIN (HCC): ICD-10-CM

## 2024-03-27 DIAGNOSIS — N93.9 ABNORMAL UTERINE BLEEDING: ICD-10-CM

## 2024-03-27 DIAGNOSIS — G47.33 OBSTRUCTIVE SLEEP APNEA SYNDROME: ICD-10-CM

## 2024-03-27 DIAGNOSIS — N39.41 URGE INCONTINENCE OF URINE: ICD-10-CM

## 2024-03-27 DIAGNOSIS — M79.671 FOOT PAIN, BILATERAL: ICD-10-CM

## 2024-03-27 DIAGNOSIS — M79.672 FOOT PAIN, BILATERAL: ICD-10-CM

## 2024-03-27 DIAGNOSIS — D21.9 FIBROIDS: ICD-10-CM

## 2024-03-27 DIAGNOSIS — E55.9 VITAMIN D DEFICIENCY: ICD-10-CM

## 2024-03-27 PROBLEM — E87.1 HYPONATREMIA: Status: RESOLVED | Noted: 2024-03-13 | Resolved: 2024-03-27

## 2024-03-27 PROBLEM — N39.0 UTI (URINARY TRACT INFECTION): Status: RESOLVED | Noted: 2024-03-14 | Resolved: 2024-03-27

## 2024-03-27 PROCEDURE — 3074F SYST BP LT 130 MM HG: CPT | Performed by: INTERNAL MEDICINE

## 2024-03-27 PROCEDURE — 99214 OFFICE O/P EST MOD 30 MIN: CPT | Performed by: INTERNAL MEDICINE

## 2024-03-27 PROCEDURE — 1125F AMNT PAIN NOTED PAIN PRSNT: CPT | Performed by: INTERNAL MEDICINE

## 2024-03-27 PROCEDURE — 3079F DIAST BP 80-89 MM HG: CPT | Performed by: INTERNAL MEDICINE

## 2024-03-27 ASSESSMENT — PAIN SCALES - GENERAL: PAINLEVEL: 6=MODERATE PAIN

## 2024-03-27 ASSESSMENT — FIBROSIS 4 INDEX: FIB4 SCORE: 0.64

## 2024-03-27 NOTE — PATIENT INSTRUCTIONS
[facility] [place] [address]  [place] [address]  Phone: [phone]

## 2024-03-27 NOTE — PROGRESS NOTES
Chief Complaint   Patient presents with    [facility] Follow[facility]     Patient here for [facility] follow up htn-dm-thyroid-nausea-and vomiting       HPI: Liana Obrien is a 43 y.o. female with past medical history as below who presented to the clinic for the following.    *Hospital follow-up.  Type 2 dm   -After presenting to the office with 2 weeks of nausea vomiting and inability to keep down, recent stressors including night shift to day shift changes, court case with ex significant other,  change in insulin regimen-patient was directed to the ER for IV fluids, workup for urinary incontinence.  -Today presents for post hospitalization follow-up.  -In the hospital patient was treated for UTI, evaluated by psychiatry, referred to /GI for iron deficiency anemia.  -Additionally insulin was titrated up to 50 units with Lantus use in the hospital and encouraged to follow-up with endocrinology.    Latest A1C was 8.4 down from 8.5  -Patient reports  that since discharge patients sugars have been running high  -Patient however has not been taking her tresiba regularly since discharge- because of her work schedule and reports that's her sugars show high whenever she does not take it.now however she has a better schedule from 6 am to 6 pm and she can work on that . Encouraged to take regularly - no change in dose of tresiba  -Additionally patient was on Trulicity 4.5 mg weekly, Premeal insulin was discontinued just prior to hospitalization.trulicity was discontinued after hospitalization - unclear reason    -Discussed with patient regarding restarting Trulicity and patient is in agreement as she does not feel that the symptoms she had prior to going to hospital was related to trulicity - nevertheless decided to restart at low dose.- 0.75 mg weekly for 2 weeks and then 1.5 mg after that , slowly increase dose monthly beyond that   Ok to continue metformin as tolerated    Patient also  reports that she has premeal insulin with her which she will take as needed. Explained that she should leave at least 6 hours in between each pre meal insulin  New endo referral to different office as per patient request    Patient also has follow up with pharmacotherapy services next week .  CGM sensors refilled        Management of other conditions which may be related to diabetes as below   Patient does have constipation - will modify iron supplementation ad manage sugars as above  Increased urine frequency- since prior to hospitalization and is having to wear diapers still - no other urinary symptom   Urge incontinence history more than stress - patient however wants to focus on controlling sugars before considering medications for overactive bladder   -Patients foot pain- bilateral plantar surface - has returned despite being on gabapentin and patient will be seeing podiatrist soon and wants to wait for that appointment and defer any interventions or medications at this time   Continues to drink plenty of water - sodium low upon admission to hospital which has since corrected   Last eye exam in December   Last monofilament test - normal monofilament test  with decreased sensation only on the right great toe.  -Pulses intact, vibration sense slightly reduced in the great toes only.  -Proprioception intact.         **Menorrhagia   *Fibroids   CBC done in hospital showed iron deficiency anemia with the last hemoglobin/hematocrit/MCV/RDW of 9.7/34.4/72.3 [normal RDW of 43.8], ferritin of 7.1, percent saturation of 5, TIBC of 275.  Previously with microcytosis without anemia.  Referred to gynecology on  previous  visit - provided information again -   Hormonal options are not an option as patient has a history of DVT - patient may need surgical options - explained to patient  She is taking iron supplement daily , however also is complaining of constipation   Hb did trend down while checked in hospital   She also  started her menstrual cycle again after 3 months which usually happens heavily when it comes after a gap   Will recheck cbc - iv iron infusion vs prbc if needed.     *Hypokalemia.  -Noted on the last lab work done in the hospital with a potassium of 3.3.  -Normal renal function.  Not on any medications that could be lowering potassium levels    *Vitamin D deficiency.  -Last vitamin D from 3/16/2024 was 11  Taking viatmin d supplement     *Anxiety   -Patient is following up with psychiatry, mental health counselor, [facility].  -Was receiving inpatient psychiatry treatment, currently is on Zoloft 100 mg, trazodone 100 mg  at nighttime, hydroxyzine as needed.   -Recent stressors as mentioned above.  Saw psychology psychiatry in hospital - now back to her outpatient psychologist - feels much better after she physically is feeling better   Sleep apnea  -Sleep study at home was done however she has not heard back from them   Needs to be faxed over again - was faxed in the past by Dr. Sharif           Other problems not discussed on this visit     Status post dental procedure   -Associated with swelling - much better   There is some remnant discomfort which however is improving   *Polypharmacy   Patient believed that the nausea, vomiting and diarrhea is from her medications.   As per patient, her endocrinologist has stopped all the oral meds for diabetes that she was on and is currently on on insulin degludec 30 units now , humalog based on sliding scale and trulicity   Pioglitazone, steglatro and metformin were stopped . However patient continued to have nausea, vomiting diarrhea which has since spontaneously resolved - hence likely associated with COVID   Recommended to continue lisinopril daily and atorvastatin 3 times a week   Patient is on as needed hydroxyzine which she is taking once or upto twice as needed   Taking trazodone 100 mg and sertraline through psychiatry - will address these with psychiatry    -Patient was on gabapentin which she has stopped since  she got steroid injection for foot which helped with the pain and that has resolved since   Patient also reports that she had to stop Vitamin C because it interacted with one of the medications that she was on and and she feels that she is getting more sick because she has not been able to take the vitamin C   *Blurry vision-checked with optometrist.  -Minimal changes were associated with diabetes as per patient otherwise within normal limits.  *Shortness of breath.  Resolved   -Patient reported that she felt that with increasing her level of activity around the house recently and had noticed that she gets very winded just walking around the house.  Denied any cough or wheezing.   -Denied any chest pain.  Did  report palpitations sometimes when she gets anxious which goes away with some deep breaths, which is also resolved now   Denied any lightheadedness, dizziness, pedal edema, orthopnea or PND  -Does have history of sleep apnea, was on CPAP in the past patient does not recall why as she is not on it anymore.  -On examination patient patient did have a grade three systolic murmur that was present previously however on subsequent examination  - unable to appreciate that murmur.   Echocardiogram recently done was also not concerning except trace MR, mild PI concentric hypertrophy of left ventricle    -Denied any fevers, chills.  -As per labs in the past patient has had history of anemia, she has regular menstrual cycles sometimes, most recently her menstrual cycle lasted for 10 days.  Has history of PCOS as per chart, ovarian cyst  -Last cbc from November 2023 shows microcytosis without anemia , MCV- 75, normal RDW and other cell lines. PBS not commented on   -Has history of DVT as per chart.  *Tachycardia   resolved  -noted that her watch - heart rate above 110 when she is up and about   -Not present today at rest and patient is asymptomatic  -echo  unremarkable  Last cbc showed   History of COVID -recovered well -described below   -Patient was able to finally reach the ophthalmologist , however was told that her insurance would not be taken   *Skin tag   -on lower abdomen - grown - wants to be rmoved,  -Also on exam noted to have erythema on lower abdominal region below skin fold- no symptoms   *Blurry vision  -Patient reports that about 6 years ago she had a significant blurry vision of both eyes which lasted for several months, when she was almost declared blind legally, was evaluated by ophthalmologist, unsure what the diagnosis was.  -Since then however her vision has significantly improved, currently has intermittent blurring of vision on both eyes, which when comes on lasts for a day to a month continuously.  -Denies any headaches, flashes floaters blind spots, eye pain, redness or aura  -We had placed ophthalmology referral on the last visit however patient was not able to attend the appointment due to being inpatient for mental health related issues.  -Patient is requesting for new referral.  -Patient denies having any dry eyes at this time  *Breast Skin lesion   Has had history of bug bites.   -On examination now only has superficial scar, no deeper concerning tissue on palpation.  -Patient did have another spot with similar complaints, which again is currently a scar.  -Had ordered ultrasound breast at the time which has not been done.  -Given resolution of lump will go ahead and proceed to routine mammogram screening.  *Status post surgery for perirectal abscess/fistula   -Healing well as per patient  *Obesity  -Patient is working with nutrition specialist as well as trying to get more exercise as per recommendations of nutrition specialist.    Presentative health  Pap smear pending   Colon cancer  - next year   Mammo ordered in September   HIV   Tdap         Patient Active Problem List    Diagnosis Date Noted    Anxiety  On Zoloft 100 mg, trazodone  100 mg daily at night, hydroxyzine 06/16/2023    Neuropathic pain of both feet  On gabapentin  06/16/2023    Fibroids 11/15/2022     10/12/2022    Anemia  Microcytosis without anemia as per last labs done in november 2023 09/23/2022    Dyslipidemia 09/22/2022    Obesity (BMI 30-39.9) 05/25/2022    Gastro-esophageal reflux disease without esophagitis 04/14/2021    Major depressive disorder 09/14/2020    Polycystic ovary syndrome  2.2 cm right ovarian cyst seen on recent CT in May 2023  Unable to undergo TVUS due to perirectal abscess  Unable to go on estrogen containing OCP due to history of VTE  Will follow up with gyn following resolution of perirectal abscess 12/11/2019    History of deep venous thrombosis  2 episodes reported on bilateral lower extremities, subsequent ultrasounds for suspected dvt were negative- thought to have been precipitated by Birth control which she is not on anymore  11/19/2019    Hypothyroidism  Levothyroxine 150 mcg,Last TSH from 03/16/2024 was 0.49 11/19/2019    Hypertension  Mildly elevated on lisinopril 10 - not checking blood pressures at home regularly as the cuff not working  10/28/2017    Obstructive sleep apnea, adult  Trying to set up with cpap, backlog with preferred - only company patient's insurance takes  07/31/2015    Vitamin D deficiency- last level checked was 11 from march 2024 03/12/2015    Type 2 diabetes mellitus with hyperglycemia, with long-term current use of insulin (HCC)  Was Following up with endocrinology -  A1c of 8.4 in feb 2024     Tresiba 50U daily, Tulicity 0.75 mg weekly started  - premeal PRN - following up with pharmacotherapy services for titration  - lipitor 20 daily  - lisinopril 10 daily  - gabapentin 100 TID for neuropathy 03/12/2015       Current Outpatient Medications   Medication Sig Dispense Refill    Continuous Blood Gluc Sensor (FREESTYLE ALEKSANDAR 2 SENSOR) [facility] Use 1 kit as directed every two weeks change every 14 days 7 Each 3    Dulaglutide  0.75 MG/0.5ML Solution Pen-injector Inject 0.5 mL under the skin every 7 days. Start 0.75mg or 0.5 ml for 2 weeks and then increase dose to 1.5mg- separate prescription sent for 1.5 mg 1 mL 0    Dulaglutide 1.5 MG/0.5ML Solution Pen-injector Inject 0.5 mL under the skin every 7 days. Start 0.75 mg first for 2 weeks ( prescription sent separately ) then start this increased dose of 1.5 mg after that to continue. 2 mL 2    atorvastatin (LIPITOR) 40 MG Tab Take 1 Tablet by mouth every evening. 30 Tablet 0    gabapentin (NEURONTIN) 100 MG Cap Take 2 Capsules by mouth 3 times a day. 180 Capsule 0    Insulin Degludec (TRESIBA FLEXTOUCH) 200 UNIT/ML Solution Pen-injector Inject 50 Units under the skin every evening. 9 mL 3    vitamin D (CHOLECALCIFEROL) 1000 UNIT Tab Take 1 Tablet by mouth every day. 60 Tablet 0    insulin lispro 100 UNIT/ML SC SOPN injection PEN Inject 2-9 Units under the skin 4 Times a Day,Before Meals and at Bedtime. 9 mL 3    ferrous sulfate 325 (65 Fe) MG tablet Take 1 Tablet by mouth every day. 30 Tablet 0    metFORMIN ER (GLUCOPHAGE XR) 500 MG TABLET SR 24 HR Take 500 mg by mouth 2 times a day.      hydrOXYzine pamoate (VISTARIL) 50 MG Cap Take 1 oral capsule as needed every (6-8) hours for anxiety (Patient taking differently: Take 50 mg by mouth 3 times a day as needed for Anxiety.) 90 Capsule 3    sertraline (ZOLOFT) 100 MG Tab Take 1.5 tablets by mouth once a day (Patient taking differently: Take 150 mg by mouth every day.) 45 Tablet 3    traZODone (DESYREL) 100 MG Tab Take 2 tablets by mouth every night at bedtime as needed 60 Tablet 3    Insulin Pen Needle 32 G x 4 mm Use 1 needle subcutaneously 4 times daily 360 Each 3    Glucagon (BAQSIMI TWO PACK) 3 mg/dose Inhale 1 SPRAY into the nostril as a single dose for severe hypoglycemia 2 Each 3    lisinopril (PRINIVIL) 10 MG Tab Take 1 Tablet by mouth every day. 90 Tablet 1    levothyroxine (SYNTHROID) 150 MCG Tab Take 1 Tablet by mouth every  "morning on an empty stomach. 90 Tablet 1    Alcohol Swabs Pads use one alcohol swab three times a day as needed. 300 Each 6     No current facility-administered medications for this visit.       ROS: As per HPI. Additional pertinent systems as noted below.  Constitutional:  Negative for fever, chills   Cardiovascular:  Negative for chest pain,  Respiratory:  Negative for cough, sputum production, negative for shortness of breath      /80 (BP Location: Right arm, Patient Position: Sitting, BP Cuff Size: Adult)   Pulse 93   Temp 36.4 °C (97.6 °F) (Temporal)   Ht 1.6 m (5' 3\")   Wt 93.9 kg (207 lb)   LMP 03/26/2024   SpO2 98%   Breastfeeding No   BMI 36.67 kg/m²     Physical Exam   Constitutional:  .  Patient is present in the clinic today with a sickness bag  CVS : Tachycardic, no murmur appreciated today.  Respi- diffusely diminished breath sounds     Note: I have reviewed all pertinent labs and diagnostic tests associated with this visit with specific comments listed under the assessment and plan below    Assessment and Plan    1. Type 2 diabetes mellitus with hyperglycemia, with long-term current use of insulin ([facility])  Latest A1C was 8.4 down from 8.5  -Patient reports  that since discharge patients sugars have been running high  -Patient however has not been taking her tresiba regularly since discharge- because of her work schedule and reports that's her sugars show high whenever she does not take it.now however she has a better schedule from 6 am to 6 pm and she can work on that . Encouraged to take regularly - no change in dose of tresiba  -Additionally patient was on Trulicity 4.5 mg weekly, Premeal insulin was discontinued just prior to hospitalization.trulicity was discontinued after hospitalization - unclear reason    -Discussed with patient regarding restarting Trulicity and patient is in agreement as she does not feel that the symptoms she had prior to going to hospital was related to " trulicity - nevertheless decided to restart at low dose.- 0.75 mg weekly for 2 weeks and then 1.5 mg after that , slowly increase dose monthly beyond that   Ok to continue metformin as tolerated    Patient also reports that she has premeal insulin with her which she will take as needed. Explained that she should leave at least 6 hours in between each pre meal insulin  New endo referral to different office as per patient request    Patient also has follow up with pharmacotherapy services next week .  CGM sensors refilled    Continue supplementation for vitamin d and recheck in 6 weeks       Management of other conditions which may be related to diabetes as below   Patient does have constipation - will modify iron supplementation ad manage sugars as above  Increased urine frequency- since prior to hospitalization and is having to wear diapers still - no other urinary symptom   Urge incontinence history more than stress - patient however wants to focus on controlling sugars before considering medications for overactive bladder   -Patients foot pain- bilateral plantar surface - has returned despite being on gabapentin and patient will be seeing podiatrist soon and wants to wait for that appointment and defer any interventions or medications at this time   Continues to drink plenty of water - sodium low upon admission to hospital which has since corrected   Last eye exam in December   Last monofilament test - normal monofilament test  with decreased sensation only on the right great toe.  -Pulses intact, vibration sense slightly reduced in the great toes only.  -Proprioception intact.       2. Urge incontinence of urine  patient however wants to focus on controlling sugars before considering medications for overactive bladder  Exercise     3. Other iron deficiency anemia  CBC done in hospital showed iron deficiency anemia with the last hemoglobin/hematocrit/MCV/RDW of 9.7/34.4/72.3 [normal RDW of 43.8], ferritin of 7.1,  percent saturation of 5, TIBC of 275.  Previously with microcytosis without anemia.  Referred to gynecology on  previous  visit - provided information again -   Hormonal options are not an option as patient has a history of DVT - patient may need surgical options - explained to patient  She is taking iron supplement daily , however also is complaining of constipation   Hb did trend down while checked in hospital   She also started her menstrual cycle again after 3 months which usually happens heavily when it comes after a gap   Will recheck cbc - iv iron infusion vs prbc if needed.   - CBC WITH DIFFERENTIAL; Future    4. Abnormal uterine bleeding  CBC done in hospital showed iron deficiency anemia with the last hemoglobin/hematocrit/MCV/RDW of 9.7/34.4/72.3 [normal RDW of 43.8], ferritin of 7.1, percent saturation of 5, TIBC of 275.  Previously with microcytosis without anemia.  Referred to gynecology on  previous  visit - provided information again -   Hormonal options are not an option as patient has a history of DVT - patient may need surgical options - explained to patient  She is taking iron supplement daily , however also is complaining of constipation   Hb did trend down while checked in hospital   She also started her menstrual cycle again after 3 months which usually happens heavily when it comes after a gap   Will recheck cbc - iv iron infusion vs prbc if needed.     5. Fibroids  CBC done in hospital showed iron deficiency anemia with the last hemoglobin/hematocrit/MCV/RDW of 9.7/34.4/72.3 [normal RDW of 43.8], ferritin of 7.1, percent saturation of 5, TIBC of 275.  Previously with microcytosis without anemia.  Referred to gynecology on  previous  visit - provided information again -   Hormonal options are not an option as patient has a history of DVT - patient may need surgical options - explained to patient  She is taking iron supplement daily , however also is complaining of constipation   Hb did trend  down while checked in hospital   She also started her menstrual cycle again after 3 months which usually happens heavily when it comes after a gap   Will recheck cbc - iv iron infusion vs prbc if needed.     6. Hypokalemia    - Basic Metabolic Panel; Future    7. Vitamin D deficiency  Continue supplementation for vitamin d and recheck in 6 weeks     8. Obstructive sleep apnea syndrome  Backordered   [employer] phone number [phone]. Pref[employer] is the only medical equipment that patient insurance takes.     9. Foot pain, bilateral  -Patients foot pain- bilateral plantar surface - has returned despite being on gabapentin and patient will be seeing podiatrist soon and wants to wait for that appointment and defer any interventions or medications at this time          Followup: Return in about 1 month (around 4/27/2024).        Signed by: Main Mcgee M.D.

## 2024-03-29 PROCEDURE — RXMED WILLOW AMBULATORY MEDICATION CHARGE

## 2024-04-01 ENCOUNTER — APPOINTMENT (OUTPATIENT)
Dept: INTERNAL MEDICINE | Facility: OTHER | Age: 44
End: 2024-04-01
Payer: MEDICAID

## 2024-04-01 ENCOUNTER — PHARMACY VISIT (OUTPATIENT)
Dept: PHARMACY | Facility: MEDICAL CENTER | Age: 44
End: 2024-04-01
Payer: COMMERCIAL

## 2024-04-01 ENCOUNTER — NON-PROVIDER VISIT (OUTPATIENT)
Dept: VASCULAR LAB | Facility: MEDICAL CENTER | Age: 44
End: 2024-04-01
Attending: INTERNAL MEDICINE
Payer: MEDICAID

## 2024-04-01 PROCEDURE — 99212 OFFICE O/P EST SF 10 MIN: CPT

## 2024-04-01 NOTE — Clinical Note
Michele Mcgee!  Patient reports painful urination/burning sensation. Would you consider placing an order for UA/urine culture?  Thanks, Jesus

## 2024-04-01 NOTE — PROGRESS NOTES
ED Time Seen By Provider Entered On:  7/24/2017 17:36     Performed On:  7/24/2017 17:36  by Bobby Pool      Time Seen By Provider   Time Seen by Provider :   7/24/2017 17:36    Bobby Pool. - 7/24/2017 17:36            "Patient Consult Note    Primary care physician: Main Mcgee M.D.    Reason for consult: Management of Uncontrolled Type 2 Diabetes    HPI:  Liana Obrien is a 43 y.o. old patient who comes in today for evaluation of above stated problem.  Patient recently admitted d/t DKA.    Allergies:  Bicillin c-r [penicillin g proc & benzathine], Ondansetron, Penicillin g, Sulfa drugs, and Metoclopramide    Current Diabetes Medication Regimen:  GLP-1 Agent: dulaglutide (Trulicity) 0.75 mg weekly -- not injecting  Basal Insulin: Tresiba 30 units daily -- not injecting  Prandial Insulin: Humalog 3 units + sliding scale before meals (1 unit every 50 > 150)  Reports having to administer 10 units before meals since after recent hospitalization  Metformin  mg BID -- not taking    Previous Diabetes Medications and Reason for Discontinuation:  Metformin  Pioglitazone  Steglatro    Potential Barriers to Care:  Adherence: reports missed doses as noted above  Has been off of meds except for bolus insulin  Side effects: none  Affordability: no issues  Others:  Work schedule still variable  Increased stress d/t ongoing divorce process    SMBG: Zack 2  Did not bring reader but reports readings have read \"high\"    Hyperglycemia/Hypoglycemia:  Pt reports sx of polyphagia, polydipsia, and polyuria.  Reports painful urination and burning sensation -- will reach out to PCP for UA.    Hypoglycemia awareness: Yes  Nocturnal hypoglycemia: None  Hypoglycemia:  None  Pt's treatment of Hypoglycemia  Discussed 15:15 Rule    Lifestyle:  Current Exercise: previously trying to walk one hour once a week but limited at this time  Exercise Goal: increase as tolerated, aim for 150 mins/week    Dietary:  Works with a nutritionist, f/u scheduled today  Diet has been variable  Drinking sugarfree gatorade but no longer drinking soda  Patient will work on resuming previous dietary modifications    Preventative Management:  BP regimen (ACEi/ARB): " lisinopril 10 mg  Statin: atorvastatin (Lipitor) 20 mg three times a week  LDL <100: no (see Media for most recent labs)  Last Lipid Panel 11/13/23      Last Microalbumin/Cr:  Lab Results   Component Value Date/Time    MALBCRT 30 08/11/2018 08:21 AM    MICROALBUR 4.1 08/11/2018 08:21 AM     Last A1c:  Lab Results   Component Value Date/Time    HBA1C 8.4 (A) 02/15/2024 12:01 PM      Last Retinal Scan: 08/2022, due    Updated caregaps     Labs  Lab Results   Component Value Date/Time    SODIUM 139 03/18/2024 01:47 AM    POTASSIUM 3.3 (L) 03/18/2024 01:47 AM    CHLORIDE 107 03/18/2024 01:47 AM    CO2 22 03/18/2024 01:47 AM    GLUCOSE 134 (H) 03/18/2024 01:47 AM    BUN 7 (L) 03/18/2024 01:47 AM    CREATININE 0.32 (L) 03/18/2024 01:47 AM     Lab Results   Component Value Date/Time    ALKPHOSPHAT 65 03/16/2024 02:02 AM    ASTSGOT 13 03/16/2024 02:02 AM    ALTSGPT 12 03/16/2024 02:02 AM    TBILIRUBIN <0.2 03/16/2024 02:02 AM    INR 1.03 09/22/2022 01:20 AM    ALBUMIN 3.2 03/16/2024 02:02 AM        Lab Results   Component Value Date/Time    MALBCRT 30 08/11/2018 08:21 AM    MICROALBUR 4.1 08/11/2018 08:21 AM     Estimated CrCl: > 100 mL/min  eGFR 121     Physical Examination:  Vital signs: LMP 03/26/2024  There is no height or weight on file to calculate BMI.    Foot Exam:  Monofilament exam: 08/2023    Assessment and Plan:    1. DMII  Discussed Goals: FBG 80 - 130, 2hPP < 180, a1c < 7.0%  A1c above goal at 8.4%, slight improvement from A1c of 8.5%  CGM reader not available today but pt reports readings are all high  Glucose readings elevated d/t non-compliance with medications and increased stress levels  Pt with symptomatic hyperglycemia  Increased stress appears to be a barrier to care and contributing to non-adherence. Pt is following up with psych.  Emphasized importance of medication adherence to prevent recurrent hospitalization d/t DKA  Will resume previous medications  As pt was off of Trulicity for a couple of  weeks, PCP had advised pt to reinitiate at 0.75 mg weekly, which is appropriate to prevent ADRs  Will also resume metformin at a lower dose to ensure tolerability  Will attempt to simplify regimen as much as possible, including injecting fixed dose of bolus insulin rather than SSI for now  Encouraged patient to resume previous dietary modifications    Medication changes:  Inject Tresiba 36 units daily. Increase by 2 units every 3 days until blood sugar before breakfast is less than 130.  Inject Humalog 6 units before meals  Inject Trulicity 0.75 mg weekly  Take metformin  mg daily with a meal    Lifestyle changes:  Increase exercise as tolerated  Focus on low carb diet. Incorporate more veggies and protein.    Follow Up:  1 week    Jesus Cazares, PharmD    CC:   Main Mcgee M.D.

## 2024-04-01 NOTE — PATIENT INSTRUCTIONS
Inject Tresiba 36 units daily. Increase by 2 units every 3 days until blood sugar before breakfast is less than 130.    Inject Humalog 6 units before meals    Inject Trulicity 0.75 mg weekly    Take metformin  mg daily with a meal

## 2024-04-02 ENCOUNTER — APPOINTMENT (OUTPATIENT)
Dept: INTERNAL MEDICINE | Facility: OTHER | Age: 44
End: 2024-04-02
Payer: MEDICAID

## 2024-04-02 PROCEDURE — RXMED WILLOW AMBULATORY MEDICATION CHARGE: Performed by: INTERNAL MEDICINE

## 2024-04-02 PROCEDURE — RXMED WILLOW AMBULATORY MEDICATION CHARGE

## 2024-04-09 ENCOUNTER — NON-PROVIDER VISIT (OUTPATIENT)
Dept: VASCULAR LAB | Facility: MEDICAL CENTER | Age: 44
End: 2024-04-09
Attending: INTERNAL MEDICINE
Payer: MEDICAID

## 2024-04-09 ENCOUNTER — TELEPHONE (OUTPATIENT)
Dept: PHARMACY | Facility: MEDICAL CENTER | Age: 44
End: 2024-04-09

## 2024-04-09 ENCOUNTER — PHARMACY VISIT (OUTPATIENT)
Dept: PHARMACY | Facility: MEDICAL CENTER | Age: 44
End: 2024-04-09
Payer: COMMERCIAL

## 2024-04-09 DIAGNOSIS — Z79.4 TYPE 2 DIABETES MELLITUS WITH HYPERGLYCEMIA, WITH LONG-TERM CURRENT USE OF INSULIN (HCC): Primary | ICD-10-CM

## 2024-04-09 DIAGNOSIS — E11.65 TYPE 2 DIABETES MELLITUS WITH HYPERGLYCEMIA, WITH LONG-TERM CURRENT USE OF INSULIN (HCC): ICD-10-CM

## 2024-04-09 DIAGNOSIS — E11.65 TYPE 2 DIABETES MELLITUS WITH HYPERGLYCEMIA, WITH LONG-TERM CURRENT USE OF INSULIN (HCC): Primary | ICD-10-CM

## 2024-04-09 DIAGNOSIS — Z79.4 TYPE 2 DIABETES MELLITUS WITH HYPERGLYCEMIA, WITH LONG-TERM CURRENT USE OF INSULIN (HCC): ICD-10-CM

## 2024-04-09 PROCEDURE — 99212 OFFICE O/P EST SF 10 MIN: CPT

## 2024-04-09 NOTE — PROGRESS NOTES
Patient Consult Note    Primary care physician: Main Mcgee M.D.    Reason for consult: Management of Uncontrolled Type 2 Diabetes    HPI:  Liana Obrien is a 43 y.o. old patient who comes in today for evaluation of above stated problem.  Patient recently admitted d/t DKA.    Allergies:  Bicillin c-r [penicillin g proc & benzathine], Ondansetron, Penicillin g, Sulfa drugs, and Metoclopramide    Current Diabetes Medication Regimen:  GLP-1 Agent: dulaglutide (Trulicity) 0.75 mg weekly  -- not injecting  Basal Insulin: Tresiba 38 units daily  Prandial Insulin: Humalog sliding scale (average 8 units) before meals (only eating dinner at this time)  Metformin  mg daily    Previous Diabetes Medications and Reason for Discontinuation:  Metformin  Pioglitazone  Steglatro    Potential Barriers to Care:  Adherence: reports missed doses  Missed 1-2 doses of Tresiba in the last week  Continued SSI instead of fixed bolus dose  Does not have Trulicity on hand  Side effects: none  Affordability: no issues  Others:  Work schedule still variable  Increased stress d/t ongoing divorce process    SMBG: Zack 2  Did not bring reader but reports readings continues to be high and out of range    Hyperglycemia/Hypoglycemia:  Pt reports sx of polydipsia and polyuria.    Hypoglycemia awareness: Yes  Nocturnal hypoglycemia: None  Hypoglycemia:  None  Pt's treatment of Hypoglycemia  Discussed 15:15 Rule    Lifestyle:  Current Exercise: previously trying to walk one hour once a week but limited at this time  Exercise Goal: increase as tolerated, aim for 150 mins/week    Dietary:  Works with a nutritionist  Diet has been variable; snacking on chips, cookies, trail mix,.egg bites  Drinking mostly water and coffee in the morning, rarely drinking soda and if she does, she chooses diet soda  Patient will work on resuming previous dietary modifications    Preventative Management:  BP regimen (ACEi/ARB): lisinopril 10 mg  Statin:  atorvastatin (Lipitor) 20 mg three times a week  LDL <100: no (see Media for most recent labs)  Last Lipid Panel 11/13/23      Last Microalbumin/Cr:  Lab Results   Component Value Date/Time    MALBCRT 30 08/11/2018 08:21 AM    MICROALBUR 4.1 08/11/2018 08:21 AM     Last A1c:  Lab Results   Component Value Date/Time    HBA1C 8.4 (A) 02/15/2024 12:01 PM      Last Retinal Scan: 08/2022, due    Updated caregaps     Labs  Lab Results   Component Value Date/Time    SODIUM 139 03/18/2024 01:47 AM    POTASSIUM 3.3 (L) 03/18/2024 01:47 AM    CHLORIDE 107 03/18/2024 01:47 AM    CO2 22 03/18/2024 01:47 AM    GLUCOSE 134 (H) 03/18/2024 01:47 AM    BUN 7 (L) 03/18/2024 01:47 AM    CREATININE 0.32 (L) 03/18/2024 01:47 AM     Lab Results   Component Value Date/Time    ALKPHOSPHAT 65 03/16/2024 02:02 AM    ASTSGOT 13 03/16/2024 02:02 AM    ALTSGPT 12 03/16/2024 02:02 AM    TBILIRUBIN <0.2 03/16/2024 02:02 AM    INR 1.03 09/22/2022 01:20 AM    ALBUMIN 3.2 03/16/2024 02:02 AM        Lab Results   Component Value Date/Time    MALBCRT 30 08/11/2018 08:21 AM    MICROALBUR 4.1 08/11/2018 08:21 AM     Estimated CrCl: > 100 mL/min  eGFR 132     Physical Examination:  Vital signs: LMP 03/26/2024  There is no height or weight on file to calculate BMI.    Foot Exam:  Monofilament exam: 08/2023    Assessment and Plan:    1. DMII  Discussed Goals: FBG 80 - 130, 2hPP < 180, a1c < 7.0%  A1c above goal at 8.4%, slight improvement from A1c of 8.5%  CGM reader not available today but pt reports readings are all high  Glucose readings elevated d/t non-compliance with medications and increased stress levels  Pt with symptomatic hyperglycemia  Increased stress appears to be a barrier to care and contributing to non-adherence. Pt is following up with psych.  Emphasized importance of medication adherence to prevent recurrent hospitalization d/t DKA and to prevent long term DM complications.  Pt will utilize a calendar and check each day off once she  administers/takes her medications  Again, will attempt to simplify regimen as much as possible, including injecting fixed dose of bolus insulin rather than SSI for now.  Will increase medication doses for additional BG control  Since pt was off of Trulicity for a couple of weeks, will reinitiate at 0.75 mg weekly to ensure tolerability  Encouraged patient to resume previous dietary modifications    Medication changes:  Inject Tresiba 50 units daily  Inject Humalog 12 units before meals  Inject Trulicity 0.75 mg weekly  Take metformin  mg twice daily with a meal    Lifestyle changes:  Increase exercise as tolerated  Focus on low carb diet. Incorporate more veggies and protein.    Follow Up:  1 week  Pt is scheduled for weekly f/u at this time    Jesus Cazares, PharmD    CC:   Main Mcgee M.D.

## 2024-04-09 NOTE — PATIENT INSTRUCTIONS
Inject Tresiba 50 units daily  Inject Humalog 12 units before meals  Inject Trulicity 0.75 mg weekly  Take metformin  mg twice daily with a meal

## 2024-04-09 NOTE — TELEPHONE ENCOUNTER
Dulaglutide 0.75 MG/0.5ML Solution Pen-injector    Received Renewal PA request via [employer]  for Dulaglutide . (Quantity:2ml, Day Supply:28)     Insurance: [facility]  Member ID:  [id number]  BIN: [id number]  PCN: [id number]  Group: [id number]     Ran Test claim via [place] & medication Rejects stating prior authorization is required.     Initiated PA in cmm Key: [id number]    Prior Authorization for Dulaglutide has been denied for a quantity of 2ml , day supply 28    Prior authorization was denied per the following:  you need to have met the guidelines listed in the GLP-1(glucagon-like peptide-1) Agonists Protocol which requires the history of failure, contraindication (condition that could lead to harm) or intolerance (unable to tolerate) ONE of the followin. A commercially available semaglutide profuct indicated for type 2 diabetes mellitus(e.g., Ozempic, Rybelsus) for 90 Days. 2. Mounjaro (Tirzepatide) for 90Days    Prior Authorization denial reference number: N/A  Insurance: [facility]      Next Steps:Proceed with appeal process. Generate proposed appeal for provider approval & signature.

## 2024-04-10 ENCOUNTER — NON-PROVIDER VISIT (OUTPATIENT)
Dept: INTERNAL MEDICINE | Facility: OTHER | Age: 44
End: 2024-04-10
Payer: MEDICAID

## 2024-04-10 ENCOUNTER — TELEPHONE (OUTPATIENT)
Dept: INTERNAL MEDICINE | Facility: OTHER | Age: 44
End: 2024-04-10

## 2024-04-10 VITALS — WEIGHT: 204 LBS | BODY MASS INDEX: 36.14 KG/M2

## 2024-04-10 DIAGNOSIS — Z79.4 TYPE 2 DIABETES MELLITUS WITH HYPERGLYCEMIA, WITH LONG-TERM CURRENT USE OF INSULIN (HCC): Primary | ICD-10-CM

## 2024-04-10 DIAGNOSIS — E66.9 OBESITY (BMI 30-39.9): ICD-10-CM

## 2024-04-10 DIAGNOSIS — E11.65 TYPE 2 DIABETES MELLITUS WITH HYPERGLYCEMIA, WITH LONG-TERM CURRENT USE OF INSULIN (HCC): Primary | ICD-10-CM

## 2024-04-10 DIAGNOSIS — E11.65 UNCONTROLLED TYPE 2 DIABETES MELLITUS WITH HYPERGLYCEMIA (HCC): ICD-10-CM

## 2024-04-10 LAB — GLUCOSE BLD-MCNC: 382 MG/DL (ref 65–99)

## 2024-04-10 PROCEDURE — 82962 GLUCOSE BLOOD TEST: CPT | Performed by: INTERNAL MEDICINE

## 2024-04-10 PROCEDURE — 97803 MED NUTRITION INDIV SUBSEQ: CPT | Performed by: DIETITIAN, REGISTERED

## 2024-04-10 ASSESSMENT — FIBROSIS 4 INDEX: FIB4 SCORE: 0.64

## 2024-04-10 NOTE — PROGRESS NOTES
Liana Obrien is a 44 y.o. year old, female returns for uncontrolled DM2 with recent 6-days in the hospital for DKA.    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    Focused on divorce and cannot move beyond.  Seeing therapist regularly.    Zack sensor reading HIGH, FS check at 384 mg/dl  Liana will bring in protein shakes to  provide some form of nutrition when she cannot eat in her cab or home environment.    Reinforced a simple plan she can do.  Advised to keep to 30-45 grams carbs per meal with Humalog ICR of 1:5 vs 12 u with/without meals.      RTC x next available    Time Spent:  60 minutes

## 2024-04-10 NOTE — PATIENT INSTRUCTIONS
Make a PLAN:    What I can bring into my cab:  - water  - protein shake    Keep to 2-3 carb choices per meal = 30-45 grams carbs/meal or snack (including shakes)  - dose her Humalog with 1 unit of insulin for every 5 grams of carbs.

## 2024-04-10 NOTE — PROGRESS NOTES
Liana Perryh Micah is a 44 y.o. female here for a non-provider visit for Glucose     If abnormal was an in office provider notified today (if so, indicate provider)? No    Routed to PCP? No, Dr. Mcgee saw her results and was ok with having patient go home.

## 2024-04-10 NOTE — TELEPHONE ENCOUNTER
Dulaglutide 0.75 MG/0.5ML Solution Pen-injector     Appeal      I have seen pt had a intolerance to one semaglutide(Ozempic) product for 90Days    [place] appeal.     Faxed appeal to [facility] with additional chart notes to [phone]      Called Appeal department at  [phone].    spoke to Jessie stated they received the appeal request and was returned  with a closed consent form. they need the pt written consent by 04/30/2024 or the appeal will be closed. they sent a copy of the letter to the provider, to assist pt the form is available online at website: [email or web address]. select providers, then health care forms., then appointment of authorized representative form. CASE #:[id number].    Insurer to decide

## 2024-04-10 NOTE — TELEPHONE ENCOUNTER
"Angel Lee    Liana mentioned she met with you to coordinate her medication and lifestyle management. She reported fear of going low while sleeping with Humalog 12 u before bed.     Liana is missing many meals due to her life stressors, she reported binging on junk food or not eating altogether.    I advised her to follow an insulin to carb ratio of 1 unit Humalog for every 5  grams of carbs. This may help her when she is not eating or when all she can take in is a protein shake. She has been distracted with her divorce stressors and her BG in clinic was 382 mg/dl. She does not eat in her home environment due to \"rodents\" and has been injecting 12 Humalog with or without meals.    I would like to collaborate with you to bring Liana to more consistent practices that can improve her DSM.    Thank you,  Angelia Ordaz RDN, [facility]  "

## 2024-04-11 ENCOUNTER — TELEPHONE (OUTPATIENT)
Dept: PHARMACY | Facility: MEDICAL CENTER | Age: 44
End: 2024-04-11
Payer: MEDICAID

## 2024-04-11 PROCEDURE — RXMED WILLOW AMBULATORY MEDICATION CHARGE: Performed by: NURSE PRACTITIONER

## 2024-04-11 NOTE — TELEPHONE ENCOUNTER
Hi Angelia!    Thank you for the update!    We decided to have her inject a fixed amount of Humalog as she expressed this would be easier than following a sliding scale which she was previously doing. She was injecting 8 units before meals on average (which has only been prior to dinner the past week) and has been skipping her Humalog dose when she skips her meals. We decided to increase to 12 units prior to meals since her readings have been consistently elevated. I am not sure why she expressed concerns about injecting Humalog at bedtime since we did not discuss this. I definitely agree that carb counting would be beneficial for her, but I was also hesitant to initiate this as it may be confusing for her and may be an additional stressor. Definitely worth a shot though and I'd be interested to see how she does. I have her scheduled for weekly follow up appts with me in the interim and will keep you posted!    Of note, she has been non-adherent to her medications even after most recent admission for DKA. Given difficulty to make lifestyle modifications at this time, I am trying to focus on optimizing her medications as much as possible but this has been difficult due to medication adherence issues and lack of glucose readings when she comes to her appointments.    Appreciate your collaboration on this!    Jesus Cazares, PharmD, BCACP

## 2024-04-11 NOTE — PROGRESS NOTES
ADDENDUM 4/11/2024 2:07 PM:    Trulicity not covered by insurance. Ozempic and Mounjaro are preferred.  Rx sent for Mounjaro as this is associated with higher BG-lowering effect than Ozempic.  Called and spoke to patient and advised. Also reminded patient to provide email address for [employer] invitation.    Jesus Cazares, KadieD, BCACP

## 2024-04-11 NOTE — TELEPHONE ENCOUNTER
Tirzepatide 2.5 MG/0.5ML Solution Pen-injector     Received [employer] PA request via [employer]  for Mounjaro. (Quantity:2ml, Day Supply:28)     Insurance: [facility]  Member ID:  [id number]  BIN: [id number]  PCN: [id number]  Group: [id number]     Ran Test claim via [place] [employer] medication Pays for a $0 copay. Will outreach to patient to offer specialty pharmacy services and or release to preferred pharmacy     $0. qty 6ml/84DS

## 2024-04-12 NOTE — TELEPHONE ENCOUNTER
Attempted to contact patient at [phone] to discuss [facility] and services/benefits offered. No answer, left voicemail.    Patient would like to be onboarded in the future and has stated that she would like to  her medication 4/11. Per W[facility] the medication is still ready to dispense so I called to follow up and see if it was actually received or not.     Thank you,  Dahiana Moore  Pharmacy Liaison  [facility]  [phone]

## 2024-04-12 NOTE — TELEPHONE ENCOUNTER
Thank you for the update Sara -I do have an additional recommendation.  She has been missing her tresiba doses  - I had emphasized that last visit . Would appreciate it if you could also remind her Jesus when you see her next. I feel that may be even more important to adhere to than the short acting, in addition to not taking short acting when she is not eating.    -NL

## 2024-04-14 ENCOUNTER — PHARMACY VISIT (OUTPATIENT)
Dept: PHARMACY | Facility: MEDICAL CENTER | Age: 44
End: 2024-04-14
Payer: COMMERCIAL

## 2024-04-17 NOTE — PROGRESS NOTES
Chief Complaint   Patient presents with    Follow-Up       HPI: Liana Obrien is a 43 y.o. female with past medical history as below who presented to the clinic for the following.    *Urinary frequency, urgency, intermittent dysuria - intermittent suprapubic discomfort , still having incontinent episodes   Also feels that she may have a yeast infection- foul smelling and with discharge   No flank pain, no nausea, chills, fevers  On examination no cva tenderness   POC ua done : Showed slightly cloudy yellow urine with 500 glucosuria, negative for bilirubin, positive for ketones, trace intact blood, point-of-care protein nitrites and leukocyte esterase negative .  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  -recently hospitalized with hyperglycemia, limited oral intake after a stressful court day   -In the hospital patient was treated for UTI, evaluated by psychiatry, referred to /GI for iron deficiency anemia.  -Additionally insulin was titrated up to 50 units with Lantus use in the hospital and encouraged to follow-up with endocrinology.  -since discharge patient however continues to have issues staying compliant due to her schedule - missed tresiba today - patient woke up late and rushed out - forgot to take tresiba - and only carries her short acting with herself . Patient is unable to take short acting daily , as she is not able to eat regularly still.   Patient additionally feels that the tresiba is a very high dose and feels that it is making her urinate more.  Patient is taking Tresiba in the morning  Patient also has been changed from dulaglutide to mounjaro - however patient received paperwork that she needs to fill for trulicity and is asking me if she needs to fill it since she  has received mounjaro already   Latest A1C was 8.4 down from 8.5  Not sure how her sugars have been lately as she has been unable to use her new CGM which is connected to her phone.  -Records patient is average has been 100% of the time about 250, no sugars ago, no lows, low no sugar low higher than 300, including in the evenings, takes Tresiba in the mornings.  -Missed her last appointment with pharmacist Jesus, rescheduled to April 23.  -Patient additionally wants a new referral with a new nutritionist as she feels overwhelmed after nutrition appointments and wants to follow up in the same building where she sees her psychologist.  -Patient has not been carrying saumya snacks or protein shakes with herself when she drives      Management of other conditions which may be related to diabetes as below   Patient does have constipation - will modify iron supplementation ad manage sugars as above  Increased urine frequency-management as above   urge incontinence history more than stress  -Patients foot pain- bilateral plantar surface discussed today, follows up with podiatry post takes gabapentin.      Last eye exam in December   Last monofilament test - normal monofilament test  with decreased sensation only on the right great toe.  -Pulses intact, vibration sense slightly reduced in the great toes only.  -Proprioception intact.                 Other problems not discussed on this visit     **Menorrhagia   *Fibroids   CBC done in hospital showed iron deficiency anemia with the last hemoglobin/hematocrit/MCV/RDW of 9.7/34.4/72.3 [normal RDW of 43.8], ferritin of 7.1, percent saturation of 5, TIBC of 275.  Previously with microcytosis without anemia.  Referred to gynecology on  previous  visit - provided information again -   Hormonal options are not an option as patient has a history of DVT - patient may need surgical options - explained to patient  She is taking iron supplement daily , however also is complaining of  constipation   Hb did trend down while checked in hospital   She also started her menstrual cycle again after 3 months which usually happens heavily when it comes after a gap   Will recheck cbc - iv iron infusion vs prbc if needed.   *Hypokalemia.  -Noted on the last lab work done in the hospital with a potassium of 3.3.  -Normal renal function.  Not on any medications that could be lowering potassium levels  *Vitamin D deficiency.  -Last vitamin D from 3/16/2024 was 11  Taking viatmin d supplement   *Anxiety   -Patient is following up with psychiatry, mental health counselor, [facility].  -Was receiving inpatient psychiatry treatment, currently is on Zoloft 100 mg, trazodone 100 mg  at nighttime, hydroxyzine as needed.   -Recent stressors as mentioned above.  Saw psychology psychiatry in hospital - now back to her outpatient psychologist - feels much better after she physically is feeling better   Sleep apnea  -Sleep study at home was done however she has not heard back from them   Needs to be faxed over again - was faxed in the past by Dr. Sharif   Status post dental procedure   -Associated with swelling - much better   There is some remnant discomfort which however is improving   *non adherance to meds /side effects of meds   Patient believed that the nausea, vomiting and diarrhea is from her medications.   As per patient, her endocrinologist has stopped all the oral meds for diabetes that she was on and is currently on on insulin degludec 30 units now , humalog based on sliding scale and trulicity   Pioglitazone, steglatro and metformin were stopped . However patient continued to have nausea, vomiting diarrhea which has since spontaneously resolved - hence likely associated with COVID   Recommended to continue lisinopril daily and atorvastatin 3 times a week   Patient is on as needed hydroxyzine which she is taking once or upto twice as needed   Taking trazodone 100 mg and sertraline through  psychiatry - will address these with psychiatry   -Patient was on gabapentin which she has stopped since  she got steroid injection for foot which helped with the pain and that has resolved since   Patient also reports that she had to stop Vitamin C because it interacted with one of the medications that she was on and and she feels that she is getting more sick because she has not been able to take the vitamin C   *Blurry vision-checked with optometrist.  -Minimal changes were associated with diabetes as per patient otherwise within normal limits.  *Shortness of breath.  Resolved   -Patient reported that she felt that with increasing her level of activity around the house recently and had noticed that she gets very winded just walking around the house.  Denied any cough or wheezing.   -Denied any chest pain.  Did  report palpitations sometimes when she gets anxious which goes away with some deep breaths, which is also resolved now   Denied any lightheadedness, dizziness, pedal edema, orthopnea or PND  -Does have history of sleep apnea, was on CPAP in the past patient does not recall why as she is not on it anymore.  -On examination patient patient did have a grade three systolic murmur that was present previously however on subsequent examination  - unable to appreciate that murmur.   Echocardiogram recently done was also not concerning except trace MR, mild PI concentric hypertrophy of left ventricle    -Denied any fevers, chills.  -As per labs in the past patient has had history of anemia, she has regular menstrual cycles sometimes, most recently her menstrual cycle lasted for 10 days.  Has history of PCOS as per chart, ovarian cyst  -Last cbc from November 2023 shows microcytosis without anemia , MCV- 75, normal RDW and other cell lines. PBS not commented on   -Has history of DVT as per chart.  *Tachycardia   resolved  -noted that her watch - heart rate above 110 when she is up and about   -Not present today at  rest and patient is asymptomatic  -echo unremarkable  Last cbc showed   History of COVID -recovered well -described below   -Patient was able to finally reach the ophthalmologist , however was told that her insurance would not be taken   *Skin tag   -on lower abdomen - grown - wants to be rmoved,  -Also on exam noted to have erythema on lower abdominal region below skin fold- no symptoms   *Blurry vision  -Patient reports that about 6 years ago she had a significant blurry vision of both eyes which lasted for several months, when she was almost declared blind legally, was evaluated by ophthalmologist, unsure what the diagnosis was.  -Since then however her vision has significantly improved, currently has intermittent blurring of vision on both eyes, which when comes on lasts for a day to a month continuously.  -Denies any headaches, flashes floaters blind spots, eye pain, redness or aura  -We had placed ophthalmology referral on the last visit however patient was not able to attend the appointment due to being inpatient for mental health related issues.  -Patient is requesting for new referral.  -Patient denies having any dry eyes at this time  *Breast Skin lesion   Has had history of bug bites.   -On examination now only has superficial scar, no deeper concerning tissue on palpation.  -Patient did have another spot with similar complaints, which again is currently a scar.  -Had ordered ultrasound breast at the time which has not been done.  -Given resolution of lump will go ahead and proceed to routine mammogram screening.  *Status post surgery for perirectal abscess/fistula   -Healing well as per patient  *Obesity  -Patient is working with nutrition specialist as well as trying to get more exercise as per recommendations of nutrition specialist.    Presentative health  Pap smear pending   Colon cancer  - next year   Mammo ordered in September   HIV   Tdap         Patient Active Problem List    Diagnosis Date Noted     Anxiety  On Zoloft 100 mg, trazodone 100 mg daily at night, hydroxyzine 06/16/2023    Neuropathic pain of both feet  On gabapentin  06/16/2023    Fibroids 11/15/2022     10/12/2022    Anemia  Microcytosis without anemia as per last labs done in november 2023 09/23/2022    Dyslipidemia 09/22/2022    Obesity (BMI 30-39.9) 05/25/2022    Gastro-esophageal reflux disease without esophagitis 04/14/2021    Major depressive disorder 09/14/2020    Polycystic ovary syndrome  2.2 cm right ovarian cyst seen on recent CT in May 2023  Unable to undergo TVUS due to perirectal abscess  Unable to go on estrogen containing OCP due to history of VTE  Will follow up with gyn following resolution of perirectal abscess 12/11/2019    History of deep venous thrombosis  2 episodes reported on bilateral lower extremities, subsequent ultrasounds for suspected dvt were negative- thought to have been precipitated by Birth control which she is not on anymore  11/19/2019    Hypothyroidism  Levothyroxine 150 mcg,Last TSH from 03/16/2024 was 0.49 11/19/2019    Hypertension  Mildly elevated on lisinopril 10 - not checking blood pressures at home regularly as the cuff not working  10/28/2017    Obstructive sleep apnea, adult  Trying to set up with cpap, backlog with preferred - only company patient's insurance takes  07/31/2015    Vitamin D deficiency- last level checked was 11 from march 2024 03/12/2015    Type 2 diabetes mellitus with hyperglycemia, with long-term current use of insulin (HCC)  Was Following up with endocrinology -  A1c of 8.4 in feb 2024     Tresiba 50U daily, mounjaro 2.5 mg weekly started  - premeal PRN - following up with pharmacotherapy services for titration-  -issues with non compliance  - lipitor 20 daily  - lisinopril 10 daily  - gabapentin 100 TID for neuropathy 03/12/2015       Current Outpatient Medications   Medication Sig Dispense Refill    fluconazole (DIFLUCAN) 150 MG tablet Take 1 Tablet by mouth every day.  Repeat after 72 hours if symptoms persist 2 Tablet 0    Tirzepatide 2.5 MG/0.5ML Solution Pen-injector Inject 0.5 mL under the skin every 7 days. 2 mL 1    FLUoxetine (PROZAC) 20 MG Cap Take 1 oral capsule once a day start 4/12 in addition to 40mg capsule for total of 60mg 30 Capsule 0    fluoxetine (PROZAC) 40 MG capsule Take 1 oral capsule once a day 30 Capsule 0    Continuous Blood Gluc Sensor (FREESTYLE ALEKSANDAR 2 SENSOR) [facility] Use 1 kit as directed every two weeks change every 14 days 7 Each 3    atorvastatin (LIPITOR) 40 MG Tab Take 1 Tablet by mouth every evening. 30 Tablet 0    gabapentin (NEURONTIN) 100 MG Cap Take 2 Capsules by mouth 3 times a day. 180 Capsule 0    Insulin Degludec (TRESIBA FLEXTOUCH) 200 UNIT/ML Solution Pen-injector Inject 50 Units under the skin every evening. 9 mL 3    vitamin D (CHOLECALCIFEROL) 1000 UNIT Tab Take 1 Tablet by mouth every day. 60 Tablet 0    insulin lispro 100 UNIT/ML SC SOPN injection PEN Inject 2-9 Units under the skin 4 Times a Day,Before Meals and at Bedtime. 9 mL 3    ferrous sulfate 325 (65 Fe) MG tablet Take 1 Tablet by mouth every day. 30 Tablet 0    metFORMIN ER (GLUCOPHAGE XR) 500 MG TABLET SR 24 HR Take 500 mg by mouth 2 times a day.      hydrOXYzine pamoate (VISTARIL) 50 MG Cap Take 1 oral capsule as needed every (6-8) hours for anxiety 90 Capsule 3    sertraline (ZOLOFT) 100 MG Tab Take 1.5 tablets by mouth once a day 45 Tablet 3    traZODone (DESYREL) 100 MG Tab Take 2 tablets by mouth every night at bedtime as needed 60 Tablet 3    Insulin Pen Needle 32 G x 4 mm Use 1 needle subcutaneously 4 times daily 360 Each 3    Glucagon (BAQSIMI TWO PACK) 3 mg/dose Inhale 1 SPRAY into the nostril as a single dose for severe hypoglycemia 2 Each 3    lisinopril (PRINIVIL) 10 MG Tab Take 1 Tablet by mouth every day. 90 Tablet 1    levothyroxine (SYNTHROID) 150 MCG Tab Take 1 Tablet by mouth every morning on an empty stomach. 90 Tablet 1    Alcohol Swabs Pads use one  "alcohol swab three times a day as needed. 300 Each 6    FLUoxetine (PROZAC) 20 MG Cap Take 1 oral capsule once a day start 4/12 in addition to 40mg capsule for total of 60mg 30 Capsule 0    fluoxetine (PROZAC) 40 MG capsule Take 1 oral capsule once a day 30 Capsule 0     No current facility-administered medications for this visit.       ROS: As per HPI. Additional pertinent systems as noted below.  Constitutional:  Negative for fever, chills   Cardiovascular:  Negative for chest pain,  Respiratory:  Negative for cough, sputum production, negative for shortness of breath      /85 (BP Location: Right arm, Patient Position: Sitting, BP Cuff Size: Large adult)   Pulse 90   Temp 36.2 °C (97.1 °F) (Temporal)   Ht 1.6 m (5' 3\")   Wt 93.6 kg (206 lb 6.4 oz)   LMP 03/26/2024   SpO2 97%   BMI 36.56 kg/m²     Physical Exam   Constitutional:  .  Not in acute distress  : No CVA tenderness    Note: I have reviewed all pertinent labs and diagnostic tests associated with this visit with specific comments listed under the assessment and plan below    Assessment and Plan      1. Urinary frequency  -With intermittent dysuria, with uncontrolled blood sugars.  Does have some component of urge incontinence.  -Urine analysis point-of-care did not show any evidence of infection.  Urinalysis was ordered but patient does not need to do it we will communicate with patient.  -She does have some abnormal vaginal discharge, vaginal swab sent, fluconazole prescription sent  -Will trial antimuscarinic/beta 3 agonist for urge incontinence after better control of sugars  - POCT Urinalysis  - URINALYSIS,CULTURE IF INDICATED; Future    Addendum  -Called patient to relay point-of-care urinalysis results as this was run after patient left the clinic.  Does not show any signs of infection, no need to do urine analysis we will observe if symptoms get better with control of sugars. And fluconazole for vaginal discharge     2. " Dysuria  -Intermittent, point-of-care urinalysis did not indicate infection, likely from level of hydration.  - POCT Urinalysis  - URINALYSIS,CULTURE IF INDICATED; Future    3. Vaginal discharge  -Did risk for yeast infection secondary to uncontrolled sugars, treat empirically with fluconazole.  -Vaginal swab sent to confirm diagnosis  - BV+TRICH AYSE+YEAST CULTURE  - fluconazole (DIFLUCAN) 150 MG tablet; Take 1 Tablet by mouth every day. Repeat after 72 hours if symptoms persist  Dispense: 2 Tablet; Refill: 0      4. 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  - since discharge patient however continues to have issues staying compliant due to her schedule - missed tresiba today and only carries her short acting with herself during work . Patient is unable to take short acting daily , as she is not able to eat regularly still.   -Patient additionally feels that the tresiba is a very high dose and feels that it is making her urinate more.  Patient is taking Tresiba in the morning  -Patient also has been changed from dulaglutide to mounjaro - however patient received paperwork that she needs to fill for trulicity and is asking me if she needs to fill it since she has received mounjaro already   -Latest A1C was 8.4 down from 8.5  -She is Not sure how her sugars have been lately as she has been unable to use her new CGM which is connected to her phone.  -Records patient is average has been 100% of the time about 250, no sugars ago, no lows, low no sugar low higher than 300, including in the evenings, takes Tresiba in the mornings.  -Missed her last appointment with pepito Lee, rescheduled to April 23.  -Patient additionally wants a new referral with a new nutritionist as she feels overwhelmed after  nutrition appointments and wants to follow up in the same building where she sees her psychologist.  -Patient has not been carrying saumya snacks or protein shakes with herself when she drives      Plan  -Emphasized the need to not miss Tresiba doses and to make sure that the Tresiba is next to her short acting insulin so that she remembers to take it.  -Encouraged to keep a snack it with her at all times.  -New referral to nutrition specialist has been placed as per patient's request.  -Will communicate with Leila regarding whether he needs to fill out paperwork for Trulicity.  -Addressed  concerns about Tresiba dosage being high.  Encouraged to continue taking 50 units. Reinforced that there are no readings less than 300. Explained that the urinary frequency is most likely from that. Will address urge incontinence issues once blood  sugars are better controlled   -Also demonstrated on how to read her CGM values on phone   -Continue Mounjaro 2.5, metformin.and short acting as per Jesus. Encouraged to keep up appointments with her     - Referral to Nutrition Services         Followup: Return in about 3 weeks (around 5/9/2024).        Signed by: Main Mcgee M.D.

## 2024-04-18 ENCOUNTER — OFFICE VISIT (OUTPATIENT)
Dept: INTERNAL MEDICINE | Facility: OTHER | Age: 44
End: 2024-04-18
Payer: MEDICAID

## 2024-04-18 VITALS
OXYGEN SATURATION: 97 % | TEMPERATURE: 97.1 F | HEART RATE: 90 BPM | BODY MASS INDEX: 36.57 KG/M2 | HEIGHT: 63 IN | SYSTOLIC BLOOD PRESSURE: 122 MMHG | DIASTOLIC BLOOD PRESSURE: 85 MMHG | WEIGHT: 206.4 LBS

## 2024-04-18 DIAGNOSIS — N89.8 VAGINAL DISCHARGE: ICD-10-CM

## 2024-04-18 DIAGNOSIS — R35.0 URINARY FREQUENCY: ICD-10-CM

## 2024-04-18 DIAGNOSIS — E11.65 UNCONTROLLED TYPE 2 DIABETES MELLITUS WITH HYPERGLYCEMIA (HCC): ICD-10-CM

## 2024-04-18 DIAGNOSIS — R30.0 DYSURIA: ICD-10-CM

## 2024-04-18 LAB
APPEARANCE UR: NORMAL
BILIRUB UR STRIP-MCNC: NEGATIVE MG/DL
COLOR UR AUTO: YELLOW
GLUCOSE UR STRIP.AUTO-MCNC: 500 MG/DL
KETONES UR STRIP.AUTO-MCNC: 15 MG/DL
LEUKOCYTE ESTERASE UR QL STRIP.AUTO: NEGATIVE
NITRITE UR QL STRIP.AUTO: NEGATIVE
PH UR STRIP.AUTO: 5.5 [PH] (ref 5–8)
PROT UR QL STRIP: NEGATIVE MG/DL
RBC UR QL AUTO: NORMAL
SP GR UR STRIP.AUTO: 1.01
UROBILINOGEN UR STRIP-MCNC: 0.2 MG/DL

## 2024-04-18 PROCEDURE — 81002 URINALYSIS NONAUTO W/O SCOPE: CPT | Performed by: INTERNAL MEDICINE

## 2024-04-18 PROCEDURE — 3079F DIAST BP 80-89 MM HG: CPT | Performed by: INTERNAL MEDICINE

## 2024-04-18 PROCEDURE — 99214 OFFICE O/P EST MOD 30 MIN: CPT | Performed by: INTERNAL MEDICINE

## 2024-04-18 PROCEDURE — 3074F SYST BP LT 130 MM HG: CPT | Performed by: INTERNAL MEDICINE

## 2024-04-18 RX ORDER — FLUCONAZOLE 150 MG/1
150 TABLET ORAL DAILY
Qty: 2 TABLET | Refills: 0 | Status: SHIPPED | OUTPATIENT
Start: 2024-04-18

## 2024-04-18 ASSESSMENT — FIBROSIS 4 INDEX: FIB4 SCORE: 0.64

## 2024-04-19 ENCOUNTER — TELEPHONE (OUTPATIENT)
Dept: INTERNAL MEDICINE | Facility: OTHER | Age: 44
End: 2024-04-19
Payer: MEDICAID

## 2024-04-19 NOTE — TELEPHONE ENCOUNTER
Called patient and left voicemail regarding poc ua results and that she does not have any signs of infection - ok to hold of urine analysis lab test ordered for her - I have cancelled it

## 2024-04-26 PROCEDURE — RXMED WILLOW AMBULATORY MEDICATION CHARGE

## 2024-04-29 PROCEDURE — RXMED WILLOW AMBULATORY MEDICATION CHARGE: Performed by: HOSPITALIST

## 2024-04-29 PROCEDURE — RXMED WILLOW AMBULATORY MEDICATION CHARGE

## 2024-04-29 PROCEDURE — RXMED WILLOW AMBULATORY MEDICATION CHARGE: Performed by: PHYSICIAN ASSISTANT

## 2024-04-29 PROCEDURE — RXMED WILLOW AMBULATORY MEDICATION CHARGE: Performed by: INTERNAL MEDICINE

## 2024-05-01 ENCOUNTER — TELEPHONE (OUTPATIENT)
Dept: VASCULAR LAB | Facility: MEDICAL CENTER | Age: 44
End: 2024-05-01
Payer: MEDICAID

## 2024-05-01 NOTE — TELEPHONE ENCOUNTER
[facility]    Pt has missed the last 3 appointments with the pharmacist. Left VM to reschedule, but asked that he please not miss his next appointment.    Ankit Fuller, PharmD

## 2024-05-05 PROCEDURE — RXMED WILLOW AMBULATORY MEDICATION CHARGE: Performed by: NURSE PRACTITIONER

## 2024-05-05 PROCEDURE — RXMED WILLOW AMBULATORY MEDICATION CHARGE

## 2024-05-05 PROCEDURE — RXMED WILLOW AMBULATORY MEDICATION CHARGE: Performed by: INTERNAL MEDICINE

## 2024-05-07 ENCOUNTER — PHARMACY VISIT (OUTPATIENT)
Dept: PHARMACY | Facility: MEDICAL CENTER | Age: 44
End: 2024-05-07
Payer: COMMERCIAL

## 2024-05-07 PROCEDURE — RXOTC WILLOW AMBULATORY OTC CHARGE: Performed by: PHARMACIST

## 2024-05-07 PROCEDURE — RXMED WILLOW AMBULATORY MEDICATION CHARGE: Performed by: INTERNAL MEDICINE

## 2024-05-09 ENCOUNTER — NON-PROVIDER VISIT (OUTPATIENT)
Dept: VASCULAR LAB | Facility: MEDICAL CENTER | Age: 44
End: 2024-05-09
Attending: INTERNAL MEDICINE
Payer: MEDICAID

## 2024-05-09 DIAGNOSIS — E11.65 TYPE 2 DIABETES MELLITUS WITH HYPERGLYCEMIA, WITH LONG-TERM CURRENT USE OF INSULIN (HCC): Primary | ICD-10-CM

## 2024-05-09 DIAGNOSIS — Z79.4 TYPE 2 DIABETES MELLITUS WITH HYPERGLYCEMIA, WITH LONG-TERM CURRENT USE OF INSULIN (HCC): Primary | ICD-10-CM

## 2024-05-09 RX ORDER — TIRZEPATIDE 5 MG/.5ML
5 INJECTION, SOLUTION SUBCUTANEOUS
Qty: 2 ML | Refills: 1 | Status: SHIPPED | OUTPATIENT
Start: 2024-05-09 | End: 2024-05-10

## 2024-05-09 NOTE — PROGRESS NOTES
Patient Consult Note    Primary care physician: Main Mcgee M.D.    Reason for consult: Management of Uncontrolled Type 2 Diabetes    HPI:  Liana Obrien is a 43 y.o. old patient who comes in today for evaluation of above stated problem.    Allergies:  Bicillin c-r [penicillin g proc & benzathine], Ondansetron, Penicillin g, Sulfa drugs, and Metoclopramide    Current Diabetes Medication Regimen:  GLP-1 Agent: tirzepatide (Mounjaro) 2.5 mg weekly  -- has injected 4 doses so far  Basal Insulin: Tresiba 38 units daily -- not injecting  Prandial Insulin: Humalog sliding scale (average 8 units) before meals (only eating dinner at this time) -- not injecting  Metformin  mg daily -- not taking    Previous Diabetes Medications and Reason for Discontinuation:  Metformin  Pioglitazone  Steglatro    Potential Barriers to Care:  Adherence: reports missed doses  Concerned about higher dose of Tresiba. Associates this with anemia and bleeding risk. Discussed potential SE of insulin therapy and advised that this is very unlikely to cause increased bleeding/menstrual periods  Patient reports Humalog and metformin are on hold per PCP  Good compliance with Moujaro  Side effects: none  Affordability: no issues  Others:  Work schedule still variable  Increased stress d/t recent divorce process    SMBG: Zack 2      Hyperglycemia/Hypoglycemia:  Pt reports sx of fatigue, polydipsia and polyuria    Hypoglycemia awareness: Yes  Nocturnal hypoglycemia: None  Hypoglycemia:  None  Pt's treatment of Hypoglycemia  Discussed 15:15 Rule    Lifestyle:  Current Exercise: previously trying to walk one hour once a week but none at this time  Exercise Goal: increase as tolerated, aim for 150 mins/week    Dietary:  Declines to schedule f/u with dietitian. Would like to establish care with a different one. PCP has already placed a referral  Diet has been variable  Drinking mostly water and coffee in the morning, rarely drinking soda and  if she does, she chooses diet soda    Preventative Management:  BP regimen (ACEi/ARB): lisinopril 10 mg  Statin: atorvastatin (Lipitor) 20 mg three times a week  LDL <100: no (see Media for most recent labs)  Last Lipid Panel 11/13/23      Last Microalbumin/Cr:  Lab Results   Component Value Date/Time    MALBCRT 30 08/11/2018 08:21 AM    MICROALBUR 4.1 08/11/2018 08:21 AM     Last A1c:  Lab Results   Component Value Date/Time    HBA1C 8.4 (A) 02/15/2024 12:01 PM      Last Retinal Scan: 08/2022, due    Updated caregaps     Labs  Lab Results   Component Value Date/Time    SODIUM 139 03/18/2024 01:47 AM    POTASSIUM 3.3 (L) 03/18/2024 01:47 AM    CHLORIDE 107 03/18/2024 01:47 AM    CO2 22 03/18/2024 01:47 AM    GLUCOSE 134 (H) 03/18/2024 01:47 AM    BUN 7 (L) 03/18/2024 01:47 AM    CREATININE 0.32 (L) 03/18/2024 01:47 AM     Lab Results   Component Value Date/Time    ALKPHOSPHAT 65 03/16/2024 02:02 AM    ASTSGOT 13 03/16/2024 02:02 AM    ALTSGPT 12 03/16/2024 02:02 AM    TBILIRUBIN <0.2 03/16/2024 02:02 AM    INR 1.03 09/22/2022 01:20 AM    ALBUMIN 3.2 03/16/2024 02:02 AM        Lab Results   Component Value Date/Time    MALBCRT 30 08/11/2018 08:21 AM    MICROALBUR 4.1 08/11/2018 08:21 AM     Estimated CrCl: > 100 mL/min  eGFR 132     Physical Examination:  Vital signs: There were no vitals taken for this visit. There is no height or weight on file to calculate BMI.    Foot Exam:  Monofilament exam: 08/2023    Assessment and Plan:    1. DMII  Discussed Goals: FBG 80 - 130, 2hPP < 180, a1c < 7.0%  A1c above goal at 8.4% but A1c is due soon and suspect this is higher  No recent CGM readings. Last available readings show 100% hyperglycemia with average glucose of 391 mg/dl  Glucose readings remain elevated d/t non-compliance with medications  Pt with symptomatic hyperglycemia  Again emphasized importance of medication adherence to prevent recurrent hospitalization d/t DKA and to prevent long term DM complications.  Will  focus on medication adherence at this time. She is willing to resume Tresiba but will start at 30 units daily.  Emphasized that sx that she is feeling is likely d/t uncontrolled DM and not d/t insulin therapy  Also discussed that increased bleeding (menstrual periods) is not a side effect of insulin therapy  Since patient is tolerating Mounjaro well, will increase dose for additional BG control  Encouraged patient to resume previous dietary modifications    Medication changes:  Inject Tresiba 30 units daily  Inject Mounjaro 5 mg weekly  Hold Humalog per pt preference  Hold metformin per pt preference    Lifestyle changes:  Increase exercise as tolerated  Focus on low carb diet. Incorporate more veggies and protein.    Follow Up:  1 week via phone/MyChart  4 weeks in clinic    Jesus aCzares, PharmD    CC:   Main Mcgee M.D.

## 2024-05-10 ENCOUNTER — TELEPHONE (OUTPATIENT)
Dept: VASCULAR LAB | Facility: MEDICAL CENTER | Age: 44
End: 2024-05-10
Payer: MEDICAID

## 2024-05-10 RX ORDER — TIRZEPATIDE 2.5 MG/.5ML
2.5 INJECTION, SOLUTION SUBCUTANEOUS
Qty: 2 ML | Refills: 1 | Status: ON HOLD | OUTPATIENT
Start: 2024-05-10 | End: 2024-05-16

## 2024-05-10 NOTE — PROGRESS NOTES
Per Rx coordinator, Mounjaro 5 mg is currently unavailable. Will revert back to 2.5 mg dosage for now. Pt aware.    Nick Mijares, PharmD, BCACP

## 2024-05-10 NOTE — TELEPHONE ENCOUNTER
Received Refill PA request via [facility]  for Tirzepatide (MOUNJARO) 2.5 MG/0.5ML Solution Pen-injector. (Quantity:2 mls, Day Supply:28)     Insurance: [employer] [place]  Member ID:  [id number]  BIN: [id number]  PCN: [id number]  Group: [id number]     Ran Test claim via [place] & medication pays for $0/28DS. Pt has been filling through [facility]. Will release Rx to pharmacy on file.     NARA [place], PhT  Vascular Pharmacy Liaison (Rx Coordinator)  P: [phone]  5/10/2024 2:27 PM

## 2024-05-10 NOTE — TELEPHONE ENCOUNTER
Received New start PA request via [facility]  for Tirzepatide (MOUNJARO) 5 MG/0.5ML Solution Pen-injector. (Quantity:2mls, Day Supply:28)     Insurance: [employer] [place]  Member ID:  [id number]  BIN: [id number]   PCN: [id number]  Group: [id number]     Ran Test claim via [place] & medication  pays for $0 84DS or 28DS. Called [facility] and confirmed that this dose/strength are currently on back order.     Notified and updated provider to seek for an alternative at this time.     NARA [place], PhT  Vascular Pharmacy Liaison (Rx Coordinator)  P: [phone]  5/10/2024 2:02 PM

## 2024-05-10 NOTE — TELEPHONE ENCOUNTER
Received EMR from the provider that pt is interchanged from Mounjaro 5mg to Mounjaro 2.5mg due to nationwide back order status for the higher doses.     Will outreach to pt to offer pharmacy services and or to release medication to patient's preferred pharmacy of choice    NARA [place], PhT  Vascular Pharmacy Liaison (Rx Coordinator)  P: [phone]  5/10/2024 2:16 PM

## 2024-05-11 ENCOUNTER — HOSPITAL ENCOUNTER (OUTPATIENT)
Facility: MEDICAL CENTER | Age: 44
End: 2024-05-16
Attending: STUDENT IN AN ORGANIZED HEALTH CARE EDUCATION/TRAINING PROGRAM | Admitting: HOSPITALIST
Payer: MEDICAID

## 2024-05-11 DIAGNOSIS — Z79.4 TYPE 2 DIABETES MELLITUS WITH HYPERGLYCEMIA, WITH LONG-TERM CURRENT USE OF INSULIN (HCC): ICD-10-CM

## 2024-05-11 DIAGNOSIS — E11.65 TYPE 2 DIABETES MELLITUS WITH HYPERGLYCEMIA, WITH LONG-TERM CURRENT USE OF INSULIN (HCC): ICD-10-CM

## 2024-05-11 DIAGNOSIS — E87.1 HYPONATREMIA: ICD-10-CM

## 2024-05-11 DIAGNOSIS — N30.90 CYSTITIS: ICD-10-CM

## 2024-05-11 DIAGNOSIS — R73.9 HYPERGLYCEMIA: ICD-10-CM

## 2024-05-11 DIAGNOSIS — N93.8 DYSFUNCTIONAL UTERINE BLEEDING: ICD-10-CM

## 2024-05-11 LAB
ALBUMIN SERPL BCP-MCNC: 3.8 G/DL (ref 3.2–4.9)
ALBUMIN/GLOB SERPL: 0.9 G/DL
ALP SERPL-CCNC: 114 U/L (ref 30–99)
ALT SERPL-CCNC: <5 U/L (ref 2–50)
ANION GAP SERPL CALC-SCNC: 15 MMOL/L (ref 7–16)
AST SERPL-CCNC: 10 U/L (ref 12–45)
B-OH-BUTYR SERPL-MCNC: 1 MMOL/L (ref 0.02–0.27)
BASE EXCESS BLDV CALC-SCNC: -2 MMOL/L
BASOPHILS # BLD AUTO: 0.7 % (ref 0–1.8)
BASOPHILS # BLD: 0.1 K/UL (ref 0–0.12)
BILIRUB SERPL-MCNC: 0.2 MG/DL (ref 0.1–1.5)
BODY TEMPERATURE: 36.3 CENTIGRADE
BUN SERPL-MCNC: 11 MG/DL (ref 8–22)
CALCIUM ALBUM COR SERPL-MCNC: 9.8 MG/DL (ref 8.5–10.5)
CALCIUM SERPL-MCNC: 9.6 MG/DL (ref 8.4–10.2)
CHLORIDE SERPL-SCNC: 90 MMOL/L (ref 96–112)
CO2 SERPL-SCNC: 22 MMOL/L (ref 20–33)
CREAT SERPL-MCNC: 0.45 MG/DL (ref 0.5–1.4)
EOSINOPHIL # BLD AUTO: 0.25 K/UL (ref 0–0.51)
EOSINOPHIL NFR BLD: 1.7 % (ref 0–6.9)
ERYTHROCYTE [DISTWIDTH] IN BLOOD BY AUTOMATED COUNT: 52.7 FL (ref 35.9–50)
GFR SERPLBLD CREATININE-BSD FMLA CKD-EPI: 121 ML/MIN/1.73 M 2
GLOBULIN SER CALC-MCNC: 4.1 G/DL (ref 1.9–3.5)
GLUCOSE BLD STRIP.AUTO-MCNC: 571 MG/DL (ref 65–99)
GLUCOSE SERPL-MCNC: 522 MG/DL (ref 65–99)
HCG SERPL QL: NEGATIVE
HCO3 BLDV-SCNC: 20 MMOL/L (ref 24–28)
HCT VFR BLD AUTO: 39.5 % (ref 37–47)
HGB BLD-MCNC: 13.4 G/DL (ref 12–16)
IMM GRANULOCYTES # BLD AUTO: 0.09 K/UL (ref 0–0.11)
IMM GRANULOCYTES NFR BLD AUTO: 0.6 % (ref 0–0.9)
INHALED O2 FLOW RATE: ABNORMAL L/MIN
LYMPHOCYTES # BLD AUTO: 2.75 K/UL (ref 1–4.8)
LYMPHOCYTES NFR BLD: 19 % (ref 22–41)
MAGNESIUM SERPL-MCNC: 1.8 MG/DL (ref 1.5–2.5)
MCH RBC QN AUTO: 24.6 PG (ref 27–33)
MCHC RBC AUTO-ENTMCNC: 33.9 G/DL (ref 32.2–35.5)
MCV RBC AUTO: 72.5 FL (ref 81.4–97.8)
MONOCYTES # BLD AUTO: 1.08 K/UL (ref 0–0.85)
MONOCYTES NFR BLD AUTO: 7.5 % (ref 0–13.4)
NEUTROPHILS # BLD AUTO: 10.22 K/UL (ref 1.82–7.42)
NEUTROPHILS NFR BLD: 70.5 % (ref 44–72)
NRBC # BLD AUTO: 0 K/UL
NRBC BLD-RTO: 0 /100 WBC (ref 0–0.2)
PCO2 BLDV: 30.2 MMHG (ref 41–51)
PH BLDV: 7.45 [PH] (ref 7.31–7.45)
PLATELET # BLD AUTO: 302 K/UL (ref 164–446)
PMV BLD AUTO: 9.4 FL (ref 9–12.9)
PO2 BLDV: 69.9 MMHG (ref 25–40)
POTASSIUM SERPL-SCNC: 3.7 MMOL/L (ref 3.6–5.5)
PROT SERPL-MCNC: 7.9 G/DL (ref 6–8.2)
RBC # BLD AUTO: 5.45 M/UL (ref 4.2–5.4)
SAO2 % BLDV: 93.8 %
SODIUM SERPL-SCNC: 127 MMOL/L (ref 135–145)
WBC # BLD AUTO: 14.5 K/UL (ref 4.8–10.8)

## 2024-05-11 PROCEDURE — 99223 1ST HOSP IP/OBS HIGH 75: CPT | Performed by: HOSPITALIST

## 2024-05-11 RX ORDER — AMOXICILLIN 250 MG
2 CAPSULE ORAL 2 TIMES DAILY
Status: DISCONTINUED | OUTPATIENT
Start: 2024-05-11 | End: 2024-05-16 | Stop reason: HOSPADM

## 2024-05-11 RX ORDER — POTASSIUM CHLORIDE 20 MEQ/1
40 TABLET, EXTENDED RELEASE ORAL ONCE
Status: COMPLETED | OUTPATIENT
Start: 2024-05-11 | End: 2024-05-11

## 2024-05-11 RX ORDER — FLUOXETINE HYDROCHLORIDE 20 MG/1
40 CAPSULE ORAL DAILY
Status: DISCONTINUED | OUTPATIENT
Start: 2024-05-12 | End: 2024-05-12

## 2024-05-11 RX ORDER — GABAPENTIN 100 MG/1
200 CAPSULE ORAL 3 TIMES DAILY
Status: DISCONTINUED | OUTPATIENT
Start: 2024-05-12 | End: 2024-05-16 | Stop reason: HOSPADM

## 2024-05-11 RX ORDER — LISINOPRIL 5 MG/1
10 TABLET ORAL
Status: DISCONTINUED | OUTPATIENT
Start: 2024-05-12 | End: 2024-05-16 | Stop reason: HOSPADM

## 2024-05-11 RX ORDER — HYDROXYZINE HYDROCHLORIDE 25 MG/1
50 TABLET, FILM COATED ORAL 3 TIMES DAILY PRN
Status: DISCONTINUED | OUTPATIENT
Start: 2024-05-11 | End: 2024-05-16 | Stop reason: HOSPADM

## 2024-05-11 RX ORDER — OMEPRAZOLE 20 MG/1
20 CAPSULE, DELAYED RELEASE ORAL DAILY
Status: DISCONTINUED | OUTPATIENT
Start: 2024-05-12 | End: 2024-05-16 | Stop reason: HOSPADM

## 2024-05-11 RX ORDER — POLYETHYLENE GLYCOL 3350 17 G/17G
1 POWDER, FOR SOLUTION ORAL
Status: DISCONTINUED | OUTPATIENT
Start: 2024-05-11 | End: 2024-05-16 | Stop reason: HOSPADM

## 2024-05-11 RX ORDER — SODIUM CHLORIDE, SODIUM LACTATE, POTASSIUM CHLORIDE, CALCIUM CHLORIDE 600; 310; 30; 20 MG/100ML; MG/100ML; MG/100ML; MG/100ML
1000 INJECTION, SOLUTION INTRAVENOUS ONCE
Status: COMPLETED | OUTPATIENT
Start: 2024-05-11 | End: 2024-05-11

## 2024-05-11 RX ORDER — LEVOTHYROXINE SODIUM 0.07 MG/1
150 TABLET ORAL
Status: DISCONTINUED | OUTPATIENT
Start: 2024-05-12 | End: 2024-05-16 | Stop reason: HOSPADM

## 2024-05-11 RX ORDER — ATORVASTATIN CALCIUM 40 MG/1
40 TABLET, FILM COATED ORAL NIGHTLY
Status: DISCONTINUED | OUTPATIENT
Start: 2024-05-11 | End: 2024-05-12

## 2024-05-11 RX ORDER — TRAZODONE HYDROCHLORIDE 50 MG/1
100-200 TABLET ORAL NIGHTLY PRN
Status: DISCONTINUED | OUTPATIENT
Start: 2024-05-11 | End: 2024-05-11

## 2024-05-11 RX ORDER — INSULIN LISPRO 100 [IU]/ML
1-6 INJECTION, SOLUTION INTRAVENOUS; SUBCUTANEOUS
Status: DISCONTINUED | OUTPATIENT
Start: 2024-05-12 | End: 2024-05-12

## 2024-05-11 RX ORDER — SODIUM CHLORIDE 9 MG/ML
INJECTION, SOLUTION INTRAVENOUS CONTINUOUS
Status: DISCONTINUED | OUTPATIENT
Start: 2024-05-11 | End: 2024-05-15

## 2024-05-11 RX ORDER — TRAZODONE HYDROCHLORIDE 50 MG/1
100 TABLET ORAL NIGHTLY PRN
Status: DISCONTINUED | OUTPATIENT
Start: 2024-05-11 | End: 2024-05-16 | Stop reason: HOSPADM

## 2024-05-11 RX ORDER — ENOXAPARIN SODIUM 100 MG/ML
40 INJECTION SUBCUTANEOUS DAILY
Status: DISCONTINUED | OUTPATIENT
Start: 2024-05-11 | End: 2024-05-16 | Stop reason: HOSPADM

## 2024-05-11 RX ADMIN — SODIUM CHLORIDE: 9 INJECTION, SOLUTION INTRAVENOUS at 23:15

## 2024-05-11 RX ADMIN — INSULIN HUMAN 10 UNITS: 100 INJECTION, SOLUTION PARENTERAL at 22:30

## 2024-05-11 RX ADMIN — SODIUM CHLORIDE, POTASSIUM CHLORIDE, SODIUM LACTATE AND CALCIUM CHLORIDE 1000 ML: 600; 310; 30; 20 INJECTION, SOLUTION INTRAVENOUS at 21:05

## 2024-05-11 RX ADMIN — POTASSIUM CHLORIDE 40 MEQ: 1500 TABLET, EXTENDED RELEASE ORAL at 22:30

## 2024-05-11 ASSESSMENT — ENCOUNTER SYMPTOMS
BRUISES/BLEEDS EASILY: 0
ABDOMINAL PAIN: 0
COUGH: 0
VOMITING: 0
SHORTNESS OF BREATH: 0
CHILLS: 0
STRIDOR: 0
EYE DISCHARGE: 0
NERVOUS/ANXIOUS: 0
FOCAL WEAKNESS: 0
EYE REDNESS: 0
POLYDIPSIA: 1
FEVER: 0
MYALGIAS: 0
FLANK PAIN: 0

## 2024-05-11 ASSESSMENT — PATIENT HEALTH QUESTIONNAIRE - PHQ9
6. FEELING BAD ABOUT YOURSELF - OR THAT YOU ARE A FAILURE OR HAVE LET YOURSELF OR YOUR FAMILY DOWN: NEARLY EVERY DAY
4. FEELING TIRED OR HAVING LITTLE ENERGY: NEARLY EVERY DAY
9. THOUGHTS THAT YOU WOULD BE BETTER OFF DEAD, OR OF HURTING YOURSELF: NOT AT ALL
7. TROUBLE CONCENTRATING ON THINGS, SUCH AS READING THE NEWSPAPER OR WATCHING TELEVISION: NEARLY EVERY DAY
1. LITTLE INTEREST OR PLEASURE IN DOING THINGS: NEARLY EVERY DAY
SUM OF ALL RESPONSES TO PHQ9 QUESTIONS 1 AND 2: 6
2. FEELING DOWN, DEPRESSED, IRRITABLE, OR HOPELESS: NEARLY EVERY DAY
5. POOR APPETITE OR OVEREATING: NEARLY EVERY DAY
SUM OF ALL RESPONSES TO PHQ QUESTIONS 1-9: 21
3. TROUBLE FALLING OR STAYING ASLEEP OR SLEEPING TOO MUCH: SEVERAL DAYS
8. MOVING OR SPEAKING SO SLOWLY THAT OTHER PEOPLE COULD HAVE NOTICED. OR THE OPPOSITE, BEING SO FIGETY OR RESTLESS THAT YOU HAVE BEEN MOVING AROUND A LOT MORE THAN USUAL: MORE THAN HALF THE DAYS

## 2024-05-11 ASSESSMENT — FIBROSIS 4 INDEX: FIB4 SCORE: 0.64

## 2024-05-12 PROBLEM — N30.90 CYSTITIS: Status: ACTIVE | Noted: 2024-05-12

## 2024-05-12 LAB
ALBUMIN SERPL BCP-MCNC: 3.3 G/DL (ref 3.2–4.9)
ALBUMIN/GLOB SERPL: 0.9 G/DL
ALP SERPL-CCNC: 107 U/L (ref 30–99)
ALT SERPL-CCNC: 10 U/L (ref 2–50)
ANION GAP SERPL CALC-SCNC: 13 MMOL/L (ref 7–16)
ANION GAP SERPL CALC-SCNC: 14 MMOL/L (ref 7–16)
APPEARANCE UR: ABNORMAL
AST SERPL-CCNC: 9 U/L (ref 12–45)
BACTERIA #/AREA URNS HPF: ABNORMAL /HPF
BILIRUB SERPL-MCNC: 0.2 MG/DL (ref 0.1–1.5)
BILIRUB UR QL STRIP.AUTO: NEGATIVE
BUN SERPL-MCNC: 10 MG/DL (ref 8–22)
BUN SERPL-MCNC: 9 MG/DL (ref 8–22)
CALCIUM ALBUM COR SERPL-MCNC: 9.4 MG/DL (ref 8.5–10.5)
CALCIUM SERPL-MCNC: 8.4 MG/DL (ref 8.4–10.2)
CALCIUM SERPL-MCNC: 8.8 MG/DL (ref 8.4–10.2)
CHLORIDE SERPL-SCNC: 92 MMOL/L (ref 96–112)
CHLORIDE SERPL-SCNC: 96 MMOL/L (ref 96–112)
CO2 SERPL-SCNC: 23 MMOL/L (ref 20–33)
CO2 SERPL-SCNC: 23 MMOL/L (ref 20–33)
COLOR UR: YELLOW
CREAT SERPL-MCNC: 0.37 MG/DL (ref 0.5–1.4)
CREAT SERPL-MCNC: 0.41 MG/DL (ref 0.5–1.4)
EPI CELLS #/AREA URNS HPF: ABNORMAL /HPF
ERYTHROCYTE [DISTWIDTH] IN BLOOD BY AUTOMATED COUNT: 54.1 FL (ref 35.9–50)
EST. AVERAGE GLUCOSE BLD GHB EST-MCNC: 367 MG/DL
GFR SERPLBLD CREATININE-BSD FMLA CKD-EPI: 124 ML/MIN/1.73 M 2
GFR SERPLBLD CREATININE-BSD FMLA CKD-EPI: 127 ML/MIN/1.73 M 2
GLOBULIN SER CALC-MCNC: 3.5 G/DL (ref 1.9–3.5)
GLUCOSE BLD STRIP.AUTO-MCNC: 275 MG/DL (ref 65–99)
GLUCOSE BLD STRIP.AUTO-MCNC: 281 MG/DL (ref 65–99)
GLUCOSE BLD STRIP.AUTO-MCNC: 362 MG/DL (ref 65–99)
GLUCOSE BLD STRIP.AUTO-MCNC: 379 MG/DL (ref 65–99)
GLUCOSE SERPL-MCNC: 364 MG/DL (ref 65–99)
GLUCOSE SERPL-MCNC: 403 MG/DL (ref 65–99)
GLUCOSE UR STRIP.AUTO-MCNC: >=1000 MG/DL
HBA1C MFR BLD: 14.4 % (ref 4–5.6)
HCT VFR BLD AUTO: 36.5 % (ref 37–47)
HGB BLD-MCNC: 12.2 G/DL (ref 12–16)
HIV 1+2 AB+HIV1 P24 AG SERPL QL IA: NORMAL
KETONES UR STRIP.AUTO-MCNC: 40 MG/DL
LEUKOCYTE ESTERASE UR QL STRIP.AUTO: ABNORMAL
MAGNESIUM SERPL-MCNC: 1.6 MG/DL (ref 1.5–2.5)
MCH RBC QN AUTO: 24.6 PG (ref 27–33)
MCHC RBC AUTO-ENTMCNC: 33.4 G/DL (ref 32.2–35.5)
MCV RBC AUTO: 73.6 FL (ref 81.4–97.8)
MICRO URNS: ABNORMAL
NITRITE UR QL STRIP.AUTO: NEGATIVE
PH UR STRIP.AUTO: 6.5 [PH] (ref 5–8)
PLATELET # BLD AUTO: 254 K/UL (ref 164–446)
PMV BLD AUTO: 10 FL (ref 9–12.9)
POTASSIUM SERPL-SCNC: 3.6 MMOL/L (ref 3.6–5.5)
POTASSIUM SERPL-SCNC: 4 MMOL/L (ref 3.6–5.5)
PROCALCITONIN SERPL-MCNC: 0.05 NG/ML
PROT SERPL-MCNC: 6.8 G/DL (ref 6–8.2)
PROT UR QL STRIP: NEGATIVE MG/DL
RBC # BLD AUTO: 4.96 M/UL (ref 4.2–5.4)
RBC # URNS HPF: ABNORMAL /HPF
RBC UR QL AUTO: ABNORMAL
SODIUM SERPL-SCNC: 129 MMOL/L (ref 135–145)
SODIUM SERPL-SCNC: 132 MMOL/L (ref 135–145)
SP GR UR STRIP.AUTO: 1.01
WBC # BLD AUTO: 11.2 K/UL (ref 4.8–10.8)
WBC #/AREA URNS HPF: ABNORMAL /HPF
YEAST #/AREA URNS HPF: ABNORMAL /HPF
YEAST BUDDING URNS QL: PRESENT /HPF
YEAST HYPHAE #/AREA URNS HPF: PRESENT /HPF

## 2024-05-12 PROCEDURE — 99233 SBSQ HOSP IP/OBS HIGH 50: CPT | Performed by: INTERNAL MEDICINE

## 2024-05-12 RX ORDER — ATORVASTATIN CALCIUM 40 MG/1
40 TABLET, FILM COATED ORAL DAILY
Status: DISCONTINUED | OUTPATIENT
Start: 2024-05-13 | End: 2024-05-16 | Stop reason: HOSPADM

## 2024-05-12 RX ORDER — INSULIN LISPRO 100 [IU]/ML
1-6 INJECTION, SOLUTION INTRAVENOUS; SUBCUTANEOUS
Status: DISCONTINUED | OUTPATIENT
Start: 2024-05-12 | End: 2024-05-12

## 2024-05-12 RX ORDER — FLUOXETINE HYDROCHLORIDE 20 MG/1
20 CAPSULE ORAL DAILY
Status: DISCONTINUED | OUTPATIENT
Start: 2024-05-13 | End: 2024-05-16 | Stop reason: HOSPADM

## 2024-05-12 RX ORDER — INSULIN LISPRO 100 [IU]/ML
4 INJECTION, SOLUTION INTRAVENOUS; SUBCUTANEOUS
Status: DISCONTINUED | OUTPATIENT
Start: 2024-05-12 | End: 2024-05-13

## 2024-05-12 RX ORDER — INSULIN LISPRO 100 [IU]/ML
2-9 INJECTION, SOLUTION INTRAVENOUS; SUBCUTANEOUS
Status: DISCONTINUED | OUTPATIENT
Start: 2024-05-12 | End: 2024-05-13

## 2024-05-12 RX ADMIN — FLUOXETINE 40 MG: 20 CAPSULE ORAL at 05:40

## 2024-05-12 RX ADMIN — INSULIN LISPRO 5 UNITS: 100 INJECTION, SOLUTION INTRAVENOUS; SUBCUTANEOUS at 06:11

## 2024-05-12 RX ADMIN — INSULIN LISPRO 4 UNITS: 100 INJECTION, SOLUTION INTRAVENOUS; SUBCUTANEOUS at 11:43

## 2024-05-12 RX ADMIN — ENOXAPARIN SODIUM 40 MG: 100 INJECTION SUBCUTANEOUS at 17:22

## 2024-05-12 RX ADMIN — CEFAZOLIN 2 G: 2 INJECTION, POWDER, FOR SOLUTION INTRAMUSCULAR; INTRAVENOUS at 14:56

## 2024-05-12 RX ADMIN — INSULIN LISPRO 5 UNITS: 100 INJECTION, SOLUTION INTRAVENOUS; SUBCUTANEOUS at 21:15

## 2024-05-12 RX ADMIN — INSULIN LISPRO 8 UNITS: 100 INJECTION, SOLUTION INTRAVENOUS; SUBCUTANEOUS at 11:38

## 2024-05-12 RX ADMIN — LEVOTHYROXINE SODIUM 150 MCG: 0.07 TABLET ORAL at 05:40

## 2024-05-12 RX ADMIN — INSULIN LISPRO 5 UNITS: 100 INJECTION, SOLUTION INTRAVENOUS; SUBCUTANEOUS at 17:24

## 2024-05-12 RX ADMIN — INSULIN GLARGINE-YFGN 10 UNITS: 100 INJECTION, SOLUTION SUBCUTANEOUS at 06:10

## 2024-05-12 RX ADMIN — SODIUM CHLORIDE: 9 INJECTION, SOLUTION INTRAVENOUS at 18:01

## 2024-05-12 RX ADMIN — GABAPENTIN 200 MG: 100 CAPSULE ORAL at 11:35

## 2024-05-12 RX ADMIN — GABAPENTIN 200 MG: 100 CAPSULE ORAL at 05:40

## 2024-05-12 RX ADMIN — INSULIN LISPRO 4 UNITS: 100 INJECTION, SOLUTION INTRAVENOUS; SUBCUTANEOUS at 17:27

## 2024-05-12 RX ADMIN — INSULIN GLARGINE-YFGN 10 UNITS: 100 INJECTION, SOLUTION SUBCUTANEOUS at 00:05

## 2024-05-12 RX ADMIN — ENOXAPARIN SODIUM 40 MG: 100 INJECTION SUBCUTANEOUS at 00:04

## 2024-05-12 RX ADMIN — CEFAZOLIN 2 G: 2 INJECTION, POWDER, FOR SOLUTION INTRAMUSCULAR; INTRAVENOUS at 21:21

## 2024-05-12 RX ADMIN — OMEPRAZOLE 20 MG: 20 CAPSULE, DELAYED RELEASE ORAL at 05:40

## 2024-05-12 RX ADMIN — ATORVASTATIN CALCIUM 40 MG: 40 TABLET, FILM COATED ORAL at 05:45

## 2024-05-12 RX ADMIN — GABAPENTIN 200 MG: 100 CAPSULE ORAL at 17:22

## 2024-05-12 ASSESSMENT — LIFESTYLE VARIABLES
HAVE YOU EVER FELT YOU SHOULD CUT DOWN ON YOUR DRINKING: NO
TOTAL SCORE: 0
CONSUMPTION TOTAL: NEGATIVE
AVERAGE NUMBER OF DAYS PER WEEK YOU HAVE A DRINK CONTAINING ALCOHOL: 0
ON A TYPICAL DAY WHEN YOU DRINK ALCOHOL HOW MANY DRINKS DO YOU HAVE: 0
TOTAL SCORE: 0
TOTAL SCORE: 0
HAVE PEOPLE ANNOYED YOU BY CRITICIZING YOUR DRINKING: NO
EVER FELT BAD OR GUILTY ABOUT YOUR DRINKING: NO
EVER HAD A DRINK FIRST THING IN THE MORNING TO STEADY YOUR NERVES TO GET RID OF A HANGOVER: NO
HOW MANY TIMES IN THE PAST YEAR HAVE YOU HAD 5 OR MORE DRINKS IN A DAY: 0
DOES PATIENT WANT TO STOP DRINKING: NO
ALCOHOL_USE: NO

## 2024-05-12 ASSESSMENT — FIBROSIS 4 INDEX: FIB4 SCORE: 0.49

## 2024-05-12 ASSESSMENT — COGNITIVE AND FUNCTIONAL STATUS - GENERAL
DAILY ACTIVITIY SCORE: 24
SUGGESTED CMS G CODE MODIFIER DAILY ACTIVITY: CH
SUGGESTED CMS G CODE MODIFIER MOBILITY: CH
MOBILITY SCORE: 24

## 2024-05-12 ASSESSMENT — PAIN DESCRIPTION - PAIN TYPE: TYPE: ACUTE PAIN

## 2024-05-12 ASSESSMENT — COPD QUESTIONNAIRES
COPD SCREENING SCORE: 0
DO YOU EVER COUGH UP ANY MUCUS OR PHLEGM?: NO/ONLY WITH OCCASIONAL COLDS OR INFECTIONS
HAVE YOU SMOKED AT LEAST 100 CIGARETTES IN YOUR ENTIRE LIFE: NO/DON'T KNOW
DURING THE PAST 4 WEEKS HOW MUCH DID YOU FEEL SHORT OF BREATH: NONE/LITTLE OF THE TIME

## 2024-05-12 ASSESSMENT — ENCOUNTER SYMPTOMS: WEAKNESS: 1

## 2024-05-12 NOTE — ASSESSMENT & PLAN NOTE
Pt c/o dysuria, frequency, noted pink color to urine  UA positive, mild hematuria.  Acute UTI  Causing hyperglycemia    Continuing Ancef for UTI, urine cultures are pending.  I added a one-time dose of fluconazole 150 mg given yeast in UA, patient has uncontrolled hyperglycemia at home.

## 2024-05-12 NOTE — ASSESSMENT & PLAN NOTE
With hyperglycemia and neuropathy  Last glycated hemoglobin was 8.4% 2/2024  Patient is on degludec, metformin and Mounjaro at home  - Patient follows with endocrinologist at [facility]    5/13:  Pt was honest today, she has not been adherent to any of her home medications before coming to the hospital, and with her endocrinology team before.  She stated she has been depressed, on medications, had a recent ugly divorce, started a new job and other social concerns.  In 2015 she tried glipizide, 2016 glimepiride.  She has sulfa allergy.  Before she had Jardiance, Steglatro  She did not tolerate Sitagliptin  Not tolerating Mounjaro, unclear if she is using this appropriately  She was not taking her degludec appropriately    Patient's A1c returned 14.4.  Her glucose ranges this 265-380 in the last 24 hours.    Continue insulin sliding scale.  Monitor for hypoglycemia.  I am restarting patient's home metformin, 850mg BID  Increasing Premeal to 7 units  continue glargine to 20 units twice daily.  Continue insulin sliding scale.  Monitor for hypoglycemia.    5/14: Patient has shown improvement however fasting glucose is still high.  Will increase Lantus to 30 units twice daily    5/15: Patient not tolerating metformin well with abdominal discomfort, continue with insulins but stop metformin  Patient states she eats multiple small meals throughout the day, will to 7 units pre-meal with SSI and also carb count insulin coverage with snacks (15g = 1 unit)  This is closer to her home regimen and should show us sagar her home situation with sugars.

## 2024-05-12 NOTE — ED PROVIDER NOTES
ED Provider Note    CHIEF COMPLAINT  Chief Complaint   Patient presents with    High Blood Sugar       EXTERNAL RECORDS REVIEWED  Inpatient Notes discharge summary from 3/18/2024 noted history of diabetes, hypertension, hypothyroidism found to have elevated blood glucose and hyponatremia discharged on long-acting insulin 50 units    HPI/ROS  LIMITATION TO HISTORY     OUTSIDE HISTORIAN(S):      Liana Obrien is a 44 y.o. female who presents with nausea, elevated blood glucose.  Patient says she is been out of her prescribed indications including insulin for the past month.  Patient says that she has been having worsening polydipsia and polyuria.  Patient says she has been eating and drinking less than usual due to nausea.  Patient denies fever, chills, chest pain or shortness of breath or abdominal pain.    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), Asthma (03/24/2023), Back pain (03/24/2023), Bipolar disorder ([facility]) (08/09/2018), Bowel habit changes (03/24/2023), Breath shortness (03/24/2023), Candida vaginitis (05/26/2022), Chickenpox, Dental disorder (03/24/2023), Diabetes 1.5, managed as type 2 ([facility]), DVT (deep venous thrombosis) ([facility]), Dysfunctional uterine bleeding, Heart burn, Herpes, High cholesterol (03/24/2023), Hypertension (03/24/2023), Hypoglycemia associated with diabetes ([facility]) (10/27/2021), Hyponatremia, Hypothyroidism due to acquired atrophy of thyroid (01/22/2016), Indigestion, Lightheadedness (01/12/2024), Nausea (06/17/2015), Nausea & vomiting, Palpitations (11/09/2023), Personal history of venous thrombosis and embolism, Sepsis ([facility]) (09/22/2022), Sleep apnea, Snoring (06/06/2023), Tachycardia (11/18/2023), Uncontrolled type 2 diabetes mellitus without complication, without long-term current use of insulin (03/12/2015), Urinary incontinence (03/24/2023), Uterine fibroids in pregnancy, postpartum condition, and UTI (urinary tract  infection).    SURGICAL HISTORY   has a past surgical history that includes i&d perirectal abscess (9/22/2022); surg diagnostic exam, anorectal (N/A, 6/20/2023); and i&d perirectal abscess (N/A, 6/20/2023).    FAMILY HISTORY  Family History   Problem Relation Age of Onset    Hypertension Mother     Sleep Apnea Mother     Hyperlipidemia Father     Cancer Brother     Sleep Apnea Brother     Cancer Maternal Uncle     Diabetes Neg Hx     Heart Disease Neg Hx     Stroke Neg Hx        SOCIAL HISTORY  Social History     Tobacco Use    Smoking status: Former     Current packs/day: 0.50     Average packs/day: 0.5 packs/day for 24.2 years (12.1 ttl pk-yrs)     Types: Cigarettes     Start date: 3/1/2000     Quit date: 1/1/2000     Passive exposure: Never    Smokeless tobacco: Never    Tobacco comments:     for 3 months at a time on and off    Vaping Use    Vaping Use: Never used   Substance and Sexual Activity    Alcohol use: No     Comment: quit 2018    Drug use: No    Sexual activity: Yes     Partners: Male       CURRENT MEDICATIONS  Home Medications       Reviewed by Anni Beal, PharmD (Pharmacist) on 05/11/24 at 2132  Med List Status: Complete     Medication Last Dose Status   Alcohol Swabs Pads UNK Active   atorvastatin (LIPITOR) 40 MG Tab 5/11/2024 Active   Continuous Glucose Sensor (FREESTYLE ALEKSANDAR 2 SENSOR) Misc UNK Active   fluconazole (DIFLUCAN) 150 MG tablet DONE Active   FLUoxetine (PROZAC) 20 MG Cap 5/11/2024 Active   FLUoxetine (PROZAC) 20 MG Cap NEW Active   fluoxetine (PROZAC) 40 MG capsule NEW Active   fluoxetine (PROZAC) 40 MG capsule NEW Active   fluoxetine (PROZAC) 40 MG capsule NEW Active   gabapentin (NEURONTIN) 100 MG Cap 5/11/2024 Active   Glucagon (BAQSIMI TWO PACK) 3 mg/dose UNK Active   hydrOXYzine pamoate (VISTARIL) 50 MG Cap  Active   Insulin Degludec (TRESIBA FLEXTOUCH) 200 UNIT/ML Solution Pen-injector UNK Active   insulin lispro 100 UNIT/ML SC SOPN injection PEN > 3 weeks Active   Insulin Pen  "Needle 32 G x 4 mm UNK Active   levothyroxine (SYNTHROID) 150 MCG Tab 5/11/2024 Active   lisinopril (PRINIVIL) 10 MG Tab 5/11/2024 Active   metFORMIN ER (GLUCOPHAGE XR) 500 MG TABLET SR 24 HR 5/11/2024 Active   Tirzepatide (MOUNJARO) 2.5 MG/0.5ML Solution Pen-injector 5/5/2024 Active   traZODone (DESYREL) 100 MG Tab PRN Active   vitamin D (CHOLECALCIFEROL) 1000 UNIT Tab UNK Active                    ALLERGIES  Allergies   Allergen Reactions    Bicillin C-R [Penicillin G Proc & Benzathine] Rash     Received inj of Bicillin- reaction was rash. Oral penicillin shows no reaction.    Ondansetron Hives    Penicillin G Hives    Sulfa Drugs Hives           Metoclopramide Rash and Vomiting     Rash         PHYSICAL EXAM  VITAL SIGNS: /74   Pulse 85   Temp 36.6 °C (97.9 °F) (Oral)   Resp 18   Ht 1.6 m (5' 2.99\")   Wt 91.7 kg (202 lb 2.6 oz)   LMP  (LMP Unknown)   SpO2 97%   BMI 35.82 kg/m²    Constitutional: Alert in no apparent distress.  HENT: No signs of trauma, Bilateral external ears normal, Nose normal.  Dry mucous membranes  Eyes: Pupils are equal and reactive, Conjunctiva normal, Non-icteric.   Neck: Normal range of motion, No tenderness, Supple, No stridor.   Lymphatic: No lymphadenopathy noted.   Cardiovascular: Regular rate and rhythm, no murmurs.   Thorax & Lungs: Normal breath sounds, No respiratory distress, No wheezing, No chest tenderness.   Abdomen: Bowel sounds normal, Soft, No tenderness, No masses, No pulsatile masses.   Skin: Warm, Dry, No erythema, No rash.   Back: No bony tenderness  Extremities: Intact distal pulses, No edema, No tenderness, No cyanosis  Musculoskeletal: Good range of motion in all major joints. No tenderness to palpation or major deformities noted.   Neurologic: Alert , Normal motor function, Normal sensory function, No focal deficits noted.       EKG/LABS  Labs Reviewed   CBC WITH DIFFERENTIAL - Abnormal; Notable for the following components:       Result Value    WBC " 14.5 (*)     RBC 5.45 (*)     MCV 72.5 (*)     MCH 24.6 (*)     RDW 52.7 (*)     Lymphocytes 19.00 (*)     Neutrophils (Absolute) 10.22 (*)     Monos (Absolute) 1.08 (*)     All other components within normal limits   COMP METABOLIC PANEL - Abnormal; Notable for the following components:    Sodium 127 (*)     Chloride 90 (*)     Glucose 522 (*)     Creatinine 0.45 (*)     AST(SGOT) 10 (*)     Alkaline Phosphatase 114 (*)     Globulin 4.1 (*)     All other components within normal limits   BETA-HYDROXYBUTYRIC ACID - Abnormal; Notable for the following components:    beta-Hydroxybutyric Acid 1.00 (*)     All other components within normal limits   VENOUS BLOOD GAS - Abnormal; Notable for the following components:    Venous Bg Pco2 30.2 (*)     Venous Bg Po2 69.9 (*)     Venous Bg Hco3 20 (*)     All other components within normal limits   POCT GLUCOSE DEVICE RESULTS - Abnormal; Notable for the following components:    POC Glucose, Blood 571 (*)     All other components within normal limits   HCG QUAL SERUM   MAGNESIUM   ESTIMATED GFR   CBC WITHOUT DIFFERENTIAL   COMP METABOLIC PANEL   MAGNESIUM   POCT URINALYSIS   POCT GLUCOSE             COURSE & MEDICAL DECISION MAKING    ASSESSMENT, COURSE AND PLAN  Care Narrative: On arrival patient noted to be hyperglycemic over 500 differential includes DKA, kidney dysfunction liver dysfunction.  No history or exam to suggest acute infectious process, CVA or MI.  Will initiate IV fluids as patient appears dry on exam and closely monitor metabolic results.    Blood work with moderate hyponatremia significant hyperglycemia no clear signs of DKA no acidemia.  Given degree of patient's hyperglycemia and hyponatremia spoke with hospitalist regarding mission for ongoing management patient given initial dose of IV insulin with potassium repletion.            ADDITIONAL PROBLEMS MANAGED      DISPOSITION AND DISCUSSIONS  I have discussed management of the patient with the following  physicians and KAYLI's: Hospitalist      FINAL DIAGNOSIS  1. Hyponatremia    2. Hyperglycemia           Electronically signed by: Darryl Lindsay D.O., 5/11/2024 8:56 PM

## 2024-05-12 NOTE — ASSESSMENT & PLAN NOTE
Patient WBC was 14.5 on admission  WBC 11.2    Continuing Ancef for UTI, urine cultures are pending.

## 2024-05-12 NOTE — PROGRESS NOTES
No chief complaint on file.      HPI: Liana Obrien is a 43 y.o. female with past medical history as below who presented to the clinic for the following.    *Urinary frequency, urgency, intermittent dysuria - intermittent suprapubic discomfort , still having incontinent episodes   Also feels that she may have a yeast infection- foul smelling and with discharge   No flank pain, no nausea, chills, fevers  On examination no cva tenderness   POC ua done : Showed slightly cloudy yellow urine with 500 glucosuria, negative for bilirubin, positive for ketones, trace intact blood, point-of-care protein nitrites and leukocyte esterase negative .  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  -recently hospitalized with hyperglycemia, limited oral intake after a stressful court day   -In the hospital patient was treated for UTI, evaluated by psychiatry, referred to /GI for iron deficiency anemia.  -Additionally insulin was titrated up to 50 units with Lantus use in the hospital and encouraged to follow-up with endocrinology.  -since discharge patient however continues to have issues staying compliant due to her schedule - missed tresiba today - patient woke up late and rushed out - forgot to take tresiba - and only carries her short acting with herself . Patient is unable to take short acting daily , as she is not able to eat regularly still.   Patient additionally feels that the tresiba is a very high dose and feels that it is making her urinate more.  Patient is taking Tresiba in the morning  Patient also has been changed from dulaglutide to mounjaro - however patient received paperwork that she needs to fill for trulicity and is asking me if she needs to fill it since she has received mounjaro  already   Latest A1C was 8.4 down from 8.5  Not sure how her sugars have been lately as she has been unable to use her new CGM which is connected to her phone.  -Records patient is average has been 100% of the time about 250, no sugars ago, no lows, low no sugar low higher than 300, including in the evenings, takes Tresiba in the mornings.  -Missed her last appointment with pharmacist Jesus, rescheduled to April 23.  -Patient additionally wants a new referral with a new nutritionist as she feels overwhelmed after nutrition appointments and wants to follow up in the same building where she sees her psychologist.  -Patient has not been carrying saumya snacks or protein shakes with herself when she drives      Management of other conditions which may be related to diabetes as below   Patient does have constipation - will modify iron supplementation ad manage sugars as above  Increased urine frequency-management as above   urge incontinence history more than stress  -Patients foot pain- bilateral plantar surface discussed today, follows up with podiatry post takes gabapentin.      Last eye exam in December   Last monofilament test - normal monofilament test  with decreased sensation only on the right great toe.  -Pulses intact, vibration sense slightly reduced in the great toes only.  -Proprioception intact.                 Other problems not discussed on this visit     **Menorrhagia   *Fibroids   CBC done in hospital showed iron deficiency anemia with the last hemoglobin/hematocrit/MCV/RDW of 9.7/34.4/72.3 [normal RDW of 43.8], ferritin of 7.1, percent saturation of 5, TIBC of 275.  Previously with microcytosis without anemia.  Referred to gynecology on  previous  visit - provided information again -   Hormonal options are not an option as patient has a history of DVT - patient may need surgical options - explained to patient  She is taking iron supplement daily , however also is complaining of constipation   Hb did  trend down while checked in hospital   She also started her menstrual cycle again after 3 months which usually happens heavily when it comes after a gap   Will recheck cbc - iv iron infusion vs prbc if needed.   *Hypokalemia.  -Noted on the last lab work done in the hospital with a potassium of 3.3.  -Normal renal function.  Not on any medications that could be lowering potassium levels  *Vitamin D deficiency.  -Last vitamin D from 3/16/2024 was 11  Taking viatmin d supplement   *Anxiety   -Patient is following up with psychiatry, mental health counselor, [facility].  -Was receiving inpatient psychiatry treatment, currently is on Zoloft 100 mg, trazodone 100 mg  at nighttime, hydroxyzine as needed.   -Recent stressors as mentioned above.  Saw psychology psychiatry in hospital - now back to her outpatient psychologist - feels much better after she physically is feeling better   Sleep apnea  -Sleep study at home was done however she has not heard back from them   Needs to be faxed over again - was faxed in the past by Dr. Sharif   Status post dental procedure   -Associated with swelling - much better   There is some remnant discomfort which however is improving   *non adherance to meds /side effects of meds   Patient believed that the nausea, vomiting and diarrhea is from her medications.   As per patient, her endocrinologist has stopped all the oral meds for diabetes that she was on and is currently on on insulin degludec 30 units now , humalog based on sliding scale and trulicity   Pioglitazone, steglatro and metformin were stopped . However patient continued to have nausea, vomiting diarrhea which has since spontaneously resolved - hence likely associated with COVID   Recommended to continue lisinopril daily and atorvastatin 3 times a week   Patient is on as needed hydroxyzine which she is taking once or upto twice as needed   Taking trazodone 100 mg and sertraline through psychiatry - will address  these with psychiatry   -Patient was on gabapentin which she has stopped since  she got steroid injection for foot which helped with the pain and that has resolved since   Patient also reports that she had to stop Vitamin C because it interacted with one of the medications that she was on and and she feels that she is getting more sick because she has not been able to take the vitamin C   *Blurry vision-checked with optometrist.  -Minimal changes were associated with diabetes as per patient otherwise within normal limits.  *Shortness of breath.  Resolved   -Patient reported that she felt that with increasing her level of activity around the house recently and had noticed that she gets very winded just walking around the house.  Denied any cough or wheezing.   -Denied any chest pain.  Did  report palpitations sometimes when she gets anxious which goes away with some deep breaths, which is also resolved now   Denied any lightheadedness, dizziness, pedal edema, orthopnea or PND  -Does have history of sleep apnea, was on CPAP in the past patient does not recall why as she is not on it anymore.  -On examination patient patient did have a grade three systolic murmur that was present previously however on subsequent examination  - unable to appreciate that murmur.   Echocardiogram recently done was also not concerning except trace MR, mild PI concentric hypertrophy of left ventricle    -Denied any fevers, chills.  -As per labs in the past patient has had history of anemia, she has regular menstrual cycles sometimes, most recently her menstrual cycle lasted for 10 days.  Has history of PCOS as per chart, ovarian cyst  -Last cbc from November 2023 shows microcytosis without anemia , MCV- 75, normal RDW and other cell lines. PBS not commented on   -Has history of DVT as per chart.  *Tachycardia   resolved  -noted that her watch - heart rate above 110 when she is up and about   -Not present today at rest and patient is  asymptomatic  -echo unremarkable  Last cbc showed   History of COVID -recovered well -described below   -Patient was able to finally reach the ophthalmologist , however was told that her insurance would not be taken   *Skin tag   -on lower abdomen - grown - wants to be rmoved,  -Also on exam noted to have erythema on lower abdominal region below skin fold- no symptoms   *Blurry vision  -Patient reports that about 6 years ago she had a significant blurry vision of both eyes which lasted for several months, when she was almost declared blind legally, was evaluated by ophthalmologist, unsure what the diagnosis was.  -Since then however her vision has significantly improved, currently has intermittent blurring of vision on both eyes, which when comes on lasts for a day to a month continuously.  -Denies any headaches, flashes floaters blind spots, eye pain, redness or aura  -We had placed ophthalmology referral on the last visit however patient was not able to attend the appointment due to being inpatient for mental health related issues.  -Patient is requesting for new referral.  -Patient denies having any dry eyes at this time  *Breast Skin lesion   Has had history of bug bites.   -On examination now only has superficial scar, no deeper concerning tissue on palpation.  -Patient did have another spot with similar complaints, which again is currently a scar.  -Had ordered ultrasound breast at the time which has not been done.  -Given resolution of lump will go ahead and proceed to routine mammogram screening.  *Status post surgery for perirectal abscess/fistula   -Healing well as per patient  *Obesity  -Patient is working with nutrition specialist as well as trying to get more exercise as per recommendations of nutrition specialist.    Presentative health  Pap smear pending   Colon cancer  - next year   Mammo ordered in September   HIV   Tdap         Patient Active Problem List    Diagnosis Date Noted    Anxiety  On  Zoloft 100 mg, trazodone 100 mg daily at night, hydroxyzine 06/16/2023    Neuropathic pain of both feet  On gabapentin  06/16/2023    Fibroids 11/15/2022     10/12/2022    Anemia  Microcytosis without anemia as per last labs done in november 2023 09/23/2022    Dyslipidemia 09/22/2022    Obesity (BMI 30-39.9) 05/25/2022    Gastro-esophageal reflux disease without esophagitis 04/14/2021    Major depressive disorder 09/14/2020    Polycystic ovary syndrome  2.2 cm right ovarian cyst seen on recent CT in May 2023  Unable to undergo TVUS due to perirectal abscess  Unable to go on estrogen containing OCP due to history of VTE  Will follow up with gyn following resolution of perirectal abscess 12/11/2019    History of deep venous thrombosis  2 episodes reported on bilateral lower extremities, subsequent ultrasounds for suspected dvt were negative- thought to have been precipitated by Birth control which she is not on anymore  11/19/2019    Hypothyroidism  Levothyroxine 150 mcg,Last TSH from 03/16/2024 was 0.49 11/19/2019    Hypertension  Mildly elevated on lisinopril 10 - not checking blood pressures at home regularly as the cuff not working  10/28/2017    Obstructive sleep apnea, adult  Trying to set up with cpap, backlog with preferred - only company patient's insurance takes  07/31/2015    Vitamin D deficiency- last level checked was 11 from march 2024  03/12/2015    Type 2 diabetes mellitus with hyperglycemia, with long-term current use of insulin ([facility])  Was Following up with endocrinology -  A1c of 8.4 in feb 2024     Tresiba 50U daily, mounjaro 2.5 mg weekly started  - premeal PRN - following up with pharmacotherapy services for titration-  -issues with non compliance  - lipitor 20 daily  - lisinopril 10 daily  - gabapentin 100 TID for neuropathy 03/12/2015       No current facility-administered medications for this visit.     No current outpatient medications on file.     Facility-Administered Medications Ordered in  Other Visits   Medication Dose Route Frequency Provider Last Rate Last Admin    insulin GLARGINE (Lantus,Semglee) injection  20 Units Subcutaneous BID Aayush Mcgee M.D.        And    insulin lispro (HumaLOG,AdmeLOG) injection  1-6 Units Subcutaneous 4X/DAY MARIANAS Aayush Mcgee M.D.        And    dextrose 10 % BOLUS 25 g  25 g Intravenous Q15 MIN PRN Aayush Mcgee M.D.        atorvastatin (Lipitor) tablet 40 mg  40 mg Oral Nightly Real Diaz M.D.   40 mg at 05/12/24 0545    FLUoxetine (PROzac) capsule 40 mg  40 mg Oral DAILY Real Diaz M.D.   40 mg at 05/12/24 0540    gabapentin (Neurontin) capsule 200 mg  200 mg Oral TID Real Diaz M.D.   200 mg at 05/12/24 0540    hydrOXYzine HCl (Atarax) tablet 50 mg  50 mg Oral TID PRN Real Diaz M.D.        levothyroxine (Synthroid) tablet 150 mcg  150 mcg Oral AM ES Real Diaz M.D.   150 mcg at 05/12/24 0540    lisinopril (Prinivil) tablet 10 mg  10 mg Oral QDAY Real Diaz M.D.        senna-docusate (Pericolace Or Senokot S) 8.6-50 MG per tablet 2 Tablet  2 Tablet Oral BID Real Diaz M.D.        And    polyethylene glycol/lytes (Miralax) Packet 1 Packet  1 Packet Oral QDAY PRN Real Diaz M.D.        NS infusion   Intravenous Continuous Real Diaz M.D. 125 mL/hr at 05/11/24 2315 New Bag at 05/11/24 2315    enoxaparin (Lovenox) inj 40 mg  40 mg Subcutaneous DAILY AT 1800 Real Diaz M.D.   40 mg at 05/12/24 0004    omeprazole (PriLOSEC) capsule 20 mg  20 mg Oral DAILY Real Diaz M.D.   20 mg at 05/12/24 0540    traZODone (Desyrel) tablet 100 mg  100 mg Oral HS PRN Asem A Mutasher, M.D.           ROS: As per HPI. Additional pertinent systems as noted below.  Constitutional:  Negative for fever, chills   Cardiovascular:  Negative for chest pain,  Respiratory:  Negative for cough, sputum production, negative for shortness of breath      LMP  (LMP Unknown)     Physical Exam   Constitutional:  .  Not in  acute distress  : No CVA tenderness    Note: I have reviewed all pertinent labs and diagnostic tests associated with this visit with specific comments listed under the assessment and plan below    Assessment and Plan      1. Urinary frequency  -With intermittent dysuria, with uncontrolled blood sugars.  Does have some component of urge incontinence.  -Urine analysis point-of-care did not show any evidence of infection.  Urinalysis was ordered but patient does not need to do it we will communicate with patient.  -She does have some abnormal vaginal discharge, vaginal swab sent, fluconazole prescription sent  -Will trial antimuscarinic/beta 3 agonist for urge incontinence after better control of sugars  - POCT Urinalysis  - URINALYSIS,CULTURE IF INDICATED; Future    Addendum  -Called patient to relay point-of-care urinalysis results as this was run after patient left the clinic.  Does not show any signs of infection, no need to do urine analysis we will observe if symptoms get better with control of sugars. And fluconazole for vaginal discharge     2. Dysuria  -Intermittent, point-of-care urinalysis did not indicate infection, likely from level of hydration.  - POCT Urinalysis  - URINALYSIS,CULTURE IF INDICATED; Future    3. Vaginal discharge  -Did risk for yeast infection secondary to uncontrolled sugars, treat empirically with fluconazole.  -Vaginal swab sent to confirm diagnosis  - BV+TRICH AYSE+YEAST CULTURE  - fluconazole (DIFLUCAN) 150 MG tablet; Take 1 Tablet by mouth every day. Repeat after 72 hours if symptoms persist  Dispense: 2 Tablet; Refill: 0      4. 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  - since discharge patient however continues to have issues staying compliant due to her schedule -  missed tresiba today and only carries her short acting with herself during work . Patient is unable to take short acting daily , as she is not able to eat regularly still.   -Patient additionally feels that the tresiba is a very high dose and feels that it is making her urinate more.  Patient is taking Tresiba in the morning  -Patient also has been changed from dulaglutide to mounjaro - however patient received paperwork that she needs to fill for trulicity and is asking me if she needs to fill it since she has received mounjaro already   -Latest A1C was 8.4 down from 8.5  -She is Not sure how her sugars have been lately as she has been unable to use her new CGM which is connected to her phone.  -Records patient is average has been 100% of the time about 250, no sugars ago, no lows, low no sugar low higher than 300, including in the evenings, takes Tresiba in the mornings.  -Missed her last appointment with pharmacist Jesus, rescheduled to April 23.  -Patient additionally wants a new referral with a new nutritionist as she feels overwhelmed after nutrition appointments and wants to follow up in the same building where she sees her psychologist.  -Patient has not been carrying saumya snacks or protein shakes with herself when she drives      Plan  -Emphasized the need to not miss Tresiba doses and to make sure that the Tresiba is next to her short acting insulin so that she remembers to take it.  -Encouraged to keep a snack it with her at all times.  -New referral to nutrition specialist has been placed as per patient's request.  -Will communicate with Leila regarding whether he needs to fill out paperwork for Trulicity.  -Addressed  concerns about Tresiba dosage being high.  Encouraged to continue taking 50 units. Reinforced that there are no readings less than 300. Explained that the urinary frequency is most likely from that. Will address urge incontinence issues once blood  sugars are better controlled   -Also  demonstrated on how to read her CGM values on phone   -Continue Mounjaro 2.5, metformin.and short acting as per [place]. Encouraged to keep up appointments with her     - Referral to Nutrition Services         Followup: No follow-ups on file.        Signed by: Main Mcgee M.D.

## 2024-05-12 NOTE — ED NOTES
Late entry  Pt ambulated to bed 6 arrives to ED c/o fluctuating blood sugar levels for approx 1 month  Increased thirst and urination blood sugars have been above 400's pt is a/ox4

## 2024-05-12 NOTE — ASSESSMENT & PLAN NOTE
Clinic Care Coordination Contact    Follow Up Progress Note      Assessment:   Pilo,  and legal guardian, consent to communicate in EMR, states neither the [facility] nor himself have heard back regarding home Physical Therapy for training on how to use the new EZ stand patient has.  RNCC stated writer would look into this and get back to the patient.    Apparently, LARRY Johnson at [facility] [place] [facility], [facility], works in the TBI clinic and the TBI therapist working with patient, requested a Primary Care Provider referral for home Physical Therapy to go to group home and demonstrate how to use the EZ stand with patient and staff how to use.    Primary Care Provider placed a referral for the above with the following added order of Cardiology recommendation of blood pressure of a minimum of 85/45 to initiative standing and 160/100 throughout the sessions on 4/20/2023.    This was faxed to [facility] at [phone] (phone [phone]).  RNCC reached out to [facility] and spoke with Alf in Intake stating this was denied and documentation stating [facility] reached out to [facility], , on 4/20/2023 stating the referral was denied due to staffing.  However, Alf states they are able to take the referral and provide home care yet the order date needs to be within 48 hours before [facility] can accept.    RNCC reached out to Primary Care Provider to update the order to reflect today's date so the patient can start Physical Therapy tomorrow.  RNCC will wait for updated order to fax to [facility] at [phone].    Update 5/1/2023:  Primary Care Provider updated the Physical Therapy home care referral and RNCC faxed it to [facility] [phone].  RNCC called Gerald to manny and he was happy to hear.    Care Gaps:    Health Maintenance Due   Topic Date Due     HEPATITIS B IMMUNIZATION (1 of 3 - 3-dose series) Never done     Pneumococcal Vaccine: Pediatrics (0 to 5 Years) and  Hypovolemic hyponatremia with a component of pseudohyponatremia  Corrected for hyperglycemia sodium was 134-137 on admission    Na 131   At-Risk Patients (6 to 64 Years) (2 - PCV) 10/13/2021       Care Gaps Last addressed on 4/27/2023    Care Plans  Care Plan: Physical Therapy     Problem: Lifestyle choices     Goal: Functional     Start Date: 8/20/2021 Expected End Date: 7/27/2023    This Visit's Progress: 80% Recent Progress: 80%    Note:     Goal Statement: Patient will continue Physical Therapy/Outpatient Therapy  Barriers: Deconditioned   Strengths:Supportive parents   Patient/caregiver expressed understanding of goal: Yes  Action steps to achieve this goal:  1. I/caregiver will keep future appointments  2. I/caregiver will discuss, review, schedule and complete recommended overdue health maintenance with my primary care provider  3. I/caregiver will contact my care team with questions, concerns, support needs   4. I/caregiver will use the clinic as a resource, and I understand I can contact my clinic with 24/7 after hours services available                       Intervention/Education provided during outreach:   RNCC called and spoke with patient/caregiver; introduced self, discussed role of Care Coordination and explained reason for call    Plan:   -Patient will contact the care team with questions, concerns, support needs   -Patient will use the clinic as a resource and understands (s)he can contact the [facility] with 24/7 after hours services available  -Care Coordinator will remain available as needed  -RNCC will follow up in one month for follow up appointments/recommendations and goal progression     Candace Perez RN, BSN, PHN  Primary Care / Care Coordinator   [facility]  E-mail [email or web address]   [phone]

## 2024-05-12 NOTE — PROGRESS NOTES
Telemetry Shift Summary    Rhythm SR  HR Range 71-80  Ectopy rPVC  Measurements 0.16/0.10/0.40      Normal Values  Rhythm SR  HR Range:   Measurements: 0.12-0.20/0.06-0.10/0.30-0.52

## 2024-05-12 NOTE — PROGRESS NOTES
Telemetry Shift Summary     Rhythm: SR  HR: 82-90  Ectopy: r PVC    Measurements: 0.14/0.08/0.38    Normal Values  Rhythm: SR  HR:   Measurements: 0.12-0.20/0.08-0.10/0.30-0.52

## 2024-05-12 NOTE — PROGRESS NOTES
Charge Nurse Rounding Note     Bedside rounding completed to address quality of care and overall patient experience.     Patient Satisfaction addressed including staff responsiveness. Patient/family are aware of the POC and any questions answered. Thorough safety education completed including use of call light prior to all mobility throughout the entirety of the hospital stay.      Patient/family aware of time of next Hourly Round.     No further questions/concerns currently.

## 2024-05-12 NOTE — PROGRESS NOTES
Hospital Medicine Daily Progress Note    Date of Service  5/12/2024    Chief Complaint  Liana Obrien is a 44 y.o. female admitted 5/11/2024 with   Chief Complaint   Patient presents with    High Blood Sugar     Hospital Course  No notes on file    Interval Problem Update  Pt stated she was having improved symptoms.  - Patient's glucose remains elevated 364.  Will need to continue titrating glargine up to 20 units twice daily today.  Continue IV fluids.  - Repeating A1c, checking procalcitonin and UA given leukocytosis.    I have discussed this patient's plan of care and discharge plan at IDT rounds today with Case Management, Nursing, Nursing leadership, and other members of the IDT team.    Consultants/Specialty  none    Code Status  Full Code    Disposition  The patient is not medically cleared for discharge to home or a post-acute facility.      I have placed the appropriate orders for post-discharge needs.    Review of Systems  Review of Systems   Constitutional:  Positive for malaise/fatigue.   Genitourinary:  Positive for dysuria and frequency.   Neurological:  Positive for weakness.        Physical Exam  Temp:  [36.2 °C (97.2 °F)-36.6 °C (97.9 °F)] 36.6 °C (97.8 °F)  Pulse:  [66-98] 66  Resp:  [17-18] 17  BP: (111-135)/(72-90) 117/75  SpO2:  [94 %-99 %] 97 %    Physical Exam  Vitals and nursing note reviewed.   Constitutional:       General: She is not in acute distress.     Appearance: She is obese. She is ill-appearing.   HENT:      Head: Normocephalic and atraumatic.   Eyes:      General: No scleral icterus.     Extraocular Movements: Extraocular movements intact.   Cardiovascular:      Rate and Rhythm: Normal rate.      Pulses: Normal pulses.      Heart sounds: Normal heart sounds.      Comments: Difficult to completely auscultate due to body habitus  Pulmonary:      Effort: Pulmonary effort is normal. No respiratory distress.      Breath sounds: Normal breath sounds.      Comments: Difficult to  completely auscultate due to body habitus  Abdominal:      General: Bowel sounds are normal. There is no distension.   Musculoskeletal:         General: No swelling or tenderness. Normal range of motion.      Cervical back: Normal range of motion and neck supple.   Skin:     General: Skin is warm.      Capillary Refill: Capillary refill takes less than 2 seconds.      Coloration: Skin is not jaundiced.      Findings: No erythema.   Neurological:      General: No focal deficit present.      Mental Status: She is alert and oriented to person, place, and time. Mental status is at baseline.      Motor: No weakness.   Psychiatric:         Mood and Affect: Mood normal.         Behavior: Behavior normal.         Thought Content: Thought content normal.         Judgment: Judgment normal.         Fluids    Intake/Output Summary (Last 24 hours) at 5/12/2024 1128  Last data filed at 5/12/2024 0900  Gross per 24 hour   Intake 120 ml   Output --   Net 120 ml       Laboratory  Recent Labs     05/11/24 2032 05/12/24  0053   WBC 14.5* 11.2*   RBC 5.45* 4.96   HEMOGLOBIN 13.4 12.2   HEMATOCRIT 39.5 36.5*   MCV 72.5* 73.6*   MCH 24.6* 24.6*   MCHC 33.9 33.4   RDW 52.7* 54.1*   PLATELETCT 302 254   MPV 9.4 10.0     Recent Labs     05/11/24 2032 05/12/24  0053 05/12/24  0550   SODIUM 127* 129* 132*   POTASSIUM 3.7 3.6 4.0   CHLORIDE 90* 92* 96   CO2 22 23 23   GLUCOSE 522* 403* 364*   BUN 11 10 9   CREATININE 0.45* 0.41* 0.37*   CALCIUM 9.6 8.8 8.4                   Imaging  No orders to display        Assessment/Plan  * Hyperglycemia- (present on admission)  Assessment & Plan  Patient is an hyperosmolar hyperglycemic state (HHS), not DKA.  Hyperglycemia due to dehydration  Due to dehydration caused by home Mounjaro  Increasing glargine to 20 units twice daily    Cystitis- (present on admission)  Assessment & Plan  Pt c/o dysuria, frequency, noted pink color to urine  UA positive, mild hematuria.  Acute UTI  Causing hyperglycemia  -  starting IV Ancef    Leukocytosis- (present on admission)  Assessment & Plan  Patient WBC was 14.5 on admission  WBC 11.2  Checking procalcitonin, UA    Type 2 diabetes mellitus with hyperglycemia, with long-term current use of insulin (HCC)- (present on admission)  Assessment & Plan  With hyperglycemia and neuropathy  Last glycated hemoglobin was 8.4%  Patient is on degludec, metformin and Mounjaro at home  - Continue on glargine, increasing to 20 units twice daily, patient takes up to 50 units of degludec  - Hold home metformin and Mounjaro  - Patient follows with endocrinologist at [facility]  -- Patient is not safe on Mounjaro. She stated she had better outcomes and no side effects when she was on Trulicity but her insurance would not pay for the medication. I recommended for patient to trial Trulicity again if her insurance will approve.  Her diabetes is not improving with higher doses of insulin.    Ozempic/Semaglutide, Trulicity/Dulaglutide, Mounjaro/Tirzepatide Adverse warnings:  Common side effects include nausea, vomiting, diarrhea or constipation, decreased appetite, indigestion, stomach/abdominal pains.  Pancreatitis, Gallbladder dysfunction, Severe stomach problems  Hypoglycemia, Serious allergic reactions  Acute kidney injury (dehydration)  Changes in vision  Avoid in MEN2 and thyroid cancer - medullary thyroid carcinoma  Do not use in Type 1 diabetes, severe GI problems    Hyponatremia- (present on admission)  Assessment & Plan  Hypovolemic hyponatremia with a component of pseudohyponatremia  Corrected for hyperglycemia sodium was 134-137 on admission  Corrected for hyperglycemia today, sodium between 136-138. Serum reading 132, glucose 364.    Anxiety- (present on admission)  Assessment & Plan  Continue home fluoxetine, as needed Atarax    Dyslipidemia- (present on admission)  Assessment & Plan  Continue home atorvastatin  Diabetic cardiac diet    Hypothyroidism- (present on  admission)  Assessment & Plan  Continue Synthroid    Gastroesophageal reflux disease- (present on admission)  Assessment & Plan  Continue omeprazole    Dehydration- (present on admission)  Assessment & Plan  Dehydration due to Mounjaro  Continue IV fluids    Hypertension- (present on admission)  Assessment & Plan  Continue home lisinopril         VTE prophylaxis:    enoxaparin ppx      I have performed a physical exam and reviewed and updated ROS and Plan today (5/12/2024). In review of yesterday's note (5/11/2024), there are no changes except as documented above.      Total time spent 51 minutes. I spent greater than 50% of the time for patient care, including unit/floor time, multiple face-to-face encounters with the patient, counseling on treatment plan and discussion with bedside RN.  Further, I spent time on my own review of patient's overnight events, RN notes, imaging and lab analysis, and developing my assessment and plan above.  In addition, working with , social workers, and Charge RN on case coordination on this date.

## 2024-05-12 NOTE — PROGRESS NOTES
4 Eyes Skin Assessment Completed by KATHI centeno and KATHI elias.    Head WDL  Ears WDL  Nose WDL  Mouth WDL  Neck WDL  Breast/Chest WDL  Shoulder Blades WDL  Spine WDL  (R) Arm/Elbow/Hand WDL  (L) Arm/Elbow/Hand WDL  Abdomen WDL  Groin WDL  Scrotum/Coccyx/Buttocks WDL  (R) Leg WDL  (L) Leg Bruising  (R) Heel/Foot/Toe WDL  (L) Heel/Foot/Toe Bruising          Devices In Places Tele Box and Pulse Ox      Interventions In Place Gray Ear Foams    Possible Skin Injury No    Pictures Uploaded Into Epic N/A  Wound Consult Placed N/A  RN Wound Prevention Protocol Ordered No

## 2024-05-12 NOTE — H&P
Hospital Medicine History & Physical Note    Date of Service  5/11/2024    Primary Care Physician  Main Mcgee M.D.    Consultants  None     Code Status  Full Code    Chief Complaint  Chief Complaint   Patient presents with    High Blood Sugar     History of Presenting Illness  Liana Obrien is a 44 y.o. female with a past medical history of diabetes who presented 5/11/2024 with generalized weakness easy fatigability polyuria excessive thirst and elevated blood sugars above 400.  Patient denies having fevers or chills.  She has urinary frequency.  Denies having dysuria or diarrhea.    I discussed the plan of care with emergency physician, the patient.    Review of Systems  Review of Systems   Constitutional:  Positive for malaise/fatigue. Negative for chills and fever.   Eyes:  Negative for discharge and redness.   Respiratory:  Negative for cough, shortness of breath and stridor.    Cardiovascular:  Negative for chest pain and leg swelling.   Gastrointestinal:  Negative for abdominal pain and vomiting.   Genitourinary:  Negative for flank pain.   Musculoskeletal:  Negative for myalgias.   Skin: Negative.    Neurological:  Negative for focal weakness.   Endo/Heme/Allergies:  Positive for polydipsia. Does not bruise/bleed easily.        Polyuria   Psychiatric/Behavioral:  The patient is not nervous/anxious.      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), Asthma (03/24/2023), Back pain (03/24/2023), Bipolar disorder ([facility]) (08/09/2018), Bowel habit changes (03/24/2023), Breath shortness (03/24/2023), Candida vaginitis (05/26/2022), Chickenpox, Dental disorder (03/24/2023), Diabetes 1.5, managed as type 2 ([facility]), DVT (deep venous thrombosis) ([facility]), Dysfunctional uterine bleeding, Heart burn, Herpes, High cholesterol (03/24/2023), Hypertension (03/24/2023), Hypoglycemia associated with diabetes ([facility]) (10/27/2021), Hyponatremia, Hypothyroidism due to acquired atrophy of  thyroid (01/22/2016), Indigestion, Lightheadedness (01/12/2024), Nausea (06/17/2015), Nausea & vomiting, Palpitations (11/09/2023), Personal history of venous thrombosis and embolism, Sepsis (HCC) (09/22/2022), Sleep apnea, Snoring (06/06/2023), Tachycardia (11/18/2023), Uncontrolled type 2 diabetes mellitus without complication, without long-term current use of insulin (03/12/2015), Urinary incontinence (03/24/2023), Uterine fibroids in pregnancy, postpartum condition, and UTI (urinary tract infection).    Surgical History   has a past surgical history that includes pr i&d perirectal abscess (9/22/2022); pr surg diagnostic exam, anorectal (N/A, 6/20/2023); and pr i&d perirectal abscess (N/A, 6/20/2023).     Family History  family history includes Cancer in her brother and maternal uncle; Hyperlipidemia in her father; Hypertension in her mother; Sleep Apnea in her brother and mother.      Social History   reports that she quit smoking about 24 years ago. Her smoking use included cigarettes. She started smoking about 24 years ago. She has a 12.1 pack-year smoking history. She has never been exposed to tobacco smoke. She has never used smokeless tobacco. She reports that she does not drink alcohol and does not use drugs.    Allergies  Allergies   Allergen Reactions    Bicillin C-R [Penicillin G Proc & Benzathine] Rash     Received inj of Bicillin- reaction was rash. Oral penicillin shows no reaction.    Ondansetron Hives    Penicillin G Hives    Sulfa Drugs Hives           Metoclopramide Rash and Vomiting     Rash       Medications  Prior to Admission Medications   Prescriptions Last Dose Informant Patient Reported? Taking?   Alcohol Swabs Pads UNK Patient No No   Sig: use one alcohol swab three times a day as needed.   Continuous Glucose Sensor (FREESTYLE ALEKSANDAR 2 SENSOR) [facility] UNK  No No   Sig: Use 1 kit as directed every two weeks change every 14 days   FLUoxetine (PROZAC) 20 MG Cap 5/11/2024 at AM  No Yes   Sig:  Take 1 oral capsule once a day start 4/12 in addition to 40mg capsule for total of 60mg   FLUoxetine (PROZAC) 20 MG Cap NEW  No No   Sig: Take 1 oral capsule once a day start 4/12 in addition to 40mg capsule for total of 60mg   Glucagon (BAQSIMI TWO PACK) 3 mg/dose UNK Patient No No   Sig: Inhale 1 SPRAY into the nostril as a single dose for severe hypoglycemia   Insulin Degludec (TRESIBA FLEXTOUCH) 200 UNIT/ML Solution Pen-injector 5/11/2024 at AM  No Yes   Sig: Inject 50 Units under the skin every evening.   Patient taking differently: Inject 30 Units under the skin every morning.   Insulin Pen Needle 32 G x 4 mm UNK Patient No No   Sig: Use 1 needle subcutaneously 4 times daily   Tirzepatide (MOUNJARO) 2.5 MG/0.5ML Solution Pen-injector 5/5/2024  No No   Sig: Inject 2.5 mg under the skin every 7 days.   atorvastatin (LIPITOR) 40 MG Tab 5/11/2024 at AM  No Yes   Sig: Take 1 Tablet by mouth every evening.   fluconazole (DIFLUCAN) 150 MG tablet DONE  No No   Sig: Take 1 Tablet by mouth every day. Repeat after 72 hours if symptoms persist   fluoxetine (PROZAC) 40 MG capsule NEW  No No   Sig: Take 1 oral capsule once a day   fluoxetine (PROZAC) 40 MG capsule NEW  No No   Sig: Take 1 oral capsule once a day   fluoxetine (PROZAC) 40 MG capsule NEW  No No   Sig: Take 1 oral capsule once a day   gabapentin (NEURONTIN) 100 MG Cap 5/11/2024 at AM  No Yes   Sig: Take 2 Capsules by mouth 3 times a day.   Patient taking differently: Take 200 mg by mouth every morning.   hydrOXYzine pamoate (VISTARIL) 50 MG Cap  Patient No No   Sig: Take 1 oral capsule as needed every (6-8) hours for anxiety   Patient taking differently: Take 50 mg by mouth 3 times a day as needed for Anxiety.   insulin lispro 100 UNIT/ML SC SOPN injection PEN > 3 weeks  No No   Sig: Inject 2-9 Units under the skin 4 Times a Day,Before Meals and at Bedtime.   levothyroxine (SYNTHROID) 150 MCG Tab 5/11/2024 at AM Patient No Yes   Sig: Take 1 Tablet by mouth  every morning on an empty stomach.   lisinopril (PRINIVIL) 10 MG Tab 5/11/2024 at AM Patient No Yes   Sig: Take 1 Tablet by mouth every day.   metFORMIN ER (GLUCOPHAGE XR) 500 MG TABLET SR 24 HR 5/11/2024 at AM Patient Yes Yes   Sig: Take 500 mg by mouth every day.   traZODone (DESYREL) 100 MG Tab PRN Patient No No   Sig: Take 2 tablets by mouth every night at bedtime as needed   Patient taking differently: Take 100-200 mg by mouth at bedtime as needed for Sleep.   vitamin D (CHOLECALCIFEROL) 1000 UNIT Tab UNK  No No   Sig: Take 1 Tablet by mouth every day.      Facility-Administered Medications: None     Physical Exam  Temp:  [36.3 °C (97.3 °F)] 36.3 °C (97.3 °F)  Pulse:  [98] 98  Resp:  [18] 18  BP: (135)/(90) 135/90  SpO2:  [99 %] 99 %  Blood Pressure: (!) 135/90   Temperature: 36.3 °C (97.3 °F)   Pulse: 98   Respiration: 18   Pulse Oximetry: 99 %     Physical Exam  Constitutional:       General: She is not in acute distress.  HENT:      Head: Normocephalic and atraumatic.      Right Ear: External ear normal.      Left Ear: External ear normal.      Nose: No congestion or rhinorrhea.      Mouth/Throat:      Mouth: Mucous membranes are dry.      Pharynx: No oropharyngeal exudate or posterior oropharyngeal erythema.   Eyes:      General: No scleral icterus.        Right eye: No discharge.         Left eye: No discharge.      Conjunctiva/sclera: Conjunctivae normal.      Pupils: Pupils are equal, round, and reactive to light.   Cardiovascular:      Rate and Rhythm: Regular rhythm. Tachycardia present.      Heart sounds:      No friction rub. No gallop.   Pulmonary:      Effort: Pulmonary effort is normal.   Abdominal:      General: Abdomen is flat. There is no distension.      Tenderness: There is no guarding.   Musculoskeletal:         General: No swelling.      Cervical back: Neck supple. No rigidity. No muscular tenderness.      Right lower leg: No edema.      Left lower leg: No edema.   Skin:     General: Skin  "is dry.      Capillary Refill: Capillary refill takes 2 to 3 seconds.      Coloration: Skin is pale. Skin is not jaundiced.      Findings: No bruising or erythema.   Neurological:      Mental Status: She is alert and oriented to person, place, and time.   Psychiatric:         Mood and Affect: Mood normal.         Judgment: Judgment normal.       Laboratory:  Recent Labs     05/11/24 2032   WBC 14.5*   RBC 5.45*   HEMOGLOBIN 13.4   HEMATOCRIT 39.5   MCV 72.5*   MCH 24.6*   MCHC 33.9   RDW 52.7*   PLATELETCT 302   MPV 9.4     Recent Labs     05/11/24 2032   SODIUM 127*   POTASSIUM 3.7   CHLORIDE 90*   CO2 22   GLUCOSE 522*   BUN 11   CREATININE 0.45*   CALCIUM 9.6     Recent Labs     05/11/24 2032   ALTSGPT <5   ASTSGOT 10*   ALKPHOSPHAT 114*   TBILIRUBIN 0.2   GLUCOSE 522*         No results for input(s): \"NTPROBNP\" in the last 72 hours.      No results for input(s): \"TROPONINT\" in the last 72 hours.    Imaging:  No orders to display     Assessment/Plan:  Justification for Admission Status  I anticipate this patient is appropriate for observation status at this time because the patient has hyperglycemia and dehydration.  Likely discharge after 1 midnight.    Patient will need a Telemetry bed on MEDICAL service     * Hyperglycemia- (present on admission)  Assessment & Plan  With hyperglycemia  Last glycated hemoglobin was 8.4%  I will start long & short acting insulin for now  I will order Accu-Checks, hypoglycemia protocol  Adjust according to blood sugars trend      Type 2 diabetes mellitus with hyperglycemia, with long-term current use of insulin (HCC)- (present on admission)  Assessment & Plan  With hyperglycemia  Last glycated hemoglobin was 8.4%  I will start long & short acting insulin for now  I will order Accu-Checks, hypoglycemia protocol  Adjust according to blood sugars trend     Dehydration- (present on admission)  Assessment & Plan  Likely secondary to reduced oral intake and increased losses " secondary to osmotic diuresis secondary to hyperglycemia.  Encourage oral intake as tolerated, antiemetics as needed.  Intravenous hydration until adequate oral intake is achieved.     Hypertension- (present on admission)  Assessment & Plan  I will start lisinopril with hold parameters.    Anxiety- (present on admission)  Assessment & Plan  I will start I will start fluoxetine.  I will start hydroxyzine as needed    Hyponatremia- (present on admission)  Assessment & Plan  Hypovolemic hyponatremia with a component of pseudohyponatremia  I expect Na to improve with IVF, and correcting blood sugars    Dyslipidemia- (present on admission)  Assessment & Plan  Cardiac diet.  I will start atorvastatin     Hypothyroidism- (present on admission)  Assessment & Plan  I will start levothyroxine    Gastroesophageal reflux disease- (present on admission)  Assessment & Plan  I will start omeprazole      VTE prophylaxis: SCDs/TEDs and Xarelto 10 mg daily as prophylaxis

## 2024-05-12 NOTE — ED TRIAGE NOTES
Pt is conscious, alert and oriented. PT has a patent airway and no signs of resp. Distress. Pt states that her blood sugars have been running high above 400. Today her meter would not read because it was too high. Blood sugar is 576. Pt has nausea and some pelvic pain.

## 2024-05-12 NOTE — ASSESSMENT & PLAN NOTE
Patient is an hyperosmolar hyperglycemic state (HHS), not DKA.  Hyperglycemia due to dehydration  Due to dehydration caused by home Mounjaro    Her glucose ranges this 187-282 in the last 24 hours.

## 2024-05-12 NOTE — CARE PLAN
Problem: Knowledge Deficit - Standard  Goal: Patient and family/care givers will demonstrate understanding of plan of care, disease process/condition, diagnostic tests and medications  Outcome: Progressing     Problem: Fall Risk  Goal: Patient will remain free from falls  Outcome: Progressing   The patient is Stable - Low risk of patient condition declining or worsening         Progress made toward(s) clinical / shift goals:  pt educated on plan of care and medications. Oriented to room and instructed to use call light if getting oob. No questions at this time    Patient is not progressing towards the following goals:

## 2024-05-12 NOTE — ED NOTES
Medication history reviewed with patient at bedside. Med rec is complete    Patient completed fluconazole in the last 30 days.    Any anticoagulants taken in the last 14 days? No        Patient is transitioning from sertraline to fluoxetine and is on fluoxetine 20 mg daily   Patient reported taking insulin degludec (tresiba) 200 units/mL inject 30 units SQ once daily  Patient reported not taking her insulin lispro in last 30 days and using mounjaro and metformin            Anni Millington, Pharm.D., [facility]  ER Clinical Pharmacist

## 2024-05-13 ENCOUNTER — APPOINTMENT (OUTPATIENT)
Dept: INTERNAL MEDICINE | Facility: OTHER | Age: 44
End: 2024-05-13
Payer: MEDICAID

## 2024-05-13 LAB
ALBUMIN SERPL BCP-MCNC: 3.1 G/DL (ref 3.2–4.9)
BACTERIA UR CULT: ABNORMAL
BACTERIA UR CULT: ABNORMAL
BUN SERPL-MCNC: 6 MG/DL (ref 8–22)
CALCIUM ALBUM COR SERPL-MCNC: 8.5 MG/DL (ref 8.5–10.5)
CALCIUM SERPL-MCNC: 7.8 MG/DL (ref 8.4–10.2)
CHLORIDE SERPL-SCNC: 101 MMOL/L (ref 96–112)
CO2 SERPL-SCNC: 18 MMOL/L (ref 20–33)
CREAT SERPL-MCNC: 0.4 MG/DL (ref 0.5–1.4)
ERYTHROCYTE [DISTWIDTH] IN BLOOD BY AUTOMATED COUNT: 56.4 FL (ref 35.9–50)
GFR SERPLBLD CREATININE-BSD FMLA CKD-EPI: 125 ML/MIN/1.73 M 2
GLUCOSE BLD STRIP.AUTO-MCNC: 185 MG/DL (ref 65–99)
GLUCOSE BLD STRIP.AUTO-MCNC: 224 MG/DL (ref 65–99)
GLUCOSE BLD STRIP.AUTO-MCNC: 266 MG/DL (ref 65–99)
GLUCOSE BLD STRIP.AUTO-MCNC: 272 MG/DL (ref 65–99)
GLUCOSE SERPL-MCNC: 386 MG/DL (ref 65–99)
HCT VFR BLD AUTO: 40 % (ref 37–47)
HGB BLD-MCNC: 12.8 G/DL (ref 12–16)
MAGNESIUM SERPL-MCNC: 1.8 MG/DL (ref 1.5–2.5)
MCH RBC QN AUTO: 24.3 PG (ref 27–33)
MCHC RBC AUTO-ENTMCNC: 32 G/DL (ref 32.2–35.5)
MCV RBC AUTO: 75.9 FL (ref 81.4–97.8)
PHOSPHATE SERPL-MCNC: 2 MG/DL (ref 2.5–4.5)
PLATELET # BLD AUTO: 255 K/UL (ref 164–446)
PMV BLD AUTO: 10.1 FL (ref 9–12.9)
POTASSIUM SERPL-SCNC: 3.7 MMOL/L (ref 3.6–5.5)
RBC # BLD AUTO: 5.27 M/UL (ref 4.2–5.4)
SIGNIFICANT IND 70042: ABNORMAL
SITE SITE: ABNORMAL
SODIUM SERPL-SCNC: 131 MMOL/L (ref 135–145)
SOURCE SOURCE: ABNORMAL
WBC # BLD AUTO: 7.2 K/UL (ref 4.8–10.8)

## 2024-05-13 PROCEDURE — 99233 SBSQ HOSP IP/OBS HIGH 50: CPT | Performed by: INTERNAL MEDICINE

## 2024-05-13 RX ORDER — INSULIN LISPRO 100 [IU]/ML
7 INJECTION, SOLUTION INTRAVENOUS; SUBCUTANEOUS
Status: DISCONTINUED | OUTPATIENT
Start: 2024-05-13 | End: 2024-05-14

## 2024-05-13 RX ORDER — FLUCONAZOLE 100 MG/1
150 TABLET ORAL ONCE
Qty: 2 TABLET | Refills: 0 | Status: COMPLETED | OUTPATIENT
Start: 2024-05-13 | End: 2024-05-13

## 2024-05-13 RX ORDER — INSULIN LISPRO 100 [IU]/ML
2-9 INJECTION, SOLUTION INTRAVENOUS; SUBCUTANEOUS
Status: DISCONTINUED | OUTPATIENT
Start: 2024-05-13 | End: 2024-05-13

## 2024-05-13 RX ORDER — INSULIN LISPRO 100 [IU]/ML
7 INJECTION, SOLUTION INTRAVENOUS; SUBCUTANEOUS
Status: DISCONTINUED | OUTPATIENT
Start: 2024-05-13 | End: 2024-05-13

## 2024-05-13 RX ORDER — INSULIN LISPRO 100 [IU]/ML
2-9 INJECTION, SOLUTION INTRAVENOUS; SUBCUTANEOUS
Status: DISCONTINUED | OUTPATIENT
Start: 2024-05-13 | End: 2024-05-14

## 2024-05-13 RX ORDER — METFORMIN HYDROCHLORIDE 750 MG/1
750 TABLET, EXTENDED RELEASE ORAL
Status: DISCONTINUED | OUTPATIENT
Start: 2024-05-13 | End: 2024-05-13

## 2024-05-13 RX ADMIN — OMEPRAZOLE 20 MG: 20 CAPSULE, DELAYED RELEASE ORAL at 05:51

## 2024-05-13 RX ADMIN — CEFAZOLIN 2 G: 2 INJECTION, POWDER, FOR SOLUTION INTRAMUSCULAR; INTRAVENOUS at 14:37

## 2024-05-13 RX ADMIN — LISINOPRIL 10 MG: 5 TABLET ORAL at 05:51

## 2024-05-13 RX ADMIN — CEFAZOLIN 2 G: 2 INJECTION, POWDER, FOR SOLUTION INTRAMUSCULAR; INTRAVENOUS at 05:53

## 2024-05-13 RX ADMIN — LEVOTHYROXINE SODIUM 150 MCG: 0.07 TABLET ORAL at 05:51

## 2024-05-13 RX ADMIN — INSULIN LISPRO 2 UNITS: 100 INJECTION, SOLUTION INTRAVENOUS; SUBCUTANEOUS at 20:48

## 2024-05-13 RX ADMIN — INSULIN LISPRO 4 UNITS: 100 INJECTION, SOLUTION INTRAVENOUS; SUBCUTANEOUS at 05:54

## 2024-05-13 RX ADMIN — SODIUM CHLORIDE: 9 INJECTION, SOLUTION INTRAVENOUS at 12:22

## 2024-05-13 RX ADMIN — INSULIN LISPRO 7 UNITS: 100 INJECTION, SOLUTION INTRAVENOUS; SUBCUTANEOUS at 17:38

## 2024-05-13 RX ADMIN — GABAPENTIN 200 MG: 100 CAPSULE ORAL at 12:21

## 2024-05-13 RX ADMIN — INSULIN LISPRO 3 UNITS: 100 INJECTION, SOLUTION INTRAVENOUS; SUBCUTANEOUS at 17:40

## 2024-05-13 RX ADMIN — FLUOXETINE 20 MG: 20 CAPSULE ORAL at 05:51

## 2024-05-13 RX ADMIN — SODIUM CHLORIDE: 9 INJECTION, SOLUTION INTRAVENOUS at 20:48

## 2024-05-13 RX ADMIN — GABAPENTIN 200 MG: 100 CAPSULE ORAL at 17:47

## 2024-05-13 RX ADMIN — SENNOSIDES AND DOCUSATE SODIUM 2 TABLET: 50; 8.6 TABLET ORAL at 05:51

## 2024-05-13 RX ADMIN — INSULIN LISPRO 5 UNITS: 100 INJECTION, SOLUTION INTRAVENOUS; SUBCUTANEOUS at 05:54

## 2024-05-13 RX ADMIN — INSULIN LISPRO 5 UNITS: 100 INJECTION, SOLUTION INTRAVENOUS; SUBCUTANEOUS at 12:15

## 2024-05-13 RX ADMIN — GABAPENTIN 200 MG: 100 CAPSULE ORAL at 05:51

## 2024-05-13 RX ADMIN — ENOXAPARIN SODIUM 40 MG: 100 INJECTION SUBCUTANEOUS at 17:47

## 2024-05-13 RX ADMIN — FLUCONAZOLE 150 MG: 100 TABLET ORAL at 09:00

## 2024-05-13 RX ADMIN — METFORMIN HYDROCHLORIDE 850 MG: 850 TABLET ORAL at 17:47

## 2024-05-13 RX ADMIN — ATORVASTATIN CALCIUM 40 MG: 40 TABLET, FILM COATED ORAL at 05:51

## 2024-05-13 RX ADMIN — CEFAZOLIN 2 G: 2 INJECTION, POWDER, FOR SOLUTION INTRAMUSCULAR; INTRAVENOUS at 20:51

## 2024-05-13 RX ADMIN — METFORMIN HYDROCHLORIDE 850 MG: 850 TABLET ORAL at 12:21

## 2024-05-13 RX ADMIN — INSULIN LISPRO 7 UNITS: 100 INJECTION, SOLUTION INTRAVENOUS; SUBCUTANEOUS at 12:14

## 2024-05-13 RX ADMIN — SODIUM CHLORIDE: 9 INJECTION, SOLUTION INTRAVENOUS at 03:11

## 2024-05-13 ASSESSMENT — ENCOUNTER SYMPTOMS: WEAKNESS: 1

## 2024-05-13 NOTE — PROGRESS NOTES
Hospital Medicine Daily Progress Note    Date of Service  5/13/2024    Chief Complaint  Liana Obrien is a 44 y.o. female admitted 5/11/2024 with   Chief Complaint   Patient presents with    High Blood Sugar     Hospital Course  No notes on file    Interval Problem Update  5/12:  Pt stated she was having improved symptoms.  - Patient's glucose remains elevated 364.  Will need to continue titrating glargine up to 20 units twice daily today.  Continue IV fluids.  - Repeating A1c, checking procalcitonin and UA given leukocytosis.    5/13:  Pt was honest with me today, she stated she stopped all her home medications as I explained her A1c. She also mentioned with her Endocrinologist, she was not taking her prescribed medications correctly. I explained this is the reason they adjusted her medications often, they were not aware the patient was not taking her prescribed medications appropriately.    Patient's A1c returned 14.4.  Her glucose ranges this 265-380 in the last 24 hours.    I am restarting patient's home metformin, 850mg BID  Increasing Premeal to 7 units  continue glargine to 20 units twice daily.  Continue insulin sliding scale.  Monitor for hypoglycemia.  Continuing Ancef for UTI, urine cultures are pending.  I added a one-time dose of fluconazole 150 mg given yeast in UA, patient has uncontrolled hyperglycemia at home.    I have discussed this patient's plan of care and discharge plan at IDT rounds today with Case Management, Nursing, Nursing leadership, and other members of the IDT team.    Consultants/Specialty  none    Code Status  Full Code    Disposition  The patient is not medically cleared for discharge to home or a post-acute facility.      I have placed the appropriate orders for post-discharge needs.    Review of Systems  Review of Systems   Constitutional:  Positive for malaise/fatigue.   Genitourinary:  Positive for dysuria and frequency.   Neurological:  Positive for weakness.         Physical Exam  Temp:  [36.2 °C (97.2 °F)-36.8 °C (98.2 °F)] 36.2 °C (97.2 °F)  Pulse:  [68-81] 72  Resp:  [18] 18  BP: (104-130)/(64-76) 117/68  SpO2:  [92 %-99 %] 93 %    Physical Exam  Vitals and nursing note reviewed.   Constitutional:       General: She is not in acute distress.     Appearance: She is obese. She is ill-appearing.   HENT:      Head: Normocephalic and atraumatic.   Eyes:      General: No scleral icterus.     Extraocular Movements: Extraocular movements intact.   Cardiovascular:      Rate and Rhythm: Normal rate.      Pulses: Normal pulses.      Heart sounds: Normal heart sounds.      Comments: Difficult to completely auscultate due to body habitus  Pulmonary:      Effort: Pulmonary effort is normal. No respiratory distress.      Breath sounds: Normal breath sounds.      Comments: Difficult to completely auscultate due to body habitus  Abdominal:      General: Bowel sounds are normal. There is no distension.   Musculoskeletal:         General: No swelling or tenderness. Normal range of motion.      Cervical back: Normal range of motion and neck supple.   Skin:     General: Skin is warm.      Capillary Refill: Capillary refill takes less than 2 seconds.      Coloration: Skin is not jaundiced.      Findings: No erythema.   Neurological:      General: No focal deficit present.      Mental Status: She is alert and oriented to person, place, and time. Mental status is at baseline.      Motor: No weakness.   Psychiatric:         Mood and Affect: Mood normal.         Behavior: Behavior normal.         Thought Content: Thought content normal.         Judgment: Judgment normal.         Fluids    Intake/Output Summary (Last 24 hours) at 5/13/2024 1207  Last data filed at 5/13/2024 0800  Gross per 24 hour   Intake 600 ml   Output --   Net 600 ml       Laboratory  Recent Labs     05/11/24  2032 05/12/24  0053 05/13/24  0805   WBC 14.5* 11.2* 7.2   RBC 5.45* 4.96 5.27   HEMOGLOBIN 13.4 12.2 12.8    HEMATOCRIT 39.5 36.5* 40.0   MCV 72.5* 73.6* 75.9*   MCH 24.6* 24.6* 24.3*   MCHC 33.9 33.4 32.0*   RDW 52.7* 54.1* 56.4*   PLATELETCT 302 254 255   MPV 9.4 10.0 10.1     Recent Labs     05/12/24  0053 05/12/24  0550 05/13/24  0805   SODIUM 129* 132* 131*   POTASSIUM 3.6 4.0 3.7   CHLORIDE 92* 96 101   CO2 23 23 18*   GLUCOSE 403* 364* 386*   BUN 10 9 6*   CREATININE 0.41* 0.37* 0.40*   CALCIUM 8.8 8.4 7.8*                   Imaging  No orders to display        Assessment/Plan  * Hyperglycemia- (present on admission)  Assessment & Plan  Patient is an hyperosmolar hyperglycemic state (HHS), not DKA.  Hyperglycemia due to dehydration  Due to dehydration caused by home Mounjaro    Her glucose ranges this 265-380 in the last 24 hours.  Plan under T2DM    Type 2 diabetes mellitus with hyperglycemia, with long-term current use of insulin (HCC)- (present on admission)  Assessment & Plan  With hyperglycemia and neuropathy  Last glycated hemoglobin was 8.4% 2/2024  Patient is on degludec, metformin and Mounjaro at home  - Patient follows with endocrinologist at [facility]    5/13:  Pt was honest today, she has not been adherent to any of her home medications before coming to the hospital, and with her endocrinology team before.  She stated she has been depressed, on medications, had a recent ugly divorce, started a new job and other social concerns.  In 2015 she tried glipizide, 2016 glimepiride.  She has sulfa allergy.  Before she had Jardiance, Steglatro  She did not tolerate Sitagliptin  Not tolerating Mounjaro, unclear if she is using this appropriately  She was not taking her degludec appropriately    Patient's A1c returned 14.4.  Her glucose ranges this 265-380 in the last 24 hours.    Continue insulin sliding scale.  Monitor for hypoglycemia.  I am restarting patient's home metformin, 850mg BID  Increasing Premeal to 7 units  continue glargine to 20 units twice daily.  Continue insulin sliding scale.   Monitor for hypoglycemia.    Cystitis- (present on admission)  Assessment & Plan  Pt c/o dysuria, frequency, noted pink color to urine  UA positive, mild hematuria.  Acute UTI  Causing hyperglycemia    Continuing Ancef for UTI, urine cultures are pending.  I added a one-time dose of fluconazole 150 mg given yeast in UA, patient has uncontrolled hyperglycemia at home.    Leukocytosis- (present on admission)  Assessment & Plan  Patient WBC was 14.5 on admission  WBC 11.2    Continuing Ancef for UTI, urine cultures are pending.    Hyponatremia- (present on admission)  Assessment & Plan  Hypovolemic hyponatremia with a component of pseudohyponatremia  Corrected for hyperglycemia sodium was 134-137 on admission    Na 131    Anxiety- (present on admission)  Assessment & Plan  Continue home fluoxetine, as needed Atarax    Dyslipidemia- (present on admission)  Assessment & Plan  Continue home atorvastatin  Diabetic cardiac diet    Hypothyroidism- (present on admission)  Assessment & Plan  Continue synthroid    Gastroesophageal reflux disease- (present on admission)  Assessment & Plan  Continue omeprazole    Dehydration- (present on admission)  Assessment & Plan  Dehydration due to Mounjaro  Continue IV fluids    Hypertension- (present on admission)  Assessment & Plan  Continue home lisinopril         VTE prophylaxis:    enoxaparin ppx      I have performed a physical exam and reviewed and updated ROS and Plan today (5/13/2024). In review of yesterday's note (5/12/2024), there are no changes except as documented above.      Total time spent 52 minutes.    This included my review of patient's overnight RN notes, face to face interview, physical examination, lab analysis.  Also includes repeat visits with the patient, and my documented assessments and interventions above.  In addition, I spoke with entire care team on patient's treatment plan, and DC planning on morning rounds and IDT rounds.

## 2024-05-13 NOTE — PROGRESS NOTES
Plan of care discussed with Dr. Mcgee. Notified Dr. Mcgee of pt's reports of vaginal bleeding after receiving insulin. Per Dr. Mcgee, bleeding likely due to UTI/

## 2024-05-13 NOTE — PROGRESS NOTES
Telemetry Shift Summary     Rhythm: SR  Rate: 70s-90s  Measurements: 0.14/0.08/0.34  Ectopy (reported by Monitor Tech): rare PVC     Normal Values  Rhythm: Sinus  HR:   Measurements: 0.12-0.20/0.06-0.10/0.30-0.52

## 2024-05-13 NOTE — CARE PLAN
The patient is Watcher - Medium risk of patient condition declining or worsening    Shift Goals  Clinical Goals: FSBG Control, IV ABx  Patient Goals: Comfort    Progress made toward(s) clinical / shift goals:    Problem: Knowledge Deficit - Standard  Goal: Patient and family/care givers will demonstrate understanding of plan of care, disease process/condition, diagnostic tests and medications  Outcome: Progressing     Problem: Depression  Goal: Patient and family/caregiver will verbalize accurate information about at least two of the possible causes of depression, three-four of the signs and symptoms of depression  Outcome: Progressing     Problem: Fall Risk  Goal: Patient will remain free from falls  Outcome: Progressing       Patient is not progressing towards the following goals:

## 2024-05-13 NOTE — CARE PLAN
The patient is Stable - Low risk of patient condition declining or worsening    Shift Goals  Clinical Goals: Blood sugar monitoring and control  Patient Goals: comfort, control sugars    Progress made toward(s) clinical / shift goals:  Monitoring blood sugars ACHS, educating on importance of blood sugar regulation, taking medications as prescribed, and dietary control. Ambulating SB assist, educated on fall prevention. Bed alarm in place.   Problem: Knowledge Deficit - Standard  Goal: Patient and family/care givers will demonstrate understanding of plan of care, disease process/condition, diagnostic tests and medications  Outcome: Progressing     Problem: Depression  Goal: Patient and family/caregiver will verbalize accurate information about at least two of the possible causes of depression, three-four of the signs and symptoms of depression  Outcome: Progressing     Problem: Fall Risk  Goal: Patient will remain free from falls  Outcome: Progressing     Problem: Fluid Balance or Risk for Fluid Volume Deficit  Goal: Patient will demonstrate adequate hydration and vital signs  Outcome: Progressing     Problem: Nutrition Deficit - Diabetes  Goal: Patient will demonstrate adequate hydration and vital signs  Outcome: Progressing       Patient is not progressing towards the following goals:

## 2024-05-14 LAB
ALBUMIN SERPL BCP-MCNC: 2.9 G/DL (ref 3.2–4.9)
BUN SERPL-MCNC: 5 MG/DL (ref 8–22)
CALCIUM ALBUM COR SERPL-MCNC: 9.1 MG/DL (ref 8.5–10.5)
CALCIUM SERPL-MCNC: 8.2 MG/DL (ref 8.4–10.2)
CHLORIDE SERPL-SCNC: 108 MMOL/L (ref 96–112)
CO2 SERPL-SCNC: 20 MMOL/L (ref 20–33)
CREAT SERPL-MCNC: 0.3 MG/DL (ref 0.5–1.4)
ERYTHROCYTE [DISTWIDTH] IN BLOOD BY AUTOMATED COUNT: 59.2 FL (ref 35.9–50)
GFR SERPLBLD CREATININE-BSD FMLA CKD-EPI: 134 ML/MIN/1.73 M 2
GLUCOSE BLD STRIP.AUTO-MCNC: 146 MG/DL (ref 65–99)
GLUCOSE BLD STRIP.AUTO-MCNC: 152 MG/DL (ref 65–99)
GLUCOSE BLD STRIP.AUTO-MCNC: 187 MG/DL (ref 65–99)
GLUCOSE BLD STRIP.AUTO-MCNC: 236 MG/DL (ref 65–99)
GLUCOSE BLD STRIP.AUTO-MCNC: 248 MG/DL (ref 65–99)
GLUCOSE BLD STRIP.AUTO-MCNC: 269 MG/DL (ref 65–99)
GLUCOSE SERPL-MCNC: 176 MG/DL (ref 65–99)
HCT VFR BLD AUTO: 37.8 % (ref 37–47)
HGB BLD-MCNC: 12 G/DL (ref 12–16)
MAGNESIUM SERPL-MCNC: 1.8 MG/DL (ref 1.5–2.5)
MCH RBC QN AUTO: 24.4 PG (ref 27–33)
MCHC RBC AUTO-ENTMCNC: 31.7 G/DL (ref 32.2–35.5)
MCV RBC AUTO: 76.8 FL (ref 81.4–97.8)
PHOSPHATE SERPL-MCNC: 2.9 MG/DL (ref 2.5–4.5)
PLATELET # BLD AUTO: 236 K/UL (ref 164–446)
PMV BLD AUTO: 10.1 FL (ref 9–12.9)
POTASSIUM SERPL-SCNC: 3.5 MMOL/L (ref 3.6–5.5)
RBC # BLD AUTO: 4.92 M/UL (ref 4.2–5.4)
SODIUM SERPL-SCNC: 138 MMOL/L (ref 135–145)
WBC # BLD AUTO: 7.1 K/UL (ref 4.8–10.8)

## 2024-05-14 PROCEDURE — 99232 SBSQ HOSP IP/OBS MODERATE 35: CPT | Performed by: INTERNAL MEDICINE

## 2024-05-14 RX ORDER — INSULIN LISPRO 100 [IU]/ML
7 INJECTION, SOLUTION INTRAVENOUS; SUBCUTANEOUS
Status: DISCONTINUED | OUTPATIENT
Start: 2024-05-14 | End: 2024-05-15

## 2024-05-14 RX ORDER — CEPHALEXIN 500 MG/1
500 CAPSULE ORAL EVERY 8 HOURS
Status: DISCONTINUED | OUTPATIENT
Start: 2024-05-14 | End: 2024-05-16 | Stop reason: HOSPADM

## 2024-05-14 RX ORDER — INSULIN LISPRO 100 [IU]/ML
2-9 INJECTION, SOLUTION INTRAVENOUS; SUBCUTANEOUS
Status: DISCONTINUED | OUTPATIENT
Start: 2024-05-14 | End: 2024-05-15

## 2024-05-14 RX ADMIN — INSULIN LISPRO 2 UNITS: 100 INJECTION, SOLUTION INTRAVENOUS; SUBCUTANEOUS at 11:49

## 2024-05-14 RX ADMIN — GABAPENTIN 200 MG: 100 CAPSULE ORAL at 17:39

## 2024-05-14 RX ADMIN — GABAPENTIN 200 MG: 100 CAPSULE ORAL at 05:52

## 2024-05-14 RX ADMIN — SODIUM CHLORIDE: 9 INJECTION, SOLUTION INTRAVENOUS at 15:29

## 2024-05-14 RX ADMIN — LISINOPRIL 10 MG: 5 TABLET ORAL at 05:52

## 2024-05-14 RX ADMIN — INSULIN LISPRO 7 UNITS: 100 INJECTION, SOLUTION INTRAVENOUS; SUBCUTANEOUS at 11:49

## 2024-05-14 RX ADMIN — CEFAZOLIN 2 G: 2 INJECTION, POWDER, FOR SOLUTION INTRAMUSCULAR; INTRAVENOUS at 05:51

## 2024-05-14 RX ADMIN — CEPHALEXIN 500 MG: 500 CAPSULE ORAL at 13:34

## 2024-05-14 RX ADMIN — OMEPRAZOLE 20 MG: 20 CAPSULE, DELAYED RELEASE ORAL at 05:52

## 2024-05-14 RX ADMIN — ATORVASTATIN CALCIUM 40 MG: 40 TABLET, FILM COATED ORAL at 05:52

## 2024-05-14 RX ADMIN — LEVOTHYROXINE SODIUM 150 MCG: 0.07 TABLET ORAL at 05:52

## 2024-05-14 RX ADMIN — SODIUM CHLORIDE: 9 INJECTION, SOLUTION INTRAVENOUS at 05:51

## 2024-05-14 RX ADMIN — SENNOSIDES AND DOCUSATE SODIUM 2 TABLET: 50; 8.6 TABLET ORAL at 05:52

## 2024-05-14 RX ADMIN — INSULIN LISPRO 7 UNITS: 100 INJECTION, SOLUTION INTRAVENOUS; SUBCUTANEOUS at 05:54

## 2024-05-14 RX ADMIN — INSULIN LISPRO 7 UNITS: 100 INJECTION, SOLUTION INTRAVENOUS; SUBCUTANEOUS at 17:41

## 2024-05-14 RX ADMIN — INSULIN LISPRO 3 UNITS: 100 INJECTION, SOLUTION INTRAVENOUS; SUBCUTANEOUS at 05:54

## 2024-05-14 RX ADMIN — METFORMIN HYDROCHLORIDE 850 MG: 850 TABLET ORAL at 17:39

## 2024-05-14 RX ADMIN — METFORMIN HYDROCHLORIDE 850 MG: 850 TABLET ORAL at 07:37

## 2024-05-14 RX ADMIN — GABAPENTIN 200 MG: 100 CAPSULE ORAL at 11:52

## 2024-05-14 RX ADMIN — INSULIN LISPRO 2 UNITS: 100 INJECTION, SOLUTION INTRAVENOUS; SUBCUTANEOUS at 17:40

## 2024-05-14 RX ADMIN — FLUOXETINE 20 MG: 20 CAPSULE ORAL at 05:52

## 2024-05-14 RX ADMIN — CEPHALEXIN 500 MG: 500 CAPSULE ORAL at 21:01

## 2024-05-14 RX ADMIN — ENOXAPARIN SODIUM 40 MG: 100 INJECTION SUBCUTANEOUS at 17:39

## 2024-05-14 ASSESSMENT — ENCOUNTER SYMPTOMS
WEAKNESS: 0
CHILLS: 0
SPEECH CHANGE: 0
BLURRED VISION: 0
CLAUDICATION: 0
CONSTIPATION: 0
COUGH: 0
FEVER: 0
DIARRHEA: 0
DIZZINESS: 0
ABDOMINAL PAIN: 0
PHOTOPHOBIA: 0
SENSORY CHANGE: 0
HEARTBURN: 0
SHORTNESS OF BREATH: 0
MYALGIAS: 0
HEADACHES: 0
VOMITING: 0
NERVOUS/ANXIOUS: 0
INSOMNIA: 0
DEPRESSION: 0

## 2024-05-14 NOTE — PROGRESS NOTES
Hospital Medicine Daily Progress Note    Date of Service  5/14/2024    Chief Complaint  Liana Obrien is a 44 y.o. female admitted 5/11/2024 with polyuria thirst and elevated blood sugars    Hospital Course  Patient is a 44-year-old female with history of diabetes came in with fatigability polyuria excessive thirst and sugars over 400.  Urinary frequency positive but no dysuria.  Patient admitted with [facility].  Blood glucose was difficult to control then patient was honest saying that she stopped taking her home medications as her A1c was 14.4.  She was started back on metformin and insulins adjusted with improvement.  I have increased her Lantus from 20-30 twice daily and if this shows improvement then she should be able to discharge in the next 24 hours.    Interval Problem Update  5/14 patient states she is feeling better today.  Blood sugars have improved with the changes made yesterday.  I will increase her Lantus up to 30 units twice daily and if this shows improvement she should be able to discharge home tomorrow.    I have discussed this patient's plan of care and discharge plan at IDT rounds today with Case Management, Nursing, Nursing leadership, and other members of the IDT team.    Consultants/Specialty  none    Code Status  Full Code    Disposition  The patient is not medically cleared for discharge to home or a post-acute facility.  Anticipate discharge to: home with close outpatient follow-up    I have placed the appropriate orders for post-discharge needs.    Review of Systems  Review of Systems   Constitutional:  Negative for chills and fever.   HENT:  Negative for congestion.    Eyes:  Negative for blurred vision and photophobia.   Respiratory:  Negative for cough and shortness of breath.    Cardiovascular:  Negative for chest pain, claudication and leg swelling.   Gastrointestinal:  Negative for abdominal pain, constipation, diarrhea, heartburn and vomiting.   Genitourinary:  Negative for  dysuria and hematuria.   Musculoskeletal:  Negative for joint pain and myalgias.   Skin:  Negative for itching and rash.   Neurological:  Negative for dizziness, sensory change, speech change, weakness and headaches.   Psychiatric/Behavioral:  Negative for depression. The patient is not nervous/anxious and does not have insomnia.         Physical Exam  Temp:  [36.3 °C (97.3 °F)-37.3 °C (99.2 °F)] 36.3 °C (97.3 °F)  Pulse:  [67-82] 76  Resp:  [18] 18  BP: (123-136)/(81-86) 129/86  SpO2:  [94 %-98 %] 96 %    Physical Exam  Vitals and nursing note reviewed.   Constitutional:       General: She is not in acute distress.     Appearance: Normal appearance. She is not ill-appearing.   HENT:      Head: Normocephalic and atraumatic.      Nose: Nose normal.   Cardiovascular:      Rate and Rhythm: Normal rate and regular rhythm.      Heart sounds: Normal heart sounds. No murmur heard.  Pulmonary:      Effort: Pulmonary effort is normal.      Breath sounds: Normal breath sounds.   Abdominal:      General: Bowel sounds are normal. There is no distension.      Palpations: Abdomen is soft.      Tenderness: There is no abdominal tenderness.   Musculoskeletal:         General: No swelling or tenderness.      Cervical back: Neck supple.   Skin:     General: Skin is warm and dry.   Neurological:      General: No focal deficit present.      Mental Status: She is alert and oriented to person, place, and time.   Psychiatric:         Mood and Affect: Mood normal.         Fluids    Intake/Output Summary (Last 24 hours) at 5/14/2024 1416  Last data filed at 5/14/2024 0810  Gross per 24 hour   Intake 340 ml   Output --   Net 340 ml       Laboratory  Recent Labs     05/12/24  0053 05/13/24  0805 05/14/24  0230   WBC 11.2* 7.2 7.1   RBC 4.96 5.27 4.92   HEMOGLOBIN 12.2 12.8 12.0   HEMATOCRIT 36.5* 40.0 37.8   MCV 73.6* 75.9* 76.8*   MCH 24.6* 24.3* 24.4*   MCHC 33.4 32.0* 31.7*   RDW 54.1* 56.4* 59.2*   PLATELETCT 254 255 236   MPV 10.0 10.1  10.1     Recent Labs     05/12/24  0550 05/13/24  0805 05/14/24  0230   SODIUM 132* 131* 138   POTASSIUM 4.0 3.7 3.5*   CHLORIDE 96 101 108   CO2 23 18* 20   GLUCOSE 364* 386* 176*   BUN 9 6* 5*   CREATININE 0.37* 0.40* 0.30*   CALCIUM 8.4 7.8* 8.2*                   Imaging  No orders to display        Assessment/Plan  * Hyperglycemia- (present on admission)  Assessment & Plan  Patient is an hyperosmolar hyperglycemic state (HHS), not DKA.  Hyperglycemia due to dehydration  Due to dehydration caused by home Mounjaro    Her glucose ranges this 248-154 in the last 24 hours.      Cystitis- (present on admission)  Assessment & Plan  Pt c/o dysuria, frequency, noted pink color to urine  UA positive, mild hematuria.  Acute UTI  Causing hyperglycemia    Continuing Ancef for UTI, urine cultures are pending.  I added a one-time dose of fluconazole 150 mg given yeast in UA, patient has uncontrolled hyperglycemia at home.    Leukocytosis- (present on admission)  Assessment & Plan  Patient WBC was 14.5 on admission  WBC 11.2    Continuing Ancef for UTI, urine cultures are pending.    Anxiety- (present on admission)  Assessment & Plan  Continue home fluoxetine, as needed Atarax    Dyslipidemia- (present on admission)  Assessment & Plan  Continue home atorvastatin  Diabetic cardiac diet    Hypothyroidism- (present on admission)  Assessment & Plan  Continue synthroid    Gastroesophageal reflux disease- (present on admission)  Assessment & Plan  Continue omeprazole    Dehydration- (present on admission)  Assessment & Plan  Dehydration due to Mounjaro  Continue IV fluids    Hypertension- (present on admission)  Assessment & Plan  Continue home lisinopril    Hyponatremia- (present on admission)  Assessment & Plan  Hypovolemic hyponatremia with a component of pseudohyponatremia  Corrected for hyperglycemia sodium was 134-137 on admission    Na 131    Type 2 diabetes mellitus with hyperglycemia, with long-term current use of insulin  ([facility])- (present on admission)  Assessment & Plan  With hyperglycemia and neuropathy  Last glycated hemoglobin was 8.4% 2/2024  Patient is on degludec, metformin and Mounjaro at home  - Patient follows with endocrinologist at [facility]    5/13:  Pt was honest today, she has not been adherent to any of her home medications before coming to the hospital, and with her endocrinology team before.  She stated she has been depressed, on medications, had a recent ugly divorce, started a new job and other social concerns.  In 2015 she tried glipizide, 2016 glimepiride.  She has sulfa allergy.  Before she had Jardiance, Steglatro  She did not tolerate Sitagliptin  Not tolerating Mounjaro, unclear if she is using this appropriately  She was not taking her degludec appropriately    Patient's A1c returned 14.4.  Her glucose ranges this 265-380 in the last 24 hours.    Continue insulin sliding scale.  Monitor for hypoglycemia.  I am restarting patient's home metformin, 850mg BID  Increasing Premeal to 7 units  continue glargine to 20 units twice daily.  Continue insulin sliding scale.  Monitor for hypoglycemia.    5/14: Patient has shown improvement however fasting glucose is still high.  Will increase Lantus to 30 units twice daily         VTE prophylaxis:   SCDs/TEDs      I have performed a physical exam and reviewed and updated ROS and Plan today (5/14/2024). In review of yesterday's note (5/13/2024), there are no changes except as documented above.

## 2024-05-14 NOTE — CARE PLAN
The patient is Stable - Low risk of patient condition declining or worsening    Shift Goals  Clinical Goals: Blood sugar control  Patient Goals: blood sugar control, comfort    Progress made toward(s) clinical / shift goals:  Monitoring blood sugars, blood sugars are trending down. Provided pt with coloring book and colored pencils for distraction while in hospital. Pt had tele health appointment with her therapist this morning which uplifted her mood.   Problem: Knowledge Deficit - Standard  Goal: Patient and family/care givers will demonstrate understanding of plan of care, disease process/condition, diagnostic tests and medications  Outcome: Progressing     Problem: Depression  Goal: Patient and family/caregiver will verbalize accurate information about at least two of the possible causes of depression, three-four of the signs and symptoms of depression  Outcome: Progressing     Problem: Fall Risk  Goal: Patient will remain free from falls  Outcome: Progressing     Problem: Diabetes Management  Goal: Patient will achieve and maintain glucose in satisfactory range  Outcome: Progressing     Problem: Knowledge Deficit - Diabetes  Goal: Patient will demonstrate knowledge of insulin injection, symptoms, and treatment of hypoglycemia and diet prior to discharge  Outcome: Progressing     Problem: Nutrition Deficit - Diabetes  Goal: Patient will demonstrate adequate hydration and vital signs  Outcome: Progressing       Patient is not progressing towards the following goals:

## 2024-05-14 NOTE — HOSPITAL COURSE
Patient is a 44-year-old female with history of diabetes came in with fatigability polyuria excessive thirst and sugars over 400.  Urinary frequency positive but no dysuria.  Patient admitted with [facility].  Blood glucose was difficult to control then patient was honest saying that she stopped taking her home medications as her A1c was 14.4.  She was started back on metformin and insulins adjusted with improvement.  I have increased her Lantus from 20-30 twice daily and if this shows improvement then she should be able to discharge in the next 24 hours.

## 2024-05-14 NOTE — CARE PLAN
The patient is Watcher - Medium risk of patient condition declining or worsening    Shift Goals  Clinical Goals: FSBG Control, IV ABx  Patient Goals: Comfort    Progress made toward(s) clinical / shift goals:    Problem: Knowledge Deficit - Standard  Goal: Patient and family/care givers will demonstrate understanding of plan of care, disease process/condition, diagnostic tests and medications  Outcome: Progressing     Problem: Fall Risk  Goal: Patient will remain free from falls  Outcome: Progressing     Problem: Knowledge Deficit - Diabetes  Goal: Patient will demonstrate knowledge of insulin injection, symptoms, and treatment of hypoglycemia and diet prior to discharge  Outcome: Progressing     Problem: Discharge Planning - Diabetes  Goal: Patient's continuum of care needs will be met  Outcome: Progressing     Problem: Nutrition Deficit - Diabetes  Goal: Patient will demonstrate adequate hydration and vital signs  Outcome: Progressing       Patient is not progressing towards the following goals:

## 2024-05-15 ENCOUNTER — APPOINTMENT (OUTPATIENT)
Dept: INTERNAL MEDICINE | Facility: OTHER | Age: 44
End: 2024-05-15
Payer: MEDICAID

## 2024-05-15 LAB
ANION GAP SERPL CALC-SCNC: 11 MMOL/L (ref 7–16)
BUN SERPL-MCNC: 5 MG/DL (ref 8–22)
CALCIUM SERPL-MCNC: 8.1 MG/DL (ref 8.4–10.2)
CHLORIDE SERPL-SCNC: 107 MMOL/L (ref 96–112)
CO2 SERPL-SCNC: 18 MMOL/L (ref 20–33)
CREAT SERPL-MCNC: 0.35 MG/DL (ref 0.5–1.4)
ERYTHROCYTE [DISTWIDTH] IN BLOOD BY AUTOMATED COUNT: 59.7 FL (ref 35.9–50)
GFR SERPLBLD CREATININE-BSD FMLA CKD-EPI: 129 ML/MIN/1.73 M 2
GLUCOSE BLD STRIP.AUTO-MCNC: 178 MG/DL (ref 65–99)
GLUCOSE BLD STRIP.AUTO-MCNC: 216 MG/DL (ref 65–99)
GLUCOSE BLD STRIP.AUTO-MCNC: 219 MG/DL (ref 65–99)
GLUCOSE BLD STRIP.AUTO-MCNC: 237 MG/DL (ref 65–99)
GLUCOSE BLD STRIP.AUTO-MCNC: 282 MG/DL (ref 65–99)
GLUCOSE SERPL-MCNC: 319 MG/DL (ref 65–99)
HCT VFR BLD AUTO: 37.1 % (ref 37–47)
HGB BLD-MCNC: 11.9 G/DL (ref 12–16)
MCH RBC QN AUTO: 24.7 PG (ref 27–33)
MCHC RBC AUTO-ENTMCNC: 32.1 G/DL (ref 32.2–35.5)
MCV RBC AUTO: 77.1 FL (ref 81.4–97.8)
PLATELET # BLD AUTO: 212 K/UL (ref 164–446)
PMV BLD AUTO: 9.8 FL (ref 9–12.9)
POTASSIUM SERPL-SCNC: 3.7 MMOL/L (ref 3.6–5.5)
RBC # BLD AUTO: 4.81 M/UL (ref 4.2–5.4)
SODIUM SERPL-SCNC: 136 MMOL/L (ref 135–145)
WBC # BLD AUTO: 6.7 K/UL (ref 4.8–10.8)

## 2024-05-15 PROCEDURE — 99232 SBSQ HOSP IP/OBS MODERATE 35: CPT | Performed by: INTERNAL MEDICINE

## 2024-05-15 RX ORDER — INSULIN LISPRO 100 [IU]/ML
1-8 INJECTION, SOLUTION INTRAVENOUS; SUBCUTANEOUS
Status: DISCONTINUED | OUTPATIENT
Start: 2024-05-15 | End: 2024-05-16 | Stop reason: HOSPADM

## 2024-05-15 RX ORDER — INSULIN LISPRO 100 [IU]/ML
7 INJECTION, SOLUTION INTRAVENOUS; SUBCUTANEOUS
Status: DISCONTINUED | OUTPATIENT
Start: 2024-05-15 | End: 2024-05-15

## 2024-05-15 RX ORDER — INSULIN LISPRO 100 [IU]/ML
7 INJECTION, SOLUTION INTRAVENOUS; SUBCUTANEOUS
Status: DISCONTINUED | OUTPATIENT
Start: 2024-05-15 | End: 2024-05-16 | Stop reason: HOSPADM

## 2024-05-15 RX ORDER — INSULIN LISPRO 100 [IU]/ML
2-9 INJECTION, SOLUTION INTRAVENOUS; SUBCUTANEOUS
Status: DISCONTINUED | OUTPATIENT
Start: 2024-05-15 | End: 2024-05-16 | Stop reason: HOSPADM

## 2024-05-15 RX ADMIN — INSULIN LISPRO 3 UNITS: 100 INJECTION, SOLUTION INTRAVENOUS; SUBCUTANEOUS at 20:56

## 2024-05-15 RX ADMIN — INSULIN LISPRO 3 UNITS: 100 INJECTION, SOLUTION INTRAVENOUS; SUBCUTANEOUS at 11:52

## 2024-05-15 RX ADMIN — INSULIN LISPRO 3 UNITS: 100 INJECTION, SOLUTION INTRAVENOUS; SUBCUTANEOUS at 18:03

## 2024-05-15 RX ADMIN — INSULIN LISPRO 1 UNITS: 100 INJECTION, SOLUTION INTRAVENOUS; SUBCUTANEOUS at 20:58

## 2024-05-15 RX ADMIN — GABAPENTIN 200 MG: 100 CAPSULE ORAL at 18:00

## 2024-05-15 RX ADMIN — LISINOPRIL 10 MG: 5 TABLET ORAL at 06:01

## 2024-05-15 RX ADMIN — FLUOXETINE 20 MG: 20 CAPSULE ORAL at 06:01

## 2024-05-15 RX ADMIN — SODIUM CHLORIDE: 9 INJECTION, SOLUTION INTRAVENOUS at 00:11

## 2024-05-15 RX ADMIN — CEPHALEXIN 500 MG: 500 CAPSULE ORAL at 14:05

## 2024-05-15 RX ADMIN — INSULIN LISPRO 7 UNITS: 100 INJECTION, SOLUTION INTRAVENOUS; SUBCUTANEOUS at 11:53

## 2024-05-15 RX ADMIN — METFORMIN HYDROCHLORIDE 850 MG: 850 TABLET ORAL at 08:02

## 2024-05-15 RX ADMIN — CEPHALEXIN 500 MG: 500 CAPSULE ORAL at 06:01

## 2024-05-15 RX ADMIN — GABAPENTIN 200 MG: 100 CAPSULE ORAL at 06:01

## 2024-05-15 RX ADMIN — ENOXAPARIN SODIUM 40 MG: 100 INJECTION SUBCUTANEOUS at 18:00

## 2024-05-15 RX ADMIN — LEVOTHYROXINE SODIUM 150 MCG: 0.07 TABLET ORAL at 06:01

## 2024-05-15 RX ADMIN — CEPHALEXIN 500 MG: 500 CAPSULE ORAL at 20:56

## 2024-05-15 RX ADMIN — INSULIN LISPRO 7 UNITS: 100 INJECTION, SOLUTION INTRAVENOUS; SUBCUTANEOUS at 06:02

## 2024-05-15 RX ADMIN — GABAPENTIN 200 MG: 100 CAPSULE ORAL at 11:49

## 2024-05-15 RX ADMIN — INSULIN LISPRO 7 UNITS: 100 INJECTION, SOLUTION INTRAVENOUS; SUBCUTANEOUS at 18:03

## 2024-05-15 RX ADMIN — ATORVASTATIN CALCIUM 40 MG: 40 TABLET, FILM COATED ORAL at 06:01

## 2024-05-15 RX ADMIN — OMEPRAZOLE 20 MG: 20 CAPSULE, DELAYED RELEASE ORAL at 06:01

## 2024-05-15 RX ADMIN — INSULIN LISPRO 5 UNITS: 100 INJECTION, SOLUTION INTRAVENOUS; SUBCUTANEOUS at 06:02

## 2024-05-15 SDOH — ECONOMIC STABILITY: TRANSPORTATION INSECURITY
IN THE PAST 12 MONTHS, HAS THE LACK OF TRANSPORTATION KEPT YOU FROM MEDICAL APPOINTMENTS OR FROM GETTING MEDICATIONS?: YES

## 2024-05-15 SDOH — ECONOMIC STABILITY: TRANSPORTATION INSECURITY
IN THE PAST 12 MONTHS, HAS LACK OF RELIABLE TRANSPORTATION KEPT YOU FROM MEDICAL APPOINTMENTS, MEETINGS, WORK OR FROM GETTING THINGS NEEDED FOR DAILY LIVING?: YES

## 2024-05-15 ASSESSMENT — SOCIAL DETERMINANTS OF HEALTH (SDOH)
WITHIN THE PAST 12 MONTHS, THE FOOD YOU BOUGHT JUST DIDN'T LAST AND YOU DIDN'T HAVE MONEY TO GET MORE: SOMETIMES TRUE
IN THE PAST 12 MONTHS, HAS THE ELECTRIC, GAS, OIL, OR WATER COMPANY THREATENED TO SHUT OFF SERVICE IN YOUR HOME?: ALREADY SHUT OFF
WITHIN THE LAST YEAR, HAVE YOU BEEN KICKED, HIT, SLAPPED, OR OTHERWISE PHYSICALLY HURT BY YOUR PARTNER OR EX-PARTNER?: NO
WITHIN THE LAST YEAR, HAVE YOU BEEN AFRAID OF YOUR PARTNER OR EX-PARTNER?: YES
WITHIN THE LAST YEAR, HAVE TO BEEN RAPED OR FORCED TO HAVE ANY KIND OF SEXUAL ACTIVITY BY YOUR PARTNER OR EX-PARTNER?: NO
WITHIN THE PAST 12 MONTHS, YOU WORRIED THAT YOUR FOOD WOULD RUN OUT BEFORE YOU GOT THE MONEY TO BUY MORE: OFTEN TRUE
WITHIN THE LAST YEAR, HAVE YOU BEEN HUMILIATED OR EMOTIONALLY ABUSED IN OTHER WAYS BY YOUR PARTNER OR EX-PARTNER?: YES

## 2024-05-15 ASSESSMENT — ENCOUNTER SYMPTOMS
MYALGIAS: 0
INSOMNIA: 0
COUGH: 0
NERVOUS/ANXIOUS: 0
PHOTOPHOBIA: 0
BLURRED VISION: 0
FEVER: 0
WEAKNESS: 0
VOMITING: 0
CONSTIPATION: 0
CHILLS: 0
ABDOMINAL PAIN: 1
DIZZINESS: 0
SPEECH CHANGE: 0
DEPRESSION: 0
SENSORY CHANGE: 0
DIARRHEA: 0
HEARTBURN: 0
SHORTNESS OF BREATH: 0
HEADACHES: 0
CLAUDICATION: 0

## 2024-05-15 ASSESSMENT — FIBROSIS 4 INDEX: FIB4 SCORE: 0.59

## 2024-05-15 ASSESSMENT — PAIN DESCRIPTION - PAIN TYPE
TYPE: ACUTE PAIN
TYPE: ACUTE PAIN

## 2024-05-15 NOTE — CARE PLAN
The patient is Stable - Low risk of patient condition declining or worsening    Shift Goals  Clinical Goals: FSBG control; PO abx as ordered; continued education  Patient Goals: take it one day at a time    Progress made toward(s) clinical / shift goals:    Problem: Knowledge Deficit - Standard  Goal: Patient and family/care givers will demonstrate understanding of plan of care, disease process/condition, diagnostic tests and medications  Outcome: Progressing  Note: Patient updated on plan of care. Transition to 6 small meals a day, see MAR for updated insulin dosing; referral to case management/; increase activity as tolerated; monitor fsbg      Problem: Knowledge Deficit - Diabetes  Goal: Patient will demonstrate knowledge of insulin injection, symptoms, and treatment of hypoglycemia and diet prior to discharge  Outcome: Progressing  Note: Education regarding checking blood sugar, diet, insulin admin and rotation of sites for insulin administration continued. Continue education, patient verbalized understanding of above topics         Patient is not progressing towards the following goals:
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
## 2024-05-15 NOTE — CARE PLAN
The patient is Stable - Low risk of patient condition declining or worsening    Shift Goals  Clinical Goals: FSBG Control, PO ABx  Patient Goals: Comfort    Progress made toward(s) clinical / shift goals:    Problem: Knowledge Deficit - Standard  Goal: Patient and family/care givers will demonstrate understanding of plan of care, disease process/condition, diagnostic tests and medications  Outcome: Progressing     Problem: Depression  Goal: Patient and family/caregiver will verbalize accurate information about at least two of the possible causes of depression, three-four of the signs and symptoms of depression  Outcome: Progressing     Problem: Fall Risk  Goal: Patient will remain free from falls  Outcome: Progressing     Problem: Diabetes Management  Goal: Patient will achieve and maintain glucose in satisfactory range  Outcome: Progressing     Problem: Knowledge Deficit - Diabetes  Goal: Patient will demonstrate knowledge of insulin injection, symptoms, and treatment of hypoglycemia and diet prior to discharge  Outcome: Progressing       Patient is not progressing towards the following goals:

## 2024-05-15 NOTE — ASSESSMENT & PLAN NOTE
Patient has done outpatient sleep study via help with her PCP - Main Mcgee MD  Follow up outpatient for CPAP machine - not something than can be coordinated by hospital.

## 2024-05-15 NOTE — PROGRESS NOTES
Patient qualifying as moderate fall risk per [facility] Fall Risk assessment. Patient educated on interventions such as bed alarm and strip alarm and calling before ambulation. Patient verbalized understanding. Patient refusing bed/strip alarm at this time. Patient A&Ox4, call light within reach, patient calls appropriately. Charge nurse notified.

## 2024-05-16 ENCOUNTER — PATIENT OUTREACH (OUTPATIENT)
Dept: SCHEDULING | Facility: IMAGING CENTER | Age: 44
End: 2024-05-16
Payer: MEDICAID

## 2024-05-16 ENCOUNTER — PHARMACY VISIT (OUTPATIENT)
Dept: PHARMACY | Facility: MEDICAL CENTER | Age: 44
End: 2024-05-16
Payer: COMMERCIAL

## 2024-05-16 VITALS
OXYGEN SATURATION: 96 % | SYSTOLIC BLOOD PRESSURE: 147 MMHG | TEMPERATURE: 98 F | HEIGHT: 63 IN | HEART RATE: 79 BPM | RESPIRATION RATE: 18 BRPM | DIASTOLIC BLOOD PRESSURE: 89 MMHG | BODY MASS INDEX: 37.5 KG/M2 | WEIGHT: 211.64 LBS

## 2024-05-16 LAB
ANION GAP SERPL CALC-SCNC: 10 MMOL/L (ref 7–16)
BUN SERPL-MCNC: 6 MG/DL (ref 8–22)
CALCIUM SERPL-MCNC: 8.5 MG/DL (ref 8.4–10.2)
CHLORIDE SERPL-SCNC: 107 MMOL/L (ref 96–112)
CO2 SERPL-SCNC: 22 MMOL/L (ref 20–33)
CREAT SERPL-MCNC: 0.36 MG/DL (ref 0.5–1.4)
ERYTHROCYTE [DISTWIDTH] IN BLOOD BY AUTOMATED COUNT: 62.2 FL (ref 35.9–50)
GFR SERPLBLD CREATININE-BSD FMLA CKD-EPI: 128 ML/MIN/1.73 M 2
GLUCOSE BLD STRIP.AUTO-MCNC: 105 MG/DL (ref 65–99)
GLUCOSE BLD STRIP.AUTO-MCNC: 113 MG/DL (ref 65–99)
GLUCOSE BLD STRIP.AUTO-MCNC: 189 MG/DL (ref 65–99)
GLUCOSE SERPL-MCNC: 162 MG/DL (ref 65–99)
HCT VFR BLD AUTO: 39.5 % (ref 37–47)
HGB BLD-MCNC: 12.3 G/DL (ref 12–16)
MCH RBC QN AUTO: 25.2 PG (ref 27–33)
MCHC RBC AUTO-ENTMCNC: 31.1 G/DL (ref 32.2–35.5)
MCV RBC AUTO: 80.8 FL (ref 81.4–97.8)
PLATELET # BLD AUTO: 213 K/UL (ref 164–446)
PMV BLD AUTO: 10.4 FL (ref 9–12.9)
POTASSIUM SERPL-SCNC: 3.4 MMOL/L (ref 3.6–5.5)
RBC # BLD AUTO: 4.89 M/UL (ref 4.2–5.4)
SODIUM SERPL-SCNC: 139 MMOL/L (ref 135–145)
WBC # BLD AUTO: 7.1 K/UL (ref 4.8–10.8)

## 2024-05-16 PROCEDURE — 99239 HOSP IP/OBS DSCHRG MGMT >30: CPT | Performed by: INTERNAL MEDICINE

## 2024-05-16 PROCEDURE — RXMED WILLOW AMBULATORY MEDICATION CHARGE: Performed by: INTERNAL MEDICINE

## 2024-05-16 RX ORDER — CEPHALEXIN 500 MG/1
500 CAPSULE ORAL EVERY 8 HOURS
Qty: 2 CAPSULE | Refills: 0 | Status: ACTIVE | OUTPATIENT
Start: 2024-05-16 | End: 2024-05-17

## 2024-05-16 RX ADMIN — INSULIN LISPRO 2 UNITS: 100 INJECTION, SOLUTION INTRAVENOUS; SUBCUTANEOUS at 12:36

## 2024-05-16 RX ADMIN — INSULIN LISPRO 1 UNITS: 100 INJECTION, SOLUTION INTRAVENOUS; SUBCUTANEOUS at 04:29

## 2024-05-16 RX ADMIN — LEVOTHYROXINE SODIUM 150 MCG: 0.07 TABLET ORAL at 06:26

## 2024-05-16 RX ADMIN — FLUOXETINE 20 MG: 20 CAPSULE ORAL at 06:26

## 2024-05-16 RX ADMIN — INSULIN LISPRO 7 UNITS: 100 INJECTION, SOLUTION INTRAVENOUS; SUBCUTANEOUS at 12:37

## 2024-05-16 RX ADMIN — CEPHALEXIN 500 MG: 500 CAPSULE ORAL at 06:25

## 2024-05-16 RX ADMIN — INSULIN LISPRO 7 UNITS: 100 INJECTION, SOLUTION INTRAVENOUS; SUBCUTANEOUS at 08:04

## 2024-05-16 RX ADMIN — LISINOPRIL 10 MG: 5 TABLET ORAL at 06:25

## 2024-05-16 RX ADMIN — ATORVASTATIN CALCIUM 40 MG: 40 TABLET, FILM COATED ORAL at 06:26

## 2024-05-16 RX ADMIN — GABAPENTIN 200 MG: 100 CAPSULE ORAL at 06:26

## 2024-05-16 RX ADMIN — OMEPRAZOLE 20 MG: 20 CAPSULE, DELAYED RELEASE ORAL at 06:26

## 2024-05-16 RX ADMIN — CEPHALEXIN 500 MG: 500 CAPSULE ORAL at 14:11

## 2024-05-16 RX ADMIN — GABAPENTIN 200 MG: 100 CAPSULE ORAL at 12:34

## 2024-05-16 ASSESSMENT — PAIN DESCRIPTION - PAIN TYPE: TYPE: ACUTE PAIN

## 2024-05-16 NOTE — PROGRESS NOTES
Patient educated on follow up care and new medications. Patient verbalized understanding. Patient discharged home via private vehicle with family member at 1545 with all personal belongings and bedside delivery medications. Patient verbalized understanding of discharge instructions, follow up care, and when to return to the ER.

## 2024-05-16 NOTE — PROGRESS NOTES
Per diet order, patient to receive 6 small meals a day, with insulin dosing based on carb count for the meals that are not breakfast, lunch, and dinner. Primary RN spoke with dietary staff when snacks arrived without carb counts. Dietary unsure of carb count. Primary RN reached out to dietician in regard to carb counts for snacks.   Dr. Arroa notified, per MD, give snack at this time but do not give PRN insulin. Snack given.

## 2024-05-16 NOTE — DIETARY
Nutrition Services: Diabetic Diet Education Consult   Day 0 of admit.  Liana Obrien is a 44 y.o. female with admitting DX of Hyperglycemia [R73.9]    RD able to visit pt at bedside to provide diabetic diet education. RD discussed CHO sources, CHO counting, label reading, My Plate for DM, how to treat hypoglycemia, inclusion of protein w/ all meals and snacks. Pt reports that she eats 6 small meals daily. Healthy small meal options discussed that may work with her work schedule. RD provided handout reinforcing topics discussed. Pt demonstrated appropriate readiness and expressed evidence of learning. RD able to answer all questions to patient's satisfaction.     No other education needs identified at this time. Consider referral to outpatient nutrition services for continuation of education as indicated or per pt preferences.     Please re-consult RD as indicated.
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
## 2024-05-16 NOTE — CARE PLAN
The patient is Stable - Low risk of patient condition declining or worsening    Shift Goals  Clinical Goals: Monitor/control blood sugar  Patient Goals: go home    Progress made toward(s) clinical / shift goals:    Problem: Knowledge Deficit - Standard  Goal: Patient and family/care givers will demonstrate understanding of plan of care, disease process/condition, diagnostic tests and medications  Outcome: Progressing     Problem: Depression  Goal: Patient and family/caregiver will verbalize accurate information about at least two of the possible causes of depression, three-four of the signs and symptoms of depression  Outcome: Progressing     Problem: Fall Risk  Goal: Patient will remain free from falls  Outcome: Progressing     Problem: Diabetes Management  Goal: Patient will achieve and maintain glucose in satisfactory range  Outcome: Progressing     Problem: Knowledge Deficit - Diabetes  Goal: Patient will demonstrate knowledge of insulin injection, symptoms, and treatment of hypoglycemia and diet prior to discharge  Outcome: Progressing     Problem: Discharge Planning - Diabetes  Goal: Patient's continuum of care needs will be met  Outcome: Progressing     Problem: Skin Integrity - Diabetes  Goal: Patient's skin on legs and feet will remain intact while hospitalized  Outcome: Progressing     Problem: Infection - Diabetes  Goal: Patient will remain free from signs and symptoms of infection  Outcome: Progressing  Goal: Promotion wound healing, line and drain management  Outcome: Progressing     Problem: Respiratory  Goal: Patient will achieve/maintain optimum respiratory ventilation and gas exchange  Outcome: Progressing     Problem: Fluid Balance or Risk for Fluid Volume Deficit  Goal: Patient will demonstrate adequate hydration and vital signs  Outcome: Progressing     Problem: Nutrition Deficit - Diabetes  Goal: Patient will demonstrate adequate hydration and vital signs  Outcome: Progressing     Problem:  Diabetic Ulcer  Goal: Early identification of diabetic foot wound and initiation of appropriate interventions  Outcome: Progressing       Patient is not progressing towards the following goals:

## 2024-05-16 NOTE — DISCHARGE PLANNING
Case Management Discharge Planning    Admission Date: 5/11/2024  GMLOS:    ALOS: 0    6-Clicks ADL Score: 24  6-Clicks Mobility Score: 24      Anticipated Discharge Dispo: Discharge Disposition: Discharged to home/self care (01)    SW spoke with bedside RN. Pt requesting help with scheduling appointments. Jimmy called schedulers and they will schedule pt's endocrinology appointment.

## 2024-05-16 NOTE — DISCHARGE PLANNING
DC Transport Scheduled    Transport Company Scheduled:  [employer]  Spoke with Ana María at [facility] to schedule transport.  [facility] Trip #: [id number]    Scheduled Date: 5/16/2024  Scheduled Time: ASAP    Destination: Home   Destination address: [address] [place] [id number]    Notified care team of scheduled transport via Voalte.     If there are any changes needed to the DC transportation scheduled, please contact [facility] at ext. [phone] between the hours of 2081-7223 Mon-Fri. If outside those hours, contact the ED Case Manager at ext. [phone].

## 2024-05-22 ENCOUNTER — OFFICE VISIT (OUTPATIENT)
Dept: INTERNAL MEDICINE | Facility: OTHER | Age: 44
End: 2024-05-22
Payer: MEDICAID

## 2024-05-22 VITALS
OXYGEN SATURATION: 96 % | BODY MASS INDEX: 37.17 KG/M2 | HEIGHT: 63 IN | HEART RATE: 84 BPM | DIASTOLIC BLOOD PRESSURE: 82 MMHG | WEIGHT: 209.8 LBS | SYSTOLIC BLOOD PRESSURE: 135 MMHG | TEMPERATURE: 96.6 F

## 2024-05-22 DIAGNOSIS — Z79.4 TYPE 2 DIABETES MELLITUS WITH HYPERGLYCEMIA, WITH LONG-TERM CURRENT USE OF INSULIN (HCC): ICD-10-CM

## 2024-05-22 DIAGNOSIS — E11.65 TYPE 2 DIABETES MELLITUS WITH HYPERGLYCEMIA, WITH LONG-TERM CURRENT USE OF INSULIN (HCC): ICD-10-CM

## 2024-05-22 DIAGNOSIS — E55.9 VITAMIN D DEFICIENCY: ICD-10-CM

## 2024-05-22 LAB — GLUCOSE BLD-MCNC: 566 MG/DL (ref 65–99)

## 2024-05-22 PROCEDURE — 82962 GLUCOSE BLOOD TEST: CPT | Mod: GC

## 2024-05-22 PROCEDURE — 99214 OFFICE O/P EST MOD 30 MIN: CPT | Mod: GC

## 2024-05-22 PROCEDURE — 3075F SYST BP GE 130 - 139MM HG: CPT | Mod: GC

## 2024-05-22 PROCEDURE — RXMED WILLOW AMBULATORY MEDICATION CHARGE

## 2024-05-22 PROCEDURE — 3079F DIAST BP 80-89 MM HG: CPT | Mod: GC

## 2024-05-22 RX ORDER — INSULIN LISPRO 100 [IU]/ML
2-9 INJECTION, SOLUTION INTRAVENOUS; SUBCUTANEOUS 4 TIMES DAILY
Qty: 4 EACH | Refills: 2 | Status: CANCELLED | OUTPATIENT
Start: 2024-05-22

## 2024-05-22 RX ORDER — METFORMIN HYDROCHLORIDE 500 MG/1
500 TABLET, EXTENDED RELEASE ORAL 2 TIMES DAILY
Qty: 60 TABLET | Refills: 1 | Status: SHIPPED | OUTPATIENT
Start: 2024-05-22

## 2024-05-22 ASSESSMENT — FIBROSIS 4 INDEX: FIB4 SCORE: 0.59

## 2024-05-22 NOTE — PROGRESS NOTES
Established Patient    Liana Obrien is a 44 y.o. female who presents today with the following Chief Complaint(s): Follow up for Diagnoses of Type 2 diabetes mellitus with hyperglycemia, with long-term current use of insulin (HCC), Vitamin D deficiency, and Urge incontinence of urine were pertinent to this visit.    HPI:  Recently hospitalized 5/11 for HHS. Per documentation, during admission patient had A1C of 14.4 and admitted to not taking her medications at due to GI upset from metformin and concern about vaginal bleeding when taking insulin. While admitted her metformin was discontinued, her mounjaro was discontinued, and her other medications were continued at same dose. She was told to follow up with her PCP for adjustments. Since being discharged she said her best glucose readings had been between 200-300 and that her glucose was on average around 270s in the morning and around noon (when she has her first meal) it would jump up to >400 (monitor unable to read >400) for the rest of the day. She said that for her ISS she had been needing to take 9 units each time even thought units are sliding scale 2-9 units. She reported continued sx of polyuria, polydipsia.     Urinary Incontinence:   Incontinent when having urge to pee. Sx unchanged from prior.     Irregular Vaginal Bleeding:   Constant vaginal bleeding even when off of period. Patient said she felt that it was associated with when she was taking insulin. Said she had not yet been contacted by gynecology.       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    Asthma 03/24/2023    history of childhood asthma    Back pain 03/24/2023    lower back    Bipolar disorder (HCC) 08/09/2018    Bowel habit changes 03/24/2023    has episodes of constipation and diarrhea    Breath shortness 03/24/2023    on exertion    Candida vaginitis 05/26/2022    Chickenpox     history of    Dental disorder 03/24/2023    loose teeth    Diabetes  1.5, managed as type 2 ([facility])     insulin dependent    DVT (deep venous thrombosis) ([facility])     Dysfunctional uterine bleeding     Heart burn     Herpes     High cholesterol 03/24/2023    on medication due diabetes    Hypertension 03/24/2023    on medication due to diabetes    Hypoglycemia associated with diabetes ([facility]) 10/27/2021    Hyponatremia     Hypothyroidism due to acquired atrophy of thyroid 01/22/2016    Indigestion     Lightheadedness 01/12/2024    Nausea 06/17/2015    Nausea & vomiting     Palpitations 11/09/2023    Personal history of venous thrombosis and embolism     DVT in BLE years ago    Sepsis ([facility]) 09/22/2022    Sleep apnea     pt states that she was diagnosed in the past; will be retested; does not use cpap    Snoring 06/06/2023    sleep study done in the past; will be having another sleep study done in the near future    Tachycardia 11/18/2023    Uncontrolled type 2 diabetes mellitus without complication, without long-term current use of insulin 03/12/2015    Urinary incontinence 03/24/2023    stress incontinence    Uterine fibroids in pregnancy, postpartum condition     UTI (urinary tract infection)      Social History     Tobacco Use    Smoking status: Former     Current packs/day: 0.50     Average packs/day: 0.5 packs/day for 24.2 years (12.1 ttl pk-yrs)     Types: Cigarettes     Start date: 3/1/2000     Quit date: 1/1/2000     Passive exposure: Never    Smokeless tobacco: Never    Tobacco comments:     for 3 months at a time on and off    Vaping Use    Vaping status: Never Used   Substance Use Topics    Alcohol use: No     Comment: quit 2018    Drug use: No     Current Outpatient Medications   Medication Sig Dispense Refill    vitamin D (CHOLECALCIFEROL) 1000 UNIT Tab Take 1 Tablet by mouth every day. 60 Tablet 0    fluoxetine (PROZAC) 40 MG capsule Take 1 oral capsule once a day 30 Capsule 2    Continuous Glucose Sensor (FREESTYLE ALEKSANDAR 2 SENSOR) [facility] [place] Use 1 kit as directed every two weeks  "change every 14 days 7 Each 3    atorvastatin (LIPITOR) 40 MG Tab Take 1 Tablet by mouth every evening. 30 Tablet 0    gabapentin (NEURONTIN) 100 MG Cap Take 2 Capsules by mouth 3 times a day. 180 Capsule 0    insulin lispro 100 UNIT/ML SC SOPN injection PEN Inject 2-9 Units under the skin 4 Times a Day,Before Meals and at Bedtime. 9 mL 3    hydrOXYzine pamoate (VISTARIL) 50 MG Cap Take 1 oral capsule as needed every (6-8) hours for anxiety 90 Capsule 3    traZODone (DESYREL) 100 MG Tab Take 2 tablets by mouth every night at bedtime as needed 60 Tablet 3    Insulin Pen Needle 32 G x 4 mm Use 1 needle subcutaneously 4 times daily 360 Each 3    Glucagon (BAQSIMI TWO PACK) 3 mg/dose Inhale 1 SPRAY into the nostril as a single dose for severe hypoglycemia 2 Each 3    lisinopril (PRINIVIL) 10 MG Tab Take 1 Tablet by mouth every day. 90 Tablet 1    levothyroxine (SYNTHROID) 150 MCG Tab Take 1 Tablet by mouth every morning on an empty stomach. 90 Tablet 1    Alcohol Swabs Pads use one alcohol swab three times a day as needed. 300 Each 6     No current facility-administered medications for this visit.       Physical Exam  /82 (BP Location: Right arm, Patient Position: Sitting, BP Cuff Size: Large adult)   Pulse 84   Temp 35.9 °C (96.6 °F) (Temporal)   Ht 1.6 m (5' 3\")   Wt 95.2 kg (209 lb 12.8 oz)   LMP  (LMP Unknown)   SpO2 96%   BMI 37.16 kg/m²   General:  Alert and oriented, No apparent distress.    Lungs: Clear to auscultation. No wheezes, rales, or rhonchi.     Cardiovascular: Regular rate and rhythm. No murmurs, rubs or gallops.    Abdomen:  Regular bowel sounds, nontender, no rebound or guarding noted.    Extremities: No clubbing, cyanosis, edema.    Neuro: Aox4, strength 5/5 bilat upper and lower extremities. Ambulatory      Assessment and Plan:   1. Type 2 diabetes mellitus with hyperglycemia, with long-term current use of insulin (HCC)  This is a chronic medical condition.   -Current meds: Tresiba 50u " qpm and Lispro sliding scale (usually uses 9 units),   -Manjauro discontinued during hospitalization due dehydration. Metformin discontinued while in hospital due to abdominal cramping and nausea.   -Was prescribed jardiance, steglaro before but said she wasn't taking   -Had tried sitagliptin in the past and didn't tolerate  Plan:   -Completed paperwork for patient's job. Paperwork scanned into patient's chart.   -endocrinology referral pending  -Started patient on 55 units of glargine nightly (discontinued Tresiba due to insurance not covering it). Instructed patient to If your morning glucose is over 160, increase that night's dose by 2 units. Increase every 2 days as needed.) Stop at 60 units nightly.   - POCT Glucose  -Restarted metFORMIN ER (GLUCOPHAGE XR) 500 MG TABLET SR 24 HR; Take 1 Tablet by mouth 2 times a day.  Dispense: 60 Tablet; Refill: 1    2. Vitamin D deficiency  Most recent Vit D 3/16/2024 of 11  Plan:  -Refilled vitamin D (CHOLECALCIFEROL) 1000 UNIT Tab; Take 1 Tablet by mouth every day.  Dispense: 60 Tablet; Refill: 0       Other medical conditions:   Urge Incontinence: -provided information for Gyn referral     Irregular Vaginal bleeding- provided information for Gyn referral       Follow up: 1 week for diabetes f/u.     Alonzo Graham D.O. PGY 2  [employer]

## 2024-05-22 NOTE — PATIENT INSTRUCTIONS
Please start taking Metformin extended release 500 twice daily.   We are switching your long acting insulin to Glargine nightly. Please start at 55 units nightly.   If your morning glucose is over 160, increase that night's dose by 2 units. Increase every 2 days as needed.) Stop at 60 units nightly.       For your vaginal bleeding and incontinence please call:   [address]  [place] [address]  Phone: [phone]  Fax: [phone]

## 2024-05-24 PROCEDURE — RXMED WILLOW AMBULATORY MEDICATION CHARGE

## 2024-05-25 PROCEDURE — RXMED WILLOW AMBULATORY MEDICATION CHARGE

## 2024-05-25 PROCEDURE — RXMED WILLOW AMBULATORY MEDICATION CHARGE: Performed by: INTERNAL MEDICINE

## 2024-05-28 DIAGNOSIS — I10 HYPERTENSION, UNSPECIFIED TYPE: ICD-10-CM

## 2024-05-28 DIAGNOSIS — E03.9 HYPOTHYROIDISM, UNSPECIFIED TYPE: ICD-10-CM

## 2024-05-28 NOTE — TELEPHONE ENCOUNTER
Received request via: Pharmacy    Was the patient seen in the last year in this department? Yes    Does the patient have an active prescription (recently filled or refills available) for medication(s) requested? No    Pharmacy Name: [facility]    Does the patient have long-term Plus and need 100 day supply (blood pressure, diabetes and cholesterol meds only)? Patient does not have SCP

## 2024-05-29 ENCOUNTER — PHARMACY VISIT (OUTPATIENT)
Dept: PHARMACY | Facility: MEDICAL CENTER | Age: 44
End: 2024-05-29
Payer: COMMERCIAL

## 2024-05-29 ENCOUNTER — HOSPITAL ENCOUNTER (OUTPATIENT)
Facility: MEDICAL CENTER | Age: 44
End: 2024-05-29
Payer: MEDICAID

## 2024-05-29 ENCOUNTER — OFFICE VISIT (OUTPATIENT)
Dept: INTERNAL MEDICINE | Facility: OTHER | Age: 44
End: 2024-05-29
Payer: MEDICAID

## 2024-05-29 VITALS
BODY MASS INDEX: 35.26 KG/M2 | HEIGHT: 63 IN | WEIGHT: 199 LBS | OXYGEN SATURATION: 98 % | DIASTOLIC BLOOD PRESSURE: 85 MMHG | HEART RATE: 79 BPM | TEMPERATURE: 97 F | SYSTOLIC BLOOD PRESSURE: 131 MMHG

## 2024-05-29 DIAGNOSIS — Z79.4 TYPE 2 DIABETES MELLITUS WITH HYPERGLYCEMIA, WITH LONG-TERM CURRENT USE OF INSULIN (HCC): ICD-10-CM

## 2024-05-29 DIAGNOSIS — N30.00 ACUTE CYSTITIS WITHOUT HEMATURIA: ICD-10-CM

## 2024-05-29 DIAGNOSIS — N93.9 ABNORMAL UTERINE BLEEDING: ICD-10-CM

## 2024-05-29 DIAGNOSIS — B37.9 YEAST INFECTION: ICD-10-CM

## 2024-05-29 DIAGNOSIS — E11.65 TYPE 2 DIABETES MELLITUS WITH HYPERGLYCEMIA, WITH LONG-TERM CURRENT USE OF INSULIN (HCC): ICD-10-CM

## 2024-05-29 LAB
APPEARANCE UR: CLEAR
APPEARANCE UR: CLEAR
BACTERIA #/AREA URNS HPF: ABNORMAL /HPF
BILIRUB UR QL STRIP.AUTO: NEGATIVE
BILIRUB UR STRIP-MCNC: NEGATIVE MG/DL
COLOR UR AUTO: YELLOW
COLOR UR: YELLOW
EPI CELLS #/AREA URNS HPF: ABNORMAL /HPF
GLUCOSE UR STRIP.AUTO-MCNC: 500 MG/DL
GLUCOSE UR STRIP.AUTO-MCNC: >=1000 MG/DL
HYALINE CASTS #/AREA URNS LPF: ABNORMAL /LPF
KETONES UR STRIP.AUTO-MCNC: 40 MG/DL
KETONES UR STRIP.AUTO-MCNC: 40 MG/DL
LEUKOCYTE ESTERASE UR QL STRIP.AUTO: NEGATIVE
LEUKOCYTE ESTERASE UR QL STRIP.AUTO: NEGATIVE
MICRO URNS: ABNORMAL
NITRITE UR QL STRIP.AUTO: NEGATIVE
NITRITE UR QL STRIP.AUTO: NEGATIVE
PH UR STRIP.AUTO: 5.5 [PH] (ref 5–8)
PH UR STRIP.AUTO: 5.5 [PH] (ref 5–8)
POCT INT CON NEG: NEGATIVE
POCT INT CON POS: POSITIVE
POCT URINE PREGNANCY TEST: NEGATIVE
PROT UR QL STRIP: NEGATIVE MG/DL
PROT UR QL STRIP: NORMAL MG/DL
RBC # URNS HPF: ABNORMAL /HPF
RBC UR QL AUTO: ABNORMAL
RBC UR QL AUTO: NORMAL
SP GR UR STRIP.AUTO: 1.02
SP GR UR STRIP.AUTO: >=1.045
UROBILINOGEN UR STRIP-MCNC: 0.2 MG/DL
UROBILINOGEN UR STRIP.AUTO-MCNC: 0.2 MG/DL
WBC #/AREA URNS HPF: ABNORMAL /HPF
YEAST BUDDING URNS QL: PRESENT /HPF
YEAST HYPHAE #/AREA URNS HPF: PRESENT /HPF

## 2024-05-29 PROCEDURE — RXMED WILLOW AMBULATORY MEDICATION CHARGE

## 2024-05-29 RX ORDER — FERROUS SULFATE 325(65) MG
325 TABLET ORAL
Qty: 30 TABLET | Refills: 0 | Status: SHIPPED | OUTPATIENT
Start: 2024-05-29

## 2024-05-29 RX ORDER — FLUCONAZOLE 200 MG/1
200 TABLET ORAL DAILY
Qty: 14 TABLET | Refills: 0 | Status: CANCELLED | OUTPATIENT
Start: 2024-05-29

## 2024-05-29 ASSESSMENT — FIBROSIS 4 INDEX: FIB4 SCORE: 0.59

## 2024-05-29 NOTE — PROGRESS NOTES
Established Patient    Liana Obrien is a 44 y.o. female who presents today with the following Chief Complaint(s): Follow up for Diagnoses of Type 2 diabetes mellitus with hyperglycemia, with long-term current use of insulin (HCC), Abnormal uterine bleeding, Acute cystitis without hematuria, and Yeast infection were pertinent to this visit.    HPI:  Patient presented to day to follow up after recent hospitalization. At prior appointment had increased patients basal insulin. However, patient said that she had been having worsening vaginal bleeding so has stopped taking her insulin about a week ago. Said that after stopping her insulin the bleeding resolved in about 2 days. She however did start taking the prescribed metformin. Home glucose readings 400+ (monitor unable to read >400). Other than vaginal bleeding she said she felt well. However, ROS positive for continue polyuria and polydipsia as well as vaginal itchiness and dysuira. Said she had sx of both dysuria and vaginal itchiness for about a year. Reported having abx while admitted to the hospital and said sx improved with abx but never completely resolved and now sx same as prior. Requested anything to help with sx of dysuria and vaginal itchiness. Reported she does have hx of BV.     Irregular menstrual cycles:   Always had irregular periods, was started on birth control but then started having blood clots. Said she has constant vaginal bleeding even when off of period. Patient said she felt that it was associated with when she was taking insulin and said that when using insulin she needs to use multiple diapers per day due to the bleeding. Said she had not yet been contacted by gynecology.       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    Asthma 03/24/2023    history of childhood asthma    Back pain 03/24/2023    lower back    Bipolar disorder (HCC) 08/09/2018    Bowel habit changes 03/24/2023    has episodes of  constipation and diarrhea    Breath shortness 03/24/2023    on exertion    Candida vaginitis 05/26/2022    Chickenpox     history of    Dental disorder 03/24/2023    loose teeth    Diabetes 1.5, managed as type 2 ([facility])     insulin dependent    DVT (deep venous thrombosis) ([facility])     Dysfunctional uterine bleeding     Heart burn     Herpes     High cholesterol 03/24/2023    on medication due diabetes    Hypertension 03/24/2023    on medication due to diabetes    Hypoglycemia associated with diabetes ([facility]) 10/27/2021    Hyponatremia     Hypothyroidism due to acquired atrophy of thyroid 01/22/2016    Indigestion     Lightheadedness 01/12/2024    Nausea 06/17/2015    Nausea & vomiting     Palpitations 11/09/2023    Personal history of venous thrombosis and embolism     DVT in BLE years ago    Sepsis ([facility]) 09/22/2022    Sleep apnea     pt states that she was diagnosed in the past; will be retested; does not use cpap    Snoring 06/06/2023    sleep study done in the past; will be having another sleep study done in the near future    Tachycardia 11/18/2023    Uncontrolled type 2 diabetes mellitus without complication, without long-term current use of insulin 03/12/2015    Urinary incontinence 03/24/2023    stress incontinence    Uterine fibroids in pregnancy, postpartum condition     UTI (urinary tract infection)      Social History     Tobacco Use    Smoking status: Former     Current packs/day: 0.50     Average packs/day: 0.5 packs/day for 24.2 years (12.1 ttl pk-yrs)     Types: Cigarettes     Start date: 3/1/2000     Quit date: 1/1/2000     Passive exposure: Never    Smokeless tobacco: Never    Tobacco comments:     for 3 months at a time on and off    Vaping Use    Vaping status: Never Used   Substance Use Topics    Alcohol use: No     Comment: quit 2018    Drug use: No     Current Outpatient Medications   Medication Sig Dispense Refill    ferrous sulfate 325 (65 Fe) MG tablet Take 1 Tablet by mouth every 48 hours. 30  "Tablet 0    sertraline (ZOLOFT) 50 MG Tab Take 1 oral tablet once a day 30 Tablet 2    vitamin D (CHOLECALCIFEROL) 1000 UNIT Tab Take 1 Tablet by mouth every day. 60 Tablet 0    insulin glargine 100 UNIT/ML SC SOPN injection Inject 55 Units under the skin every evening. 12 mL 1    metFORMIN ER (GLUCOPHAGE XR) 500 MG TABLET SR 24 HR Take 1 Tablet by mouth 2 times a day. 60 Tablet 1    Continuous Glucose Sensor (FREESTYLE ALEKSANDAR 2 SENSOR) [facility] Use 1 kit as directed every two weeks change every 14 days 7 Each 3    atorvastatin (LIPITOR) 40 MG Tab Take 1 Tablet by mouth every evening. 30 Tablet 0    gabapentin (NEURONTIN) 100 MG Cap Take 2 Capsules by mouth 3 times a day. 180 Capsule 0    insulin lispro 100 UNIT/ML SC SOPN injection PEN Inject 2-9 Units under the skin 4 Times a Day,Before Meals and at Bedtime. 9 mL 3    hydrOXYzine pamoate (VISTARIL) 50 MG Cap Take 1 oral capsule as needed every (6-8) hours for anxiety 90 Capsule 3    traZODone (DESYREL) 100 MG Tab Take 2 tablets by mouth every night at bedtime as needed 60 Tablet 3    Insulin Pen Needle 32 G x 4 mm Use 1 needle subcutaneously 4 times daily 360 Each 3    Glucagon (BAQSIMI TWO PACK) 3 mg/dose Inhale 1 SPRAY into the nostril as a single dose for severe hypoglycemia 2 Each 3    lisinopril (PRINIVIL) 10 MG Tab Take 1 Tablet by mouth every day. 90 Tablet 1    levothyroxine (SYNTHROID) 150 MCG Tab Take 1 Tablet by mouth every morning on an empty stomach. 90 Tablet 1    Alcohol Swabs Pads use one alcohol swab three times a day as needed. 300 Each 6    hydrOXYzine pamoate (VISTARIL) 50 MG Cap Take 1 oral capsule as needed every (6-8) hours for anxiety 90 Capsule 3     No current facility-administered medications for this visit.       Physical Exam  /85 (BP Location: Left arm, Patient Position: Sitting, BP Cuff Size: Adult)   Pulse 79   Temp 36.1 °C (97 °F) (Temporal)   Ht 1.6 m (5' 3\")   Wt 90.3 kg (199 lb)   LMP  (LMP Unknown)   SpO2 98%   BMI " 35.25 kg/m²   General:  Alert and oriented, No apparent distress.    Lungs: Clear to auscultation. No wheezes, rales, or rhonchi.     Cardiovascular: Regular rate and rhythm. No murmurs, rubs or gallops.    Abdomen:  Regular bowel sounds, nontender, no rebound or guarding noted.    Extremities: No clubbing, cyanosis, edema.    Neuro: Aox4, strength 5/5 bilat upper and lower extremities. Ambulatory      Assessment and Plan:   1. Type 2 diabetes mellitus with hyperglycemia, with long-term current use of insulin (HCC)  This is a chronic medical condition.   -Current meds: Metformin 500mg BID, Glargine 55u qpm, and Lispro sliding scale (usually uses 9 units),   -Manjauro discontinued during hospitalization due dehydration. Metformin discontinued while in hospital due to abdominal cramping and nausea.   -Was prescribed jardiance, steglaro before but said she wasn't taking   -Had tried sitagliptin in the past and didn't tolerate  Plan:   -Patient said she had called the endocrinology office- pending call back. Recommended calling again.   -Strongly recommended patient continue her insulin. Explained no known association of insulin with vaginal bleeding. Requested that she continue 55 units of glargine nightly and sliding scale Lispro.   -Continue Metformin 500mg BID.     2. Abnormal uterine bleeding  Patient reported sx for multiple years and said sx were associated with when she used insulin.   Plan:   -Provided information of ObGyn referral   - US-PELVIC TRANSVAGINAL ONLY; Future  - POCT Pregnancy  - CBC WITHOUT DIFFERENTIAL; Future  - IRON/TOTAL IRON BIND; Future  - FERRITIN; Future  - ferrous sulfate 325 (65 Fe) MG tablet; Take 1 Tablet by mouth every 48 hours.  Dispense: 30 Tablet; Refill: 0    3. Acute cystitis without hematuria  Reported sx of dysuria   -Recent urine cx 5/12 positive for group B strep (normal giovani). However, UA at that time also positive for yeast.   Plan:   - URINALYSIS,CULTURE IF INDICATED;  Future  - POCT Urinalysis    4. Yeast infection  -Recent cx 4/18/2024 positive for candida vaginitis and candida glabrata. Pt said same sx at this time- sx of dryness, itchiness, and white bits.   Plan:   - BV+CT/NG/TV AYSE+YEAST CULTURE      Follow up: 1 week for diabetes f/u.     Alonzo Graham D.O. PGY 2  [facility]

## 2024-05-30 PROCEDURE — RXMED WILLOW AMBULATORY MEDICATION CHARGE: Performed by: INTERNAL MEDICINE

## 2024-05-30 RX ORDER — LISINOPRIL 10 MG/1
10 TABLET ORAL
Qty: 90 TABLET | Refills: 1 | Status: SHIPPED | OUTPATIENT
Start: 2024-05-30

## 2024-05-30 RX ORDER — LEVOTHYROXINE SODIUM 0.15 MG/1
150 TABLET ORAL
Qty: 90 TABLET | Refills: 1 | Status: SHIPPED | OUTPATIENT
Start: 2024-05-30

## 2024-06-01 PROCEDURE — RXMED WILLOW AMBULATORY MEDICATION CHARGE

## 2024-06-06 ENCOUNTER — NON-PROVIDER VISIT (OUTPATIENT)
Dept: VASCULAR LAB | Facility: MEDICAL CENTER | Age: 44
End: 2024-06-06
Attending: INTERNAL MEDICINE
Payer: MEDICAID

## 2024-06-06 PROCEDURE — 99212 OFFICE O/P EST SF 10 MIN: CPT

## 2024-06-06 NOTE — Clinical Note
FYI - pt unwilling to restart insulin against our recommendations d/t perceived ADR of vaginal bleeding. Also, she would like a referral to ophthalmology. Thank you!

## 2024-06-06 NOTE — PROGRESS NOTES
"Patient Consult Note    Primary care physician: Main Mcgee M.D.    Reason for consult: Management of Uncontrolled Type 2 Diabetes    HPI:  Liana Obrien is a 43 y.o. old patient who comes in today for evaluation of above stated problem.    Allergies:  Bicillin c-r [penicillin g proc & benzathine], Ondansetron, Penicillin g, Sulfa drugs, and Metoclopramide    Current Diabetes Medication Regimen:  Metformin ER: 500mg QD    Previous Diabetes Medications and Reason for Discontinuation:  Tresiba and Humalog - stopped by pt d/t perceived ADR of vaginal bleeding  Mounjaro - stopped by hospitalist d/t dehydration  Metformin ER - cramping and nausea, but now tolerating  Pioglitazone - stopped by former endocrinologist 2023  Steglatro - stopped by former endocrinologist 2023; hx of UTI    Potential Barriers to Care:  Adherence: reports missed doses - self d/c'd both Tresiba and Humalog  Concerned about higher dose of Tresiba. Associates this with anemia and bleeding risk. Discussed potential SE of insulin therapy and advised that this is very unlikely to cause increased bleeding/menstrual periods  Side effects: none  Affordability: no issues    SMBG: Zack 2 - 300s, also goes higher than 400        Hypoglycemia:  Hypoglycemia awareness: Yes  Nocturnal hypoglycemia: None  Hypoglycemia:  None  Pt's treatment of Hypoglycemia  Discussed 15:15 Rule    Lifestyle:  Current Exercise: none at this time. Can barely walk around the house without getting tired.  Exercise Goal: walk for 5-10 min daily    Dietary:  Diet highly variable. She doesn't have balanced meals. Mostly snacks throughout the day. Currently without electricity, has no refrigerator, can't cook. Therefore cannot keep fresh food.  Breakfast: Cereal  Lunch: \"Whatever there is\" - has canned fruit and veggies  Snacking: Snacks throughout the day. Sugar free jello and pudding. [employer] crackers.  Drinking mostly water with crystal light and coffee in the morning, " rarely drinking soda and if she does, she chooses coke or sprite.    Preventative Management:  BP regimen (ACEi/ARB): lisinopril 10 mg  Statin: atorvastatin (Lipitor) 20 mg three times a week  LDL <100: no (see Media for most recent labs)  Last Lipid Panel 11/13/23      Last Microalbumin/Cr:  Lab Results   Component Value Date/Time    MALBCRT 30 08/11/2018 08:21 AM    MICROALBUR 4.1 08/11/2018 08:21 AM     Last A1c:  Lab Results   Component Value Date/Time    HBA1C 14.4 (H) 05/12/2024 12:53 AM      Last Retinal Scan: 08/2022, due    Updated caregaps     Labs  Lab Results   Component Value Date/Time    SODIUM 139 05/16/2024 01:21 AM    POTASSIUM 3.4 (L) 05/16/2024 01:21 AM    CHLORIDE 107 05/16/2024 01:21 AM    CO2 22 05/16/2024 01:21 AM    GLUCOSE 162 (H) 05/16/2024 01:21 AM    BUN 6 (L) 05/16/2024 01:21 AM    CREATININE 0.36 (L) 05/16/2024 01:21 AM     Lab Results   Component Value Date/Time    ALKPHOSPHAT 107 (H) 05/12/2024 12:53 AM    ASTSGOT 9 (L) 05/12/2024 12:53 AM    ALTSGPT 10 05/12/2024 12:53 AM    TBILIRUBIN 0.2 05/12/2024 12:53 AM    INR 1.03 09/22/2022 01:20 AM    ALBUMIN 2.9 (L) 05/14/2024 02:30 AM        Lab Results   Component Value Date/Time    MALBCRT 30 08/11/2018 08:21 AM    MICROALBUR 4.1 08/11/2018 08:21 AM     Estimated CrCl: > 100 mL/min  eGFR 132     Physical Examination:  Vital signs: LMP  (LMP Unknown)  There is no height or weight on file to calculate BMI.    Foot Exam:  Monofilament exam: 08/2023    Assessment and Plan:    1. DMII  Discussed Goals: FBG 80 - 130, 2hPP < 180, a1c < 7.0%  A1c above goal at 14.4%   CGM readings show 100% hyperglycemia with average glucose of 391 mg/dl  Pt was readmitted for HHS d/t noncompliance  At discharge, Marjorie was d/c'd d/t dehydration and pt does not want to retrial; metformin was d/c'd d/t abdominal s/s  Post discharge, pt saw PCP who restarted metformin ER; however she was taking it ONCE daily instead of BID, and she is tolerating at this  time.  Pt states that insulin causes her to feel sick and unable to function.  Will focus on medication adherence at this time. She is unwilling to resume insulin therapy.  Emphasized that sx that she is feeling is likely d/t uncontrolled DM and not d/t insulin therapy. Advised pt to return to the ER if she has any s/s of DKA or AMS.  Also discussed that increased bleeding (menstrual periods) is not a side effect of insulin therapy - she needs to establish with gynecology for further workup  Encouraged patient to resume previous dietary modifications  Re-addressed education on general diabetes pathophysiology.     Medication changes:  Although I advised insulin therapy is going to be the only method to quickly manage her blood glucose, given her A1c is >10% and she is highly insulin resistant, she refuses to restart insulin against my recommendation  Stop Tresiba 55 units daily per pt preference  Stop Humalog per pt preference  Increase Metformin to 500mg twice daily    Lifestyle changes:  Increase exercise as tolerated. Walk for 10 minutes per day  Focus on reading nutrition labels to increase protein and fiber, and decrease carbs.    Follow Up:  1 week    Alyson Miles, KadieD    CC:   Main Mcgee M.D.

## 2024-06-07 ENCOUNTER — OFFICE VISIT (OUTPATIENT)
Dept: INTERNAL MEDICINE | Facility: OTHER | Age: 44
End: 2024-06-07
Payer: MEDICAID

## 2024-06-07 ENCOUNTER — TELEPHONE (OUTPATIENT)
Dept: INTERNAL MEDICINE | Facility: OTHER | Age: 44
End: 2024-06-07

## 2024-06-07 VITALS
WEIGHT: 202 LBS | DIASTOLIC BLOOD PRESSURE: 76 MMHG | HEART RATE: 74 BPM | HEIGHT: 63 IN | TEMPERATURE: 97.7 F | OXYGEN SATURATION: 97 % | SYSTOLIC BLOOD PRESSURE: 122 MMHG | BODY MASS INDEX: 35.79 KG/M2 | RESPIRATION RATE: 14 BRPM

## 2024-06-07 DIAGNOSIS — H53.8 BLURRING OF VISION: ICD-10-CM

## 2024-06-07 DIAGNOSIS — Z79.4 TYPE 2 DIABETES MELLITUS WITH HYPERGLYCEMIA, WITH LONG-TERM CURRENT USE OF INSULIN (HCC): ICD-10-CM

## 2024-06-07 DIAGNOSIS — N89.8 VAGINAL ITCHING: ICD-10-CM

## 2024-06-07 DIAGNOSIS — N92.6 MENSTRUAL IRREGULARITY: ICD-10-CM

## 2024-06-07 DIAGNOSIS — E11.65 TYPE 2 DIABETES MELLITUS WITH HYPERGLYCEMIA, WITH LONG-TERM CURRENT USE OF INSULIN (HCC): ICD-10-CM

## 2024-06-07 PROBLEM — D72.829 LEUKOCYTOSIS: Status: RESOLVED | Noted: 2024-03-13 | Resolved: 2024-06-07

## 2024-06-07 PROBLEM — M79.672 FOOT PAIN, BILATERAL: Status: RESOLVED | Noted: 2024-03-27 | Resolved: 2024-06-07

## 2024-06-07 PROBLEM — M79.671 FOOT PAIN, BILATERAL: Status: RESOLVED | Noted: 2024-03-27 | Resolved: 2024-06-07

## 2024-06-07 PROBLEM — N30.90 CYSTITIS: Status: RESOLVED | Noted: 2024-05-12 | Resolved: 2024-06-07

## 2024-06-07 PROBLEM — E86.0 DEHYDRATION: Status: RESOLVED | Noted: 2020-01-10 | Resolved: 2024-06-07

## 2024-06-07 PROBLEM — Z79.899 POLYPHARMACY: Status: RESOLVED | Noted: 2023-11-18 | Resolved: 2024-06-07

## 2024-06-07 PROBLEM — R19.7 DIARRHEA: Status: RESOLVED | Noted: 2024-01-12 | Resolved: 2024-06-07

## 2024-06-07 PROCEDURE — 99214 OFFICE O/P EST MOD 30 MIN: CPT | Mod: GC

## 2024-06-07 PROCEDURE — 3078F DIAST BP <80 MM HG: CPT | Mod: GC

## 2024-06-07 PROCEDURE — RXMED WILLOW AMBULATORY MEDICATION CHARGE

## 2024-06-07 PROCEDURE — 3074F SYST BP LT 130 MM HG: CPT | Mod: GC

## 2024-06-07 RX ORDER — EMPAGLIFLOZIN, METFORMIN HYDROCHLORIDE 12.5; 1 MG/1; MG/1
TABLET, EXTENDED RELEASE ORAL
Qty: 30 TABLET | Refills: 1 | Status: SHIPPED | OUTPATIENT
Start: 2024-06-07 | End: 2024-06-20

## 2024-06-07 RX ORDER — FLUCONAZOLE 150 MG/1
150 TABLET ORAL DAILY
Qty: 2 TABLET | Refills: 0 | Status: SHIPPED | OUTPATIENT
Start: 2024-06-07 | End: 2024-06-28 | Stop reason: SDUPTHER

## 2024-06-07 ASSESSMENT — ENCOUNTER SYMPTOMS
CONSTITUTIONAL NEGATIVE: 1
NEUROLOGICAL NEGATIVE: 1
INSOMNIA: 1
CARDIOVASCULAR NEGATIVE: 1
NERVOUS/ANXIOUS: 0
RESPIRATORY NEGATIVE: 1
GASTROINTESTINAL NEGATIVE: 1

## 2024-06-07 ASSESSMENT — FIBROSIS 4 INDEX: FIB4 SCORE: 0.59

## 2024-06-07 NOTE — TELEPHONE ENCOUNTER
DOCUMENTATION OF PAR STATUS:    1. Name of Medication & Dose: synjardy xr 12.5 sig one daily     2. Name of Prescription Coverage Company & phone #: [employer] [phone] phone [phone]    3. Date Prior Auth Submitted: 06/07/2024    4. What information was given to obtain insurance decision? Notes,lab-prior    5. Prior Auth Letter Approved or Denied? pending    6. Action Taken: Pharmacy/Patient Notified: Yes

## 2024-06-07 NOTE — PROGRESS NOTES
"HISTORY OF PRESENT ILLNESS: Patient is a 44 y.o. female established patient of Dr. Mcgee with past medical history as below who presents today for the following.      Patient was recently seen last 5/29/2024 here at the clinic and comes today for scheduled follow-up.  The following were discussed this visit:       1. Type 2 diabetes mellitus with hyperglycemia, with long-term current use of insulin (HCC)  Patient states that she has been on metformin 500 mg 2 times a day for 1 day only, previously she has only been on metformin 500 mg daily. Patient's most recent hemoglobin A1c last 5/12/2024 was 14.4 from 8.4%. She has not been on any insulin for about 2 weeks now, states that also previously when she has been on insulin she would note increased vaginal bleeding ,when she is not on insulin, states that vaginal bleeding would stop.   She was previously referred to gynecology and has yet to make an appointment (see below).  Patient was also given another referral for endocrinology, previously follows with Dana Yu.  She has also been seen by nutrition in the past . Furthermore, patient states that she would want to try only 1 medication at a time.  She remembers that she has been on Sitagliptin in the past but stopped taking it due to side effects, she is not able to remember the side effects and how long she has been on this medication.  Patient also is unsure whether or not she has been on Empagliflozin in the past.     2. Blurring of vision  Patient notes blurring of vision of both eyes for the past few weeks , she has not seen ophthalmology in the past 1 or 2 years.  She remembers that she was told to have a \"scar\" but was not told to have any evidence of  diabetic retinopathy. States that blurring of vision is throughout the day, she has difficulty reading with near vision.  No associated eye pain, no eye redness, no eye discharge.     3.  Vaginal symptoms  This was also discussed last visit, patient " states that there is persistence of vaginal itchiness as well as vaginal discharge which she described as white spots.  States this is associated with foul odor, unsure if consistency since she does not really check.  Patient does have history of previous recurrent candidiasis, and has received treatment with fluconazole in the past.  Patient's last sexual activity was about 2 months ago. She does not complain of dysuria however does have urinary frequency and urinary incontinence.  She states that there is a little bit of leakage when she coughs.  She has not had any vaginal deliveries in the past, no previous pregnancy.     4.  Menstrual irregularity  Discussed during last visit, patient reports history of heavy vaginal bleeding and irregular menstrual cycle.  She is unsure of how many pads per day she uses because it  varies.  She was previously referred to gynecology and has yet to make an appointment.  Patient's hemoglobin of 12.3 on recent labs, iron level of 14 and ferritin of 7.1.  She denies any symptoms of lightheadedness, but states that she has always been anemic,  however she has only been on her iron supplementation for 1 week.  Patient will make an appointment with gynecology.       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    Asthma 03/24/2023    history of childhood asthma    Back pain 03/24/2023    lower back    Bipolar disorder (HCC) 08/09/2018    Bowel habit changes 03/24/2023    has episodes of constipation and diarrhea    Breath shortness 03/24/2023    on exertion    Candida vaginitis 05/26/2022    Chickenpox     history of    Dental disorder 03/24/2023    loose teeth    Diabetes 1.5, managed as type 2 (HCC)     insulin dependent    DVT (deep venous thrombosis) ([facility])     Dysfunctional uterine bleeding     Heart burn     Herpes     High cholesterol 03/24/2023    on medication due diabetes    Hypertension 03/24/2023    on medication due to diabetes    Hypoglycemia  associated with diabetes (HCC) 10/27/2021    Hyponatremia     Hypothyroidism due to acquired atrophy of thyroid 01/22/2016    Indigestion     Lightheadedness 01/12/2024    Nausea 06/17/2015    Nausea & vomiting     Palpitations 11/09/2023    Personal history of venous thrombosis and embolism     DVT in BLE years ago    Sepsis (HCC) 09/22/2022    Sleep apnea     pt states that she was diagnosed in the past; will be retested; does not use cpap    Snoring 06/06/2023    sleep study done in the past; will be having another sleep study done in the near future    Tachycardia 11/18/2023    Uncontrolled type 2 diabetes mellitus without complication, without long-term current use of insulin 03/12/2015    Urinary incontinence 03/24/2023    stress incontinence    Uterine fibroids in pregnancy, postpartum condition     UTI (urinary tract infection)        Patient Active Problem List    Diagnosis Date Noted    Cystitis 05/12/2024    Hyperglycemia 05/11/2024    Foot pain, bilateral 03/27/2024    Iron deficiency anemia 03/18/2024    Abnormal uterine bleeding 03/18/2024    Hypomagnesemia 03/14/2024    Urinary incontinence 03/13/2024    Leukocytosis 03/13/2024    Intractable nausea and vomiting 03/13/2024    Advance care planning 02/05/2024    Menorrhagia 01/12/2024    Diarrhea 01/12/2024    SILVIA (obstructive sleep apnea) 12/17/2023    History of recent dental procedure 12/10/2023    COVID 11/18/2023    Polypharmacy 11/18/2023    Microcytosis 11/18/2023    Blurry vision 11/09/2023    Dermatitis 10/29/2023    Skin lesion of breast 09/26/2023    Anxiety 06/16/2023    Neuropathic pain of both feet 06/16/2023    Fibroids 11/15/2022    Perirectal abscess 10/12/2022    Anemia 09/23/2022    Dyslipidemia 09/22/2022    Obesity (BMI 30-39.9) 05/25/2022    Gastroesophageal reflux disease 04/14/2021    Major depressive disorder 09/14/2020    Yeast infection 01/10/2020    Dehydration 01/10/2020    Polycystic ovary syndrome 12/11/2019    History of  deep vein thrombosis 11/19/2019    Hypothyroidism 11/19/2019    Hypertension 10/28/2017    Hypokalemia 06/17/2015    Hyponatremia 06/17/2015    Vitamin D deficiency 03/12/2015    Type 2 diabetes mellitus with hyperglycemia, with long-term current use of insulin (HCC) 03/12/2015       Bicillin c-r [penicillin g proc & benzathine], Ondansetron, Penicillin g, Sulfa drugs, and Metoclopramide    Current Outpatient Medications   Medication Sig Dispense Refill    Boric Acid Vaginal 600 MG Suppos Insert 1 suppository into the vagina every day for 14 days. 14 Suppository 0    fluconazole (DIFLUCAN) 150 MG tablet Take 1 Tablet by mouth every day. For  persistent symptoms, take a second dose of 150 mg administered 72 hours after the first dose 2 Tablet 0    Empagliflozin-metFORMIN HCl ER (SYNJARDY XR) 12.5-1000 MG TABLET SR 24 HR Please take 1 tablet once a day 30 Tablet 1    lisinopril (PRINIVIL) 10 MG Tab Take 1 Tablet by mouth every day. 90 Tablet 1    levothyroxine (SYNTHROID) 150 MCG Tab Take 1 Tablet by mouth every morning on an empty stomach. 90 Tablet 1    ferrous sulfate 325 (65 Fe) MG tablet Take 1 Tablet by mouth every 48 hours. 30 Tablet 0    hydrOXYzine pamoate (VISTARIL) 50 MG Cap Take 1 oral capsule as needed every (6-8) hours for anxiety 90 Capsule 3    sertraline (ZOLOFT) 50 MG Tab Take 1 oral tablet once a day 30 Tablet 2    vitamin D (CHOLECALCIFEROL) 1000 UNIT Tab Take 1 Tablet by mouth every day. 60 Tablet 0    Continuous Glucose Sensor (FREESTYLE ALEKSANDAR 2 SENSOR) [facility] [place] Use 1 kit as directed every two weeks change every 14 days 7 Each 3    atorvastatin (LIPITOR) 40 MG Tab Take 1 Tablet by mouth every evening. 30 Tablet 0    gabapentin (NEURONTIN) 100 MG Cap Take 2 Capsules by mouth 3 times a day. 180 Capsule 0    traZODone (DESYREL) 100 MG Tab Take 2 tablets by mouth every night at bedtime as needed 60 Tablet 3    Glucagon (BAQSIMI TWO PACK) 3 mg/dose Inhale 1 SPRAY into the nostril as a single dose for  "severe hypoglycemia 2 Each 3    Alcohol Swabs Pads use one alcohol swab three times a day as needed. 300 Each 6     No current facility-administered medications for this visit.       Social History     Tobacco Use    Smoking status: Former     Current packs/day: 0.50     Average packs/day: 0.5 packs/day for 24.3 years (12.1 ttl pk-yrs)     Types: Cigarettes     Start date: 3/1/2000     Quit date: 1/1/2000     Passive exposure: Never    Smokeless tobacco: Never    Tobacco comments:     for 3 months at a time on and off    Vaping Use    Vaping status: Never Used   Substance Use Topics    Alcohol use: No     Comment: quit 2018    Drug use: No       Family History   Problem Relation Age of Onset    Hypertension Mother     Sleep Apnea Mother     Hyperlipidemia Father     Cancer Brother     Sleep Apnea Brother     Cancer Maternal Uncle     Diabetes Neg Hx     Heart Disease Neg Hx     Stroke Neg Hx          Review of Systems   Constitutional: Negative.    Respiratory: Negative.     Cardiovascular: Negative.    Gastrointestinal: Negative.    Genitourinary: Negative.    Skin: Negative.    Neurological: Negative.    Psychiatric/Behavioral:  The patient has insomnia. The patient is not nervous/anxious.        Exam:  /76   Pulse 74   Temp 36.5 °C (97.7 °F) (Temporal)   Resp 14   Ht 1.6 m (5' 3\")   Wt 91.6 kg (202 lb)   SpO2 97%  Body mass index is 35.78 kg/m².    Constitutional:  Not in acute distress, well appearing.  HEENT:   NC/AT  Cardiovascular: Regular rate and rhythm. No murmurs or gallops.      Lungs:   Clear to auscultation bilaterally. No wheezes or crackles. No respiratory distress.  Abdomen: Not distended, soft, not tender. No guarding or rigidity. No masses.  Extremities:  No cyanosis/clubbing/edema. No obvious deformities.  Monofilament testing with a 10 gram force: sensation intact: decreased on right  Visual Inspection: Feet without maceration, ulcers  Pedal pulses: decreased on right   Skin:  Warm " and dry.  No visible rashes.  Neurologic: Alert & oriented x 3, CN II-XII grossly intact, strength and sensation grossly intact.  No focal deficits noted.  Psychiatric:  Affect normal, mood normal, judgment normal.    Assessment/Plan:     1. Type 2 diabetes mellitus with hyperglycemia, with long-term current use of insulin (HCC)  -Poorly controlled diabetes mellitus, most recent hemoglobin A1c of 14.4%  -Advised to start logging and closely monitor blood glucose, fasting and premeal, will likely initiate short acting insulin on next visit  -Extensively discussed with patient importance of strict adherence to diabetic medications, particularly discussed the need for insulin due to her uncontrolled diabetes.  Discussed complications of poorly controlled diabetes, patient in understanding  -Also discussed that most of her symptoms are likely related to poorly controlled diabetes and tighter blood glucose control will likely offer relief  -Continue statin medication(atorvastatin 40 mg daily)  -advised patient to start taking Tresiba 35 units daily for now and will further increase as indicated  - initiate Synjardy XR (empagliflozin 12.5, metformin 1000 mg) daily  -Pending: Lipid Profile; Future                   MICROALBUMIN CREAT RATIO URINE; Future  -Monofilament testing done, patient with decreased sensation on right foot, has previously seen podiatry in the past, encouraged follow-up  - Referral to Ophthalmology placed for diabetic retinopathy screening    2. Blurring of vision  - Referral to Ophthalmology placed,also due for diabetic retinopathy screening    3.  Vaginal symptoms  -Vaginal itching with discharge as above  -Patient tested positive for Candida vaginitis and Candida glabrata on most recent testing 5/29/2024, she has not been on any medication for this recent finding  -Prescribed with fluconazole and boric acid vaginal suppository(due to presence of Candida glabrata)  -Will follow-up on next visit 1  week from now    4.  Menstrual irregularity  -Reviewed results of CBC and iron studies with patient  -Patient with normal hemoglobin of 12.3 on recent lab, iron level of 14 and ferritin of 7.1.   -Continue iron supplementation for now, repeat iron studies in 1 month and assess need for IV iron if indicated  -Reviewed signs and symptoms that warrant immediate consult including lightheadedness, shortness of breath, dizziness among others  -Referral to gynecology previously placed, patient to make an appointment with gynecology    To discuss on patient's next visit aside from her diabetes:   Vitamin D deficiency  -Noted vitamin D of 11 (last March 2023)   - Patient is on vitamin D supplementation, verify if patient has been on high-dose vitamin D     Urinary incontinence  -Patient presenting with symptoms suggestive of urinary incontinence  -Kegel's exercise ,consider need for more physical therapy     All imaging results and lab results and consult notes are reviewed at this visit.  Followup: Return in about 6 days (around 6/13/2024).    Please note that this dictation was created using voice recognition software. I have made every reasonable attempt to correct obvious errors, but I expect that there are errors of grammar and possibly content that I did not discover before finalizing the note.    TARAS JORGE MD  PGY-2

## 2024-06-07 NOTE — TELEPHONE ENCOUNTER
FINAL PRIOR AUTHORIZATION STATUS:    1.  Name of Medication & Dose: Synjardy     2. Prior Auth Status: Denied.  Reason: Meed to have met the SLG-2 guidelines, but also not tolerate steglatro and segluromet. Please advise, denial uploaded.    3. Action Taken: Pharmacy Notified: N\A Patient Notified: N\A

## 2024-06-08 ENCOUNTER — PHARMACY VISIT (OUTPATIENT)
Dept: PHARMACY | Facility: MEDICAL CENTER | Age: 44
End: 2024-06-08
Payer: COMMERCIAL

## 2024-06-13 ENCOUNTER — NON-PROVIDER VISIT (OUTPATIENT)
Dept: VASCULAR LAB | Facility: MEDICAL CENTER | Age: 44
End: 2024-06-13
Attending: INTERNAL MEDICINE
Payer: MEDICAID

## 2024-06-13 PROCEDURE — 99212 OFFICE O/P EST SF 10 MIN: CPT

## 2024-06-13 NOTE — PROGRESS NOTES
Patient Consult Note    Primary care physician: Main Mcgee M.D.    Reason for consult: Management of Uncontrolled Type 2 Diabetes    HPI:  Liana Obrien is a 43 y.o. old patient who comes in today for evaluation of above stated problem.    Allergies:  Bicillin c-r [penicillin g proc & benzathine], Ondansetron, Penicillin g, Sulfa drugs, and Metoclopramide    Current Diabetes Medication Regimen:  Metformin ER: 500mg BID (of note, Rx was sent by PCP for Synjardy XR 12.5/1000 mg daily but pt has not picked this up from the pharmacy)  Tresiba 35 units daily -- resumed by PCP last week    Previous Diabetes Medications and Reason for Discontinuation:  Tresiba and Humalog - stopped by pt d/t perceived ADR of vaginal bleeding  Mounjaro - stopped by hospitalist d/t dehydration  Metformin ER - cramping and nausea, but now tolerating  Pioglitazone - stopped by former endocrinologist 2023  Steglatro - stopped by former endocrinologist 2023; hx of UTI    Potential Barriers to Care:  Adherence: reports missed dose of Tresiba last night, otherwise has been compliant since PCP visit last week. No adherence issues with metformin noted in the last week  Side effects: none  Affordability: no issues  Others  Previously expressed concerns about higher dose of Tresiba. Associates this with anemia and bleeding risk. Patient reports today that injecting Tresiba also seems to cause polyuria. Discussed potential SE of insulin therapy and advised that this is very unlikely to cause increased bleeding/menstrual periods/urination.  Feels that she has another UTI. Pt will discuss with PCP tomorrow.    SMBG: Zack 2       Hypoglycemia:  Hypoglycemia awareness: Yes  Nocturnal hypoglycemia: None  Hypoglycemia:  None  Pt's treatment of Hypoglycemia  Discussed 15:15 Rule    Lifestyle:  Current Exercise: none at this time. Can barely walk around the house without getting tired.  Exercise Goal: walk for 5-10 min daily    Dietary:  Diet  "highly variable. She doesn't have balanced meals. Mostly snacks throughout the day. Currently without electricity, has no refrigerator, can't cook. Therefore cannot keep fresh food.  Breakfast: Cereal  Lunch: \"Whatever there is\" - has canned fruit and veggies  Snacking: Snacks throughout the day. Sugar free jello and pudding. [employer] crackers.  Drinking mostly water with crystal light and coffee in the morning, rarely drinking soda and if she does, she chooses coke or sprite.    Preventative Management:  BP regimen (ACEi/ARB): lisinopril 10 mg  Statin: atorvastatin (Lipitor) 20 mg three times a week  LDL <100: no (see Media for most recent labs)  Last Lipid Panel 11/13/23      Last Microalbumin/Cr:  Lab Results   Component Value Date/Time    MALBCRT 30 08/11/2018 08:21 AM    MICROALBUR 4.1 08/11/2018 08:21 AM     Last A1c:  Lab Results   Component Value Date/Time    HBA1C 14.4 (H) 05/12/2024 12:53 AM      Last Retinal Scan: 08/2022, due    Updated caregaps     Labs  Lab Results   Component Value Date/Time    SODIUM 139 05/16/2024 01:21 AM    POTASSIUM 3.4 (L) 05/16/2024 01:21 AM    CHLORIDE 107 05/16/2024 01:21 AM    CO2 22 05/16/2024 01:21 AM    GLUCOSE 162 (H) 05/16/2024 01:21 AM    BUN 6 (L) 05/16/2024 01:21 AM    CREATININE 0.36 (L) 05/16/2024 01:21 AM     Lab Results   Component Value Date/Time    ALKPHOSPHAT 107 (H) 05/12/2024 12:53 AM    ASTSGOT 9 (L) 05/12/2024 12:53 AM    ALTSGPT 10 05/12/2024 12:53 AM    TBILIRUBIN 0.2 05/12/2024 12:53 AM    INR 1.03 09/22/2022 01:20 AM    ALBUMIN 2.9 (L) 05/14/2024 02:30 AM        Lab Results   Component Value Date/Time    MALBCRT 30 08/11/2018 08:21 AM    MICROALBUR 4.1 08/11/2018 08:21 AM     Estimated CrCl: > 100 mL/min  eGFR 132     Physical Examination:  Vital signs: LMP  (LMP Unknown)  There is no height or weight on file to calculate BMI.    Foot Exam:  Monofilament exam: 08/2023    Assessment and Plan:    1. DMII  Discussed Goals: FBG 80 - 130, 2hPP < 180, a1c < " 7.0%  A1c above goal at 14.4%   CGM readings show 100% hyperglycemia with average glucose of 360 mg/d in the last week  Pt with previous hospital admissions for DKA and HHS d/t noncompliance to DM medications  Emphasized risks of uncontrolled DM today. Discussed both microvascular and macrovascular complications.  Will continue to focus on medication adherence at this time. She is willing to increase insulin therapy today  Again, emphasized that sx that she is feeling is likely d/t uncontrolled DM and not d/t insulin therapy. Also discussed that increased bleeding (menstrual periods) and increased urination is not a side effect of insulin therapy - reminded pt to establish with gynecology for further workup  If pt expresses further concern with the use of Tresiba in the future, can consider alternative long-acting insulin   Pt has not initiated Synjardy as she has not picked this up from the pharmacy. D/t recurrent UTIs/vaginal yeast infections, would recommend holding off on initiating an SGLT2i as this would worsen and increase the risk for these infections. Will coordinate with PCP.  Encouraged patient to resume previous dietary modifications    Medication changes:  Increase Tresiba to 42 units daily    Continue  Metformin 500 mg twice daily    Lifestyle changes:  Increase exercise as tolerated. Walk for 10 minutes per day  Focus on reading nutrition labels to increase protein and fiber, and decrease carbs.    Follow Up:  1 week    Jesus Cazares, PharmD    CC:   Main Mcgee M.D.

## 2024-06-13 NOTE — Clinical Note
Hi Dr. Mcgee,  Pt was willing to increase Tresiba dose today to 42 units daily. I emphasized on the micro and macrovascular risks of uncontrolled DM today.  She is concerned about another UTI which she will discuss with you tomorrow. She has not started Synjardy, and I would recommend holding off on initiating this at this time as this would worsen and increase the risk for UTIs/vaginal yeast infections especially since her BG is so poorly controlled.  Will see her again next week.  Thanks, Jesus

## 2024-06-14 ENCOUNTER — OFFICE VISIT (OUTPATIENT)
Dept: INTERNAL MEDICINE | Facility: OTHER | Age: 44
End: 2024-06-14
Payer: MEDICAID

## 2024-06-14 VITALS
BODY MASS INDEX: 36.68 KG/M2 | SYSTOLIC BLOOD PRESSURE: 108 MMHG | DIASTOLIC BLOOD PRESSURE: 68 MMHG | TEMPERATURE: 97.6 F | OXYGEN SATURATION: 96 % | HEART RATE: 66 BPM | HEIGHT: 63 IN | WEIGHT: 207 LBS

## 2024-06-14 DIAGNOSIS — Z79.4 TYPE 2 DIABETES MELLITUS WITH HYPERGLYCEMIA, WITH LONG-TERM CURRENT USE OF INSULIN (HCC): ICD-10-CM

## 2024-06-14 DIAGNOSIS — N93.9 ABNORMAL UTERINE BLEEDING: ICD-10-CM

## 2024-06-14 DIAGNOSIS — E11.65 TYPE 2 DIABETES MELLITUS WITH HYPERGLYCEMIA, WITH LONG-TERM CURRENT USE OF INSULIN (HCC): ICD-10-CM

## 2024-06-14 DIAGNOSIS — L02.91 ABSCESS: ICD-10-CM

## 2024-06-14 PROCEDURE — 3074F SYST BP LT 130 MM HG: CPT | Performed by: INTERNAL MEDICINE

## 2024-06-14 PROCEDURE — 3078F DIAST BP <80 MM HG: CPT | Performed by: INTERNAL MEDICINE

## 2024-06-14 PROCEDURE — 99214 OFFICE O/P EST MOD 30 MIN: CPT | Performed by: INTERNAL MEDICINE

## 2024-06-14 PROCEDURE — RXMED WILLOW AMBULATORY MEDICATION CHARGE: Performed by: INTERNAL MEDICINE

## 2024-06-14 RX ORDER — INSULIN DEGLUDEC 100 U/ML
INJECTION, SOLUTION SUBCUTANEOUS
Status: CANCELLED | OUTPATIENT
Start: 2024-06-14

## 2024-06-14 RX ORDER — INSULIN DEGLUDEC 200 U/ML
45 INJECTION, SOLUTION SUBCUTANEOUS EVERY EVENING
Qty: 21 ML | Refills: 1 | Status: SHIPPED | OUTPATIENT
Start: 2024-06-14

## 2024-06-14 ASSESSMENT — FIBROSIS 4 INDEX: FIB4 SCORE: 0.59

## 2024-06-14 ASSESSMENT — PAIN SCALES - GENERAL: PAINLEVEL: 6=MODERATE PAIN

## 2024-06-14 NOTE — PATIENT INSTRUCTIONS
[facility]     [address] [place] [address]  [place] [id number]  [phone]    Call them to make appt for gynecology

## 2024-06-14 NOTE — PROGRESS NOTES
Chief Complaint   Patient presents with    Diabetes    Nausea    Lab Results    Vaginitis       HPI: Liana Obrien is a 43 y.o. female with past medical history as below who presented to the clinic for the following.        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  -recently when seen in clinic prior to hospitalization a vaginal swa was collected that showed recurrent yeast infection including candida glabrata and patient was prescribed fluconazole and also boric acid on the last visit   -Patient was reluctant to start insulin since discharge - she had stopped taking it - however after explaining risks - patient was willing to restart one injection - started on 35 units of insulin - she did not want to start the short acting   She was also prescribed synjardy, however insurance did not cover it   She has CGM however was not able to connect it with the phone and hence was having to check her sugars by glucometer   New nutrition referral was placed on previous visit - did not follow up pwith patient regarding this today   Also following up with pharmacotherapy services - since last visit has seen them and insulin dose increased to 42 units   During most recent hospitalization - patient was referred to endocrinology - patient did not want to follow up with previous endo   Patient was reassured that this referral has been sent to another office and encouraged her to contact them - she agreed - provided info again today  Patient is  requesting for insulin refill which was placed today - patient was recommended to reach out if there was any issue with the pen needles   She agreed      *vaginal candidiasis   With candida glabrata   *Pubic lesions   Status post fluconazole and also boric acid - with resolution of symptoms   Pharmacy recommends against synjardy - I agree plus insurancce is not covering it   - Patient however to day complaints of boils on the pubic region   Patient reports that she has had it before -   One has popped with release of only blood - is quite tender to examination - < 1 cm of induration, redness with crusting at where it popped   There is one more intravaginally- which has not popped and is tender soft - about 1 cm with redness - no warmth   - patient encouraged to trial warm compress - in area- lidocaine over the counter and mupirocin - if not resolving patient was instructed to reach out to consider I& D and antibiotics if needed.   Patient also encouraged to follow up with gyn for this and uterine bleeding which is not present at this time   Patient provided info - new referral placed to gyn also for new problem   She has no uterine bleed at this time   Management of sugar as above       **Menorrhagia   *Fibroids   CBC most recently showed elevated rdw - no anemia at this time    ferritin of 7.1, percent saturation of 5, TIBC of 275.from march 2024   No bleeding at this time   Not followed up with gyn yet - reinforced today-   Hormonal options are not an option as patient has a history of DVT - patient may need surgical options - explained to patient  She is taking iron supplement daily , however also is complaining of constipation                            Other problems not discussed on this visit   Management of other conditions which may be related to diabetes as below   Patient does have constipation - will modify iron supplementation ad manage sugars as above  Increased urine frequency-management as above    urge incontinence history more than stress  -Patients foot pain- bilateral plantar surface discussed today, follows up with podiatry post takes gabapentin.      Last eye exam in December   Last monofilament test - normal monofilament test  with decreased sensation only on the right great toe.  -Pulses intact, vibration sense slightly reduced in the great toes only.  -Proprioception intact.    *Hypokalemia.  -Noted on the last lab work done in the hospital with a potassium of 3.3.  -Normal renal function.  Not on any medications that could be lowering potassium levels  *Vitamin D deficiency.  -Last vitamin D from 3/16/2024 was 11  Taking viatmin d supplement   *Anxiety   -Patient is following up with psychiatry, mental health counselor, [place] [facility].  -Was receiving inpatient psychiatry treatment, currently is on Zoloft 100 mg, trazodone 100 mg  at nighttime, hydroxyzine as needed.   -Recent stressors as mentioned above.  Saw psychology psychiatry in hospital - now back to her outpatient psychologist - feels much better after she physically is feeling better   Sleep apnea  -Sleep study at home was done however she has not heard back from them   Needs to be faxed over again - was faxed in the past by Dr. Sharif   Status post dental procedure   -Associated with swelling - much better   There is some remnant discomfort which however is improving   *non adherance to meds /side effects of meds   Patient believed that the nausea, vomiting and diarrhea is from her medications.   As per patient, her endocrinologist has stopped all the oral meds for diabetes that she was on and is currently on on insulin degludec 30 units now , humalog based on sliding scale and trulicity   Pioglitazone, steglatro and metformin were stopped . However patient continued to have nausea, vomiting diarrhea which has since spontaneously resolved - hence likely associated with COVID   Recommended to continue lisinopril daily and  atorvastatin 3 times a week   Patient is on as needed hydroxyzine which she is taking once or upto twice as needed   Taking trazodone 100 mg and sertraline through psychiatry - will address these with psychiatry   -Patient was on gabapentin which she has stopped since  she got steroid injection for foot which helped with the pain and that has resolved since   Patient also reports that she had to stop Vitamin C because it interacted with one of the medications that she was on and and she feels that she is getting more sick because she has not been able to take the vitamin C   *Blurry vision-checked with optometrist.  -Minimal changes were associated with diabetes as per patient otherwise within normal limits.  *Shortness of breath.  Resolved   -Patient reported that she felt that with increasing her level of activity around the house recently and had noticed that she gets very winded just walking around the house.  Denied any cough or wheezing.   -Denied any chest pain.  Did  report palpitations sometimes when she gets anxious which goes away with some deep breaths, which is also resolved now   Denied any lightheadedness, dizziness, pedal edema, orthopnea or PND  -Does have history of sleep apnea, was on CPAP in the past patient does not recall why as she is not on it anymore.  -On examination patient patient did have a grade three systolic murmur that was present previously however on subsequent examination  - unable to appreciate that murmur.   Echocardiogram recently done was also not concerning except trace MR, mild PI concentric hypertrophy of left ventricle    -Denied any fevers, chills.  -As per labs in the past patient has had history of anemia, she has regular menstrual cycles sometimes, most recently her menstrual cycle lasted for 10 days.  Has history of PCOS as per chart, ovarian cyst  -Last cbc from November 2023 shows microcytosis without anemia , MCV- 75, normal RDW and other cell lines. PBS not  commented on   -Has history of DVT as per chart.  *Tachycardia   resolved  -noted that her watch - heart rate above 110 when she is up and about   -Not present today at rest and patient is asymptomatic  -echo unremarkable  Last cbc showed   History of COVID -recovered well -described below   -Patient was able to finally reach the ophthalmologist , however was told that her insurance would not be taken   *Skin tag   -on lower abdomen - grown - wants to be rmoved,  -Also on exam noted to have erythema on lower abdominal region below skin fold- no symptoms   *Blurry vision  -Patient reports that about 6 years ago she had a significant blurry vision of both eyes which lasted for several months, when she was almost declared blind legally, was evaluated by ophthalmologist, unsure what the diagnosis was.  -Since then however her vision has significantly improved, currently has intermittent blurring of vision on both eyes, which when comes on lasts for a day to a month continuously.  -Denies any headaches, flashes floaters blind spots, eye pain, redness or aura  -We had placed ophthalmology referral on the last visit however patient was not able to attend the appointment due to being inpatient for mental health related issues.  -Patient is requesting for new referral.  -Patient denies having any dry eyes at this time  *Breast Skin lesion   Has had history of bug bites.   -On examination now only has superficial scar, no deeper concerning tissue on palpation.  -Patient did have another spot with similar complaints, which again is currently a scar.  -Had ordered ultrasound breast at the time which has not been done.  -Given resolution of lump will go ahead and proceed to routine mammogram screening.  *Status post surgery for perirectal abscess/fistula   -Healing well as per patient  *Obesity  -Patient is working with nutrition specialist as well as trying to get more exercise as per recommendations of nutrition  specialist.    Presentative health  Pap smear pending   Colon cancer  - next year   Mammo ordered in September   HIV   Tdap         Patient Active Problem List    Diagnosis Date Noted    Anxiety  On Zoloft 100 mg, trazodone 100 mg daily at night, hydroxyzine 06/16/2023    Neuropathic pain of both feet  On gabapentin  06/16/2023    Fibroids 11/15/2022    As in hpi above 10/12/2022    Anemia  Microcytosis without anemia  09/23/2022    Dyslipidemia 09/22/2022    Obesity (BMI 30-39.9) 05/25/2022    Gastro-esophageal reflux disease without esophagitis 04/14/2021    Major depressive disorder 09/14/2020    Polycystic ovary syndrome  2.2 cm right ovarian cyst seen on recent CT in May 2023  Unable to undergo TVUS due to perirectal abscess  Unable to go on estrogen containing OCP due to history of VTE  Will follow up with gyn following resolution of perirectal abscess 12/11/2019    History of deep venous thrombosis  2 episodes reported on bilateral lower extremities, subsequent ultrasounds for suspected dvt were negative- thought to have been precipitated by Birth control which she is not on anymore  11/19/2019    Hypothyroidism  Levothyroxine 150 mcg,Last TSH from 03/16/2024 was 0.49 11/19/2019    Hypertension  Mildly elevated on lisinopril 10 - not checking blood pressures at home regularly as the cuff not working  10/28/2017    Obstructive sleep apnea, adult  Trying to set up with cpap, backlog with preferred - only company patient's insurance takes  07/31/2015    Vitamin D deficiency- last level checked was 11 from march 2024 03/12/2015    Type 2 diabetes mellitus with hyperglycemia, with long-term current use of insulin ([facility])  Was Following up with endocrinology -  details above  03/12/2015       Current Outpatient Medications   Medication Sig Dispense Refill    Insulin Degludec (TRESIBA FLEXTOUCH) 200 UNIT/ML Solution Pen-injector Inject 45 Units under the skin every evening. 21 mL 1    Insulin Pen Needle 32 G x 4 mm 1  Application every evening. Please dispense pen needle that goes with current tresiba injection 100 Each 1    mupirocin (BACTROBAN) 2 % Ointment Apply 1 Application topically 2 times a day. For 2 weeks 22 g 0    fluconazole (DIFLUCAN) 150 MG tablet Take 1 Tablet by mouth every day. For  persistent symptoms, take a second dose of 150 mg administered 72 hours after the first dose 2 Tablet 0    lisinopril (PRINIVIL) 10 MG Tab Take 1 Tablet by mouth every day. 90 Tablet 1    levothyroxine (SYNTHROID) 150 MCG Tab Take 1 Tablet by mouth every morning on an empty stomach. 90 Tablet 1    ferrous sulfate 325 (65 Fe) MG tablet Take 1 Tablet by mouth every 48 hours. 30 Tablet 0    hydrOXYzine pamoate (VISTARIL) 50 MG Cap Take 1 oral capsule as needed every (6-8) hours for anxiety 90 Capsule 3    sertraline (ZOLOFT) 50 MG Tab Take 1 oral tablet once a day 30 Tablet 2    vitamin D (CHOLECALCIFEROL) 1000 UNIT Tab Take 1 Tablet by mouth every day. 60 Tablet 0    Continuous Glucose Sensor (FREESTYLE ALEKSANDAR 2 SENSOR) [facility] Use 1 kit as directed every two weeks change every 14 days 7 Each 3    atorvastatin (LIPITOR) 40 MG Tab Take 1 Tablet by mouth every evening. 30 Tablet 0    gabapentin (NEURONTIN) 100 MG Cap Take 2 Capsules by mouth 3 times a day. 180 Capsule 0    traZODone (DESYREL) 100 MG Tab Take 2 tablets by mouth every night at bedtime as needed 60 Tablet 3    Glucagon (BAQSIMI TWO PACK) 3 mg/dose Inhale 1 SPRAY into the nostril as a single dose for severe hypoglycemia 2 Each 3    Alcohol Swabs Pads use one alcohol swab three times a day as needed. 300 Each 6    Boric Acid Vaginal 600 MG Suppos Insert 1 suppository into the vagina every day for 14 days. (Patient not taking: Reported on 6/14/2024) 14 Suppository 0    Empagliflozin-metFORMIN HCl ER (SYNJARDY XR) 12.5-1000 MG TABLET SR 24 HR Please take 1 tablet once a day (Patient not taking: Reported on 6/14/2024) 30 Tablet 1     No current facility-administered medications for  "this visit.       ROS: As per HPI. Additional pertinent systems as noted below.  Constitutional:  Negative for fever, chills   Cardiovascular:  Negative for chest pain,  Respiratory:  Negative for cough, sputum production, negative for shortness of breath   /skin - as in hpi       /68 (BP Location: Right arm, Patient Position: Sitting, BP Cuff Size: Large adult long)   Pulse 66   Temp 36.4 °C (97.6 °F) (Temporal)   Ht 1.6 m (5' 3\")   Wt 93.9 kg (207 lb)   LMP  (LMP Unknown)   SpO2 96%   BMI 36.67 kg/m²     Physical Exam   Constitutional:  .  Not in acute distress  : One has popped with release of only blood - is quite tender to examination - < 1 cm of induration, redness with crusting at where it popped   There is one more intravaginally- which has not popped and is tender soft - about 1 cm with redness - no warmth     Note: I have reviewed all pertinent labs and diagnostic tests associated with this visit with specific comments listed under the assessment and plan below    Assessment and Plan    1. 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    started on 35 units of insulin on last visit  - she did not want to start the short acting   She was also prescribed synjardy, however insurance did not cover it   She has CGM however was not able to connect it with the phone and hence was having to check her sugars by glucometer   New nutrition referral was placed on previous " visit - did not follow up pwith patient regarding this today   Also following up with pharmacotherapy services - since last visit has seen them and insulin dose increased to 42 units   Will let [place] adjust meds to oavoid confusion   During most recent hospitalization - patient was referred to endocrinology - patient did not want to follow up with previous endo   Patient was reassured that this referral has been sent to another office and encouraged her to contact them - she agreed - provided info again today  Patient is requesting for insulin refill which was placed today - patient was recommended to reach out if there was any issue with the pen needles   She agreed      - Insulin Degludec (TRESIBA FLEXTOUCH) 200 UNIT/ML Solution Pen-injector; Inject 45 Units under the skin every evening.  Dispense: 21 mL; Refill: 1  - Insulin Pen Needle 32 G x 4 mm; 1 Application every evening. Please dispense pen needle that goes with current tresiba injection  Dispense: 100 Each; Refill: 1    2. Abscess  -vaginal /pubic   - patient encouraged to trial warm compress - in area- lidocaine over the counter and mupirocin - if not resolving patient was instructed to reach out to consider I& D and antibiotics if needed.   Patient also encouraged to follow up with gyn for this and uterine bleeding which is not present at this time   Patient provided info - new referral placed to gyn also for new problem   She has no uterine bleed at this time   Management of sugar as above     - mupirocin (BACTROBAN) 2 % Ointment; Apply 1 Application topically 2 times a day. For 2 weeks  Dispense: 22 g; Refill: 0           Followup: Return in about 2 weeks (around 6/28/2024).        Signed by: Main Mcgee M.D.

## 2024-06-18 ENCOUNTER — TELEPHONE (OUTPATIENT)
Dept: INTERNAL MEDICINE | Facility: OTHER | Age: 44
End: 2024-06-18
Payer: MEDICAID

## 2024-06-18 DIAGNOSIS — N76.4 VULVAR ABSCESS: ICD-10-CM

## 2024-06-18 PROCEDURE — RXMED WILLOW AMBULATORY MEDICATION CHARGE: Performed by: INTERNAL MEDICINE

## 2024-06-18 PROCEDURE — RXMED WILLOW AMBULATORY MEDICATION CHARGE: Performed by: HOSPITALIST

## 2024-06-18 PROCEDURE — RXMED WILLOW AMBULATORY MEDICATION CHARGE: Performed by: PHYSICIAN ASSISTANT

## 2024-06-19 PROCEDURE — RXMED WILLOW AMBULATORY MEDICATION CHARGE: Performed by: INTERNAL MEDICINE

## 2024-06-19 RX ORDER — AMOXICILLIN AND CLAVULANATE POTASSIUM 875; 125 MG/1; MG/1
1 TABLET, FILM COATED ORAL 2 TIMES DAILY
Qty: 10 TABLET | Refills: 0 | Status: SHIPPED | OUTPATIENT
Start: 2024-06-19 | End: 2024-06-28 | Stop reason: SDUPTHER

## 2024-06-19 NOTE — TELEPHONE ENCOUNTER
Tried calling patient however no answer     If abscess is growing she will need drainage and she may have to go into urgent care or ER for that.   I will also send antibiotic oral - however that may not be enough especially given her uncontrolled sugars       Called patient again she picked up the phone, relayed the above message - patient reports that she has more now - including at the back . And that one of them is getting bigger  Patient encouraged to go into ER>  No other systemic signs   Relayed that if it is better today compared to yesterday, then she can trial antibiotics for a day and if still not better, then to go into ER    Sending antibiotic prescription     Augmentin sent given location - rash as side effect pops up - reviewing allergy - rash with bicillin - not with oral penicillin and augmentin prescribed multiple times in the past

## 2024-06-20 ENCOUNTER — NON-PROVIDER VISIT (OUTPATIENT)
Dept: VASCULAR LAB | Facility: MEDICAL CENTER | Age: 44
End: 2024-06-20
Attending: INTERNAL MEDICINE
Payer: MEDICAID

## 2024-06-20 ENCOUNTER — PHARMACY VISIT (OUTPATIENT)
Dept: PHARMACY | Facility: MEDICAL CENTER | Age: 44
End: 2024-06-20
Payer: COMMERCIAL

## 2024-06-20 PROCEDURE — 99212 OFFICE O/P EST SF 10 MIN: CPT

## 2024-06-20 NOTE — PROGRESS NOTES
Patient Consult Note    Primary care physician: Main Mcgee M.D.    Reason for consult: Management of Uncontrolled Type 2 Diabetes    HPI:  Liana Obrien is a 43 y.o. old patient who comes in today for evaluation of above stated problem.    Allergies:  Bicillin c-r [penicillin g proc & benzathine], Ondansetron, Penicillin g, Sulfa drugs, and Metoclopramide    Current Diabetes Medication Regimen:  Metformin ER: 500mg BID  Tresiba 42 units daily -- last dose was last Fri    Previous Diabetes Medications and Reason for Discontinuation:  Tresiba and Humalog - stopped by pt d/t perceived ADR of vaginal bleeding  Mounjaro - stopped by hospitalist d/t dehydration  Metformin ER - cramping and nausea, but now tolerating  Pioglitazone - stopped by former endocrinologist 2023  Steglatro - stopped by former endocrinologist 2023; hx of UTI    Potential Barriers to Care:  Adherence: reports missed doses of Tresiba last week due to being busy and going to several parties. No adherence issues with metformin noted in the last week  Side effects: none  Affordability: no issues  Others  Previously expressed concerns about higher dose of Tresiba. Associates this with anemia and bleeding risk. Patient reports again today that injecting Tresiba also seems to cause polyuria. Again discussed potential SE of insulin therapy and advised that this is very unlikely to cause increased bleeding/menstrual periods/urination. Advised that polyuria is caused by uncontrolled BG.  Feels that she has another UTI. Pt will discuss with PCP tomorrow.    SMBG: Zack 2 -- currently off for a week. Pt states it fell off and she has not replaced it. Sensor was replaced during the visit today.    Hypoglycemia:  Hypoglycemia awareness: Yes  Nocturnal hypoglycemia: None  Hypoglycemia:  None  Pt's treatment of Hypoglycemia  Discussed 15:15 Rule    Lifestyle:  Current Exercise: none at this time.  Exercise Goal: walk for 5-10 min daily    Dietary:  Diet  Verified Results  MA FFDM SCREEN KELLY W LELA W CAD [id number] 11:00AM PARAM HAMILTON   Ordering Provider: PARAM HAMILTON.    Reason For Study: r,r.   [Oct 24, 2018 1:56PM PARAM HAMILTON]  Normal.     Test Name Result Flag Reference   MA FFDM SCREEN KELLY W LELA W CAD (Report)     Accession #    [id number]    #[id number] - MA FFDM SCREEN KELLY W LELA W CAD    BILATERAL DIGITAL SCREENING MAMMOGRAM 3D/2D WITH CAD: 10/24/2018    CLINICAL HISTORY:Routine annual screening mammogram - tomosynthesis.  The patient is status post excisional biopsy of the right breast.    COMPARISON:   Comparison is made to exams dated: 10/20/2017 mammogram, 10/5/2015 mammogram, 10/10/2016   mammogram, and 10/1/2014 mammogram - [facility].    FINDINGS:  There are scattered fibroglandular elements in both breasts.    There is a stable benign nodule in the right breast. There also are stable benign calcifications   in the right breast. Additionally, there are stable benign masses in the left breast.   Additionally, there also are post operative findings in the right breast.    No significant masses, calcifications, or other findings are seen in either breast.  Current study was also evaluated with a Computer Aided Detection (CAD) system. Digital Breast   Tomosynthesis (DBT) images were obtained and used to assist in the interpretation of this   examination.  There has been no significant interval change.    IMPRESSION: BENIGN    There is no mammographic evidence of malignancy. A 1 year screening mammogram is recommended.    MAMMOGRAPHY BI-RADS: 2 BENIGN    Hardeep Schwartz M.D., lm/raysa:10/24/2018 13:19:50    Imaging Technologist: RT Gordon(HARSHA)(M), [facility]  letter sent: Normal Single Exam     **** FINAL ****    Dictated By:   HARDEEP CHEUNG    Electronically Reviewed and Approved By:  HARDEEP CHEUNG        "highly variable. She doesn't have balanced meals. Mostly snacks throughout the day. Currently without electricity, has no refrigerator, can't cook. Therefore cannot keep fresh food.  Breakfast: Cereal  Lunch: \"Whatever there is\" - has canned fruit and veggies  Snacking: Snacks throughout the day. Sugar free jello and pudding. [employer] crackers.  Drinking mostly water with crystal light and coffee in the morning, rarely drinking soda and if she does, she chooses coke or sprite.    Preventative Management:  BP regimen (ACEi/ARB): lisinopril 10 mg  Statin: atorvastatin (Lipitor) 20 mg three times a week  LDL <100: no (see Media for most recent labs)  Last Lipid Panel 11/13/23      Last Microalbumin/Cr:  Lab Results   Component Value Date/Time    MALBCRT 30 08/11/2018 08:21 AM    MICROALBUR 4.1 08/11/2018 08:21 AM     Last A1c:  Lab Results   Component Value Date/Time    HBA1C 14.4 (H) 05/12/2024 12:53 AM      Last Retinal Scan: 08/2022, due    Updated caregaps     Labs  Lab Results   Component Value Date/Time    SODIUM 139 05/16/2024 01:21 AM    POTASSIUM 3.4 (L) 05/16/2024 01:21 AM    CHLORIDE 107 05/16/2024 01:21 AM    CO2 22 05/16/2024 01:21 AM    GLUCOSE 162 (H) 05/16/2024 01:21 AM    BUN 6 (L) 05/16/2024 01:21 AM    CREATININE 0.36 (L) 05/16/2024 01:21 AM     Lab Results   Component Value Date/Time    ALKPHOSPHAT 107 (H) 05/12/2024 12:53 AM    ASTSGOT 9 (L) 05/12/2024 12:53 AM    ALTSGPT 10 05/12/2024 12:53 AM    TBILIRUBIN 0.2 05/12/2024 12:53 AM    INR 1.03 09/22/2022 01:20 AM    ALBUMIN 2.9 (L) 05/14/2024 02:30 AM        Lab Results   Component Value Date/Time    MALBCRT 30 08/11/2018 08:21 AM    MICROALBUR 4.1 08/11/2018 08:21 AM     Estimated CrCl: > 100 mL/min  eGFR 132     Physical Examination:  Vital signs: There were no vitals taken for this visit. There is no height or weight on file to calculate BMI.    Foot Exam:  Monofilament exam: 08/2023    Assessment and Plan:    1. DMII  Discussed Goals: FBG 80 - " 130, 2hPP < 180, a1c < 7.0%  A1c above goal at 14.4%   No CGM readings available but suspect BG is elevated especially given that she has not injected insulin for almost a week  Pt with previous hospital admissions for DKA and HHS d/t noncompliance to DM medications  Again emphasized risks of uncontrolled DM today. Discussed both microvascular and macrovascular complications.  Will continue to focus on medication adherence at this time. She is willing to resume insulin therapy and increase metformin dose today  Again, emphasized that sx that she is feeling is likely d/t uncontrolled DM and not d/t insulin therapy. Also discussed that increased bleeding (menstrual periods) and increased urination is not a side effect of insulin therapy - reminded pt to establish with gynecology for further workup  If pt expresses further concern with the use of Tresiba in the future, can consider alternative long-acting insulin   Encouraged patient to resume previous dietary modifications    Medication changes:  Resume Tresiba 42 units daily  Increase metformin to 1000 mg in the AM and 500 mg in the PM    Lifestyle changes:  Increase exercise as tolerated. Walk for 10 minutes per day  Focus on reading nutrition labels to increase protein and fiber, and decrease carbs.    Follow Up:  5 days    Jesus Cazares, PharmD    CC:   Main Mcgee M.D.

## 2024-06-21 PROCEDURE — RXMED WILLOW AMBULATORY MEDICATION CHARGE: Performed by: PHYSICIAN ASSISTANT

## 2024-06-25 ENCOUNTER — PHARMACY VISIT (OUTPATIENT)
Dept: PHARMACY | Facility: MEDICAL CENTER | Age: 44
End: 2024-06-25
Payer: COMMERCIAL

## 2024-06-25 ENCOUNTER — NON-PROVIDER VISIT (OUTPATIENT)
Dept: VASCULAR LAB | Facility: MEDICAL CENTER | Age: 44
End: 2024-06-25
Attending: INTERNAL MEDICINE
Payer: MEDICAID

## 2024-06-25 PROCEDURE — 99212 OFFICE O/P EST SF 10 MIN: CPT

## 2024-06-25 PROCEDURE — RXMED WILLOW AMBULATORY MEDICATION CHARGE

## 2024-06-25 NOTE — PROGRESS NOTES
Patient Consult Note    Primary care physician: Main Mcgee M.D.    Reason for consult: Management of Uncontrolled Type 2 Diabetes    HPI:  Liana Obrien is a 43 y.o. old patient who comes in today for evaluation of above stated problem.    Allergies:  Bicillin c-r [penicillin g proc & benzathine], Ondansetron, Penicillin g, Sulfa drugs, and Metoclopramide    Current Diabetes Medication Regimen:  Metformin ER: 1000 mg in the AM and 500 mg in the PM  Tresiba 42 units daily    Previous Diabetes Medications and Reason for Discontinuation:  Tresiba and Humalog - stopped by pt d/t perceived ADR of vaginal bleeding  Mounjaro - stopped by hospitalist d/t dehydration  Metformin ER - cramping and nausea, but now tolerating  Pioglitazone - stopped by former endocrinologist 2023  Steglatro - stopped by former endocrinologist 2023; hx of UTI    Potential Barriers to Care:  Adherence: denies missed doses  Side effects: occasional diarrhea from metformin  Affordability: no issues  Others: previously expressed concerns about higher dose of Tresiba. Associates this with anemia and bleeding risk. Previously discussed potential SE of insulin therapy with pt and advised that this is very unlikely to cause increased bleeding/menstrual periods/urination. Advised that polyuria is caused by uncontrolled BG.    SMBG: Zack 2        Hypoglycemia:  Hypoglycemia awareness: Yes  Nocturnal hypoglycemia: None  Hypoglycemia:  None  Pt's treatment of Hypoglycemia  Discussed 15:15 Rule    Lifestyle:  Current Exercise: none at this time.  Exercise Goal: walk for 5-10 min daily    Dietary:  Diet highly variable, but working on incorporating more veggies/salads  Eating two meals a day  Keeps fresh food in a cooler  Lunch: eggs with cheese/tomatoes, kiwi, tuna/decker, spaghetti  Dinner: eggs with cheese/tomatoes  Snacking: chips, donuts  Drinking mostly water with crystal light, coffee with Splenda and occasional  creamer/half&half    Preventative Management:  BP regimen (ACEi/ARB): lisinopril 10 mg  Statin: atorvastatin (Lipitor) 20 mg three times a week  LDL <100: no (see Media for most recent labs)  Last Lipid Panel 11/13/23      Last Microalbumin/Cr:  Lab Results   Component Value Date/Time    MALBCRT 30 08/11/2018 08:21 AM    MICROALBUR 4.1 08/11/2018 08:21 AM     Last A1c:  Lab Results   Component Value Date/Time    HBA1C 14.4 (H) 05/12/2024 12:53 AM      Last Retinal Scan: 08/2022, due    Updated caregaps     Labs  Lab Results   Component Value Date/Time    SODIUM 139 05/16/2024 01:21 AM    POTASSIUM 3.4 (L) 05/16/2024 01:21 AM    CHLORIDE 107 05/16/2024 01:21 AM    CO2 22 05/16/2024 01:21 AM    GLUCOSE 162 (H) 05/16/2024 01:21 AM    BUN 6 (L) 05/16/2024 01:21 AM    CREATININE 0.36 (L) 05/16/2024 01:21 AM     Lab Results   Component Value Date/Time    ALKPHOSPHAT 107 (H) 05/12/2024 12:53 AM    ASTSGOT 9 (L) 05/12/2024 12:53 AM    ALTSGPT 10 05/12/2024 12:53 AM    TBILIRUBIN 0.2 05/12/2024 12:53 AM    INR 1.03 09/22/2022 01:20 AM    ALBUMIN 2.9 (L) 05/14/2024 02:30 AM        Lab Results   Component Value Date/Time    MALBCRT 30 08/11/2018 08:21 AM    MICROALBUR 4.1 08/11/2018 08:21 AM     Estimated CrCl: > 100 mL/min  eGFR 132     Physical Examination:  Vital signs: There were no vitals taken for this visit. There is no height or weight on file to calculate BMI.    Foot Exam:  Monofilament exam: 08/2023    Assessment and Plan:    1. DMII  Discussed Goals: FBG 80 - 130, 2hPP < 180, a1c < 7.0%  A1c above goal at 14.4%   Hyperglycemia still noted to be 100%, however now seeing some glucose levels in the high 200s since pt has been adherent to medications the past days  Pt with previous hospital admissions for DKA and HHS d/t noncompliance to DM medications  Will continue to focus on medication adherence at this time. She is willing to increase insulin therapy.  Will hold off on titrating metformin until GI issues have  resolved  Pt will likely need additional pharmacotherapy in the future that covers PPG -- given previous intolerances, may opt for bolus insulin  Encouraged patient to continue dietary modifications    Medication changes:  Increase Tresiba 54 units daily  Continue metformin to 1000 mg in the AM and 500 mg in the PM    Lifestyle changes:  Increase exercise as tolerated. Walk for 10 minutes per day  Focus on reading nutrition labels to increase protein and fiber, and decrease carbs.    Follow Up:  1 week    Jesus Cazares, Caryl    CC:   Main Mcgee M.D.

## 2024-06-28 ENCOUNTER — OFFICE VISIT (OUTPATIENT)
Dept: INTERNAL MEDICINE | Facility: OTHER | Age: 44
End: 2024-06-28
Payer: MEDICAID

## 2024-06-28 VITALS
DIASTOLIC BLOOD PRESSURE: 74 MMHG | SYSTOLIC BLOOD PRESSURE: 108 MMHG | BODY MASS INDEX: 36.82 KG/M2 | TEMPERATURE: 97.1 F | HEIGHT: 63 IN | WEIGHT: 207.8 LBS | OXYGEN SATURATION: 97 % | HEART RATE: 68 BPM

## 2024-06-28 DIAGNOSIS — E55.9 VITAMIN D DEFICIENCY: ICD-10-CM

## 2024-06-28 DIAGNOSIS — E11.65 TYPE 2 DIABETES MELLITUS WITH HYPERGLYCEMIA, WITH LONG-TERM CURRENT USE OF INSULIN (HCC): ICD-10-CM

## 2024-06-28 DIAGNOSIS — Z79.4 TYPE 2 DIABETES MELLITUS WITH HYPERGLYCEMIA, WITH LONG-TERM CURRENT USE OF INSULIN (HCC): ICD-10-CM

## 2024-06-28 DIAGNOSIS — K61.1 PERIRECTAL ABSCESS: ICD-10-CM

## 2024-06-28 DIAGNOSIS — N39.490 OVERFLOW INCONTINENCE OF URINE: ICD-10-CM

## 2024-06-28 DIAGNOSIS — M79.644 FINGER PAIN, RIGHT: ICD-10-CM

## 2024-06-28 DIAGNOSIS — B37.31 VAGINAL CANDIDIASIS: ICD-10-CM

## 2024-06-28 DIAGNOSIS — N76.4 VULVAR ABSCESS: ICD-10-CM

## 2024-06-28 DIAGNOSIS — I10 HYPERTENSION, UNSPECIFIED TYPE: ICD-10-CM

## 2024-06-28 DIAGNOSIS — N39.41 URGE INCONTINENCE: ICD-10-CM

## 2024-06-28 DIAGNOSIS — E03.9 HYPOTHYROIDISM, UNSPECIFIED TYPE: ICD-10-CM

## 2024-06-28 PROCEDURE — 3078F DIAST BP <80 MM HG: CPT | Performed by: INTERNAL MEDICINE

## 2024-06-28 PROCEDURE — 99214 OFFICE O/P EST MOD 30 MIN: CPT | Performed by: INTERNAL MEDICINE

## 2024-06-28 PROCEDURE — RXMED WILLOW AMBULATORY MEDICATION CHARGE: Performed by: INTERNAL MEDICINE

## 2024-06-28 PROCEDURE — 3074F SYST BP LT 130 MM HG: CPT | Performed by: INTERNAL MEDICINE

## 2024-06-28 RX ORDER — LEVOTHYROXINE SODIUM 0.15 MG/1
150 TABLET ORAL
Qty: 90 TABLET | Refills: 1 | Status: SHIPPED | OUTPATIENT
Start: 2024-06-28

## 2024-06-28 RX ORDER — LISINOPRIL 10 MG/1
10 TABLET ORAL
Qty: 90 TABLET | Refills: 1 | Status: SHIPPED | OUTPATIENT
Start: 2024-06-28

## 2024-06-28 RX ORDER — FLUCONAZOLE 150 MG/1
150 TABLET ORAL DAILY
Qty: 2 TABLET | Refills: 1 | Status: SHIPPED | OUTPATIENT
Start: 2024-06-28

## 2024-06-28 RX ORDER — AMOXICILLIN AND CLAVULANATE POTASSIUM 875; 125 MG/1; MG/1
1 TABLET, FILM COATED ORAL 2 TIMES DAILY
Qty: 20 TABLET | Refills: 0 | Status: SHIPPED | OUTPATIENT
Start: 2024-06-28

## 2024-06-28 RX ORDER — ATORVASTATIN CALCIUM 40 MG/1
40 TABLET, FILM COATED ORAL NIGHTLY
Qty: 90 TABLET | Refills: 1 | Status: SHIPPED | OUTPATIENT
Start: 2024-06-28

## 2024-06-28 RX ORDER — GABAPENTIN 100 MG/1
200 CAPSULE ORAL 3 TIMES DAILY
Qty: 180 CAPSULE | Refills: 3 | Status: SHIPPED | OUTPATIENT
Start: 2024-06-28

## 2024-06-28 ASSESSMENT — FIBROSIS 4 INDEX: FIB4 SCORE: 0.59

## 2024-06-28 NOTE — PATIENT INSTRUCTIONS
[facility]   [address] [place] [address]  [phone]    Ophthalmology   [address] [place] [address]  [phone]    [place] [address]  [place] [id number] [phone]

## 2024-06-28 NOTE — PROGRESS NOTES
No chief complaint on file.      HPI: Liana Obrien is a 43 y.o. female with past medical history as below who presented to the clinic for the following.    Patient is here for follow up of perirectal vulvar abscess, for right ring finger pain, refill of prescriptions and also for incontinence supply prescriptions for care chest . She also feels like she has  a yeast infection at this time     Bed pads , pads   Briefs       Trying to come back   Longer       Imrpoved - yeeys     Yeast infection     Refills -    Yeat infection     What else - inciden  - weight really bothers me lot of pain     Right ring finger - tendon   For a weel metfromin    Antibiotic pain     Fax : number care chest - [phone]      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  -recently when seen in clinic prior to hospitalization a vaginal swa was collected that showed recurrent yeast infection including candida glabrata and patient was prescribed fluconazole and also boric acid on the last visit   -Patient was reluctant to start insulin since discharge - she had stopped taking it - however after explaining risks - patient was willing to restart one injection - started on 35 units of insulin - she did not want to start the short acting   She was also prescribed synjardy, however insurance did not cover it   She has CGM however was not able to connect it with the phone and hence was having to check her sugars by glucometer   New nutrition referral was  placed on previous visit - did not follow up pwith patient regarding this today   Also following up with pharmacotherapy services - since last visit has seen them and insulin dose increased to 42 units > 54 units   Also on metformin 1000 in the morning and 500 in the evening   During most recent hospitalization - patient was referred to endocrinology - patient did not want to follow up with previous endo   Patient was reassured that this referral has been sent to another office and encouraged her to contact them - she agreed - provided info again today  Patient is requesting for insulin refill which was placed today - patient was recommended to reach out if there was any issue with the pen needles   She agreed      *vaginal candidiasis - Status post fluconazole and also boric acid - with resolution of symptoms  Pharmacy recommends against synjardy - I agree plus insurancce is not covering it   With candida glabrata   *Pubic lesions /vulvar lesions /perrectal    - Patient however to day complaints of boils on the pubic region   Patient reports that she has had it before -   One has popped with release of only blood - is quite tender to examination - < 1 cm of induration, redness with crusting at where it popped   There is one more intravaginally- which has not popped and is tender soft - about 1 cm with redness - no warmth   - patient encouraged to trial warm compress - in area- lidocaine over the counter and mupirocin - if not resolving patient was instructed to reach out to consider I& D and antibiotics if needed.   Patient also encouraged to follow up with gyn for this and uterine bleeding which is not present at this time   Patient provided info - new referral placed to gyn also for new problem   She has no uterine bleed at this time   Management of sugar as above   Patient followed up after visit stating that she has new abscess and one is growing bigger - patient was prescribed oral Augmentin but instructed to  present to ER or urgent care for drainage   Also encouareged to get appt with gynecology       **Menorrhagia   *Fibroids   CBC most recently showed elevated rdw - no anemia at this time    ferritin of 7.1, percent saturation of 5, TIBC of 275.from march 2024   No bleeding at this time   Not followed up with gyn yet - reinforced today-   Hormonal options are not an option as patient has a history of DVT - patient may need surgical options - explained to patient  She is taking iron supplement daily , however also is complaining of constipation                            Other problems not discussed on this visit   Management of other conditions which may be related to diabetes as below   Patient does have constipation - will modify iron supplementation ad manage sugars as above  Increased urine frequency-management as above   urge incontinence history more than stress  -Patients foot pain- bilateral plantar surface discussed today, follows up with podiatry post takes gabapentin.      Last eye exam in December   Last monofilament test - normal monofilament test  with decreased sensation only on the right great toe.  -Pulses intact, vibration sense slightly reduced in the great toes only.  -Proprioception intact.    *Hypokalemia.  -Noted on the last lab work done in the hospital with a potassium of 3.3.  -Normal renal function.  Not on any medications that could be lowering potassium levels  *Vitamin D deficiency.  -Last vitamin D from 3/16/2024 was 11  Taking viatmin d supplement   *Anxiety   -Patient is following up with psychiatry, mental health counselor, [facility].  -Was receiving inpatient psychiatry treatment, currently is on Zoloft 100 mg, trazodone 100 mg  at nighttime, hydroxyzine as needed.   -Recent stressors as mentioned above.  Saw psychology psychiatry in hospital - now back to her outpatient psychologist - feels much better after she physically is feeling better   Sleep apnea  -Sleep study  at home was done however she has not heard back from them   Needs to be faxed over again - was faxed in the past by Dr. Sharif   Status post dental procedure   -Associated with swelling - much better   There is some remnant discomfort which however is improving   *non adherance to meds /side effects of meds   Patient believed that the nausea, vomiting and diarrhea is from her medications.   As per patient, her endocrinologist has stopped all the oral meds for diabetes that she was on and is currently on on insulin degludec 30 units now , humalog based on sliding scale and trulicity   Pioglitazone, steglatro and metformin were stopped . However patient continued to have nausea, vomiting diarrhea which has since spontaneously resolved - hence likely associated with COVID   Recommended to continue lisinopril daily and atorvastatin 3 times a week   Patient is on as needed hydroxyzine which she is taking once or upto twice as needed   Taking trazodone 100 mg and sertraline through psychiatry - will address these with psychiatry   -Patient was on gabapentin which she has stopped since  she got steroid injection for foot which helped with the pain and that has resolved since   Patient also reports that she had to stop Vitamin C because it interacted with one of the medications that she was on and and she feels that she is getting more sick because she has not been able to take the vitamin C   *Blurry vision-checked with optometrist.  -Minimal changes were associated with diabetes as per patient otherwise within normal limits.  *Shortness of breath.  Resolved   -Patient reported that she felt that with increasing her level of activity around the house recently and had noticed that she gets very winded just walking around the house.  Denied any cough or wheezing.   -Denied any chest pain.  Did  report palpitations sometimes when she gets anxious which goes away with some deep breaths, which is also resolved now   Denied any  lightheadedness, dizziness, pedal edema, orthopnea or PND  -Does have history of sleep apnea, was on CPAP in the past patient does not recall why as she is not on it anymore.  -On examination patient patient did have a grade three systolic murmur that was present previously however on subsequent examination  - unable to appreciate that murmur.   Echocardiogram recently done was also not concerning except trace MR, mild PI concentric hypertrophy of left ventricle    -Denied any fevers, chills.  -As per labs in the past patient has had history of anemia, she has regular menstrual cycles sometimes, most recently her menstrual cycle lasted for 10 days.  Has history of PCOS as per chart, ovarian cyst  -Last cbc from November 2023 shows microcytosis without anemia , MCV- 75, normal RDW and other cell lines. PBS not commented on   -Has history of DVT as per chart.  *Tachycardia   resolved  -noted that her watch - heart rate above 110 when she is up and about   -Not present today at rest and patient is asymptomatic  -echo unremarkable  Last cbc showed   History of COVID -recovered well -described below   -Patient was able to finally reach the ophthalmologist , however was told that her insurance would not be taken   *Skin tag   -on lower abdomen - grown - wants to be rmoved,  -Also on exam noted to have erythema on lower abdominal region below skin fold- no symptoms   *Blurry vision  -Patient reports that about 6 years ago she had a significant blurry vision of both eyes which lasted for several months, when she was almost declared blind legally, was evaluated by ophthalmologist, unsure what the diagnosis was.  -Since then however her vision has significantly improved, currently has intermittent blurring of vision on both eyes, which when comes on lasts for a day to a month continuously.  -Denies any headaches, flashes floaters blind spots, eye pain, redness or aura  -We had placed ophthalmology referral on the last visit  however patient was not able to attend the appointment due to being inpatient for mental health related issues.  -Patient is requesting for new referral.  -Patient denies having any dry eyes at this time  *Breast Skin lesion   Has had history of bug bites.   -On examination now only has superficial scar, no deeper concerning tissue on palpation.  -Patient did have another spot with similar complaints, which again is currently a scar.  -Had ordered ultrasound breast at the time which has not been done.  -Given resolution of lump will go ahead and proceed to routine mammogram screening.  *Status post surgery for perirectal abscess/fistula   -Healing well as per patient  *Obesity  -Patient is working with nutrition specialist as well as trying to get more exercise as per recommendations of nutrition specialist.    Presentative health  Pap smear pending   Colon cancer  - next year   Mammo ordered in September   HIV   Tdap         Patient Active Problem List    Diagnosis Date Noted    Anxiety  On Zoloft 100 mg, trazodone 100 mg daily at night, hydroxyzine 06/16/2023    Neuropathic pain of both feet  On gabapentin  06/16/2023    Fibroids 11/15/2022    As in hpi above 10/12/2022    Anemia  Microcytosis without anemia  09/23/2022    Dyslipidemia 09/22/2022    Obesity (BMI 30-39.9) 05/25/2022    Gastro-esophageal reflux disease without esophagitis 04/14/2021    Major depressive disorder 09/14/2020    Polycystic ovary syndrome  2.2 cm right ovarian cyst seen on recent CT in May 2023  Unable to undergo TVUS due to perirectal abscess  Unable to go on estrogen containing OCP due to history of VTE  Will follow up with gyn following resolution of perirectal abscess 12/11/2019    History of deep venous thrombosis  2 episodes reported on bilateral lower extremities, subsequent ultrasounds for suspected dvt were negative- thought to have been precipitated by Birth control which she is not on anymore  11/19/2019     Hypothyroidism  Levothyroxine 150 mcg,Last TSH from 03/16/2024 was 0.49 11/19/2019    Hypertension  Mildly elevated on lisinopril 10 - not checking blood pressures at home regularly as the cuff not working  10/28/2017    Obstructive sleep apnea, adult  Trying to set up with cpap, backlog with preferred - only company patient's insurance takes  07/31/2015    Vitamin D deficiency- last level checked was 11 from march 2024 03/12/2015    Type 2 diabetes mellitus with hyperglycemia, with long-term current use of insulin ([facility])  Was Following up with endocrinology -  details above  03/12/2015       Current Outpatient Medications   Medication Sig Dispense Refill    sertraline (ZOLOFT) 100 MG Tab Take 1 oral tablet once a day 30 Tablet 2    amoxicillin-clavulanate (AUGMENTIN) 875-125 MG Tab Take 1 Tablet by mouth 2 times a day. For 5 days 10 Tablet 0    Insulin Degludec (TRESIBA FLEXTOUCH) 200 UNIT/ML Solution Pen-injector Inject 45 Units under the skin every evening. 21 mL 1    Insulin Pen Needle 32 G x 4 mm 1 Application every evening. Please dispense pen needle that goes with current tresiba injection 100 Each 1    mupirocin (BACTROBAN) 2 % Ointment Apply 1 Application topically 2 times a day. For 2 weeks 22 g 0    fluconazole (DIFLUCAN) 150 MG tablet Take 1 Tablet by mouth every day. For  persistent symptoms, take a second dose of 150 mg administered 72 hours after the first dose 2 Tablet 0    lisinopril (PRINIVIL) 10 MG Tab Take 1 Tablet by mouth every day. 90 Tablet 1    levothyroxine (SYNTHROID) 150 MCG Tab Take 1 Tablet by mouth every morning on an empty stomach. 90 Tablet 1    ferrous sulfate 325 (65 Fe) MG tablet Take 1 Tablet by mouth every 48 hours. 30 Tablet 0    hydrOXYzine pamoate (VISTARIL) 50 MG Cap Take 1 oral capsule as needed every (6-8) hours for anxiety 90 Capsule 3    sertraline (ZOLOFT) 50 MG Tab Take 1 oral tablet once a day 30 Tablet 2    vitamin D (CHOLECALCIFEROL) 1000 UNIT Tab Take 1 Tablet by  mouth every day. 60 Tablet 0    Continuous Glucose Sensor (FREESTYLE ALEKSANDAR 2 SENSOR) [facility] Use 1 kit as directed every two weeks change every 14 days 7 Each 3    atorvastatin (LIPITOR) 40 MG Tab Take 1 Tablet by mouth every evening. 30 Tablet 0    gabapentin (NEURONTIN) 100 MG Cap Take 2 Capsules by mouth 3 times a day. 180 Capsule 0    traZODone (DESYREL) 100 MG Tab Take 2 tablets by mouth every night at bedtime as needed 60 Tablet 3    Glucagon (BAQSIMI TWO PACK) 3 mg/dose Inhale 1 SPRAY into the nostril as a single dose for severe hypoglycemia 2 Each 3    Alcohol Swabs Pads use one alcohol swab three times a day as needed. 300 Each 6     No current facility-administered medications for this visit.       ROS: As per HPI. Additional pertinent systems as noted below.  Constitutional:  Negative for fever, chills   Cardiovascular:  Negative for chest pain,  Respiratory:  Negative for cough, sputum production, negative for shortness of breath   /skin - as in hpi       There were no vitals taken for this visit.    Physical Exam   Constitutional:  .  Not in acute distress  : One has popped with release of only blood - is quite tender to examination - < 1 cm of induration, redness with crusting at where it popped   There is one more intravaginally- which has not popped and is tender soft - about 1 cm with redness - no warmth     Note: I have reviewed all pertinent labs and diagnostic tests associated with this visit with specific comments listed under the assessment and plan below    Assessment and Plan    1. 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    started on 35 units of insulin on last visit  - she did not want to start the short acting   She was also prescribed synjardy, however insurance did not cover it   She has CGM however was not able to connect it with the phone and hence was having to check her sugars by glucometer   New nutrition referral was placed on previous visit - did not follow up pwith patient regarding this today   Also following up with pharmacotherapy services - since last visit has seen them and insulin dose increased to 42 units   Will let [place] adjust meds to oavoid confusion   During most recent hospitalization - patient was referred to endocrinology - patient did not want to follow up with previous endo   Patient was reassured that this referral has been sent to another office and encouraged her to contact them - she agreed - provided info again today  Patient is requesting for insulin refill which was placed today - patient was recommended to reach out if there was any issue with the pen needles   She agreed      - Insulin Degludec (TRESIBA FLEXTOUCH) 200 UNIT/ML Solution Pen-injector; Inject 45 Units under the skin every evening.  Dispense: 21 mL; Refill: 1  - Insulin Pen Needle 32 G x 4 mm; 1 Application every evening. Please dispense pen needle that goes with current tresiba injection  Dispense: 100 Each; Refill: 1    2. Abscess  -vaginal /pubic   - patient encouraged to trial warm compress - in area- lidocaine over the counter and mupirocin - if not resolving patient was instructed to reach out to consider I& D and antibiotics if needed.   Patient also encouraged to follow up with gyn for this and uterine bleeding which is not present at this time   Patient provided info - new referral placed to gyn also for new problem   She has no uterine bleed at this time    Management of sugar as above     - mupirocin (BACTROBAN) 2 % Ointment; Apply 1 Application topically 2 times a day. For 2 weeks  Dispense: 22 g; Refill: 0           Followup: No follow-ups on file.        Signed by: Main Mcgee M.D.   resolve with fluconazole.     - fluconazole (DIFLUCAN) 150 MG tablet; Take 1 Tablet by mouth every day. For  persistent symptoms, take a second dose of 150 mg administered 72 hours after the first dose.   I am also prescribing another course if symptoms recur after upcoming course of antibiotics - as refill  Dispense: 2 Tablet; Refill: 1    4. 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    started on 35 units of insulin on last visit  - she did not want to start the short acting   She was also prescribed synjardy, however insurance did not cover it   She has CGM however was not able to connect it with the phone and hence was having to check her sugars by glucometer   New nutrition referral was placed on previous visit - did not follow up pwith patient regarding this today   Also following up with pharmacotherapy services - since last visit has seen them and insulin dose increased to 42 units > 54 units   Will let [place] adjust meds to oavoid confusion   During most recent hospitalization - patient was referred to endocrinology - patient did not want to follow up with previous endo   Patient was reassured that this referral has been sent to another office and encouraged her to contact them - she agreed - provided info again today  Patient continues to have blurry vision problem - information for ophthalmology referral was provided today     - atorvastatin (LIPITOR) 40 MG Tab; Take 1 Tablet by mouth every evening.   Dispense: 90 Tablet; Refill: 1  - gabapentin (NEURONTIN) 100 MG Cap; Take 2 Capsules by mouth 3 times a day.  Dispense: 180 Capsule; Refill: 3    5. Finger pain, right  Suspect tendinopathy   - Referral to Orthopedics    6. Vitamin D deficiency    - vitamin D (CHOLECALCIFEROL) 1000 UNIT Tab; Take 1 Tablet by mouth every day.  Dispense: 60 Tablet; Refill: 1    7. Hypothyroidism, unspecified type    - levothyroxine (SYNTHROID) 150 MCG Tab; Take 1 Tablet by mouth every morning on an empty stomach.  Dispense: 90 Tablet; Refill: 1    8. Hypertension, unspecified type    - lisinopril (PRINIVIL) 10 MG Tab; Take 1 Tablet by mouth every day.  Dispense: 90 Tablet; Refill: 1       9. Overflow incontinence of urine    - DME Other    10. Urge incontinence    - DME Other         Followup: Return in about 1 month (around 7/28/2024).        Signed by: Main Mcgee M.D.

## 2024-06-29 ENCOUNTER — PHARMACY VISIT (OUTPATIENT)
Dept: PHARMACY | Facility: MEDICAL CENTER | Age: 44
End: 2024-06-29
Payer: COMMERCIAL

## 2024-07-02 ENCOUNTER — NON-PROVIDER VISIT (OUTPATIENT)
Dept: VASCULAR LAB | Facility: MEDICAL CENTER | Age: 44
End: 2024-07-02
Attending: INTERNAL MEDICINE
Payer: MEDICAID

## 2024-07-02 ENCOUNTER — PHARMACY VISIT (OUTPATIENT)
Dept: PHARMACY | Facility: MEDICAL CENTER | Age: 44
End: 2024-07-02
Payer: COMMERCIAL

## 2024-07-02 ENCOUNTER — TELEPHONE (OUTPATIENT)
Dept: INTERNAL MEDICINE | Facility: OTHER | Age: 44
End: 2024-07-02

## 2024-07-02 DIAGNOSIS — Z79.4 TYPE 2 DIABETES MELLITUS WITH HYPERGLYCEMIA, WITH LONG-TERM CURRENT USE OF INSULIN (HCC): Primary | ICD-10-CM

## 2024-07-02 DIAGNOSIS — E11.65 TYPE 2 DIABETES MELLITUS WITH HYPERGLYCEMIA, WITH LONG-TERM CURRENT USE OF INSULIN (HCC): Primary | ICD-10-CM

## 2024-07-02 PROCEDURE — 99212 OFFICE O/P EST SF 10 MIN: CPT

## 2024-07-02 PROCEDURE — RXMED WILLOW AMBULATORY MEDICATION CHARGE: Performed by: NURSE PRACTITIONER

## 2024-07-02 RX ORDER — INSULIN LISPRO 100 [IU]/ML
INJECTION, SOLUTION INTRAVENOUS; SUBCUTANEOUS
Qty: 15 ML | Refills: 1 | Status: SHIPPED | OUTPATIENT
Start: 2024-07-02

## 2024-07-09 ENCOUNTER — APPOINTMENT (OUTPATIENT)
Dept: VASCULAR LAB | Facility: MEDICAL CENTER | Age: 44
End: 2024-07-09
Payer: MEDICAID

## 2024-07-16 ENCOUNTER — APPOINTMENT (OUTPATIENT)
Dept: VASCULAR LAB | Facility: MEDICAL CENTER | Age: 44
End: 2024-07-16
Attending: INTERNAL MEDICINE
Payer: MEDICAID

## 2024-07-16 ENCOUNTER — TELEPHONE (OUTPATIENT)
Dept: VASCULAR LAB | Facility: MEDICAL CENTER | Age: 44
End: 2024-07-16
Payer: MEDICAID

## 2024-07-22 DIAGNOSIS — E11.65 TYPE 2 DIABETES MELLITUS WITH HYPERGLYCEMIA, WITHOUT LONG-TERM CURRENT USE OF INSULIN (HCC): ICD-10-CM

## 2024-07-22 DIAGNOSIS — N93.9 ABNORMAL UTERINE BLEEDING: ICD-10-CM

## 2024-07-22 PROCEDURE — RXMED WILLOW AMBULATORY MEDICATION CHARGE: Performed by: PHYSICIAN ASSISTANT

## 2024-07-22 PROCEDURE — RXMED WILLOW AMBULATORY MEDICATION CHARGE

## 2024-07-22 PROCEDURE — RXMED WILLOW AMBULATORY MEDICATION CHARGE: Performed by: INTERNAL MEDICINE

## 2024-07-22 RX ORDER — BLOOD PRESSURE TEST KIT
1 KIT MISCELLANEOUS
Qty: 300 EACH | Refills: 6 | Status: SHIPPED | OUTPATIENT
Start: 2024-07-22

## 2024-07-22 RX ORDER — FERROUS SULFATE 325(65) MG
325 TABLET ORAL
Qty: 30 TABLET | Refills: 1 | Status: SHIPPED | OUTPATIENT
Start: 2024-07-22

## 2024-07-23 ENCOUNTER — NON-PROVIDER VISIT (OUTPATIENT)
Dept: VASCULAR LAB | Facility: MEDICAL CENTER | Age: 44
End: 2024-07-23
Attending: INTERNAL MEDICINE
Payer: MEDICAID

## 2024-07-23 PROCEDURE — 99212 OFFICE O/P EST SF 10 MIN: CPT

## 2024-07-24 PROCEDURE — RXMED WILLOW AMBULATORY MEDICATION CHARGE: Performed by: INTERNAL MEDICINE

## 2024-07-26 PROCEDURE — RXMED WILLOW AMBULATORY MEDICATION CHARGE: Performed by: PSYCHIATRY & NEUROLOGY

## 2024-07-29 PROCEDURE — RXMED WILLOW AMBULATORY MEDICATION CHARGE: Performed by: NURSE PRACTITIONER

## 2024-07-30 ENCOUNTER — PHARMACY VISIT (OUTPATIENT)
Dept: PHARMACY | Facility: MEDICAL CENTER | Age: 44
End: 2024-07-30
Payer: COMMERCIAL

## 2024-07-30 ENCOUNTER — NON-PROVIDER VISIT (OUTPATIENT)
Dept: VASCULAR LAB | Facility: MEDICAL CENTER | Age: 44
End: 2024-07-30
Attending: INTERNAL MEDICINE
Payer: MEDICAID

## 2024-07-30 DIAGNOSIS — E11.65 TYPE 2 DIABETES MELLITUS WITH HYPERGLYCEMIA, WITH LONG-TERM CURRENT USE OF INSULIN (HCC): ICD-10-CM

## 2024-07-30 DIAGNOSIS — Z79.4 TYPE 2 DIABETES MELLITUS WITH HYPERGLYCEMIA, WITH LONG-TERM CURRENT USE OF INSULIN (HCC): ICD-10-CM

## 2024-07-30 PROCEDURE — 99212 OFFICE O/P EST SF 10 MIN: CPT

## 2024-07-31 ENCOUNTER — OFFICE VISIT (OUTPATIENT)
Dept: INTERNAL MEDICINE | Facility: OTHER | Age: 44
End: 2024-07-31
Payer: MEDICAID

## 2024-07-31 VITALS
SYSTOLIC BLOOD PRESSURE: 121 MMHG | HEIGHT: 63 IN | WEIGHT: 212.2 LBS | HEART RATE: 89 BPM | BODY MASS INDEX: 37.6 KG/M2 | DIASTOLIC BLOOD PRESSURE: 84 MMHG | TEMPERATURE: 97.5 F | OXYGEN SATURATION: 97 %

## 2024-07-31 DIAGNOSIS — L30.9 DERMATITIS: ICD-10-CM

## 2024-07-31 DIAGNOSIS — G57.93 NEUROPATHIC PAIN OF BOTH FEET: ICD-10-CM

## 2024-07-31 DIAGNOSIS — F41.9 ANXIETY: ICD-10-CM

## 2024-07-31 DIAGNOSIS — R60.0 LOWER EXTREMITY EDEMA: ICD-10-CM

## 2024-07-31 DIAGNOSIS — Z79.4 TYPE 2 DIABETES MELLITUS WITH HYPERGLYCEMIA, WITH LONG-TERM CURRENT USE OF INSULIN (HCC): ICD-10-CM

## 2024-07-31 DIAGNOSIS — E11.65 TYPE 2 DIABETES MELLITUS WITH HYPERGLYCEMIA, WITH LONG-TERM CURRENT USE OF INSULIN (HCC): ICD-10-CM

## 2024-07-31 DIAGNOSIS — E11.65 TYPE 2 DIABETES MELLITUS WITH HYPERGLYCEMIA, WITHOUT LONG-TERM CURRENT USE OF INSULIN (HCC): ICD-10-CM

## 2024-07-31 DIAGNOSIS — Z02.9 ADMINISTRATIVE ENCOUNTER: ICD-10-CM

## 2024-07-31 PROCEDURE — 3074F SYST BP LT 130 MM HG: CPT | Performed by: INTERNAL MEDICINE

## 2024-07-31 PROCEDURE — 3079F DIAST BP 80-89 MM HG: CPT | Performed by: INTERNAL MEDICINE

## 2024-07-31 PROCEDURE — 99214 OFFICE O/P EST MOD 30 MIN: CPT | Performed by: INTERNAL MEDICINE

## 2024-07-31 RX ORDER — INSULIN DEGLUDEC 200 U/ML
INJECTION, SOLUTION SUBCUTANEOUS
Qty: 27 ML | Refills: 1 | Status: SHIPPED | OUTPATIENT
Start: 2024-07-31

## 2024-07-31 ASSESSMENT — FIBROSIS 4 INDEX: FIB4 SCORE: 0.59

## 2024-07-31 NOTE — LETTER
Liana Obrien YOB: 1980       Current Outpatient Medications:     hydrOXYzine pamoate (VISTARIL) 50 MG Cap, Take 1 oral capsule as needed every (6-8) hours for anxiety, Disp: 90 Capsule, Rfl: 2    sertraline (ZOLOFT) 100 MG Tab, Take 1 oral tablet once a day. Discontinue fluoxetine, starting sertraline., Disp: 30 Tablet, Rfl: 2    traZODone (DESYREL) 100 MG Tab, Take 2 oral tablet at bedtime as needed, Disp: 60 Tablet, Rfl: 2    Alcohol Swabs Pads, use one alcohol swab three times a day as needed., Disp: 300 Each, Rfl: 6    ferrous sulfate (FEROSUL) 325 (65 Fe) MG tablet, Take 1 Tablet by mouth every 48 hours., Disp: 30 Tablet, Rfl: 1    insulin lispro (HUMALOG KWIKPEN) 100 UNIT/ML SC SOPN injection PEN, Inject 4 units subcutaneously three times daily before meals or as directed (max 50 units/day), Disp: 15 mL, Rfl: 1    Insulin Pen Needle 32 G x 4 mm, Use to inject insulin 4 times daily, Disp: 100 Each, Rfl: 5    vitamin D (CHOLECALCIFEROL) 1000 UNIT Tab, Take 1 Tablet by mouth every day., Disp: 60 Tablet, Rfl: 1    atorvastatin (LIPITOR) 40 MG Tab, Take 1 Tablet by mouth every evening., Disp: 90 Tablet, Rfl: 1    gabapentin (NEURONTIN) 100 MG Cap, Take 2 Capsules by mouth 3 times a day., Disp: 180 Capsule, Rfl: 3    levothyroxine (SYNTHROID) 150 MCG Tab, Take 1 Tablet by mouth every morning on an empty stomach., Disp: 90 Tablet, Rfl: 1    lisinopril (PRINIVIL) 10 MG Tab, Take 1 Tablet by mouth every day., Disp: 90 Tablet, Rfl: 1    fluconazole (DIFLUCAN) 150 MG tablet, Take 1 Tablet by mouth every day. For  persistent symptoms, take a second dose of 150 mg administered 72 hours after the first dose.  I am also prescribing another course if symptoms recur after upcoming course of antibiotics - as refill, Disp: 2 Tablet, Rfl: 1    sertraline (ZOLOFT) 100 MG Tab, Take 1 oral tablet once a day, Disp: 30 Tablet, Rfl: 2    Insulin Degludec (TRESIBA FLEXTOUCH) 200 UNIT/ML Solution Pen-injector, Inject 45  Units under the skin every evening., Disp: 21 mL, Rfl: 1    mupirocin (BACTROBAN) 2 % Ointment, Apply 1 Application topically 2 times a day. For 2 weeks, Disp: 22 g, Rfl: 0    hydrOXYzine pamoate (VISTARIL) 50 MG Cap, Take 1 oral capsule as needed every (6-8) hours for anxiety, Disp: 90 Capsule, Rfl: 3    Continuous Glucose Sensor (FREESTYLE ALEKSANDAR 2 SENSOR) [facility] [place] 1 [place] as directed every two weeks change every 14 days, Disp: 7 Each, Rfl: 3    Glucagon (BAQSIMI TWO PACK) 3 mg/dose, Inhale 1 SPRAY into the nostril as a single dose for severe hypoglycemia, Disp: 2 Each, Rfl: 3    amoxicillin-clavulanate (AUGMENTIN) 875-125 MG Tab, Take 1 Tablet by mouth 2 times a day. For 10 days (Patient not taking: Reported on 7/31/2024), Disp: 20 Tablet, Rfl: 0       Current Diabetic Medications:    insulin lispro (HUMALOG KWIKPEN) 100 UNIT/ML SC SOPN injection PEN, Inject 4 units subcutaneously three times daily before meals or as directed (max 50 units/day), Disp: 15 mL, Rfl: 1    Insulin Pen Needle 32 G x 4 mm, Use to inject insulin 4 times daily, Disp: 100 Each, Rfl: 5    Insulin Degludec (TRESIBA FLEXTOUCH) 200 UNIT/ML Solution Pen-injector, Inject 45 Units under the skin every evening., Disp: 21 mL, Rfl: 1    Continuous Glucose Sensor (FREESTYLE ALEKSANDAR 2 SENSOR) [facility] [place] 1 kit as directed every two weeks change every 14 days, Disp: 7 Each, Rfl: 3    Glucagon (BAQSIMI TWO PACK) 3 mg/dose, Inhale 1 SPRAY into the nostril as a single dose for severe hypoglycemia, Disp: 2 Each, Rfl: 3

## 2024-07-31 NOTE — PROGRESS NOTES
No chief complaint on file.      HPI: Liana Obrien is a 43 y.o. female with past medical history as below who presented to the clinic for the following.    Patient is here for follow up of perirectal vulvar abscess, for right ring finger pain, refill of prescriptions and also for incontinence supply prescriptions for care chest . 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  -recently when seen in clinic prior to hospitalization a vaginal swa was collected that showed recurrent yeast infection including candida glabrata and patient was prescribed fluconazole and also boric acid on the last visit   -Patient was reluctant to start insulin since discharge - she had stopped taking it - however after explaining risks - patient was willing to restart one injection - started on 35 units of insulin - she did not want to start the short acting   She was also prescribed synjardy, however insurance did not cover it   She has CGM however was not able to connect it with the phone and hence was having to check her sugars by glucometer   New nutrition referral was placed on previous visit - did not follow up pwith patient regarding this today   Also following up with pharmacotherapy services - since last visit has seen them and insulin dose increased to 42 units > 54 units  Started on humalog 4 units, decreased tresiba to 46 units, increased humalog to 6 units three times a  day before meals   Also on metformin 1000 in the morning and 500 in the evening - gradually increasing due to side effects as per patient   During most recent hospitalization - patient was referred to endocrinology - patient did not want to follow up with previous endo   Patient was reassured that this referral has been sent to another office and encouraged her to contact them - she agreed - provided info again today    *vaginal candidiasis - Status post fluconazole and also boric acid - with resolution of symptoms  Pharmacy recommends against synjardy - I agree plus insurancce is not covering it   With candida glabrata   *Pubic lesions /vulvar lesions /per-rectal   Patient reports that she has had it before on last visit patient had 2 lesions - on on pubic symphyseal region and the other vaginally with the intravaginal one not popped yet -patient was instructed to follow up with gyn m use warm compress and help it pop - however did not improve and had more lesions    Status post antibiotic course for 5 days - with resolution of most except one that is perirectal now.   On examination there is an ulcer perrectally  however healing well, no induration or fluctuance however with local erythema   Patient also encouraged to follow up with gyn for this and uterine bleeding which is not present at this time   Patient provided info - new referral placed to gyn also for new problem   She has no uterine bleed at this time   Management of sugar as above   Another round of Augmentin for longer duration will be prescribed today for the perirectal lesion   Patient  also has yeast like symptoms since the last course of antibiotic - fluconazole will be prescribed - and prophylactic fluconazole in case of return of symptoms after this upcoming course of Augmentin.   Patient however also instructed to reach put if yeast infection symptoms do not resolve as she did have candida glabrata on the last swab - hence she may have to be  retested if symptoms do not resolve with fluconazole.       **Menorrhagia   *Fibroids   CBC most recently showed elevated rdw - no anemia at this time    ferritin of 7.1, percent saturation of 5, TIBC of 275.from march 2024   No bleeding at this time   Not followed up with gyn yet - reinforced today-   Hormonal options are not an option as patient has a history of DVT - patient may need surgical options - explained to patient  She is taking iron supplement daily , however also is complaining of constipation       *Right ring finger pain   -Started after injury in the past - had subsided on its own, however now has recently started hurting again along the finger now all the way up to her forearm.   Patient attributes it to recent lifting etc   There is no swelling erythema warmth on exam.   Patient has not tried anything for the pain yet   Would like to see hand surgeon - referral will be placed today       *Urine incontinence   -Patient continues to have issues with urine incontinence - increased frequency of urination   Uncontrolled sugars   Requesting for orders to be faxed to [facility] for incontinence supplies   Bed pads - briefs, pads   Fax : number [facility] - [phone]                 Other problems not discussed on this visit   Management of other conditions which may be related to diabetes as below   Patient does have constipation - will modify iron supplementation ad manage sugars as above  Increased urine frequency-management as above   urge incontinence history more than stress  -Patients foot pain- bilateral plantar surface discussed today, follows up with podiatry post takes gabapentin.      Last eye exam in December   Last monofilament test - normal monofilament test  with decreased sensation only on the right great toe.  -Pulses intact, vibration sense slightly reduced in the great toes only.  -Proprioception intact.    *Hypokalemia.  -Noted on the last lab work done in the hospital with a  potassium of 3.3.  -Normal renal function.  Not on any medications that could be lowering potassium levels  *Vitamin D deficiency.  -Last vitamin D from 3/16/2024 was 11  Taking viatmin d supplement   *Anxiety   -Patient is following up with psychiatry, mental health counselor, [facility].  -Was receiving inpatient psychiatry treatment, currently is on Zoloft 100 mg, trazodone 100 mg  at nighttime, hydroxyzine as needed.   -Recent stressors as mentioned above.  Saw psychology psychiatry in hospital - now back to her outpatient psychologist - feels much better after she physically is feeling better   Sleep apnea  -Sleep study at home was done however she has not heard back from them   Needs to be faxed over again - was faxed in the past by Dr. Sharif   Status post dental procedure   -Associated with swelling - much better   There is some remnant discomfort which however is improving   *non adherance to meds /side effects of meds   Patient believed that the nausea, vomiting and diarrhea is from her medications.   As per patient, her endocrinologist has stopped all the oral meds for diabetes that she was on and is currently on on insulin degludec 30 units now , humalog based on sliding scale and trulicity   Pioglitazone, steglatro and metformin were stopped . However patient continued to have nausea, vomiting diarrhea which has since spontaneously resolved - hence likely associated with COVID   Recommended to continue lisinopril daily and atorvastatin 3 times a week   Patient is on as needed hydroxyzine which she is taking once or upto twice as needed   Taking trazodone 100 mg and sertraline through psychiatry - will address these with psychiatry   -Patient was on gabapentin which she has stopped since  she got steroid injection for foot which helped with the pain and that has resolved since   Patient also reports that she had to stop Vitamin C because it interacted with one of the medications that she was  on and and she feels that she is getting more sick because she has not been able to take the vitamin C   *Blurry vision-checked with optometrist.  -Minimal changes were associated with diabetes as per patient otherwise within normal limits.  *Shortness of breath.  Resolved   -Patient reported that she felt that with increasing her level of activity around the house recently and had noticed that she gets very winded just walking around the house.  Denied any cough or wheezing.   -Denied any chest pain.  Did  report palpitations sometimes when she gets anxious which goes away with some deep breaths, which is also resolved now   Denied any lightheadedness, dizziness, pedal edema, orthopnea or PND  -Does have history of sleep apnea, was on CPAP in the past patient does not recall why as she is not on it anymore.  -On examination patient patient did have a grade three systolic murmur that was present previously however on subsequent examination  - unable to appreciate that murmur.   Echocardiogram recently done was also not concerning except trace MR, mild PI concentric hypertrophy of left ventricle    -Denied any fevers, chills.  -As per labs in the past patient has had history of anemia, she has regular menstrual cycles sometimes, most recently her menstrual cycle lasted for 10 days.  Has history of PCOS as per chart, ovarian cyst  -Last cbc from November 2023 shows microcytosis without anemia , MCV- 75, normal RDW and other cell lines. PBS not commented on   -Has history of DVT as per chart.  *Tachycardia   resolved  -noted that her watch - heart rate above 110 when she is up and about   -Not present today at rest and patient is asymptomatic  -echo unremarkable  Last cbc showed   History of COVID -recovered well -described below   -Patient was able to finally reach the ophthalmologist , however was told that her insurance would not be taken   *Skin tag   -on lower abdomen - grown - wants to be rmoved,  -Also on exam  noted to have erythema on lower abdominal region below skin fold- no symptoms   *Blurry vision  -Patient reports that about 6 years ago she had a significant blurry vision of both eyes which lasted for several months, when she was almost declared blind legally, was evaluated by ophthalmologist, unsure what the diagnosis was.  -Since then however her vision has significantly improved, currently has intermittent blurring of vision on both eyes, which when comes on lasts for a day to a month continuously.  -Denies any headaches, flashes floaters blind spots, eye pain, redness or aura  -We had placed ophthalmology referral on the last visit however patient was not able to attend the appointment due to being inpatient for mental health related issues.  -Patient is requesting for new referral.  -Patient denies having any dry eyes at this time  *Breast Skin lesion   Has had history of bug bites.   -On examination now only has superficial scar, no deeper concerning tissue on palpation.  -Patient did have another spot with similar complaints, which again is currently a scar.  -Had ordered ultrasound breast at the time which has not been done.  -Given resolution of lump will go ahead and proceed to routine mammogram screening.  *Status post surgery for perirectal abscess/fistula   -Healing well as per patient  *Obesity  -Patient is working with nutrition specialist as well as trying to get more exercise as per recommendations of nutrition specialist.    Presentative health  Pap smear pending   Colon cancer  - next year   Mammo ordered in September   HIV   Tdap         Patient Active Problem List    Diagnosis Date Noted    Anxiety  On Zoloft 100 mg, trazodone 100 mg daily at night, hydroxyzine 06/16/2023    Neuropathic pain of both feet  On gabapentin  06/16/2023    Fibroids 11/15/2022    As in hpi above 10/12/2022    Anemia  Microcytosis without anemia  09/23/2022    Dyslipidemia 09/22/2022    Obesity (BMI 30-39.9) 05/25/2022     Gastro-esophageal reflux disease without esophagitis 04/14/2021    Major depressive disorder 09/14/2020    Polycystic ovary syndrome  2.2 cm right ovarian cyst seen on recent CT in May 2023  Unable to undergo TVUS due to perirectal abscess  Unable to go on estrogen containing OCP due to history of VTE  Will follow up with gyn following resolution of perirectal abscess 12/11/2019    History of deep venous thrombosis  2 episodes reported on bilateral lower extremities, subsequent ultrasounds for suspected dvt were negative- thought to have been precipitated by Birth control which she is not on anymore  11/19/2019    Hypothyroidism  Levothyroxine 150 mcg,Last TSH from 03/16/2024 was 0.49 11/19/2019    Hypertension  Mildly elevated on lisinopril 10 - not checking blood pressures at home regularly as the cuff not working  10/28/2017    Obstructive sleep apnea, adult  Trying to set up with cpap, backlog with preferred - only company patient's insurance takes  07/31/2015    Vitamin D deficiency- last level checked was 11 from march 2024  03/12/2015    Type 2 diabetes mellitus with hyperglycemia, with long-term current use of insulin ([facility])  Was Following up with endocrinology -  details above  03/12/2015       Current Outpatient Medications   Medication Sig Dispense Refill    hydrOXYzine pamoate (VISTARIL) 50 MG Cap Take 1 oral capsule as needed every (6-8) hours for anxiety 90 Capsule 2    sertraline (ZOLOFT) 100 MG Tab Take 1 oral tablet once a day. Discontinue fluoxetine, starting sertraline. 30 Tablet 2    traZODone (DESYREL) 100 MG Tab Take 2 oral tablet at bedtime as needed 60 Tablet 2    Alcohol Swabs Pads use one alcohol swab three times a day as needed. 300 Each 6    ferrous sulfate (FEROSUL) 325 (65 Fe) MG tablet Take 1 Tablet by mouth every 48 hours. 30 Tablet 1    insulin lispro (HUMALOG KWIKPEN) 100 UNIT/ML SC SOPN injection PEN Inject 4 units subcutaneously three times daily before meals or as directed (max  50 units/day) 15 mL 1    Insulin Pen Needle 32 G x 4 mm Use to inject insulin 4 times daily 100 Each 5    amoxicillin-clavulanate (AUGMENTIN) 875-125 MG Tab Take 1 Tablet by mouth 2 times a day. For 10 days 20 Tablet 0    vitamin D (CHOLECALCIFEROL) 1000 UNIT Tab Take 1 Tablet by mouth every day. 60 Tablet 1    atorvastatin (LIPITOR) 40 MG Tab Take 1 Tablet by mouth every evening. 90 Tablet 1    gabapentin (NEURONTIN) 100 MG Cap Take 2 Capsules by mouth 3 times a day. 180 Capsule 3    levothyroxine (SYNTHROID) 150 MCG Tab Take 1 Tablet by mouth every morning on an empty stomach. 90 Tablet 1    lisinopril (PRINIVIL) 10 MG Tab Take 1 Tablet by mouth every day. 90 Tablet 1    fluconazole (DIFLUCAN) 150 MG tablet Take 1 Tablet by mouth every day. For  persistent symptoms, take a second dose of 150 mg administered 72 hours after the first dose.   I am also prescribing another course if symptoms recur after upcoming course of antibiotics - as refill 2 Tablet 1    sertraline (ZOLOFT) 100 MG Tab Take 1 oral tablet once a day 30 Tablet 2    Insulin Degludec (TRESIBA FLEXTOUCH) 200 UNIT/ML Solution Pen-injector Inject 45 Units under the skin every evening. 21 mL 1    mupirocin (BACTROBAN) 2 % Ointment Apply 1 Application topically 2 times a day. For 2 weeks 22 g 0    hydrOXYzine pamoate (VISTARIL) 50 MG Cap Take 1 oral capsule as needed every (6-8) hours for anxiety 90 Capsule 3    Continuous Glucose Sensor (FREESTYLE ALEKSANDAR 2 SENSOR) [facility] Use 1 kit as directed every two weeks change every 14 days 7 Each 3    Glucagon (BAQSIMI TWO PACK) 3 mg/dose Inhale 1 SPRAY into the nostril as a single dose for severe hypoglycemia 2 Each 3     No current facility-administered medications for this visit.       ROS: As per HPI. Additional pertinent systems as noted below.  Constitutional:  Negative for fever, chills   Cardiovascular:  Negative for chest pain,  Respiratory:  Negative for cough, sputum production, negative for shortness of  breath   /skin - as in hpi       There were no vitals taken for this visit.    Physical Exam   Constitutional:  .  Not in acute distress  : On examination there is an ulcer perrectally  however healing well, no induration or fluctuance however with local erythema   Other abscesses drained and healing well.   Patients does have inflammation of the labia diffusely - patient denies any other discomfort at this time. Does have icthing - vaginal discharge similar to yeast infection prior as per patient     Note: I have reviewed all pertinent labs and diagnostic tests associated with this visit with specific comments listed under the assessment and plan below    Assessment and Plan    1. Vulvar abscess  Patient reports that she has had it before on last visit patient had 2 lesions - on on pubic symphyseal region and the other vaginally with the intravaginal one not popped yet -patient was instructed to follow up with gyn m use warm compress and help it pop - however did not improve and had more lesions    Status post antibiotic course for 5 days - with resolution of most except one that is perirectal now.   On examination there is an ulcer perrectally  however healing well, no induration or fluctuance however with local erythema   Patient also encouraged to follow up with gyn for this and uterine bleeding which is not present at this time   Patient provided info - new referral placed to gyn also for new problem   She has no uterine bleed at this time   Management of sugar as above   Another round of Augmentin for longer duration will be prescribed today for the perirectal lesion   Patient  also has yeast like symptoms since the last course of antibiotic - fluconazole will be prescribed - and prophylactic fluconazole in case of return of symptoms after this upcoming course of Augmentin.   Patient however also instructed to reach put if yeast infection symptoms do not resolve as she did have candida glabrata on the last  swab - hence she may have to be retested if symptoms do not resolve with fluconazole.   - amoxicillin-clavulanate (AUGMENTIN) 875-125 MG Tab; Take 1 Tablet by mouth 2 times a day. For 10 days  Dispense: 20 Tablet; Refill: 0    2. Perirectal abscess  Patient reports that she has had it before on last visit patient had 2 lesions - on on pubic symphyseal region and the other vaginally with the intravaginal one not popped yet -patient was instructed to follow up with gyn m use warm compress and help it pop - however did not improve and had more lesions    Status post antibiotic course for 5 days - with resolution of most except one that is perirectal now.   On examination there is an ulcer perrectally  however healing well, no induration or fluctuance however with local erythema   Patient also encouraged to follow up with gyn for this and uterine bleeding which is not present at this time   Patient provided info - new referral placed to gyn also for new problem   She has no uterine bleed at this time   Management of sugar as above   Another round of Augmentin for longer duration will be prescribed today for the perirectal lesion   Patient  also has yeast like symptoms since the last course of antibiotic - fluconazole will be prescribed - and prophylactic fluconazole in case of return of symptoms after this upcoming course of Augmentin.   Patient however also instructed to reach put if yeast infection symptoms do not resolve as she did have candida glabrata on the last swab - hence she may have to be retested if symptoms do not resolve with fluconazole.   - amoxicillin-clavulanate (AUGMENTIN) 875-125 MG Tab; Take 1 Tablet by mouth 2 times a day. For 10 days  Dispense: 20 Tablet; Refill: 0    3. Vaginal candidiasis   fluconazole will be prescribed - and prophylactic fluconazole in case of return of symptoms after this upcoming course of Augmentin.   Patient however also instructed to reach put if yeast infection symptoms  do not resolve as she did have candida glabrata on the last swab - hence she may have to be retested if symptoms do not resolve with fluconazole.     - fluconazole (DIFLUCAN) 150 MG tablet; Take 1 Tablet by mouth every day. For  persistent symptoms, take a second dose of 150 mg administered 72 hours after the first dose.   I am also prescribing another course if symptoms recur after upcoming course of antibiotics - as refill  Dispense: 2 Tablet; Refill: 1    4. 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    started on 35 units of insulin on last visit  - she did not want to start the short acting   She was also prescribed synjardy, however insurance did not cover it   She has CGM however was not able to connect it with the phone and hence was having to check her sugars by glucometer   New nutrition referral was placed on previous visit - did not follow up pwith patient regarding this today   Also following up with pharmacotherapy services - since last visit has seen them and insulin dose increased to 42 units > 54 units   Will let [place] adjust meds to oavoid confusion   During most recent hospitalization - patient was referred to endocrinology - patient did not want to follow up with previous endo   Patient was reassured that this referral has been sent to another office and encouraged her to contact them - she agreed - provided info again today  Patient continues to have blurry vision problem - information for  ophthalmology referral was provided today     - atorvastatin (LIPITOR) 40 MG Tab; Take 1 Tablet by mouth every evening.  Dispense: 90 Tablet; Refill: 1  - gabapentin (NEURONTIN) 100 MG Cap; Take 2 Capsules by mouth 3 times a day.  Dispense: 180 Capsule; Refill: 3    5. Finger pain, right  Suspect tendinopathy   - Referral to Orthopedics    6. Vitamin D deficiency    - vitamin D (CHOLECALCIFEROL) 1000 UNIT Tab; Take 1 Tablet by mouth every day.  Dispense: 60 Tablet; Refill: 1    7. Hypothyroidism, unspecified type    - levothyroxine (SYNTHROID) 150 MCG Tab; Take 1 Tablet by mouth every morning on an empty stomach.  Dispense: 90 Tablet; Refill: 1    8. Hypertension, unspecified type    - lisinopril (PRINIVIL) 10 MG Tab; Take 1 Tablet by mouth every day.  Dispense: 90 Tablet; Refill: 1       9. Overflow incontinence of urine    - DME Other    10. Urge incontinence    - DME Other         Followup: No follow-ups on file.        Signed by: Main Mcgee M.D.

## 2024-08-01 PROBLEM — Z02.9 ADMINISTRATIVE ENCOUNTER: Status: ACTIVE | Noted: 2024-08-01

## 2024-08-01 PROBLEM — R60.0 LOWER EXTREMITY EDEMA: Status: ACTIVE | Noted: 2024-08-01

## 2024-08-07 PROCEDURE — RXMED WILLOW AMBULATORY MEDICATION CHARGE: Performed by: NURSE PRACTITIONER

## 2024-08-13 ENCOUNTER — NON-PROVIDER VISIT (OUTPATIENT)
Dept: VASCULAR LAB | Facility: MEDICAL CENTER | Age: 44
End: 2024-08-13
Attending: INTERNAL MEDICINE
Payer: MEDICAID

## 2024-08-13 ENCOUNTER — PHARMACY VISIT (OUTPATIENT)
Dept: PHARMACY | Facility: MEDICAL CENTER | Age: 44
End: 2024-08-13
Payer: COMMERCIAL

## 2024-08-13 DIAGNOSIS — Z79.4 TYPE 2 DIABETES MELLITUS WITH HYPERGLYCEMIA, WITH LONG-TERM CURRENT USE OF INSULIN (HCC): Primary | ICD-10-CM

## 2024-08-13 DIAGNOSIS — E11.65 TYPE 2 DIABETES MELLITUS WITH HYPERGLYCEMIA, WITH LONG-TERM CURRENT USE OF INSULIN (HCC): Primary | ICD-10-CM

## 2024-08-13 LAB
HBA1C MFR BLD: 9.7 % (ref ?–5.8)
POCT INT CON NEG: NEGATIVE
POCT INT CON POS: POSITIVE

## 2024-08-13 PROCEDURE — 99212 OFFICE O/P EST SF 10 MIN: CPT

## 2024-08-13 PROCEDURE — 83036 HEMOGLOBIN GLYCOSYLATED A1C: CPT

## 2024-08-13 PROCEDURE — RXOTC WILLOW AMBULATORY OTC CHARGE: Performed by: PHARMACIST

## 2024-08-13 NOTE — PATIENT INSTRUCTIONS
Inject Tresiba 46 units daily. Decrease to 42 units if your blood sugars before breakfast are consistently less than 70.    Inject Humalog 6 units before meals    Take metformin 1000 mg in the morning and 1000 mg in the evening with meals

## 2024-08-13 NOTE — PROGRESS NOTES
Patient Consult Note    Primary care physician: Main Mcgee M.D.    Reason for consult: Management of Uncontrolled Type 2 Diabetes    HPI:  Liana Obrien is a 43 y.o. old patient who comes in today for evaluation of above stated problem.    Allergies:  Bicillin c-r [penicillin g proc & benzathine], Ondansetron, Penicillin g, Sulfa drugs, and Metoclopramide    Current Diabetes Medication Regimen:  Metformin ER: 1000 mg in the AM and 500 mg in the PM  Tresiba 46 units daily  Humalog 6 units before meals    Previous Diabetes Medications and Reason for Discontinuation:  Tresiba and Humalog - stopped by pt d/t perceived ADR of vaginal bleeding  Mounjaro - stopped by hospitalist d/t dehydration  Metformin ER - cramping and nausea, but now tolerating  Pioglitazone - stopped by former endocrinologist 2023  Steglatro - stopped by former endocrinologist 2023; hx of UTI    Potential Barriers to Care:  Adherence: reports missed doses of metformin in the evening (takes at bedtime but now plans to take with dinner), otherwise reports good compliance  Side effects: none, diarrhea from metformin resolved but appears to have returned due to diet. States diarrhea has resolved completely.  Affordability: no issues    SMBG: Zack 2      Hypoglycemia:  Hypoglycemia awareness: Yes  Nocturnal hypoglycemia: None  Hypoglycemia:  None  Pt's treatment of Hypoglycemia  Discussed 15:15 Rule    Lifestyle:  Current Exercise: none at this time  Exercise Goal: walk for 5-10 min daily    Dietary:  Meeting with a nutritionist weekly  Incorporating more protein and veggies in her diet and limiting her carb intake  States next goal is to eat meals at scheduled times  Breakast (7am): eggs, protein drink, bananas  Lunch (1-2pm, sometimes ~4pm): eggs with cheese/tomatoes, kiwi, tuna/decker, spaghetti  Dinner (8-9pm): eggs with cheese/tomatoes  Snack (10-11pm): velveeta, pudding cup  Drinking mostly water with crystal light, coffee with Splenda and  occasional creamer/half&half    Preventative Management:  BP regimen (ACEi/ARB): lisinopril 10 mg  Statin: atorvastatin (Lipitor) 20 mg three times a week  LDL <100: no (see Media for most recent labs)  Last Lipid Panel 11/13/23      Last Microalbumin/Cr:  Lab Results   Component Value Date/Time    MALBCRT 30 08/11/2018 08:21 AM    MICROALBUR 4.1 08/11/2018 08:21 AM     Last A1c:  Lab Results   Component Value Date/Time    HBA1C 9.7 (A) 08/13/2024 12:00 AM      Labs  Lab Results   Component Value Date/Time    SODIUM 139 05/16/2024 01:21 AM    POTASSIUM 3.4 (L) 05/16/2024 01:21 AM    CHLORIDE 107 05/16/2024 01:21 AM    CO2 22 05/16/2024 01:21 AM    GLUCOSE 162 (H) 05/16/2024 01:21 AM    BUN 6 (L) 05/16/2024 01:21 AM    CREATININE 0.36 (L) 05/16/2024 01:21 AM     Lab Results   Component Value Date/Time    ALKPHOSPHAT 107 (H) 05/12/2024 12:53 AM    ASTSGOT 9 (L) 05/12/2024 12:53 AM    ALTSGPT 10 05/12/2024 12:53 AM    TBILIRUBIN 0.2 05/12/2024 12:53 AM    INR 1.03 09/22/2022 01:20 AM    ALBUMIN 2.9 (L) 05/14/2024 02:30 AM        Lab Results   Component Value Date/Time    MALBCRT 30 08/11/2018 08:21 AM    MICROALBUR 4.1 08/11/2018 08:21 AM     Estimated CrCl: > 100 mL/min  eGFR 132     Physical Examination:  Vital signs: There were no vitals taken for this visit. There is no height or weight on file to calculate BMI.    Assessment and Plan:    1. DMII  Discussed Goals: FBG 80 - 130, 2hPP < 180, a1c < 7.0%  A1c remains above goal but shows significant improvement from 14.4% to 9.7%  AGP shows continued improvement with average glucose at 173 (decreased from 213)  TIR now at 61% (previously 44%)  Pt with previous hospital admissions for DKA and HHS d/t noncompliance to DM medications  BG control shows improvement with recent insulin titrations and recent dietary modifications. Again congratulated patient on recent improvements.  GI issues have resolved. Will increase metformin dose to fully maximize effect of the  medication and to decrease insulin requirements.    Medication changes:  Increase metformin 1000 mg in the AM and 1000 mg in the PM  Continue Tresiba 46 units daily. Decrease to 42 units If FBG is consistently < 70.  Continue Humalog 6 units before meals    Lifestyle changes:  Increase exercise as tolerated. Walk for 10 minutes per day  Focus on reading nutrition labels to increase protein and fiber, and decrease carbs. Continue following up weekly with nutritionist.    Follow Up:  2 weeks    Jesus Cazares PharmD    CC:   Main Mcgee M.D.

## 2024-08-23 DIAGNOSIS — Z79.4 TYPE 2 DIABETES MELLITUS WITH HYPERGLYCEMIA, WITH LONG-TERM CURRENT USE OF INSULIN (HCC): ICD-10-CM

## 2024-08-23 DIAGNOSIS — E11.65 TYPE 2 DIABETES MELLITUS WITH HYPERGLYCEMIA, WITH LONG-TERM CURRENT USE OF INSULIN (HCC): ICD-10-CM

## 2024-08-23 PROCEDURE — RXMED WILLOW AMBULATORY MEDICATION CHARGE: Performed by: PSYCHIATRY & NEUROLOGY

## 2024-08-23 PROCEDURE — RXMED WILLOW AMBULATORY MEDICATION CHARGE: Performed by: INTERNAL MEDICINE

## 2024-08-23 PROCEDURE — RXMED WILLOW AMBULATORY MEDICATION CHARGE: Performed by: PHYSICIAN ASSISTANT

## 2024-08-23 PROCEDURE — RXMED WILLOW AMBULATORY MEDICATION CHARGE: Performed by: NURSE PRACTITIONER

## 2024-08-23 PROCEDURE — RXMED WILLOW AMBULATORY MEDICATION CHARGE

## 2024-08-26 RX ORDER — INSULIN LISPRO 100 [IU]/ML
INJECTION, SOLUTION INTRAVENOUS; SUBCUTANEOUS
Qty: 15 ML | Refills: 1 | Status: SHIPPED | OUTPATIENT
Start: 2024-08-26

## 2024-08-27 ENCOUNTER — APPOINTMENT (OUTPATIENT)
Dept: VASCULAR LAB | Facility: MEDICAL CENTER | Age: 44
End: 2024-08-27
Attending: INTERNAL MEDICINE
Payer: MEDICAID

## 2024-08-29 ENCOUNTER — OFFICE VISIT (OUTPATIENT)
Dept: INTERNAL MEDICINE | Facility: OTHER | Age: 44
End: 2024-08-29
Payer: MEDICAID

## 2024-08-29 VITALS
TEMPERATURE: 97.1 F | HEIGHT: 63 IN | WEIGHT: 212.8 LBS | HEART RATE: 73 BPM | OXYGEN SATURATION: 98 % | DIASTOLIC BLOOD PRESSURE: 75 MMHG | BODY MASS INDEX: 37.7 KG/M2 | SYSTOLIC BLOOD PRESSURE: 112 MMHG

## 2024-08-29 DIAGNOSIS — E11.65 TYPE 2 DIABETES MELLITUS WITH HYPERGLYCEMIA, WITH LONG-TERM CURRENT USE OF INSULIN (HCC): ICD-10-CM

## 2024-08-29 DIAGNOSIS — Z79.4 TYPE 2 DIABETES MELLITUS WITH HYPERGLYCEMIA, WITH LONG-TERM CURRENT USE OF INSULIN (HCC): ICD-10-CM

## 2024-08-29 DIAGNOSIS — E61.1 IRON DEFICIENCY: ICD-10-CM

## 2024-08-29 DIAGNOSIS — B37.31 VAGINAL CANDIDIASIS: ICD-10-CM

## 2024-08-29 DIAGNOSIS — E55.9 VITAMIN D DEFICIENCY: ICD-10-CM

## 2024-08-29 DIAGNOSIS — E03.9 HYPOTHYROIDISM, UNSPECIFIED TYPE: ICD-10-CM

## 2024-08-29 PROCEDURE — 3074F SYST BP LT 130 MM HG: CPT | Performed by: INTERNAL MEDICINE

## 2024-08-29 PROCEDURE — 99214 OFFICE O/P EST MOD 30 MIN: CPT | Performed by: INTERNAL MEDICINE

## 2024-08-29 PROCEDURE — 3078F DIAST BP <80 MM HG: CPT | Performed by: INTERNAL MEDICINE

## 2024-08-29 ASSESSMENT — FIBROSIS 4 INDEX: FIB4 SCORE: 0.59

## 2024-08-29 NOTE — PATIENT INSTRUCTIONS
[facility]  [address] [place] [address]  Phone: [phone]    For [address]  [place] [address]  Phone: [phone]

## 2024-08-29 NOTE — LETTER
[email or web address]  Main Mcgee M.D.  [address] [place] [address]  Fax: [phone]   Authorization for Release/Disclosure of   Protected Health Information   Name: LIANA KRAUSE : 1980 SSN: [id number]   Address: [address]  [place] [address] Phone:    There are no phone numbers on file.   I authorize the entity listed below to release/disclose the PHI below to:   [facility]/Main Mcgee M.D. and Main Mcgee M.D.   Provider or Entity Name:     Address   City, State, Zip   Phone:      Fax:     Reason for request: continuity of care   Information to be released:    [  ] LAST COLONOSCOPY,  including any PATH REPORT and follow-up  [  ] LAST FIT/COLOGUARD RESULT [  ] LAST DEXA  [  ] LAST MAMMOGRAM  [  ] LAST PAP  [  ] LAST LABS [  ] RETINA EXAM REPORT  [  ] IMMUNIZATION RECORDS  [  ] Release all info      [  ] Check here and initial the line next to each item to release ALL health information INCLUDING  _____ Care and treatment for drug and / or alcohol abuse  _____ HIV testing, infection status, or AIDS  _____ Genetic Testing    DATES OF SERVICE OR TIME PERIOD TO BE DISCLOSED: _____________  I understand and acknowledge that:  * This Authorization may be revoked at any time by you in writing, except if your health information has already been used or disclosed.  * Your health information that will be used or disclosed as a result of you signing this authorization could be re-disclosed by the recipient. If this occurs, your re-disclosed health information may no longer be protected by State or Federal laws.  * You may refuse to sign this Authorization. Your refusal will not affect your ability to obtain treatment.  * This Authorization becomes effective upon signing and will  on (date) __________.      If no date is indicated, this Authorization will  one (1) year from the signature date.    Name: Liana Krause  Signature: Date:   2024     PLEASE FAX REQUESTED  RECORDS BACK TO: [phone]

## 2024-08-29 NOTE — PROGRESS NOTES
Chief Complaint   Patient presents with    Follow-Up       HPI: Liana Obrien is a 43 y.o. female with past medical history as below who presented to the clinic for the following.      Patient comes in for paperwork for RTC and Department of Transportation however patient also complains of rash on her face and neck and upper extremities as well as complaints of trace lower extremity edema.    Stuff happening - littlle bit     Better         *Rash  -Mildly papular, nonpustular nonplaque-like, associated with some hyperpigmentation in the surroundings on the face upper neck and upper extremities.  -Today able to appreciate the wounds on the upper neck and face.  -Patient has tried Cetaphil and use Lubriderm without much effect.  -Patient did change her detergent soap recently.  -Patient has not been out in the sun for a long time.  -Lesions are not painful or itchy.  -Discussed with patient regarding trial of CeraVe, change of detergent soap and reassess if not resolving then to consider dermatology referral for this.    *Lower extremity edema.  -Recently noticed over the past few weeks, bilaterally.  -Denies any new pain, redness warmth.  -Patient is concerned because of her history of DVT and wants to be tested for that.  -She denies any new shortness of breath chest pain palpitations dizziness lightheadedness orthopnea PND.  -Is currently still pending issuance of CPAP machine.  -Has uncontrolled sugars as detailed below.  -No new medications which could potentially cause edema.  -On examination patient has trace bilateral lower extremity edema extending to ankle/mid lower leg  -Discussed about potentially getting a D-dimer screen if patient is concerned if positive that pursuing ultrasound for lower extremity.  -Encouraged to use compression stockings, leg elevation in the meantime.    *Administrative encounter.  -A significant amount of time was spent filling out form for Department of  Transportation as it involved including details regarding proof of previous notes/ER visits which were compiled during the visit.        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  -recently when seen in clinic prior to hospitalization a vaginal swa was collected that showed recurrent yeast infection including candida glabrata and patient was prescribed fluconazole and also boric acid on the last visit   -Patient was reluctant to start insulin since discharge - she had stopped taking it - however after explaining risks - patient was willing to restart one injection - started on 35 units of insulin - she did not want to start the short acting   She was also prescribed synjardy, however insurance did not cover it   She has CGM however was not able to connect it with the phone and hence was having to check her sugars by glucometer   New nutrition referral was placed on previous visit - did not follow up pwith patient regarding this today   Also following up with pharmacotherapy services - since last visit has seen them and insulin dose increased to 42 units > 54 units  Started on humalog 4 units, decreased tresiba to 46 units, increased humalog to 6 units three times a day before meals   Also on metformin 1000 in the morning and 500 in the evening -  During most recent hospitalization - patient was referred to endocrinology - patient did not want to follow up with previous endo   Patient was reassured that this referral has been sent to another  office and encouraged her to contact them - she agreed - provided info again   Eye doctor-         Tsh               Other problems not discussed on this visit   **Menorrhagia   *Fibroids   CBC most recently showed elevated rdw - no anemia at this time    ferritin of 7.1, percent saturation of 5, TIBC of 275.from march 2024   No bleeding at this time   Not followed up with gyn yet - reinforced today-   Hormonal options are not an option as patient has a history of DVT - patient may need surgical options - explained to patient  She is taking iron supplement daily , however also is complaining of constipation   *Right ring finger pain   -Started after injury in the past - had subsided on its own, however now has recently started hurting again along the finger now all the way up to her forearm.   Patient attributes it to recent lifting etc   There is no swelling erythema warmth on exam.   Patient has not tried anything for the pain yet   Would like to see hand surgeon - referral will be placed today   *Urine incontinence   -Patient continues to have issues with urine incontinence - increased frequency of urination   Uncontrolled sugars   Requesting for orders to be faxed to [facility] for incontinence supplies   Bed pads - briefs, pads   Fax : number [facility] - [phone]  *vaginal candidiasis - Status post fluconazole and also boric acid - with resolution of symptoms  Pharmacy recommends against synjardy - I agree plus insurancce is not covering it   With candida glabrata   *Pubic lesions /vulvar lesions /per-rectal   Patient reports that she has had it before on last visit patient had 2 lesions - on on pubic symphyseal region and the other vaginally with the intravaginal one not popped yet -patient was instructed to follow up with gyn m use warm compress and help it pop - however did not improve and had more lesions    Status post antibiotic course for 5 days - with resolution of most except one that is  perirectal now.   On examination there is an ulcer perrectally  however healing well, no induration or fluctuance however with local erythema   Patient also encouraged to follow up with gyn for this and uterine bleeding which is not present at this time   Patient provided info - new referral placed to gyn also for new problem   She has no uterine bleed at this time   Management of sugar as above   Another round of Augmentin for longer duration will be prescribed today for the perirectal lesion   Patient  also has yeast like symptoms since the last course of antibiotic - fluconazole will be prescribed - and prophylactic fluconazole in case of return of symptoms after this upcoming course of Augmentin.   Patient however also instructed to reach put if yeast infection symptoms do not resolve as she did have candida glabrata on the last swab - hence she may have to be retested if symptoms do not resolve with fluconazole.   Management of other conditions which may be related to diabetes as below   Patient does have constipation - will modify iron supplementation ad manage sugars as above  Increased urine frequency-management as above   urge incontinence history more than stress  -Patients foot pain- bilateral plantar surface discussed today, follows up with podiatry post takes gabapentin.      Last eye exam in December   Last monofilament test - normal monofilament test  with decreased sensation only on the right great toe.  -Pulses intact, vibration sense slightly reduced in the great toes only.  -Proprioception intact.    *Hypokalemia.  -Noted on the last lab work done in the hospital with a potassium of 3.3.  -Normal renal function.  Not on any medications that could be lowering potassium levels  *Vitamin D deficiency.  -Last vitamin D from 3/16/2024 was 11  Taking viatmin d supplement   *Anxiety   -Patient is following up with psychiatry, mental health counselor, [facility].  -Was receiving inpatient  psychiatry treatment, currently is on Zoloft 100 mg, trazodone 100 mg  at nighttime, hydroxyzine as needed.   -Recent stressors as mentioned above.  Saw psychology psychiatry in hospital - now back to her outpatient psychologist - feels much better after she physically is feeling better   Sleep apnea  -Sleep study at home was done however she has not heard back from them   Needs to be faxed over again - was faxed in the past by Dr. Sharif   Status post dental procedure   -Associated with swelling - much better   There is some remnant discomfort which however is improving   Not anymore   Dentures - so the cavities   Cleaning     *non adherance to meds /side effects of meds   Patient believed that the nausea, vomiting and diarrhea is from her medications.   As per patient, her endocrinologist has stopped all the oral meds for diabetes that she was on and is currently on on insulin degludec 30 units now , humalog based on sliding scale and trulicity   Pioglitazone, steglatro and metformin were stopped . However patient continued to have nausea, vomiting diarrhea which has since spontaneously resolved - hence likely associated with COVID   Recommended to continue lisinopril daily and atorvastatin 3 times a week   Patient is on as needed hydroxyzine which she is taking once or upto twice as needed   Taking trazodone 100 mg and sertraline through psychiatry - will address these with psychiatry   -Patient was on gabapentin which she has stopped since  she got steroid injection for foot which helped with the pain and that has resolved since   Patient also reports that she had to stop Vitamin C because it interacted with one of the medications that she was on and and she feels that she is getting more sick because she has not been able to take the vitamin C   *Blurry vision-checked with optometrist.  -Minimal changes were associated with diabetes as per patient otherwise within normal limits.  *Shortness of  breath.  Resolved   -Patient reported that she felt that with increasing her level of activity around the house recently and had noticed that she gets very winded just walking around the house.  Denied any cough or wheezing.   -Denied any chest pain.  Did  report palpitations sometimes when she gets anxious which goes away with some deep breaths, which is also resolved now   Denied any lightheadedness, dizziness, pedal edema, orthopnea or PND  -Does have history of sleep apnea, was on CPAP in the past patient does not recall why as she is not on it anymore.  -On examination patient patient did have a grade three systolic murmur that was present previously however on subsequent examination  - unable to appreciate that murmur.   Echocardiogram recently done was also not concerning except trace MR, mild PI concentric hypertrophy of left ventricle    -Denied any fevers, chills.  -As per labs in the past patient has had history of anemia, she has regular menstrual cycles sometimes, most recently her menstrual cycle lasted for 10 days.  Has history of PCOS as per chart, ovarian cyst  -Last cbc from November 2023 shows microcytosis without anemia , MCV- 75, normal RDW and other cell lines. PBS not commented on   -Has history of DVT as per chart.  *Tachycardia   resolved  -noted that her watch - heart rate above 110 when she is up and about   -Not present today at rest and patient is asymptomatic  -echo unremarkable  Last cbc showed   History of COVID -recovered well -described below   -Patient was able to finally reach the ophthalmologist , however was told that her insurance would not be taken   *Skin tag   -on lower abdomen - grown - wants to be rmoved,  -Also on exam noted to have erythema on lower abdominal region below skin fold- no symptoms   *Blurry vision  -Patient reports that about 6 years ago she had a significant blurry vision of both eyes which lasted for several months, when she was almost declared blind  legally, was evaluated by ophthalmologist, unsure what the diagnosis was.  -Since then however her vision has significantly improved, currently has intermittent blurring of vision on both eyes, which when comes on lasts for a day to a month continuously.  -Denies any headaches, flashes floaters blind spots, eye pain, redness or aura  -We had placed ophthalmology referral on the last visit however patient was not able to attend the appointment due to being inpatient for mental health related issues.  -Patient is requesting for new referral.  -Patient denies having any dry eyes at this time  *Breast Skin lesion   Has had history of bug bites.   -On examination now only has superficial scar, no deeper concerning tissue on palpation.  -Patient did have another spot with similar complaints, which again is currently a scar.  -Had ordered ultrasound breast at the time which has not been done.  -Given resolution of lump will go ahead and proceed to routine mammogram screening.  *Status post surgery for perirectal abscess/fistula   -Healing well as per patient  *Obesity  -Patient is working with nutrition specialist as well as trying to get more exercise as per recommendations of nutrition specialist.    Presentative health  Pap smear pending   Colon cancer  - next year   Mammo ordered in September   HIV   Tdap     No blood work     Hurting yeast infection   Fluconazole fluconazole           Patient Active Problem List    Diagnosis Date Noted    Anxiety  On Zoloft 100 mg, trazodone 100 mg daily at night, hydroxyzine 06/16/2023    Neuropathic pain of both feet  On gabapentin  06/16/2023    Fibroids 11/15/2022    As in hpi above 10/12/2022    Anemia  Microcytosis without anemia  09/23/2022    Dyslipidemia 09/22/2022    Obesity (BMI 30-39.9) 05/25/2022    Gastro-esophageal reflux disease without esophagitis 04/14/2021    Major depressive disorder 09/14/2020    Polycystic ovary syndrome  2.2 cm right ovarian cyst seen on recent  CT in May 2023  Unable to undergo TVUS due to perirectal abscess  Unable to go on estrogen containing OCP due to history of VTE  Will follow up with gyn following resolution of perirectal abscess 12/11/2019    History of deep venous thrombosis  2 episodes reported on bilateral lower extremities, subsequent ultrasounds for suspected dvt were negative- thought to have been precipitated by Birth control which she is not on anymore  11/19/2019    Hypothyroidism  Levothyroxine 150 mcg,Last TSH from 03/16/2024 was 0.49 11/19/2019    Hypertension  Mildly elevated on lisinopril 10 - not checking blood pressures at home regularly as the cuff not working  10/28/2017    Obstructive sleep apnea, adult  Trying to set up with cpap, backlog with preferred - only company patient's insurance takes  07/31/2015    Vitamin D deficiency- last level checked was 11 from march 2024  03/12/2015    Type 2 diabetes mellitus with hyperglycemia, with long-term current use of insulin ([facility])  Was Following up with endocrinology -  details above  03/12/2015       Current Outpatient Medications   Medication Sig Dispense Refill    Calcium Polycarbophil (FIBER-CAPS PO) Take  by mouth.      PROTEIN PO Take  by mouth.      insulin lispro (HUMALOG KWIKPEN) 100 UNIT/ML SC SOPN injection PEN Inject 6 units subcutaneously three times daily before meals or as directed (max 50 units/day) 15 mL 1    Insulin Degludec (TRESIBA FLEXTOUCH) 200 UNIT/ML Solution Pen-injector Inject up to 60 units under the skin daily or as directed 27 mL 1    hydrOXYzine pamoate (VISTARIL) 50 MG Cap Take 1 oral capsule as needed every (6-8) hours for anxiety 90 Capsule 2    sertraline (ZOLOFT) 100 MG Tab Take 1 oral tablet once a day. Discontinue fluoxetine, starting sertraline. 30 Tablet 2    traZODone (DESYREL) 100 MG Tab Take 2 oral tablet at bedtime as needed 60 Tablet 2    Alcohol Swabs Pads use one alcohol swab three times a day as needed. 300 Each 6    ferrous sulfate  (FEROSUL) 325 (65 Fe) MG tablet Take 1 Tablet by mouth every 48 hours. 30 Tablet 1    Insulin Pen Needle 32 G x 4 mm Use to inject insulin 4 times daily 100 Each 5    vitamin D (CHOLECALCIFEROL) 1000 UNIT Tab Take 1 Tablet by mouth every day. 60 Tablet 1    atorvastatin (LIPITOR) 40 MG Tab Take 1 Tablet by mouth every evening. 90 Tablet 1    gabapentin (NEURONTIN) 100 MG Cap Take 2 Capsules by mouth 3 times a day. 180 Capsule 3    levothyroxine (SYNTHROID) 150 MCG Tab Take 1 Tablet by mouth every morning on an empty stomach. 90 Tablet 1    lisinopril (PRINIVIL) 10 MG Tab Take 1 Tablet by mouth every day. 90 Tablet 1    fluconazole (DIFLUCAN) 150 MG tablet Take 1 Tablet by mouth every day. For  persistent symptoms, take a second dose of 150 mg administered 72 hours after the first dose.   I am also prescribing another course if symptoms recur after upcoming course of antibiotics - as refill 2 Tablet 1    sertraline (ZOLOFT) 100 MG Tab Take 1 oral tablet once a day 30 Tablet 2    mupirocin (BACTROBAN) 2 % Ointment Apply 1 Application topically 2 times a day. For 2 weeks 22 g 0    hydrOXYzine pamoate (VISTARIL) 50 MG Cap Take 1 oral capsule as needed every (6-8) hours for anxiety 90 Capsule 3    Continuous Glucose Sensor (FREESTYLE ALEKSANDRA 2 SENSOR) [facility] [place] 1 kit as directed every two weeks change every 14 days 7 Each 3    Glucagon (BAQSIMI TWO PACK) 3 mg/dose Inhale 1 SPRAY into the nostril as a single dose for severe hypoglycemia 2 Each 3    amoxicillin-clavulanate (AUGMENTIN) 875-125 MG Tab Take 1 Tablet by mouth 2 times a day. For 10 days (Patient not taking: Reported on 7/31/2024) 20 Tablet 0     No current facility-administered medications for this visit.       ROS: As per HPI. Additional pertinent systems as noted below.  Constitutional:  Negative for fever, chills   Cardiovascular:  Negative for chest pain,  Respiratory:  Negative for cough, sputum production, negative for shortness of breath  Skin : Positive as  "in HPI.  Extremities: As in HPI      /75 (BP Location: Left arm, Patient Position: Sitting, BP Cuff Size: Adult)   Pulse 73   Temp 36.2 °C (97.1 °F) (Temporal)   Ht 1.6 m (5' 3\")   Wt 96.5 kg (212 lb 12.8 oz)   SpO2 98%   BMI 37.70 kg/m²     Physical Exam   Constitutional:  .  Not in acute distress  Extremities: Trace bilateral lower extremity edema extending to mid lower leg.  Symmetrical.  Cardiovascular: Normal S1-S2.  Respiratory: Regular breath sounds no added sounds.  Skin: Mildly papular, nonpustular nonplaque-like, associated with some hyperpigmentation in the surroundings on the face upper neck and upper extremities.  Note: I have reviewed all pertinent labs and diagnostic tests associated with this visit with specific comments listed under the assessment and plan below    Assessment and Plan    1. Dermatitis  Potential allergic dermatitis.  -Discussed with patient regarding trial of CeraVe, change of detergent soap and reassess if not resolving then to consider dermatology referral for this.      2. Lower extremity edema  Minimal, no other hypervolemic symptoms.  Likely venous insufficiency.  -Recommended compression stockings, leg elevation and reassess on the next visit in a month.  -Patient fearful about DVT given her history.  D-dimer ordered if positive will proceed with ultrasound of lower extremities.    3. Type 2 diabetes mellitus with hyperglycemia, with long-term current use of insulin (HCC)  Currently being managed by pharmacy.  -Continue Tresiba 46 units, Humalog 6 units 3 times a day.  Metformin 1500 daily.  -Repeat A1c to be done after mid August which has been ordered.  -Microalbumin creatinine ratio also has been ordered.  -[facility], records pending.  Was recommended to have glasses, and to go to the optometrist possibly for that purpose.  Patient encouraged to follow-up with an optometrist.  -Does have neuropathy, follows up " with podiatry as well.  No wounds cuts ulcers at this time.  -Renal function stable.  - D-DIMER; Future  - HEMOGLOBIN A1C; Future  - MICROALBUMIN CREAT RATIO URINE; Future    4. Administrative encounter  Extensive amount of time was spent today filling of the Department of Transportation paperwork which required further documentation to be attached, and these were compiled during this visit.  -Additionally RTC paperwork was also filled out.  -Paperwork was primarily related to her uncontrolled type 2 diabetes mellitus with hyperglycemia, anxiety/panic attacks which sometimes can be limiting for transportation, being out in the public etc.  -Patient reports that she takes about 2 hours to 24 hours to recover from these anxiety attacks sometimes.  She has developed ways to cope with these including medications/hydroxyzine, positive reinforcement.  Additionally paperwork was filled out to include her neuropathy and vision impairment for which she is following up with podiatry as well as ophthalmology.    5. Anxiety  Included in the paperwork filled out today.  -Stable at this time.    6. Neuropathic pain of both feet  -Is on gabapentin, continue follow-up with podiatry    Followup: No follow-ups on file.        Signed by: Main Mcgee M.D.

## 2024-08-30 ENCOUNTER — PHARMACY VISIT (OUTPATIENT)
Dept: PHARMACY | Facility: MEDICAL CENTER | Age: 44
End: 2024-08-30
Payer: COMMERCIAL

## 2024-08-30 ENCOUNTER — NON-PROVIDER VISIT (OUTPATIENT)
Dept: VASCULAR LAB | Facility: MEDICAL CENTER | Age: 44
End: 2024-08-30
Attending: INTERNAL MEDICINE
Payer: MEDICAID

## 2024-08-30 PROCEDURE — 99212 OFFICE O/P EST SF 10 MIN: CPT

## 2024-08-30 PROCEDURE — RXMED WILLOW AMBULATORY MEDICATION CHARGE: Performed by: NURSE PRACTITIONER

## 2024-08-30 NOTE — PROGRESS NOTES
Patient Consult Note    TIME IN: 4.25  TIME OUT: 4.46    Primary care physician: Main Mcgee M.D.    Reason for consult: Management of Uncontrolled Type 2 Diabetes    HPI:  Liana Obrien is a 44 y.o. old patient who comes in today for evaluation of above stated problem.    Allergies  Bicillin c-r [penicillin g proc & benzathine], Ondansetron, Penicillin g, Sulfa drugs, and Metoclopramide    Current Diabetes Medication Regimen  Metformin ER: 1000 mg in the AM and 1000 mg in the PM  Tresiba 46 units daily  Humalog 6 units before meals    Previous Diabetes Medications and Reason for Discontinuation  Tresiba and Humalog - stopped by pt d/t perceived ADR of vaginal bleeding  Mounjaro - stopped by hospitalist d/t dehydration  Metformin ER - cramping and nausea, but now tolerating  Pioglitazone - stopped by former endocrinologist 2023  Steglatro - stopped by former endocrinologist 2023; hx of UTI    Potential Barriers to Care:  Adherence: reports missed doses for about 2 weeks  Side effects: none  Affordability: Yes    SMBG  Pt has home glucometer and proper testing technique - Patient has CGM (Zack 2)    Pt reports blood sugars:       Hypoglycemia  Hypoglycemia awareness: Yes  Nocturnal hypoglycemia: None  Hypoglycemia:  None  Pt's treatment of Hypoglycemia  Discussed 15:15 Rule    Lifestyle  Current Exercise - None currently    Dietary - Patient works with a nutritionist, and meets with them weekly  Breakfast - Protein shake, eggs  Lunch - Hot dogs, fajitas  Dinner - Ground beef and vegetables, hash browns  Snacks - Chips/crackers   Drinks - Coffee and creamer    Labs  Lab Results   Component Value Date/Time    HBA1C 9.7 (A) 08/13/2024 12:00 AM      Lab Results   Component Value Date/Time    SODIUM 139 05/16/2024 01:21 AM    POTASSIUM 3.4 (L) 05/16/2024 01:21 AM    CHLORIDE 107 05/16/2024 01:21 AM    CO2 22 05/16/2024 01:21 AM    GLUCOSE 162 (H) 05/16/2024 01:21 AM    BUN 6 (L) 05/16/2024 01:21 AM     CREATININE 0.36 (L) 2024 01:21 AM     Lab Results   Component Value Date/Time    ALKPHOSPHAT 107 (H) 2024 12:53 AM    ASTSGOT 9 (L) 2024 12:53 AM    ALTSGPT 10 2024 12:53 AM    TBILIRUBIN 0.2 2024 12:53 AM    INR 1.03 2022 01:20 AM    ALBUMIN 2.9 (L) 2024 02:30 AM      Lab Results   Component Value Date/Time    CHOLSTRLTOT 132 2018 08:22 AM    LDL 59 2018 08:22 AM    HDL 57 2018 08:22 AM    TRIGLYCERIDE 82 2018 08:22 AM       Lab Results   Component Value Date/Time    MALBCRT 30 2018 08:21 AM    MICROALBUR 4.1 2018 08:21 AM     Preventative Management:  BP regimen (ACEi/ARB): lisinopril 10 mg  Statin: atorvastatin (Lipitor) 20 mg three times a week  LDL <100: no (see Media for most recent labs)  Last Lipid Panel 23    Physical Examination:  Vital signs: There were no vitals taken for this visit. There is no height or weight on file to calculate BMI.    Assessment and Plan:    1. DM2  Upon reviewing CGM data, BG has been elevated Average B, GMI 10.9 and BG variability 23.4%  Discussed Goals: FBG 80 - 130, 2hPP < 180, a1c < 7.0%  Last a1c drawn on 24 was 9.7%, which is not at goal   Patient admits to non-adherence to medication regimen, for about 2 weeks she was not injecting her insulin. She also admits to eating out during this time. Patient attributes this change to a recent friend she has been spending time with. Patient states the friendship had to end as she noticed increased BG readings and unhealthy habits.   Patient states that she will begin strict adherence to medication regimen like she has done in the past such as scheduling her meals and eating home cooked meals. She will begin injecting insulin as directed.    - Medication changes:  None   - Continue:  Continue Tresiba 46 units daily.   Continue Humalog 6 units before meals   Continue metformin 1000 mg in the AM and 1000 mg in the PM     - Lifestyle  changes:  Exercise Goal - Increase exercise as tolerated. Walk for 10 minutes per day   Dietary Goal - Continue working with your nutritionist weekly  Resume eating on  a schedule and injecting bolus insulin as directed      Follow Up:  2 weeks    Ayleen Singh, PharmD    CC:   Main Mcgee M.D.

## 2024-09-03 RX ORDER — FLUCONAZOLE 150 MG/1
150 TABLET ORAL DAILY
Qty: 2 TABLET | Refills: 1 | Status: SHIPPED | OUTPATIENT
Start: 2024-09-03 | End: 2024-09-30 | Stop reason: SDUPTHER

## 2024-09-06 PROCEDURE — RXMED WILLOW AMBULATORY MEDICATION CHARGE: Performed by: INTERNAL MEDICINE

## 2024-09-12 ENCOUNTER — NON-PROVIDER VISIT (OUTPATIENT)
Dept: VASCULAR LAB | Facility: MEDICAL CENTER | Age: 44
End: 2024-09-12
Attending: INTERNAL MEDICINE
Payer: MEDICAID

## 2024-09-12 ENCOUNTER — PHARMACY VISIT (OUTPATIENT)
Dept: PHARMACY | Facility: MEDICAL CENTER | Age: 44
End: 2024-09-12
Payer: COMMERCIAL

## 2024-09-12 DIAGNOSIS — Z79.4 TYPE 2 DIABETES MELLITUS WITH HYPERGLYCEMIA, WITH LONG-TERM CURRENT USE OF INSULIN (HCC): Primary | ICD-10-CM

## 2024-09-12 DIAGNOSIS — E11.65 TYPE 2 DIABETES MELLITUS WITH HYPERGLYCEMIA, WITH LONG-TERM CURRENT USE OF INSULIN (HCC): Primary | ICD-10-CM

## 2024-09-12 PROCEDURE — RXMED WILLOW AMBULATORY MEDICATION CHARGE: Performed by: NURSE PRACTITIONER

## 2024-09-12 PROCEDURE — 99212 OFFICE O/P EST SF 10 MIN: CPT

## 2024-09-12 RX ORDER — METFORMIN HCL 500 MG
1000 TABLET, EXTENDED RELEASE 24 HR ORAL 2 TIMES DAILY
Qty: 360 TABLET | Refills: 3 | Status: SHIPPED | OUTPATIENT
Start: 2024-09-12

## 2024-09-12 RX ORDER — INSULIN LISPRO 100 [IU]/ML
INJECTION, SOLUTION INTRAVENOUS; SUBCUTANEOUS
Qty: 15 ML | Refills: 2 | Status: SHIPPED | OUTPATIENT
Start: 2024-09-12

## 2024-09-12 NOTE — PROGRESS NOTES
Patient Consult Note    Primary care physician: Main Mcgee M.D.    Reason for consult: Management of Uncontrolled Type 2 Diabetes    HPI:  Liana Obrien is a 44 y.o. old patient who comes in today for evaluation of above stated problem.    Allergies  Bicillin c-r [penicillin g proc & benzathine], Ondansetron, Penicillin g, Sulfa drugs, and Metoclopramide    Current Diabetes Medication Regimen  Metformin ER: 1000 mg in the AM and 1000 mg in the PM  Tresiba 46 units daily  Humalog 6 units before meals    Previous Diabetes Medications and Reason for Discontinuation  Tresiba and Humalog - stopped by pt d/t perceived ADR of vaginal bleeding  Mounjaro - stopped by hospitalist d/t dehydration  Metformin ER - cramping and nausea, but now tolerating  Pioglitazone - stopped by former endocrinologist 2023  Steglatro - stopped by former endocrinologist 2023; hx of UTI    Potential Barriers to Care:  Adherence: reports missed doses  Side effects: none  Affordability: Yes  Others: reports increased stress and difficulty sleeping    SMBG  Pt has home glucometer and proper testing technique - Patient has CGM (Zack 2)    Pt reports blood sugars:       Hypoglycemia  Hypoglycemia awareness: Yes  Nocturnal hypoglycemia: None  Hypoglycemia:  None  Pt's treatment of Hypoglycemia  Discussed 15:15 Rule    Lifestyle  Current Exercise - None currently    Dietary - Patient works with a nutritionist, and meets with them weekly  Breakfast - Protein shake, eggs  Lunch - Hot dogs, fajitas  Dinner - Ground beef and vegetables, hash browns  Snacks - Chips/crackers   Drinks - Coffee and creamer    Labs  Lab Results   Component Value Date/Time    HBA1C 9.7 (A) 08/13/2024 12:00 AM      Lab Results   Component Value Date/Time    SODIUM 139 05/16/2024 01:21 AM    POTASSIUM 3.4 (L) 05/16/2024 01:21 AM    CHLORIDE 107 05/16/2024 01:21 AM    CO2 22 05/16/2024 01:21 AM    GLUCOSE 162 (H) 05/16/2024 01:21 AM    BUN 6 (L) 05/16/2024 01:21 AM     CREATININE 0.36 (L) 05/16/2024 01:21 AM     Lab Results   Component Value Date/Time    ALKPHOSPHAT 107 (H) 05/12/2024 12:53 AM    ASTSGOT 9 (L) 05/12/2024 12:53 AM    ALTSGPT 10 05/12/2024 12:53 AM    TBILIRUBIN 0.2 05/12/2024 12:53 AM    INR 1.03 09/22/2022 01:20 AM    ALBUMIN 2.9 (L) 05/14/2024 02:30 AM      Lab Results   Component Value Date/Time    CHOLSTRLTOT 132 08/11/2018 08:22 AM    LDL 59 08/11/2018 08:22 AM    HDL 57 08/11/2018 08:22 AM    TRIGLYCERIDE 82 08/11/2018 08:22 AM       Lab Results   Component Value Date/Time    MALBCRT 30 08/11/2018 08:21 AM    MICROALBUR 4.1 08/11/2018 08:21 AM     Preventative Management:  BP regimen (ACEi/ARB): lisinopril 10 mg  Statin: atorvastatin (Lipitor) 20 mg three times a week  LDL <100: no (see Media for most recent labs)  Last Lipid Panel 11/13/23      Physical Examination:  Vital signs: There were no vitals taken for this visit. There is no height or weight on file to calculate BMI.    Assessment and Plan:    1. DM2  Discussed Goals: FBG 80 - 130, 2hPP < 180, a1c < 7.0%  Last A1c drawn on 08/13/24 was 9.7%, which is not at goal but improved from previous A1c of 14.4%  TIR not at goal of > 70%  Patient has resumed her medications but admits to occasional missed doses.  Per AGP trends, it appears that glucose readings the past days have been mostly elevated.  Increased stress and lack of sleep likely contributing to elevations in glucose readings   Will titrate basal and bolus insulin for now    - Medication changes:  Increase Tresiba to 50 units daily  Increase Humalog to 8 units before meals     - Continue:  Metformin 1000 mg in the morning and 1000 mg in the evening with meals    - Lifestyle changes:  Exercise Goal - Increase exercise as tolerated. Walk for 10 minutes per day   Dietary Goal - Continue working with your nutritionist weekly      Follow Up:  2 weeks    Jesus Cazares, PharmD    CC:   Main Mcgee M.D.

## 2024-09-12 NOTE — PATIENT INSTRUCTIONS
Inject Tresiba 50 units daily    Inject Humalog 8 units before meals    Take metformin 1000 mg in the morning and 1000 mg in the evening with meals

## 2024-09-19 ENCOUNTER — NON-PROVIDER VISIT (OUTPATIENT)
Dept: INTERNAL MEDICINE | Facility: OTHER | Age: 44
End: 2024-09-19
Payer: MEDICAID

## 2024-09-19 ENCOUNTER — TELEPHONE (OUTPATIENT)
Dept: INTERNAL MEDICINE | Facility: OTHER | Age: 44
End: 2024-09-19

## 2024-09-19 DIAGNOSIS — Z23 NEED FOR VACCINATION: ICD-10-CM

## 2024-09-19 DIAGNOSIS — Z23 NEED FOR INFLUENZA VACCINATION: Primary | ICD-10-CM

## 2024-09-19 NOTE — PROGRESS NOTES
"Liana Obrien is a 44 y.o. female here for a non-provider visit for:   FLU    Reason for immunization: Annual Flu Vaccine  Immunization records indicate need for vaccine: No  Minimum interval has been met for this vaccine: Yes  ABN completed: Not Indicated    VIS Dated  08/06/2021 was given to patient: Yes  All IAC Questionnaire questions were answered \"No.\"    Patient tolerated injection and no adverse effects were observed or reported: Yes    Pt scheduled for next dose in series: No   "

## 2024-09-20 ENCOUNTER — OFFICE VISIT (OUTPATIENT)
Dept: INTERNAL MEDICINE | Facility: OTHER | Age: 44
End: 2024-09-20
Payer: MEDICAID

## 2024-09-20 VITALS
HEART RATE: 70 BPM | OXYGEN SATURATION: 96 % | DIASTOLIC BLOOD PRESSURE: 74 MMHG | TEMPERATURE: 96.7 F | SYSTOLIC BLOOD PRESSURE: 115 MMHG | HEIGHT: 63 IN | WEIGHT: 209.6 LBS | BODY MASS INDEX: 37.14 KG/M2

## 2024-09-20 DIAGNOSIS — E11.65 TYPE 2 DIABETES MELLITUS WITH HYPERGLYCEMIA, WITH LONG-TERM CURRENT USE OF INSULIN (HCC): ICD-10-CM

## 2024-09-20 DIAGNOSIS — Z02.9 ADMINISTRATIVE ENCOUNTER: ICD-10-CM

## 2024-09-20 DIAGNOSIS — Z79.4 TYPE 2 DIABETES MELLITUS WITH HYPERGLYCEMIA, WITH LONG-TERM CURRENT USE OF INSULIN (HCC): ICD-10-CM

## 2024-09-20 PROCEDURE — 3074F SYST BP LT 130 MM HG: CPT | Performed by: INTERNAL MEDICINE

## 2024-09-20 PROCEDURE — 99214 OFFICE O/P EST MOD 30 MIN: CPT | Performed by: INTERNAL MEDICINE

## 2024-09-20 PROCEDURE — 3078F DIAST BP <80 MM HG: CPT | Performed by: INTERNAL MEDICINE

## 2024-09-20 ASSESSMENT — FIBROSIS 4 INDEX: FIB4 SCORE: 0.59

## 2024-09-20 NOTE — PROGRESS NOTES
Chief Complaint   Patient presents with    Paperwork     Pt needs a note to return to work and discuss limitations       HPI: Liana Obrien is a 43 y.o. female with past medical history as below who presented to the clinic for the following.    Patient is here for paperwork stating limitations for return to work.   Patient is working for an agency - unsure of what post she would get at this time.   Letter provided with limitation pertaining to bathroom breaks and neuropathy     *Mice infestation at home   -reports that landlord is not willing to do anything at this time  Patient is unable to spend money on this at this time. 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  -recently when seen in clinic prior to hospitalization a vaginal swa was collected that showed recurrent yeast infection including candida glabrata and patient was prescribed fluconazole and also boric acid on the last visit   -Patient was reluctant to start insulin since discharge - she had stopped taking it - however after explaining risks - patient was willing to restart one injection - started on 35 units of insulin - she did not want to start the short acting at the time  She was also prescribed synjardy, however insurance did not cover it plus pharmacy recommended against it given yeast infections  She has CGM   New nutrition  referral was placed on previous visit - did not follow up pwith patient regarding this today   Following up with pharmacotherapy services now-   Tresiba - 50 units   2. humalog 8 units three times a day before meals   Also on metformin 1000 in the morning and 1000 in the evening -  During most recent hospitalization - patient was referred to endocrinology - patient did not want to follow up with previous endo   Patient was reassured that this referral has been sent to another office and encouraged her to contact them - not addressed on this visit   Has already followed up with ophthalmology requested for records today   Microalbumin creatinine ratio - normal as per labs done in August 2024    Patient reports today that she has not been very compliant to insulin or diet - due to having issues with spouse not complying by court orders and recent celebration of dads birthday - other ongoing things in life.   Recommended and reinforced again today that she needs to focus on atleast the long acting during such instances- patient agrees                                             Other problems not discussed on this visit   *Lower extremity edema.  -resolved   -History of dvt - D dimer negative   *History of ALBARO   -History of menorrhagia - as detailed below   *Fibroids   CBC most recently showed elevated rdw - no anemia at this time    ferritin of 7.1, percent saturation of 5, TIBC of 275.from march 2024   No bleeding at this time   Not followed up with gyn yet - reinforced today-   Hormonal options are not an option as patient has a history of DVT - patient may need surgical options - explained to patient  She is taking iron supplement daily , however also is complaining of constipation    Recheck iron studies   CBC   Encouraged to follow up with gynecology      *Right ring finger pain   -Started after injury in the past - had subsided on its own, however now has recently started hurting again along the finger now all  the way up to her forearm.   Patient attributes it to recent lifting etc   There is no swelling erythema warmth on exam.   Patient has not tried anything for the pain yet   Would like to see hand surgeon - referral information provided  Since previous visits however symptoms have significantly improved    *Rash  -Mildly papular, nonpustular nonplaque-like, associated with some hyperpigmentation in the surroundings on the face upper neck and upper extremities.  -Today able to appreciate the wounds on the upper neck and face.  -Patient has tried Cetaphil and use Lubriderm without much effect.  -Patient did change her detergent soap recently.  -Patient has not been out in the sun for a long time.  -Lesions are not painful or itchy.  -Discussed with patient regarding trial of CeraVe, change of detergent soap and reassess if not resolving then to consider dermatology referral for this.    *Urine incontinence   -Patient continues to have issues with urine incontinence - increased frequency of urination   Uncontrolled sugars   Requesting for orders to be faxed to [facility] for incontinence supplies   Bed pads - briefs, pads   Fax : number [facility] - [phone]  *vaginal candidiasis - Status post fluconazole and also boric acid - with resolution of symptoms  Pharmacy recommends against synjardy - I agree plus insurancce is not covering it   With candida glabrata   *Pubic lesions /vulvar lesions /per-rectal   Resolved , status post antibiotics   Management of other conditions which may be related to diabetes as below   Patient does have constipation - will modify iron supplementation ad manage sugars as above  Increased urine frequency-management as above   urge incontinence history more than stress  -Patients foot pain- bilateral plantar surface discussed today, follows up with podiatry post takes gabapentin.      Last eye exam in December   Last monofilament test - normal monofilament test  with decreased sensation  only on the right great toe.  -Pulses intact, vibration sense slightly reduced in the great toes only.  -Proprioception intact.    *Hypokalemia.  -Noted on the last lab work done in the hospital with a potassium of 3.3.  -Normal renal function.  Not on any medications that could be lowering potassium levels  *Vitamin D deficiency.  -Last vitamin D from 3/16/2024 was 11  Taking viatmin d supplement   *Anxiety   -Patient is following up with psychiatry, mental health counselor, [facility].  -Was receiving inpatient psychiatry treatment, currently is on Zoloft 100 mg, trazodone 100 mg  at nighttime, hydroxyzine as needed.   -Recent stressors as mentioned above.  Saw psychology psychiatry in hospital - now back to her outpatient psychologist - feels much better after she physically is feeling better   Sleep apnea  -Sleep study at home was done however she has not heard back from them   Needs to be faxed over again - was faxed in the past by Dr. Sharif   Status post dental procedure   -Associated with swelling - much better   There is some remnant discomfort which however is improving   Not anymore   Dentures - so the cavities   Cleaning     *non adherance to meds /side effects of meds   Patient believed that the nausea, vomiting and diarrhea is from her medications.   As per patient, her endocrinologist has stopped all the oral meds for diabetes that she was on and is currently on on insulin degludec 30 units now , humalog based on sliding scale and trulicity   Pioglitazone, steglatro and metformin were stopped . However patient continued to have nausea, vomiting diarrhea which has since spontaneously resolved - hence likely associated with COVID   Recommended to continue lisinopril daily and atorvastatin 3 times a week   Patient is on as needed hydroxyzine which she is taking once or upto twice as needed   Taking trazodone 100 mg and sertraline through psychiatry - will address these with psychiatry    -Patient was on gabapentin which she has stopped since  she got steroid injection for foot which helped with the pain and that has resolved since   Patient also reports that she had to stop Vitamin C because it interacted with one of the medications that she was on and and she feels that she is getting more sick because she has not been able to take the vitamin C   *Blurry vision-checked with optometrist.  -Minimal changes were associated with diabetes as per patient otherwise within normal limits.  *Shortness of breath.  Resolved   -Patient reported that she felt that with increasing her level of activity around the house recently and had noticed that she gets very winded just walking around the house.  Denied any cough or wheezing.   -Denied any chest pain.  Did  report palpitations sometimes when she gets anxious which goes away with some deep breaths, which is also resolved now   Denied any lightheadedness, dizziness, pedal edema, orthopnea or PND  -Does have history of sleep apnea, was on CPAP in the past patient does not recall why as she is not on it anymore.  -On examination patient patient did have a grade three systolic murmur that was present previously however on subsequent examination  - unable to appreciate that murmur.   Echocardiogram recently done was also not concerning except trace MR, mild PI concentric hypertrophy of left ventricle    -Denied any fevers, chills.  -As per labs in the past patient has had history of anemia, she has regular menstrual cycles sometimes, most recently her menstrual cycle lasted for 10 days.  Has history of PCOS as per chart, ovarian cyst  -Last cbc from November 2023 shows microcytosis without anemia , MCV- 75, normal RDW and other cell lines. PBS not commented on   -Has history of DVT as per chart.  *Tachycardia   resolved  -noted that her watch - heart rate above 110 when she is up and about   -Not present today at rest and patient is asymptomatic  -echo  unremarkable  Last cbc showed   History of COVID -recovered well -described below   -Patient was able to finally reach the ophthalmologist , however was told that her insurance would not be taken   *Skin tag   -on lower abdomen - grown - wants to be rmoved,  -Also on exam noted to have erythema on lower abdominal region below skin fold- no symptoms   *Blurry vision  -Patient reports that about 6 years ago she had a significant blurry vision of both eyes which lasted for several months, when she was almost declared blind legally, was evaluated by ophthalmologist, unsure what the diagnosis was.  -Since then however her vision has significantly improved, currently has intermittent blurring of vision on both eyes, which when comes on lasts for a day to a month continuously.  -Denies any headaches, flashes floaters blind spots, eye pain, redness or aura  -We had placed ophthalmology referral on the last visit however patient was not able to attend the appointment due to being inpatient for mental health related issues.  -Patient is requesting for new referral.  -Patient denies having any dry eyes at this time  *Breast Skin lesion   Has had history of bug bites.   -On examination now only has superficial scar, no deeper concerning tissue on palpation.  -Patient did have another spot with similar complaints, which again is currently a scar.  -Had ordered ultrasound breast at the time which has not been done.  -Given resolution of lump will go ahead and proceed to routine mammogram screening.  *Status post surgery for perirectal abscess/fistula   -Healing well as per patient  *Obesity  -Patient is working with nutrition specialist as well as trying to get more exercise as per recommendations of nutrition specialist.    Presentative health  Pap smear pending   Colon cancer  - next year   Mammo ordered in September   HIV non reactive, hep c negative  Tdap - next in 2025              Patient Active Problem List    Diagnosis  Date Noted    Anxiety  On Zoloft 100 mg, trazodone 100 mg daily at night, hydroxyzine 06/16/2023    Neuropathic pain of both feet  On gabapentin  06/16/2023    Fibroids 11/15/2022    As in hpi above 10/12/2022    Anemia  Microcytosis without anemia , on iron supplements pending repeat labs  09/23/2022    Dyslipidemia 09/22/2022    Obesity (BMI 30-39.9) 05/25/2022    Gastro-esophageal reflux disease without esophagitis 04/14/2021    Major depressive disorder 09/14/2020    Polycystic ovary syndrome  2.2 cm right ovarian cyst seen on recent CT in May 2023  Unable to undergo TVUS due to perirectal abscess  Unable to go on estrogen containing OCP due to history of VTE  Will follow up with gyn following resolution of perirectal abscess 12/11/2019    History of deep venous thrombosis  2 episodes reported on bilateral lower extremities, subsequent ultrasounds for suspected dvt were negative- thought to have been precipitated by Birth control which she is not on anymore  11/19/2019    Hypothyroidism  Levothyroxine 150 mcg,Last TSH from 03/16/2024 was 0.49 11/19/2019    Hypertension  Mildly elevated on lisinopril 10 - not checking blood pressures at home regularly as the cuff not working  10/28/2017    Obstructive sleep apnea, adult  Trying to set up with cpap, backlog with preferred - only company patient's insurance takes  07/31/2015    Vitamin D deficiency- last level checked was 11 from march 2024  03/12/2015    Type 2 diabetes mellitus with hyperglycemia, with long-term current use of insulin (HCC)  Was Following up with endocrinology -  details above  03/12/2015       Current Outpatient Medications   Medication Sig Dispense Refill    insulin lispro (HUMALOG KWIKPEN) 100 UNIT/ML SC SOPN injection PEN Inject 8-16 units subcutaneously three times daily before meals or as directed (max 50 units/day) 15 mL 2    metFORMIN ER (GLUCOPHAGE XR) 500 MG TABLET SR 24 HR Take 2 Tablets by mouth 2 times a day. 360 Tablet 3     Sertraline HCl 150 MG Cap Take 1 oral capsule once a day 30 Capsule 2    fluconazole (DIFLUCAN) 150 MG tablet Take 1 Tablet by mouth every day. For  persistent symptoms, take a second dose of 150 mg administered 72 hours after the first dose.   I am also prescribing another course if symptoms recur after upcoming course of antibiotics - as refill 2 Tablet 1    Calcium Polycarbophil (FIBER-CAPS PO) Take  by mouth.      PROTEIN PO Take  by mouth.      Insulin Degludec (TRESIBA FLEXTOUCH) 200 UNIT/ML Solution Pen-injector Inject up to 60 units under the skin daily or as directed 27 mL 1    traZODone (DESYREL) 100 MG Tab Take 2 oral tablet at bedtime as needed 60 Tablet 2    Alcohol Swabs Pads use one alcohol swab three times a day as needed. 300 Each 6    ferrous sulfate (FEROSUL) 325 (65 Fe) MG tablet Take 1 Tablet by mouth every 48 hours. 30 Tablet 1    Insulin Pen Needle 32 G x 4 mm Use to inject insulin 4 times daily 100 Each 5    vitamin D (CHOLECALCIFEROL) 1000 UNIT Tab Take 1 Tablet by mouth every day. 60 Tablet 1    atorvastatin (LIPITOR) 40 MG Tab Take 1 Tablet by mouth every evening. 90 Tablet 1    gabapentin (NEURONTIN) 100 MG Cap Take 2 Capsules by mouth 3 times a day. 180 Capsule 3    levothyroxine (SYNTHROID) 150 MCG Tab Take 1 Tablet by mouth every morning on an empty stomach. 90 Tablet 1    lisinopril (PRINIVIL) 10 MG Tab Take 1 Tablet by mouth every day. 90 Tablet 1    mupirocin (BACTROBAN) 2 % Ointment Apply 1 Application topically 2 times a day. For 2 weeks 22 g 0    hydrOXYzine pamoate (VISTARIL) 50 MG Cap Take 1 oral capsule as needed every (6-8) hours for anxiety 90 Capsule 3    Continuous Glucose Sensor (FREESTYLE ALEKSANDAR 2 SENSOR) [facility] Use 1 kit as directed every two weeks change every 14 days 7 Each 3    Glucagon (BAQSIMI TWO PACK) 3 mg/dose Inhale 1 SPRAY into the nostril as a single dose for severe hypoglycemia 2 Each 3    traZODone (DESYREL) 100 MG Tab Take 2 oral tablets at bedtime as needed  "(Patient not taking: Reported on 9/20/2024) 60 Tablet 2    hydrOXYzine pamoate (VISTARIL) 50 MG Cap Take 1 oral capsule as needed every (6-8) hours for anxiety (Patient not taking: Reported on 9/20/2024) 90 Capsule 2    hydrOXYzine pamoate (VISTARIL) 50 MG Cap Take 1 oral capsule as needed every (6-8) hours for anxiety (Patient not taking: Reported on 9/20/2024) 90 Capsule 2    sertraline (ZOLOFT) 100 MG Tab Take 1 oral tablet once a day. Discontinue fluoxetine, starting sertraline. (Patient not taking: Reported on 9/20/2024) 30 Tablet 2    sertraline (ZOLOFT) 100 MG Tab Take 1 oral tablet once a day (Patient not taking: Reported on 9/20/2024) 30 Tablet 2     No current facility-administered medications for this visit.       ROS: As per HPI. Additional pertinent systems as noted below.  Constitutional:  Negative for fever, chills   Cardiovascular:  Negative for chest pain,  Respiratory:  Negative for cough, sputum production, negative for shortness of breath      /74 (BP Location: Right arm, Patient Position: Sitting, BP Cuff Size: Adult)   Pulse 70   Temp 35.9 °C (96.7 °F) (Temporal)   Ht 1.6 m (5' 3\")   Wt 95.1 kg (209 lb 9.6 oz)   SpO2 96%   BMI 37.13 kg/m²     Physical Exam   Constitutional:  .  Not in acute distress        Note: I have reviewed all pertinent labs and diagnostic tests associated with this visit with specific comments listed under the assessment and plan below    Assessment and Plan    1. Type 2 diabetes mellitus with hyperglycemia, with long-term current use of insulin (HCC)  Currently being managed by pharmacy.  -Continue Tresiba 50 units, Humalog 8 units 3 times a day.  Metformin 2000 daily.  A1C improved - still above goal however , still working on adherance to medications and diet   -Microalbumin creatinine ratio normal from August 2024  -[facility], [facility] pending.    -Unable to work at this time as DOT has not cleared her due " to uncontrolled DM  -Does have neuropathy, follows up with podiatry as well.  No wounds cuts ulcers at this time.  -Renal function stable.  -work note provided with restrictions incorporating bathroom breaks and neuropathy     Patient reports today that she has not been very compliant to insulin or diet - due to having issues with spouse not complying by court orders and recent celebration of dads birthday - other ongoing things in life.   Recommended and reinforced again today that she needs to focus on atleast the long acting during such instances- patient agrees       2. 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   Letter provided with limitation pertaining to bathroom breaks and neuropathy      Followup: Return in about 1 month (around 10/20/2024).        Signed by: Main Mcgee M.D.

## 2024-09-20 NOTE — LETTER
September 20, 2024    To Whom It May Concern:         This is confirmation that Liana Obrien attended her scheduled appointment with Main Mcgee M.D. on 9/20/24. Liana will need frequent bathroom breaks at work at least 2 breaks for 5 minutes in an hour. She is unable to do work that involves long hours of standing. Would recommend light duty or desk job at this time atleast for next 3 months, pending further assessment.          If you have any questions please do not hesitate to call me at the phone number listed below.    Sincerely,          Main Mgcee M.D.  [phone]

## 2024-09-23 PROCEDURE — RXMED WILLOW AMBULATORY MEDICATION CHARGE: Performed by: PSYCHIATRY & NEUROLOGY

## 2024-09-23 PROCEDURE — RXMED WILLOW AMBULATORY MEDICATION CHARGE: Performed by: INTERNAL MEDICINE

## 2024-09-23 PROCEDURE — RXMED WILLOW AMBULATORY MEDICATION CHARGE: Performed by: NURSE PRACTITIONER

## 2024-09-24 ENCOUNTER — APPOINTMENT (OUTPATIENT)
Dept: VASCULAR LAB | Facility: MEDICAL CENTER | Age: 44
End: 2024-09-24
Attending: INTERNAL MEDICINE
Payer: MEDICAID

## 2024-09-24 ENCOUNTER — PHARMACY VISIT (OUTPATIENT)
Dept: PHARMACY | Facility: MEDICAL CENTER | Age: 44
End: 2024-09-24
Payer: COMMERCIAL

## 2024-09-24 PROCEDURE — 99212 OFFICE O/P EST SF 10 MIN: CPT

## 2024-09-24 NOTE — PROGRESS NOTES
Patient Consult Note    Primary care physician: Main Mcgee M.D.    Reason for consult: Management of Uncontrolled Type 2 Diabetes    HPI:  Liana Obrien is a 44 y.o. old patient who comes in today for evaluation of above stated problem.    Allergies  Bicillin c-r [penicillin g proc & benzathine], Ondansetron, Penicillin g, Sulfa drugs, and Metoclopramide    Current Diabetes Medication Regimen  Metformin ER: 1000 mg in the AM and 1000 mg in the PM  Tresiba 50 units daily in the AM  Humalog 8 units before meals    Previous Diabetes Medications and Reason for Discontinuation  Tresiba and Humalog - stopped by pt d/t perceived ADR of vaginal bleeding  Mounjaro - stopped by hospitalist d/t dehydration  Metformin ER - cramping and nausea, but now tolerating  Pioglitazone - stopped by former endocrinologist 2023  Steglatro - stopped by former endocrinologist 2023; hx of UTI    Potential Barriers to Care:  Adherence: reports missed doses  Side effects: diarrhea but unclear cause as this just developed recently. Pt reports waking up later than usual since initiation of trazodone 100 mg daily. Advised to f/u with prescribing provider if she could take a lower dose instead.  Affordability: yes    SMBG  Pt has home glucometer and proper testing technique - Patient has CGM (Zack 2)    Pt reports blood sugars:       Hypoglycemia  Hypoglycemia awareness: Yes  Nocturnal hypoglycemia: None  Hypoglycemia:  None  Pt's treatment of Hypoglycemia  Discussed 15:15 Rule    Lifestyle  Current Exercise - None currently    Dietary  Patient works with a nutritionist and meets with them weekly. Working on establishing routines. She was also advised to eat every 2 hours. See below for typical diet throughout the day:  Protein shake  Cereal/oatmal with fruit  Snack (protein bar, trail mix, cottage cheese/fruit, half PBJ sandwich)  Lunch (split into two): meat, veggies, fruits, tortilla/bread  Snack (protein bar, trail mix, cottage  cheese/fruit, half PBJ sandwich)  Dinner: meat, veggies, fruits, tortilla/bread  Snack (protein bar, trail mix, cottage cheese/fruit, half PBJ sandwich)    Labs  Lab Results   Component Value Date/Time    HBA1C 9.7 (A) 08/13/2024 12:00 AM      Lab Results   Component Value Date/Time    SODIUM 139 05/16/2024 01:21 AM    POTASSIUM 3.4 (L) 05/16/2024 01:21 AM    CHLORIDE 107 05/16/2024 01:21 AM    CO2 22 05/16/2024 01:21 AM    GLUCOSE 162 (H) 05/16/2024 01:21 AM    BUN 6 (L) 05/16/2024 01:21 AM    CREATININE 0.36 (L) 05/16/2024 01:21 AM     Lab Results   Component Value Date/Time    ALKPHOSPHAT 107 (H) 05/12/2024 12:53 AM    ASTSGOT 9 (L) 05/12/2024 12:53 AM    ALTSGPT 10 05/12/2024 12:53 AM    TBILIRUBIN 0.2 05/12/2024 12:53 AM    INR 1.03 09/22/2022 01:20 AM    ALBUMIN 2.9 (L) 05/14/2024 02:30 AM      Lab Results   Component Value Date/Time    CHOLSTRLTOT 132 08/11/2018 08:22 AM    LDL 59 08/11/2018 08:22 AM    HDL 57 08/11/2018 08:22 AM    TRIGLYCERIDE 82 08/11/2018 08:22 AM       Lab Results   Component Value Date/Time    MALBCRT 30 08/11/2018 08:21 AM    MICROALBUR 4.1 08/11/2018 08:21 AM     Preventative Management:  BP regimen (ACEi/ARB): lisinopril 10 mg  Statin: atorvastatin (Lipitor) 20 mg three times a week  LDL <100: no (see Media for most recent labs)  Last Lipid Panel 11/13/23      Physical Examination:  Vital signs: There were no vitals taken for this visit. There is no height or weight on file to calculate BMI.    Assessment and Plan:    1. DM2  Discussed Goals: FBG 80 - 130, 2hPP < 180, a1c < 7.0%  Last A1c drawn on 08/13/24 was 9.7%, which is not at goal but improved from previous A1c of 14.4%  TIR not at goal of > 70%  Patient has been missing her medications which explains poor glucose control the past weeks  Encouraged patient to establish new habits and routines to help her with medication adherence. Discussed keeping medications on her dining table so that she can remember to take her  medications.  Will rechallenge regimen at this time.    - Medication changes:  None  - Continue:  Metformin 1000 mg in the morning and 1000 mg in the evening with meals  Tresiba 50 units daily  Humalog 8 units before meals     - Lifestyle changes:  Exercise Goal - Increase exercise as tolerated. Walk for 10 minutes per day   Dietary Goal - Continue working with your nutritionist weekly    Follow Up:  1 week per pt preference    Jesus Cazares, PharmD    CC:   Main Mcgee M.D.

## 2024-09-26 ENCOUNTER — HOSPITAL ENCOUNTER (OUTPATIENT)
Facility: MEDICAL CENTER | Age: 44
End: 2024-09-26
Attending: STUDENT IN AN ORGANIZED HEALTH CARE EDUCATION/TRAINING PROGRAM
Payer: MEDICAID

## 2024-09-26 ENCOUNTER — APPOINTMENT (OUTPATIENT)
Dept: OBGYN | Facility: CLINIC | Age: 44
End: 2024-09-26
Attending: INTERNAL MEDICINE
Payer: MEDICAID

## 2024-09-26 VITALS — DIASTOLIC BLOOD PRESSURE: 60 MMHG | BODY MASS INDEX: 36.88 KG/M2 | SYSTOLIC BLOOD PRESSURE: 128 MMHG | WEIGHT: 208.2 LBS

## 2024-09-26 DIAGNOSIS — L68.0 FEMALE HIRSUTISM: ICD-10-CM

## 2024-09-26 DIAGNOSIS — Z01.419 WELL WOMAN EXAM WITH ROUTINE GYNECOLOGICAL EXAM: ICD-10-CM

## 2024-09-26 DIAGNOSIS — N93.9 ABNORMAL UTERINE BLEEDING (AUB): ICD-10-CM

## 2024-09-26 PROCEDURE — 3074F SYST BP LT 130 MM HG: CPT | Performed by: STUDENT IN AN ORGANIZED HEALTH CARE EDUCATION/TRAINING PROGRAM

## 2024-09-26 PROCEDURE — 99204 OFFICE O/P NEW MOD 45 MIN: CPT | Performed by: STUDENT IN AN ORGANIZED HEALTH CARE EDUCATION/TRAINING PROGRAM

## 2024-09-26 PROCEDURE — 88175 CYTOPATH C/V AUTO FLUID REDO: CPT

## 2024-09-26 PROCEDURE — 87660 TRICHOMONAS VAGIN DIR PROBE: CPT

## 2024-09-26 PROCEDURE — 87510 GARDNER VAG DNA DIR PROBE: CPT

## 2024-09-26 PROCEDURE — 3078F DIAST BP <80 MM HG: CPT | Performed by: STUDENT IN AN ORGANIZED HEALTH CARE EDUCATION/TRAINING PROGRAM

## 2024-09-26 PROCEDURE — 87480 CANDIDA DNA DIR PROBE: CPT

## 2024-09-26 ASSESSMENT — FIBROSIS 4 INDEX: FIB4 SCORE: 0.59

## 2024-09-27 DIAGNOSIS — Z01.419 WELL WOMAN EXAM WITH ROUTINE GYNECOLOGICAL EXAM: ICD-10-CM

## 2024-09-28 LAB
CANDIDA DNA VAG QL PROBE+SIG AMP: POSITIVE
G VAGINALIS DNA VAG QL PROBE+SIG AMP: NEGATIVE
T VAGINALIS DNA VAG QL PROBE+SIG AMP: NEGATIVE

## 2024-09-30 DIAGNOSIS — B37.31 CANDIDAL VAGINITIS: ICD-10-CM

## 2024-09-30 DIAGNOSIS — B37.31 VAGINAL CANDIDIASIS: ICD-10-CM

## 2024-09-30 RX ORDER — FLUCONAZOLE 150 MG/1
150 TABLET ORAL DAILY
Qty: 2 TABLET | Refills: 1 | Status: SHIPPED | OUTPATIENT
Start: 2024-09-30

## 2024-09-30 NOTE — RESULT ENCOUNTER NOTE
Angel Obrien,     You vaginitis testing was positive for yeast. I have placed a medication in your pharmacy for you to take. Please don't hesitate to reach out if you have any questions.     Marti Flores DO, FACOG  [facility]

## 2024-10-01 ENCOUNTER — NON-PROVIDER VISIT (OUTPATIENT)
Dept: VASCULAR LAB | Facility: MEDICAL CENTER | Age: 44
End: 2024-10-01
Attending: INTERNAL MEDICINE
Payer: MEDICAID

## 2024-10-01 ENCOUNTER — PHARMACY VISIT (OUTPATIENT)
Dept: PHARMACY | Facility: MEDICAL CENTER | Age: 44
End: 2024-10-01

## 2024-10-01 LAB
C TRACH RRNA CVX QL NAA+PROBE: NEGATIVE
CYTOLOGIST CVX/VAG CYTO: NORMAL
CYTOLOGY CVX/VAG DOC CYTO: NORMAL
CYTOLOGY CVX/VAG DOC THIN PREP: NORMAL
HPV I/H RISK 4 DNA CVX QL PROBE+SIG AMP: NEGATIVE
N GONORRHOEA RRNA CVX QL NAA+PROBE: NEGATIVE
NOTE NL11727A: NORMAL
OTHER STN SPEC: NORMAL
STAT OF ADQ CVX/VAG CYTO-IMP: NORMAL

## 2024-10-01 PROCEDURE — 99212 OFFICE O/P EST SF 10 MIN: CPT

## 2024-10-08 ENCOUNTER — APPOINTMENT (OUTPATIENT)
Dept: VASCULAR LAB | Facility: MEDICAL CENTER | Age: 44
End: 2024-10-08
Attending: INTERNAL MEDICINE
Payer: MEDICAID

## 2024-10-15 ENCOUNTER — NON-PROVIDER VISIT (OUTPATIENT)
Dept: VASCULAR LAB | Facility: MEDICAL CENTER | Age: 44
End: 2024-10-15
Attending: INTERNAL MEDICINE
Payer: MEDICAID

## 2024-10-15 PROCEDURE — 99212 OFFICE O/P EST SF 10 MIN: CPT

## 2024-10-23 ENCOUNTER — APPOINTMENT (OUTPATIENT)
Dept: RADIOLOGY | Facility: MEDICAL CENTER | Age: 44
End: 2024-10-23
Payer: MEDICAID

## 2024-10-23 ENCOUNTER — APPOINTMENT (OUTPATIENT)
Dept: RADIOLOGY | Facility: MEDICAL CENTER | Age: 44
End: 2024-10-23
Attending: STUDENT IN AN ORGANIZED HEALTH CARE EDUCATION/TRAINING PROGRAM
Payer: MEDICAID

## 2024-10-23 ENCOUNTER — OFFICE VISIT (OUTPATIENT)
Dept: INTERNAL MEDICINE | Facility: OTHER | Age: 44
End: 2024-10-23
Payer: MEDICAID

## 2024-10-23 VITALS
HEIGHT: 63 IN | OXYGEN SATURATION: 96 % | HEART RATE: 82 BPM | WEIGHT: 207.8 LBS | SYSTOLIC BLOOD PRESSURE: 126 MMHG | DIASTOLIC BLOOD PRESSURE: 74 MMHG | BODY MASS INDEX: 36.82 KG/M2 | TEMPERATURE: 97.2 F

## 2024-10-23 DIAGNOSIS — Z79.4 TYPE 2 DIABETES MELLITUS WITH HYPERGLYCEMIA, WITH LONG-TERM CURRENT USE OF INSULIN (HCC): ICD-10-CM

## 2024-10-23 DIAGNOSIS — L98.9 SKIN LESIONS: ICD-10-CM

## 2024-10-23 DIAGNOSIS — L30.9 DERMATITIS: ICD-10-CM

## 2024-10-23 DIAGNOSIS — E11.65 TYPE 2 DIABETES MELLITUS WITH HYPERGLYCEMIA, WITH LONG-TERM CURRENT USE OF INSULIN (HCC): ICD-10-CM

## 2024-10-23 PROCEDURE — 99214 OFFICE O/P EST MOD 30 MIN: CPT | Mod: GC

## 2024-10-23 ASSESSMENT — ENCOUNTER SYMPTOMS
NAUSEA: 1
DIZZINESS: 0
VOMITING: 1

## 2024-10-23 ASSESSMENT — FIBROSIS 4 INDEX: FIB4 SCORE: 0.59

## 2024-10-24 DIAGNOSIS — E11.65 TYPE 2 DIABETES MELLITUS WITH HYPERGLYCEMIA, WITH LONG-TERM CURRENT USE OF INSULIN (HCC): ICD-10-CM

## 2024-10-24 DIAGNOSIS — Z79.4 TYPE 2 DIABETES MELLITUS WITH HYPERGLYCEMIA, WITH LONG-TERM CURRENT USE OF INSULIN (HCC): ICD-10-CM

## 2024-10-24 DIAGNOSIS — N93.9 ABNORMAL UTERINE BLEEDING: ICD-10-CM

## 2024-10-24 PROCEDURE — RXMED WILLOW AMBULATORY MEDICATION CHARGE: Performed by: PHYSICIAN ASSISTANT

## 2024-10-24 PROCEDURE — RXMED WILLOW AMBULATORY MEDICATION CHARGE: Performed by: STUDENT IN AN ORGANIZED HEALTH CARE EDUCATION/TRAINING PROGRAM

## 2024-10-24 PROCEDURE — RXMED WILLOW AMBULATORY MEDICATION CHARGE: Performed by: NURSE PRACTITIONER

## 2024-10-24 PROCEDURE — RXMED WILLOW AMBULATORY MEDICATION CHARGE: Performed by: INTERNAL MEDICINE

## 2024-10-25 ENCOUNTER — PHARMACY VISIT (OUTPATIENT)
Dept: PHARMACY | Facility: MEDICAL CENTER | Age: 44
End: 2024-10-25
Payer: COMMERCIAL

## 2024-10-29 RX ORDER — GABAPENTIN 100 MG/1
200 CAPSULE ORAL 3 TIMES DAILY
Qty: 180 CAPSULE | Refills: 3 | Status: SHIPPED | OUTPATIENT
Start: 2024-10-29

## 2024-10-29 RX ORDER — ATORVASTATIN CALCIUM 40 MG/1
40 TABLET, FILM COATED ORAL NIGHTLY
Qty: 90 TABLET | Refills: 1 | Status: SHIPPED | OUTPATIENT
Start: 2024-10-29

## 2024-10-29 RX ORDER — FERROUS SULFATE 325(65) MG
325 TABLET ORAL
Qty: 30 TABLET | Refills: 1 | Status: SHIPPED | OUTPATIENT
Start: 2024-10-29

## 2024-10-30 NOTE — PROGRESS NOTES
No chief complaint on file.      HPI: Liana Obrien is a 43 y.o. female with past medical history as below who presented to the clinic for the following.    Patient is here for paperwork stating limitations for return to work.   Patient is working for an agency - unsure of what post she would get at this time.   Letter provided with limitation pertaining to bathroom breaks and neuropathy     *Mice infestation at home   -reports that landlord is not willing to do anything at this time  Patient is unable to spend money on this at this time. 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  -recently when seen in clinic prior to hospitalization a vaginal swa was collected that showed recurrent yeast infection including candida glabrata and patient was prescribed fluconazole and also boric acid on the last visit   -Patient was reluctant to start insulin since discharge - she had stopped taking it - however after explaining risks - patient was willing to restart one injection - started on 35 units of insulin - she did not want to start the short acting at the time  She was also prescribed synjardy, however insurance did not cover it plus pharmacy recommended against it given yeast infections  She has CGM   New nutrition referral was placed on previous visit - did not follow up pwith patient regarding this  today   Following up with pharmacotherapy services now-   Tresiba - 50 units   2. humalog 8 units three times a day before meals   Also on metformin 1000 in the morning and 1000 in the evening -  During most recent hospitalization - patient was referred to endocrinology - patient did not want to follow up with previous endo   Patient was reassured that this referral has been sent to another office and encouraged her to contact them - not addressed on this visit   Has already followed up with ophthalmology requested for records today   Microalbumin creatinine ratio - normal as per labs done in August 2024    Patient reports today that she has not been very compliant to insulin or diet - due to having issues with spouse not complying by court orders and recent celebration of dads birthday - other ongoing things in life.   Recommended and reinforced again today that she needs to focus on atleast the long acting during such instances- patient agrees                                             Other problems not discussed on this visit   *Lower extremity edema.  -resolved   -History of dvt - D dimer negative   *History of ALBARO   -History of menorrhagia - as detailed below   *Fibroids   CBC most recently showed elevated rdw - no anemia at this time    ferritin of 7.1, percent saturation of 5, TIBC of 275.from march 2024   No bleeding at this time   Not followed up with gyn yet - reinforced today-   Hormonal options are not an option as patient has a history of DVT - patient may need surgical options - explained to patient  She is taking iron supplement daily , however also is complaining of constipation    Recheck iron studies   CBC   Encouraged to follow up with gynecology      *Right ring finger pain   -Started after injury in the past - had subsided on its own, however now has recently started hurting again along the finger now all the way up to her forearm.   Patient attributes it to recent lifting etc   There is no  swelling erythema warmth on exam.   Patient has not tried anything for the pain yet   Would like to see hand surgeon - referral information provided  Since previous visits however symptoms have significantly improved    *Rash  -Mildly papular, nonpustular nonplaque-like, associated with some hyperpigmentation in the surroundings on the face upper neck and upper extremities.  -Today able to appreciate the wounds on the upper neck and face.  -Patient has tried Cetaphil and use Lubriderm without much effect.  -Patient did change her detergent soap recently.  -Patient has not been out in the sun for a long time.  -Lesions are not painful or itchy.  -Discussed with patient regarding trial of CeraVe, change of detergent soap and reassess if not resolving then to consider dermatology referral for this.    *Urine incontinence   -Patient continues to have issues with urine incontinence - increased frequency of urination   Uncontrolled sugars   Requesting for orders to be faxed to [facility] for incontinence supplies   Bed pads - briefs, pads   Fax : number [facility] - [phone]  *vaginal candidiasis - Status post fluconazole and also boric acid - with resolution of symptoms  Pharmacy recommends against synjardy - I agree plus insurancce is not covering it   With candida glabrata   *Pubic lesions /vulvar lesions /per-rectal   Resolved , status post antibiotics   Management of other conditions which may be related to diabetes as below   Patient does have constipation - will modify iron supplementation ad manage sugars as above  Increased urine frequency-management as above   urge incontinence history more than stress  -Patients foot pain- bilateral plantar surface discussed today, follows up with podiatry post takes gabapentin.      Last eye exam in December   Last monofilament test - normal monofilament test  with decreased sensation only on the right great toe.  -Pulses intact, vibration sense slightly reduced in the  great toes only.  -Proprioception intact.    *Hypokalemia.  -Noted on the last lab work done in the hospital with a potassium of 3.3.  -Normal renal function.  Not on any medications that could be lowering potassium levels  *Vitamin D deficiency.  -Last vitamin D from 3/16/2024 was 11  Taking viatmin d supplement   *Anxiety   -Patient is following up with psychiatry, mental health counselor, [facility].  -Was receiving inpatient psychiatry treatment, currently is on Zoloft 100 mg, trazodone 100 mg  at nighttime, hydroxyzine as needed.   -Recent stressors as mentioned above.  Saw psychology psychiatry in hospital - now back to her outpatient psychologist - feels much better after she physically is feeling better   Sleep apnea  -Sleep study at home was done however she has not heard back from them   Needs to be faxed over again - was faxed in the past by Dr. Sharif   Status post dental procedure   -Associated with swelling - much better   There is some remnant discomfort which however is improving   Not anymore   Dentures - so the cavities   Cleaning     *non adherance to meds /side effects of meds   Patient believed that the nausea, vomiting and diarrhea is from her medications.   As per patient, her endocrinologist has stopped all the oral meds for diabetes that she was on and is currently on on insulin degludec 30 units now , humalog based on sliding scale and trulicity   Pioglitazone, steglatro and metformin were stopped . However patient continued to have nausea, vomiting diarrhea which has since spontaneously resolved - hence likely associated with COVID   Recommended to continue lisinopril daily and atorvastatin 3 times a week   Patient is on as needed hydroxyzine which she is taking once or upto twice as needed   Taking trazodone 100 mg and sertraline through psychiatry - will address these with psychiatry   -Patient was on gabapentin which she has stopped since  she got steroid injection for foot  which helped with the pain and that has resolved since   Patient also reports that she had to stop Vitamin C because it interacted with one of the medications that she was on and and she feels that she is getting more sick because she has not been able to take the vitamin C   *Blurry vision-checked with optometrist.  -Minimal changes were associated with diabetes as per patient otherwise within normal limits.  *Shortness of breath.  Resolved   -Patient reported that she felt that with increasing her level of activity around the house recently and had noticed that she gets very winded just walking around the house.  Denied any cough or wheezing.   -Denied any chest pain.  Did  report palpitations sometimes when she gets anxious which goes away with some deep breaths, which is also resolved now   Denied any lightheadedness, dizziness, pedal edema, orthopnea or PND  -Does have history of sleep apnea, was on CPAP in the past patient does not recall why as she is not on it anymore.  -On examination patient patient did have a grade three systolic murmur that was present previously however on subsequent examination  - unable to appreciate that murmur.   Echocardiogram recently done was also not concerning except trace MR, mild PI concentric hypertrophy of left ventricle    -Denied any fevers, chills.  -As per labs in the past patient has had history of anemia, she has regular menstrual cycles sometimes, most recently her menstrual cycle lasted for 10 days.  Has history of PCOS as per chart, ovarian cyst  -Last cbc from November 2023 shows microcytosis without anemia , MCV- 75, normal RDW and other cell lines. PBS not commented on   -Has history of DVT as per chart.  *Tachycardia   resolved  -noted that her watch - heart rate above 110 when she is up and about   -Not present today at rest and patient is asymptomatic  -echo unremarkable  Last cbc showed   History of COVID -recovered well -described below   -Patient was able  to finally reach the ophthalmologist , however was told that her insurance would not be taken   *Skin tag   -on lower abdomen - grown - wants to be rmoved,  -Also on exam noted to have erythema on lower abdominal region below skin fold- no symptoms   *Blurry vision  -Patient reports that about 6 years ago she had a significant blurry vision of both eyes which lasted for several months, when she was almost declared blind legally, was evaluated by ophthalmologist, unsure what the diagnosis was.  -Since then however her vision has significantly improved, currently has intermittent blurring of vision on both eyes, which when comes on lasts for a day to a month continuously.  -Denies any headaches, flashes floaters blind spots, eye pain, redness or aura  -We had placed ophthalmology referral on the last visit however patient was not able to attend the appointment due to being inpatient for mental health related issues.  -Patient is requesting for new referral.  -Patient denies having any dry eyes at this time  *Breast Skin lesion   Has had history of bug bites.   -On examination now only has superficial scar, no deeper concerning tissue on palpation.  -Patient did have another spot with similar complaints, which again is currently a scar.  -Had ordered ultrasound breast at the time which has not been done.  -Given resolution of lump will go ahead and proceed to routine mammogram screening.  *Status post surgery for perirectal abscess/fistula   -Healing well as per patient  *Obesity  -Patient is working with nutrition specialist as well as trying to get more exercise as per recommendations of nutrition specialist.    Presentative health  Pap smear pending   Colon cancer  - next year   Mammo ordered in September   HIV non reactive, hep c negative  Tdap - next in 2025              Patient Active Problem List    Diagnosis Date Noted    Anxiety  On Zoloft 100 mg, trazodone 100 mg daily at night, hydroxyzine 06/16/2023     Neuropathic pain of both feet  On gabapentin  06/16/2023    Fibroids 11/15/2022    As in hpi above 10/12/2022    Anemia  Microcytosis without anemia , on iron supplements pending repeat labs  09/23/2022    Dyslipidemia 09/22/2022    Obesity (BMI 30-39.9) 05/25/2022    Gastro-esophageal reflux disease without esophagitis 04/14/2021    Major depressive disorder 09/14/2020    Polycystic ovary syndrome  2.2 cm right ovarian cyst seen on recent CT in May 2023  Unable to undergo TVUS due to perirectal abscess  Unable to go on estrogen containing OCP due to history of VTE  Will follow up with gyn following resolution of perirectal abscess 12/11/2019    History of deep venous thrombosis  2 episodes reported on bilateral lower extremities, subsequent ultrasounds for suspected dvt were negative- thought to have been precipitated by Birth control which she is not on anymore  11/19/2019    Hypothyroidism  Levothyroxine 150 mcg,Last TSH from 03/16/2024 was 0.49 11/19/2019    Hypertension  Mildly elevated on lisinopril 10 - not checking blood pressures at home regularly as the cuff not working  10/28/2017    Obstructive sleep apnea, adult  Trying to set up with cpap, backlog with preferred - only company patient's insurance takes  07/31/2015    Vitamin D deficiency- last level checked was 11 from march 2024  03/12/2015    Type 2 diabetes mellitus with hyperglycemia, with long-term current use of insulin ([facility])  Was Following up with endocrinology -  details above  03/12/2015       Current Outpatient Medications   Medication Sig Dispense Refill    atorvastatin (LIPITOR) 40 MG Tab Take 1 Tablet by mouth every evening. 90 Tablet 1    gabapentin (NEURONTIN) 100 MG Cap Take 2 Capsules by mouth 3 times a day. 180 Capsule 3    ferrous sulfate (FEROSUL) 325 (65 Fe) MG tablet Take 1 Tablet by mouth every 48 hours. 30 Tablet 1    hydrOXYzine pamoate (VISTARIL) 50 MG Cap Take 1 oral capsule as needed every (6-8) hours for anxiety or sleep 90  Capsule 2    sertraline (ZOLOFT) 100 MG Tab Take 1.5 oral tablets once a day 45 Tablet 2    fluconazole (DIFLUCAN) 150 MG tablet Take 1 Tablet by mouth every day. For  persistent symptoms, take a second dose of 150 mg administered 72 hours after the first dose. 2 Tablet 1    insulin lispro (HUMALOG KWIKPEN) 100 UNIT/ML SC SOPN injection PEN Inject 8-16 units subcutaneously three times daily before meals or as directed (max 50 units/day) 15 mL 2    metFORMIN ER (GLUCOPHAGE XR) 500 MG TABLET SR 24 HR Take 2 Tablets by mouth 2 times a day. 360 Tablet 3    Sertraline HCl 150 MG Cap Take 1 oral capsule once a day 30 Capsule 2    Calcium Polycarbophil (FIBER-CAPS PO) Take  by mouth.      PROTEIN PO Take  by mouth.      Insulin Degludec (TRESIBA FLEXTOUCH) 200 UNIT/ML Solution Pen-injector Inject up to 60 units under the skin daily or as directed 27 mL 1    traZODone (DESYREL) 100 MG Tab Take 2 oral tablet at bedtime as needed 60 Tablet 2    Alcohol Swabs Pads use one alcohol swab three times a day as needed. 300 Each 6    Insulin Pen Needle 32 G x 4 mm Use to inject insulin 4 times daily 100 Each 5    vitamin D (CHOLECALCIFEROL) 1000 UNIT Tab Take 1 Tablet by mouth every day. 60 Tablet 1    levothyroxine (SYNTHROID) 150 MCG Tab Take 1 Tablet by mouth every morning on an empty stomach. 90 Tablet 1    lisinopril (PRINIVIL) 10 MG Tab Take 1 Tablet by mouth every day. 90 Tablet 1    mupirocin (BACTROBAN) 2 % Ointment Apply 1 Application topically 2 times a day. For 2 weeks 22 g 0    Continuous Glucose Sensor (FREESTYLE ALEKSANDAR 2 SENSOR) [facility] Use 1 kit as directed every two weeks change every 14 days 7 Each 3    Glucagon (BAQSIMI TWO PACK) 3 mg/dose Inhale 1 SPRAY into the nostril as a single dose for severe hypoglycemia 2 Each 3     No current facility-administered medications for this visit.       ROS: As per HPI. Additional pertinent systems as noted below.  Constitutional:  Negative for fever, chills   Cardiovascular:   Negative for chest pain,  Respiratory:  Negative for cough, sputum production, negative for shortness of breath      There were no vitals taken for this visit.    Physical Exam   Constitutional:  .  Not in acute distress        Note: I have reviewed all pertinent labs and diagnostic tests associated with this visit with specific comments listed under the assessment and plan below    Assessment and Plan    1. Type 2 diabetes mellitus with hyperglycemia, with long-term current use of insulin ([facility]CC)  Currently being managed by pharmacy.  -Continue Tresiba 50 units, Humalog 8 units 3 times a day.  Metformin 2000 daily.  A1C improved - still above goal however , still working on adherance to medications and diet   -Microalbumin creatinine ratio normal from August 2024  -Patient is currently following [facility], records pending.    -Unable to work at this time as DOT has not cleared her due to uncontrolled DM  -Does have neuropathy, follows up with podiatry as well.  No wounds cuts ulcers at this time.  -Renal function stable.  -work note provided with restrictions incorporating bathroom breaks and neuropathy     Patient reports today that she has not been very compliant to insulin or diet - due to having issues with spouse not complying by court orders and recent celebration of dads birthday - other ongoing things in life.   Recommended and reinforced again today that she needs to focus on atleast the long acting during such instances- patient agrees       2. 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   Letter provided with limitation pertaining to bathroom breaks and neuropathy      Followup: No follow-ups on file.        Signed by: Main Mcgee M.D.   forgetting to take it in a hurry to go to work.  -Recommended to start taking Tresiba in the evenings which she has started to.  -Patient also recently had an upper respiratory infection which prevented her from taking Tresiba regularly given that she was sleeping most of the day now she is recovering from it almost back to normal, requesting for her to go back to work which has been provided.  -She has been taking Tresiba 60 units, regularly for the past few days, Humalog 8 units 2 times a day since she is only having 2 meals a day, metformin.  -Patient was taking vitamin C supplement that camewith sugars which was causing higher sugars readings, recommended vitamin C without sugar if she wishes to continue taking vitamin C.  -Patient has follow-up with pharmacy in 2 weeks.  -Recommended follow-up with them as scheduled and follow-up with me in 1 month.  -Microalbumin creatinine ratio normal from August 2024  -Patient is currently following [facility], records pending.    -Does have neuropathy, follows up with podiatry as well.  No wounds cuts ulcers at this time.  -Renal function stable.    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  -Recommended practicing simple hygiene, wearing mask, washing hands.  -Patient informed of limited evidence behind these strategies however vitamin C without added sugars/probiotics could help with overall immunity/gut health    -Patient provided with information regarding new nutrition referral.  -Patient asking for new referral to [facility] as well due to insurance changes    - Referral to Endocrinology    2. Hypothyroidism, unspecified type    - Referral to Endocrinology    3. Administrative encounter  Letter provided to return back to work after recovery from recent URI    Followup: No follow-ups on file.        Signed by: Main Mcgee M.D.

## 2024-11-01 ENCOUNTER — APPOINTMENT (OUTPATIENT)
Dept: INTERNAL MEDICINE | Facility: OTHER | Age: 44
End: 2024-11-01
Payer: MEDICAID

## 2024-11-01 VITALS
SYSTOLIC BLOOD PRESSURE: 120 MMHG | WEIGHT: 210 LBS | OXYGEN SATURATION: 96 % | HEIGHT: 63 IN | BODY MASS INDEX: 37.21 KG/M2 | DIASTOLIC BLOOD PRESSURE: 70 MMHG | TEMPERATURE: 97 F | HEART RATE: 76 BPM

## 2024-11-01 DIAGNOSIS — Z02.9 ADMINISTRATIVE ENCOUNTER: ICD-10-CM

## 2024-11-01 DIAGNOSIS — E03.9 HYPOTHYROIDISM, UNSPECIFIED TYPE: ICD-10-CM

## 2024-11-01 DIAGNOSIS — E11.65 TYPE 2 DIABETES MELLITUS WITH HYPERGLYCEMIA, WITH LONG-TERM CURRENT USE OF INSULIN (HCC): ICD-10-CM

## 2024-11-01 DIAGNOSIS — Z79.4 TYPE 2 DIABETES MELLITUS WITH HYPERGLYCEMIA, WITH LONG-TERM CURRENT USE OF INSULIN (HCC): ICD-10-CM

## 2024-11-01 PROCEDURE — 3074F SYST BP LT 130 MM HG: CPT | Performed by: INTERNAL MEDICINE

## 2024-11-01 PROCEDURE — 3078F DIAST BP <80 MM HG: CPT | Performed by: INTERNAL MEDICINE

## 2024-11-01 PROCEDURE — 99214 OFFICE O/P EST MOD 30 MIN: CPT | Performed by: INTERNAL MEDICINE

## 2024-11-01 ASSESSMENT — FIBROSIS 4 INDEX: FIB4 SCORE: 0.59

## 2024-11-01 NOTE — LETTER
November 1, 2024    To Whom It May Concern:         This is confirmation that Liana Degroot Obrien attended her scheduled appointment with Main Mcgee M.D. on 11/01/24. Liana can return to work on Monday 11/4/2024.          If you have any questions please do not hesitate to call me at the phone number listed below.    Sincerely,          Main Mcgee M.D.  [phone]

## 2024-11-01 NOTE — LETTER
November 1, 2024    To Whom It May Concern:         This is confirmation that Liana Obrien attended her scheduled appointment with Main Mcgee M.D. on 11/01/24. Please excuse Liana from work from 10/21 to 11/01/2024. She may return to work on 11/04/2024.         If you have any questions please do not hesitate to call me at the phone number listed below.    Sincerely,          Main Mcgee M.D.  [phone]

## 2024-11-14 ENCOUNTER — NON-PROVIDER VISIT (OUTPATIENT)
Dept: VASCULAR LAB | Facility: MEDICAL CENTER | Age: 44
End: 2024-11-14
Attending: INTERNAL MEDICINE
Payer: MEDICAID

## 2024-11-14 DIAGNOSIS — E11.65 TYPE 2 DIABETES MELLITUS WITH HYPERGLYCEMIA, WITH LONG-TERM CURRENT USE OF INSULIN (HCC): Primary | ICD-10-CM

## 2024-11-14 DIAGNOSIS — Z79.4 TYPE 2 DIABETES MELLITUS WITH HYPERGLYCEMIA, WITH LONG-TERM CURRENT USE OF INSULIN (HCC): Primary | ICD-10-CM

## 2024-11-14 LAB
HBA1C MFR BLD: 12.4 % (ref ?–5.8)
POCT INT CON NEG: NEGATIVE
POCT INT CON POS: POSITIVE

## 2024-11-14 PROCEDURE — 83036 HEMOGLOBIN GLYCOSYLATED A1C: CPT

## 2024-11-14 PROCEDURE — 99212 OFFICE O/P EST SF 10 MIN: CPT

## 2024-11-14 NOTE — PROGRESS NOTES
Patient Consult Note    Primary care physician: Main Mcgee M.D.    Reason for consult: Management of Uncontrolled Type 2 Diabetes    HPI:  Liana Obrien is a 44 y.o. old patient who comes in today for evaluation of above stated problem.    Allergies  Bicillin c-r [penicillin g proc & benzathine], Ondansetron, Penicillin g, Sulfa drugs, and Metoclopramide    Current Diabetes Medication Regimen  Metformin ER: 1000 mg in the AM and 1000 mg in the PM  Tresiba 60 units daily in the AM  Humalog 8 units before meals    Previous Diabetes Medications and Reason for Discontinuation  Tresiba and Humalog - stopped by pt d/t perceived ADR of vaginal bleeding  Mounjaro - stopped by hospitalist d/t dehydration  Metformin ER - cramping and nausea, but now tolerating  Pioglitazone - stopped by former endocrinologist 2023  Steglatro - stopped by former endocrinologist 2023; hx of UTI    Potential Barriers to Care:  Adherence: reports missed doses  States she is only injecting Humalog before breakfast d/t forgetting later in the day (per patient, PCP advised her to just focus on basal insulin for now)  Denies missed doses of Tresiba but may have missed metformin  Side effects: none at this time  Affordability: yes  Other: pt reports that PCP also discussed possibly splitting up basal insulin dose into two for better coverage    SMBG  Pt has home glucometer and proper testing technique - Patient has CGM (Zack 2)        Hypoglycemia  Hypoglycemia awareness: Yes  Nocturnal hypoglycemia: None  Hypoglycemia:  None  Pt's treatment of Hypoglycemia  Discussed 15:15 Rule    Lifestyle  Current Exercise - walking a lot at work    Dietary  Patient was working with a nutritionist and was meeting with her weekly, but  d/t insurance issues, she is unable to f/u at this time. States that her new insurance from work will kick in next month so she will re-establish care then.  Breakfast: protein shake, bread, oatmeal, cottage cheese/fruitl;  was previously eating cereal with fruit  Lunch: varies; meat, salad kits, bread, chicken/tortilla  Snacks: rare   Drinks: water with crystal lite, coffee with sugarfree syrup    Labs  Lab Results   Component Value Date/Time    HBA1C 12.4 (A) 11/14/2024 12:00 AM      Lab Results   Component Value Date/Time    SODIUM 139 05/16/2024 01:21 AM    POTASSIUM 3.4 (L) 05/16/2024 01:21 AM    CHLORIDE 107 05/16/2024 01:21 AM    CO2 22 05/16/2024 01:21 AM    GLUCOSE 162 (H) 05/16/2024 01:21 AM    BUN 6 (L) 05/16/2024 01:21 AM    CREATININE 0.36 (L) 05/16/2024 01:21 AM     Lab Results   Component Value Date/Time    ALKPHOSPHAT 107 (H) 05/12/2024 12:53 AM    ASTSGOT 9 (L) 05/12/2024 12:53 AM    ALTSGPT 10 05/12/2024 12:53 AM    TBILIRUBIN 0.2 05/12/2024 12:53 AM    INR 1.03 09/22/2022 01:20 AM    ALBUMIN 2.9 (L) 05/14/2024 02:30 AM      Lab Results   Component Value Date/Time    CHOLSTRLTOT 132 08/11/2018 08:22 AM    LDL 59 08/11/2018 08:22 AM    HDL 57 08/11/2018 08:22 AM    TRIGLYCERIDE 82 08/11/2018 08:22 AM       Lab Results   Component Value Date/Time    MALBCRT 30 08/11/2018 08:21 AM    MICROALBUR 4.1 08/11/2018 08:21 AM     Preventative Management:  BP regimen (ACEi/ARB): lisinopril 10 mg  Statin: atorvastatin (Lipitor) 20 mg three times a week  LDL <100: no (see Media for most recent labs)  Last Lipid Panel 11/13/23      Physical Examination:  Vital signs: There were no vitals taken for this visit. There is no height or weight on file to calculate BMI.    Assessment and Plan:    1. DM2  Discussed Goals: FBG 80 - 130, 2hPP < 180, a1c < 7.0%  Last A1c drawn today was 12.4%, which is not at goal and increased from previous A1c of 9.7%  TIR not at goal of > 70% as TIR in the last month is only 4%  AGP shows improving glucose trends the past two days. Suspect continued non-adherence to medications until days leading up to this appt.  PCP recommended to divide basal into two doses, however pt is having difficulty injecting  insulin during other times of the day other than in the AM, so would worry that pt would not be able to get her full dose if she divides the dose into two. Will continue with daily basal insulin administration for now and increase the dose for additional BG control  Encouraged patient to improve adherence with bolus insulin. Discussed effects of missed bolus insulin dose to BG control and A1c. Discussed risks and complications associated with poor DM control.    - Medication changes:  Increase Tresiba to 60 units daily  - Continue:  Metformin 1000 mg in the morning and 1000 mg in the evening with meals  Humalog 8 units before meals     - Lifestyle changes:  Exercise Goal - Increase exercise as tolerated. Walk for 10 minutes per day   Dietary Goal - Continue working with your nutritionist weekly. Substitute gatorade to sugarfree gatorade or water    Follow Up:  3 weeks    Jesus Cazares, PharmD    CC:   Main Mcgee M.D.

## 2024-11-14 NOTE — PATIENT INSTRUCTIONS
Your A1c today is 12.4% (increased from 9.7%)    Inject Tresiba 68 units daily    Inject Humalog 8 units before meals    Take metformin 1000 mg in the morning and 1000 mg in the evening with meals

## 2024-11-18 ENCOUNTER — OFFICE VISIT (OUTPATIENT)
Dept: INTERNAL MEDICINE | Facility: OTHER | Age: 44
End: 2024-11-18
Payer: MEDICAID

## 2024-11-18 VITALS
DIASTOLIC BLOOD PRESSURE: 80 MMHG | OXYGEN SATURATION: 97 % | WEIGHT: 209 LBS | SYSTOLIC BLOOD PRESSURE: 118 MMHG | BODY MASS INDEX: 37.03 KG/M2 | TEMPERATURE: 97.5 F | HEIGHT: 63 IN | HEART RATE: 87 BPM

## 2024-11-18 DIAGNOSIS — R53.1 WEAKNESS: ICD-10-CM

## 2024-11-18 DIAGNOSIS — D50.9 IRON DEFICIENCY ANEMIA, UNSPECIFIED IRON DEFICIENCY ANEMIA TYPE: ICD-10-CM

## 2024-11-18 DIAGNOSIS — E55.9 VITAMIN D DEFICIENCY: ICD-10-CM

## 2024-11-18 DIAGNOSIS — N93.9 ABNORMAL UTERINE BLEEDING: ICD-10-CM

## 2024-11-18 DIAGNOSIS — R53.83 OTHER FATIGUE: ICD-10-CM

## 2024-11-18 PROCEDURE — 99214 OFFICE O/P EST MOD 30 MIN: CPT | Mod: GC

## 2024-11-18 PROCEDURE — 3074F SYST BP LT 130 MM HG: CPT | Mod: GC

## 2024-11-18 PROCEDURE — RXMED WILLOW AMBULATORY MEDICATION CHARGE: Performed by: NURSE PRACTITIONER

## 2024-11-18 PROCEDURE — RXMED WILLOW AMBULATORY MEDICATION CHARGE: Performed by: STUDENT IN AN ORGANIZED HEALTH CARE EDUCATION/TRAINING PROGRAM

## 2024-11-18 PROCEDURE — RXMED WILLOW AMBULATORY MEDICATION CHARGE: Performed by: INTERNAL MEDICINE

## 2024-11-18 PROCEDURE — 3079F DIAST BP 80-89 MM HG: CPT | Mod: GC

## 2024-11-18 ASSESSMENT — FIBROSIS 4 INDEX: FIB4 SCORE: 0.59

## 2024-11-18 NOTE — LETTER
November 18, 2024    To Whom It May Concern:         This is confirmation that Liana Obrien attended her scheduled appointment with Ambar Webb M.D. on 11/18/24.         If you have any questions please do not hesitate to call me at the phone number listed below.    Sincerely,          Ambar Webb M.D.  [phone]

## 2024-11-18 NOTE — PROGRESS NOTES
Established Patient    Patient Care Team:  Main Mcgee M.D. as PCP - General (Internal Medicine)  Rain Latif (Inactive)    Liana Obrien is a 44 y.o. anxious female with PMH of Uncontrolled DM2, ALBARO, Menorrhagia, urinary incontinence, vagina candidiasis, vitamin D deficiency, anxiety, s/p dental procedures, sleep apnea, skin tag, date s/p surgery for perirectal abscess/fistula, obesity who is nonadherence to medicine due to side effect is here due to abnormal uterine bleeding.    HPI:  # Abnormal uterine bleeding  She states her menstrual bleeding started on Friday, November 15.  On November 14, Tresiba was increased this to 68 units from 60 units and she thinks increased dose of Tresiba is causing abnormal uterine bleeding.  Her amount bleeding is so heavy causing her tiredness, weakness, lightheadedness and fatigue.  She has to change pad 5-6 times a day.    #Uncontrolled Type2 DM  Per patient, her blood sugar has not been well-controlled and has always been 300-400 ranges.  She states she has been using Tresiba, Humalog, metformin as scheduled regularly.  Her last HbA1c on 11/14 was 12.4.     #Postviral infection  She states that she is still having sore throat and cough but it has been improving.    ROS:     General: No fevers, chills, night sweats, weight loss or gain  HEENT: No hearing changes, vision changes, eye pain, ear pain, nasal discharge, sore throat  Neck: No swelling in neck  Pulmonary: No shortness of breath, cough, sputum, or hemoptysis  Cardiovascular: No chest pain, palpitations, or LE swelling  GI: No nausea, vomiting, diarrhea, constipation, abdominal pain, hematochezia or melena  : No dysuria or frequency  Neuro: No focal weakness, no general weakness, no headaches, no lightheadedness, no dizziness  Psych: No anxiety or depression    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    Asthma 03/24/2023    history of childhood asthma    Back pain  03/24/2023    lower back    Bipolar disorder ([facility]) 08/09/2018    Bowel habit changes 03/24/2023    has episodes of constipation and diarrhea    Breath shortness 03/24/2023    on exertion    Candida vaginitis 05/26/2022    Chickenpox     history of    Cystitis 05/12/2024    Dehydration 01/10/2020    Dental disorder 03/24/2023    loose teeth    Diabetes 1.5, managed as type 2 ([facility])     insulin dependent    Diarrhea 01/12/2024    DVT (deep venous thrombosis) ([facility])     Dysfunctional uterine bleeding     Foot pain, bilateral 03/27/2024    Heart burn     Herpes     High cholesterol 03/24/2023    on medication due diabetes    Hypertension 03/24/2023    on medication due to diabetes    Hypoglycemia associated with diabetes ([facility]) 10/27/2021    Hyponatremia     Hypothyroidism due to acquired atrophy of thyroid 01/22/2016    Indigestion     Leukocytosis 03/13/2024    Lightheadedness 01/12/2024    Nausea 06/17/2015    Nausea & vomiting     Palpitations 11/09/2023    Personal history of venous thrombosis and embolism     DVT in BLE years ago    Polypharmacy 11/18/2023    Sepsis ([facility]) 09/22/2022    Sleep apnea     pt states that she was diagnosed in the past; will be retested; does not use cpap    Snoring 06/06/2023    sleep study done in the past; will be having another sleep study done in the near future    Tachycardia 11/18/2023    Uncontrolled type 2 diabetes mellitus without complication, without long-term current use of insulin 03/12/2015    Urinary incontinence 03/24/2023    stress incontinence    Uterine fibroids in pregnancy, postpartum condition     UTI (urinary tract infection)      Social History     Tobacco Use    Smoking status: Former     Current packs/day: 0.50     Average packs/day: 0.5 packs/day for 24.7 years (12.4 ttl pk-yrs)     Types: Cigarettes     Start date: 3/1/2000     Quit date: 1/1/2000     Passive exposure: Never    Smokeless tobacco: Never    Tobacco comments:     for 3 months at a time on and off     Vaping Use    Vaping status: Never Used   Substance Use Topics    Alcohol use: No     Comment: quit 2018    Drug use: No     Current Outpatient Medications   Medication Sig Dispense Refill    hydrOXYzine pamoate (VISTARIL) 50 MG Cap Take 1 oral capsule as needed every (4-6) hours for anxiety or sleep 120 Capsule 2    traZODone (DESYREL) 50 MG Tab Take 0.5-1 oral tablet at bedtime 30 Tablet 1    traZODone (DESYREL) 50 MG Tab Take 0.5-1 tablet by mouth at bedtime 30 Tablet 1    atorvastatin (LIPITOR) 40 MG Tab Take 1 Tablet by mouth every evening. (Patient taking differently: Take 40 mg by mouth every day.) 90 Tablet 1    gabapentin (NEURONTIN) 100 MG Cap Take 2 Capsules by mouth 3 times a day. 180 Capsule 3    ferrous sulfate (FEROSUL) 325 (65 Fe) MG tablet Take 1 Tablet by mouth every 48 hours. 30 Tablet 1    hydrOXYzine pamoate (VISTARIL) 50 MG Cap Take 1 oral capsule as needed every (6-8) hours for anxiety or sleep 90 Capsule 2    sertraline (ZOLOFT) 100 MG Tab Take 1.5 oral tablets once a day (Patient not taking: Reported on 11/1/2024) 45 Tablet 2    fluconazole (DIFLUCAN) 150 MG tablet Take 1 Tablet by mouth every day. For  persistent symptoms, take a second dose of 150 mg administered 72 hours after the first dose. 2 Tablet 1    insulin lispro (HUMALOG KWIKPEN) 100 UNIT/ML SC SOPN injection PEN Inject 8-16 units subcutaneously three times daily before meals or as directed (max 50 units/day) 15 mL 2    metFORMIN ER (GLUCOPHAGE XR) 500 MG TABLET SR 24 HR Take 2 Tablets by mouth 2 times a day. 360 Tablet 3    Sertraline HCl 150 MG Cap Take 1 oral capsule once a day 30 Capsule 2    Calcium Polycarbophil (FIBER-CAPS PO) Take  by mouth.      PROTEIN PO Take  by mouth.      Insulin Degludec (TRESIBA FLEXTOUCH) 200 UNIT/ML Solution Pen-injector Inject up to 60 units under the skin daily or as directed 27 mL 1    traZODone (DESYREL) 100 MG Tab Take 2 oral tablet at bedtime as needed (Patient not taking: Reported on  11/1/2024) 60 Tablet 2    Alcohol Swabs Pads use one alcohol swab three times a day as needed. 300 Each 6    Insulin Pen Needle 32 G x 4 mm Use to inject insulin 4 times daily 100 Each 5    vitamin D (CHOLECALCIFEROL) 1000 UNIT Tab Take 1 Tablet by mouth every day. 60 Tablet 1    levothyroxine (SYNTHROID) 150 MCG Tab Take 1 Tablet by mouth every morning on an empty stomach. 90 Tablet 1    lisinopril (PRINIVIL) 10 MG Tab Take 1 Tablet by mouth every day. 90 Tablet 1    mupirocin (BACTROBAN) 2 % Ointment Apply 1 Application topically 2 times a day. For 2 weeks 22 g 0    Continuous Glucose Sensor (FREESTYLE ALEKSANDAR 2 SENSOR) [facility] Use 1 kit as directed every two weeks change every 14 days 7 Each 3    Glucagon (BAQSIMI TWO PACK) 3 mg/dose Inhale 1 SPRAY into the nostril as a single dose for severe hypoglycemia 2 Each 3     No current facility-administered medications for this visit.       Physical Exam:  There were no vitals taken for this visit.  General: Well developed, well nourished female, in no distress.  HEENT: NC/AT, PERRL, EOMI, no scleral icterus or conjunctival pallor, fair dentition/denture in, no nasal discharge or oral erythema or exudates.   Neck: Supple, No cervical or supraclavicular LAD  CV:RRR, no murmurs gallops or Rubs, no JVD  Pulm: LCAB, no crackles, rales, rhonchi, or wheezing  GI: Normal bowel sounds, abdomen soft, nontender, nondistended to deep or light palpation in all 4 quadrants, no HSM.  MSK: Radial and dorsalis pedis pulses 2+ and equal bilaterally, respectively.  Strength 5 out of 5 in upper and lower extremities.  No lower extremity edema  Neuro: Patient is alert and oriented x3, no focal deficits  Psych: Appropriate mood and affect       Assessment and Plan:       # Abnormal uterine bleeding  Dr. Mcgee and I see this patient together. She has no signs of paler. She seem tired and anxious. She was encouraged to see gyne and undergo US pelvic. She was also explained that her abn uterine  bleeding is not due to Tresiba. Since she is having acute abn uterine bleeding, she was recommended to recheck CBC for possible anemia.    #Uncontrolled Type2 DM  Per patient, her blood sugar has not been well-controlled and has always been 300-400 ranges.  She states she has been using Tresiba, Humalog, metformin as scheduled regularly.  She was recommended to use Tresiba 60U. She was encouraged to see endocrinologist, and continue to use all her DM med. She also stated that her CGM was removed by itself and need CGM henna strips. She was recommended to send name of strip kits from [employer] and Dr. Mcgee will refill for her. She will also be seeing with Dr. Mcgee on 11/22 for follow up.  She was also recommended to go to nearest ED if she has BS for >400-500 and increased thirst, frequent urination, blurred vision, fatigue, and headaches    #Postviral infection  She states that she is still having sore throat and cough but it has been improving.  Recommended to continue watching. If symptoms got worsen, she will need further evaluation for prolonged postviral symptoms.        Ambar Webb M.D. PGY 1        This note was created using voice recognition software.  While every attempt is made to ensure accuracy of transcription, occasionally errors occur.

## 2024-11-19 ENCOUNTER — HOSPITAL ENCOUNTER (EMERGENCY)
Facility: MEDICAL CENTER | Age: 44
End: 2024-11-19
Attending: EMERGENCY MEDICINE
Payer: MEDICAID

## 2024-11-19 VITALS
TEMPERATURE: 96.9 F | HEIGHT: 63 IN | OXYGEN SATURATION: 97 % | DIASTOLIC BLOOD PRESSURE: 90 MMHG | HEART RATE: 67 BPM | SYSTOLIC BLOOD PRESSURE: 154 MMHG | BODY MASS INDEX: 36.09 KG/M2 | RESPIRATION RATE: 20 BRPM | WEIGHT: 203.71 LBS

## 2024-11-19 DIAGNOSIS — R11.2 NAUSEA AND VOMITING, UNSPECIFIED VOMITING TYPE: ICD-10-CM

## 2024-11-19 DIAGNOSIS — R73.9 HYPERGLYCEMIA: ICD-10-CM

## 2024-11-19 LAB
ALBUMIN SERPL BCP-MCNC: 4.4 G/DL (ref 3.2–4.9)
ALBUMIN/GLOB SERPL: 1 G/DL
ALP SERPL-CCNC: 94 U/L (ref 30–99)
ALT SERPL-CCNC: 29 U/L (ref 2–50)
ANION GAP SERPL CALC-SCNC: 15 MMOL/L (ref 7–16)
AST SERPL-CCNC: 20 U/L (ref 12–45)
B-OH-BUTYR SERPL-MCNC: 0.2 MMOL/L (ref 0.02–0.27)
BASOPHILS # BLD AUTO: 0.7 % (ref 0–1.8)
BASOPHILS # BLD: 0.06 K/UL (ref 0–0.12)
BILIRUB SERPL-MCNC: 0.3 MG/DL (ref 0.1–1.5)
BUN SERPL-MCNC: 12 MG/DL (ref 8–22)
CALCIUM ALBUM COR SERPL-MCNC: 9.5 MG/DL (ref 8.5–10.5)
CALCIUM SERPL-MCNC: 9.8 MG/DL (ref 8.4–10.2)
CHLORIDE SERPL-SCNC: 96 MMOL/L (ref 96–112)
CO2 SERPL-SCNC: 23 MMOL/L (ref 20–33)
CREAT SERPL-MCNC: 0.51 MG/DL (ref 0.5–1.4)
EOSINOPHIL # BLD AUTO: 0.19 K/UL (ref 0–0.51)
EOSINOPHIL NFR BLD: 2.2 % (ref 0–6.9)
ERYTHROCYTE [DISTWIDTH] IN BLOOD BY AUTOMATED COUNT: 37.4 FL (ref 35.9–50)
GFR SERPLBLD CREATININE-BSD FMLA CKD-EPI: 117 ML/MIN/1.73 M 2
GLOBULIN SER CALC-MCNC: 4.5 G/DL (ref 1.9–3.5)
GLUCOSE BLD STRIP.AUTO-MCNC: 154 MG/DL (ref 65–99)
GLUCOSE BLD STRIP.AUTO-MCNC: 451 MG/DL (ref 65–99)
GLUCOSE SERPL-MCNC: 415 MG/DL (ref 65–99)
HCG SERPL QL: NEGATIVE
HCT VFR BLD AUTO: 42.2 % (ref 37–47)
HGB BLD-MCNC: 14 G/DL (ref 12–16)
IMM GRANULOCYTES # BLD AUTO: 0.04 K/UL (ref 0–0.11)
IMM GRANULOCYTES NFR BLD AUTO: 0.5 % (ref 0–0.9)
LIPASE SERPL-CCNC: 28 U/L (ref 11–82)
LYMPHOCYTES # BLD AUTO: 2.4 K/UL (ref 1–4.8)
LYMPHOCYTES NFR BLD: 27.9 % (ref 22–41)
MAGNESIUM SERPL-MCNC: 2.1 MG/DL (ref 1.5–2.5)
MCH RBC QN AUTO: 26.7 PG (ref 27–33)
MCHC RBC AUTO-ENTMCNC: 33.2 G/DL (ref 32.2–35.5)
MCV RBC AUTO: 80.5 FL (ref 81.4–97.8)
MONOCYTES # BLD AUTO: 0.49 K/UL (ref 0–0.85)
MONOCYTES NFR BLD AUTO: 5.7 % (ref 0–13.4)
NEUTROPHILS # BLD AUTO: 5.43 K/UL (ref 1.82–7.42)
NEUTROPHILS NFR BLD: 63 % (ref 44–72)
NRBC # BLD AUTO: 0 K/UL
NRBC BLD-RTO: 0 /100 WBC (ref 0–0.2)
PLATELET # BLD AUTO: 252 K/UL (ref 164–446)
PMV BLD AUTO: 10.3 FL (ref 9–12.9)
POTASSIUM SERPL-SCNC: 4.1 MMOL/L (ref 3.6–5.5)
PROT SERPL-MCNC: 8.9 G/DL (ref 6–8.2)
RBC # BLD AUTO: 5.24 M/UL (ref 4.2–5.4)
SODIUM SERPL-SCNC: 134 MMOL/L (ref 135–145)
WBC # BLD AUTO: 8.6 K/UL (ref 4.8–10.8)

## 2024-11-19 PROCEDURE — 85025 COMPLETE CBC W/AUTO DIFF WBC: CPT

## 2024-11-19 PROCEDURE — 80053 COMPREHEN METABOLIC PANEL: CPT

## 2024-11-19 PROCEDURE — RXMED WILLOW AMBULATORY MEDICATION CHARGE: Performed by: EMERGENCY MEDICINE

## 2024-11-19 PROCEDURE — 83690 ASSAY OF LIPASE: CPT

## 2024-11-19 PROCEDURE — 82962 GLUCOSE BLOOD TEST: CPT

## 2024-11-19 PROCEDURE — 83735 ASSAY OF MAGNESIUM: CPT

## 2024-11-19 PROCEDURE — 82010 KETONE BODYS QUAN: CPT

## 2024-11-19 PROCEDURE — 96374 THER/PROPH/DIAG INJ IV PUSH: CPT

## 2024-11-19 PROCEDURE — 96375 TX/PRO/DX INJ NEW DRUG ADDON: CPT

## 2024-11-19 PROCEDURE — 700105 HCHG RX REV CODE 258: Performed by: EMERGENCY MEDICINE

## 2024-11-19 PROCEDURE — 36415 COLL VENOUS BLD VENIPUNCTURE: CPT

## 2024-11-19 PROCEDURE — 84703 CHORIONIC GONADOTROPIN ASSAY: CPT

## 2024-11-19 PROCEDURE — 99284 EMERGENCY DEPT VISIT MOD MDM: CPT

## 2024-11-19 PROCEDURE — 700111 HCHG RX REV CODE 636 W/ 250 OVERRIDE (IP): Performed by: EMERGENCY MEDICINE

## 2024-11-19 RX ORDER — SODIUM CHLORIDE 9 MG/ML
1000 INJECTION, SOLUTION INTRAVENOUS ONCE
Status: COMPLETED | OUTPATIENT
Start: 2024-11-19 | End: 2024-11-19

## 2024-11-19 RX ORDER — PROCHLORPERAZINE EDISYLATE 5 MG/ML
10 INJECTION INTRAMUSCULAR; INTRAVENOUS ONCE
Status: COMPLETED | OUTPATIENT
Start: 2024-11-19 | End: 2024-11-19

## 2024-11-19 RX ORDER — PROCHLORPERAZINE MALEATE 10 MG
10 TABLET ORAL EVERY 6 HOURS PRN
Qty: 30 TABLET | Refills: 3 | Status: SHIPPED | OUTPATIENT
Start: 2024-11-19

## 2024-11-19 RX ADMIN — PROCHLORPERAZINE EDISYLATE 10 MG: 5 INJECTION INTRAMUSCULAR; INTRAVENOUS at 13:54

## 2024-11-19 RX ADMIN — SODIUM CHLORIDE 1000 ML: 9 INJECTION, SOLUTION INTRAVENOUS at 13:53

## 2024-11-19 RX ADMIN — INSULIN HUMAN 10 UNITS: 1 INJECTION, SOLUTION INTRAVENOUS at 13:54

## 2024-11-19 ASSESSMENT — FIBROSIS 4 INDEX: FIB4 SCORE: 0.59

## 2024-11-19 NOTE — DISCHARGE INSTRUCTIONS
Abdominal Pain, Extended Version   Belly Pain, Stomach Pain    Take clear fluid diet 12 hours and slowly advance to solid food as tolerated. Take  Ibuprofen or Tylenol for pain as needed. Return to the emergency department in 24 hours for reevaluation if you continue to have abdominal. Return sooner if you have worsening pain (especially in the lower right abdomen), pain that is worse with movement, uncontrolled vomiting, new fever, bleeding or ill appearance as you may have a surgical condition in evolution that require further evaluation and consultation.    Your exam may not have shown the exact reason you have abdominal pain.  Since there are many different causes of abdominal pain, another checkup and more tests is required in 24 hours if your symptoms do not improve. One of the many possible causes of abdominal pain in any person who has not had their appendix removed is Acute Appendicitis.  Appendicitis is often a very difficult to diagnosis. Normal blood tests, urine tests, and even ultrasound and CT can not ensure there is not an early appendicitis. Sometimes only the changes which occur over time will allow appendicitis and other causes of abdominal pain to be determined.  Because of this, it is important you follow all of the instructions below.                                                                                                Home care instructions  Rest.  Do not eat solid food until your pain is gone.  While You Have Pain:  Stay on a clear liquid diet.  A clear liquid is one you can see through (water, weak tea, broth or bouillon, ginger ale, Jell-o, Ezekiel-Aid, Gatorade, apple juice, popsicles or ice chips).   When Your Pain is Gone:  Start a light diet (dry toast, crackers, applesauce, white rice, bananas, broth or bouillon) and increase the diet slowly, as long as it does not bother you.  No dairy products (including cheese and eggs) and no spicy, fatty, fried or high fiber foods.  No  alcohol, caffeine or cigarettes.  Take your regular medicines unless the caregiver told you not to.  Take any prescribed medicine as directed.  Do not take medicine containing aspirin, ibuprofen (Advil® / Motrin® ), naprosyn/naproxen (Aleve®) or ketoprofen (Orudis® ) unless told to by your own caregiver.    seek immediate medical attention if :  Your pain is not gone in 8-12 hours.  Your pain becomes worse, changes location or feels different.  You have a fever or shaking chills.  You keep throwing up or cannot drink liquids.   You see blood when you throw up or see blood in your bowel movements.    Your bowel movements become dark or black.  You move your bowels frequently.  Your bowel movements stop (become blocked) or you cannot pass gas.  You have bloody, frequent or painful urination (“passing water”).  Your skin or the whites of your eyes look yellow.  Your stomach becomes bloated or bigger.  You notice bleeding or discharge from your vagina.  You have dizziness or fainting.  You have chest or back pain.   Anything else worries you.  You become short of breath.    If you have questions or concerns, please call your caregiver.     Adapted from ©2001  [employer] Aftercare Instruction Sheets  Last reviewed July 12, 2005, Layout [employer], creator of [employer] Patient Information System.    [employer]® Patient Information ©2007 [employer].

## 2024-11-19 NOTE — ED NOTES
Medication history reviewed with pt. Med rec is complete.  Allergies reviewed, per pt    Pt reports that she is taking her HYDROXYZINE 50MG 1 tablet every 4 hours, not as needed.  Pt is taking 68 units of her TRESIBA 200 units/ml every evening.  Pt reports that she has not taken her HUMALOG 100 units/ml since 11/14/2024    Patient has not had any outpatient antibiotics in the last 30 days.    Pt is not on any anticoagulants

## 2024-11-19 NOTE — Clinical Note
Liana Obrien was seen and treated in our emergency department on 11/19/2024.  She may return to work on 11/24/2024.       If you have any questions or concerns, please don't hesitate to call.      Sean Pro D.O.

## 2024-11-19 NOTE — ED PROVIDER NOTES
ED Provider Note    CHIEF COMPLAINT  Chief Complaint   Patient presents with    N/V     Persistently increased nausea vomiting.     Fatigue     Patient states she is having increased vaginal bleeding.     High Blood Sugar     451 in triage    Vaginal Bleeding     Patient states this occurs when her insulin dosage goes up.          HPI/ROS        Liana Mikayla Obrien is a 44 y.o. female who presents complaining of high sugar, nausea, mild fatigue.  She states that she has history of type 2 diabetes and is taking her medication as prescribed.  She takes 16 units of Tresiba at night as well as 8 units of Humalog before every meal.  She states her blood sugars are running 400.  She is scared she may have DKA.  She has had vaginal bleeding but states that that happens when they increase her insulin and she increased her insulin on Friday last week.  She states that she has had slight increasing vaginal bleeding.  She has irregular periods that this may just be her period.  She is unsure if she is pregnant.  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), Asthma (03/24/2023), Back pain (03/24/2023), Bipolar disorder ([facility]) (08/09/2018), Bowel habit changes (03/24/2023), Breath shortness (03/24/2023), Candida vaginitis (05/26/2022), Chickenpox, Cystitis (05/12/2024), Dehydration (01/10/2020), Dental disorder (03/24/2023), Diabetes 1.5, managed as type 2 ([facility]), Diarrhea (01/12/2024), DVT (deep venous thrombosis) ([facility]), Dysfunctional uterine bleeding, Foot pain, bilateral (03/27/2024), Heart burn, Herpes, High cholesterol (03/24/2023), Hypertension (03/24/2023), Hypoglycemia associated with diabetes ([facility]) (10/27/2021), Hyponatremia, Hypothyroidism due to acquired atrophy of thyroid (01/22/2016), Indigestion, Leukocytosis (03/13/2024), Lightheadedness (01/12/2024), Nausea  (06/17/2015), Nausea & vomiting, Palpitations (11/09/2023), Personal history of venous thrombosis and embolism, Polypharmacy (11/18/2023), Sepsis (HCC) (09/22/2022), Sleep apnea, Snoring (06/06/2023), Tachycardia (11/18/2023), Uncontrolled type 2 diabetes mellitus without complication, without long-term current use of insulin (03/12/2015), Urinary incontinence (03/24/2023), Uterine fibroids in pregnancy, postpartum condition, and UTI (urinary tract infection).    SURGICAL HISTORY   has a past surgical history that includes i&d perirectal abscess (9/22/2022); surg diagnostic exam, anorectal (N/A, 6/20/2023); and i&d perirectal abscess (N/A, 6/20/2023).    FAMILY HISTORY  Family History   Problem Relation Age of Onset    Hypertension Mother     Sleep Apnea Mother     Hyperlipidemia Father     Cancer Brother     Sleep Apnea Brother     Cancer Maternal Uncle     Diabetes Neg Hx     Heart Disease Neg Hx     Stroke Neg Hx        SOCIAL HISTORY  Social History     Tobacco Use    Smoking status: Former     Current packs/day: 0.50     Average packs/day: 0.5 packs/day for 24.7 years (12.4 ttl pk-yrs)     Types: Cigarettes     Start date: 3/1/2000     Quit date: 1/1/2000     Passive exposure: Never    Smokeless tobacco: Never    Tobacco comments:     for 3 months at a time on and off    Vaping Use    Vaping status: Never Used   Substance and Sexual Activity    Alcohol use: No     Comment: quit 2018    Drug use: No    Sexual activity: Not Currently     Partners: Male       CURRENT MEDICATIONS  Home Medications       Reviewed by Amari Teague (Pharmacy Tech) on 11/19/24 at 1353  Med List Status: Complete     Medication Last Dose Status   Alcohol Swabs Pads  Active   atorvastatin (LIPITOR) 40 MG Tab 11/17/2024 Active   Continuous Glucose Sensor (FREESTYLE ALEKSANDAR 2 SENSOR) Misc  Active   Dextromethorphan-guaiFENesin (ROBITUSSIN COUGH/CHEST DM MAX PO) 11/18/2024 Active   ferrous sulfate (FEROSUL) 325 (65 Fe) MG tablet  "11/17/2024 Active   FIBER ADULT GUMMIES PO 11/17/2024 Active   fluconazole (DIFLUCAN) 150 MG tablet 11/5/2024 Active   gabapentin (NEURONTIN) 100 MG Cap 11/17/2024 Active   Glucagon (BAQSIMI TWO PACK) 3 mg/dose Unknown Active   hydrOXYzine pamoate (VISTARIL) 50 MG Cap 11/17/2024 Active   Insulin Degludec (TRESIBA FLEXTOUCH) 200 UNIT/ML Solution Pen-injector 11/18/2024 Active   insulin lispro (HUMALOG KWIKPEN) 100 UNIT/ML SC SOPN injection PEN 11/14/2024 Active   Insulin Pen Needle 32 G x 4 mm  Active   levothyroxine (SYNTHROID) 150 MCG Tab 11/17/2024 Active   lisinopril (PRINIVIL) 10 MG Tab 11/17/2024 Active   metFORMIN ER (GLUCOPHAGE XR) 500 MG TABLET SR 24 HR 11/17/2024 Active   Multiple Vitamins-Minerals (EMERGEN-C IMMUNE) Pack 11/17/2024 Active   Sertraline HCl 150 MG Cap 11/17/2024 Active   traZODone (DESYREL) 50 MG Tab 11/17/2024 Active   vitamin D (CHOLECALCIFEROL) 1000 UNIT Tab 11/17/2024 Active                    ALLERGIES  Allergies   Allergen Reactions    Bicillin C-R [Penicillin G Proc & Benzathine] Rash     Received inj of Bicillin- reaction was rash. Oral penicillin shows no reaction.    Ondansetron Hives    Penicillin G Hives    Sulfa Drugs Hives           Metoclopramide Rash and Vomiting     Rash         PHYSICAL EXAM  VITAL SIGNS: BP (!) 154/90   Pulse 67   Temp 36.1 °C (96.9 °F) (Temporal)   Resp 20   Ht 1.6 m (5' 3\")   Wt 92.4 kg (203 lb 11.3 oz)   LMP 11/19/2024 (Exact Date)   SpO2 97%   BMI 36.08 kg/m²      Nursing notes and vitals reviewed.  Constitutional: Well developed, Well nourished, No acute distress, Non-toxic appearance.   Eyes: PERRLA, EOMI, Conjunctiva normal, No discharge.   HENT: Normocephalic, Atraumatic, dry mucous membranes, no facial edema   Cardiovascular: Normal heart rate, Normal rhythm, No murmurs, No rubs, No gallops.   Thorax & Lungs: No respiratory distress, No rales, No rhonchi, No wheezing, No chest tenderness.   Abdomen: Bowel sounds normal, Soft, No " tenderness, No guarding, No rebound, No masses, No pulsatile masses.   Skin: Warm, Dry, No erythema, No rash.   Extremities: No deformity, no edema, good range of motion range of motion upper lower extremes bilaterally  Neurologic: Alert & oriented x 3, no focal abnormalities noted, acting appropriately on examination        EKG/LABS  Normal sinus rhythm on monitor  I have independently interpreted this EKG  Results for orders placed or performed during the hospital encounter of 11/19/24   POCT glucose device results    Collection Time: 11/19/24 12:13 PM   Result Value Ref Range    POC Glucose, Blood 451 (HH) 65 - 99 mg/dL   CBC WITH DIFFERENTIAL    Collection Time: 11/19/24 12:49 PM   Result Value Ref Range    WBC 8.6 4.8 - 10.8 K/uL    RBC 5.24 4.20 - 5.40 M/uL    Hemoglobin 14.0 12.0 - 16.0 g/dL    Hematocrit 42.2 37.0 - 47.0 %    MCV 80.5 (L) 81.4 - 97.8 fL    MCH 26.7 (L) 27.0 - 33.0 pg    MCHC 33.2 32.2 - 35.5 g/dL    RDW 37.4 35.9 - 50.0 fL    Platelet Count 252 164 - 446 K/uL    MPV 10.3 9.0 - 12.9 fL    Neutrophils-Polys 63.00 44.00 - 72.00 %    Lymphocytes 27.90 22.00 - 41.00 %    Monocytes 5.70 0.00 - 13.40 %    Eosinophils 2.20 0.00 - 6.90 %    Basophils 0.70 0.00 - 1.80 %    Immature Granulocytes 0.50 0.00 - 0.90 %    Nucleated RBC 0.00 0.00 - 0.20 /100 WBC    Neutrophils (Absolute) 5.43 1.82 - 7.42 K/uL    Lymphs (Absolute) 2.40 1.00 - 4.80 K/uL    Monos (Absolute) 0.49 0.00 - 0.85 K/uL    Eos (Absolute) 0.19 0.00 - 0.51 K/uL    Baso (Absolute) 0.06 0.00 - 0.12 K/uL    Immature Granulocytes (abs) 0.04 0.00 - 0.11 K/uL    NRBC (Absolute) 0.00 K/uL   COMP METABOLIC PANEL    Collection Time: 11/19/24 12:49 PM   Result Value Ref Range    Sodium 134 (L) 135 - 145 mmol/L    Potassium 4.1 3.6 - 5.5 mmol/L    Chloride 96 96 - 112 mmol/L    Co2 23 20 - 33 mmol/L    Anion Gap 15.0 7.0 - 16.0    Glucose 415 (H) 65 - 99 mg/dL    Bun 12 8 - 22 mg/dL    Creatinine 0.51 0.50 - 1.40 mg/dL    Calcium 9.8 8.4 - 10.2  mg/dL    Correct Calcium 9.5 8.5 - 10.5 mg/dL    AST(SGOT) 20 12 - 45 U/L    ALT(SGPT) 29 2 - 50 U/L    Alkaline Phosphatase 94 30 - 99 U/L    Total Bilirubin 0.3 0.1 - 1.5 mg/dL    Albumin 4.4 3.2 - 4.9 g/dL    Total Protein 8.9 (H) 6.0 - 8.2 g/dL    Globulin 4.5 (H) 1.9 - 3.5 g/dL    A-G Ratio 1.0 g/dL   LIPASE    Collection Time: 11/19/24 12:49 PM   Result Value Ref Range    Lipase 28 11 - 82 U/L   MAGNESIUM    Collection Time: 11/19/24 12:49 PM   Result Value Ref Range    Magnesium 2.1 1.5 - 2.5 mg/dL   BETA-HYDROXYBUTYRIC ACID    Collection Time: 11/19/24 12:49 PM   Result Value Ref Range    beta-Hydroxybutyric Acid 0.20 0.02 - 0.27 mmol/L   HCG QUAL SERUM    Collection Time: 11/19/24 12:49 PM   Result Value Ref Range    Beta-Hcg Qualitative Serum Negative Negative   ESTIMATED GFR    Collection Time: 11/19/24 12:49 PM   Result Value Ref Range    GFR (CKD-EPI) 117 >60 mL/min/1.73 m 2   POCT glucose device results    Collection Time: 11/19/24  3:36 PM   Result Value Ref Range    POC Glucose, Blood 154 (H) 65 - 99 mg/dL     *Note: Due to a large number of results and/or encounters for the requested time period, some results have not been displayed. A complete set of results can be found in Results Review.       RADIOLOGY/PROCEDURES     COURSE & MEDICAL DECISION MAKING    ASSESSMENT, COURSE AND PLAN  Care Narrative: This is a pleasant 44-year-old female presents with hyperglycemia, nausea vomiting.  Here in the emerged primary because she has notes of DKA with an elevated blood sugar of 4 and 15 but no evidence of anion gap elevation, no acidosis, beta hydroxybutyric acid was negative as well.  The patient received insulin 10 units as well as 1 L IV fluid secondary to her found hyperglycemia.  As for her nausea and vomiting, she received Compazine and on reevaluation she had a soft, nontender, nonsurgical abdomen, she is positive p.o.  Her sugar to correct to 154 after IV fluids as well as insulin.  The patient had  no evidence of surgical abdomen, no infection.  Had a long conversation the patient concerning her insulin and need to follow-up with her endocrinologist and primary care physician for further evaluation of stroke return precautions have been given.  For the vaginal bleeding, she states is a normal phenomenon, she is not pregnant, she is not complain of any abdominal pain or pelvic pain therefore do not believe patient requires evaluation with ultrasound or pelvic examination.  This was considered.      ADDITIONAL PROBLEMS MANAGED  Vaginal bleeding, intermittent chronic for the patient, not pregnant.  Will follow with her gynecologist as needed      FINAL DIAGNOSIS  1. Hyperglycemia    2. Nausea and vomiting, unspecified vomiting type          DISPOSITION:  Patient will be discharged home in stable condition.    FOLLOW UP:  [facility], Emergency Dept  [address] [facility] [id number] [phone]    If symptoms worsen    Main Mcgee M.D.  [address] [facility] [address]  [phone]    Schedule an appointment as soon as possible for a visit   As needed      OUTPATIENT MEDICATIONS:  Discharge Medication List as of 11/19/2024  4:07 PM        START taking these medications    Details   prochlorperazine (COMPAZINE) 10 MG Tab Take 1 Tablet by mouth every 6 hours as needed for Nausea/Vomiting., Disp-30 Tablet, R-3, Normal                Electronically signed by: Sean Pro D.O., 11/19/2024 12:43 PM

## 2024-11-19 NOTE — ED NOTES
Called to room-pt amb to br void-forgot about u/a. Ivf continue. Results noted by erp-to bedside to discuss same

## 2024-11-19 NOTE — ED TRIAGE NOTES
"Patient presents to the ER with the following complaints:    Chief Complaint   Patient presents with    N/V     Persistently increased nausea vomiting.     Fatigue     Patient states she is having increased vaginal bleeding.     High Blood Sugar     451 in triage    Vaginal Bleeding     Patient states this occurs when her insulin dosage goes up.        BP (!) 151/95   Pulse 80   Temp 36.1 °C (96.9 °F) (Temporal)   Resp 20   Ht 1.6 m (5' 3\")   Wt 92.4 kg (203 lb 11.3 oz)   LMP 11/19/2024 (Exact Date)   SpO2 96%   BMI 36.08 kg/m²       "

## 2024-11-19 NOTE — ED NOTES
Pt rounding-warm blanket provided per request, vs as charted, requesting med for nausea. Will notify erp of same. Pt in no apparent distress,

## 2024-11-20 NOTE — ED NOTES
Pt given d/c paperwork and RX p/u information. Pt verbalized understanding all information given. Pt ambulated out of the ER w/o difficulty.

## 2024-11-21 ENCOUNTER — PHARMACY VISIT (OUTPATIENT)
Dept: PHARMACY | Facility: MEDICAL CENTER | Age: 44
End: 2024-11-21
Payer: COMMERCIAL

## 2024-11-21 PROCEDURE — RXMED WILLOW AMBULATORY MEDICATION CHARGE: Performed by: INTERNAL MEDICINE

## 2024-11-23 ENCOUNTER — PHARMACY VISIT (OUTPATIENT)
Dept: PHARMACY | Facility: MEDICAL CENTER | Age: 44
End: 2024-11-23
Payer: COMMERCIAL

## 2024-11-27 ENCOUNTER — OFFICE VISIT (OUTPATIENT)
Dept: INTERNAL MEDICINE | Facility: OTHER | Age: 44
End: 2024-11-27
Payer: MEDICAID

## 2024-11-27 VITALS
TEMPERATURE: 97.3 F | BODY MASS INDEX: 38.02 KG/M2 | HEART RATE: 88 BPM | HEIGHT: 63 IN | OXYGEN SATURATION: 97 % | WEIGHT: 214.6 LBS | SYSTOLIC BLOOD PRESSURE: 109 MMHG | DIASTOLIC BLOOD PRESSURE: 72 MMHG

## 2024-11-27 DIAGNOSIS — Z79.4 TYPE 2 DIABETES MELLITUS WITH HYPERGLYCEMIA, WITH LONG-TERM CURRENT USE OF INSULIN (HCC): ICD-10-CM

## 2024-11-27 DIAGNOSIS — E11.65 TYPE 2 DIABETES MELLITUS WITH HYPERGLYCEMIA, WITH LONG-TERM CURRENT USE OF INSULIN (HCC): ICD-10-CM

## 2024-11-27 ASSESSMENT — FIBROSIS 4 INDEX: FIB4 SCORE: 0.65

## 2024-11-27 NOTE — PROGRESS NOTES
Established Patient    Patient Care Team:  Main Mcgee M.D. as PCP - General (Internal Medicine)  Rain Latif (Inactive)    HPI:  Liana Obrien is a 44 y.o. female with relevant past medical history of T2DM, hypertension, DVT, GERD, hypothyroidism, MDD, anemia and abnormal vaginal bleeding who presents today for follow-up after ED visit.      The patient was last seen by her PCP in this clinic on 11/18. During that visit, she reported complaints of menorrhagia and lightheadedness following several months without any bleeding. She was awaiting a pelvic ultrasound scheduled for 2 days later and had also followed up with gynecology. She associated the sudden bleeding with an increased dose of long-acting insulin, noting that a similar issue had occurred previously when she was on higher doses of Lantus. As a result, she had stopped taking her long-acting insulin. She was encouraged at that time to return to her previous dose of 60 units.    ED visit: The patient presented to the ED on 11/19 with complaints of nausea, non-bloody episodes of vomiting, and fatigue. She reported blood sugar levels consistently around 400 and expressed concern about the possibility of DKA. Additional symptoms included chills and sweats. She denied hematochezia, melena, or abdominal pain. Slight vomiting was noted, but no other significant gastrointestinal complaints were reported. DKA was ruled out during the patient’s ED visit. She was started on an insulin drip, which improved her blood glucose levels from 415 mg/dL to the 150s. The patient was subsequently discharged in stable condition.    Since her last ED visit, the patient has been consistently using 60 units of Tresiba, 8 units of Humalog before breakfast and dinner, and metformin 1000 mg BID. However, she admits to only taking her insulin occasionally (before ED encounter). Her CGM data from the past 7 days shows an average glucose level of 255 mg/dL, reviewed on  her mobile yehuda. She reports resolution of nausea and vomiting and significant improvement in fatigue compared to her ED visit. Additionally, she notes that her vaginal bleeding has stopped after reducing her Tresiba dose from 68 units to 60 units.    Review of Systems   Constitutional:  Negative for chills, fever and weight loss.   HENT:  Negative for hearing loss, sinus pain, sore throat and tinnitus.    Eyes:  Negative for blurred vision, double vision, pain and redness.   Respiratory:  Negative for cough, shortness of breath and wheezing.    Cardiovascular:  Negative for chest pain, palpitations, orthopnea, leg swelling and PND.   Gastrointestinal:  Negative for abdominal pain, constipation, diarrhea and heartburn.   Genitourinary:  Negative for dysuria and hematuria.   Musculoskeletal:  Negative for falls, joint pain and myalgias.   Skin:  Negative for itching and rash.   Neurological:  Negative for dizziness, tremors, focal weakness, seizures, weakness and headaches.   Endo/Heme/Allergies:  Does not bruise/bleed easily.   Psychiatric/Behavioral:  Negative for depression, hallucinations and memory loss. The patient is not nervous/anxious and does not have insomnia.    :    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    Asthma 03/24/2023    history of childhood asthma    Back pain 03/24/2023    lower back    Bipolar disorder (HCC) 08/09/2018    Bowel habit changes 03/24/2023    has episodes of constipation and diarrhea    Breath shortness 03/24/2023    on exertion    Candida vaginitis 05/26/2022    Chickenpox     history of    Cystitis 05/12/2024    Dehydration 01/10/2020    Dental disorder 03/24/2023    loose teeth    Diabetes 1.5, managed as type 2 ([facility])     insulin dependent    Diarrhea 01/12/2024    DVT (deep venous thrombosis) ([facility])     Dysfunctional uterine bleeding     Foot pain, bilateral 03/27/2024    Heart burn     Herpes     High cholesterol 03/24/2023    on medication due  diabetes    Hypertension 03/24/2023    on medication due to diabetes    Hypoglycemia associated with diabetes (HCC) 10/27/2021    Hyponatremia     Hypothyroidism due to acquired atrophy of thyroid 01/22/2016    Indigestion     Leukocytosis 03/13/2024    Lightheadedness 01/12/2024    Nausea 06/17/2015    Nausea & vomiting     Palpitations 11/09/2023    Personal history of venous thrombosis and embolism     DVT in BLE years ago    Polypharmacy 11/18/2023    Sepsis (HCC) 09/22/2022    Sleep apnea     pt states that she was diagnosed in the past; will be retested; does not use cpap    Snoring 06/06/2023    sleep study done in the past; will be having another sleep study done in the near future    Tachycardia 11/18/2023    Uncontrolled type 2 diabetes mellitus without complication, without long-term current use of insulin 03/12/2015    Urinary incontinence 03/24/2023    stress incontinence    Uterine fibroids in pregnancy, postpartum condition     UTI (urinary tract infection)        Social History     Tobacco Use    Smoking status: Former     Current packs/day: 0.50     Average packs/day: 0.5 packs/day for 24.7 years (12.4 ttl pk-yrs)     Types: Cigarettes     Start date: 3/1/2000     Quit date: 1/1/2000     Passive exposure: Never    Smokeless tobacco: Never    Tobacco comments:     for 3 months at a time on and off    Vaping Use    Vaping status: Never Used   Substance Use Topics    Alcohol use: No     Comment: quit 2018    Drug use: No       Current Outpatient Medications   Medication Sig Dispense Refill    vitamin D (CHOLECALCIFEROL) 1000 UNIT Tab Take 1 Tablet by mouth every day. 90 Tablet 1    FIBER ADULT GUMMIES PO Take 2 Tablets by mouth every day.      prochlorperazine (COMPAZINE) 10 MG Tab Take 1 Tablet by mouth every 6 hours as needed for Nausea/Vomiting. 30 Tablet 3    hydrOXYzine pamoate (VISTARIL) 50 MG Cap Take 1 capsule by mouth every (4-6) hours as needed for anxiety or sleep 120 Capsule 2    traZODone  (DESYREL) 50 MG Tab Take 0.5-1 oral tablet at bedtime (Patient taking differently: Take 25-50 mg by mouth every evening.) 30 Tablet 1    atorvastatin (LIPITOR) 40 MG Tab Take 1 Tablet by mouth every evening. (Patient taking differently: Take 40 mg by mouth every day.) 90 Tablet 1    gabapentin (NEURONTIN) 100 MG Cap Take 2 Capsules by mouth 3 times a day. 180 Capsule 3    ferrous sulfate (FEROSUL) 325 (65 Fe) MG tablet Take 1 Tablet by mouth every 48 hours. 30 Tablet 1    fluconazole (DIFLUCAN) 150 MG tablet Take 1 Tablet by mouth every day. For  persistent symptoms, take a second dose of 150 mg administered 72 hours after the first dose. (Patient taking differently: Take 150 mg by mouth see administration instructions. For  persistent symptoms, take a second dose of 150 mg administered 72 hours after the first dose.) 2 Tablet 1    insulin lispro (HUMALOG KWIKPEN) 100 UNIT/ML SC SOPN injection PEN Inject 8-16 units subcutaneously three times daily before meals or as directed (max 50 units/day) (Patient taking differently: Inject 8-16 Units under the skin 3 times a day before meals. Inject 8-16 units subcutaneously three times daily before meals or as directed (max 50 units/day)) 15 mL 2    metFORMIN ER (GLUCOPHAGE XR) 500 MG TABLET SR 24 HR Take 2 Tablets by mouth 2 times a day. 360 Tablet 3    Sertraline HCl 150 MG Cap Take 1 oral capsule once a day (Patient taking differently: Take 150 mg by mouth every day.) 30 Capsule 2    Insulin Degludec (TRESIBA FLEXTOUCH) 200 UNIT/ML Solution Pen-injector Inject up to 60 units under the skin daily or as directed (Patient taking differently: Inject 68 Units under the skin every evening.) 27 mL 1    Alcohol Swabs Pads use one alcohol swab three times a day as needed. 300 Each 6    Insulin Pen Needle 32 G x 4 mm Use to inject insulin 4 times daily 100 Each 5    levothyroxine (SYNTHROID) 150 MCG Tab Take 1 Tablet by mouth every morning on an empty stomach. 90 Tablet 1     "lisinopril (PRINIVIL) 10 MG Tab Take 1 Tablet by mouth every day. 90 Tablet 1    Continuous Glucose Sensor (FREESTYLE ALEKSANDAR 2 SENSOR) [facility] Use 1 kit as directed every two weeks change every 14 days 7 Each 3    Glucagon (BAQSIMI TWO PACK) 3 mg/dose Inhale 1 SPRAY into the nostril as a single dose for severe hypoglycemia (Patient taking differently: Administer 1 Spray into one nostril 1 time a day as needed. Indications: Disorder with Low Blood Sugar) 2 Each 3    Multiple Vitamins-Minerals (EMERGEN-C IMMUNE) Pack Take 1 Package by mouth every day. (Patient not taking: Reported on 11/27/2024)      Dextromethorphan-guaiFENesin (ROBITUSSIN COUGH/CHEST DM MAX PO) Take 20 mL by mouth 2 times a day as needed (For cold symptoms). (Patient not taking: Reported on 11/27/2024)       No current facility-administered medications for this visit.       /72 (BP Location: Left arm, Patient Position: Sitting, BP Cuff Size: Large adult)   Pulse 88   Temp 36.3 °C (97.3 °F) (Temporal)   Ht 1.6 m (5' 3\")   Wt 97.3 kg (214 lb 9.6 oz)   LMP 11/19/2024 (Exact Date)   SpO2 97%   BMI 38.01 kg/m²   Physical Exam  Constitutional:       General: She is not in acute distress.     Appearance: Normal appearance.   HENT:      Head: Normocephalic.      Right Ear: Tympanic membrane normal.      Left Ear: Tympanic membrane normal.      Nose: Nose normal. No congestion or rhinorrhea.      Mouth/Throat:      Mouth: Mucous membranes are moist.   Eyes:      General: No scleral icterus.     Extraocular Movements: Extraocular movements intact.      Conjunctiva/sclera: Conjunctivae normal.      Pupils: Pupils are equal, round, and reactive to light.   Cardiovascular:      Rate and Rhythm: Normal rate and regular rhythm.      Pulses: Normal pulses.      Heart sounds: Normal heart sounds. No murmur heard.     No gallop.   Pulmonary:      Effort: Pulmonary effort is normal. No respiratory distress.      Breath sounds: Normal breath sounds. No " wheezing, rhonchi or rales.   Chest:      Chest wall: No tenderness.   Abdominal:      General: Bowel sounds are normal. There is no distension.      Palpations: There is no mass.      Tenderness: There is no abdominal tenderness. There is no guarding or rebound.   Genitourinary:     Rectum: Normal.   Musculoskeletal:         General: No swelling or deformity. Normal range of motion.      Cervical back: Normal range of motion and neck supple.      Right lower leg: No edema.      Left lower leg: No edema.   Lymphadenopathy:      Cervical: No cervical adenopathy.   Skin:     General: Skin is warm.      Capillary Refill: Capillary refill takes less than 2 seconds.      Coloration: Skin is not jaundiced or pale.      Findings: No bruising, erythema or rash.   Neurological:      General: No focal deficit present.      Mental Status: She is alert and oriented to person, place, and time.      Sensory: No sensory deficit.      Motor: No weakness.      Gait: Gait normal.      Deep Tendon Reflexes: Reflexes normal.   Psychiatric:         Mood and Affect: Mood normal.         Behavior: Behavior normal.         Thought Content: Thought content normal.         Judgment: Judgment normal.           Assessment and Plan:     1. Type 2 diabetes mellitus with hyperglycemia, with long-term current use of insulin (HCC)  The patient has longstanding uncontrolled type 2 diabetes, with a history of noncompliance with her insulin regimen due to a self-reported correlation between high doses of long-acting insulin and abnormal vaginal bleeding, despite this side effect being undocumented in the literature. Her most recent HbA1c from 11/14 was 12.4, reflecting inconsistent insulin use. On 11/19, the patient presented to the ED with nausea, vomiting, and a blood glucose level of 415 mg/dL. DKA was ruled out, with no anion gap metabolic acidosis, urine ketones, or beta-hydroxybutyrate detected. Since that episode, the patient has adhered to her  PCP’s insulin recommendations. At today’s visit, her average glucose over the past 7 days was 255 mg/dL, indicating improvement compared to her home readings from two weeks ago. She has a follow-up appointment with endocrinology scheduled for March 2025 for follow-up and management of her uncontrolled diabetes.    Plan:  - Continue Tresiba 60 units  - Continue Humalog 8 units before meals  - Continue metformin 1000 mg twice daily p.o.  - Patient has a new appointment with endocrinology for March 2025.      2. abnormal vaginal bleeding  The patient associates episodes of abnormal vaginal bleeding with an increase in her long-acting insulin dose above 60 units of Tresiba. However, according to her PCP and the pharmacist managing her type 2 diabetes, the patient has previously experienced vaginal bleeding without an increased dose of Tresiba, making this correlation less likely. She is currently awaiting a pelvic ultrasound and is under follow-up with gynecology. The patient has been counseled multiple times by her PCP, emphasizing that the bleeding is unlikely to be related to her long-acting insulin use.    Plan:    - Follow-up with gynecology    Return in about 3 weeks (around 12/18/2024).      Wyatt Romero M.D., MPH. PGY-1 Internal Medicine  [facility]    This note was created using voice recognition software.  While every attempt is made to ensure accuracy of transcription, occasionally errors occur.

## 2024-11-28 ASSESSMENT — ENCOUNTER SYMPTOMS
WEIGHT LOSS: 0
TREMORS: 0
PALPITATIONS: 0
DIZZINESS: 0
HEADACHES: 0
ORTHOPNEA: 0
SORE THROAT: 0
MEMORY LOSS: 0
SHORTNESS OF BREATH: 0
DEPRESSION: 0
WEAKNESS: 0
HEARTBURN: 0
MYALGIAS: 0
EYE REDNESS: 0
HALLUCINATIONS: 0
CONSTIPATION: 0
DIARRHEA: 0
SINUS PAIN: 0
CHILLS: 0
EYE PAIN: 0
FEVER: 0
SEIZURES: 0
BLURRED VISION: 0
NERVOUS/ANXIOUS: 0
WHEEZING: 0
PND: 0
ABDOMINAL PAIN: 0
DOUBLE VISION: 0
BRUISES/BLEEDS EASILY: 0
COUGH: 0
FOCAL WEAKNESS: 0
INSOMNIA: 0
FALLS: 0

## 2024-11-30 PROCEDURE — RXMED WILLOW AMBULATORY MEDICATION CHARGE: Performed by: PHYSICIAN ASSISTANT

## 2024-12-05 ENCOUNTER — DOCUMENTATION (OUTPATIENT)
Dept: VASCULAR LAB | Facility: MEDICAL CENTER | Age: 44
End: 2024-12-05
Payer: MEDICAID

## 2024-12-05 NOTE — PROGRESS NOTES
Pt missed her dm f/u visit in the pharmacotherapy clinic today. Called pt and left a vm asking her to call us at [phone] to reschedule.    Yari CANADA

## 2024-12-06 ENCOUNTER — APPOINTMENT (OUTPATIENT)
Dept: RADIOLOGY | Facility: MEDICAL CENTER | Age: 44
End: 2024-12-06
Attending: STUDENT IN AN ORGANIZED HEALTH CARE EDUCATION/TRAINING PROGRAM
Payer: MEDICAID

## 2024-12-11 PROCEDURE — RXMED WILLOW AMBULATORY MEDICATION CHARGE: Performed by: NURSE PRACTITIONER

## 2024-12-12 ENCOUNTER — NON-PROVIDER VISIT (OUTPATIENT)
Dept: OCCUPATIONAL MEDICINE | Facility: CLINIC | Age: 44
End: 2024-12-12

## 2024-12-12 DIAGNOSIS — Z02.89 PHYSICAL EXAMINATION OF EMPLOYEE: Primary | ICD-10-CM

## 2024-12-12 PROCEDURE — RXMED WILLOW AMBULATORY MEDICATION CHARGE: Performed by: PHYSICIAN ASSISTANT

## 2024-12-12 PROCEDURE — 86580 TB INTRADERMAL TEST: CPT | Performed by: PREVENTIVE MEDICINE

## 2024-12-12 PROCEDURE — 80305 DRUG TEST PRSMV DIR OPT OBS: CPT | Performed by: PREVENTIVE MEDICINE

## 2024-12-13 PROCEDURE — RXMED WILLOW AMBULATORY MEDICATION CHARGE: Performed by: INTERNAL MEDICINE

## 2024-12-15 ENCOUNTER — PHARMACY VISIT (OUTPATIENT)
Dept: PHARMACY | Facility: MEDICAL CENTER | Age: 44
End: 2024-12-15
Payer: COMMERCIAL

## 2024-12-15 ENCOUNTER — NON-PROVIDER VISIT (OUTPATIENT)
Dept: URGENT CARE | Facility: CLINIC | Age: 44
End: 2024-12-15

## 2024-12-15 LAB — TB WHEAL 3D P 5 TU DIAM: 0 MM

## 2024-12-15 PROCEDURE — RXOTC WILLOW AMBULATORY OTC CHARGE

## 2024-12-22 PROCEDURE — RXMED WILLOW AMBULATORY MEDICATION CHARGE: Performed by: INTERNAL MEDICINE

## 2024-12-23 ENCOUNTER — HOSPITAL ENCOUNTER (OUTPATIENT)
Dept: RADIOLOGY | Facility: MEDICAL CENTER | Age: 44
End: 2024-12-23
Attending: STUDENT IN AN ORGANIZED HEALTH CARE EDUCATION/TRAINING PROGRAM
Payer: MEDICAID

## 2024-12-23 DIAGNOSIS — Z01.419 WELL WOMAN EXAM WITH ROUTINE GYNECOLOGICAL EXAM: ICD-10-CM

## 2024-12-23 PROCEDURE — RXMED WILLOW AMBULATORY MEDICATION CHARGE: Performed by: INTERNAL MEDICINE

## 2024-12-23 PROCEDURE — 77067 SCR MAMMO BI INCL CAD: CPT

## 2024-12-24 ENCOUNTER — PHARMACY VISIT (OUTPATIENT)
Dept: PHARMACY | Facility: MEDICAL CENTER | Age: 44
End: 2024-12-24
Payer: COMMERCIAL

## 2024-12-24 DIAGNOSIS — Z79.4 TYPE 2 DIABETES MELLITUS WITH HYPERGLYCEMIA, WITH LONG-TERM CURRENT USE OF INSULIN (HCC): ICD-10-CM

## 2024-12-24 DIAGNOSIS — E11.65 TYPE 2 DIABETES MELLITUS WITH HYPERGLYCEMIA, WITH LONG-TERM CURRENT USE OF INSULIN (HCC): ICD-10-CM

## 2024-12-24 PROCEDURE — RXMED WILLOW AMBULATORY MEDICATION CHARGE: Performed by: NURSE PRACTITIONER

## 2024-12-24 RX ORDER — INSULIN DEGLUDEC 200 U/ML
INJECTION, SOLUTION SUBCUTANEOUS
Qty: 27 ML | Refills: 1 | Status: SHIPPED | OUTPATIENT
Start: 2024-12-24

## 2024-12-24 RX ORDER — INSULIN LISPRO 100 [IU]/ML
INJECTION, SOLUTION INTRAVENOUS; SUBCUTANEOUS
Qty: 45 ML | Refills: 1 | Status: SHIPPED | OUTPATIENT
Start: 2024-12-24

## 2024-12-25 ENCOUNTER — HOSPITAL ENCOUNTER (OUTPATIENT)
Dept: RADIOLOGY | Facility: MEDICAL CENTER | Age: 44
End: 2024-12-25
Payer: MEDICAID

## 2024-12-25 DIAGNOSIS — N93.9 ABNORMAL UTERINE BLEEDING: ICD-10-CM

## 2024-12-25 PROCEDURE — 76830 TRANSVAGINAL US NON-OB: CPT

## 2025-01-02 ENCOUNTER — OFFICE VISIT (OUTPATIENT)
Dept: INTERNAL MEDICINE | Facility: OTHER | Age: 45
End: 2025-01-02
Payer: MEDICAID

## 2025-01-02 VITALS
HEART RATE: 92 BPM | DIASTOLIC BLOOD PRESSURE: 69 MMHG | BODY MASS INDEX: 35.44 KG/M2 | WEIGHT: 200 LBS | SYSTOLIC BLOOD PRESSURE: 103 MMHG | OXYGEN SATURATION: 96 % | HEIGHT: 63 IN | TEMPERATURE: 97.7 F

## 2025-01-02 DIAGNOSIS — R10.13 EPIGASTRIC DISCOMFORT: ICD-10-CM

## 2025-01-02 DIAGNOSIS — R11.2 NAUSEA AND VOMITING, UNSPECIFIED VOMITING TYPE: ICD-10-CM

## 2025-01-02 DIAGNOSIS — Z79.4 TYPE 2 DIABETES MELLITUS WITH HYPERGLYCEMIA, WITH LONG-TERM CURRENT USE OF INSULIN (HCC): ICD-10-CM

## 2025-01-02 DIAGNOSIS — E11.65 TYPE 2 DIABETES MELLITUS WITH HYPERGLYCEMIA, WITH LONG-TERM CURRENT USE OF INSULIN (HCC): ICD-10-CM

## 2025-01-02 LAB — GLUCOSE BLD-MCNC: 389 MG/DL (ref 65–99)

## 2025-01-02 PROCEDURE — 99214 OFFICE O/P EST MOD 30 MIN: CPT | Performed by: INTERNAL MEDICINE

## 2025-01-02 PROCEDURE — 3074F SYST BP LT 130 MM HG: CPT | Performed by: INTERNAL MEDICINE

## 2025-01-02 PROCEDURE — 3078F DIAST BP <80 MM HG: CPT | Performed by: INTERNAL MEDICINE

## 2025-01-02 PROCEDURE — 82962 GLUCOSE BLOOD TEST: CPT | Performed by: INTERNAL MEDICINE

## 2025-01-02 PROCEDURE — RXMED WILLOW AMBULATORY MEDICATION CHARGE: Performed by: INTERNAL MEDICINE

## 2025-01-02 RX ORDER — PROCHLORPERAZINE MALEATE 10 MG
10 TABLET ORAL EVERY 6 HOURS PRN
Qty: 30 TABLET | Refills: 0 | Status: SHIPPED | OUTPATIENT
Start: 2025-01-02

## 2025-01-02 ASSESSMENT — FIBROSIS 4 INDEX: FIB4 SCORE: 0.65

## 2025-01-02 ASSESSMENT — PATIENT HEALTH QUESTIONNAIRE - PHQ9: CLINICAL INTERPRETATION OF PHQ2 SCORE: 0

## 2025-01-02 NOTE — LETTER
January 2, 2025    To Whom It May Concern:         This is confirmation that Liana Obrien attended her scheduled appointment with Main Mcgee M.D. on 1/02/25. Please excuse Liana from work on 1/3 and 1/4/2025.         If you have any questions please do not hesitate to call me at the phone number listed below.    Sincerely,          Main Mcgee M.D.  [phone]

## 2025-01-02 NOTE — PATIENT INSTRUCTIONS
-Avoid coffee.  -Take omeprazole 20 mg half an hour before first meal.  -Take antinausea medications up to every 6 hours as needed.  -Try to stay hydrated is much as possible.  -If sugars are higher than 300 may take 3 units of Humalog to bring it down.  Do not repeat Humalog for another 8 hours if sugars are not coming down with this 3 units within 8 hours, please present to the ER.  -Try not to skip doses of Humalog associated with meals however would not overlap Humalog within 8 hours.  If you have already taken Humalog within the past 8 hours and you plan to have a meal then take only what is remaining to complete 8 units of Humalog before the meal.  For example if you took 3 units at 10 AM and you are planning to take lunch at 12 PM then take 5 units before you eat that lunch at 12 PM.  -Do this only until your nausea subsides after that go back to taking Humalog 8 units before meals with Tresiba 60 units every evening  -Take Tresiba every evening without fail.  -If these interventions and not resolving the nausea vomiting I would present to the ER for IV fluids and IV insulin

## 2025-01-02 NOTE — PROGRESS NOTES
Chief Complaint   Patient presents with    Diabetes     Follow up    Emesis     Began vomiting this morning and has no explanation for it       HPI: Liana Obrien is a 43 y.o. female with past medical history as below who presented to the clinic for the following.    *Nausea vomiting.  -Started this morning, 2 episodes of vomiting, acidic taste without any blood vomited mostly the breakfast she ate, along with her morning medications will  -States that her sugars was also showing high today.  -Reports that for the past 1 week she has been compliant with Tresiba 60 units every evening.  Took last dose last night.  -Reports that her sugars also overall have been generally better mostly in the 200s to 300s which is relatively better compared to previously mostly in 400s.  -Patient has been however forgetting the short acting insulin prior to meals.  -Denies any fevers, chills.  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  -Denies having eaten out.  Denies any other urinary symptoms except for increased frequency for the past few weeks.  Denies any dysuria burning, denies any diarrhea.  -Positive for epigastric discomfort.  -Has not been able to drink water today since she threw up, has not eaten anything.  -Point-of-care glucose checked in clinic today.            *Type 2 dm - uncontrolled  - Non adherence to medications   A1C 10.4  -multiple hospitalizations for symptomatic hyperglycemia due to non adherance   -last hospitalized with [facility] in may 2024 due to non compliance   -using CGM   New nutrition referral was placed on previous visit -has not had appt yet   Following up with pharmacotherapy services now- however patient is requesting for decreased frequency of visits with them despite uncontrolled sugars -   patient has also now resumed working and her work timings as per patient  were interfering with taking insulin - long  acting included -which she was not doing in the morning- which she often forgot to in a hurry to leave the home for work- changed dose to evenings   Frequently comes in for appts with nausea vomiting and sugars in 400s-   She is supposed to be on the following now based on last phramacy note    Tresiba - 60 units   2. humalog 8 units three times a day before meals   Also on metformin 1000 in the morning and 1000 in the evening -  During most recent hospitalization - patient was referred to endocrinology - patient did not want to follow up with previous endo . Appt pending for march   -patient also at some point self discontinued tresiba - completely due to menorrhagia which she associated with increased doses on insulin - reassurance was provided and restarted on insulin   -Patient reports today that she has been taking her tresiba regularly for 1 week now- however does not remember to take her humalog.   - Comes in with sudden onset nausea and vomiting as above     Has already followed up with ophthalmology requested for records previously.  Microalbumin creatinine ratio - normal as per labs done in August 2024  Does have neuropathy, pain associated with it                                                  Other problems not discussed on this visit   *Lower extremity edema.  -resolved   -History of dvt - D dimer negative   *History of ALBARO   -History of menorrhagia - as detailed below   *Fibroids   CBC most recently showed elevated rdw - no anemia at this time    ferritin of 7.1, percent saturation of 5, TIBC of 275.from march 2024   No bleeding at this time   Hormonal options are not an option as patient has a history of DVT - patient may need surgical options - explained to patient  She is taking iron supplement daily , however also is complaining of constipation  Has seen gynecology since - working up for PCOS, pelvic usg , mammogram ordered , iUD recommended due to history of DVT   *Right ring finger pain    -Started after injury in the past - had subsided on its own, however now has recently started hurting again along the finger now all the way up to her forearm.   Patient attributes it to recent lifting etc   There is no swelling erythema warmth on exam.   Patient has not tried anything for the pain yet   Would like to see hand surgeon - referral information provided  Since previous visits however symptoms have significantly improved    *Rash  -Mildly papular, nonpustular nonplaque-like, associated with some hyperpigmentation in the surroundings on the face upper neck and upper extremities.  -Today able to appreciate the wounds on the upper neck and face.  -Patient has tried Cetaphil and use Lubriderm without much effect.  -Patient did change her detergent soap recently.  -Patient has not been out in the sun for a long time.  -Lesions are not painful or itchy.  -Discussed with patient regarding trial of CeraVe, change of detergent soap and reassess if not resolving then to consider dermatology referral for this.    *Urine incontinence   -Patient continues to have issues with urine incontinence - increased frequency of urination   Uncontrolled sugars   Requesting for orders to be faxed to [facility] for incontinence supplies   Bed pads - briefs, pads   Fax : number [facility] - [phone]  *vaginal candidiasis - Status post fluconazole and also boric acid - with resolution of symptoms  Pharmacy recommends against synjardy - I agree plus insurancce is not covering it   With candida glabrata   *Pubic lesions /vulvar lesions /per-rectal   Resolved , status post antibiotics   Management of other conditions which may be related to diabetes as below   Patient does have constipation - will modify iron supplementation ad manage sugars as above  Increased urine frequency-management as above   urge incontinence history more than stress  -Patients foot pain- bilateral plantar surface discussed today, follows up with podiatry  post takes gabapentin.      Last eye exam in December   Last monofilament test - normal monofilament test  with decreased sensation only on the right great toe.  -Pulses intact, vibration sense slightly reduced in the great toes only.  -Proprioception intact.    *Hypokalemia.  -Noted on the last lab work done in the hospital with a potassium of 3.3.  -Normal renal function.  Not on any medications that could be lowering potassium levels  *Vitamin D deficiency.  -Last vitamin D from 3/16/2024 was 11  Taking viatmin d supplement   *Anxiety  *Depression  -Patient is following up with psychiatry, mental health counselor, [place] [facility].  -Was receiving inpatient psychiatry treatment, currently is on Zoloft 100 mg, trazodone 100 mg  at nighttime, hydroxyzine as needed.   -Recent stressors as mentioned above.  Saw psychology psychiatry in hospital - now back to her outpatient psychologist - feels much better after she physically is feeling better   Sleep apnea  -Sleep study at home was done however she has not heard back from them   Needs to be faxed over again - was faxed in the past by Dr. Sharif   Status post dental procedure   -Associated with swelling - much better   There is some remnant discomfort which however is improving   Not anymore   Dentures - so the cavities   Cleaning   *non adherance to meds /side effects of meds   Patient believed that the nausea, vomiting and diarrhea is from her medications.   As per patient, her endocrinologist has stopped all the oral meds for diabetes that she was on and is currently on on insulin degludec 30 units now , humalog based on sliding scale and trulicity   Pioglitazone, steglatro and metformin were stopped . However patient continued to have nausea, vomiting diarrhea which has since spontaneously resolved - hence likely associated with COVID   Recommended to continue lisinopril daily and atorvastatin 3 times a week   Patient is on as needed hydroxyzine which she  is taking once or upto twice as needed   Taking trazodone 100 mg and sertraline through psychiatry - will address these with psychiatry   -Patient was on gabapentin which she has stopped since  she got steroid injection for foot which helped with the pain and that has resolved since   Patient also reports that she had to stop Vitamin C because it interacted with one of the medications that she was on and and she feels that she is getting more sick because she has not been able to take the vitamin C   *Blurry vision-checked with optometrist.  -Minimal changes were associated with diabetes as per patient otherwise within normal limits.  *Shortness of breath.  Resolved   -Patient reported that she felt that with increasing her level of activity around the house recently and had noticed that she gets very winded just walking around the house.  Denied any cough or wheezing.   -Denied any chest pain.  Did  report palpitations sometimes when she gets anxious which goes away with some deep breaths, which is also resolved now   Denied any lightheadedness, dizziness, pedal edema, orthopnea or PND  -Does have history of sleep apnea, was on CPAP in the past patient does not recall why as she is not on it anymore.  -On examination patient patient did have a grade three systolic murmur that was present previously however on subsequent examination  - unable to appreciate that murmur.   Echocardiogram recently done was also not concerning except trace MR, mild PI concentric hypertrophy of left ventricle    -Denied any fevers, chills.  -As per labs in the past patient has had history of anemia, she has regular menstrual cycles sometimes, most recently her menstrual cycle lasted for 10 days.  Has history of PCOS as per chart, ovarian cyst  -Last cbc from November 2023 shows microcytosis without anemia , MCV- 75, normal RDW and other cell lines. PBS not commented on   -Has history of DVT as per chart.  *Tachycardia   resolved  -noted  that her watch - heart rate above 110 when she is up and about   -Not present today at rest and patient is asymptomatic  -echo unremarkable  Last cbc showed   History of COVID -recovered well -described below   -Patient was able to finally reach the ophthalmologist , however was told that her insurance would not be taken   *Skin tag   -on lower abdomen - grown - wants to be rmoved,  -Also on exam noted to have erythema on lower abdominal region below skin fold- no symptoms   *Blurry vision  -Patient reports that about 6 years ago she had a significant blurry vision of both eyes which lasted for several months, when she was almost declared blind legally, was evaluated by ophthalmologist, unsure what the diagnosis was.  -Since then however her vision has significantly improved, currently has intermittent blurring of vision on both eyes, which when comes on lasts for a day to a month continuously.  -Denies any headaches, flashes floaters blind spots, eye pain, redness or aura  -We had placed ophthalmology referral on the last visit however patient was not able to attend the appointment due to being inpatient for mental health related issues.  -Patient is requesting for new referral.  -Patient denies having any dry eyes at this time  *Breast Skin lesion   Has had history of bug bites.   -On examination now only has superficial scar, no deeper concerning tissue on palpation.  -Patient did have another spot with similar complaints, which again is currently a scar.  -Had ordered ultrasound breast at the time which has not been done.  -Given resolution of lump will go ahead and proceed to routine mammogram screening.  *Status post surgery for perirectal abscess/fistula   -Healing well as per patient  *Obesity  Was working with nutrition however now waiting for establishment with new nutrition specialist.  *Dyslipidemia-patient is on atorvastatin 40 mg.  *Hypertension on lisinopril 10 -  *GERD   *Hypothyroidism, is on  levothyroxine 150 mcg-last TSH from March 2024 within normal limits.  Polycystic ovary syndrome  2.2 cm right ovarian cyst seen on recent CT in May 2023  Unable to undergo TVUS due to perirectal abscess  Unable to go on estrogen containing OCP due to history of VTE  Will follow up with gyn following resolution of perirectal abscess   History of deep venous thrombosis  2 episodes reported on bilateral lower extremities, subsequent ultrasounds for suspected dvt were negative- thought to have been precipitated by Birth control which she is not on anymore        [facility]  Pap smear pending   Colon cancer  - next year   Mammo ordered in September   HIV non reactive, hep c negative  Tdap - next in 2025                  Current Outpatient Medications   Medication Sig Dispense Refill    prochlorperazine (COMPAZINE) 10 MG Tab Take 1 Tablet by mouth every 6 hours as needed for Nausea/Vomiting. 30 Tablet 0    omeprazole (PRILOSEC) 20 MG delayed-release capsule Take 1 Capsule by mouth every day. 30 mins before first meal of the day 30 Capsule 1    Insulin Degludec (TRESIBA FLEXTOUCH) 200 UNIT/ML Solution Pen-injector Inject up to 60 units under the skin daily or as directed  Indications: Type 2 Diabetes 27 mL 1    insulin lispro (HUMALOG KWIKPEN) 100 UNIT/ML SC SOPN injection PEN Inject 8-16 units subcutaneously three times daily before meals or as directed (max 50 units/day)  Indications: Type 2 Diabetes 45 mL 1    sertraline (ZOLOFT) 100 MG Tab Take 1.5 oral tablets once a day 45 Tablet 2    vitamin D (CHOLECALCIFEROL) 1000 UNIT Tab Take 1 Tablet by mouth every day. 90 Tablet 1    FIBER ADULT GUMMIES PO Take 2 Tablets by mouth every day.      Multiple Vitamins-Minerals (EMERGEN-C IMMUNE) Pack Take 1 Package by mouth every day.      hydrOXYzine pamoate (VISTARIL) 50 MG Cap Take 1 capsule by mouth every (4-6) hours as needed for anxiety or sleep 120 Capsule 2    traZODone (DESYREL) 50 MG Tab Take 0.5-1 oral tablet at  bedtime (Patient taking differently: Take 25-50 mg by mouth every evening.) 30 Tablet 1    atorvastatin (LIPITOR) 40 MG Tab Take 1 Tablet by mouth every evening. (Patient taking differently: Take 40 mg by mouth every day.) 90 Tablet 1    gabapentin (NEURONTIN) 100 MG Cap Take 2 Capsules by mouth 3 times a day. 180 Capsule 3    ferrous sulfate (FEROSUL) 325 (65 Fe) MG tablet Take 1 Tablet by mouth every 48 hours. 30 Tablet 1    fluconazole (DIFLUCAN) 150 MG tablet Take 1 Tablet by mouth every day. For  persistent symptoms, take a second dose of 150 mg administered 72 hours after the first dose. (Patient taking differently: Take 150 mg by mouth see administration instructions. For  persistent symptoms, take a second dose of 150 mg administered 72 hours after the first dose.) 2 Tablet 1    metFORMIN ER (GLUCOPHAGE XR) 500 MG TABLET SR 24 HR Take 2 Tablets by mouth 2 times a day. 360 Tablet 3    Alcohol Swabs Pads use one alcohol swab three times a day as needed. 300 Each 6    Insulin Pen Needle 32 G x 4 mm Use to inject insulin 4 times daily 100 Each 5    levothyroxine (SYNTHROID) 150 MCG Tab Take 1 Tablet by mouth every morning on an empty stomach. 90 Tablet 1    lisinopril (PRINIVIL) 10 MG Tab Take 1 Tablet by mouth every day. 90 Tablet 1    Continuous Glucose Sensor (FREESTYLE ALEKSANDAR 2 SENSOR) [facility] Use 1 kit as directed every two weeks change every 14 days 7 Each 3    Glucagon (BAQSIMI TWO PACK) 3 mg/dose Inhale 1 SPRAY into the nostril as a single dose for severe hypoglycemia (Patient taking differently: Administer 1 Spray into one nostril 1 time a day as needed. Indications: Disorder with Low Blood Sugar) 2 Each 3    hydrOXYzine pamoate (VISTARIL) 50 MG Cap Take 1 oral capsule as needed every (4-6) hours for anxiety or sleep (Patient not taking: Reported on 1/2/2025) 120 Capsule 2    traZODone (DESYREL) 50 MG Tab Take 0.5-1 oral tablet at bedtime (Patient not taking: Reported on 1/2/2025) 30 Tablet 1     "Dextromethorphan-guaiFENesin (ROBITUSSIN COUGH/CHEST DM MAX PO) Take 20 mL by mouth 2 times a day as needed (For cold symptoms). (Patient not taking: Reported on 1/2/2025)      Sertraline HCl 150 MG Cap Take 1 oral capsule once a day (Patient not taking: Reported on 1/2/2025) 30 Capsule 2     No current facility-administered medications for this visit.       ROS: As per HPI. Additional pertinent systems as noted below.  Constitutional:  Negative for fever, chills   Cardiovascular:  Negative for chest pain,  GI : as in hpi       /69 (BP Location: Right arm, Patient Position: Sitting, BP Cuff Size: Adult)   Pulse 92   Temp 36.5 °C (97.7 °F) (Temporal)   Ht 1.6 m (5' 3\")   Wt 90.7 kg (200 lb)   SpO2 96%   BMI 35.43 kg/m²     Physical Exam   Constitutional:  .  Not in acute distress        Note: I have reviewed all pertinent labs and diagnostic tests associated with this visit with specific comments listed under the assessment and plan below    Assessment and Plan    1. Nausea and vomiting, unspecified vomiting type  -Symptoms not favoring viral/infectious gastroenteritis given lack of other symptoms and only 2 episodes of nausea vomiting.  This was following breakfast, random sugar hours after breakfast is more than 300 hence nausea vomiting experienced in the morning may have been from hyperglycemia, acidosis.  Currently feeling nauseous however has not vomited.  -Patient recommended to start taking omeprazole, antinausea medication and trying to stay as well-hydrated as possible, take short acting insulin as soon as she gets home.  Instructed to take 3 units of higher than 300 reassess in 2 hours take remaining 5 units to complete 8 units of Humalog and eat a small meal.  Check again every 8 hours and administer Humalog with meals.  -Reinforced the need to take Tresiba every evening.  -Patient instructed to present to the ER if sugars are not getting controlled and nausea vomiting still persisting despite " the above intervention.    - POCT Glucose  - prochlorperazine (COMPAZINE) 10 MG Tab; Take 1 Tablet by mouth every 6 hours as needed for Nausea/Vomiting.  Dispense: 30 Tablet; Refill: 0  - omeprazole (PRILOSEC) 20 MG delayed-release capsule; Take 1 Capsule by mouth every day. 30 mins before first meal of the day  Dispense: 30 Capsule; Refill: 1    2. Epigastric discomfort  Plan as above.  - omeprazole (PRILOSEC) 20 MG delayed-release capsule; Take 1 Capsule by mouth every day. 30 mins before first meal of the day  Dispense: 30 Capsule; Refill: 1    3. Type 2 diabetes mellitus with hyperglycemia, with long-term current use of insulin (HCC)  -Quite challenging to manage her diabetes due to nonadherence to insulin and diabetic diet.  -Pending reestablishing with a new nutritionist and endocrinologist due to dissatisfaction previously.  -Reports that she has been taking Tresiba regularly for the past 1 week however had not prior to that, reports that blood sugars are better controlled since starting Tresiba however still 200-300 range as per patient.  -Still having a hard time remembering to take Humalog,  -Is taking metformin regularly.  -Also following up with pharmacotherapy services which she requested to see less frequently despite her uncontrolled diabetes due to work schedule.  -Reinforced again the need to titrate Tresiba regularly and then focus on Humalog thereafter.  -For acute symptoms of nausea vomiting plan as under problem 1.  -Microalbumin creatinine ratio normal from August 2024  -[facility], records pending.    -Does have neuropathy, follows up with podiatry as well.  No wounds cuts ulcers at this time.  -Renal function stable.    3. Administrative encounter  Letter provided to return back to work after 2 days given symptoms and nausea at this time which possibly secondary to uncontrolled sugars, given instructions to control sugars and when to present  to the ER.    Followup: Return in about 1 month (around 2/2/2025).  Instructed to follow-up sooner if unable to follow-up with pharmacotherapy services within next 2 weeks      Signed by: Main Mcgee M.D.

## 2025-01-03 ENCOUNTER — PHARMACY VISIT (OUTPATIENT)
Dept: PHARMACY | Facility: MEDICAL CENTER | Age: 45
End: 2025-01-03
Payer: COMMERCIAL

## 2025-01-06 ENCOUNTER — PHARMACY VISIT (OUTPATIENT)
Dept: PHARMACY | Facility: MEDICAL CENTER | Age: 45
End: 2025-01-06
Payer: MEDICAID

## 2025-01-06 ENCOUNTER — NON-PROVIDER VISIT (OUTPATIENT)
Dept: VASCULAR LAB | Facility: MEDICAL CENTER | Age: 45
End: 2025-01-06
Attending: INTERNAL MEDICINE
Payer: MEDICAID

## 2025-01-06 DIAGNOSIS — Z79.4 TYPE 2 DIABETES MELLITUS WITH HYPERGLYCEMIA, WITH LONG-TERM CURRENT USE OF INSULIN (HCC): ICD-10-CM

## 2025-01-06 DIAGNOSIS — E11.65 TYPE 2 DIABETES MELLITUS WITH HYPERGLYCEMIA, WITH LONG-TERM CURRENT USE OF INSULIN (HCC): ICD-10-CM

## 2025-01-06 PROCEDURE — RXOTC WILLOW AMBULATORY OTC CHARGE: Performed by: PHARMACIST

## 2025-01-06 PROCEDURE — 99212 OFFICE O/P EST SF 10 MIN: CPT

## 2025-01-06 RX ORDER — INSULIN DEGLUDEC 200 U/ML
INJECTION, SOLUTION SUBCUTANEOUS
Qty: 18 ML | Refills: 1 | Status: SHIPPED | OUTPATIENT
Start: 2025-01-06

## 2025-01-06 NOTE — PROGRESS NOTES
Patient Consult Note    Primary care physician: Main Mcgee M.D.    Reason for consult: Management of Uncontrolled Type 2 Diabetes    HPI:  Liana Obrien is a 44 y.o. old patient who comes in today for evaluation of above stated problem.    Allergies  Bicillin c-r [penicillin g proc & benzathine], Ondansetron, Penicillin g, Sulfa drugs, and Metoclopramide    Current Diabetes Medication Regimen  Metformin ER: 1000 mg in the AM and 1000 mg in the PM  Tresiba 60 units daily in the AM  Humalog 8 units before meals    Previous Diabetes Medications and Reason for Discontinuation  Tresiba and Humalog - stopped by pt d/t perceived ADR of vaginal bleeding  Mounjaro - stopped by hospitalist d/t dehydration  Metformin ER - cramping and nausea, but now tolerating  Pioglitazone - stopped by former endocrinologist 2023  Steglatro - stopped by former endocrinologist 2023; hx of UTI    Potential Barriers to Care:  Adherence: reports missed doses  Initially reported missing her Tresiba doses stating that she is about to run out of supply since pharmacy only gave her 1 box in November and said that she has been injecting Humalog consistently. However, as the visit progresses and when asked when she last injected both insulin, she states she does not recall and it has been > 1 week.  Side effects: none at this time  Affordability: no issues  Other:  Pt reports increased stress levels from work  She also states the holidays are a tough time for her due to losing loved ones around this time of the year in the past  Pt reports fear of hypoglycemia    SMBG  Pt has home glucometer and proper testing technique - Patient has CGM (Zack 2)        Hypoglycemia  Hypoglycemia awareness: Yes  Nocturnal hypoglycemia: None  Hypoglycemia:  None  Pt's treatment of Hypoglycemia  Discussed 15:15 Rule    Lifestyle  Current Exercise - walking a lot at work    Dietary  Patient was working with a nutritionist and was meeting with her weekly, but  d/t insurance issues and work schedule, she is unable to f/u at this time.  Pt states she has not been eating much but diet has been variable    Labs  Lab Results   Component Value Date/Time    HBA1C 12.4 (A) 11/14/2024 12:00 AM      Lab Results   Component Value Date/Time    SODIUM 134 (L) 11/19/2024 12:49 PM    POTASSIUM 4.1 11/19/2024 12:49 PM    CHLORIDE 96 11/19/2024 12:49 PM    CO2 23 11/19/2024 12:49 PM    GLUCOSE 415 (H) 11/19/2024 12:49 PM    BUN 12 11/19/2024 12:49 PM    CREATININE 0.51 11/19/2024 12:49 PM     Lab Results   Component Value Date/Time    ALKPHOSPHAT 94 11/19/2024 12:49 PM    ASTSGOT 20 11/19/2024 12:49 PM    ALTSGPT 29 11/19/2024 12:49 PM    TBILIRUBIN 0.3 11/19/2024 12:49 PM    INR 1.03 09/22/2022 01:20 AM    ALBUMIN 4.4 11/19/2024 12:49 PM      Lab Results   Component Value Date/Time    CHOLSTRLTOT 132 08/11/2018 08:22 AM    LDL 59 08/11/2018 08:22 AM    HDL 57 08/11/2018 08:22 AM    TRIGLYCERIDE 82 08/11/2018 08:22 AM       Lab Results   Component Value Date/Time    MALBCRT 30 08/11/2018 08:21 AM    MICROALBUR 4.1 08/11/2018 08:21 AM     Preventative Management:  BP regimen (ACEi/ARB): lisinopril 10 mg  Statin: atorvastatin (Lipitor) 20 mg three times a week  LDL <100: no (see Media for most recent labs)  Last Lipid Panel 11/13/23      Physical Examination:  Vital signs: There were no vitals taken for this visit. There is no height or weight on file to calculate BMI.    Assessment and Plan:    1. DM2  Discussed Goals: FBG 80 - 130, 2hPP < 180, a1c < 7.0%  Last A1c drawn today was 12.4%, which is not at goal and increased from previous A1c of 9.7%  TIR not at goal of > 70% as TIR in the last month is only 0%  AGP shows 100% hyperglycemia (very high glucose levels of > 250 mg/dl)  Pt continues to be non-adherent to medication regimen. At every visit, we discuss the risks of uncontrolled BG and strategies on how to improve adherence.  Pt reported intermittently being compliant to her insulin  regimen in the last weeks but her CGM reports shows otherwise. She also reported that the pharmacy did not give her enough basal insulin, however even if she was given only one box, with missed doses, would have expected pt to have more supply. I am uncertain as to what happened to her basal insulin supply but will attempt to resend Rx for Tresiba reflecting higher dose so pt can titrate her basal insulin dose over the next weeks    - Medication changes:  Resume Tresiba 60 units daily  Inject Humalog 10 units before meals  Continue Metformin 1000 mg in the morning and 1000 mg in the evening with meals    - Lifestyle changes:  Exercise Goal - Increase exercise as tolerated. Walk for 10 minutes per day   Dietary Goal - Establish care with new nutritionist    Follow Up:  2 weeks    Jesus Cazares, PharmD    CC:   Main Mcgee M.D.

## 2025-01-21 ENCOUNTER — NON-PROVIDER VISIT (OUTPATIENT)
Dept: VASCULAR LAB | Facility: MEDICAL CENTER | Age: 45
End: 2025-01-21
Attending: INTERNAL MEDICINE
Payer: MEDICAID

## 2025-01-21 PROCEDURE — RXMED WILLOW AMBULATORY MEDICATION CHARGE: Performed by: NURSE PRACTITIONER

## 2025-01-21 PROCEDURE — 99212 OFFICE O/P EST SF 10 MIN: CPT

## 2025-01-21 NOTE — PATIENT INSTRUCTIONS
Inject Tresiba 60 units daily    Inject Humalog 14 units before meals    Take metformin 1000 mg in the morning and 1000 mg in the evening with meals

## 2025-01-21 NOTE — PROGRESS NOTES
Patient Consult Note    Primary care physician: Main Mcgee M.D.    Reason for consult: Management of Uncontrolled Type 2 Diabetes    HPI:  Liana Obrien is a 44 y.o. old patient who comes in today for evaluation of above stated problem.    Allergies  Bicillin c-r [penicillin g proc & benzathine], Ondansetron, Penicillin g, Sulfa drugs, and Metoclopramide    Current Diabetes Medication Regimen  Metformin ER: 1000 mg in the AM and 1000 mg in the PM  Tresiba 60 units daily in the PM  Humalog 10 units before meals    Previous Diabetes Medications and Reason for Discontinuation  Tresiba and Humalog - stopped by pt d/t perceived ADR of vaginal bleeding  Mounjaro - stopped by hospitalist d/t dehydration  Metformin ER - cramping and nausea, but now tolerating  Pioglitazone - stopped by former endocrinologist 2023  Steglatro - stopped by former endocrinologist 2023; hx of UTI    Potential Barriers to Care:  Adherence: reports missed doses  Missed last two days of Tresiba  Only injecting Humalog before breakfast  Missed PM dose of metformin last week but doing better this week  Side effects: occasional diarrhea  Affordability: no issues  Other:  Pt reports increased stress levels from work  Pt reports fear of hypoglycemia  Will be changing insurance next month, unsure if it will be [employer]    SMBG  Pt has home glucometer and proper testing technique - Patient has CGM (Zack 2)        Hypoglycemia  Hypoglycemia awareness: Yes  Nocturnal hypoglycemia: None  Hypoglycemia:  None  Pt's treatment of Hypoglycemia  Discussed 15:15 Rule    Lifestyle  Current Exercise - walking a lot at work    Dietary  Eating 2-3 meals a day  Patient was working with a nutritionist and was meeting with her weekly, but d/t insurance issues and work schedule, she is unable to f/u at this time.    Labs  Lab Results   Component Value Date/Time    HBA1C 12.4 (A) 11/14/2024 12:00 AM      Lab Results   Component Value Date/Time    SODIUM 134  (L) 11/19/2024 12:49 PM    POTASSIUM 4.1 11/19/2024 12:49 PM    CHLORIDE 96 11/19/2024 12:49 PM    CO2 23 11/19/2024 12:49 PM    GLUCOSE 415 (H) 11/19/2024 12:49 PM    BUN 12 11/19/2024 12:49 PM    CREATININE 0.51 11/19/2024 12:49 PM     Lab Results   Component Value Date/Time    ALKPHOSPHAT 94 11/19/2024 12:49 PM    ASTSGOT 20 11/19/2024 12:49 PM    ALTSGPT 29 11/19/2024 12:49 PM    TBILIRUBIN 0.3 11/19/2024 12:49 PM    INR 1.03 09/22/2022 01:20 AM    ALBUMIN 4.4 11/19/2024 12:49 PM      Lab Results   Component Value Date/Time    CHOLSTRLTOT 132 08/11/2018 08:22 AM    LDL 59 08/11/2018 08:22 AM    HDL 57 08/11/2018 08:22 AM    TRIGLYCERIDE 82 08/11/2018 08:22 AM       Lab Results   Component Value Date/Time    MALBCRT 30 08/11/2018 08:21 AM    MICROALBUR 4.1 08/11/2018 08:21 AM     Preventative Management:  BP regimen (ACEi/ARB): lisinopril 10 mg  Statin: atorvastatin (Lipitor) 20 mg three times a week  LDL <100: no (see Media for most recent labs)  Last Lipid Panel 11/13/23      Physical Examination:  Vital signs: There were no vitals taken for this visit. There is no height or weight on file to calculate BMI.    Assessment and Plan:    1. DM2  Discussed Goals: FBG 80 - 130, 2hPP < 180, a1c < 7.0%  Last A1c drawn was 12.4%, which is not at goal and increased from previous A1c of 9.7%  TIR not at goal of > 70% but shows improvement in the last two weeks  Pt continues to have adherence issues to medication regimen, however she has been working on this the past 2 weeks which is evident on her CGM report. At every visit, we discuss the risks of uncontrolled BG and strategies on how to improve adherence.  Will increase bolus insulin for additional BG control    - Medication changes:  Continue Tresiba 60 units daily  Increase Humalog to 14 units before meals  Continue Metformin 1000 mg in the morning and 1000 mg in the evening with meals    - Lifestyle changes:  Exercise Goal - Increase exercise as tolerated. Walk for  10 minutes per day   Dietary Goal - Establish care with new nutritionist    Follow Up:  1 week    Jesus Cazares, PharmD    CC:   Main Mcgee M.D.

## 2025-01-24 ENCOUNTER — PHARMACY VISIT (OUTPATIENT)
Dept: PHARMACY | Facility: MEDICAL CENTER | Age: 45
End: 2025-01-24
Payer: COMMERCIAL

## 2025-01-24 PROCEDURE — RXMED WILLOW AMBULATORY MEDICATION CHARGE: Performed by: INTERNAL MEDICINE

## 2025-01-24 PROCEDURE — RXMED WILLOW AMBULATORY MEDICATION CHARGE: Performed by: PHYSICIAN ASSISTANT

## 2025-01-26 DIAGNOSIS — I10 HYPERTENSION, UNSPECIFIED TYPE: ICD-10-CM

## 2025-01-26 DIAGNOSIS — E03.9 HYPOTHYROIDISM, UNSPECIFIED TYPE: ICD-10-CM

## 2025-01-26 PROCEDURE — RXMED WILLOW AMBULATORY MEDICATION CHARGE: Performed by: NURSE PRACTITIONER

## 2025-01-26 PROCEDURE — RXMED WILLOW AMBULATORY MEDICATION CHARGE: Performed by: INTERNAL MEDICINE

## 2025-01-27 RX ORDER — LEVOTHYROXINE SODIUM 150 UG/1
150 TABLET ORAL
Qty: 90 TABLET | Refills: 0 | Status: SHIPPED | OUTPATIENT
Start: 2025-01-27

## 2025-01-27 RX ORDER — LISINOPRIL 10 MG/1
10 TABLET ORAL
Qty: 90 TABLET | Refills: 0 | Status: SHIPPED | OUTPATIENT
Start: 2025-01-27

## 2025-01-27 NOTE — TELEPHONE ENCOUNTER
Received request via: Pharmacy    Was the patient seen in the last year in this department? Yes    Does the patient have an active prescription (recently filled or refills available) for medication(s) requested? No    Pharmacy Name: [facility] [place]    Does the patient have [employer] Plus and need 100-day supply? (This applies to ALL medications) Patient does not have SCP

## 2025-01-28 ENCOUNTER — NON-PROVIDER VISIT (OUTPATIENT)
Dept: VASCULAR LAB | Facility: MEDICAL CENTER | Age: 45
End: 2025-01-28
Attending: INTERNAL MEDICINE
Payer: MEDICAID

## 2025-01-28 DIAGNOSIS — Z79.4 TYPE 2 DIABETES MELLITUS WITH HYPERGLYCEMIA, WITH LONG-TERM CURRENT USE OF INSULIN (HCC): ICD-10-CM

## 2025-01-28 DIAGNOSIS — E11.65 TYPE 2 DIABETES MELLITUS WITH HYPERGLYCEMIA, WITH LONG-TERM CURRENT USE OF INSULIN (HCC): ICD-10-CM

## 2025-01-28 PROCEDURE — 99212 OFFICE O/P EST SF 10 MIN: CPT

## 2025-01-28 PROCEDURE — RXMED WILLOW AMBULATORY MEDICATION CHARGE: Performed by: NURSE PRACTITIONER

## 2025-01-28 RX ORDER — BLOOD PRESSURE TEST KIT
KIT MISCELLANEOUS
Qty: 400 EACH | Refills: 3 | Status: SHIPPED | OUTPATIENT
Start: 2025-01-28

## 2025-01-28 NOTE — Clinical Note
Please release Rx to [facility]. Patient states insurance covers alcohol swabs but I advised her to pay out of pocket otherwise. Thanks!

## 2025-01-28 NOTE — PROGRESS NOTES
Patient Consult Note    Primary care physician: Main Mcgee M.D.    Reason for consult: Management of Uncontrolled Type 2 Diabetes    HPI:  Liana Obrien is a 44 y.o. old patient who comes in today for evaluation of above stated problem.    Allergies  Bicillin c-r [penicillin g proc & benzathine], Ondansetron, Penicillin g, Sulfa drugs, and Metoclopramide    Current Diabetes Medication Regimen  Metformin ER: 1000 mg in the AM and 1000 mg in the PM  Tresiba 60 units daily in the PM  Humalog 14 units before meals    Previous Diabetes Medications and Reason for Discontinuation  Tresiba and Humalog - stopped by pt d/t perceived ADR of vaginal bleeding  Mounjaro - stopped by hospitalist d/t dehydration  Metformin ER - cramping and nausea, but now tolerating  Pioglitazone - stopped by former endocrinologist 2023  Steglatro - stopped by former endocrinologist 2023; hx of UTI    Potential Barriers to Care:  Adherence: reports missed doses  Missed one dose of Tresiba  Injecting Humalog before breakfast and dinner  Missed two PM doses of metformin last week  Side effects: none  Affordability: no issues  Other:  Pt reports increased stress levels from work  Pt reports fear of hypoglycemia  Will be changing insurance next month, unsure if it will be [employer] or something else    SMBG  Pt has home glucometer and proper testing technique - Patient has CGM (Zack 2)        Hypoglycemia  Hypoglycemia awareness: Yes  Nocturnal hypoglycemia: None  Hypoglycemia:  None  Pt's treatment of Hypoglycemia  Discussed 15:15 Rule    Lifestyle  Current Exercise - walking a lot at work    Dietary  Eating 2-3 meals a day  Patient was working with a nutritionist and was meeting with her weekly, but d/t insurance issues and work schedule, she is unable to f/u at this time.    Labs  Lab Results   Component Value Date/Time    HBA1C 12.4 (A) 11/14/2024 12:00 AM      Lab Results   Component Value Date/Time    SODIUM 134 (L) 11/19/2024 12:49  PM    POTASSIUM 4.1 11/19/2024 12:49 PM    CHLORIDE 96 11/19/2024 12:49 PM    CO2 23 11/19/2024 12:49 PM    GLUCOSE 415 (H) 11/19/2024 12:49 PM    BUN 12 11/19/2024 12:49 PM    CREATININE 0.51 11/19/2024 12:49 PM     Lab Results   Component Value Date/Time    ALKPHOSPHAT 94 11/19/2024 12:49 PM    ASTSGOT 20 11/19/2024 12:49 PM    ALTSGPT 29 11/19/2024 12:49 PM    TBILIRUBIN 0.3 11/19/2024 12:49 PM    INR 1.03 09/22/2022 01:20 AM    ALBUMIN 4.4 11/19/2024 12:49 PM      Lab Results   Component Value Date/Time    CHOLSTRLTOT 132 08/11/2018 08:22 AM    LDL 59 08/11/2018 08:22 AM    HDL 57 08/11/2018 08:22 AM    TRIGLYCERIDE 82 08/11/2018 08:22 AM       Lab Results   Component Value Date/Time    MALBCRT 30 08/11/2018 08:21 AM    MICROALBUR 4.1 08/11/2018 08:21 AM     Preventative Management:  BP regimen (ACEi/ARB): lisinopril 10 mg  Statin: atorvastatin (Lipitor) 20 mg three times a week  LDL <100: no (see Media for most recent labs)  Last Lipid Panel 11/13/23      Physical Examination:  Vital signs: There were no vitals taken for this visit. There is no height or weight on file to calculate BMI.    Assessment and Plan:    1. DM2  Discussed Goals: FBG 80 - 130, 2hPP < 180, a1c < 7.0%  Last A1c drawn was 12.4%, which is not at goal and increased from previous A1c of 9.7%  TIR not at goal of > 70%  Pt continues to have adherence issues to medication regimen, however states she has been working on this. However, adherence is still questionable given CGM report. It does appear like she was more adherent the past two days given improving trends. Of note, at every visit, we discuss the risks of uncontrolled BG and strategies on how to improve adherence.  Will continue current regimen and trend    - Medication changes:  Continue Tresiba 60 units daily  Continue Humalog 14 units before meals  Continue Metformin 1000 mg in the morning and 1000 mg in the evening with meals    - Lifestyle changes:  Exercise Goal - Increase exercise  as tolerated  Dietary Goal - Establish care with new nutritionist    Follow Up:  2 weeks    Jesus Cazares, PharmD    CC:   Main Mcgee M.D.

## 2025-02-02 ENCOUNTER — PHARMACY VISIT (OUTPATIENT)
Dept: PHARMACY | Facility: MEDICAL CENTER | Age: 45
End: 2025-02-02
Payer: COMMERCIAL

## 2025-04-03 PROCEDURE — RXMED WILLOW AMBULATORY MEDICATION CHARGE: Performed by: NURSE PRACTITIONER

## 2025-04-05 PROCEDURE — RXMED WILLOW AMBULATORY MEDICATION CHARGE: Performed by: PHYSICIAN ASSISTANT

## 2025-04-05 PROCEDURE — RXMED WILLOW AMBULATORY MEDICATION CHARGE: Performed by: NURSE PRACTITIONER

## 2025-04-10 ENCOUNTER — PHARMACY VISIT (OUTPATIENT)
Dept: PHARMACY | Facility: MEDICAL CENTER | Age: 45
End: 2025-04-10
Payer: COMMERCIAL

## (undated) DEVICE — LEGGING LITHOTOMY 31 X 48 IN - (2EA/PK 20PK/CA)

## (undated) DEVICE — KIT SIGMOIDOSCOPE W/BULB AND - SUCTION (1/PK 10PK/CA)

## (undated) DEVICE — SPONGE GAUZESTER 4 X 4 4PLY - (128PK/CA)

## (undated) DEVICE — CANISTER SUCTION 3000ML MECHANICAL FILTER AUTO SHUTOFF MEDI-VAC NONSTERILE LF DISP  (40EA/CA)

## (undated) DEVICE — SET EXTENSION WITH 2 PORTS (48EA/CA) ***PART #2C8610 IS A SUBSTITUTE*****

## (undated) DEVICE — SENSOR OXIMETER ADULT SPO2 RD SET (20EA/BX)

## (undated) DEVICE — LACTATED RINGERS INJ 1000 ML - (14EA/CA 60CA/PF)

## (undated) DEVICE — GAUZE PACKING STRIP PLAIN STERILE - 1/2 IN X 5 YD (12/CS)

## (undated) DEVICE — BLADE SURGICAL #11 - (50/BX)

## (undated) DEVICE — BLADE SURGICAL #15 - (50/BX 3BX/CA)

## (undated) DEVICE — SUTURE 3-0 VICRYL PLUS SH - 27 INCH (36/BX)

## (undated) DEVICE — COVER LIGHT HANDLE ALC PLUS DISP (18EA/BX)

## (undated) DEVICE — PAD SANITARY 11IN MAXI IND WRAPPED  (12EA/PK 24PK/CA)

## (undated) DEVICE — JELLY SURGILUBE STERILE TUBE 4.25 OZ (1/EA)

## (undated) DEVICE — SUTURE 0 COATED VICRYL 6-18IN - (12PK/BX)

## (undated) DEVICE — Device

## (undated) DEVICE — DRAPE LAPAROTOMY T SHEET - (12EA/CA)

## (undated) DEVICE — GAUZE FLUFF STERILE 2-PLY 36 X 36 (100EA/CA)

## (undated) DEVICE — GLOVE SZ 6.5 BIOGEL PI MICRO - PF LF (50PR/BX)

## (undated) DEVICE — BRIEF STRETCH MATERNITY M/L - FITS 20-60IN (5EA/BG 20BG/CA)

## (undated) DEVICE — PAD PREP 24 X 48 CUFFED - (100/CA)

## (undated) DEVICE — ARMREST CRADLE FOAM - (2PR/PK 12PR/CA)

## (undated) DEVICE — SODIUM CHL IRRIGATION 0.9% 1000ML (12EA/CA)

## (undated) DEVICE — GAUZE PACKING STRIP PLAIN STERILE - 1 IN X 5 YD (12/CA)

## (undated) DEVICE — SUTURE GENERAL

## (undated) DEVICE — TRAY SRGPRP PVP IOD WT PRP - (20/CA)

## (undated) DEVICE — SUTURE 0 SILK TIES (36PK/BX)

## (undated) DEVICE — ELECTRODE DUAL RETURN W/ CORD - (50/PK)

## (undated) DEVICE — GLOVE BIOGEL PI INDICATOR SZ 6.5 SURGICAL PF LF - (50/BX 4BX/CA)

## (undated) DEVICE — SET LEADWIRE 5 LEAD BEDSIDE DISPOSABLE ECG (1SET OF 5/EA)

## (undated) DEVICE — RINGDISP RETRACTOR LONESTAR

## (undated) DEVICE — TUBING CLEARLINK DUO-VENT - C-FLO (48EA/CA)

## (undated) DEVICE — CATHETER IV 18 GA X 1-3/4 ---SURG.& SDS ONLY---

## (undated) DEVICE — GOWN WARMING STANDARD FLEX - (30/CA)

## (undated) DEVICE — PACK MINOR BASIN - (2EA/CA)

## (undated) DEVICE — CATHETER IV 14 GA X 2 ---SURG.& SDS ONLY---(200EA/CA)

## (undated) DEVICE — GLOVE BIOGEL SZ 8 SURGICAL PF LTX - (50PR/BX 4BX/CA)

## (undated) DEVICE — TRAY SKIN SCRUB PVP WET (20EA/CA) PART #DYND70356 DISCONTINUED

## (undated) DEVICE — VESSELOOP MINI BLUE STERILE - SURG-I-LOOP (10EA/BX)

## (undated) DEVICE — WATER IRRIGATION STERILE 1000ML (12EA/CA)

## (undated) DEVICE — GLOVE, LITE (PAIR)

## (undated) DEVICE — TUBE, CULTURE AEROBIC

## (undated) DEVICE — DRESSING ABDOMINAL PAD STERILE 8 X 10" (360EA/CA)"

## (undated) DEVICE — TOWEL STOP TIMEOUT SAFETY FLAG (40EA/CA)

## (undated) DEVICE — VESSELOOP MAXI BLUE STERILE- SURG-I-LOOP (10EA/BX)

## (undated) DEVICE — DRAPE UNDER BUTTOCKS FLUID - (20/CA)

## (undated) DEVICE — SUCTION INSTRUMENT YANKAUER BULBOUS TIP W/O VENT (50EA/CA)

## (undated) DEVICE — SLEEVE, VASO, THIGH, MED

## (undated) DEVICE — HEMOSTAT SURG ABSORBABLE - 4 X 8 IN SURGICEL (24EA/CA)

## (undated) DEVICE — SLEEVE VASO CALF MED - (10PR/CA)